# Patient Record
Sex: MALE | Race: WHITE | Employment: OTHER | URBAN - METROPOLITAN AREA
[De-identification: names, ages, dates, MRNs, and addresses within clinical notes are randomized per-mention and may not be internally consistent; named-entity substitution may affect disease eponyms.]

---

## 2017-01-12 ENCOUNTER — CARE COORDINATION (OUTPATIENT)
Dept: INTERNAL MEDICINE | Age: 82
End: 2017-01-12

## 2017-02-06 ENCOUNTER — CARE COORDINATION (OUTPATIENT)
Dept: INTERNAL MEDICINE | Age: 82
End: 2017-02-06

## 2017-02-10 RX ORDER — AMLODIPINE BESYLATE AND BENAZEPRIL HYDROCHLORIDE 10; 20 MG/1; MG/1
1 CAPSULE ORAL DAILY
Qty: 30 CAPSULE | Refills: 5 | Status: SHIPPED | OUTPATIENT
Start: 2017-02-10 | End: 2017-11-28 | Stop reason: SDUPTHER

## 2017-03-03 ENCOUNTER — CARE COORDINATION (OUTPATIENT)
Dept: INTERNAL MEDICINE | Age: 82
End: 2017-03-03

## 2017-03-14 DIAGNOSIS — E78.5 HYPERLIPIDEMIA, UNSPECIFIED HYPERLIPIDEMIA TYPE: Primary | ICD-10-CM

## 2017-03-14 DIAGNOSIS — I10 ESSENTIAL HYPERTENSION: ICD-10-CM

## 2017-03-15 DIAGNOSIS — I10 ESSENTIAL HYPERTENSION: ICD-10-CM

## 2017-03-15 DIAGNOSIS — E78.5 HYPERLIPIDEMIA, UNSPECIFIED HYPERLIPIDEMIA TYPE: ICD-10-CM

## 2017-03-15 LAB
ALBUMIN SERPL-MCNC: 4.5 G/DL (ref 3.9–4.9)
ALP BLD-CCNC: 97 U/L (ref 35–104)
ALT SERPL-CCNC: 15 U/L (ref 0–41)
ANION GAP SERPL CALCULATED.3IONS-SCNC: 13 MEQ/L (ref 7–13)
AST SERPL-CCNC: 14 U/L (ref 0–40)
BILIRUB SERPL-MCNC: 0.7 MG/DL (ref 0–1.2)
BUN BLDV-MCNC: 21 MG/DL (ref 8–23)
CALCIUM SERPL-MCNC: 9.5 MG/DL (ref 8.6–10.2)
CHLORIDE BLD-SCNC: 100 MEQ/L (ref 98–107)
CHOLESTEROL, TOTAL: 173 MG/DL (ref 0–199)
CO2: 25 MEQ/L (ref 22–29)
CREAT SERPL-MCNC: 1.09 MG/DL (ref 0.7–1.2)
GFR AFRICAN AMERICAN: >60
GFR NON-AFRICAN AMERICAN: >60
GLOBULIN: 2.3 G/DL (ref 2.3–3.5)
GLUCOSE BLD-MCNC: 89 MG/DL (ref 74–109)
HDLC SERPL-MCNC: 55 MG/DL (ref 40–59)
LDL CHOLESTEROL CALCULATED: 97 MG/DL (ref 0–129)
POTASSIUM SERPL-SCNC: 4.3 MEQ/L (ref 3.5–5.1)
SODIUM BLD-SCNC: 138 MEQ/L (ref 132–144)
TOTAL PROTEIN: 6.8 G/DL (ref 6.4–8.1)
TRIGL SERPL-MCNC: 106 MG/DL (ref 0–200)

## 2017-03-20 ENCOUNTER — OFFICE VISIT (OUTPATIENT)
Dept: FAMILY MEDICINE CLINIC | Age: 82
End: 2017-03-20

## 2017-03-20 VITALS
TEMPERATURE: 97.3 F | HEIGHT: 69 IN | DIASTOLIC BLOOD PRESSURE: 66 MMHG | RESPIRATION RATE: 16 BRPM | SYSTOLIC BLOOD PRESSURE: 134 MMHG | HEART RATE: 80 BPM | BODY MASS INDEX: 30.81 KG/M2 | WEIGHT: 208 LBS

## 2017-03-20 DIAGNOSIS — I65.23 CAROTID STENOSIS, BILATERAL: ICD-10-CM

## 2017-03-20 DIAGNOSIS — I10 ESSENTIAL HYPERTENSION: Primary | ICD-10-CM

## 2017-03-20 DIAGNOSIS — F02.80 ALZHEIMER'S DISEASE OF OTHER ONSET WITHOUT BEHAVIORAL DISTURBANCE: ICD-10-CM

## 2017-03-20 DIAGNOSIS — G30.8 ALZHEIMER'S DISEASE OF OTHER ONSET WITHOUT BEHAVIORAL DISTURBANCE: ICD-10-CM

## 2017-03-20 DIAGNOSIS — E78.5 HYPERLIPIDEMIA, UNSPECIFIED HYPERLIPIDEMIA TYPE: ICD-10-CM

## 2017-03-20 PROCEDURE — G8484 FLU IMMUNIZE NO ADMIN: HCPCS | Performed by: FAMILY MEDICINE

## 2017-03-20 PROCEDURE — 4040F PNEUMOC VAC/ADMIN/RCVD: CPT | Performed by: FAMILY MEDICINE

## 2017-03-20 PROCEDURE — 99214 OFFICE O/P EST MOD 30 MIN: CPT | Performed by: FAMILY MEDICINE

## 2017-03-20 PROCEDURE — 1123F ACP DISCUSS/DSCN MKR DOCD: CPT | Performed by: FAMILY MEDICINE

## 2017-03-20 PROCEDURE — G8419 CALC BMI OUT NRM PARAM NOF/U: HCPCS | Performed by: FAMILY MEDICINE

## 2017-03-20 PROCEDURE — G8599 NO ASA/ANTIPLAT THER USE RNG: HCPCS | Performed by: FAMILY MEDICINE

## 2017-03-20 PROCEDURE — 1036F TOBACCO NON-USER: CPT | Performed by: FAMILY MEDICINE

## 2017-03-20 PROCEDURE — G8427 DOCREV CUR MEDS BY ELIG CLIN: HCPCS | Performed by: FAMILY MEDICINE

## 2017-04-10 ENCOUNTER — CARE COORDINATION (OUTPATIENT)
Dept: INTERNAL MEDICINE | Age: 82
End: 2017-04-10

## 2017-05-01 ENCOUNTER — CARE COORDINATION (OUTPATIENT)
Dept: INTERNAL MEDICINE | Age: 82
End: 2017-05-01

## 2017-06-05 RX ORDER — PRAVASTATIN SODIUM 80 MG/1
TABLET ORAL
Qty: 90 TABLET | Refills: 1 | Status: SHIPPED | OUTPATIENT
Start: 2017-06-05 | End: 2018-04-26 | Stop reason: SDUPTHER

## 2017-06-26 RX ORDER — DONEPEZIL HYDROCHLORIDE 5 MG/1
TABLET, FILM COATED ORAL
Qty: 30 TABLET | Refills: 5 | Status: SHIPPED | OUTPATIENT
Start: 2017-06-26 | End: 2018-04-25 | Stop reason: SDUPTHER

## 2017-08-09 RX ORDER — METOPROLOL SUCCINATE 25 MG/1
25 TABLET, EXTENDED RELEASE ORAL DAILY
Qty: 30 TABLET | Refills: 5 | Status: SHIPPED | OUTPATIENT
Start: 2017-08-09 | End: 2018-03-06 | Stop reason: SDUPTHER

## 2017-09-11 DIAGNOSIS — I10 ESSENTIAL HYPERTENSION: ICD-10-CM

## 2017-09-11 DIAGNOSIS — E78.5 HYPERLIPIDEMIA, UNSPECIFIED HYPERLIPIDEMIA TYPE: Primary | ICD-10-CM

## 2017-09-12 DIAGNOSIS — I10 ESSENTIAL HYPERTENSION: ICD-10-CM

## 2017-09-12 DIAGNOSIS — E78.5 HYPERLIPIDEMIA, UNSPECIFIED HYPERLIPIDEMIA TYPE: ICD-10-CM

## 2017-09-12 LAB
ALBUMIN SERPL-MCNC: 4.3 G/DL (ref 3.9–4.9)
ALP BLD-CCNC: 102 U/L (ref 35–104)
ALT SERPL-CCNC: 11 U/L (ref 0–41)
ANION GAP SERPL CALCULATED.3IONS-SCNC: 16 MEQ/L (ref 7–13)
AST SERPL-CCNC: 12 U/L (ref 0–40)
BASOPHILS ABSOLUTE: 0.1 K/UL (ref 0–0.2)
BASOPHILS RELATIVE PERCENT: 0.7 %
BILIRUB SERPL-MCNC: 0.4 MG/DL (ref 0–1.2)
BUN BLDV-MCNC: 24 MG/DL (ref 8–23)
CALCIUM SERPL-MCNC: 9.4 MG/DL (ref 8.6–10.2)
CHLORIDE BLD-SCNC: 100 MEQ/L (ref 98–107)
CHOLESTEROL, TOTAL: 149 MG/DL (ref 0–199)
CO2: 24 MEQ/L (ref 22–29)
CREAT SERPL-MCNC: 0.93 MG/DL (ref 0.7–1.2)
EOSINOPHILS ABSOLUTE: 0.2 K/UL (ref 0–0.7)
EOSINOPHILS RELATIVE PERCENT: 2.8 %
GFR AFRICAN AMERICAN: >60
GFR NON-AFRICAN AMERICAN: >60
GLOBULIN: 2.4 G/DL (ref 2.3–3.5)
GLUCOSE BLD-MCNC: 100 MG/DL (ref 74–109)
HCT VFR BLD CALC: 42.7 % (ref 42–52)
HDLC SERPL-MCNC: 56 MG/DL (ref 40–59)
HEMOGLOBIN: 14.3 G/DL (ref 14–18)
LDL CHOLESTEROL CALCULATED: 76 MG/DL (ref 0–129)
LYMPHOCYTES ABSOLUTE: 1.4 K/UL (ref 1–4.8)
LYMPHOCYTES RELATIVE PERCENT: 17 %
MCH RBC QN AUTO: 30.8 PG (ref 27–31.3)
MCHC RBC AUTO-ENTMCNC: 33.4 % (ref 33–37)
MCV RBC AUTO: 92.1 FL (ref 80–100)
MONOCYTES ABSOLUTE: 0.8 K/UL (ref 0.2–0.8)
MONOCYTES RELATIVE PERCENT: 9.8 %
NEUTROPHILS ABSOLUTE: 5.9 K/UL (ref 1.4–6.5)
NEUTROPHILS RELATIVE PERCENT: 69.7 %
PDW BLD-RTO: 14.6 % (ref 11.5–14.5)
PLATELET # BLD: 216 K/UL (ref 130–400)
POTASSIUM SERPL-SCNC: 4.7 MEQ/L (ref 3.5–5.1)
RBC # BLD: 4.64 M/UL (ref 4.7–6.1)
SODIUM BLD-SCNC: 140 MEQ/L (ref 132–144)
TOTAL PROTEIN: 6.7 G/DL (ref 6.4–8.1)
TRIGL SERPL-MCNC: 83 MG/DL (ref 0–200)
WBC # BLD: 8.5 K/UL (ref 4.8–10.8)

## 2017-09-18 ENCOUNTER — OFFICE VISIT (OUTPATIENT)
Dept: FAMILY MEDICINE CLINIC | Age: 82
End: 2017-09-18

## 2017-09-18 VITALS
DIASTOLIC BLOOD PRESSURE: 60 MMHG | WEIGHT: 208 LBS | SYSTOLIC BLOOD PRESSURE: 122 MMHG | HEIGHT: 69 IN | TEMPERATURE: 97.7 F | BODY MASS INDEX: 30.81 KG/M2 | RESPIRATION RATE: 16 BRPM | HEART RATE: 72 BPM

## 2017-09-18 DIAGNOSIS — E78.5 HYPERLIPIDEMIA, UNSPECIFIED HYPERLIPIDEMIA TYPE: ICD-10-CM

## 2017-09-18 DIAGNOSIS — G30.9 ALZHEIMER'S DEMENTIA WITHOUT BEHAVIORAL DISTURBANCE, UNSPECIFIED TIMING OF DEMENTIA ONSET: ICD-10-CM

## 2017-09-18 DIAGNOSIS — Z23 NEED FOR INFLUENZA VACCINATION: ICD-10-CM

## 2017-09-18 DIAGNOSIS — Z12.5 PROSTATE CANCER SCREENING: ICD-10-CM

## 2017-09-18 DIAGNOSIS — I10 ESSENTIAL HYPERTENSION: Primary | ICD-10-CM

## 2017-09-18 DIAGNOSIS — F02.80 ALZHEIMER'S DEMENTIA WITHOUT BEHAVIORAL DISTURBANCE, UNSPECIFIED TIMING OF DEMENTIA ONSET: ICD-10-CM

## 2017-09-18 PROCEDURE — 4040F PNEUMOC VAC/ADMIN/RCVD: CPT | Performed by: FAMILY MEDICINE

## 2017-09-18 PROCEDURE — 1036F TOBACCO NON-USER: CPT | Performed by: FAMILY MEDICINE

## 2017-09-18 PROCEDURE — G8427 DOCREV CUR MEDS BY ELIG CLIN: HCPCS | Performed by: FAMILY MEDICINE

## 2017-09-18 PROCEDURE — 99214 OFFICE O/P EST MOD 30 MIN: CPT | Performed by: FAMILY MEDICINE

## 2017-09-18 PROCEDURE — G8599 NO ASA/ANTIPLAT THER USE RNG: HCPCS | Performed by: FAMILY MEDICINE

## 2017-09-18 PROCEDURE — 1123F ACP DISCUSS/DSCN MKR DOCD: CPT | Performed by: FAMILY MEDICINE

## 2017-09-18 PROCEDURE — G8417 CALC BMI ABV UP PARAM F/U: HCPCS | Performed by: FAMILY MEDICINE

## 2017-09-18 PROCEDURE — G0008 ADMIN INFLUENZA VIRUS VAC: HCPCS | Performed by: FAMILY MEDICINE

## 2017-09-18 PROCEDURE — 90662 IIV NO PRSV INCREASED AG IM: CPT | Performed by: FAMILY MEDICINE

## 2017-09-18 ASSESSMENT — PATIENT HEALTH QUESTIONNAIRE - PHQ9
2. FEELING DOWN, DEPRESSED OR HOPELESS: 1
SUM OF ALL RESPONSES TO PHQ QUESTIONS 1-9: 2
1. LITTLE INTEREST OR PLEASURE IN DOING THINGS: 1
SUM OF ALL RESPONSES TO PHQ9 QUESTIONS 1 & 2: 2

## 2017-11-28 RX ORDER — AMLODIPINE BESYLATE AND BENAZEPRIL HYDROCHLORIDE 10; 20 MG/1; MG/1
CAPSULE ORAL
Qty: 30 CAPSULE | Refills: 5 | Status: SHIPPED | OUTPATIENT
Start: 2017-11-28 | End: 2018-03-06 | Stop reason: SDUPTHER

## 2017-11-29 RX ORDER — AMLODIPINE BESYLATE AND BENAZEPRIL HYDROCHLORIDE 10; 20 MG/1; MG/1
CAPSULE ORAL
Qty: 30 CAPSULE | Refills: 5 | Status: SHIPPED | OUTPATIENT
Start: 2017-11-29 | End: 2018-03-06 | Stop reason: SDUPTHER

## 2017-12-01 RX ORDER — AMLODIPINE BESYLATE AND BENAZEPRIL HYDROCHLORIDE 10; 20 MG/1; MG/1
CAPSULE ORAL
Qty: 30 CAPSULE | Refills: 5 | Status: SHIPPED | OUTPATIENT
Start: 2017-12-01 | End: 2018-05-14

## 2018-02-06 RX ORDER — FINASTERIDE 5 MG/1
TABLET, FILM COATED ORAL
Qty: 90 TABLET | Refills: 3 | Status: SHIPPED | OUTPATIENT
Start: 2018-02-06 | End: 2019-03-29 | Stop reason: SDUPTHER

## 2018-03-06 ENCOUNTER — OFFICE VISIT (OUTPATIENT)
Dept: FAMILY MEDICINE CLINIC | Age: 83
End: 2018-03-06
Payer: MEDICARE

## 2018-03-06 VITALS
HEIGHT: 69 IN | SYSTOLIC BLOOD PRESSURE: 160 MMHG | DIASTOLIC BLOOD PRESSURE: 66 MMHG | TEMPERATURE: 98.1 F | RESPIRATION RATE: 18 BRPM | HEART RATE: 84 BPM | WEIGHT: 208 LBS | BODY MASS INDEX: 30.81 KG/M2

## 2018-03-06 DIAGNOSIS — I10 ESSENTIAL HYPERTENSION: Primary | ICD-10-CM

## 2018-03-06 DIAGNOSIS — G30.9 ALZHEIMER'S DEMENTIA WITHOUT BEHAVIORAL DISTURBANCE, UNSPECIFIED TIMING OF DEMENTIA ONSET: ICD-10-CM

## 2018-03-06 DIAGNOSIS — F02.80 ALZHEIMER'S DEMENTIA WITHOUT BEHAVIORAL DISTURBANCE, UNSPECIFIED TIMING OF DEMENTIA ONSET: ICD-10-CM

## 2018-03-06 DIAGNOSIS — I65.23 CAROTID STENOSIS, BILATERAL: ICD-10-CM

## 2018-03-06 DIAGNOSIS — E78.5 HYPERLIPIDEMIA, UNSPECIFIED HYPERLIPIDEMIA TYPE: ICD-10-CM

## 2018-03-06 PROCEDURE — 1036F TOBACCO NON-USER: CPT | Performed by: FAMILY MEDICINE

## 2018-03-06 PROCEDURE — G8427 DOCREV CUR MEDS BY ELIG CLIN: HCPCS | Performed by: FAMILY MEDICINE

## 2018-03-06 PROCEDURE — G8484 FLU IMMUNIZE NO ADMIN: HCPCS | Performed by: FAMILY MEDICINE

## 2018-03-06 PROCEDURE — 1123F ACP DISCUSS/DSCN MKR DOCD: CPT | Performed by: FAMILY MEDICINE

## 2018-03-06 PROCEDURE — 99214 OFFICE O/P EST MOD 30 MIN: CPT | Performed by: FAMILY MEDICINE

## 2018-03-06 PROCEDURE — G8599 NO ASA/ANTIPLAT THER USE RNG: HCPCS | Performed by: FAMILY MEDICINE

## 2018-03-06 PROCEDURE — 4040F PNEUMOC VAC/ADMIN/RCVD: CPT | Performed by: FAMILY MEDICINE

## 2018-03-06 PROCEDURE — G8417 CALC BMI ABV UP PARAM F/U: HCPCS | Performed by: FAMILY MEDICINE

## 2018-03-06 RX ORDER — METOPROLOL SUCCINATE 50 MG/1
50 TABLET, EXTENDED RELEASE ORAL DAILY
Qty: 90 TABLET | Refills: 3 | Status: SHIPPED | OUTPATIENT
Start: 2018-03-06 | End: 2018-12-27 | Stop reason: HOSPADM

## 2018-03-06 NOTE — PROGRESS NOTES
Serenoa repens, (SAW PALMETTO PO) Take by mouth      Multiple Vitamins-Minerals (MULTIVITAMIN PO) Take by mouth      dorzolamide-timolol (COSOPT) 22.3-6.8 MG/ML ophthalmic solution       latanoprost (XALATAN) 0.005 % ophthalmic solution       timolol (TIMOPTIC) 0.5 % ophthalmic solution        No current facility-administered medications for this visit. The patient denies any history of      seizures,             heart attack or KNOWN CAD        or stroke. No chest pain, shortness of breath, paroxysmal nocturnal dyspnea. No nausea, vomiting, diarrhea, hematochezia or melena. No paresthesias or headaches. No dysuria, frequency or hematuria. Last labs  No visits with results within 3 Month(s) from this visit. Latest known visit with results is:   Orders Only on 09/12/2017   Component Date Value Ref Range Status    Cholesterol, Total 09/12/2017 149  0 - 199 mg/dL Final    Triglycerides 09/12/2017 83  0 - 200 mg/dL Final    HDL 09/12/2017 56  40 - 59 mg/dL Final    Comment: ATP III HDL Cholesterol Classification is Desirable. Expected Values:    Males:    >55 = No Risk            35-55 = Moderate Risk            <35 = High Risk    Females:  >65 = No Risk            45-65 = Moderate Risk            <45 = High Risk    NCEP Guidelines:   Third Report May 2001  >59 = negative risk factor for CHD  <40 = major risk factor for CHD      LDL Calculated 09/12/2017 76  0 - 129 mg/dL Final    Sodium 09/12/2017 140  132 - 144 mEq/L Final    Potassium 09/12/2017 4.7  3.5 - 5.1 mEq/L Final    Chloride 09/12/2017 100  98 - 107 mEq/L Final    CO2 09/12/2017 24  22 - 29 mEq/L Final    Anion Gap 09/12/2017 16* 7 - 13 mEq/L Final    Glucose 09/12/2017 100  74 - 109 mg/dL Final    BUN 09/12/2017 24* 8 - 23 mg/dL Final    CREATININE 09/12/2017 0.93  0.70 - 1.20 mg/dL Final    GFR Non- 09/12/2017 >60.0  >60 Final    Comment: >60 mL/min/1.73m2 EGFR, calc. for ages 25 and older using No results found for this visit on 03/06/18. Objective    Vitals:    03/06/18 1438 03/06/18 1446   BP: (!) 158/60 (!) 160/66   Pulse: 84    Resp: 18    Temp: 98.1 °F (36.7 °C)    TempSrc: Oral    Weight: 208 lb (94.3 kg)    Height: 5' 9\" (1.753 m)        PHYSICAL EXAMINATION:        GENERAL:    The patient appears well nourished and well-developed,     Normal affect. Not appearing significantly anxious or depressed. No acute respiratory distress. Alert and oriented times 2. Skin:     No skin rashes. No concerning moles observed. Gait:    Normal gait. No ataxia. HEENT:  Normocephalic, atraumatic. Throat:  Pharynx is clear, no erythema/ edema or exudates   Ears:    TMs normal bilaterally. Canals and ears normal   Eyes:  Extraocular eye motions intact and pain free. Pupils reactive/equal    Sclerae and conjunctivae clear    NECK: No masses or adenopathy palpable. No carotid bruits heard. No asymmetry visible. No thyromegaly. RESPIRATORY:   Clear/ Equal breath sounds /No acute respiratory distress. No wheezes,rales, or rhonchi. No percussive abnormalities    HEART: Regular rhythm without murmur, rub or gallop. ABDOMEN:  Soft, non tender. No masses, guarding or rebound. Normo active bowel sounds. EXTREMITIES:  No edema in any extremity. No cyanosis or clubbing. 2+ dorsalis pedis pulses bilaterally          Assessment & Plan   1. Essential hypertension     2. Hyperlipidemia, unspecified hyperlipidemia type     3. Carotid stenosis, bilateral     4. Alzheimer's dementia without behavioral disturbance, unspecified timing of dementia onset       No orders of the defined types were placed in this encounter.     Orders Placed This Encounter   Medications    metoprolol succinate (TOPROL XL) 50 MG extended release tablet     Sig: Take 1 tablet by mouth daily     Dispense:  90 tablet     Refill:  3     Medications Discontinued During This Encounter Medication Reason    amLODIPine-benazepril (LOTREL) 10-20 MG per capsule Duplicate Order    amLODIPine-benazepril (LOTREL) 10-20 MG per capsule Duplicate Order    metoprolol succinate (TOPROL XL) 25 MG extended release tablet Reorder     Return in about 4 months (around 7/6/2018).   Labs and appt     Lars Telles MD

## 2018-04-25 RX ORDER — DONEPEZIL HYDROCHLORIDE 5 MG/1
TABLET, FILM COATED ORAL
Qty: 30 TABLET | Refills: 5 | Status: SHIPPED | OUTPATIENT
Start: 2018-04-25 | End: 2018-05-18 | Stop reason: SDUPTHER

## 2018-04-27 RX ORDER — PRAVASTATIN SODIUM 80 MG/1
TABLET ORAL
Qty: 90 TABLET | Refills: 1 | Status: SHIPPED | OUTPATIENT
Start: 2018-04-27 | End: 2018-11-12 | Stop reason: SDUPTHER

## 2018-05-01 ENCOUNTER — HOSPITAL ENCOUNTER (OUTPATIENT)
Age: 83
Setting detail: OBSERVATION
Discharge: HOME | End: 2018-05-02
Attending: STUDENT IN AN ORGANIZED HEALTH CARE EDUCATION/TRAINING PROGRAM | Admitting: INTERNAL MEDICINE
Payer: MEDICARE

## 2018-05-01 ENCOUNTER — APPOINTMENT (OUTPATIENT)
Dept: GENERAL RADIOLOGY | Age: 83
End: 2018-05-01
Payer: MEDICARE

## 2018-05-01 DIAGNOSIS — R77.8 ELEVATED TROPONIN: Primary | ICD-10-CM

## 2018-05-01 DIAGNOSIS — I16.0 MALIGNANT HYPERTENSIVE URGENCY: ICD-10-CM

## 2018-05-01 LAB
ALBUMIN SERPL-MCNC: 4.5 G/DL (ref 3.9–4.9)
ALP BLD-CCNC: 106 U/L (ref 35–104)
ALT SERPL-CCNC: 14 U/L (ref 0–41)
ANION GAP SERPL CALCULATED.3IONS-SCNC: 15 MEQ/L (ref 7–13)
APTT: 27.3 SEC (ref 21.6–35.4)
AST SERPL-CCNC: 14 U/L (ref 0–40)
BASOPHILS ABSOLUTE: 0.1 K/UL (ref 0–0.2)
BASOPHILS RELATIVE PERCENT: 1.1 %
BILIRUB SERPL-MCNC: 0.7 MG/DL (ref 0–1.2)
BUN BLDV-MCNC: 22 MG/DL (ref 8–23)
CALCIUM SERPL-MCNC: 9.8 MG/DL (ref 8.6–10.2)
CHLORIDE BLD-SCNC: 100 MEQ/L (ref 98–107)
CO2: 24 MEQ/L (ref 22–29)
CREAT SERPL-MCNC: 0.84 MG/DL (ref 0.7–1.2)
EKG ATRIAL RATE: 80 BPM
EKG P AXIS: 32 DEGREES
EKG P-R INTERVAL: 166 MS
EKG Q-T INTERVAL: 372 MS
EKG QRS DURATION: 86 MS
EKG QTC CALCULATION (BAZETT): 429 MS
EKG R AXIS: -29 DEGREES
EKG T AXIS: -1 DEGREES
EKG VENTRICULAR RATE: 80 BPM
EOSINOPHILS ABSOLUTE: 0.2 K/UL (ref 0–0.7)
EOSINOPHILS RELATIVE PERCENT: 2.6 %
GFR AFRICAN AMERICAN: >60
GFR NON-AFRICAN AMERICAN: >60
GLOBULIN: 2.3 G/DL (ref 2.3–3.5)
GLUCOSE BLD-MCNC: 103 MG/DL (ref 74–109)
HCT VFR BLD CALC: 46 % (ref 42–52)
HEMOGLOBIN: 15.7 G/DL (ref 14–18)
INR BLD: 1
LV EF: 60 %
LVEF MODALITY: NORMAL
LYMPHOCYTES ABSOLUTE: 1.2 K/UL (ref 1–4.8)
LYMPHOCYTES RELATIVE PERCENT: 18.2 %
MCH RBC QN AUTO: 31.3 PG (ref 27–31.3)
MCHC RBC AUTO-ENTMCNC: 34.1 % (ref 33–37)
MCV RBC AUTO: 92 FL (ref 80–100)
MONOCYTES ABSOLUTE: 0.5 K/UL (ref 0.2–0.8)
MONOCYTES RELATIVE PERCENT: 7.6 %
NEUTROPHILS ABSOLUTE: 4.6 K/UL (ref 1.4–6.5)
NEUTROPHILS RELATIVE PERCENT: 70.5 %
PDW BLD-RTO: 14.3 % (ref 11.5–14.5)
PLATELET # BLD: 190 K/UL (ref 130–400)
POTASSIUM SERPL-SCNC: 4.2 MEQ/L (ref 3.5–5.1)
PROTHROMBIN TIME: 10.8 SEC (ref 9.6–12.3)
RBC # BLD: 5.01 M/UL (ref 4.7–6.1)
SODIUM BLD-SCNC: 139 MEQ/L (ref 132–144)
TOTAL CK: 144 U/L (ref 0–190)
TOTAL PROTEIN: 6.8 G/DL (ref 6.4–8.1)
TROPONIN: 0.01 NG/ML (ref 0–0.01)
TROPONIN: <0.01 NG/ML (ref 0–0.01)
WBC # BLD: 6.5 K/UL (ref 4.8–10.8)

## 2018-05-01 PROCEDURE — 99285 EMERGENCY DEPT VISIT HI MDM: CPT

## 2018-05-01 PROCEDURE — 80053 COMPREHEN METABOLIC PANEL: CPT

## 2018-05-01 PROCEDURE — 36415 COLL VENOUS BLD VENIPUNCTURE: CPT

## 2018-05-01 PROCEDURE — 85025 COMPLETE CBC W/AUTO DIFF WBC: CPT

## 2018-05-01 PROCEDURE — 85610 PROTHROMBIN TIME: CPT

## 2018-05-01 PROCEDURE — 93005 ELECTROCARDIOGRAM TRACING: CPT

## 2018-05-01 PROCEDURE — 96372 THER/PROPH/DIAG INJ SC/IM: CPT

## 2018-05-01 PROCEDURE — 71046 X-RAY EXAM CHEST 2 VIEWS: CPT

## 2018-05-01 PROCEDURE — 6370000000 HC RX 637 (ALT 250 FOR IP): Performed by: STUDENT IN AN ORGANIZED HEALTH CARE EDUCATION/TRAINING PROGRAM

## 2018-05-01 PROCEDURE — G0378 HOSPITAL OBSERVATION PER HR: HCPCS

## 2018-05-01 PROCEDURE — 2580000003 HC RX 258: Performed by: NURSE PRACTITIONER

## 2018-05-01 PROCEDURE — 82550 ASSAY OF CK (CPK): CPT

## 2018-05-01 PROCEDURE — 93306 TTE W/DOPPLER COMPLETE: CPT

## 2018-05-01 PROCEDURE — 6370000000 HC RX 637 (ALT 250 FOR IP): Performed by: INTERNAL MEDICINE

## 2018-05-01 PROCEDURE — 6360000002 HC RX W HCPCS: Performed by: NURSE PRACTITIONER

## 2018-05-01 PROCEDURE — 96374 THER/PROPH/DIAG INJ IV PUSH: CPT

## 2018-05-01 PROCEDURE — 84484 ASSAY OF TROPONIN QUANT: CPT

## 2018-05-01 PROCEDURE — 85730 THROMBOPLASTIN TIME PARTIAL: CPT

## 2018-05-01 RX ORDER — SODIUM CHLORIDE 0.9 % (FLUSH) 0.9 %
10 SYRINGE (ML) INJECTION EVERY 12 HOURS SCHEDULED
Status: DISCONTINUED | OUTPATIENT
Start: 2018-05-01 | End: 2018-05-02 | Stop reason: HOSPADM

## 2018-05-01 RX ORDER — METOPROLOL SUCCINATE 50 MG/1
50 TABLET, EXTENDED RELEASE ORAL DAILY
Status: DISCONTINUED | OUTPATIENT
Start: 2018-05-01 | End: 2018-05-02 | Stop reason: HOSPADM

## 2018-05-01 RX ORDER — LATANOPROST 50 UG/ML
1 SOLUTION/ DROPS OPHTHALMIC NIGHTLY
Status: DISCONTINUED | OUTPATIENT
Start: 2018-05-01 | End: 2018-05-02 | Stop reason: HOSPADM

## 2018-05-01 RX ORDER — DONEPEZIL HYDROCHLORIDE 5 MG/1
5 TABLET, FILM COATED ORAL NIGHTLY
Status: DISCONTINUED | OUTPATIENT
Start: 2018-05-01 | End: 2018-05-02 | Stop reason: HOSPADM

## 2018-05-01 RX ORDER — ASPIRIN 81 MG/1
81 TABLET, CHEWABLE ORAL ONCE
Status: COMPLETED | OUTPATIENT
Start: 2018-05-01 | End: 2018-05-01

## 2018-05-01 RX ORDER — TIMOLOL MALEATE 5 MG/ML
1 SOLUTION/ DROPS OPHTHALMIC DAILY
Status: DISCONTINUED | OUTPATIENT
Start: 2018-05-01 | End: 2018-05-01 | Stop reason: SDUPTHER

## 2018-05-01 RX ORDER — AMLODIPINE BESYLATE 10 MG/1
10 TABLET ORAL DAILY
Status: DISCONTINUED | OUTPATIENT
Start: 2018-05-01 | End: 2018-05-02 | Stop reason: HOSPADM

## 2018-05-01 RX ORDER — LISINOPRIL 20 MG/1
20 TABLET ORAL DAILY
Status: DISCONTINUED | OUTPATIENT
Start: 2018-05-01 | End: 2018-05-02 | Stop reason: HOSPADM

## 2018-05-01 RX ORDER — DORZOLAMIDE HCL 20 MG/ML
1 SOLUTION/ DROPS OPHTHALMIC 2 TIMES DAILY
Status: DISCONTINUED | OUTPATIENT
Start: 2018-05-01 | End: 2018-05-02 | Stop reason: HOSPADM

## 2018-05-01 RX ORDER — DORZOLAMIDE HYDROCHLORIDE AND TIMOLOL MALEATE 20; 5 MG/ML; MG/ML
1 SOLUTION/ DROPS OPHTHALMIC 2 TIMES DAILY
Status: DISCONTINUED | OUTPATIENT
Start: 2018-05-01 | End: 2018-05-01 | Stop reason: CLARIF

## 2018-05-01 RX ORDER — SODIUM CHLORIDE 0.9 % (FLUSH) 0.9 %
10 SYRINGE (ML) INJECTION PRN
Status: DISCONTINUED | OUTPATIENT
Start: 2018-05-01 | End: 2018-05-02 | Stop reason: HOSPADM

## 2018-05-01 RX ORDER — TIMOLOL MALEATE 5 MG/ML
1 SOLUTION/ DROPS OPHTHALMIC 2 TIMES DAILY
Status: DISCONTINUED | OUTPATIENT
Start: 2018-05-01 | End: 2018-05-02 | Stop reason: HOSPADM

## 2018-05-01 RX ORDER — HYDRALAZINE HYDROCHLORIDE 20 MG/ML
10 INJECTION INTRAMUSCULAR; INTRAVENOUS EVERY 4 HOURS PRN
Status: DISCONTINUED | OUTPATIENT
Start: 2018-05-01 | End: 2018-05-02 | Stop reason: HOSPADM

## 2018-05-01 RX ORDER — PRAVASTATIN SODIUM 80 MG/1
80 TABLET ORAL EVERY EVENING
Status: DISCONTINUED | OUTPATIENT
Start: 2018-05-01 | End: 2018-05-02 | Stop reason: HOSPADM

## 2018-05-01 RX ORDER — FINASTERIDE 5 MG/1
5 TABLET, FILM COATED ORAL DAILY
Status: DISCONTINUED | OUTPATIENT
Start: 2018-05-01 | End: 2018-05-02 | Stop reason: HOSPADM

## 2018-05-01 RX ORDER — LORAZEPAM 1 MG/1
1 TABLET ORAL EVERY 8 HOURS PRN
Status: DISCONTINUED | OUTPATIENT
Start: 2018-05-01 | End: 2018-05-02 | Stop reason: HOSPADM

## 2018-05-01 RX ADMIN — HYDRALAZINE HYDROCHLORIDE 10 MG: 20 INJECTION INTRAMUSCULAR; INTRAVENOUS at 16:15

## 2018-05-01 RX ADMIN — ENOXAPARIN SODIUM 40 MG: 40 INJECTION SUBCUTANEOUS at 16:07

## 2018-05-01 RX ADMIN — PRAVASTATIN SODIUM 80 MG: 80 TABLET ORAL at 21:58

## 2018-05-01 RX ADMIN — METOPROLOL SUCCINATE 50 MG: 50 TABLET, EXTENDED RELEASE ORAL at 21:59

## 2018-05-01 RX ADMIN — Medication 10 ML: at 22:06

## 2018-05-01 RX ADMIN — DONEPEZIL HYDROCHLORIDE 5 MG: 5 TABLET, FILM COATED ORAL at 21:59

## 2018-05-01 RX ADMIN — LISINOPRIL 20 MG: 20 TABLET ORAL at 21:58

## 2018-05-01 RX ADMIN — ASPIRIN 81 MG 81 MG: 81 TABLET ORAL at 13:14

## 2018-05-01 ASSESSMENT — ENCOUNTER SYMPTOMS
ABDOMINAL PAIN: 0
SHORTNESS OF BREATH: 0
EYE PAIN: 0
CHEST TIGHTNESS: 0
TROUBLE SWALLOWING: 0
BACK PAIN: 0
COUGH: 0
SINUS PRESSURE: 0
DIARRHEA: 0
VOMITING: 0
PHOTOPHOBIA: 0

## 2018-05-01 ASSESSMENT — PAIN DESCRIPTION - ORIENTATION: ORIENTATION: ANTERIOR

## 2018-05-01 ASSESSMENT — PAIN DESCRIPTION - PAIN TYPE: TYPE: ACUTE PAIN

## 2018-05-01 ASSESSMENT — PAIN DESCRIPTION - LOCATION: LOCATION: HEAD

## 2018-05-01 ASSESSMENT — PAIN SCALES - GENERAL
PAINLEVEL_OUTOF10: 2
PAINLEVEL_OUTOF10: 0

## 2018-05-01 ASSESSMENT — PAIN DESCRIPTION - DESCRIPTORS: DESCRIPTORS: THROBBING

## 2018-05-02 VITALS
DIASTOLIC BLOOD PRESSURE: 66 MMHG | HEIGHT: 69 IN | HEART RATE: 67 BPM | SYSTOLIC BLOOD PRESSURE: 129 MMHG | RESPIRATION RATE: 16 BRPM | OXYGEN SATURATION: 95 % | WEIGHT: 206.57 LBS | TEMPERATURE: 97.9 F | BODY MASS INDEX: 30.6 KG/M2

## 2018-05-02 LAB
ANION GAP SERPL CALCULATED.3IONS-SCNC: 13 MEQ/L (ref 7–13)
BASOPHILS ABSOLUTE: 0.1 K/UL (ref 0–0.2)
BASOPHILS RELATIVE PERCENT: 1 %
BUN BLDV-MCNC: 21 MG/DL (ref 8–23)
CALCIUM SERPL-MCNC: 9.2 MG/DL (ref 8.6–10.2)
CHLORIDE BLD-SCNC: 104 MEQ/L (ref 98–107)
CO2: 23 MEQ/L (ref 22–29)
CREAT SERPL-MCNC: 0.85 MG/DL (ref 0.7–1.2)
EOSINOPHILS ABSOLUTE: 0.3 K/UL (ref 0–0.7)
EOSINOPHILS RELATIVE PERCENT: 4.2 %
GFR AFRICAN AMERICAN: >60
GFR NON-AFRICAN AMERICAN: >60
GLUCOSE BLD-MCNC: 101 MG/DL (ref 74–109)
HCT VFR BLD CALC: 42.4 % (ref 42–52)
HEMOGLOBIN: 14.4 G/DL (ref 14–18)
LYMPHOCYTES ABSOLUTE: 1.7 K/UL (ref 1–4.8)
LYMPHOCYTES RELATIVE PERCENT: 24.3 %
MCH RBC QN AUTO: 31.4 PG (ref 27–31.3)
MCHC RBC AUTO-ENTMCNC: 33.8 % (ref 33–37)
MCV RBC AUTO: 92.7 FL (ref 80–100)
MONOCYTES ABSOLUTE: 0.6 K/UL (ref 0.2–0.8)
MONOCYTES RELATIVE PERCENT: 8.3 %
NEUTROPHILS ABSOLUTE: 4.2 K/UL (ref 1.4–6.5)
NEUTROPHILS RELATIVE PERCENT: 62.2 %
PDW BLD-RTO: 14.3 % (ref 11.5–14.5)
PLATELET # BLD: 204 K/UL (ref 130–400)
POTASSIUM REFLEX MAGNESIUM: 4.3 MEQ/L (ref 3.5–5.1)
RBC # BLD: 4.58 M/UL (ref 4.7–6.1)
SODIUM BLD-SCNC: 140 MEQ/L (ref 132–144)
TROPONIN: 0.01 NG/ML (ref 0–0.01)
TROPONIN: 0.01 NG/ML (ref 0–0.01)
WBC # BLD: 6.8 K/UL (ref 4.8–10.8)

## 2018-05-02 PROCEDURE — 6370000000 HC RX 637 (ALT 250 FOR IP): Performed by: INTERNAL MEDICINE

## 2018-05-02 PROCEDURE — 80048 BASIC METABOLIC PNL TOTAL CA: CPT

## 2018-05-02 PROCEDURE — 84484 ASSAY OF TROPONIN QUANT: CPT

## 2018-05-02 PROCEDURE — G0378 HOSPITAL OBSERVATION PER HR: HCPCS

## 2018-05-02 PROCEDURE — 99204 OFFICE O/P NEW MOD 45 MIN: CPT | Performed by: INTERNAL MEDICINE

## 2018-05-02 PROCEDURE — 36415 COLL VENOUS BLD VENIPUNCTURE: CPT

## 2018-05-02 PROCEDURE — 96372 THER/PROPH/DIAG INJ SC/IM: CPT

## 2018-05-02 PROCEDURE — 6360000002 HC RX W HCPCS: Performed by: NURSE PRACTITIONER

## 2018-05-02 PROCEDURE — 2580000003 HC RX 258: Performed by: NURSE PRACTITIONER

## 2018-05-02 PROCEDURE — 85025 COMPLETE CBC W/AUTO DIFF WBC: CPT

## 2018-05-02 RX ADMIN — Medication 10 ML: at 09:06

## 2018-05-02 RX ADMIN — AMLODIPINE BESYLATE 10 MG: 10 TABLET ORAL at 01:18

## 2018-05-02 RX ADMIN — ENOXAPARIN SODIUM 40 MG: 40 INJECTION SUBCUTANEOUS at 09:06

## 2018-05-02 RX ADMIN — METOPROLOL SUCCINATE 50 MG: 50 TABLET, EXTENDED RELEASE ORAL at 09:06

## 2018-05-02 RX ADMIN — FINASTERIDE 5 MG: 5 TABLET, FILM COATED ORAL at 09:06

## 2018-05-02 ASSESSMENT — ENCOUNTER SYMPTOMS
EYES NEGATIVE: 1
RESPIRATORY NEGATIVE: 1
SHORTNESS OF BREATH: 0
COUGH: 0
CHEST TIGHTNESS: 0
BLOOD IN STOOL: 0
NAUSEA: 0
WHEEZING: 0
STRIDOR: 0
GASTROINTESTINAL NEGATIVE: 1

## 2018-05-03 ENCOUNTER — CARE COORDINATION (OUTPATIENT)
Dept: CASE MANAGEMENT | Age: 83
End: 2018-05-03

## 2018-05-03 DIAGNOSIS — I10 ESSENTIAL HYPERTENSION: Primary | ICD-10-CM

## 2018-05-03 PROCEDURE — 1111F DSCHRG MED/CURRENT MED MERGE: CPT | Performed by: FAMILY MEDICINE

## 2018-05-04 ENCOUNTER — CARE COORDINATION (OUTPATIENT)
Dept: CARE COORDINATION | Age: 83
End: 2018-05-04

## 2018-05-04 ENCOUNTER — HOSPITAL ENCOUNTER (EMERGENCY)
Age: 83
Discharge: HOME | End: 2018-05-04
Attending: STUDENT IN AN ORGANIZED HEALTH CARE EDUCATION/TRAINING PROGRAM
Payer: MEDICARE

## 2018-05-04 VITALS
TEMPERATURE: 97.8 F | RESPIRATION RATE: 16 BRPM | OXYGEN SATURATION: 96 % | HEIGHT: 69 IN | DIASTOLIC BLOOD PRESSURE: 77 MMHG | SYSTOLIC BLOOD PRESSURE: 144 MMHG | BODY MASS INDEX: 29.77 KG/M2 | HEART RATE: 78 BPM | WEIGHT: 201 LBS

## 2018-05-04 DIAGNOSIS — I10 ESSENTIAL HYPERTENSION: Primary | ICD-10-CM

## 2018-05-04 PROCEDURE — 99282 EMERGENCY DEPT VISIT SF MDM: CPT

## 2018-05-04 ASSESSMENT — ENCOUNTER SYMPTOMS
ABDOMINAL PAIN: 0
BACK PAIN: 0
VOMITING: 0
CHEST TIGHTNESS: 0
TROUBLE SWALLOWING: 0
COUGH: 0
SHORTNESS OF BREATH: 0
DIARRHEA: 0
SINUS PRESSURE: 0

## 2018-05-14 ENCOUNTER — OFFICE VISIT (OUTPATIENT)
Dept: CARDIOLOGY CLINIC | Age: 83
End: 2018-05-14
Payer: MEDICARE

## 2018-05-14 VITALS
BODY MASS INDEX: 30.96 KG/M2 | WEIGHT: 209 LBS | OXYGEN SATURATION: 95 % | DIASTOLIC BLOOD PRESSURE: 74 MMHG | SYSTOLIC BLOOD PRESSURE: 152 MMHG | HEART RATE: 78 BPM | HEIGHT: 69 IN | RESPIRATION RATE: 16 BRPM

## 2018-05-14 DIAGNOSIS — I16.0 HYPERTENSIVE URGENCY: ICD-10-CM

## 2018-05-14 DIAGNOSIS — E78.5 HYPERLIPIDEMIA, UNSPECIFIED HYPERLIPIDEMIA TYPE: ICD-10-CM

## 2018-05-14 DIAGNOSIS — I21.4 NSTEMI (NON-ST ELEVATED MYOCARDIAL INFARCTION) (HCC): Primary | ICD-10-CM

## 2018-05-14 DIAGNOSIS — I10 ESSENTIAL HYPERTENSION: ICD-10-CM

## 2018-05-14 PROCEDURE — G8427 DOCREV CUR MEDS BY ELIG CLIN: HCPCS | Performed by: INTERNAL MEDICINE

## 2018-05-14 PROCEDURE — 4040F PNEUMOC VAC/ADMIN/RCVD: CPT | Performed by: INTERNAL MEDICINE

## 2018-05-14 PROCEDURE — 1036F TOBACCO NON-USER: CPT | Performed by: INTERNAL MEDICINE

## 2018-05-14 PROCEDURE — 99214 OFFICE O/P EST MOD 30 MIN: CPT | Performed by: INTERNAL MEDICINE

## 2018-05-14 PROCEDURE — G8598 ASA/ANTIPLAT THER USED: HCPCS | Performed by: INTERNAL MEDICINE

## 2018-05-14 PROCEDURE — G8417 CALC BMI ABV UP PARAM F/U: HCPCS | Performed by: INTERNAL MEDICINE

## 2018-05-14 PROCEDURE — 1123F ACP DISCUSS/DSCN MKR DOCD: CPT | Performed by: INTERNAL MEDICINE

## 2018-05-14 RX ORDER — BENAZEPRIL HYDROCHLORIDE 20 MG/1
20 TABLET ORAL DAILY
Qty: 30 TABLET | Refills: 3 | Status: SHIPPED | OUTPATIENT
Start: 2018-05-14 | End: 2018-10-05 | Stop reason: SDUPTHER

## 2018-05-14 RX ORDER — TRIAMTERENE AND HYDROCHLOROTHIAZIDE 37.5; 25 MG/1; MG/1
1 TABLET ORAL DAILY
Qty: 30 TABLET | Refills: 3 | Status: SHIPPED | OUTPATIENT
Start: 2018-05-14 | End: 2018-07-30 | Stop reason: SDUPTHER

## 2018-05-14 ASSESSMENT — ENCOUNTER SYMPTOMS
EYES NEGATIVE: 1
SHORTNESS OF BREATH: 0
COUGH: 0
GASTROINTESTINAL NEGATIVE: 1
STRIDOR: 0
WHEEZING: 0
RESPIRATORY NEGATIVE: 1
CHEST TIGHTNESS: 0
BLOOD IN STOOL: 0
NAUSEA: 0

## 2018-05-21 RX ORDER — DONEPEZIL HYDROCHLORIDE 5 MG/1
TABLET, FILM COATED ORAL
Qty: 30 TABLET | Refills: 5 | Status: ON HOLD | OUTPATIENT
Start: 2018-05-21 | End: 2018-11-14 | Stop reason: HOSPADM

## 2018-05-29 ENCOUNTER — HOSPITAL ENCOUNTER (OUTPATIENT)
Dept: NUCLEAR MEDICINE | Age: 83
Discharge: HOME | End: 2018-05-31
Payer: MEDICARE

## 2018-05-29 DIAGNOSIS — I21.4 NSTEMI (NON-ST ELEVATED MYOCARDIAL INFARCTION) (HCC): ICD-10-CM

## 2018-05-29 PROCEDURE — 6360000002 HC RX W HCPCS: Performed by: INTERNAL MEDICINE

## 2018-05-29 PROCEDURE — 2580000003 HC RX 258: Performed by: INTERNAL MEDICINE

## 2018-05-29 PROCEDURE — 78452 HT MUSCLE IMAGE SPECT MULT: CPT

## 2018-05-29 PROCEDURE — A9502 TC99M TETROFOSMIN: HCPCS | Performed by: INTERNAL MEDICINE

## 2018-05-29 PROCEDURE — 3430000000 HC RX DIAGNOSTIC RADIOPHARMACEUTICAL: Performed by: INTERNAL MEDICINE

## 2018-05-29 PROCEDURE — 93017 CV STRESS TEST TRACING ONLY: CPT

## 2018-05-29 RX ORDER — SODIUM CHLORIDE 0.9 % (FLUSH) 0.9 %
10 SYRINGE (ML) INJECTION PRN
Status: DISCONTINUED | OUTPATIENT
Start: 2018-05-29 | End: 2018-06-01 | Stop reason: HOSPADM

## 2018-05-29 RX ADMIN — Medication 10 ML: at 11:46

## 2018-05-29 RX ADMIN — Medication 10 ML: at 11:47

## 2018-05-29 RX ADMIN — REGADENOSON 0.4 MG: 0.08 INJECTION, SOLUTION INTRAVENOUS at 11:46

## 2018-05-29 RX ADMIN — Medication 10 ML: at 09:35

## 2018-05-29 RX ADMIN — TETROFOSMIN 30.4 MILLICURIE: 0.23 INJECTION, POWDER, LYOPHILIZED, FOR SOLUTION INTRAVENOUS at 11:46

## 2018-05-29 RX ADMIN — TETROFOSMIN 9.6 MILLICURIE: 0.23 INJECTION, POWDER, LYOPHILIZED, FOR SOLUTION INTRAVENOUS at 09:35

## 2018-06-29 ENCOUNTER — OFFICE VISIT (OUTPATIENT)
Dept: CARDIOLOGY CLINIC | Age: 83
End: 2018-06-29
Payer: MEDICARE

## 2018-06-29 VITALS
BODY MASS INDEX: 30.27 KG/M2 | OXYGEN SATURATION: 98 % | TEMPERATURE: 98.7 F | WEIGHT: 204.4 LBS | HEIGHT: 69 IN | HEART RATE: 71 BPM | DIASTOLIC BLOOD PRESSURE: 74 MMHG | RESPIRATION RATE: 22 BRPM | SYSTOLIC BLOOD PRESSURE: 126 MMHG

## 2018-06-29 DIAGNOSIS — I21.4 NSTEMI (NON-ST ELEVATED MYOCARDIAL INFARCTION) (HCC): Primary | ICD-10-CM

## 2018-06-29 DIAGNOSIS — I10 ESSENTIAL HYPERTENSION: ICD-10-CM

## 2018-06-29 DIAGNOSIS — E78.5 HYPERLIPIDEMIA, UNSPECIFIED HYPERLIPIDEMIA TYPE: ICD-10-CM

## 2018-06-29 PROCEDURE — G8417 CALC BMI ABV UP PARAM F/U: HCPCS | Performed by: INTERNAL MEDICINE

## 2018-06-29 PROCEDURE — G8428 CUR MEDS NOT DOCUMENT: HCPCS | Performed by: INTERNAL MEDICINE

## 2018-06-29 PROCEDURE — G8598 ASA/ANTIPLAT THER USED: HCPCS | Performed by: INTERNAL MEDICINE

## 2018-06-29 PROCEDURE — 1036F TOBACCO NON-USER: CPT | Performed by: INTERNAL MEDICINE

## 2018-06-29 PROCEDURE — 1123F ACP DISCUSS/DSCN MKR DOCD: CPT | Performed by: INTERNAL MEDICINE

## 2018-06-29 PROCEDURE — 4040F PNEUMOC VAC/ADMIN/RCVD: CPT | Performed by: INTERNAL MEDICINE

## 2018-06-29 PROCEDURE — 99214 OFFICE O/P EST MOD 30 MIN: CPT | Performed by: INTERNAL MEDICINE

## 2018-06-29 ASSESSMENT — ENCOUNTER SYMPTOMS
NAUSEA: 0
STRIDOR: 0
WHEEZING: 0
GASTROINTESTINAL NEGATIVE: 1
COUGH: 0
EYES NEGATIVE: 1
BLOOD IN STOOL: 0
SHORTNESS OF BREATH: 0
CHEST TIGHTNESS: 0
RESPIRATORY NEGATIVE: 1

## 2018-07-30 ENCOUNTER — OFFICE VISIT (OUTPATIENT)
Dept: FAMILY MEDICINE CLINIC | Age: 83
End: 2018-07-30
Payer: MEDICARE

## 2018-07-30 VITALS
HEART RATE: 76 BPM | WEIGHT: 209 LBS | DIASTOLIC BLOOD PRESSURE: 60 MMHG | RESPIRATION RATE: 16 BRPM | TEMPERATURE: 98.1 F | BODY MASS INDEX: 30.96 KG/M2 | HEIGHT: 69 IN | SYSTOLIC BLOOD PRESSURE: 124 MMHG

## 2018-07-30 DIAGNOSIS — F02.80 ALZHEIMER'S DEMENTIA WITHOUT BEHAVIORAL DISTURBANCE, UNSPECIFIED TIMING OF DEMENTIA ONSET: ICD-10-CM

## 2018-07-30 DIAGNOSIS — G30.9 ALZHEIMER'S DEMENTIA WITHOUT BEHAVIORAL DISTURBANCE, UNSPECIFIED TIMING OF DEMENTIA ONSET: ICD-10-CM

## 2018-07-30 DIAGNOSIS — R73.9 HYPERGLYCEMIA: ICD-10-CM

## 2018-07-30 DIAGNOSIS — I21.4 NSTEMI (NON-ST ELEVATED MYOCARDIAL INFARCTION) (HCC): ICD-10-CM

## 2018-07-30 DIAGNOSIS — E78.5 HYPERLIPIDEMIA, UNSPECIFIED HYPERLIPIDEMIA TYPE: ICD-10-CM

## 2018-07-30 DIAGNOSIS — I10 ESSENTIAL HYPERTENSION: Primary | ICD-10-CM

## 2018-07-30 DIAGNOSIS — Z12.5 PROSTATE CANCER SCREENING: ICD-10-CM

## 2018-07-30 DIAGNOSIS — I10 ESSENTIAL HYPERTENSION: ICD-10-CM

## 2018-07-30 DIAGNOSIS — I65.23 CAROTID STENOSIS, BILATERAL: ICD-10-CM

## 2018-07-30 LAB
ALBUMIN SERPL-MCNC: 4.2 G/DL (ref 3.9–4.9)
ALP BLD-CCNC: 90 U/L (ref 35–104)
ALT SERPL-CCNC: 14 U/L (ref 0–41)
ANION GAP SERPL CALCULATED.3IONS-SCNC: 15 MEQ/L (ref 7–13)
AST SERPL-CCNC: 13 U/L (ref 0–40)
BILIRUB SERPL-MCNC: 0.5 MG/DL (ref 0–1.2)
BUN BLDV-MCNC: 24 MG/DL (ref 8–23)
CALCIUM SERPL-MCNC: 9.6 MG/DL (ref 8.6–10.2)
CHLORIDE BLD-SCNC: 99 MEQ/L (ref 98–107)
CHOLESTEROL, TOTAL: 173 MG/DL (ref 0–199)
CO2: 24 MEQ/L (ref 22–29)
CREAT SERPL-MCNC: 1.04 MG/DL (ref 0.7–1.2)
GFR AFRICAN AMERICAN: >60
GFR NON-AFRICAN AMERICAN: >60
GLOBULIN: 2.7 G/DL (ref 2.3–3.5)
GLUCOSE BLD-MCNC: 90 MG/DL (ref 74–109)
HBA1C MFR BLD: 6.1 % (ref 4.8–5.9)
HCT VFR BLD CALC: 44.1 % (ref 42–52)
HDLC SERPL-MCNC: 50 MG/DL (ref 40–59)
HEMOGLOBIN: 14.8 G/DL (ref 14–18)
LDL CHOLESTEROL CALCULATED: 106 MG/DL (ref 0–129)
MCH RBC QN AUTO: 31.2 PG (ref 27–31.3)
MCHC RBC AUTO-ENTMCNC: 33.6 % (ref 33–37)
MCV RBC AUTO: 92.9 FL (ref 80–100)
PDW BLD-RTO: 15.2 % (ref 11.5–14.5)
PLATELET # BLD: 219 K/UL (ref 130–400)
PLATELET SLIDE REVIEW: NORMAL
POTASSIUM SERPL-SCNC: 4.9 MEQ/L (ref 3.5–5.1)
PROSTATE SPECIFIC ANTIGEN: 2.88 NG/ML (ref 0–6.22)
RBC # BLD: 4.74 M/UL (ref 4.7–6.1)
SODIUM BLD-SCNC: 138 MEQ/L (ref 132–144)
TOTAL PROTEIN: 6.9 G/DL (ref 6.4–8.1)
TRIGL SERPL-MCNC: 83 MG/DL (ref 0–200)
WBC # BLD: 7.5 K/UL (ref 4.8–10.8)

## 2018-07-30 PROCEDURE — 4040F PNEUMOC VAC/ADMIN/RCVD: CPT | Performed by: FAMILY MEDICINE

## 2018-07-30 PROCEDURE — 1036F TOBACCO NON-USER: CPT | Performed by: FAMILY MEDICINE

## 2018-07-30 PROCEDURE — G8427 DOCREV CUR MEDS BY ELIG CLIN: HCPCS | Performed by: FAMILY MEDICINE

## 2018-07-30 PROCEDURE — G8417 CALC BMI ABV UP PARAM F/U: HCPCS | Performed by: FAMILY MEDICINE

## 2018-07-30 PROCEDURE — G8598 ASA/ANTIPLAT THER USED: HCPCS | Performed by: FAMILY MEDICINE

## 2018-07-30 PROCEDURE — 1123F ACP DISCUSS/DSCN MKR DOCD: CPT | Performed by: FAMILY MEDICINE

## 2018-07-30 PROCEDURE — 1101F PT FALLS ASSESS-DOCD LE1/YR: CPT | Performed by: FAMILY MEDICINE

## 2018-07-30 PROCEDURE — 99214 OFFICE O/P EST MOD 30 MIN: CPT | Performed by: FAMILY MEDICINE

## 2018-07-30 RX ORDER — TRIAMTERENE AND HYDROCHLOROTHIAZIDE 37.5; 25 MG/1; MG/1
1 TABLET ORAL DAILY
Qty: 30 TABLET | Refills: 5 | Status: ON HOLD | OUTPATIENT
Start: 2018-07-30 | End: 2018-11-06

## 2018-07-30 ASSESSMENT — PATIENT HEALTH QUESTIONNAIRE - PHQ9
2. FEELING DOWN, DEPRESSED OR HOPELESS: 1
SUM OF ALL RESPONSES TO PHQ QUESTIONS 1-9: 1
SUM OF ALL RESPONSES TO PHQ9 QUESTIONS 1 & 2: 1
1. LITTLE INTEREST OR PLEASURE IN DOING THINGS: 0

## 2018-07-30 NOTE — PROGRESS NOTES
Subjective  [place], [de-identified] y.o. male presents today with:  Chief Complaint   Patient presents with    Hypertension    Hyperlipidemia    Dementia           Past Medical History:   Diagnosis Date    Carotid stenosis     2/2013 16-49%    Dementia     Hyperlipidemia     Hypertension      Past Surgical History:   Procedure Laterality Date    CARPAL TUNNEL RELEASE  1998    CATARACT REMOVAL  2007    COLONOSCOPY  12/10/10,2007    DR Nilo Cagle - DONE AT [address] COLONOSCOPY  2007    hx polyps needs 2015    COLONOSCOPY  9/21/15     DR. YARBROUGH      Social History     Social History    Marital status:      Spouse name: N/A    Number of children: N/A    Years of education: N/A     Occupational History    Not on file.      Social History Main Topics    Smoking status: Never Smoker    Smokeless tobacco: Never Used    Alcohol use No    Drug use: No    Sexual activity: Not on file     Other Topics Concern    Not on file     Social History Narrative    No narrative on file     Family History   Problem Relation Age of Onset    Heart Disease Mother     High Blood Pressure Mother      No Known Allergies  Current Outpatient Prescriptions   Medication Sig Dispense Refill    donepezil (ARICEPT) 5 MG tablet TAKE ONE TABLET BY MOUTH AT BEDTIME 30 tablet 5    aspirin 81 MG tablet Take 1 tablet by mouth daily With Food 30 tablet 3    benazepril (LOTENSIN) 20 MG tablet Take 1 tablet by mouth daily 30 tablet 3    triamterene-hydrochlorothiazide (MAXZIDE-25) 37.5-25 MG per tablet Take 1 tablet by mouth daily 30 tablet 3    pravastatin (PRAVACHOL) 80 MG tablet TAKE ONE TABLET BY MOUTH EVERY EVENING 90 tablet 1    metoprolol succinate (TOPROL XL) 50 MG extended release tablet Take 1 tablet by mouth daily 90 tablet 3    finasteride (PROSCAR) 5 MG tablet TAKE ONE TABLET BY MOUTH EVERY DAY 90 tablet 3    LORazepam (ATIVAN) 1 MG tablet Take 1 tablet by mouth every 8 hours as needed for Anxiety 30  Monocytes # 05/01/2018 0.5  0.2 - 0.8 K/uL Final    Eosinophils # 05/01/2018 0.2  0.0 - 0.7 K/uL Final    Basophils # 05/01/2018 0.1  0.0 - 0.2 K/uL Final    Troponin 05/01/2018 0.015* 0.000 - 0.010 ng/mL Final    Methodology by Troponin T.    Total CK 05/01/2018 144  0 - 190 U/L Final    Sodium 05/01/2018 139  132 - 144 mEq/L Final    Potassium 05/01/2018 4.2  3.5 - 5.1 mEq/L Final    Chloride 05/01/2018 100  98 - 107 mEq/L Final    CO2 05/01/2018 24  22 - 29 mEq/L Final    Anion Gap 05/01/2018 15* 7 - 13 mEq/L Final    Glucose 05/01/2018 103  74 - 109 mg/dL Final    BUN 05/01/2018 22  8 - 23 mg/dL Final    CREATININE 05/01/2018 0.84  0.70 - 1.20 mg/dL Final    GFR Non- 05/01/2018 >60.0  >60 Final    Comment: >60 mL/min/1.73m2 EGFR, calc. for ages 25 and older using the  MDRD formula (not corrected for weight), is valid for stable  renal function.  GFR  05/01/2018 >60.0  >60 Final    Comment: >60 mL/min/1.73m2 EGFR, calc. for ages 25 and older using the  MDRD formula (not corrected for weight), is valid for stable  renal function.  Calcium 05/01/2018 9.8  8.6 - 10.2 mg/dL Final    Total Protein 05/01/2018 6.8  6.4 - 8.1 g/dL Final    Alb 05/01/2018 4.5  3.9 - 4.9 g/dL Final    Total Bilirubin 05/01/2018 0.7  0.0 - 1.2 mg/dL Final    Alkaline Phosphatase 05/01/2018 106* 35 - 104 U/L Final    ALT 05/01/2018 14  0 - 41 U/L Final    AST 05/01/2018 14  0 - 40 U/L Final    Globulin 05/01/2018 2.3  2.3 - 3.5 g/dL Final    Protime 05/01/2018 10.8  9.6 - 12.3 sec Final    Comment: Effective 4-10-18:  Please note reference ranges have  changed for PT testing.       INR 05/01/2018 1.0   Final    Comment: Recommended INR therapeutic ranges for oral anticoagulant  therapy    Prophylaxis/treatment of:                       INR     Venous Thrombosis, Pulmonary Embolism       2.0-3  Prevention of Systemic Embolism from:     Atrial Fibrillation No wheezes,rales, or rhonchi. No percussive abnormalities    HEART: Regular rhythm without murmur, rub or gallop. ABDOMEN:  Soft, non tender. No masses, guarding or rebound. Normo active bowel sounds. EXTREMITIES:  No edema in any extremity. No cyanosis or clubbing. 2+ dorsalis pedis pulses bilaterally          Assessment & Plan    Diagnosis Orders   1. Essential hypertension  triamterene-hydrochlorothiazide (MAXZIDE-25) 37.5-25 MG per tablet    Lipid Panel    CBC    Comprehensive Metabolic Panel   2. Hyperlipidemia, unspecified hyperlipidemia type  Lipid Panel   3. Carotid stenosis, bilateral     4. Alzheimer's dementia without behavioral disturbance, unspecified timing of dementia onset     5. NSTEMI (non-ST elevated myocardial infarction) ([facility] [address]     6. Prostate cancer screening  PSA Screening   7. Hyperglycemia  Hemoglobin A1C     No orders of the defined types were placed in this encounter. No orders of the defined types were placed in this encounter. There are no discontinued medications. No Follow-up on file. Stay active  [facility] is advised to follow up ASAP if condition deteriorates or problems arise and if no information on test results to patient in the next 1 month they are advised to call us.      Terra Tello MD

## 2018-09-27 ENCOUNTER — OFFICE VISIT (OUTPATIENT)
Dept: CARDIOLOGY CLINIC | Age: 83
End: 2018-09-27
Payer: MEDICARE

## 2018-09-27 VITALS
BODY MASS INDEX: 30.81 KG/M2 | SYSTOLIC BLOOD PRESSURE: 136 MMHG | RESPIRATION RATE: 16 BRPM | DIASTOLIC BLOOD PRESSURE: 68 MMHG | HEIGHT: 69 IN | WEIGHT: 208 LBS | HEART RATE: 86 BPM | OXYGEN SATURATION: 97 %

## 2018-09-27 DIAGNOSIS — R60.0 LEG EDEMA: ICD-10-CM

## 2018-09-27 DIAGNOSIS — I16.0 HYPERTENSIVE URGENCY: ICD-10-CM

## 2018-09-27 DIAGNOSIS — I21.4 NSTEMI (NON-ST ELEVATED MYOCARDIAL INFARCTION) (HCC): ICD-10-CM

## 2018-09-27 DIAGNOSIS — I50.31 ACUTE DIASTOLIC HEART FAILURE (HCC): ICD-10-CM

## 2018-09-27 DIAGNOSIS — E78.5 HYPERLIPIDEMIA, UNSPECIFIED HYPERLIPIDEMIA TYPE: ICD-10-CM

## 2018-09-27 DIAGNOSIS — I10 ESSENTIAL HYPERTENSION: ICD-10-CM

## 2018-09-27 DIAGNOSIS — R06.09 DOE (DYSPNEA ON EXERTION): Primary | ICD-10-CM

## 2018-09-27 PROCEDURE — 1036F TOBACCO NON-USER: CPT | Performed by: INTERNAL MEDICINE

## 2018-09-27 PROCEDURE — 99215 OFFICE O/P EST HI 40 MIN: CPT | Performed by: INTERNAL MEDICINE

## 2018-09-27 PROCEDURE — 1123F ACP DISCUSS/DSCN MKR DOCD: CPT | Performed by: INTERNAL MEDICINE

## 2018-09-27 PROCEDURE — G8598 ASA/ANTIPLAT THER USED: HCPCS | Performed by: INTERNAL MEDICINE

## 2018-09-27 PROCEDURE — 4040F PNEUMOC VAC/ADMIN/RCVD: CPT | Performed by: INTERNAL MEDICINE

## 2018-09-27 PROCEDURE — G8417 CALC BMI ABV UP PARAM F/U: HCPCS | Performed by: INTERNAL MEDICINE

## 2018-09-27 PROCEDURE — 1101F PT FALLS ASSESS-DOCD LE1/YR: CPT | Performed by: INTERNAL MEDICINE

## 2018-09-27 PROCEDURE — G8427 DOCREV CUR MEDS BY ELIG CLIN: HCPCS | Performed by: INTERNAL MEDICINE

## 2018-09-27 RX ORDER — POTASSIUM CHLORIDE 20 MEQ/1
20 TABLET, EXTENDED RELEASE ORAL DAILY
Qty: 30 TABLET | Refills: 3 | Status: SHIPPED | OUTPATIENT
Start: 2018-09-27 | End: 2019-02-02 | Stop reason: SDUPTHER

## 2018-09-27 RX ORDER — FUROSEMIDE 40 MG/1
40 TABLET ORAL DAILY
Qty: 30 TABLET | Refills: 3 | Status: SHIPPED | OUTPATIENT
Start: 2018-09-27 | End: 2018-11-21

## 2018-09-27 ASSESSMENT — ENCOUNTER SYMPTOMS
SHORTNESS OF BREATH: 1
EYES NEGATIVE: 1
COUGH: 0
GASTROINTESTINAL NEGATIVE: 1
WHEEZING: 0
CHEST TIGHTNESS: 0
STRIDOR: 0
NAUSEA: 0
BLOOD IN STOOL: 0

## 2018-09-27 NOTE — PROGRESS NOTES
Subsequent Progress Note  Patient: Oscar Phoenix  YOB: 1935  MRN: [id number]    Chief Complaint: nstemi HTN ORR LE edema  Chief Complaint   Patient presents with    Hypertension    Hyperlipidemia       CV Data:  6/2018 spect negative  5/2018 echo EF 60%  Subjective/HPI: now has orr. No cp. Has + lE edema. No bleed no falls     EKG:    Past Medical History:   Diagnosis Date    Carotid stenosis     2/2013 16-49%    Dementia     Hyperlipidemia     Hypertension        Past Surgical History:   Procedure Laterality Date    CARPAL TUNNEL RELEASE  1998    CATARACT REMOVAL  2007    COLONOSCOPY  12/10/10,2007    DR Taylor Barnes - DONE AT [address] COLONOSCOPY  2007    hx polyps needs 2015    COLONOSCOPY  9/21/15     DR. YARBROUGH        Family History   Problem Relation Age of Onset    Heart Disease Mother     High Blood Pressure Mother        Social History     Social History    Marital status:      Spouse name: N/A    Number of children: N/A    Years of education: N/A     Social History Main Topics    Smoking status: Never Smoker    Smokeless tobacco: Never Used    Alcohol use No    Drug use: No    Sexual activity: Not Asked     Other Topics Concern    None     Social History Narrative    None       No Known Allergies    Current Outpatient Prescriptions   Medication Sig Dispense Refill    furosemide (LASIX) 40 MG tablet Take 1 tablet by mouth daily 30 tablet 3    potassium chloride (KLOR-CON M) 20 MEQ extended release tablet Take 1 tablet by mouth daily 30 tablet 3    triamterene-hydrochlorothiazide (MAXZIDE-25) 37.5-25 MG per tablet Take 1 tablet by mouth daily 30 tablet 5    donepezil (ARICEPT) 5 MG tablet TAKE ONE TABLET BY MOUTH AT BEDTIME 30 tablet 5    aspirin 81 MG tablet Take 1 tablet by mouth daily With Food 30 tablet 3    benazepril (LOTENSIN) 20 MG tablet Take 1 tablet by mouth daily 30 tablet 3    pravastatin (PRAVACHOL) 80 MG tablet TAKE ONE TABLET BY MOUTH intact distal pulses and normal pulses. Pulmonary/Chest: Effort normal. He has no wheezes. He has bibasilar rales. He exhibits no tenderness. Abdominal: Soft. Bowel sounds are normal. There is no tenderness. Musculoskeletal: Normal range of motion. He exhibits edema (1+). Neurological: He is alert and oriented to person, place, and time. Skin: Skin is warm. No cyanosis. Nails show no clubbing.        LABS:  CBC:   Lab Results   Component Value Date    WBC 7.5 07/30/2018    RBC 4.74 07/30/2018    RBC 4.69 10/25/2011    HGB 14.8 07/30/2018    HCT 44.1 07/30/2018    MCV 92.9 07/30/2018    MCH 31.2 07/30/2018    MCHC 33.6 07/30/2018    RDW 15.2 07/30/2018     07/30/2018    MPV 9.2 08/27/2015     Lipids:  Lab Results   Component Value Date    CHOL 173 07/30/2018    CHOL 149 09/12/2017    CHOL 173 03/15/2017     Lab Results   Component Value Date    TRIG 83 07/30/2018    TRIG 83 09/12/2017    TRIG 106 03/15/2017     Lab Results   Component Value Date    HDL 50 07/30/2018    HDL 56 09/12/2017    HDL 55 03/15/2017     Lab Results   Component Value Date    LDLCALC 106 07/30/2018    LDLCALC 76 09/12/2017    LDLCALC 97 03/15/2017     No results found for: LABVLDL, VLDL  Lab Results   Component Value Date    CHOLHDLRATIO 3.8 09/10/2012    CHOLHDLRATIO 3.3 10/25/2011     CMP:    Lab Results   Component Value Date     07/30/2018    K 4.9 07/30/2018    K 4.3 05/02/2018    CL 99 07/30/2018    CO2 24 07/30/2018    BUN 24 07/30/2018    CREATININE 1.04 07/30/2018    GFRAA >60.0 07/30/2018    LABGLOM >60.0 07/30/2018    GLUCOSE 90 07/30/2018    GLUCOSE 96 10/25/2011    PROT 6.9 07/30/2018    LABALBU 4.2 07/30/2018    LABALBU 4.6 10/25/2011    CALCIUM 9.6 07/30/2018    BILITOT 0.5 07/30/2018    ALKPHOS 90 07/30/2018    AST 13 07/30/2018    ALT 14 07/30/2018     BMP:    Lab Results   Component Value Date     07/30/2018    K 4.9 07/30/2018    K 4.3 05/02/2018    CL 99 07/30/2018    CO2 24 07/30/2018    BUN 24

## 2018-10-05 RX ORDER — BENAZEPRIL HYDROCHLORIDE 20 MG/1
20 TABLET ORAL DAILY
Qty: 30 TABLET | Refills: 3 | Status: SHIPPED | OUTPATIENT
Start: 2018-10-05 | End: 2018-12-27 | Stop reason: SDUPTHER

## 2018-10-10 DIAGNOSIS — I16.0 HYPERTENSIVE URGENCY: ICD-10-CM

## 2018-10-10 LAB
ANION GAP SERPL CALCULATED.3IONS-SCNC: 12 MEQ/L (ref 7–13)
BUN BLDV-MCNC: 19 MG/DL (ref 8–23)
CALCIUM SERPL-MCNC: 9.5 MG/DL (ref 8.6–10.2)
CHLORIDE BLD-SCNC: 101 MEQ/L (ref 98–107)
CO2: 26 MEQ/L (ref 22–29)
CREAT SERPL-MCNC: 0.86 MG/DL (ref 0.7–1.2)
GFR AFRICAN AMERICAN: >60
GFR NON-AFRICAN AMERICAN: >60
GLUCOSE BLD-MCNC: 89 MG/DL (ref 74–109)
HCT VFR BLD CALC: 43.5 % (ref 42–52)
HEMOGLOBIN: 14.5 G/DL (ref 14–18)
MAGNESIUM: 2.3 MG/DL (ref 1.7–2.3)
MCH RBC QN AUTO: 31.4 PG (ref 27–31.3)
MCHC RBC AUTO-ENTMCNC: 33.4 % (ref 33–37)
MCV RBC AUTO: 94.2 FL (ref 80–100)
PDW BLD-RTO: 14.3 % (ref 11.5–14.5)
PLATELET # BLD: 213 K/UL (ref 130–400)
POTASSIUM SERPL-SCNC: 4.3 MEQ/L (ref 3.5–5.1)
RBC # BLD: 4.62 M/UL (ref 4.7–6.1)
SODIUM BLD-SCNC: 139 MEQ/L (ref 132–144)
WBC # BLD: 7.1 K/UL (ref 4.8–10.8)

## 2018-10-12 ENCOUNTER — OFFICE VISIT (OUTPATIENT)
Dept: CARDIOLOGY CLINIC | Age: 83
End: 2018-10-12
Payer: MEDICARE

## 2018-10-12 VITALS
HEIGHT: 69 IN | SYSTOLIC BLOOD PRESSURE: 136 MMHG | WEIGHT: 204 LBS | DIASTOLIC BLOOD PRESSURE: 74 MMHG | HEART RATE: 100 BPM | BODY MASS INDEX: 30.21 KG/M2 | RESPIRATION RATE: 16 BRPM | OXYGEN SATURATION: 97 %

## 2018-10-12 DIAGNOSIS — R60.0 LEG EDEMA: Primary | ICD-10-CM

## 2018-10-12 DIAGNOSIS — I50.31 ACUTE DIASTOLIC HEART FAILURE (HCC): ICD-10-CM

## 2018-10-12 DIAGNOSIS — R06.09 DOE (DYSPNEA ON EXERTION): ICD-10-CM

## 2018-10-12 DIAGNOSIS — I10 ESSENTIAL HYPERTENSION: ICD-10-CM

## 2018-10-12 DIAGNOSIS — I65.23 CAROTID STENOSIS, BILATERAL: ICD-10-CM

## 2018-10-12 DIAGNOSIS — E78.5 HYPERLIPIDEMIA, UNSPECIFIED HYPERLIPIDEMIA TYPE: ICD-10-CM

## 2018-10-12 DIAGNOSIS — I16.0 HYPERTENSIVE URGENCY: ICD-10-CM

## 2018-10-12 DIAGNOSIS — I21.4 NSTEMI (NON-ST ELEVATED MYOCARDIAL INFARCTION) (HCC): ICD-10-CM

## 2018-10-12 PROCEDURE — 1101F PT FALLS ASSESS-DOCD LE1/YR: CPT | Performed by: INTERNAL MEDICINE

## 2018-10-12 PROCEDURE — 1123F ACP DISCUSS/DSCN MKR DOCD: CPT | Performed by: INTERNAL MEDICINE

## 2018-10-12 PROCEDURE — G8417 CALC BMI ABV UP PARAM F/U: HCPCS | Performed by: INTERNAL MEDICINE

## 2018-10-12 PROCEDURE — 4040F PNEUMOC VAC/ADMIN/RCVD: CPT | Performed by: INTERNAL MEDICINE

## 2018-10-12 PROCEDURE — G8427 DOCREV CUR MEDS BY ELIG CLIN: HCPCS | Performed by: INTERNAL MEDICINE

## 2018-10-12 PROCEDURE — 1036F TOBACCO NON-USER: CPT | Performed by: INTERNAL MEDICINE

## 2018-10-12 PROCEDURE — G8598 ASA/ANTIPLAT THER USED: HCPCS | Performed by: INTERNAL MEDICINE

## 2018-10-12 PROCEDURE — G8484 FLU IMMUNIZE NO ADMIN: HCPCS | Performed by: INTERNAL MEDICINE

## 2018-10-12 PROCEDURE — 99214 OFFICE O/P EST MOD 30 MIN: CPT | Performed by: INTERNAL MEDICINE

## 2018-10-12 ASSESSMENT — ENCOUNTER SYMPTOMS
CHEST TIGHTNESS: 0
BLOOD IN STOOL: 0
RESPIRATORY NEGATIVE: 1
SHORTNESS OF BREATH: 0
GASTROINTESTINAL NEGATIVE: 1
NAUSEA: 0
EYES NEGATIVE: 1
WHEEZING: 0
COUGH: 0
STRIDOR: 0

## 2018-10-12 NOTE — PROGRESS NOTES
Subsequent Progress Note  Patient: Elroy Epley  YOB: 1935  MRN: [id number]    Chief Complaint: NSTEMI HTN HESS LEG edema  Chief Complaint   Patient presents with    Hyperlipidemia     Review labs    Hypertension    Coronary Artery Disease       CV Data:  6/2018 spect negative  5/2018 echo EF 60%  Subjective/HPI: feels good compression and Lasix helped edema. No cp breathing is good no falls     EKG:    Past Medical History:   Diagnosis Date    Carotid stenosis     2/2013 16-49%    Dementia     Hyperlipidemia     Hypertension        Past Surgical History:   Procedure Laterality Date    CARPAL TUNNEL RELEASE  1998    CATARACT REMOVAL  2007    COLONOSCOPY  12/10/10,2007    DR Lyndsey Blanchard - DONE AT [address] COLONOSCOPY  2007    hx polyps needs 2015    COLONOSCOPY  9/21/15     DR. YARBROUGH        Family History   Problem Relation Age of Onset    Heart Disease Mother     High Blood Pressure Mother        Social History     Social History    Marital status:      Spouse name: N/A    Number of children: N/A    Years of education: N/A     Social History Main Topics    Smoking status: Never Smoker    Smokeless tobacco: Never Used    Alcohol use No    Drug use: No    Sexual activity: Not Asked     Other Topics Concern    None     Social History Narrative    None       No Known Allergies    Current Outpatient Prescriptions   Medication Sig Dispense Refill    benazepril (LOTENSIN) 20 MG tablet Take 1 tablet by mouth daily 30 tablet 3    furosemide (LASIX) 40 MG tablet Take 1 tablet by mouth daily 30 tablet 3    potassium chloride (KLOR-CON M) 20 MEQ extended release tablet Take 1 tablet by mouth daily 30 tablet 3    Elastic Bandages & Supports (JOBST KNEE HIGH COMPRESSION SM) MISC 1 each by Does not apply route daily 1 each 2    triamterene-hydrochlorothiazide (MAXZIDE-25) 37.5-25 MG per tablet Take 1 tablet by mouth daily 30 tablet 5    donepezil (ARICEPT) 5 MG tablet TAKE ONE TABLET BY MOUTH AT BEDTIME 30 tablet 5    aspirin 81 MG tablet Take 1 tablet by mouth daily With Food 30 tablet 3    pravastatin (PRAVACHOL) 80 MG tablet TAKE ONE TABLET BY MOUTH EVERY EVENING 90 tablet 1    metoprolol succinate (TOPROL XL) 50 MG extended release tablet Take 1 tablet by mouth daily 90 tablet 3    finasteride (PROSCAR) 5 MG tablet TAKE ONE TABLET BY MOUTH EVERY DAY 90 tablet 3    LORazepam (ATIVAN) 1 MG tablet Take 1 tablet by mouth every 8 hours as needed for Anxiety 30 tablet 1    Multiple Vitamins-Minerals (MULTIVITAMIN PO) Take by mouth      dorzolamide-timolol (COSOPT) 22.3-6.8 MG/ML ophthalmic solution Place 1 drop into both eyes daily       latanoprost (XALATAN) 0.005 % ophthalmic solution Place 1 drop into both eyes daily       timolol (TIMOPTIC) 0.5 % ophthalmic solution Place 1 drop into both eyes        No current facility-administered medications for this visit. Review of Systems:   Review of Systems   Constitutional: Negative. Negative for diaphoresis and fatigue. HENT: Negative. Eyes: Negative. Respiratory: Negative. Negative for cough, chest tightness, shortness of breath, wheezing and stridor. Cardiovascular: Negative. Negative for chest pain, palpitations and leg swelling. Gastrointestinal: Negative. Negative for blood in stool and nausea. Genitourinary: Negative. Musculoskeletal: Negative. Skin: Negative. Neurological: Negative. Negative for dizziness, syncope, weakness and light-headedness. Hematological: Negative. Psychiatric/Behavioral: Negative. Physical Examination:    /74 (Site: Right Upper Arm, Position: Sitting, Cuff Size: Large Adult)   Pulse 100   Resp 16   Ht 5' 9\" (1.753 m)   Wt 204 lb (92.5 kg)   SpO2 97%   BMI 30.13 kg/m²    Physical Exam   Constitutional: He appears healthy. No distress. HENT:   Normal cephalic and Atraumatic   Eyes: Pupils are equal, round, and reactive to light. Neck: Normal range of motion and thyroid normal. Neck supple. No JVD present. No neck adenopathy. No thyromegaly present. Cardiovascular: Normal rate, regular rhythm, normal heart sounds, intact distal pulses and normal pulses. Pulmonary/Chest: Effort normal and breath sounds normal. He has no wheezes. He has no rales. He exhibits no tenderness. Abdominal: Soft. Bowel sounds are normal. There is no tenderness. Musculoskeletal: Normal range of motion. He exhibits no edema or tenderness. Neurological: He is alert and oriented to person, place, and time. Skin: Skin is warm. No cyanosis. Nails show no clubbing.        LABS:  CBC:   Lab Results   Component Value Date    WBC 7.1 10/10/2018    RBC 4.62 10/10/2018    RBC 4.69 10/25/2011    HGB 14.5 10/10/2018    HCT 43.5 10/10/2018    MCV 94.2 10/10/2018    MCH 31.4 10/10/2018    MCHC 33.4 10/10/2018    RDW 14.3 10/10/2018     10/10/2018    MPV 9.2 08/27/2015     Lipids:  Lab Results   Component Value Date    CHOL 173 07/30/2018    CHOL 149 09/12/2017    CHOL 173 03/15/2017     Lab Results   Component Value Date    TRIG 83 07/30/2018    TRIG 83 09/12/2017    TRIG 106 03/15/2017     Lab Results   Component Value Date    HDL 50 07/30/2018    HDL 56 09/12/2017    HDL 55 03/15/2017     Lab Results   Component Value Date    LDLCALC 106 07/30/2018    LDLCALC 76 09/12/2017    LDLCALC 97 03/15/2017     No results found for: LABVLDL, VLDL  Lab Results   Component Value Date    CHOLHDLRATIO 3.8 09/10/2012    CHOLHDLRATIO 3.3 10/25/2011     CMP:    Lab Results   Component Value Date     10/10/2018    K 4.3 10/10/2018    K 4.3 05/02/2018     10/10/2018    CO2 26 10/10/2018    BUN 19 10/10/2018    CREATININE 0.86 10/10/2018    GFRAA >60.0 10/10/2018    LABGLOM >60.0 10/10/2018    GLUCOSE 89 10/10/2018    GLUCOSE 96 10/25/2011    PROT 6.9 07/30/2018    LABALBU 4.2 07/30/2018    LABALBU 4.6 10/25/2011    CALCIUM 9.5 10/10/2018    BILITOT 0.5 07/30/2018

## 2018-11-06 ENCOUNTER — APPOINTMENT (OUTPATIENT)
Dept: GENERAL RADIOLOGY | Age: 83
End: 2018-11-06
Payer: MEDICARE

## 2018-11-06 ENCOUNTER — HOSPITAL ENCOUNTER (OUTPATIENT)
Age: 83
Setting detail: OBSERVATION
Discharge: HOME | End: 2018-11-07
Attending: EMERGENCY MEDICINE | Admitting: INTERNAL MEDICINE
Payer: MEDICARE

## 2018-11-06 DIAGNOSIS — R07.9 CHEST PAIN, UNSPECIFIED TYPE: Primary | ICD-10-CM

## 2018-11-06 DIAGNOSIS — I10 ESSENTIAL HYPERTENSION: ICD-10-CM

## 2018-11-06 LAB
ALBUMIN SERPL-MCNC: 4.3 G/DL (ref 3.9–4.9)
ALP BLD-CCNC: 95 U/L (ref 35–104)
ALT SERPL-CCNC: 13 U/L (ref 0–41)
ANION GAP SERPL CALCULATED.3IONS-SCNC: 13 MEQ/L (ref 7–13)
AST SERPL-CCNC: 13 U/L (ref 0–40)
BASOPHILS ABSOLUTE: 0.1 K/UL (ref 0–0.2)
BASOPHILS RELATIVE PERCENT: 0.8 %
BILIRUB SERPL-MCNC: 0.5 MG/DL (ref 0–1.2)
BUN BLDV-MCNC: 19 MG/DL (ref 8–23)
CALCIUM SERPL-MCNC: 9.6 MG/DL (ref 8.6–10.2)
CHLORIDE BLD-SCNC: 99 MEQ/L (ref 98–107)
CO2: 26 MEQ/L (ref 22–29)
CREAT SERPL-MCNC: 0.94 MG/DL (ref 0.7–1.2)
EOSINOPHILS ABSOLUTE: 0.1 K/UL (ref 0–0.7)
EOSINOPHILS RELATIVE PERCENT: 1.3 %
GFR AFRICAN AMERICAN: >60
GFR NON-AFRICAN AMERICAN: >60
GLOBULIN: 2.8 G/DL (ref 2.3–3.5)
GLUCOSE BLD-MCNC: 110 MG/DL (ref 74–109)
HCT VFR BLD CALC: 44.9 % (ref 42–52)
HEMOGLOBIN: 15 G/DL (ref 14–18)
INR BLD: 1
LYMPHOCYTES ABSOLUTE: 0.9 K/UL (ref 1–4.8)
LYMPHOCYTES RELATIVE PERCENT: 12.6 %
MCH RBC QN AUTO: 31.1 PG (ref 27–31.3)
MCHC RBC AUTO-ENTMCNC: 33.5 % (ref 33–37)
MCV RBC AUTO: 93 FL (ref 80–100)
MONOCYTES ABSOLUTE: 0.4 K/UL (ref 0.2–0.8)
MONOCYTES RELATIVE PERCENT: 6 %
NEUTROPHILS ABSOLUTE: 5.8 K/UL (ref 1.4–6.5)
NEUTROPHILS RELATIVE PERCENT: 79.3 %
PDW BLD-RTO: 14 % (ref 11.5–14.5)
PLATELET # BLD: 191 K/UL (ref 130–400)
POTASSIUM SERPL-SCNC: 4.3 MEQ/L (ref 3.5–5.1)
PRO-BNP: 192 PG/ML
PROTHROMBIN TIME: 10.8 SEC (ref 9.6–12.3)
RBC # BLD: 4.83 M/UL (ref 4.7–6.1)
SODIUM BLD-SCNC: 138 MEQ/L (ref 132–144)
TOTAL PROTEIN: 7.1 G/DL (ref 6.4–8.1)
TROPONIN: 0.01 NG/ML (ref 0–0.01)
TROPONIN: 0.02 NG/ML (ref 0–0.01)
WBC # BLD: 7.3 K/UL (ref 4.8–10.8)

## 2018-11-06 PROCEDURE — 6360000002 HC RX W HCPCS: Performed by: EMERGENCY MEDICINE

## 2018-11-06 PROCEDURE — 96372 THER/PROPH/DIAG INJ SC/IM: CPT

## 2018-11-06 PROCEDURE — 6360000002 HC RX W HCPCS: Performed by: INTERNAL MEDICINE

## 2018-11-06 PROCEDURE — 96374 THER/PROPH/DIAG INJ IV PUSH: CPT

## 2018-11-06 PROCEDURE — 36415 COLL VENOUS BLD VENIPUNCTURE: CPT

## 2018-11-06 PROCEDURE — 85025 COMPLETE CBC W/AUTO DIFF WBC: CPT

## 2018-11-06 PROCEDURE — 83880 ASSAY OF NATRIURETIC PEPTIDE: CPT

## 2018-11-06 PROCEDURE — 93005 ELECTROCARDIOGRAM TRACING: CPT

## 2018-11-06 PROCEDURE — 99285 EMERGENCY DEPT VISIT HI MDM: CPT

## 2018-11-06 PROCEDURE — 6370000000 HC RX 637 (ALT 250 FOR IP): Performed by: EMERGENCY MEDICINE

## 2018-11-06 PROCEDURE — 99215 OFFICE O/P EST HI 40 MIN: CPT | Performed by: INTERNAL MEDICINE

## 2018-11-06 PROCEDURE — 2580000003 HC RX 258: Performed by: INTERNAL MEDICINE

## 2018-11-06 PROCEDURE — 80053 COMPREHEN METABOLIC PANEL: CPT

## 2018-11-06 PROCEDURE — 85610 PROTHROMBIN TIME: CPT

## 2018-11-06 PROCEDURE — G0378 HOSPITAL OBSERVATION PER HR: HCPCS

## 2018-11-06 PROCEDURE — 84484 ASSAY OF TROPONIN QUANT: CPT

## 2018-11-06 PROCEDURE — 6370000000 HC RX 637 (ALT 250 FOR IP): Performed by: INTERNAL MEDICINE

## 2018-11-06 PROCEDURE — 71045 X-RAY EXAM CHEST 1 VIEW: CPT

## 2018-11-06 RX ORDER — METOPROLOL SUCCINATE 50 MG/1
50 TABLET, EXTENDED RELEASE ORAL DAILY
Status: DISCONTINUED | OUTPATIENT
Start: 2018-11-06 | End: 2018-11-07 | Stop reason: HOSPADM

## 2018-11-06 RX ORDER — SODIUM CHLORIDE 0.9 % (FLUSH) 0.9 %
10 SYRINGE (ML) INJECTION EVERY 12 HOURS SCHEDULED
Status: DISCONTINUED | OUTPATIENT
Start: 2018-11-06 | End: 2018-11-06

## 2018-11-06 RX ORDER — ATORVASTATIN CALCIUM 40 MG/1
20 TABLET, FILM COATED ORAL NIGHTLY
Status: DISCONTINUED | OUTPATIENT
Start: 2018-11-06 | End: 2018-11-07 | Stop reason: HOSPADM

## 2018-11-06 RX ORDER — NITROGLYCERIN 0.4 MG/1
0.4 TABLET SUBLINGUAL EVERY 5 MIN PRN
Status: DISCONTINUED | OUTPATIENT
Start: 2018-11-06 | End: 2018-11-07 | Stop reason: HOSPADM

## 2018-11-06 RX ORDER — DORZOLAMIDE HYDROCHLORIDE AND TIMOLOL MALEATE 20; 5 MG/ML; MG/ML
1 SOLUTION/ DROPS OPHTHALMIC DAILY
Status: DISCONTINUED | OUTPATIENT
Start: 2018-11-06 | End: 2018-11-06 | Stop reason: SDUPTHER

## 2018-11-06 RX ORDER — ONDANSETRON 2 MG/ML
4 INJECTION INTRAMUSCULAR; INTRAVENOUS EVERY 6 HOURS PRN
Status: DISCONTINUED | OUTPATIENT
Start: 2018-11-06 | End: 2018-11-06

## 2018-11-06 RX ORDER — FINASTERIDE 5 MG/1
5 TABLET, FILM COATED ORAL DAILY
Status: DISCONTINUED | OUTPATIENT
Start: 2018-11-06 | End: 2018-11-07 | Stop reason: HOSPADM

## 2018-11-06 RX ORDER — DONEPEZIL HYDROCHLORIDE 5 MG/1
5 TABLET, FILM COATED ORAL NIGHTLY
Status: DISCONTINUED | OUTPATIENT
Start: 2018-11-06 | End: 2018-11-07 | Stop reason: HOSPADM

## 2018-11-06 RX ORDER — SODIUM CHLORIDE 0.9 % (FLUSH) 0.9 %
10 SYRINGE (ML) INJECTION PRN
Status: DISCONTINUED | OUTPATIENT
Start: 2018-11-06 | End: 2018-11-06

## 2018-11-06 RX ORDER — LANOLIN ALCOHOL/MO/W.PET/CERES
500 CREAM (GRAM) TOPICAL DAILY
COMMUNITY

## 2018-11-06 RX ORDER — LISINOPRIL 20 MG/1
40 TABLET ORAL DAILY
Status: DISCONTINUED | OUTPATIENT
Start: 2018-11-06 | End: 2018-11-07 | Stop reason: HOSPADM

## 2018-11-06 RX ORDER — TIMOLOL MALEATE 5 MG/ML
1 SOLUTION/ DROPS OPHTHALMIC DAILY
Status: DISCONTINUED | OUTPATIENT
Start: 2018-11-06 | End: 2018-11-07 | Stop reason: HOSPADM

## 2018-11-06 RX ORDER — SODIUM CHLORIDE 0.9 % (FLUSH) 0.9 %
10 SYRINGE (ML) INJECTION EVERY 12 HOURS SCHEDULED
Status: DISCONTINUED | OUTPATIENT
Start: 2018-11-06 | End: 2018-11-07 | Stop reason: HOSPADM

## 2018-11-06 RX ORDER — POTASSIUM CHLORIDE 20 MEQ/1
20 TABLET, EXTENDED RELEASE ORAL DAILY
Status: DISCONTINUED | OUTPATIENT
Start: 2018-11-06 | End: 2018-11-07 | Stop reason: HOSPADM

## 2018-11-06 RX ORDER — ASPIRIN 81 MG/1
81 TABLET, CHEWABLE ORAL DAILY
Status: DISCONTINUED | OUTPATIENT
Start: 2018-11-07 | End: 2018-11-07 | Stop reason: HOSPADM

## 2018-11-06 RX ORDER — NITROGLYCERIN 0.4 MG/1
0.4 TABLET SUBLINGUAL EVERY 5 MIN PRN
Status: DISCONTINUED | OUTPATIENT
Start: 2018-11-06 | End: 2018-11-06

## 2018-11-06 RX ORDER — ASPIRIN 81 MG/1
81 TABLET, CHEWABLE ORAL DAILY
Status: DISCONTINUED | OUTPATIENT
Start: 2018-11-07 | End: 2018-11-06

## 2018-11-06 RX ORDER — LORAZEPAM 1 MG/1
1 TABLET ORAL EVERY 8 HOURS PRN
Status: DISCONTINUED | OUTPATIENT
Start: 2018-11-06 | End: 2018-11-07 | Stop reason: HOSPADM

## 2018-11-06 RX ORDER — ONDANSETRON 2 MG/ML
4 INJECTION INTRAMUSCULAR; INTRAVENOUS EVERY 6 HOURS PRN
Status: DISCONTINUED | OUTPATIENT
Start: 2018-11-06 | End: 2018-11-07 | Stop reason: HOSPADM

## 2018-11-06 RX ORDER — FUROSEMIDE 10 MG/ML
40 INJECTION INTRAMUSCULAR; INTRAVENOUS 2 TIMES DAILY
Status: DISCONTINUED | OUTPATIENT
Start: 2018-11-06 | End: 2018-11-07

## 2018-11-06 RX ORDER — SODIUM CHLORIDE 0.9 % (FLUSH) 0.9 %
10 SYRINGE (ML) INJECTION PRN
Status: DISCONTINUED | OUTPATIENT
Start: 2018-11-06 | End: 2018-11-07 | Stop reason: HOSPADM

## 2018-11-06 RX ORDER — METOPROLOL SUCCINATE 50 MG/1
50 TABLET, EXTENDED RELEASE ORAL DAILY
Status: DISCONTINUED | OUTPATIENT
Start: 2018-11-06 | End: 2018-11-06 | Stop reason: SDUPTHER

## 2018-11-06 RX ORDER — METOPROLOL TARTRATE 50 MG/1
50 TABLET, FILM COATED ORAL ONCE
Status: COMPLETED | OUTPATIENT
Start: 2018-11-06 | End: 2018-11-06

## 2018-11-06 RX ORDER — BENAZEPRIL HYDROCHLORIDE 20 MG/1
20 TABLET ORAL DAILY
Status: DISCONTINUED | OUTPATIENT
Start: 2018-11-06 | End: 2018-11-06 | Stop reason: CLARIF

## 2018-11-06 RX ORDER — LATANOPROST 50 UG/ML
1 SOLUTION/ DROPS OPHTHALMIC DAILY
Status: DISCONTINUED | OUTPATIENT
Start: 2018-11-06 | End: 2018-11-07 | Stop reason: HOSPADM

## 2018-11-06 RX ORDER — PRAVASTATIN SODIUM 80 MG/1
80 TABLET ORAL EVERY EVENING
Status: DISCONTINUED | OUTPATIENT
Start: 2018-11-06 | End: 2018-11-07 | Stop reason: HOSPADM

## 2018-11-06 RX ORDER — TRIAMTERENE AND HYDROCHLOROTHIAZIDE 37.5; 25 MG/1; MG/1
1 TABLET ORAL DAILY
Status: DISCONTINUED | OUTPATIENT
Start: 2018-11-06 | End: 2018-11-07 | Stop reason: HOSPADM

## 2018-11-06 RX ADMIN — FUROSEMIDE 40 MG: 10 INJECTION, SOLUTION INTRAMUSCULAR; INTRAVENOUS at 17:11

## 2018-11-06 RX ADMIN — PRAVASTATIN SODIUM 80 MG: 80 TABLET ORAL at 21:05

## 2018-11-06 RX ADMIN — DONEPEZIL HYDROCHLORIDE 5 MG: 5 TABLET, FILM COATED ORAL at 21:06

## 2018-11-06 RX ADMIN — Medication 10 ML: at 21:06

## 2018-11-06 RX ADMIN — METOPROLOL TARTRATE 50 MG: 50 TABLET ORAL at 10:19

## 2018-11-06 RX ADMIN — LISINOPRIL 40 MG: 20 TABLET ORAL at 15:43

## 2018-11-06 RX ADMIN — ATORVASTATIN CALCIUM 20 MG: 40 TABLET, FILM COATED ORAL at 21:06

## 2018-11-06 RX ADMIN — ENOXAPARIN SODIUM 40 MG: 40 INJECTION SUBCUTANEOUS at 17:11

## 2018-11-06 ASSESSMENT — ENCOUNTER SYMPTOMS
ABDOMINAL PAIN: 0
COUGH: 0
STRIDOR: 0
WHEEZING: 0
VOMITING: 0
EYES NEGATIVE: 1
GASTROINTESTINAL NEGATIVE: 1
CHEST TIGHTNESS: 1
SHORTNESS OF BREATH: 0
SORE THROAT: 0
BLOOD IN STOOL: 0
EYE PAIN: 0
NAUSEA: 0
CHEST TIGHTNESS: 0

## 2018-11-06 NOTE — H&P
10/10/2018    HCT 43.5 10/10/2018    MCV 94.2 10/10/2018    MCH 31.4 10/10/2018    MCHC 33.4 10/10/2018    RDW 14.3 10/10/2018     10/10/2018    MPV 9.2 08/27/2015     CBC with Differential:    Lab Results   Component Value Date    WBC 7.1 10/10/2018    RBC 4.62 10/10/2018    RBC 4.69 10/25/2011    HGB 14.5 10/10/2018    HCT 43.5 10/10/2018     10/10/2018    MCV 94.2 10/10/2018    MCH 31.4 10/10/2018    MCHC 33.4 10/10/2018    RDW 14.3 10/10/2018    LYMPHOPCT 24.3 05/02/2018    MONOPCT 8.3 05/02/2018    EOSPCT 6.2 10/25/2011    BASOPCT 1.0 05/02/2018    MONOSABS 0.6 05/02/2018    LYMPHSABS 1.7 05/02/2018    EOSABS 0.3 05/02/2018    BASOSABS 0.1 05/02/2018     CMP:    Lab Results   Component Value Date     10/10/2018    K 4.3 10/10/2018    K 4.3 05/02/2018     10/10/2018    CO2 26 10/10/2018    BUN 19 10/10/2018    CREATININE 0.86 10/10/2018    GFRAA >60.0 10/10/2018    LABGLOM >60.0 10/10/2018    GLUCOSE 89 10/10/2018    GLUCOSE 96 10/25/2011    PROT 6.9 07/30/2018    LABALBU 4.2 07/30/2018    LABALBU 4.6 10/25/2011    CALCIUM 9.5 10/10/2018    BILITOT 0.5 07/30/2018    ALKPHOS 90 07/30/2018    AST 13 07/30/2018    ALT 14 07/30/2018     BMP:    Lab Results   Component Value Date     10/10/2018    K 4.3 10/10/2018    K 4.3 05/02/2018     10/10/2018    CO2 26 10/10/2018    BUN 19 10/10/2018    LABALBU 4.2 07/30/2018    LABALBU 4.6 10/25/2011    CREATININE 0.86 10/10/2018    CALCIUM 9.5 10/10/2018    GFRAA >60.0 10/10/2018    LABGLOM >60.0 10/10/2018    GLUCOSE 89 10/10/2018    GLUCOSE 96 10/25/2011     Magnesium:    Lab Results   Component Value Date    MG 2.3 10/10/2018     Troponin:    Lab Results   Component Value Date    TROPONINI 0.013 05/02/2018       There are no active hospital problems to display for this patient. Assessment/Plan:  1. Chest pain admit serial Trops- ASA BB Statin  2. HTN- will adjust meds  3. DHF- Compensated with no symptoms.   4. Chronic LE edema- will give iv Lasix  5.  Follow labs     Electronically signed by Roxann Meehan MD on 11/6/2018 at 11:10 AM

## 2018-11-07 VITALS
DIASTOLIC BLOOD PRESSURE: 69 MMHG | WEIGHT: 198.6 LBS | HEART RATE: 69 BPM | RESPIRATION RATE: 20 BRPM | OXYGEN SATURATION: 97 % | SYSTOLIC BLOOD PRESSURE: 134 MMHG | BODY MASS INDEX: 29.41 KG/M2 | HEIGHT: 69 IN | TEMPERATURE: 97.2 F

## 2018-11-07 LAB
ANION GAP SERPL CALCULATED.3IONS-SCNC: 13 MEQ/L (ref 7–13)
BUN BLDV-MCNC: 22 MG/DL (ref 8–23)
CALCIUM SERPL-MCNC: 9.3 MG/DL (ref 8.6–10.2)
CHLORIDE BLD-SCNC: 104 MEQ/L (ref 98–107)
CHOLESTEROL, TOTAL: 154 MG/DL (ref 0–199)
CO2: 24 MEQ/L (ref 22–29)
CREAT SERPL-MCNC: 0.89 MG/DL (ref 0.7–1.2)
EKG ATRIAL RATE: 70 BPM
EKG ATRIAL RATE: 76 BPM
EKG P AXIS: 33 DEGREES
EKG P AXIS: 50 DEGREES
EKG P-R INTERVAL: 166 MS
EKG P-R INTERVAL: 172 MS
EKG Q-T INTERVAL: 380 MS
EKG Q-T INTERVAL: 414 MS
EKG QRS DURATION: 94 MS
EKG QRS DURATION: 96 MS
EKG QTC CALCULATION (BAZETT): 427 MS
EKG QTC CALCULATION (BAZETT): 447 MS
EKG R AXIS: -21 DEGREES
EKG R AXIS: -23 DEGREES
EKG T AXIS: 7 DEGREES
EKG T AXIS: 8 DEGREES
EKG VENTRICULAR RATE: 70 BPM
EKG VENTRICULAR RATE: 76 BPM
GFR AFRICAN AMERICAN: >60
GFR NON-AFRICAN AMERICAN: >60
GLUCOSE BLD-MCNC: 101 MG/DL (ref 74–109)
HCT VFR BLD CALC: 43.3 % (ref 42–52)
HDLC SERPL-MCNC: 50 MG/DL (ref 40–59)
HEMOGLOBIN: 14.7 G/DL (ref 14–18)
LDL CHOLESTEROL CALCULATED: 79 MG/DL (ref 0–129)
MAGNESIUM: 2.2 MG/DL (ref 1.7–2.3)
MCH RBC QN AUTO: 31.4 PG (ref 27–31.3)
MCHC RBC AUTO-ENTMCNC: 33.9 % (ref 33–37)
MCV RBC AUTO: 92.7 FL (ref 80–100)
PDW BLD-RTO: 14.1 % (ref 11.5–14.5)
PLATELET # BLD: 178 K/UL (ref 130–400)
POTASSIUM SERPL-SCNC: 3.8 MEQ/L (ref 3.5–5.1)
RBC # BLD: 4.67 M/UL (ref 4.7–6.1)
SODIUM BLD-SCNC: 141 MEQ/L (ref 132–144)
TRIGL SERPL-MCNC: 125 MG/DL (ref 0–200)
TROPONIN: 0.02 NG/ML (ref 0–0.01)
WBC # BLD: 6.2 K/UL (ref 4.8–10.8)

## 2018-11-07 PROCEDURE — 80048 BASIC METABOLIC PNL TOTAL CA: CPT

## 2018-11-07 PROCEDURE — 6370000000 HC RX 637 (ALT 250 FOR IP): Performed by: INTERNAL MEDICINE

## 2018-11-07 PROCEDURE — G0378 HOSPITAL OBSERVATION PER HR: HCPCS

## 2018-11-07 PROCEDURE — 83735 ASSAY OF MAGNESIUM: CPT

## 2018-11-07 PROCEDURE — 36415 COLL VENOUS BLD VENIPUNCTURE: CPT

## 2018-11-07 PROCEDURE — 84484 ASSAY OF TROPONIN QUANT: CPT

## 2018-11-07 PROCEDURE — 99217 PR OBSERVATION CARE DISCHARGE MANAGEMENT: CPT | Performed by: INTERNAL MEDICINE

## 2018-11-07 PROCEDURE — 6370000000 HC RX 637 (ALT 250 FOR IP): Performed by: EMERGENCY MEDICINE

## 2018-11-07 PROCEDURE — 85027 COMPLETE CBC AUTOMATED: CPT

## 2018-11-07 PROCEDURE — 80061 LIPID PANEL: CPT

## 2018-11-07 PROCEDURE — 93005 ELECTROCARDIOGRAM TRACING: CPT

## 2018-11-07 RX ORDER — TRIAMTERENE AND HYDROCHLOROTHIAZIDE 37.5; 25 MG/1; MG/1
1 TABLET ORAL DAILY
Qty: 30 TABLET | Refills: 5 | Status: SHIPPED | OUTPATIENT
Start: 2018-11-07 | End: 2019-09-24 | Stop reason: SDUPTHER

## 2018-11-07 RX ORDER — FUROSEMIDE 40 MG/1
40 TABLET ORAL DAILY
Status: DISCONTINUED | OUTPATIENT
Start: 2018-11-07 | End: 2018-11-07 | Stop reason: HOSPADM

## 2018-11-07 RX ORDER — PRAVASTATIN SODIUM 80 MG/1
80 TABLET ORAL EVERY EVENING
Qty: 30 TABLET | Refills: 3 | Status: SHIPPED | OUTPATIENT
Start: 2018-11-07 | End: 2019-01-28 | Stop reason: SDUPTHER

## 2018-11-07 RX ORDER — LATANOPROST 50 UG/ML
1 SOLUTION/ DROPS OPHTHALMIC DAILY
Qty: 1 BOTTLE | Refills: 3 | Status: SHIPPED | OUTPATIENT
Start: 2018-11-07 | End: 2019-08-08 | Stop reason: SDUPTHER

## 2018-11-07 RX ADMIN — POTASSIUM CHLORIDE 20 MEQ: 20 TABLET, EXTENDED RELEASE ORAL at 09:18

## 2018-11-07 RX ADMIN — FUROSEMIDE 40 MG: 40 TABLET ORAL at 09:18

## 2018-11-07 RX ADMIN — LISINOPRIL 40 MG: 20 TABLET ORAL at 09:18

## 2018-11-07 RX ADMIN — TRIAMTERENE AND HYDROCHLOROTHIAZIDE 1 TABLET: 37.5; 25 TABLET ORAL at 09:18

## 2018-11-07 RX ADMIN — METOPROLOL SUCCINATE 50 MG: 50 TABLET, EXTENDED RELEASE ORAL at 09:18

## 2018-11-07 RX ADMIN — ASPIRIN 81 MG: 81 TABLET, CHEWABLE ORAL at 09:18

## 2018-11-07 RX ADMIN — FINASTERIDE 5 MG: 5 TABLET, FILM COATED ORAL at 09:22

## 2018-11-07 ASSESSMENT — PAIN SCALES - GENERAL: PAINLEVEL_OUTOF10: 0

## 2018-11-07 NOTE — DISCHARGE SUMMARY
Cardiology Discharge Summary      Patient Identification:  Cecy Savage  : 1935  MRN: [id number]   Account: [de-identified]     Admit date: 2018  Discharge date: 2018   Attending provider: Scott Navarro MD        Primary care provider: Elvia Marie MD     Admission Diagnoses:  <principal problem not specified>     Cp  htn    Discharge Diagnoses: Active Hospital Problems    Diagnosis Date Noted    Chest pain [R07.9] 2018     Priority: [facility] Course:   Cecy Savage is a [de-identified] y.o. male admitted to [facility] on 2018 for . Med RX  Procedures:   1. Consults:   None    Examination:  BP (!) 149/85   Pulse 66   Temp 97.3 °F (36.3 °C) (Oral)   Resp 16   Ht 5' 9\" (1.753 m)   Wt 198 lb 9.6 oz (90.1 kg)   SpO2 97%   BMI 29.33 kg/m²    Physical Exam   Constitutional: He appears healthy. No distress. HENT:   Normal cephalic and Atraumatic   Eyes: Pupils are equal, round, and reactive to light. Neck: Normal range of motion and thyroid normal. Neck supple. No JVD present. No neck adenopathy. No thyromegaly present. Cardiovascular: Normal rate, regular rhythm, normal heart sounds, intact distal pulses and normal pulses. Pulmonary/Chest: Effort normal and breath sounds normal. He has no wheezes. He has no rales. He exhibits no tenderness. Abdominal: Soft. Bowel sounds are normal. There is no tenderness. Musculoskeletal: Normal range of motion. He exhibits no edema or tenderness. Neurological: He is alert and oriented to person, place, and time. Skin: Skin is warm. No cyanosis. Nails show no clubbing.        Medications:  Current Discharge Medication List      CONTINUE these medications which have CHANGED    Details   !! latanoprost (XALATAN) 0.005 % ophthalmic solution Place 1 drop into both eyes daily  Qty: 1 Bottle, Refills: 3      !! pravastatin (PRAVACHOL) 80 MG tablet medications       timolol (TIMOPTIC) 0.5 % ophthalmic solution Comments:   Reason for Stopping:               Significant Diagnostics:   Radiology: Xr Chest Portable    Result Date: 11/6/2018  EXAMINATION: CHEST PORTABLE VIEW  CLINICAL HISTORY: Midsternal chest pain COMPARISONS: May 1, 2018  FINDINGS: Single  views of the chest is submitted. The cardiac silhouette is enlarged unchanged configuration. The mediastinum is unremarkable. Pulmonary vascular unremarkable. Right sided trachea. There is areas atelectasis, scarring right lower lobe. No focal infiltrates. No Pneumothoraces. Again note is made of elevation right hemidiaphragm. Unchanged Degenerative changes of the right shoulder.                                                                                    NO ACUTE ACTIVE CARDIOPULMONARY PROCESS      Labs:   Recent Results (from the past 72 hour(s))   EKG 12 Lead - Chest Pain    Collection Time: 11/06/18  9:58 AM   Result Value Ref Range    Ventricular Rate 76 BPM    Atrial Rate 76 BPM    P-R Interval 172 ms    QRS Duration 94 ms    Q-T Interval 380 ms    QTc Calculation (Bazett) 427 ms    P Axis 33 degrees    R Axis -23 degrees    T Axis 7 degrees   CBC Auto Differential    Collection Time: 11/06/18 10:54 AM   Result Value Ref Range    WBC 7.3 4.8 - 10.8 K/uL    RBC 4.83 4.70 - 6.10 M/uL    Hemoglobin 15.0 14.0 - 18.0 g/dL    Hematocrit 44.9 42.0 - 52.0 %    MCV 93.0 80.0 - 100.0 fL    MCH 31.1 27.0 - 31.3 pg    MCHC 33.5 33.0 - 37.0 %    RDW 14.0 11.5 - 14.5 %    Platelets 647 838 - 584 K/uL    Neutrophils % 79.3 %    Lymphocytes % 12.6 %    Monocytes % 6.0 %    Eosinophils % 1.3 %    Basophils % 0.8 %    Neutrophils # 5.8 1.4 - 6.5 K/uL    Lymphocytes # 0.9 (L) 1.0 - 4.8 K/uL    Monocytes # 0.4 0.2 - 0.8 K/uL    Eosinophils # 0.1 0.0 - 0.7 K/uL    Basophils # 0.1 0.0 - 0.2 K/uL   Comprehensive Metabolic Panel    Collection Time: 11/06/18 10:54 AM   Result Value Ref Range    Sodium 138 132 - 144 mEq/L 141 132 - 144 mEq/L    Potassium 3.8 3.5 - 5.1 mEq/L    Chloride 104 98 - 107 mEq/L    CO2 24 22 - 29 mEq/L    Anion Gap 13 7 - 13 mEq/L    Glucose 101 74 - 109 mg/dL    BUN 22 8 - 23 mg/dL    CREATININE 0.89 0.70 - 1.20 mg/dL    GFR Non-African American >60.0 >60    GFR  >60.0 >60    Calcium 9.3 8.6 - 10.2 mg/dL   Lipid panel - fasting    Collection Time: 11/07/18  6:20 AM   Result Value Ref Range    Cholesterol, Total 154 0 - 199 mg/dL    Triglycerides 125 0 - 200 mg/dL    HDL 50 40 - 59 mg/dL    LDL Calculated 79 0 - 129 mg/dL           Follow-up visits:   Donald Trotter MD  [address] [place] [address] [facility],[address]          Myranda Whittaker MD  [address] [place] [address] A  [address] [place] [address]  [phone]    Schedule an appointment as soon as possible for a visit in 1 week         [facility]    Diagnosis Date Noted    Chest pain [R07.9] 11/06/2018     Priority: Low     1. CP- Trops detected. Appears to have chronic elevated trop. Recent SPECT negative. 2. LE edema- diuresed 1.3 L  3. Pt is asymptomatic  4. Telemetry SR   5.  Dc home               Electronically signed by Willie Garcia MD on 11/7/2018 at 7:43 AM

## 2018-11-08 PROCEDURE — 93010 ELECTROCARDIOGRAM REPORT: CPT | Performed by: INTERNAL MEDICINE

## 2018-11-12 ENCOUNTER — OFFICE VISIT (OUTPATIENT)
Dept: CARDIOLOGY CLINIC | Age: 83
End: 2018-11-12
Payer: MEDICARE

## 2018-11-12 VITALS
HEART RATE: 68 BPM | BODY MASS INDEX: 29.92 KG/M2 | SYSTOLIC BLOOD PRESSURE: 142 MMHG | RESPIRATION RATE: 18 BRPM | WEIGHT: 202 LBS | HEIGHT: 69 IN | OXYGEN SATURATION: 98 % | DIASTOLIC BLOOD PRESSURE: 78 MMHG

## 2018-11-12 DIAGNOSIS — R06.09 DOE (DYSPNEA ON EXERTION): ICD-10-CM

## 2018-11-12 DIAGNOSIS — I16.0 HYPERTENSIVE URGENCY: Primary | ICD-10-CM

## 2018-11-12 DIAGNOSIS — E78.5 HYPERLIPIDEMIA, UNSPECIFIED HYPERLIPIDEMIA TYPE: ICD-10-CM

## 2018-11-12 DIAGNOSIS — I50.31 ACUTE DIASTOLIC HEART FAILURE (HCC): ICD-10-CM

## 2018-11-12 DIAGNOSIS — I21.4 NSTEMI (NON-ST ELEVATED MYOCARDIAL INFARCTION) (HCC): ICD-10-CM

## 2018-11-12 DIAGNOSIS — I10 ESSENTIAL HYPERTENSION: ICD-10-CM

## 2018-11-12 PROCEDURE — 99215 OFFICE O/P EST HI 40 MIN: CPT | Performed by: INTERNAL MEDICINE

## 2018-11-12 PROCEDURE — 1101F PT FALLS ASSESS-DOCD LE1/YR: CPT | Performed by: INTERNAL MEDICINE

## 2018-11-12 PROCEDURE — 1123F ACP DISCUSS/DSCN MKR DOCD: CPT | Performed by: INTERNAL MEDICINE

## 2018-11-12 PROCEDURE — G8417 CALC BMI ABV UP PARAM F/U: HCPCS | Performed by: INTERNAL MEDICINE

## 2018-11-12 PROCEDURE — 1036F TOBACCO NON-USER: CPT | Performed by: INTERNAL MEDICINE

## 2018-11-12 PROCEDURE — G8484 FLU IMMUNIZE NO ADMIN: HCPCS | Performed by: INTERNAL MEDICINE

## 2018-11-12 PROCEDURE — 4040F PNEUMOC VAC/ADMIN/RCVD: CPT | Performed by: INTERNAL MEDICINE

## 2018-11-12 PROCEDURE — G8427 DOCREV CUR MEDS BY ELIG CLIN: HCPCS | Performed by: INTERNAL MEDICINE

## 2018-11-12 PROCEDURE — G8598 ASA/ANTIPLAT THER USED: HCPCS | Performed by: INTERNAL MEDICINE

## 2018-11-12 ASSESSMENT — ENCOUNTER SYMPTOMS
SHORTNESS OF BREATH: 0
WHEEZING: 0
EYES NEGATIVE: 1
CHEST TIGHTNESS: 0
GASTROINTESTINAL NEGATIVE: 1
COUGH: 0
RESPIRATORY NEGATIVE: 1
NAUSEA: 0
STRIDOR: 0
BLOOD IN STOOL: 0

## 2018-11-12 NOTE — PROGRESS NOTES
Subsequent Progress Note  Patient: Kylie Rizo  YOB: 1935  MRN: [id number]    Chief Complaint: Leg edema nstemi  Etienne htn  Chief Complaint   Patient presents with    Follow-Up from [address] [place] [address]    Chest Pain       CV Data:  6/2018 spect negative  5/2018 echo EF 60%  Subjective/HPI: no cp + sob leg swelling improved. Eating well. takse smeds     EKG:    Past Medical History:   Diagnosis Date    Anticoagulant long-term use     Anxiety     CAD (coronary artery disease)     Carotid stenosis     2/2013 16-49%    [facility]     Dementia     Dementia     Hyperlipidemia     Hypertension     [facility] infarction) [facility]     Osteoarthritis        Past Surgical History:   Procedure Laterality Date    CARPAL TUNNEL RELEASE  1998    CATARACT REMOVAL  2007    COLONOSCOPY  12/10/10,2007    DR Lynda Ponce - DONE AT [address] [place]  COLONOSCOPY  2007    hx polyps needs 2015    COLONOSCOPY  9/21/15     DR. YARBROUGH     HERNIA REPAIR         Family History   Problem Relation Age of Onset    Heart Disease Mother     High Blood Pressure Mother        Social History     Social History    Marital status:      Spouse name: N/A    Number of children: N/A    Years of education: N/A     Social History Main Topics    Smoking status: Never Smoker    Smokeless tobacco: Never Used    Alcohol use No      Comment: social    Drug use: No    Sexual activity: Not on file     Other Topics Concern    Not on file     Social History Narrative    No narrative on file       No Known Allergies    Current Outpatient Prescriptions   Medication Sig Dispense Refill    latanoprost (XALATAN) 0.005 % ophthalmic solution Place 1 drop into both eyes daily 1 Bottle 3    pravastatin (PRAVACHOL) 80 MG tablet Take 1 tablet by mouth every evening 30 tablet 3    triamterene-hydrochlorothiazide (MAXZIDE-25) 37.5-25 MG per tablet Take 1 tablet by mouth daily 30 tablet 5    vitamin B-12 Arm, Position: Sitting, Cuff Size: Medium Adult)   Pulse 68   Resp 18   Ht 5' 9\" (1.753 m)   Wt 202 lb (91.6 kg)   SpO2 98%   BMI 29.83 kg/m²    Physical Exam   Constitutional: He appears healthy. No distress. HENT:   Normal cephalic and Atraumatic   Eyes: Pupils are equal, round, and reactive to light. Neck: Normal range of motion and thyroid normal. Neck supple. No JVD present. No neck adenopathy. No thyromegaly present. Cardiovascular: Normal rate, regular rhythm, normal heart sounds, intact distal pulses and normal pulses. Pulmonary/Chest: Effort normal and breath sounds normal. He has no wheezes. He has no rales. He exhibits no tenderness. Abdominal: Soft. Bowel sounds are normal. There is no tenderness. Musculoskeletal: Normal range of motion. He exhibits no edema or tenderness. Neurological: He is alert and oriented to person, place, and time. Skin: Skin is warm. No cyanosis. Nails show no clubbing.        LABS:  CBC:   Lab Results   Component Value Date    WBC 6.2 11/07/2018    RBC 4.67 11/07/2018    RBC 4.69 10/25/2011    HGB 14.7 11/07/2018    HCT 43.3 11/07/2018    MCV 92.7 11/07/2018    MCH 31.4 11/07/2018    MCHC 33.9 11/07/2018    RDW 14.1 11/07/2018     11/07/2018    MPV 9.2 08/27/2015     Lipids:  Lab Results   Component Value Date    CHOL 154 11/07/2018    CHOL 173 07/30/2018    CHOL 149 09/12/2017     Lab Results   Component Value Date    TRIG 125 11/07/2018    TRIG 83 07/30/2018    TRIG 83 09/12/2017     Lab Results   Component Value Date    HDL 50 11/07/2018    HDL 50 07/30/2018    HDL 56 09/12/2017     Lab Results   Component Value Date    LDLCALC 79 11/07/2018    LDLCALC 106 07/30/2018    LDLCALC 76 09/12/2017     No results found for: LABVLDL, VLDL  Lab Results   Component Value Date    CHOLHDLRATIO 3.8 09/10/2012    CHOLHDLRATIO 3.3 10/25/2011     CMP:    Lab Results   Component Value Date     11/07/2018    K 3.8 11/07/2018    K 4.3 05/02/2018

## 2018-11-13 ENCOUNTER — HOSPITAL ENCOUNTER (OUTPATIENT)
Dept: CARDIAC CATH/INVASIVE PROCEDURES | Age: 83
Discharge: HOME | End: 2018-11-14
Attending: INTERNAL MEDICINE | Admitting: INTERNAL MEDICINE
Payer: MEDICARE

## 2018-11-13 PROBLEM — Z98.890 S/P CARDIAC CATH: Status: ACTIVE | Noted: 2018-11-13

## 2018-11-13 PROCEDURE — C1725 CATH, TRANSLUMIN NON-LASER: HCPCS

## 2018-11-13 PROCEDURE — C1887 CATHETER, GUIDING: HCPCS

## 2018-11-13 PROCEDURE — C9600 PERC DRUG-EL COR STENT SING: HCPCS | Performed by: INTERNAL MEDICINE

## 2018-11-13 PROCEDURE — 6360000002 HC RX W HCPCS: Performed by: INTERNAL MEDICINE

## 2018-11-13 PROCEDURE — 6360000002 HC RX W HCPCS

## 2018-11-13 PROCEDURE — C1751 CATH, INF, PER/CENT/MIDLINE: HCPCS

## 2018-11-13 PROCEDURE — 93458 L HRT ARTERY/VENTRICLE ANGIO: CPT | Performed by: INTERNAL MEDICINE

## 2018-11-13 PROCEDURE — 2580000003 HC RX 258: Performed by: INTERNAL MEDICINE

## 2018-11-13 PROCEDURE — C1769 GUIDE WIRE: HCPCS

## 2018-11-13 PROCEDURE — 6370000000 HC RX 637 (ALT 250 FOR IP): Performed by: INTERNAL MEDICINE

## 2018-11-13 PROCEDURE — 2500000003 HC RX 250 WO HCPCS

## 2018-11-13 PROCEDURE — C1894 INTRO/SHEATH, NON-LASER: HCPCS

## 2018-11-13 PROCEDURE — C1760 CLOSURE DEV, VASC: HCPCS

## 2018-11-13 PROCEDURE — C1874 STENT, COATED/COV W/DEL SYS: HCPCS

## 2018-11-13 PROCEDURE — 92928 PRQ TCAT PLMT NTRAC ST 1 LES: CPT | Performed by: INTERNAL MEDICINE

## 2018-11-13 PROCEDURE — 6370000000 HC RX 637 (ALT 250 FOR IP)

## 2018-11-13 PROCEDURE — 2709999900 HC NON-CHARGEABLE SUPPLY

## 2018-11-13 PROCEDURE — 2580000003 HC RX 258

## 2018-11-13 PROCEDURE — 93460 R&L HRT ART/VENTRICLE ANGIO: CPT | Performed by: INTERNAL MEDICINE

## 2018-11-13 RX ORDER — TRIAMTERENE AND HYDROCHLOROTHIAZIDE 37.5; 25 MG/1; MG/1
1 TABLET ORAL DAILY
Status: DISCONTINUED | OUTPATIENT
Start: 2018-11-13 | End: 2018-11-14 | Stop reason: HOSPADM

## 2018-11-13 RX ORDER — POTASSIUM CHLORIDE 20 MEQ/1
20 TABLET, EXTENDED RELEASE ORAL DAILY
Status: DISCONTINUED | OUTPATIENT
Start: 2018-11-13 | End: 2018-11-14 | Stop reason: HOSPADM

## 2018-11-13 RX ORDER — SODIUM CHLORIDE 0.9 % (FLUSH) 0.9 %
10 SYRINGE (ML) INJECTION PRN
Status: DISCONTINUED | OUTPATIENT
Start: 2018-11-13 | End: 2018-11-13

## 2018-11-13 RX ORDER — LISINOPRIL 5 MG/1
5 TABLET ORAL DAILY
Status: DISCONTINUED | OUTPATIENT
Start: 2018-11-13 | End: 2018-11-14 | Stop reason: HOSPADM

## 2018-11-13 RX ORDER — SODIUM CHLORIDE 9 MG/ML
INJECTION, SOLUTION INTRAVENOUS CONTINUOUS
Status: ACTIVE | OUTPATIENT
Start: 2018-11-13 | End: 2018-11-14

## 2018-11-13 RX ORDER — DIPHENHYDRAMINE HYDROCHLORIDE 50 MG/ML
50 INJECTION INTRAMUSCULAR; INTRAVENOUS ONCE
Status: DISCONTINUED | OUTPATIENT
Start: 2018-11-13 | End: 2018-11-13

## 2018-11-13 RX ORDER — FUROSEMIDE 40 MG/1
40 TABLET ORAL DAILY
Status: DISCONTINUED | OUTPATIENT
Start: 2018-11-13 | End: 2018-11-14 | Stop reason: HOSPADM

## 2018-11-13 RX ORDER — PRAVASTATIN SODIUM 80 MG/1
80 TABLET ORAL EVERY EVENING
Status: DISCONTINUED | OUTPATIENT
Start: 2018-11-13 | End: 2018-11-14 | Stop reason: HOSPADM

## 2018-11-13 RX ORDER — NITROGLYCERIN 0.4 MG/1
0.4 TABLET SUBLINGUAL EVERY 5 MIN PRN
Status: DISCONTINUED | OUTPATIENT
Start: 2018-11-13 | End: 2018-11-14 | Stop reason: HOSPADM

## 2018-11-13 RX ORDER — FINASTERIDE 5 MG/1
5 TABLET, FILM COATED ORAL DAILY
Status: DISCONTINUED | OUTPATIENT
Start: 2018-11-13 | End: 2018-11-14 | Stop reason: HOSPADM

## 2018-11-13 RX ORDER — METOPROLOL SUCCINATE 50 MG/1
50 TABLET, EXTENDED RELEASE ORAL DAILY
Status: DISCONTINUED | OUTPATIENT
Start: 2018-11-13 | End: 2018-11-14 | Stop reason: HOSPADM

## 2018-11-13 RX ORDER — SODIUM CHLORIDE 0.9 % (FLUSH) 0.9 %
10 SYRINGE (ML) INJECTION EVERY 12 HOURS SCHEDULED
Status: DISCONTINUED | OUTPATIENT
Start: 2018-11-13 | End: 2018-11-13

## 2018-11-13 RX ORDER — ONDANSETRON 2 MG/ML
4 INJECTION INTRAMUSCULAR; INTRAVENOUS EVERY 6 HOURS PRN
Status: DISCONTINUED | OUTPATIENT
Start: 2018-11-13 | End: 2018-11-14 | Stop reason: HOSPADM

## 2018-11-13 RX ORDER — ASPIRIN 81 MG/1
81 TABLET ORAL DAILY
Status: DISCONTINUED | OUTPATIENT
Start: 2018-11-13 | End: 2018-11-14 | Stop reason: HOSPADM

## 2018-11-13 RX ORDER — SODIUM CHLORIDE 450 MG/100ML
75 INJECTION, SOLUTION INTRAVENOUS CONTINUOUS
Status: DISCONTINUED | OUTPATIENT
Start: 2018-11-13 | End: 2018-11-13

## 2018-11-13 RX ORDER — DONEPEZIL HYDROCHLORIDE 5 MG/1
5 TABLET, FILM COATED ORAL NIGHTLY
Status: DISCONTINUED | OUTPATIENT
Start: 2018-11-13 | End: 2018-11-14 | Stop reason: HOSPADM

## 2018-11-13 RX ADMIN — DONEPEZIL HYDROCHLORIDE 5 MG: 5 TABLET, FILM COATED ORAL at 21:36

## 2018-11-13 RX ADMIN — SODIUM CHLORIDE 75 ML/HR: 4.5 INJECTION, SOLUTION INTRAVENOUS at 13:34

## 2018-11-13 RX ADMIN — ONDANSETRON 4 MG: 2 INJECTION INTRAMUSCULAR; INTRAVENOUS at 16:29

## 2018-11-13 RX ADMIN — PRAVASTATIN SODIUM 80 MG: 80 TABLET ORAL at 21:36

## 2018-11-13 RX ADMIN — SODIUM CHLORIDE: 9 INJECTION, SOLUTION INTRAVENOUS at 20:14

## 2018-11-13 NOTE — PROGRESS NOTES
Patient is resting in bed with family at the bedside. Right groin remains soft and dry.   Report called to Ghazal Robertson

## 2018-11-13 NOTE — PROGRESS NOTES
Hemostasis achieved after holding manual pressure for 10 minutes. Patient complained of nausea and was medicated with zofran.

## 2018-11-13 NOTE — PROGRESS NOTES
Patient will be brought back to pre and post, identified, sign consents and given a copy of rights and responsibilities

## 2018-11-14 VITALS
RESPIRATION RATE: 18 BRPM | DIASTOLIC BLOOD PRESSURE: 65 MMHG | OXYGEN SATURATION: 96 % | BODY MASS INDEX: 30.21 KG/M2 | TEMPERATURE: 96.3 F | WEIGHT: 204 LBS | HEIGHT: 69 IN | HEART RATE: 66 BPM | SYSTOLIC BLOOD PRESSURE: 132 MMHG

## 2018-11-14 PROCEDURE — 6370000000 HC RX 637 (ALT 250 FOR IP): Performed by: INTERNAL MEDICINE

## 2018-11-14 PROCEDURE — 99217 PR OBSERVATION CARE DISCHARGE MANAGEMENT: CPT | Performed by: INTERNAL MEDICINE

## 2018-11-14 RX ORDER — DONEPEZIL HYDROCHLORIDE 5 MG/1
5 TABLET, FILM COATED ORAL NIGHTLY
Qty: 30 TABLET | Refills: 3 | Status: SHIPPED | OUTPATIENT
Start: 2018-11-14 | End: 2019-04-01 | Stop reason: SDUPTHER

## 2018-11-14 RX ORDER — NITROGLYCERIN 0.4 MG/1
TABLET SUBLINGUAL
Qty: 25 TABLET | Refills: 3 | Status: SHIPPED | OUTPATIENT
Start: 2018-11-14 | End: 2021-11-02 | Stop reason: SDUPTHER

## 2018-11-14 RX ADMIN — ASPIRIN 81 MG: 81 TABLET ORAL at 08:02

## 2018-11-14 RX ADMIN — POTASSIUM CHLORIDE 20 MEQ: 20 TABLET, EXTENDED RELEASE ORAL at 08:02

## 2018-11-14 RX ADMIN — TICAGRELOR 90 MG: 90 TABLET ORAL at 08:02

## 2018-11-14 RX ADMIN — TRIAMTERENE AND HYDROCHLOROTHIAZIDE 1 TABLET: 37.5; 25 TABLET ORAL at 08:02

## 2018-11-14 RX ADMIN — LISINOPRIL 5 MG: 5 TABLET ORAL at 08:02

## 2018-11-14 RX ADMIN — METOPROLOL SUCCINATE 50 MG: 50 TABLET, EXTENDED RELEASE ORAL at 08:02

## 2018-11-14 ASSESSMENT — PAIN SCALES - GENERAL
PAINLEVEL_OUTOF10: 0
PAINLEVEL_OUTOF10: 0

## 2018-11-14 NOTE — CONSULTS
Cardiac Rehab and MI book given and discussed. Uncertain if he can start outpatient program due to lack of transportation, will check some resources and schedule if able.

## 2018-11-21 ENCOUNTER — OFFICE VISIT (OUTPATIENT)
Dept: CARDIOLOGY CLINIC | Age: 83
End: 2018-11-21
Payer: MEDICARE

## 2018-11-21 VITALS
WEIGHT: 202 LBS | OXYGEN SATURATION: 97 % | HEIGHT: 69 IN | HEART RATE: 79 BPM | DIASTOLIC BLOOD PRESSURE: 67 MMHG | BODY MASS INDEX: 29.92 KG/M2 | RESPIRATION RATE: 16 BRPM | SYSTOLIC BLOOD PRESSURE: 115 MMHG

## 2018-11-21 DIAGNOSIS — I10 ESSENTIAL HYPERTENSION: ICD-10-CM

## 2018-11-21 DIAGNOSIS — I65.23 CAROTID STENOSIS, BILATERAL: ICD-10-CM

## 2018-11-21 DIAGNOSIS — R60.0 LEG EDEMA: ICD-10-CM

## 2018-11-21 DIAGNOSIS — R06.09 DOE (DYSPNEA ON EXERTION): ICD-10-CM

## 2018-11-21 DIAGNOSIS — E78.5 HYPERLIPIDEMIA, UNSPECIFIED HYPERLIPIDEMIA TYPE: ICD-10-CM

## 2018-11-21 DIAGNOSIS — I21.4 NSTEMI (NON-ST ELEVATED MYOCARDIAL INFARCTION) (HCC): ICD-10-CM

## 2018-11-21 PROCEDURE — G8427 DOCREV CUR MEDS BY ELIG CLIN: HCPCS | Performed by: INTERNAL MEDICINE

## 2018-11-21 PROCEDURE — G8598 ASA/ANTIPLAT THER USED: HCPCS | Performed by: INTERNAL MEDICINE

## 2018-11-21 PROCEDURE — 1036F TOBACCO NON-USER: CPT | Performed by: INTERNAL MEDICINE

## 2018-11-21 PROCEDURE — G8482 FLU IMMUNIZE ORDER/ADMIN: HCPCS | Performed by: INTERNAL MEDICINE

## 2018-11-21 PROCEDURE — 4040F PNEUMOC VAC/ADMIN/RCVD: CPT | Performed by: INTERNAL MEDICINE

## 2018-11-21 PROCEDURE — G8417 CALC BMI ABV UP PARAM F/U: HCPCS | Performed by: INTERNAL MEDICINE

## 2018-11-21 PROCEDURE — 1123F ACP DISCUSS/DSCN MKR DOCD: CPT | Performed by: INTERNAL MEDICINE

## 2018-11-21 PROCEDURE — 1101F PT FALLS ASSESS-DOCD LE1/YR: CPT | Performed by: INTERNAL MEDICINE

## 2018-11-21 PROCEDURE — 99214 OFFICE O/P EST MOD 30 MIN: CPT | Performed by: INTERNAL MEDICINE

## 2018-11-21 ASSESSMENT — ENCOUNTER SYMPTOMS
COUGH: 0
RESPIRATORY NEGATIVE: 1
EYES NEGATIVE: 1
NAUSEA: 0
BLOOD IN STOOL: 0
WHEEZING: 0
STRIDOR: 0
CHEST TIGHTNESS: 0
GASTROINTESTINAL NEGATIVE: 1
SHORTNESS OF BREATH: 0

## 2018-11-21 NOTE — PROGRESS NOTES
tablet 3    triamterene-hydrochlorothiazide (MAXZIDE-25) 37.5-25 MG per tablet Take 1 tablet by mouth daily 30 tablet 5    vitamin B-12 (CYANOCOBALAMIN) 1000 MCG tablet Take 500 mcg by mouth daily      benazepril (LOTENSIN) 20 MG tablet Take 1 tablet by mouth daily 30 tablet 3    potassium chloride (KLOR-CON M) 20 MEQ extended release tablet Take 1 tablet by mouth daily 30 tablet 3    Elastic Bandages & Supports (JOBST KNEE HIGH COMPRESSION SM) MISC 1 each by Does not apply route daily 1 each 2    aspirin 81 MG tablet Take 1 tablet by mouth daily With Food 30 tablet 3    metoprolol succinate (TOPROL XL) 50 MG extended release tablet Take 1 tablet by mouth daily 90 tablet 3    finasteride (PROSCAR) 5 MG tablet TAKE ONE TABLET BY MOUTH EVERY DAY 90 tablet 3    LORazepam (ATIVAN) 1 MG tablet Take 1 tablet by mouth every 8 hours as needed for Anxiety 30 tablet 1    Multiple Vitamins-Minerals (MULTIVITAMIN PO) Take by mouth daily       dorzolamide-timolol (COSOPT) 22.3-6.8 MG/ML ophthalmic solution Place 1 drop into both eyes daily       nitroGLYCERIN (NITROSTAT) 0.4 MG SL tablet up to max of 3 total doses. If no relief after 1 dose, call 911. 25 tablet 3     No current facility-administered medications for this visit. Review of Systems:   Review of Systems   Constitutional: Negative. Negative for diaphoresis and fatigue. HENT: Negative. Eyes: Negative. Respiratory: Negative. Negative for cough, chest tightness, shortness of breath, wheezing and stridor. Cardiovascular: Negative. Negative for chest pain, palpitations and leg swelling. Gastrointestinal: Negative. Negative for blood in stool and nausea. Genitourinary: Negative. Musculoskeletal: Negative. Skin: Negative. Neurological: Negative. Negative for dizziness, syncope, weakness and light-headedness. Hematological: Negative. Psychiatric/Behavioral: Negative.           Physical Examination:    /67 (Site: Left Low Salt Diet, Take Precautions to Prevent Falls, Regular Exercise and Walk Daily    Return in about 3 months (around 2/21/2019) for Cardiovascular care. .      Electronically signed by Chen Rasheed MD on 11/21/2018 at 2:15 PM

## 2018-11-25 ENCOUNTER — CARE COORDINATION (OUTPATIENT)
Dept: CARE COORDINATION | Age: 83
End: 2018-11-25

## 2018-12-06 ENCOUNTER — HOSPITAL ENCOUNTER (OUTPATIENT)
Dept: ULTRASOUND IMAGING | Age: 83
Discharge: HOME | End: 2018-12-08
Payer: MEDICARE

## 2018-12-06 ENCOUNTER — OFFICE VISIT (OUTPATIENT)
Dept: FAMILY MEDICINE CLINIC | Age: 83
End: 2018-12-06
Payer: MEDICARE

## 2018-12-06 VITALS
SYSTOLIC BLOOD PRESSURE: 116 MMHG | OXYGEN SATURATION: 98 % | WEIGHT: 204.3 LBS | HEIGHT: 69 IN | HEART RATE: 70 BPM | RESPIRATION RATE: 15 BRPM | TEMPERATURE: 97.3 F | DIASTOLIC BLOOD PRESSURE: 84 MMHG | BODY MASS INDEX: 30.26 KG/M2

## 2018-12-06 DIAGNOSIS — K40.90 UNILATERAL INGUINAL HERNIA WITHOUT OBSTRUCTION OR GANGRENE, RECURRENCE NOT SPECIFIED: ICD-10-CM

## 2018-12-06 DIAGNOSIS — I72.4 FEMORAL ARTERY PSEUDO-ANEURYSM, RIGHT (HCC): Primary | ICD-10-CM

## 2018-12-06 DIAGNOSIS — K40.90 UNILATERAL INGUINAL HERNIA WITHOUT OBSTRUCTION OR GANGRENE, RECURRENCE NOT SPECIFIED: Primary | ICD-10-CM

## 2018-12-06 DIAGNOSIS — I72.4 FEMORAL ARTERY PSEUDO-ANEURYSM, RIGHT (HCC): ICD-10-CM

## 2018-12-06 LAB
HCT VFR BLD CALC: 43.1 % (ref 42–52)
HEMOGLOBIN: 14.6 G/DL (ref 14–18)

## 2018-12-06 PROCEDURE — 99214 OFFICE O/P EST MOD 30 MIN: CPT | Performed by: INTERNAL MEDICINE

## 2018-12-06 PROCEDURE — G8417 CALC BMI ABV UP PARAM F/U: HCPCS | Performed by: INTERNAL MEDICINE

## 2018-12-06 PROCEDURE — 93926 LOWER EXTREMITY STUDY: CPT

## 2018-12-06 PROCEDURE — 4040F PNEUMOC VAC/ADMIN/RCVD: CPT | Performed by: INTERNAL MEDICINE

## 2018-12-06 PROCEDURE — 1101F PT FALLS ASSESS-DOCD LE1/YR: CPT | Performed by: INTERNAL MEDICINE

## 2018-12-06 PROCEDURE — G8427 DOCREV CUR MEDS BY ELIG CLIN: HCPCS | Performed by: INTERNAL MEDICINE

## 2018-12-06 PROCEDURE — G8482 FLU IMMUNIZE ORDER/ADMIN: HCPCS | Performed by: INTERNAL MEDICINE

## 2018-12-06 PROCEDURE — G8598 ASA/ANTIPLAT THER USED: HCPCS | Performed by: INTERNAL MEDICINE

## 2018-12-06 PROCEDURE — 1036F TOBACCO NON-USER: CPT | Performed by: INTERNAL MEDICINE

## 2018-12-06 PROCEDURE — 1123F ACP DISCUSS/DSCN MKR DOCD: CPT | Performed by: INTERNAL MEDICINE

## 2018-12-06 ASSESSMENT — ENCOUNTER SYMPTOMS
SHORTNESS OF BREATH: 0
VOMITING: 0
BACK PAIN: 0
EYE PAIN: 0
ABDOMINAL PAIN: 0
NAUSEA: 0

## 2018-12-06 NOTE — PROGRESS NOTES
Subjective:      Patient ID: Shaneka Salgado is a 82 y.o. male who presents today with:  Chief Complaint   Patient presents with    Inguinal Hernia     has a lump on the right side groin area, no pain at this time. Patient noticed this about 7 days ago. HPI   He noticed a painless lump above his right testicle. He mentioned it will spontaneously \"go away\" when he lays flat. He has a history of a total of four hernias. He mentions he had a LHC done in 11/13/18, but denies any pain or swelling at the site of the [facility]. Past Medical History:   Diagnosis Date    Anticoagulant long-term use     Anxiety     CAD (coronary artery disease)     Carotid stenosis     2/2013 16-49%    [facility]     Dementia     Dementia     Hyperlipidemia     Hypertension     MI [facility]myocardial infarction) [facility]     Osteoarthritis      Past Surgical History:   Procedure Laterality Date    CARPAL TUNNEL RELEASE  1998    CATARACT REMOVAL  2007    COLONOSCOPY  12/10/10,2007    DR Hollis Lebron - DONE AT [address] [facility] COLONOSCOPY  2007    hx polyps needs 2015    COLONOSCOPY  9/21/15     DR. YARBROUGH     HERNIA REPAIR       Social History     Social History    Marital status:      Spouse name: N/A    Number of children: N/A    Years of education: N/A     Occupational History    Not on file. Social History Main Topics    Smoking status: Never Smoker    Smokeless tobacco: Never Used    Alcohol use No      Comment: social    Drug use: No    Sexual activity: Not on file     Other Topics Concern    Not on file     Social History Narrative    No narrative on file     No Known Allergies  Current Outpatient Prescriptions on File Prior to Visit   Medication Sig Dispense Refill    nitroGLYCERIN (NITROSTAT) 0.4 MG SL tablet up to max of 3 total doses.  If no relief after 1 dose, call 911. 25 tablet 3    ticagrelor (BRILINTA) 90 MG TABS tablet Take 1 tablet by mouth 2 times daily 60 tablet 5

## 2018-12-07 ENCOUNTER — TELEPHONE (OUTPATIENT)
Dept: FAMILY MEDICINE CLINIC | Age: 83
End: 2018-12-07

## 2018-12-07 DIAGNOSIS — N32.89 BLADDER HERNIA IN MALE: Primary | ICD-10-CM

## 2018-12-14 ENCOUNTER — OFFICE VISIT (OUTPATIENT)
Dept: SURGERY | Age: 83
End: 2018-12-14
Payer: MEDICARE

## 2018-12-14 VITALS — WEIGHT: 190 LBS | BODY MASS INDEX: 28.14 KG/M2 | HEIGHT: 69 IN | TEMPERATURE: 96.8 F

## 2018-12-14 DIAGNOSIS — K40.91 UNILATERAL RECURRENT INGUINAL HERNIA WITHOUT OBSTRUCTION OR GANGRENE: Primary | ICD-10-CM

## 2018-12-14 PROCEDURE — 4040F PNEUMOC VAC/ADMIN/RCVD: CPT | Performed by: COLON & RECTAL SURGERY

## 2018-12-14 PROCEDURE — G8598 ASA/ANTIPLAT THER USED: HCPCS | Performed by: COLON & RECTAL SURGERY

## 2018-12-14 PROCEDURE — 99204 OFFICE O/P NEW MOD 45 MIN: CPT | Performed by: COLON & RECTAL SURGERY

## 2018-12-14 PROCEDURE — G8417 CALC BMI ABV UP PARAM F/U: HCPCS | Performed by: COLON & RECTAL SURGERY

## 2018-12-14 PROCEDURE — 1123F ACP DISCUSS/DSCN MKR DOCD: CPT | Performed by: COLON & RECTAL SURGERY

## 2018-12-14 PROCEDURE — 1101F PT FALLS ASSESS-DOCD LE1/YR: CPT | Performed by: COLON & RECTAL SURGERY

## 2018-12-14 PROCEDURE — 1036F TOBACCO NON-USER: CPT | Performed by: COLON & RECTAL SURGERY

## 2018-12-14 PROCEDURE — G8427 DOCREV CUR MEDS BY ELIG CLIN: HCPCS | Performed by: COLON & RECTAL SURGERY

## 2018-12-14 PROCEDURE — G8482 FLU IMMUNIZE ORDER/ADMIN: HCPCS | Performed by: COLON & RECTAL SURGERY

## 2018-12-14 ASSESSMENT — ENCOUNTER SYMPTOMS
DIARRHEA: 0
ABDOMINAL DISTENTION: 0
CONSTIPATION: 0
ANAL BLEEDING: 0
SHORTNESS OF BREATH: 0
ABDOMINAL PAIN: 0
CHEST TIGHTNESS: 0

## 2018-12-14 NOTE — PROGRESS NOTES
Subjective:      Patient ID: Celine Ortiz is a 82 y.o. male who presents for:  Chief Complaint   Patient presents with    New Patient       This is an 68-year-old male who was referred for potential inguinal hernia. He's had this repaired twice in the past.  He complains of an occasional bulge which reduces by its own he has no groin pain whatsoever. He's never had any abdominal distention, nausea or vomiting. He recently had heart stents placed through a right groin percutaneous approach. During an ultrasound for evaluation of pseudoaneurysm there was concern about bladder being in this hernia. Past Medical History:   Diagnosis Date    Anticoagulant long-term use     Anxiety     CAD (coronary artery disease)     Carotid stenosis     2/2013 16-49%    [facility]     Dementia     Dementia     Hyperlipidemia     Hypertension     [facility]my[facility] infarction) [facility]     Osteoarthritis      Past Surgical History:   Procedure Laterality Date    CARPAL TUNNEL RELEASE  1998    CATARACT REMOVAL  2007    COLONOSCOPY  12/10/10,2007    DR Aldo Grimes - DONE AT [address] [place]  COLONOSCOPY  2007    hx polyps needs 2015    COLONOSCOPY  9/21/15     DR. YARBROUGH     HERNIA REPAIR       Social History     Social History    Marital status:      Spouse name: N/A    Number of children: N/A    Years of education: N/A     Occupational History    Not on file. Social History Main Topics    Smoking status: Never Smoker    Smokeless tobacco: Never Used    Alcohol use No      Comment: social    Drug use: No    Sexual activity: Not on file     Other Topics Concern    Not on file     Social History Narrative    No narrative on file     Family History   Problem Relation Age of Onset    Heart Disease Mother     High Blood Pressure Mother      Allergies:  Patient has no known allergies. Review of Systems   Constitutional: Negative for activity change and fever. Respiratory: Negative for chest tightness and shortness of breath. Cardiovascular: Negative for chest pain. Gastrointestinal: Negative for abdominal distention, abdominal pain, anal bleeding, constipation and diarrhea. Genitourinary: Negative for difficulty urinating. Musculoskeletal: Negative for arthralgias. Neurological: Negative for dizziness and headaches. Hematological: Does not bruise/bleed easily. Psychiatric/Behavioral: Negative for agitation and confusion. Objective:   Temp 96.8 °F (36 °C) (Temporal)   Ht 5' 9\" (1.753 m)   Wt 190 lb (86.2 kg)   BMI 28.06 kg/m²     Physical Exam   Constitutional: He is oriented to person, place, and time. He appears well-developed and well-nourished. No distress. HENT:   Head: Normocephalic and atraumatic. Eyes: Pupils are equal, round, and reactive to light. Neck: Normal range of motion. Cardiovascular: Normal rate, regular rhythm and normal heart sounds. Pulmonary/Chest: Effort normal and breath sounds normal. No respiratory distress. He has no wheezes. Abdominal: Soft. He exhibits no distension. There is no tenderness. There is no guarding. No hernia. There is a very small recurrent right inguinal hernia. There is no evidence of any incarcerated bladder. There are no skin changes. Previous surgical incisions are well healed. The remainder of his abdominal exam is unremarkable. Musculoskeletal: Normal range of motion. He exhibits no edema or tenderness. Neurological: He is alert and oriented to person, place, and time. Skin: Skin is warm and dry. No rash noted. He is not diaphoretic. No erythema. Psychiatric: He has a normal mood and affect. His behavior is normal. Judgment and thought content normal.            Assessment/Plan:       Diagnosis Orders   1. Unilateral recurrent inguinal hernia without obstruction or gangrene          I reviewed all his films.   I spoke with Dr. Bulmaro Nicholas and discussed the case as well.    With his daughter on the phone, I discussed the fact that this represents a small recurrent hernia which is completely asymptomatic. Given his heart history and anticoagulation status, I would not recommend any surgical repair. I would be happy to see him back at any time if any problems arose or if he had any further questions. His primary care physician, daughter, and he himself all agree with that plan. Please note this report has beenpartially produced using speech recognition software and may cause contain errors related to that system including grammar, punctuation and spelling as well as words and phrases that may seem inappropriate.  If there arequestions or concerns please feel free to contact me to clarify

## 2018-12-23 ENCOUNTER — HOSPITAL ENCOUNTER (INPATIENT)
Age: 83
LOS: 1 days | Discharge: HOME | DRG: 305 | End: 2018-12-25
Attending: STUDENT IN AN ORGANIZED HEALTH CARE EDUCATION/TRAINING PROGRAM | Admitting: INTERNAL MEDICINE
Payer: MEDICARE

## 2018-12-23 ENCOUNTER — APPOINTMENT (OUTPATIENT)
Dept: GENERAL RADIOLOGY | Age: 83
DRG: 305 | End: 2018-12-23
Payer: MEDICARE

## 2018-12-23 DIAGNOSIS — R42 DIZZINESS: Primary | ICD-10-CM

## 2018-12-23 DIAGNOSIS — R77.8 ELEVATED TROPONIN: ICD-10-CM

## 2018-12-23 PROBLEM — I20.8 ANGINAL EQUIVALENT (HCC): Status: ACTIVE | Noted: 2018-12-23

## 2018-12-23 PROBLEM — I20.89 ANGINAL EQUIVALENT: Status: ACTIVE | Noted: 2018-12-23

## 2018-12-23 LAB
ALBUMIN SERPL-MCNC: 4.2 G/DL (ref 3.9–4.9)
ALP BLD-CCNC: 101 U/L (ref 35–104)
ALT SERPL-CCNC: 12 U/L (ref 0–41)
ANION GAP SERPL CALCULATED.3IONS-SCNC: 15 MEQ/L (ref 7–13)
APTT: 28.5 SEC (ref 21.6–35.4)
AST SERPL-CCNC: 13 U/L (ref 0–40)
BACTERIA: NEGATIVE /HPF
BASOPHILS ABSOLUTE: 0.1 K/UL (ref 0–0.2)
BASOPHILS RELATIVE PERCENT: 0.7 %
BILIRUB SERPL-MCNC: 0.5 MG/DL (ref 0–1.2)
BILIRUBIN URINE: NEGATIVE
BLOOD, URINE: ABNORMAL
BUN BLDV-MCNC: 19 MG/DL (ref 8–23)
C-REACTIVE PROTEIN, HIGH SENSITIVITY: 4.3 MG/L (ref 0–5)
CALCIUM SERPL-MCNC: 9.8 MG/DL (ref 8.6–10.2)
CHLORIDE BLD-SCNC: 99 MEQ/L (ref 98–107)
CK MB: 5.2 NG/ML (ref 0–6.7)
CLARITY: CLEAR
CO2: 22 MEQ/L (ref 22–29)
COLOR: YELLOW
CREAT SERPL-MCNC: 0.88 MG/DL (ref 0.7–1.2)
CREATINE KINASE-MB INDEX: 4.9 % (ref 0–3.5)
EOSINOPHILS ABSOLUTE: 0.3 K/UL (ref 0–0.7)
EOSINOPHILS RELATIVE PERCENT: 3.4 %
EPITHELIAL CELLS, UA: ABNORMAL /HPF (ref 0–5)
GFR AFRICAN AMERICAN: >60
GFR NON-AFRICAN AMERICAN: >60
GLOBULIN: 3.3 G/DL (ref 2.3–3.5)
GLUCOSE BLD-MCNC: 97 MG/DL (ref 74–109)
GLUCOSE URINE: NEGATIVE MG/DL
HCT VFR BLD CALC: 45.3 % (ref 42–52)
HEMOGLOBIN: 15.2 G/DL (ref 14–18)
HYALINE CASTS: ABNORMAL /HPF (ref 0–5)
INR BLD: 1
KETONES, URINE: NEGATIVE MG/DL
LEUKOCYTE ESTERASE, URINE: NEGATIVE
LYMPHOCYTES ABSOLUTE: 1.7 K/UL (ref 1–4.8)
LYMPHOCYTES RELATIVE PERCENT: 22 %
MCH RBC QN AUTO: 30.8 PG (ref 27–31.3)
MCHC RBC AUTO-ENTMCNC: 33.5 % (ref 33–37)
MCV RBC AUTO: 91.9 FL (ref 80–100)
MONOCYTES ABSOLUTE: 0.6 K/UL (ref 0.2–0.8)
MONOCYTES RELATIVE PERCENT: 7.7 %
NEUTROPHILS ABSOLUTE: 5 K/UL (ref 1.4–6.5)
NEUTROPHILS RELATIVE PERCENT: 66.2 %
NITRITE, URINE: NEGATIVE
PDW BLD-RTO: 14.4 % (ref 11.5–14.5)
PH UA: 6.5 (ref 5–9)
PLATELET # BLD: 230 K/UL (ref 130–400)
POTASSIUM SERPL-SCNC: 4.2 MEQ/L (ref 3.5–5.1)
PRO-BNP: 161 PG/ML
PROTEIN UA: NEGATIVE MG/DL
PROTHROMBIN TIME: 10.6 SEC (ref 9–11.5)
RBC # BLD: 4.93 M/UL (ref 4.7–6.1)
RBC UA: ABNORMAL /HPF (ref 0–5)
SODIUM BLD-SCNC: 136 MEQ/L (ref 132–144)
SPECIFIC GRAVITY UA: 1.01 (ref 1–1.03)
TOTAL CK: 107 U/L (ref 0–190)
TOTAL PROTEIN: 7.5 G/DL (ref 6.4–8.1)
TROPONIN: 0.01 NG/ML (ref 0–0.01)
TROPONIN: <0.01 NG/ML (ref 0–0.01)
URINE REFLEX TO CULTURE: YES
UROBILINOGEN, URINE: 0.2 E.U./DL
WBC # BLD: 7.5 K/UL (ref 4.8–10.8)
WBC UA: ABNORMAL /HPF (ref 0–5)

## 2018-12-23 PROCEDURE — 93005 ELECTROCARDIOGRAM TRACING: CPT

## 2018-12-23 PROCEDURE — 36415 COLL VENOUS BLD VENIPUNCTURE: CPT

## 2018-12-23 PROCEDURE — G0378 HOSPITAL OBSERVATION PER HR: HCPCS

## 2018-12-23 PROCEDURE — 84484 ASSAY OF TROPONIN QUANT: CPT

## 2018-12-23 PROCEDURE — 82553 CREATINE MB FRACTION: CPT

## 2018-12-23 PROCEDURE — 85730 THROMBOPLASTIN TIME PARTIAL: CPT

## 2018-12-23 PROCEDURE — 85610 PROTHROMBIN TIME: CPT

## 2018-12-23 PROCEDURE — 96372 THER/PROPH/DIAG INJ SC/IM: CPT

## 2018-12-23 PROCEDURE — 71045 X-RAY EXAM CHEST 1 VIEW: CPT

## 2018-12-23 PROCEDURE — 82550 ASSAY OF CK (CPK): CPT

## 2018-12-23 PROCEDURE — 6370000000 HC RX 637 (ALT 250 FOR IP): Performed by: STUDENT IN AN ORGANIZED HEALTH CARE EDUCATION/TRAINING PROGRAM

## 2018-12-23 PROCEDURE — 83880 ASSAY OF NATRIURETIC PEPTIDE: CPT

## 2018-12-23 PROCEDURE — 80053 COMPREHEN METABOLIC PANEL: CPT

## 2018-12-23 PROCEDURE — 86141 C-REACTIVE PROTEIN HS: CPT

## 2018-12-23 PROCEDURE — 2580000003 HC RX 258: Performed by: INTERNAL MEDICINE

## 2018-12-23 PROCEDURE — 87086 URINE CULTURE/COLONY COUNT: CPT

## 2018-12-23 PROCEDURE — 6360000002 HC RX W HCPCS: Performed by: INTERNAL MEDICINE

## 2018-12-23 PROCEDURE — 99285 EMERGENCY DEPT VISIT HI MDM: CPT

## 2018-12-23 PROCEDURE — 85025 COMPLETE CBC W/AUTO DIFF WBC: CPT

## 2018-12-23 PROCEDURE — 81001 URINALYSIS AUTO W/SCOPE: CPT

## 2018-12-23 RX ORDER — TRIAMTERENE AND HYDROCHLOROTHIAZIDE 37.5; 25 MG/1; MG/1
1 TABLET ORAL DAILY
Status: DISCONTINUED | OUTPATIENT
Start: 2018-12-23 | End: 2018-12-24

## 2018-12-23 RX ORDER — ASPIRIN 81 MG/1
81 TABLET ORAL DAILY
Status: DISCONTINUED | OUTPATIENT
Start: 2018-12-24 | End: 2018-12-25 | Stop reason: HOSPADM

## 2018-12-23 RX ORDER — LORAZEPAM 1 MG/1
1 TABLET ORAL EVERY 8 HOURS PRN
Status: DISCONTINUED | OUTPATIENT
Start: 2018-12-23 | End: 2018-12-25 | Stop reason: HOSPADM

## 2018-12-23 RX ORDER — DONEPEZIL HYDROCHLORIDE 5 MG/1
5 TABLET, FILM COATED ORAL NIGHTLY
Status: DISCONTINUED | OUTPATIENT
Start: 2018-12-23 | End: 2018-12-25 | Stop reason: HOSPADM

## 2018-12-23 RX ORDER — BENAZEPRIL HYDROCHLORIDE 20 MG/1
20 TABLET ORAL DAILY
Status: DISCONTINUED | OUTPATIENT
Start: 2018-12-23 | End: 2018-12-23 | Stop reason: CLARIF

## 2018-12-23 RX ORDER — BENAZEPRIL HYDROCHLORIDE 20 MG/1
20 TABLET ORAL DAILY
Status: DISCONTINUED | OUTPATIENT
Start: 2018-12-24 | End: 2018-12-24

## 2018-12-23 RX ORDER — CHOLECALCIFEROL (VITAMIN D3) 125 MCG
500 CAPSULE ORAL DAILY
Status: DISCONTINUED | OUTPATIENT
Start: 2018-12-23 | End: 2018-12-25 | Stop reason: HOSPADM

## 2018-12-23 RX ORDER — SODIUM CHLORIDE 0.9 % (FLUSH) 0.9 %
10 SYRINGE (ML) INJECTION PRN
Status: DISCONTINUED | OUTPATIENT
Start: 2018-12-23 | End: 2018-12-25 | Stop reason: HOSPADM

## 2018-12-23 RX ORDER — METOPROLOL SUCCINATE 50 MG/1
50 TABLET, EXTENDED RELEASE ORAL DAILY
Status: DISCONTINUED | OUTPATIENT
Start: 2018-12-23 | End: 2018-12-24

## 2018-12-23 RX ORDER — LATANOPROST 50 UG/ML
1 SOLUTION/ DROPS OPHTHALMIC DAILY
Status: DISCONTINUED | OUTPATIENT
Start: 2018-12-23 | End: 2018-12-25 | Stop reason: HOSPADM

## 2018-12-23 RX ORDER — SODIUM CHLORIDE 0.9 % (FLUSH) 0.9 %
10 SYRINGE (ML) INJECTION EVERY 12 HOURS SCHEDULED
Status: DISCONTINUED | OUTPATIENT
Start: 2018-12-23 | End: 2018-12-25 | Stop reason: HOSPADM

## 2018-12-23 RX ORDER — FINASTERIDE 5 MG/1
5 TABLET, FILM COATED ORAL DAILY
Status: DISCONTINUED | OUTPATIENT
Start: 2018-12-23 | End: 2018-12-25 | Stop reason: HOSPADM

## 2018-12-23 RX ORDER — POTASSIUM CHLORIDE 20 MEQ/1
20 TABLET, EXTENDED RELEASE ORAL DAILY
Status: DISCONTINUED | OUTPATIENT
Start: 2018-12-23 | End: 2018-12-25 | Stop reason: HOSPADM

## 2018-12-23 RX ORDER — LISINOPRIL 20 MG/1
20 TABLET ORAL DAILY
Status: DISCONTINUED | OUTPATIENT
Start: 2018-12-24 | End: 2018-12-23

## 2018-12-23 RX ORDER — ASPIRIN 81 MG/1
162 TABLET, CHEWABLE ORAL ONCE
Status: COMPLETED | OUTPATIENT
Start: 2018-12-23 | End: 2018-12-23

## 2018-12-23 RX ORDER — DORZOLAMIDE HYDROCHLORIDE AND TIMOLOL MALEATE 20; 5 MG/ML; MG/ML
1 SOLUTION/ DROPS OPHTHALMIC DAILY
Status: DISCONTINUED | OUTPATIENT
Start: 2018-12-23 | End: 2018-12-23 | Stop reason: CLARIF

## 2018-12-23 RX ORDER — PRAVASTATIN SODIUM 80 MG/1
80 TABLET ORAL EVERY EVENING
Status: DISCONTINUED | OUTPATIENT
Start: 2018-12-23 | End: 2018-12-25 | Stop reason: HOSPADM

## 2018-12-23 RX ORDER — TIMOLOL MALEATE 5 MG/ML
1 SOLUTION/ DROPS OPHTHALMIC DAILY
Status: DISCONTINUED | OUTPATIENT
Start: 2018-12-23 | End: 2018-12-25 | Stop reason: HOSPADM

## 2018-12-23 RX ORDER — ASPIRIN 81 MG/1
81 TABLET, CHEWABLE ORAL DAILY
Status: DISCONTINUED | OUTPATIENT
Start: 2018-12-24 | End: 2018-12-23 | Stop reason: SDUPTHER

## 2018-12-23 RX ORDER — ASPIRIN 81 MG/1
81 TABLET, CHEWABLE ORAL DAILY
Status: DISCONTINUED | OUTPATIENT
Start: 2018-12-23 | End: 2018-12-23

## 2018-12-23 RX ORDER — NITROGLYCERIN 0.4 MG/1
0.4 TABLET SUBLINGUAL EVERY 5 MIN PRN
Status: DISCONTINUED | OUTPATIENT
Start: 2018-12-23 | End: 2018-12-25 | Stop reason: HOSPADM

## 2018-12-23 RX ORDER — ONDANSETRON 2 MG/ML
4 INJECTION INTRAMUSCULAR; INTRAVENOUS EVERY 6 HOURS PRN
Status: DISCONTINUED | OUTPATIENT
Start: 2018-12-23 | End: 2018-12-25 | Stop reason: HOSPADM

## 2018-12-23 RX ORDER — DORZOLAMIDE HCL 20 MG/ML
1 SOLUTION/ DROPS OPHTHALMIC DAILY
Status: DISCONTINUED | OUTPATIENT
Start: 2018-12-23 | End: 2018-12-25 | Stop reason: HOSPADM

## 2018-12-23 RX ADMIN — Medication 10 ML: at 21:38

## 2018-12-23 RX ADMIN — ENOXAPARIN SODIUM 40 MG: 40 INJECTION SUBCUTANEOUS at 21:38

## 2018-12-23 RX ADMIN — ASPIRIN 81 MG 162 MG: 81 TABLET ORAL at 19:42

## 2018-12-23 ASSESSMENT — PAIN SCALES - GENERAL
PAINLEVEL_OUTOF10: 0
PAINLEVEL_OUTOF10: 0

## 2018-12-23 ASSESSMENT — ENCOUNTER SYMPTOMS
DIARRHEA: 0
TROUBLE SWALLOWING: 0
VOMITING: 0
BACK PAIN: 0
ABDOMINAL PAIN: 0
CHEST TIGHTNESS: 0
SHORTNESS OF BREATH: 0
COUGH: 0
SINUS PRESSURE: 0
NAUSEA: 0

## 2018-12-23 ASSESSMENT — HEART SCORE: ECG: 1

## 2018-12-23 ASSESSMENT — PAIN SCALES - WONG BAKER: WONGBAKER_NUMERICALRESPONSE: 0

## 2018-12-23 NOTE — ED PROVIDER NOTES
(BRILINTA) 90 MG TABS TABLET    Take 1 tablet by mouth 2 times daily    TRIAMTERENE-HYDROCHLOROTHIAZIDE (MAXZIDE-25) 37.5-25 MG PER TABLET    Take 1 tablet by mouth daily    VITAMIN B-12 (CYANOCOBALAMIN) 1000 MCG TABLET    Take 500 mcg by mouth daily       ALLERGIES     Patient has no known allergies. FAMILY HISTORY       Family History   Problem Relation Age of Onset    Heart Disease Mother     High Blood Pressure Mother           SOCIAL HISTORY       Social History     Social History    Marital status:      Spouse name: N/A    Number of children: N/A    Years of education: N/A     Social History Main Topics    Smoking status: Never Smoker    Smokeless tobacco: Never Used    Alcohol use No      Comment: social    Drug use: No    Sexual activity: Not Asked     Other Topics Concern    None     Social History Narrative    None       SCREENINGS    Wali Coma Scale  Eye Opening: Spontaneous  Best Verbal Response: Oriented  Best Motor Response: Obeys commands  Wali Coma Scale Score: 15 @FLOW([id number])@      PHYSICAL EXAM    (up to 7 for level 4, 8 or more for level 5)     ED Triage Vitals [12/23/18 1524]   BP Temp Temp Source Pulse Resp SpO2 Height Weight   (!) 144/76 98 °F (36.7 °C) Oral 78 16 98 % 5' 9\" (1.753 m) 204 lb (92.5 kg)       Physical Exam   Constitutional: He is oriented to person, place, and time. He appears well-developed and well-nourished. No distress. HENT:   Head: Normocephalic and atraumatic. Head is without Hernandez's sign. Right Ear: External ear normal.   Left Ear: External ear normal.   Nose: Nose normal.   Mouth/Throat: Oropharynx is clear and moist. No oropharyngeal exudate. Eyes: Pupils are equal, round, and reactive to light. Conjunctivae and EOM are normal. No foreign body present in the right eye. Left eye exhibits no exudate. No scleral icterus. Neck: Normal range of motion. Neck supple. No JVD present. No neck rigidity. No tracheal deviation present.  No patient had updated the patient and the family. CONSULTS:  IP CONSULT TO CARDIOLOGY    PROCEDURES:  Unless otherwise noted below, none     Procedures    FINAL IMPRESSION      1. Dizziness    2. Elevated troponin          DISPOSITION/PLAN   DISPOSITION Decision To Admit 12/23/2018 06:34:00 PM      PATIENT REFERRED TO:  No follow-up provider specified.     DISCHARGE MEDICATIONS:  New Prescriptions    No medications on file          (Please note that portions of this note were completed with a voice recognition program.  Efforts were made to edit the dictations but occasionally words are mis-transcribed.)    Adriana Brown DO (electronically signed)  Attending Emergency Physician       Adriana Brown DO  12/23/18 2713

## 2018-12-24 ENCOUNTER — APPOINTMENT (OUTPATIENT)
Dept: ULTRASOUND IMAGING | Age: 83
DRG: 305 | End: 2018-12-24
Payer: MEDICARE

## 2018-12-24 LAB
CHOLESTEROL, TOTAL: 133 MG/DL (ref 0–199)
HCT VFR BLD CALC: 41.9 % (ref 42–52)
HDLC SERPL-MCNC: 49 MG/DL (ref 40–59)
HEMOGLOBIN: 14.2 G/DL (ref 14–18)
LDL CHOLESTEROL CALCULATED: 60 MG/DL (ref 0–129)
MAGNESIUM: 2.2 MG/DL (ref 1.7–2.3)
MCH RBC QN AUTO: 31.1 PG (ref 27–31.3)
MCHC RBC AUTO-ENTMCNC: 34 % (ref 33–37)
MCV RBC AUTO: 91.7 FL (ref 80–100)
PDW BLD-RTO: 14.3 % (ref 11.5–14.5)
PLATELET # BLD: 205 K/UL (ref 130–400)
RBC # BLD: 4.57 M/UL (ref 4.7–6.1)
TRIGL SERPL-MCNC: 119 MG/DL (ref 0–200)
TROPONIN: 0.03 NG/ML (ref 0–0.01)
WBC # BLD: 5.8 K/UL (ref 4.8–10.8)

## 2018-12-24 PROCEDURE — 6370000000 HC RX 637 (ALT 250 FOR IP): Performed by: INTERNAL MEDICINE

## 2018-12-24 PROCEDURE — 6360000002 HC RX W HCPCS: Performed by: INTERNAL MEDICINE

## 2018-12-24 PROCEDURE — 93010 ELECTROCARDIOGRAM REPORT: CPT | Performed by: INTERNAL MEDICINE

## 2018-12-24 PROCEDURE — 84484 ASSAY OF TROPONIN QUANT: CPT

## 2018-12-24 PROCEDURE — 93880 EXTRACRANIAL BILAT STUDY: CPT

## 2018-12-24 PROCEDURE — 99223 1ST HOSP IP/OBS HIGH 75: CPT | Performed by: INTERNAL MEDICINE

## 2018-12-24 PROCEDURE — 2060000000 HC ICU INTERMEDIATE R&B

## 2018-12-24 PROCEDURE — 83735 ASSAY OF MAGNESIUM: CPT

## 2018-12-24 PROCEDURE — 93005 ELECTROCARDIOGRAM TRACING: CPT

## 2018-12-24 PROCEDURE — 36415 COLL VENOUS BLD VENIPUNCTURE: CPT

## 2018-12-24 PROCEDURE — 2580000003 HC RX 258: Performed by: INTERNAL MEDICINE

## 2018-12-24 PROCEDURE — APPNB45 APP NON BILLABLE 31-45 MINUTES: Performed by: NURSE PRACTITIONER

## 2018-12-24 PROCEDURE — 80061 LIPID PANEL: CPT

## 2018-12-24 PROCEDURE — 6370000000 HC RX 637 (ALT 250 FOR IP): Performed by: NURSE PRACTITIONER

## 2018-12-24 PROCEDURE — 85027 COMPLETE CBC AUTOMATED: CPT

## 2018-12-24 RX ORDER — CARVEDILOL 12.5 MG/1
12.5 TABLET ORAL 2 TIMES DAILY WITH MEALS
Status: DISCONTINUED | OUTPATIENT
Start: 2018-12-24 | End: 2018-12-25 | Stop reason: HOSPADM

## 2018-12-24 RX ORDER — BENAZEPRIL HYDROCHLORIDE 20 MG/1
20 TABLET ORAL 2 TIMES DAILY
Status: DISCONTINUED | OUTPATIENT
Start: 2018-12-24 | End: 2018-12-24 | Stop reason: CLARIF

## 2018-12-24 RX ORDER — HYDROCHLOROTHIAZIDE 25 MG/1
25 TABLET ORAL DAILY
Status: DISCONTINUED | OUTPATIENT
Start: 2018-12-24 | End: 2018-12-25 | Stop reason: HOSPADM

## 2018-12-24 RX ORDER — LISINOPRIL 20 MG/1
20 TABLET ORAL 2 TIMES DAILY
Status: DISCONTINUED | OUTPATIENT
Start: 2018-12-24 | End: 2018-12-25 | Stop reason: HOSPADM

## 2018-12-24 RX ADMIN — POTASSIUM CHLORIDE 20 MEQ: 20 TABLET, EXTENDED RELEASE ORAL at 08:38

## 2018-12-24 RX ADMIN — METOPROLOL SUCCINATE 50 MG: 50 TABLET, EXTENDED RELEASE ORAL at 08:36

## 2018-12-24 RX ADMIN — Medication 10 ML: at 08:42

## 2018-12-24 RX ADMIN — ENOXAPARIN SODIUM 40 MG: 40 INJECTION SUBCUTANEOUS at 22:17

## 2018-12-24 RX ADMIN — CARVEDILOL 12.5 MG: 12.5 TABLET, FILM COATED ORAL at 17:08

## 2018-12-24 RX ADMIN — TICAGRELOR 90 MG: 90 TABLET ORAL at 08:37

## 2018-12-24 RX ADMIN — BENAZEPRIL HYDROCHLORIDE 20 MG: 20 TABLET ORAL at 08:37

## 2018-12-24 RX ADMIN — DONEPEZIL HYDROCHLORIDE 5 MG: 5 TABLET, FILM COATED ORAL at 22:16

## 2018-12-24 RX ADMIN — HYDROCHLOROTHIAZIDE 25 MG: 25 TABLET ORAL at 12:01

## 2018-12-24 RX ADMIN — ASPIRIN 81 MG: 81 TABLET, COATED ORAL at 08:38

## 2018-12-24 RX ADMIN — FINASTERIDE 5 MG: 5 TABLET, FILM COATED ORAL at 08:36

## 2018-12-24 RX ADMIN — CARVEDILOL 12.5 MG: 12.5 TABLET, FILM COATED ORAL at 12:01

## 2018-12-24 RX ADMIN — PRAVASTATIN SODIUM 80 MG: 80 TABLET ORAL at 22:16

## 2018-12-24 RX ADMIN — LISINOPRIL 20 MG: 20 TABLET ORAL at 22:16

## 2018-12-24 RX ADMIN — TICAGRELOR 90 MG: 90 TABLET ORAL at 22:16

## 2018-12-24 RX ADMIN — TRIAMTERENE AND HYDROCHLOROTHIAZIDE 1 TABLET: 37.5; 25 TABLET ORAL at 08:39

## 2018-12-24 ASSESSMENT — ENCOUNTER SYMPTOMS
CHOKING: 0
NAUSEA: 0
ABDOMINAL DISTENTION: 0
EYE PAIN: 0
ABDOMINAL PAIN: 0
SHORTNESS OF BREATH: 0
TROUBLE SWALLOWING: 0
APNEA: 0
STRIDOR: 0
EYES NEGATIVE: 1
EYE DISCHARGE: 0
CHEST TIGHTNESS: 0
COUGH: 0
ALLERGIC/IMMUNOLOGIC NEGATIVE: 1
COLOR CHANGE: 0
GASTROINTESTINAL NEGATIVE: 1
WHEEZING: 0

## 2018-12-24 ASSESSMENT — PAIN SCALES - GENERAL
PAINLEVEL_OUTOF10: 0

## 2018-12-24 NOTE — CARE COORDINATION
PT FROM HOME ALONE, INDEPENDENT WITH A CANE. PT DOES NOT DRIVE, PT USES SENIOR TRANSPORTATION OR HIS FAMILY WILL TAKE HIM TO APPTS. PT STATES HIS DTR IS A NURSE AND HELPS WHEN NEEDED. PT DENIES ANY DISCHARGE NEEDS. POPULATION HEALTH PATIENT. IMM FRONT SIGNED, PT VERBALIZED UNDERSTANDING.

## 2018-12-24 NOTE — PLAN OF CARE
Problem: Falls - Risk of:  Goal: Will remain free from falls  Will remain free from falls   Outcome: Ongoing    Goal: Absence of physical injury  Absence of physical injury   Outcome: Ongoing      Problem: Infection:  Goal: Will remain free from infection  Will remain free from infection   Outcome: Ongoing      Problem: Safety:  Goal: Free from accidental physical injury  Free from accidental physical injury   Outcome: Ongoing    Goal: Free from intentional harm  Free from intentional harm   Outcome: Ongoing      Problem: Daily Care:  Goal: Daily care needs are met  Daily care needs are met   Outcome: Ongoing      Problem: Pain:  Goal: Patient's pain/discomfort is manageable  Patient's pain/discomfort is manageable   Outcome: Ongoing      Problem: Discharge Planning:  Goal: Patients continuum of care needs are met  Patients continuum of care needs are met   Outcome: Ongoing

## 2018-12-24 NOTE — H&P
ADDITION H&P/CONSULT    Chief Complaint: dizziness      History of Present Illness: 82year old male known to me from recent PCI. Patient was feeling lightheaded and dizzy for one day at home. This was moderate. No aggravating or alleviating factors. Associated with very high blood pressure >804 systolic. No chest pain or dyspnea per se. Noted to have positive troponin and so was admitted. Review of Systems:   Review of Systems   Constitutional: Negative. HENT: Negative for congestion and trouble swallowing. Eyes: Negative for pain, discharge and visual disturbance. Respiratory: Negative for apnea, cough, choking, chest tightness, shortness of breath, wheezing and stridor. Cardiovascular: Negative for chest pain, palpitations and leg swelling. Gastrointestinal: Negative for abdominal distention, abdominal pain and nausea. Endocrine: Negative for cold intolerance and heat intolerance. Genitourinary: Negative for difficulty urinating. Musculoskeletal: Negative for arthralgias and joint swelling. Skin: Negative for color change and pallor. Allergic/Immunologic: Negative for immunocompromised state. Neurological: Positive for dizziness and light-headedness. Negative for seizures, syncope, facial asymmetry, speech difficulty, weakness, numbness and headaches. Hematological: Negative for adenopathy. Psychiatric/Behavioral: Negative for agitation, behavioral problems and confusion. Physical Examination:    BP (!) 126/57   Pulse 64   Temp 97.5 °F (36.4 °C) (Oral)   Resp 18   Ht 5' 9\" (1.753 m)   Wt 203 lb (92.1 kg)   SpO2 96%   BMI 29.98 kg/m²    Physical Exam   Constitutional: He appears healthy. No distress. HENT:   Mouth/Throat: Oropharynx is clear. Eyes: Pupils are equal, round, and reactive to light. Neck: Normal range of motion. No JVD present. Cardiovascular: Regular rhythm, S1 normal, S2 normal, normal heart sounds and intact distal pulses.   Exam reveals

## 2018-12-24 NOTE — PROGRESS NOTES
PHARMACY NOTE  Cecy Savage was ordered benazepril. Per the [address], this medication is non-formulary and not stocked by pharmacy. lisinopril was substituted. The medication can be reordered at discharge.

## 2018-12-25 VITALS
OXYGEN SATURATION: 95 % | RESPIRATION RATE: 18 BRPM | DIASTOLIC BLOOD PRESSURE: 61 MMHG | WEIGHT: 203 LBS | BODY MASS INDEX: 30.07 KG/M2 | HEART RATE: 62 BPM | TEMPERATURE: 97.6 F | HEIGHT: 69 IN | SYSTOLIC BLOOD PRESSURE: 107 MMHG

## 2018-12-25 LAB
ANION GAP SERPL CALCULATED.3IONS-SCNC: 16 MEQ/L (ref 7–13)
BUN BLDV-MCNC: 23 MG/DL (ref 8–23)
CALCIUM SERPL-MCNC: 9.2 MG/DL (ref 8.6–10.2)
CHLORIDE BLD-SCNC: 100 MEQ/L (ref 98–107)
CO2: 19 MEQ/L (ref 22–29)
CREAT SERPL-MCNC: 1.07 MG/DL (ref 0.7–1.2)
EKG ATRIAL RATE: 60 BPM
EKG ATRIAL RATE: 69 BPM
EKG ATRIAL RATE: 70 BPM
EKG ATRIAL RATE: 79 BPM
EKG P AXIS: 28 DEGREES
EKG P AXIS: 45 DEGREES
EKG P AXIS: 49 DEGREES
EKG P AXIS: 58 DEGREES
EKG P-R INTERVAL: 162 MS
EKG P-R INTERVAL: 164 MS
EKG P-R INTERVAL: 168 MS
EKG P-R INTERVAL: 168 MS
EKG Q-T INTERVAL: 378 MS
EKG Q-T INTERVAL: 392 MS
EKG Q-T INTERVAL: 398 MS
EKG Q-T INTERVAL: 412 MS
EKG QRS DURATION: 86 MS
EKG QRS DURATION: 88 MS
EKG QRS DURATION: 88 MS
EKG QRS DURATION: 90 MS
EKG QTC CALCULATION (BAZETT): 412 MS
EKG QTC CALCULATION (BAZETT): 423 MS
EKG QTC CALCULATION (BAZETT): 426 MS
EKG QTC CALCULATION (BAZETT): 433 MS
EKG R AXIS: -14 DEGREES
EKG R AXIS: -16 DEGREES
EKG R AXIS: -23 DEGREES
EKG R AXIS: -25 DEGREES
EKG T AXIS: 22 DEGREES
EKG T AXIS: 3 DEGREES
EKG T AXIS: 33 DEGREES
EKG T AXIS: 35 DEGREES
EKG VENTRICULAR RATE: 60 BPM
EKG VENTRICULAR RATE: 69 BPM
EKG VENTRICULAR RATE: 70 BPM
EKG VENTRICULAR RATE: 79 BPM
GFR AFRICAN AMERICAN: >60
GFR NON-AFRICAN AMERICAN: >60
GLUCOSE BLD-MCNC: 128 MG/DL (ref 74–109)
HCT VFR BLD CALC: 43 % (ref 42–52)
HEMOGLOBIN: 14.9 G/DL (ref 14–18)
MAGNESIUM: 2.1 MG/DL (ref 1.7–2.3)
MCH RBC QN AUTO: 32 PG (ref 27–31.3)
MCHC RBC AUTO-ENTMCNC: 34.6 % (ref 33–37)
MCV RBC AUTO: 92.3 FL (ref 80–100)
PDW BLD-RTO: 14.3 % (ref 11.5–14.5)
PLATELET # BLD: 240 K/UL (ref 130–400)
POTASSIUM SERPL-SCNC: 4.2 MEQ/L (ref 3.5–5.1)
RBC # BLD: 4.66 M/UL (ref 4.7–6.1)
SODIUM BLD-SCNC: 135 MEQ/L (ref 132–144)
URINE CULTURE, ROUTINE: NORMAL
WBC # BLD: 6.7 K/UL (ref 4.8–10.8)

## 2018-12-25 PROCEDURE — 6370000000 HC RX 637 (ALT 250 FOR IP): Performed by: NURSE PRACTITIONER

## 2018-12-25 PROCEDURE — 36415 COLL VENOUS BLD VENIPUNCTURE: CPT

## 2018-12-25 PROCEDURE — 2580000003 HC RX 258: Performed by: INTERNAL MEDICINE

## 2018-12-25 PROCEDURE — 93005 ELECTROCARDIOGRAM TRACING: CPT

## 2018-12-25 PROCEDURE — 83735 ASSAY OF MAGNESIUM: CPT

## 2018-12-25 PROCEDURE — 80048 BASIC METABOLIC PNL TOTAL CA: CPT

## 2018-12-25 PROCEDURE — 6370000000 HC RX 637 (ALT 250 FOR IP): Performed by: INTERNAL MEDICINE

## 2018-12-25 PROCEDURE — 85027 COMPLETE CBC AUTOMATED: CPT

## 2018-12-25 RX ADMIN — FINASTERIDE 5 MG: 5 TABLET, FILM COATED ORAL at 09:42

## 2018-12-25 RX ADMIN — TICAGRELOR 90 MG: 90 TABLET ORAL at 09:44

## 2018-12-25 RX ADMIN — LISINOPRIL 20 MG: 20 TABLET ORAL at 09:47

## 2018-12-25 RX ADMIN — CARVEDILOL 12.5 MG: 12.5 TABLET, FILM COATED ORAL at 09:53

## 2018-12-25 RX ADMIN — POTASSIUM CHLORIDE 20 MEQ: 20 TABLET, EXTENDED RELEASE ORAL at 09:43

## 2018-12-25 RX ADMIN — ASPIRIN 81 MG: 81 TABLET, COATED ORAL at 09:41

## 2018-12-25 RX ADMIN — HYDROCHLOROTHIAZIDE 25 MG: 25 TABLET ORAL at 09:50

## 2018-12-25 RX ADMIN — Medication 10 ML: at 09:45

## 2018-12-25 NOTE — DISCHARGE SUMMARY
Radiology: Xr Chest Portable    Result Date: 12/24/2018  EXAMINATION: CHEST PORTABLE VIEW  CLINICAL HISTORY: Midsternal chest pain COMPARISONS: November 6, 2018  FINDINGS: Single  views of the chest is submitted. The cardiac silhouette is enlarged unchanged configuration. .  The mediastinum is unremarkable. Pulmonary vascular unremarkable. Right sided trachea. Again note is made of compressive atelectasis in the right lower lobe superimposed upon a elevated right hemidiaphragm. Small area of atelectasis left lower lobe. No focal infiltrates. No Pneumothoraces. There is degenerative changes of the right shoulder                                                                                   NO ACUTE ACTIVE CARDIOPULMONARY PROCESS    Us Carotid Artery Bilateral    Result Date: 12/24/2018  Carotid Ultrasound Examination Clinical information: Carotid stenosis Comparison:  Carotid ultrasound, March 5, 2014 February 6, 2013. Findings Grayscale and color Doppler ultrasound examination of the carotid and vertebral artery systems bilaterally. Maximum peak systolic velocity (PSV) / end diastolic velocity (EDV) measurements were obtained. Right Carotid System Right Common Carotid Artery (RCCA): 114  cm/s. Right Internal Carotid Artery (SHE): 103  cm/s. Right External Carotid Artery (RECA): 122 cm/s. Right Vertebral Artery (RVA): Antegrade flow. Right ICA to CCA ratio: 0.90. Left Carotid System Left Common Carotid Artery (LCCA): 148 cm/s. Left Internal Carotid Artery (LICA): 362 cm/s. Left External Carotid Artery (LECA): 122 cm/s. Left Vertebral Artery (LVA): Not visualized. No color flow and no Doppler identified in region of left vertebral artery. Left ICA/CCA ratio: 0.72. Impression Less than 50% stenosis, bilateral internal carotid arteries. Interval occlusion, left vertebral artery .  Validated velocity measurements with angiographic measurements, velocity criteria EKG 12 Lead    Collection Time: 12/25/18  6:06 AM   Result Value Ref Range    Ventricular Rate 60 BPM    Atrial Rate 60 BPM    P-R Interval 164 ms    QRS Duration 86 ms    Q-T Interval 412 ms    QTc Calculation (Bazett) 412 ms    P Axis 45 degrees    R Axis -16 degrees    T Axis 33 degrees             Follow-up visits:   Lilian Stroud MD  [address] [place] [address] [facility],[address]             Assessment:  C/[facility] [address] Problems    Diagnosis Date Noted    Elevated troponin [R74.8]     Dizziness [R42]     Anginal equivalent ([address] [I20.8] 12/23/2018    NSTEMI (non-ST elevated myocardial infarction) ([address] [I21.4] 05/14/2018         Plan:   1. DC home and see RC as OP in 3 weeks        Electronically signed by Sally Benavides 12/25/2018 at 1:31 PM

## 2018-12-26 ENCOUNTER — TELEPHONE (OUTPATIENT)
Dept: PHARMACY | Facility: CLINIC | Age: 83
End: 2018-12-26

## 2018-12-26 DIAGNOSIS — I20.8 ANGINAL EQUIVALENT (HCC): Primary | ICD-10-CM

## 2018-12-26 PROCEDURE — 1111F DSCHRG MED/CURRENT MED MERGE: CPT | Performed by: PHARMACIST

## 2018-12-26 PROCEDURE — 93010 ELECTROCARDIOGRAM REPORT: CPT | Performed by: INTERNAL MEDICINE

## 2018-12-27 ENCOUNTER — TELEPHONE (OUTPATIENT)
Dept: CARDIOLOGY CLINIC | Age: 83
End: 2018-12-27

## 2018-12-27 RX ORDER — CARVEDILOL 12.5 MG/1
12.5 TABLET ORAL 2 TIMES DAILY
Qty: 60 TABLET | Refills: 5 | Status: SHIPPED | OUTPATIENT
Start: 2018-12-27 | End: 2019-07-03 | Stop reason: SDUPTHER

## 2018-12-27 RX ORDER — BENAZEPRIL HYDROCHLORIDE 20 MG/1
20 TABLET ORAL 2 TIMES DAILY
Qty: 60 TABLET | Refills: 5 | Status: SHIPPED | OUTPATIENT
Start: 2018-12-27 | End: 2019-03-28 | Stop reason: SDUPTHER

## 2018-12-27 NOTE — TELEPHONE ENCOUNTER
PT'S DAUGHTER, LYRIC, CALLING. PT WAS INPT AT [facility] 12/23/18 THROUGH 12/25/18 FOR UNCONTROLLED HTN. IT WAS MENTIONED WHEN SPEAKING WITH NP THAT 2 MEDICATIONS WOULD BE STOPPED AND 2 DIFFERENT MEDS WOULD BE STARTED BUT WHEN PT DISCHARGED NO MEDICATION CHANGES WERE MADE. SHE STATES ONE OF THE MEDS MAY HAVE TO DO WITH THE CAROTID US RESULTS. SHOWED 50% OCCLUSION ON ONE SIDE. PLEASE ADVISE. CALL LYRIC [phone].

## 2019-01-08 ENCOUNTER — OFFICE VISIT (OUTPATIENT)
Dept: FAMILY MEDICINE CLINIC | Age: 84
End: 2019-01-08
Payer: MEDICARE

## 2019-01-08 VITALS
DIASTOLIC BLOOD PRESSURE: 90 MMHG | SYSTOLIC BLOOD PRESSURE: 142 MMHG | TEMPERATURE: 98.2 F | RESPIRATION RATE: 17 BRPM | HEIGHT: 69 IN | BODY MASS INDEX: 30.51 KG/M2 | WEIGHT: 206 LBS | HEART RATE: 66 BPM

## 2019-01-08 DIAGNOSIS — I20.8 ANGINAL EQUIVALENT (HCC): ICD-10-CM

## 2019-01-08 DIAGNOSIS — F02.80 ALZHEIMER'S DEMENTIA WITHOUT BEHAVIORAL DISTURBANCE, UNSPECIFIED TIMING OF DEMENTIA ONSET: ICD-10-CM

## 2019-01-08 DIAGNOSIS — Z09 HOSPITAL DISCHARGE FOLLOW-UP: Primary | ICD-10-CM

## 2019-01-08 DIAGNOSIS — G30.9 ALZHEIMER'S DEMENTIA WITHOUT BEHAVIORAL DISTURBANCE, UNSPECIFIED TIMING OF DEMENTIA ONSET: ICD-10-CM

## 2019-01-08 PROCEDURE — 4040F PNEUMOC VAC/ADMIN/RCVD: CPT | Performed by: NURSE PRACTITIONER

## 2019-01-08 PROCEDURE — 1036F TOBACCO NON-USER: CPT | Performed by: NURSE PRACTITIONER

## 2019-01-08 PROCEDURE — G8599 NO ASA/ANTIPLAT THER USE RNG: HCPCS | Performed by: NURSE PRACTITIONER

## 2019-01-08 PROCEDURE — 1123F ACP DISCUSS/DSCN MKR DOCD: CPT | Performed by: NURSE PRACTITIONER

## 2019-01-08 PROCEDURE — 1111F DSCHRG MED/CURRENT MED MERGE: CPT | Performed by: NURSE PRACTITIONER

## 2019-01-08 PROCEDURE — G8417 CALC BMI ABV UP PARAM F/U: HCPCS | Performed by: NURSE PRACTITIONER

## 2019-01-08 PROCEDURE — 99204 OFFICE O/P NEW MOD 45 MIN: CPT | Performed by: NURSE PRACTITIONER

## 2019-01-08 PROCEDURE — G8427 DOCREV CUR MEDS BY ELIG CLIN: HCPCS | Performed by: NURSE PRACTITIONER

## 2019-01-08 PROCEDURE — G8482 FLU IMMUNIZE ORDER/ADMIN: HCPCS | Performed by: NURSE PRACTITIONER

## 2019-01-08 PROCEDURE — 1101F PT FALLS ASSESS-DOCD LE1/YR: CPT | Performed by: NURSE PRACTITIONER

## 2019-01-08 RX ORDER — FINASTERIDE 5 MG/1
TABLET, FILM COATED ORAL
Qty: 90 TABLET | Refills: 3 | Status: CANCELLED | OUTPATIENT
Start: 2019-01-08

## 2019-01-08 RX ORDER — DONEPEZIL HYDROCHLORIDE 5 MG/1
5 TABLET, FILM COATED ORAL NIGHTLY
Qty: 30 TABLET | Refills: 3 | Status: CANCELLED | OUTPATIENT
Start: 2019-01-08

## 2019-01-08 RX ORDER — POTASSIUM CHLORIDE 20 MEQ/1
20 TABLET, EXTENDED RELEASE ORAL DAILY
Qty: 30 TABLET | Refills: 3 | Status: CANCELLED | OUTPATIENT
Start: 2019-01-08

## 2019-01-13 ASSESSMENT — ENCOUNTER SYMPTOMS: CHEST TIGHTNESS: 0

## 2019-01-28 RX ORDER — PRAVASTATIN SODIUM 80 MG/1
80 TABLET ORAL EVERY EVENING
Qty: 30 TABLET | Refills: 3 | Status: SHIPPED | OUTPATIENT
Start: 2019-01-28 | End: 2019-05-31 | Stop reason: SDUPTHER

## 2019-02-04 RX ORDER — POTASSIUM CHLORIDE 20 MEQ/1
20 TABLET, EXTENDED RELEASE ORAL DAILY
Qty: 30 TABLET | Refills: 3 | Status: SHIPPED | OUTPATIENT
Start: 2019-02-04 | End: 2019-06-07 | Stop reason: SDUPTHER

## 2019-02-20 ENCOUNTER — TELEPHONE (OUTPATIENT)
Dept: FAMILY MEDICINE CLINIC | Age: 84
End: 2019-02-20

## 2019-02-21 ENCOUNTER — OFFICE VISIT (OUTPATIENT)
Dept: CARDIOLOGY CLINIC | Age: 84
End: 2019-02-21
Payer: MEDICARE

## 2019-02-21 VITALS
BODY MASS INDEX: 30.36 KG/M2 | DIASTOLIC BLOOD PRESSURE: 70 MMHG | RESPIRATION RATE: 18 BRPM | OXYGEN SATURATION: 98 % | SYSTOLIC BLOOD PRESSURE: 132 MMHG | HEIGHT: 69 IN | HEART RATE: 70 BPM | WEIGHT: 205 LBS

## 2019-02-21 DIAGNOSIS — I10 ESSENTIAL HYPERTENSION: ICD-10-CM

## 2019-02-21 DIAGNOSIS — R60.0 LEG EDEMA: ICD-10-CM

## 2019-02-21 DIAGNOSIS — I16.0 HYPERTENSIVE URGENCY: Primary | ICD-10-CM

## 2019-02-21 DIAGNOSIS — I25.10 CORONARY ARTERY DISEASE INVOLVING NATIVE CORONARY ARTERY OF NATIVE HEART WITHOUT ANGINA PECTORIS: ICD-10-CM

## 2019-02-21 DIAGNOSIS — I50.31 ACUTE DIASTOLIC HEART FAILURE (HCC): ICD-10-CM

## 2019-02-21 DIAGNOSIS — I21.4 NSTEMI (NON-ST ELEVATED MYOCARDIAL INFARCTION) (HCC): ICD-10-CM

## 2019-02-21 PROCEDURE — G8427 DOCREV CUR MEDS BY ELIG CLIN: HCPCS | Performed by: INTERNAL MEDICINE

## 2019-02-21 PROCEDURE — 4040F PNEUMOC VAC/ADMIN/RCVD: CPT | Performed by: INTERNAL MEDICINE

## 2019-02-21 PROCEDURE — 99214 OFFICE O/P EST MOD 30 MIN: CPT | Performed by: INTERNAL MEDICINE

## 2019-02-21 PROCEDURE — 1036F TOBACCO NON-USER: CPT | Performed by: INTERNAL MEDICINE

## 2019-02-21 PROCEDURE — G8599 NO ASA/ANTIPLAT THER USE RNG: HCPCS | Performed by: INTERNAL MEDICINE

## 2019-02-21 PROCEDURE — 1123F ACP DISCUSS/DSCN MKR DOCD: CPT | Performed by: INTERNAL MEDICINE

## 2019-02-21 PROCEDURE — G8482 FLU IMMUNIZE ORDER/ADMIN: HCPCS | Performed by: INTERNAL MEDICINE

## 2019-02-21 PROCEDURE — G8417 CALC BMI ABV UP PARAM F/U: HCPCS | Performed by: INTERNAL MEDICINE

## 2019-02-21 PROCEDURE — 1101F PT FALLS ASSESS-DOCD LE1/YR: CPT | Performed by: INTERNAL MEDICINE

## 2019-02-21 ASSESSMENT — ENCOUNTER SYMPTOMS
NAUSEA: 0
SHORTNESS OF BREATH: 0
EYES NEGATIVE: 1
BLOOD IN STOOL: 0
STRIDOR: 0
GASTROINTESTINAL NEGATIVE: 1
COUGH: 0
CHEST TIGHTNESS: 1
WHEEZING: 0

## 2019-03-28 RX ORDER — BENAZEPRIL HYDROCHLORIDE 20 MG/1
20 TABLET ORAL 2 TIMES DAILY
Qty: 180 TABLET | Refills: 2 | Status: SHIPPED | OUTPATIENT
Start: 2019-03-28 | End: 2019-05-03 | Stop reason: SDUPTHER

## 2019-03-29 RX ORDER — FINASTERIDE 5 MG/1
5 TABLET, FILM COATED ORAL DAILY
Qty: 30 TABLET | Refills: 0 | Status: SHIPPED | OUTPATIENT
Start: 2019-03-29 | End: 2019-04-08 | Stop reason: SDUPTHER

## 2019-04-04 RX ORDER — DONEPEZIL HYDROCHLORIDE 5 MG/1
5 TABLET, FILM COATED ORAL NIGHTLY
Qty: 30 TABLET | Refills: 3 | Status: SHIPPED | OUTPATIENT
Start: 2019-04-04 | End: 2019-09-24 | Stop reason: SDUPTHER

## 2019-04-08 ENCOUNTER — OFFICE VISIT (OUTPATIENT)
Dept: FAMILY MEDICINE CLINIC | Age: 84
End: 2019-04-08
Payer: MEDICARE

## 2019-04-08 VITALS
SYSTOLIC BLOOD PRESSURE: 130 MMHG | DIASTOLIC BLOOD PRESSURE: 70 MMHG | HEART RATE: 68 BPM | OXYGEN SATURATION: 95 % | TEMPERATURE: 97.6 F | HEIGHT: 69 IN | BODY MASS INDEX: 30.07 KG/M2 | WEIGHT: 203 LBS

## 2019-04-08 DIAGNOSIS — I16.0 HYPERTENSIVE URGENCY: ICD-10-CM

## 2019-04-08 DIAGNOSIS — I21.4 NSTEMI (NON-ST ELEVATED MYOCARDIAL INFARCTION) (HCC): ICD-10-CM

## 2019-04-08 DIAGNOSIS — I10 ESSENTIAL HYPERTENSION: ICD-10-CM

## 2019-04-08 DIAGNOSIS — Z98.890 S/P CARDIAC CATH: ICD-10-CM

## 2019-04-08 DIAGNOSIS — R60.0 LEG EDEMA: ICD-10-CM

## 2019-04-08 DIAGNOSIS — I20.9 ANGINA, CLASS III (HCC): ICD-10-CM

## 2019-04-08 DIAGNOSIS — R07.9 CHEST PAIN, UNSPECIFIED TYPE: ICD-10-CM

## 2019-04-08 DIAGNOSIS — E78.2 MIXED HYPERLIPIDEMIA: ICD-10-CM

## 2019-04-08 DIAGNOSIS — I50.31 ACUTE DIASTOLIC HEART FAILURE (HCC): ICD-10-CM

## 2019-04-08 DIAGNOSIS — I50.9 CONGESTIVE HEART FAILURE, UNSPECIFIED HF CHRONICITY, UNSPECIFIED HEART FAILURE TYPE (HCC): ICD-10-CM

## 2019-04-08 DIAGNOSIS — I65.23 CAROTID STENOSIS, BILATERAL: ICD-10-CM

## 2019-04-08 DIAGNOSIS — R06.09 DOE (DYSPNEA ON EXERTION): ICD-10-CM

## 2019-04-08 DIAGNOSIS — F03.90 DEMENTIA WITHOUT BEHAVIORAL DISTURBANCE, UNSPECIFIED DEMENTIA TYPE: ICD-10-CM

## 2019-04-08 DIAGNOSIS — N40.0 BENIGN PROSTATIC HYPERPLASIA, UNSPECIFIED WHETHER LOWER URINARY TRACT SYMPTOMS PRESENT: Primary | ICD-10-CM

## 2019-04-08 DIAGNOSIS — I25.10 CORONARY ARTERY DISEASE INVOLVING NATIVE HEART WITHOUT ANGINA PECTORIS, UNSPECIFIED VESSEL OR LESION TYPE: ICD-10-CM

## 2019-04-08 PROCEDURE — G8599 NO ASA/ANTIPLAT THER USE RNG: HCPCS | Performed by: FAMILY MEDICINE

## 2019-04-08 PROCEDURE — G8417 CALC BMI ABV UP PARAM F/U: HCPCS | Performed by: FAMILY MEDICINE

## 2019-04-08 PROCEDURE — 99214 OFFICE O/P EST MOD 30 MIN: CPT | Performed by: FAMILY MEDICINE

## 2019-04-08 PROCEDURE — 1123F ACP DISCUSS/DSCN MKR DOCD: CPT | Performed by: FAMILY MEDICINE

## 2019-04-08 PROCEDURE — 4040F PNEUMOC VAC/ADMIN/RCVD: CPT | Performed by: FAMILY MEDICINE

## 2019-04-08 PROCEDURE — G8427 DOCREV CUR MEDS BY ELIG CLIN: HCPCS | Performed by: FAMILY MEDICINE

## 2019-04-08 PROCEDURE — 1036F TOBACCO NON-USER: CPT | Performed by: FAMILY MEDICINE

## 2019-04-08 RX ORDER — FINASTERIDE 5 MG/1
5 TABLET, FILM COATED ORAL DAILY
Qty: 90 TABLET | Refills: 2 | Status: SHIPPED | OUTPATIENT
Start: 2019-04-08 | End: 2020-01-07

## 2019-04-08 NOTE — PROGRESS NOTES
Chief Complaint   Patient presents with    Consultation     new PCP       HPI:  Berna Thomas is a 82 y.o. male     New to this office, was following at [address] list reviewed below    Has had follow ups this year with previous PCP office and cardiology    Has upcoming f/u with Dr. Chandana Burton    Wife passed away in September    Son and daughter live here in [address] [place] [address] safe at home    Patient Active Problem List   Diagnosis    Hyperlipidemia    Carotid stenosis, bilateral    Dementia    Hypertensive urgency    NSTEMI (non-ST elevated myocardial infarction) ([address]    Essential hypertension    HESS (dyspnea on exertion)    Leg edema    Acute diastolic heart failure ([address]    Chest pain    Angina, class III ([address]    S/P cardiac cath    Anginal equivalent ([address]    Dizziness    Coronary artery disease involving native coronary artery of native heart without angina pectoris    CHF (congestive heart failure) ([address]    CAD (coronary artery disease)       Current Outpatient Medications   Medication Sig Dispense Refill    finasteride (PROSCAR) 5 MG tablet Take 1 tablet by mouth daily 90 tablet 02    donepezil (ARICEPT) 5 MG tablet TAKE 1 TABLET BY MOUTH NIGHTLY 30 tablet 3    benazepril (LOTENSIN) 20 MG tablet Take 1 tablet by mouth 2 times daily 180 tablet 2    potassium chloride (KLOR-CON M) 20 MEQ extended release tablet Take 1 tablet by mouth daily 30 tablet 3    pravastatin (PRAVACHOL) 80 MG tablet Take 1 tablet by mouth every evening 30 tablet 3    carvedilol (COREG) 12.5 MG tablet Take 1 tablet by mouth 2 times daily 60 tablet 5    nitroGLYCERIN (NITROSTAT) 0.4 MG SL tablet up to max of 3 total doses.  If no relief after 1 dose, call 911. 25 tablet 3    ticagrelor (BRILINTA) 90 MG TABS tablet Take 1 tablet by mouth 2 times daily 60 tablet 5    latanoprost (XALATAN) 0.005 % ophthalmic solution Place 1 drop into both eyes daily 1 Bottle 3    triamterene-hydrochlorothiazide Sexual activity: None   Lifestyle    Physical activity:     Days per week: None     Minutes per session: None    Stress: None   Relationships    Social connections:     Talks on phone: None     Gets together: None     Attends Jewish service: None     Active member of club or organization: None     Attends meetings of clubs or organizations: None     Relationship status: None    Intimate partner violence:     Fear of current or ex partner: None     Emotionally abused: None     Physically abused: None     Forced sexual activity: None   Other Topics Concern    None   Social History Narrative    None     No Known Allergies    Review of Systems:   General ROS: negative for - chills, fatigue, fever, malaise, weight gain or weight loss  Respiratory ROS: no cough, shortness of breath, or wheezing  Cardiovascular ROS: no chest pain or dyspnea on exertion  Gastrointestinal ROS: no abdominal pain, change in bowel habits, or black or bloody stools  Genito-Urinary ROS: no dysuria, trouble voiding  Musculoskeletal ROS: negative for - gait disturbance, joint pain or joint stiffness  Neurological ROS: negative for - behavioral changes, memory loss, numbness/tingling, tremors or weakness    In general patient otherwise reports feeling well. Physical Exam:  /70   Pulse 68   Temp 97.6 °F (36.4 °C)   Ht 5' 9\" (1.753 m)   Wt 203 lb (92.1 kg)   SpO2 95%   BMI 29.98 kg/m²     Gen: Well, NAD, Alert, Oriented x 3   HEENT: EOMI, eyes clear, MMM  Skin: without rash or jaundice  Neck: no significant lymphadenopathy or thyromegaly  Lungs: CTA B w/out Rales/Wheezes/Rhonchi, Good respiratory effort   Heart: RRR, S1S2, w/out M/R/G, non-displaced PMI   Abdomen: Soft NT/ND, w/out R/G, w/ +BSx4   Ext: No C/C/E Bilaterally. Neuro: Neurovascularly intact w/ Sensory/Motor intact UE/LE Bilaterally.     Lab Results   Component Value Date    WBC 6.7 12/25/2018    HGB 14.9 12/25/2018    HCT 43.0 12/25/2018     12/25/2018 CHOL 133 12/24/2018    TRIG 119 12/24/2018    HDL 49 12/24/2018    ALT 12 12/23/2018    AST 13 12/23/2018     12/25/2018    K 4.2 12/25/2018     12/25/2018    CREATININE 1.07 12/25/2018    BUN 23 12/25/2018    CO2 19 (L) 12/25/2018    TSH 1.948 02/04/2013    PSA 2.88 07/30/2018    INR 1.0 12/23/2018    LABA1C 6.1 (H) 07/30/2018         A&P   Diagnosis Orders   1. Benign prostatic hyperplasia, unspecified whether lower urinary tract symptoms present  finasteride (PROSCAR) 5 MG tablet   2. Mixed hyperlipidemia     3. Dementia without behavioral disturbance, unspecified dementia type     4. Carotid stenosis, bilateral     5. Hypertensive urgency     6. NSTEMI (non-ST elevated myocardial infarction) (HCC)  finasteride (PROSCAR) 5 MG tablet   7. Essential hypertension     8. HESS (dyspnea on exertion)     9. Leg edema     10. Acute diastolic heart failure ([address]     11. Chest pain, unspecified type     12. Angina, class III ([address]     13. S/P cardiac cath     14. Congestive heart failure, unspecified HF chronicity, unspecified heart failure type ([address]     15.  Coronary artery disease involving native heart without angina pectoris, unspecified vessel or lesion type       Chronic conditions are stable  Continue current regimen  Follow up with appropriate specialists and here routinely for ongoing monitoring of chronic conditions    Call for refills    Stay active     F/u 6 mos      Shivani Cuenca MD

## 2019-04-29 ENCOUNTER — OFFICE VISIT (OUTPATIENT)
Dept: FAMILY MEDICINE CLINIC | Age: 84
End: 2019-04-29
Payer: MEDICARE

## 2019-04-29 VITALS
OXYGEN SATURATION: 98 % | DIASTOLIC BLOOD PRESSURE: 74 MMHG | HEIGHT: 69 IN | SYSTOLIC BLOOD PRESSURE: 130 MMHG | WEIGHT: 198.4 LBS | HEART RATE: 77 BPM | BODY MASS INDEX: 29.38 KG/M2

## 2019-04-29 DIAGNOSIS — K40.90 RIGHT INGUINAL HERNIA: Primary | ICD-10-CM

## 2019-04-29 PROCEDURE — 4040F PNEUMOC VAC/ADMIN/RCVD: CPT | Performed by: FAMILY MEDICINE

## 2019-04-29 PROCEDURE — G8417 CALC BMI ABV UP PARAM F/U: HCPCS | Performed by: FAMILY MEDICINE

## 2019-04-29 PROCEDURE — G8427 DOCREV CUR MEDS BY ELIG CLIN: HCPCS | Performed by: FAMILY MEDICINE

## 2019-04-29 PROCEDURE — 1123F ACP DISCUSS/DSCN MKR DOCD: CPT | Performed by: FAMILY MEDICINE

## 2019-04-29 PROCEDURE — G8599 NO ASA/ANTIPLAT THER USE RNG: HCPCS | Performed by: FAMILY MEDICINE

## 2019-04-29 PROCEDURE — 1036F TOBACCO NON-USER: CPT | Performed by: FAMILY MEDICINE

## 2019-04-29 PROCEDURE — 99213 OFFICE O/P EST LOW 20 MIN: CPT | Performed by: FAMILY MEDICINE

## 2019-04-29 ASSESSMENT — PATIENT HEALTH QUESTIONNAIRE - PHQ9
SUM OF ALL RESPONSES TO PHQ9 QUESTIONS 1 & 2: 1
2. FEELING DOWN, DEPRESSED OR HOPELESS: 1
SUM OF ALL RESPONSES TO PHQ QUESTIONS 1-9: 1
1. LITTLE INTEREST OR PLEASURE IN DOING THINGS: 0
SUM OF ALL RESPONSES TO PHQ QUESTIONS 1-9: 1

## 2019-04-29 NOTE — PROGRESS NOTES
Chief Complaint   Patient presents with    Hernia     x 3 weeks would like checked, right side       HPI:  Estrellita Prather is a 82 y.o. male     4 hernia operations in his 25s    About 3 weeks ago noted bulge right side straining to have bowel movement. Denies any pain    Feels the bulge in area of old surgical scar      Patient Active Problem List   Diagnosis    Hyperlipidemia    Carotid stenosis, bilateral    Dementia    Hypertensive urgency    [facility]    Essential hypertension    HESS (dyspnea on exertion)    Leg edema    Acute diastolic heart failure ([facility])    Chest pain    Angina, class III ([facility])    S/P cardiac cath    Anginal equivalent ([facility])    Dizziness    Coronary artery disease involving native coronary artery of native heart without angina pectoris    CHF (congestive heart failure) ([facility])    CAD (coronary artery disease)       Current Outpatient Medications   Medication Sig Dispense Refill    Elastic Bandages & Supports (HERNIA SUPPORT RIGHT LARGE) MISC Wear daily. Dx: right inguinal hernia 1 each 0    finasteride (PROSCAR) 5 MG tablet Take 1 tablet by mouth daily 90 tablet 02    donepezil (ARICEPT) 5 MG tablet TAKE 1 TABLET BY MOUTH NIGHTLY 30 tablet 3    benazepril (LOTENSIN) 20 MG tablet Take 1 tablet by mouth 2 times daily 180 tablet 2    potassium chloride (KLOR-CON M) 20 MEQ extended release tablet Take 1 tablet by mouth daily 30 tablet 3    pravastatin (PRAVACHOL) 80 MG tablet Take 1 tablet by mouth every evening 30 tablet 3    carvedilol (COREG) 12.5 MG tablet Take 1 tablet by mouth 2 times daily 60 tablet 5    nitroGLYCERIN (NITROSTAT) 0.4 MG SL tablet up to max of 3 total doses.  If no relief after 1 dose, call 911. 25 tablet 3    ticagrelor (BRILINTA) 90 MG TABS tablet Take 1 tablet by mouth 2 times daily 60 tablet 5    latanoprost (XALATAN) 0.005 % ophthalmic solution Place 1 drop into both eyes daily 1 Bottle 3    triamterene-hydrochlorothiazide (MAXZIDE-25) 37.5-25 MG per tablet Take 1 tablet by mouth daily 30 tablet 5    vitamin B-12 (CYANOCOBALAMIN) 1000 MCG tablet Take 500 mcg by mouth daily      Elastic Bandages & Supports (JOBST KNEE HIGH COMPRESSION SM) MISC 1 each by Does not apply route daily 1 each 2    aspirin 81 MG tablet Take 1 tablet by mouth daily With Food 30 tablet 3    Multiple Vitamins-Minerals (MULTIVITAMIN PO) Take by mouth daily       dorzolamide-timolol (COSOPT) 22.3-6.8 MG/ML ophthalmic solution Place 1 drop into both eyes 2 times daily        No current facility-administered medications for this visit. Past Medical History:   Diagnosis Date    Anticoagulant long-term use     Anxiety     CAD (coronary artery disease)     Carotid stenosis     2/2013 16-49%    [facility]) [facility]     Dementia     Dementia     Hyperlipidemia     Hypertension     MI (myocardial infarction) ([facility]     Osteoarthritis      Past Surgical History:   Procedure Laterality Date    CARPAL TUNNEL RELEASE  1998    CATARACT REMOVAL  2007    COLONOSCOPY  12/10/10,2007    DR Cherie Narvaez - DONE AT [address] [place]  COLONOSCOPY  2007    hx polyps needs 2015    COLONOSCOPY  9/21/15     DR. YARBROUGH     CORONARY ANGIOPLASTY WITH STENT PLACEMENT      HERNIA REPAIR       Family History   Problem Relation Age of Onset    Heart Disease Mother     High Blood Pressure Mother      Social History     Socioeconomic History    Marital status:      Spouse name: None    Number of children: None    Years of education: None    Highest education level: None   Occupational History    None   Social Needs    Financial resource strain: None    Food insecurity:     Worry: None     Inability: None    Transportation needs:     Medical: None     Non-medical: None   Tobacco Use    Smoking status: Never Smoker    Smokeless tobacco: Never Used   Substance and Sexual Activity    Alcohol use:  No 19 (L) 12/25/2018    TSH 1.948 02/04/2013    PSA 2.88 07/30/2018    INR 1.0 12/23/2018    LABA1C 6.1 (H) 07/30/2018         A&P   Diagnosis Orders   1.  Right inguinal hernia  Elastic Bandages & Supports (HERNIA SUPPORT RIGHT LARGE) MISC     Will need to hold off on surgery right now  Stent in December and on antiplatelet therapy  Hernia belt  Will pursue elective surgery next year    Perri Avendaño MD

## 2019-05-03 RX ORDER — BENAZEPRIL HYDROCHLORIDE 20 MG/1
20 TABLET ORAL 2 TIMES DAILY
Qty: 180 TABLET | Refills: 2 | Status: SHIPPED | OUTPATIENT
Start: 2019-05-03 | End: 2019-05-29 | Stop reason: SDUPTHER

## 2019-05-22 ENCOUNTER — OFFICE VISIT (OUTPATIENT)
Dept: CARDIOLOGY CLINIC | Age: 84
End: 2019-05-22
Payer: MEDICARE

## 2019-05-22 VITALS
OXYGEN SATURATION: 98 % | HEIGHT: 69 IN | RESPIRATION RATE: 16 BRPM | SYSTOLIC BLOOD PRESSURE: 136 MMHG | HEART RATE: 68 BPM | DIASTOLIC BLOOD PRESSURE: 72 MMHG | WEIGHT: 201 LBS | BODY MASS INDEX: 29.77 KG/M2

## 2019-05-22 DIAGNOSIS — E78.5 HYPERLIPIDEMIA, UNSPECIFIED HYPERLIPIDEMIA TYPE: Primary | ICD-10-CM

## 2019-05-22 DIAGNOSIS — I25.10 CORONARY ARTERY DISEASE INVOLVING NATIVE CORONARY ARTERY OF NATIVE HEART WITHOUT ANGINA PECTORIS: ICD-10-CM

## 2019-05-22 DIAGNOSIS — I21.4 NSTEMI (NON-ST ELEVATED MYOCARDIAL INFARCTION) (HCC): ICD-10-CM

## 2019-05-22 DIAGNOSIS — I10 ESSENTIAL HYPERTENSION: ICD-10-CM

## 2019-05-22 PROCEDURE — G8417 CALC BMI ABV UP PARAM F/U: HCPCS | Performed by: INTERNAL MEDICINE

## 2019-05-22 PROCEDURE — G8427 DOCREV CUR MEDS BY ELIG CLIN: HCPCS | Performed by: INTERNAL MEDICINE

## 2019-05-22 PROCEDURE — 1123F ACP DISCUSS/DSCN MKR DOCD: CPT | Performed by: INTERNAL MEDICINE

## 2019-05-22 PROCEDURE — 4040F PNEUMOC VAC/ADMIN/RCVD: CPT | Performed by: INTERNAL MEDICINE

## 2019-05-22 PROCEDURE — 1036F TOBACCO NON-USER: CPT | Performed by: INTERNAL MEDICINE

## 2019-05-22 PROCEDURE — 99214 OFFICE O/P EST MOD 30 MIN: CPT | Performed by: INTERNAL MEDICINE

## 2019-05-22 PROCEDURE — G8599 NO ASA/ANTIPLAT THER USE RNG: HCPCS | Performed by: INTERNAL MEDICINE

## 2019-05-22 ASSESSMENT — ENCOUNTER SYMPTOMS
SHORTNESS OF BREATH: 0
BLOOD IN STOOL: 0
COUGH: 0
STRIDOR: 0
WHEEZING: 0
GASTROINTESTINAL NEGATIVE: 1
CHEST TIGHTNESS: 0
EYES NEGATIVE: 1
RESPIRATORY NEGATIVE: 1
NAUSEA: 0

## 2019-05-22 NOTE — PROGRESS NOTES
Subsequent Progress Note  Patient: Wil Miranda  YOB: 1935  MRN: [id number]    Chief Complaint: nstemi cad htn   Chief Complaint   Patient presents with    Hypertension     3 m f/u    Congestive Heart Failure    Coronary Artery Disease       CV Data:  6/2018 spect negative  5/2018 echo EF 60%  11/2018 LAD AMBER  12/2018 CUS <. Left Vertebral 100    Subjective/HPI: no cp no sob no falls no bleed. R Inguinal hernia     Nonsmoker      EKG:    Past Medical History:   Diagnosis Date    Anticoagulant long-term use     Anxiety     CAD (coronary artery disease)     Carotid stenosis     2/2013 16-49%    CHF (congestive heart failure) ([facility] [place] [address]     Dementia     Dementia     Hyperlipidemia     Hypertension     MI (myocardial infarction) ([facility] [place] [address]     Osteoarthritis        Past Surgical History:   Procedure Laterality Date    CARPAL TUNNEL RELEASE  1998    CATARACT REMOVAL  2007    COLONOSCOPY  12/10/10,2007    DR Frances Pierce - DONE AT [address] COLONOSCOPY  2007    hx polyps needs 2015    COLONOSCOPY  9/21/15     DR. YARBROUGH     CORONARY ANGIOPLASTY WITH STENT PLACEMENT      HERNIA REPAIR         Family History   Problem Relation Age of Onset    Heart Disease Mother     High Blood Pressure Mother        Social History     Socioeconomic History    Marital status:       Spouse name: None    Number of children: None    Years of education: None    Highest education level: None   Occupational History    None   Social Needs    Financial resource strain: None    Food insecurity:     Worry: None     Inability: None    Transportation needs:     Medical: None     Non-medical: None   Tobacco Use    Smoking status: Never Smoker    Smokeless tobacco: Never Used   Substance and Sexual Activity    Alcohol use: No     Comment: social    Drug use: No    Sexual activity: None   Lifestyle    Physical activity:     Days per week: None     Minutes per session: None    Stress: None Relationships    Social connections:     Talks on phone: None     Gets together: None     Attends Restorationism service: None     Active member of club or organization: None     Attends meetings of clubs or organizations: None     Relationship status: None    Intimate partner violence:     Fear of current or ex partner: None     Emotionally abused: None     Physically abused: None     Forced sexual activity: None   Other Topics Concern    None   Social History Narrative    None       No Known Allergies    Current Outpatient Medications   Medication Sig Dispense Refill    BRILINTA 90 MG TABS tablet TAKE 1 TABLET BY MOUTH TWICE DAILY 60 tablet 5    benazepril (LOTENSIN) 20 MG tablet Take 1 tablet by mouth 2 times daily 180 tablet 2    Elastic Bandages & Supports (HERNIA SUPPORT RIGHT LARGE) MISC Wear daily.   Dx: right inguinal hernia 1 each 0    finasteride (PROSCAR) 5 MG tablet Take 1 tablet by mouth daily 90 tablet 02    donepezil (ARICEPT) 5 MG tablet TAKE 1 TABLET BY MOUTH NIGHTLY 30 tablet 3    potassium chloride (KLOR-CON M) 20 MEQ extended release tablet Take 1 tablet by mouth daily 30 tablet 3    pravastatin (PRAVACHOL) 80 MG tablet Take 1 tablet by mouth every evening 30 tablet 3    carvedilol (COREG) 12.5 MG tablet Take 1 tablet by mouth 2 times daily 60 tablet 5    latanoprost (XALATAN) 0.005 % ophthalmic solution Place 1 drop into both eyes daily 1 Bottle 3    triamterene-hydrochlorothiazide (MAXZIDE-25) 37.5-25 MG per tablet Take 1 tablet by mouth daily 30 tablet 5    vitamin B-12 (CYANOCOBALAMIN) 1000 MCG tablet Take 500 mcg by mouth daily      Elastic Bandages & Supports (JOBST KNEE HIGH COMPRESSION SM) MISC 1 each by Does not apply route daily 1 each 2    aspirin 81 MG tablet Take 1 tablet by mouth daily With Food 30 tablet 3    Multiple Vitamins-Minerals (MULTIVITAMIN PO) Take by mouth daily       dorzolamide-timolol (COSOPT) 22.3-6.8 MG/ML ophthalmic solution Place 1 drop into both eyes 2 times daily       nitroGLYCERIN (NITROSTAT) 0.4 MG SL tablet up to max of 3 total doses. If no relief after 1 dose, call 911. 25 tablet 3     No current facility-administered medications for this visit. Review of Systems:   Review of Systems   Constitutional: Negative. Negative for diaphoresis and fatigue. HENT: Negative. Eyes: Negative. Respiratory: Negative. Negative for cough, chest tightness, shortness of breath, wheezing and stridor. Cardiovascular: Negative. Negative for chest pain, palpitations and leg swelling. Gastrointestinal: Negative. Negative for blood in stool and nausea. Genitourinary: Negative. Musculoskeletal: Negative. Skin: Negative. Neurological: Negative. Negative for dizziness, syncope, weakness and light-headedness. Hematological: Negative. Psychiatric/Behavioral: Negative. Physical Examination:    /72 (Site: Left Upper Arm, Position: Sitting, Cuff Size: Large Adult)   Pulse 68   Resp 16   Ht 5' 9\" (1.753 m)   Wt 201 lb (91.2 kg)   SpO2 98%   BMI 29.68 kg/m²    Physical Exam   Constitutional: He appears healthy. No distress. HENT:   Normal cephalic and Atraumatic   Eyes: Pupils are equal, round, and reactive to light. Neck: Normal range of motion and thyroid normal. Neck supple. No JVD present. No neck adenopathy. No thyromegaly present. Cardiovascular: Normal rate, regular rhythm, normal heart sounds, intact distal pulses and normal pulses. Pulmonary/Chest: Effort normal and breath sounds normal. He has no wheezes. He has no rales. He exhibits no tenderness. Abdominal: Soft. Bowel sounds are normal. There is no tenderness. Musculoskeletal: Normal range of motion. He exhibits no edema or tenderness. Neurological: He is alert and oriented to person, place, and time. Skin: Skin is warm. No cyanosis. Nails show no clubbing.        LABS:  CBC:   Lab Results   Component Value Date    WBC 6.7 12/25/2018    RBC 4.66 12/25/2018    RBC 4.69 10/25/2011    HGB 14.9 12/25/2018    HCT 43.0 12/25/2018    MCV 92.3 12/25/2018    MCH 32.0 12/25/2018    MCHC 34.6 12/25/2018    RDW 14.3 12/25/2018     12/25/2018    MPV 9.2 08/27/2015     Lipids:  Lab Results   Component Value Date    CHOL 133 12/24/2018    CHOL 154 11/07/2018    CHOL 173 07/30/2018     Lab Results   Component Value Date    TRIG 119 12/24/2018    TRIG 125 11/07/2018    TRIG 83 07/30/2018     Lab Results   Component Value Date    HDL 49 12/24/2018    HDL 50 11/07/2018    HDL 50 07/30/2018     Lab Results   Component Value Date    LDLCALC 60 12/24/2018    LDLCALC 79 11/07/2018    LDLCALC 106 07/30/2018     No results found for: LABVLDL, VLDL  Lab Results   Component Value Date    CHOLHDLRATIO 3.8 09/10/2012    CHOLHDLRATIO 3.3 10/25/2011     CMP:    Lab Results   Component Value Date     12/25/2018    K 4.2 12/25/2018    K 4.3 05/02/2018     12/25/2018    CO2 19 12/25/2018    BUN 23 12/25/2018    CREATININE 1.07 12/25/2018    GFRAA >60.0 12/25/2018    LABGLOM >60.0 12/25/2018    GLUCOSE 128 12/25/2018    GLUCOSE 96 10/25/2011    PROT 7.5 12/23/2018    LABALBU 4.2 12/23/2018    LABALBU 4.6 10/25/2011    CALCIUM 9.2 12/25/2018    BILITOT 0.5 12/23/2018    ALKPHOS 101 12/23/2018    AST 13 12/23/2018    ALT 12 12/23/2018     BMP:    Lab Results   Component Value Date     12/25/2018    K 4.2 12/25/2018    K 4.3 05/02/2018     12/25/2018    CO2 19 12/25/2018    BUN 23 12/25/2018    LABALBU 4.2 12/23/2018    LABALBU 4.6 10/25/2011    CREATININE 1.07 12/25/2018    CALCIUM 9.2 12/25/2018    GFRAA >60.0 12/25/2018    LABGLOM >60.0 12/25/2018    GLUCOSE 128 12/25/2018    GLUCOSE 96 10/25/2011     Magnesium:    Lab Results   Component Value Date    MG 2.1 12/25/2018     TSH:  Lab Results   Component Value Date    TSH 1.948 02/04/2013       Patient Active Problem List   Diagnosis    Hyperlipidemia    Carotid stenosis, bilateral    Dementia    Hypertensive urgency    [facility]non[facility] elevated [facility] [address]    Essential hypertension    HESS (dyspnea on exertion)    Leg edema    Acute diastolic heart failure [facility]    Chest pain    Angina, class III ([facility])    S/P cardiac cath    Anginal equivalent ([facility])    Dizziness    Coronary artery disease involving native coronary artery of native heart without angina pectoris    CHF (congestive heart failure) ([facility]    CAD (coronary artery disease)       There are no discontinued medications. Modified Medications    No medications on file       No orders of the defined types were placed in this encounter. Assessment/Plan:    1. Hyperlipidemia, unspecified hyperlipidemia type  Statin     2. NSTEMI (non-ST elevated myocardial infarction) ([facility])  No angina     3. Essential hypertension   stable     4. Coronary artery disease involving native coronary artery of native heart without angina pectoris  No angina. Continue DAPT    5. R Inguinal hernia- asymptomatic . Avoid heavylifting or constipation       Counseling:  Heart Healthy Lifestyle, Decrease Alcohol Consumption, Take Precautions to Prevent Falls, Regular Exercise and Walk Daily    Return in about 4 months (around 9/22/2019) for Cardiovascular care. .      Electronically signed by Micaela Swift MD on 5/22/2019 at 2:24 PM

## 2019-05-29 RX ORDER — BENAZEPRIL HYDROCHLORIDE 20 MG/1
20 TABLET ORAL 2 TIMES DAILY
Qty: 180 TABLET | Refills: 2 | Status: SHIPPED | OUTPATIENT
Start: 2019-05-29 | End: 2020-04-13 | Stop reason: SDUPTHER

## 2019-05-31 RX ORDER — PRAVASTATIN SODIUM 80 MG/1
80 TABLET ORAL EVERY EVENING
Qty: 30 TABLET | Refills: 3 | Status: SHIPPED | OUTPATIENT
Start: 2019-05-31 | End: 2019-09-24 | Stop reason: SDUPTHER

## 2019-06-07 RX ORDER — POTASSIUM CHLORIDE 20 MEQ/1
20 TABLET, EXTENDED RELEASE ORAL DAILY
Qty: 30 TABLET | Refills: 5 | Status: SHIPPED | OUTPATIENT
Start: 2019-06-07 | End: 2019-12-05 | Stop reason: SDUPTHER

## 2019-07-03 RX ORDER — CARVEDILOL 12.5 MG/1
12.5 TABLET ORAL 2 TIMES DAILY
Qty: 60 TABLET | Refills: 5 | Status: SHIPPED | OUTPATIENT
Start: 2019-07-03 | End: 2020-01-10 | Stop reason: SDUPTHER

## 2019-08-09 RX ORDER — LATANOPROST 50 UG/ML
SOLUTION/ DROPS OPHTHALMIC
Qty: 2.5 ML | Refills: 5 | Status: SHIPPED | OUTPATIENT
Start: 2019-08-09

## 2019-09-24 DIAGNOSIS — I10 ESSENTIAL HYPERTENSION: ICD-10-CM

## 2019-09-24 RX ORDER — DONEPEZIL HYDROCHLORIDE 5 MG/1
5 TABLET, FILM COATED ORAL NIGHTLY
Qty: 30 TABLET | Refills: 3 | Status: SHIPPED | OUTPATIENT
Start: 2019-09-24 | End: 2020-02-16

## 2019-09-24 RX ORDER — TRIAMTERENE AND HYDROCHLOROTHIAZIDE 37.5; 25 MG/1; MG/1
TABLET ORAL
Qty: 30 TABLET | Refills: 11 | Status: SHIPPED | OUTPATIENT
Start: 2019-09-24 | End: 2019-09-25 | Stop reason: SDUPTHER

## 2019-09-25 ENCOUNTER — OFFICE VISIT (OUTPATIENT)
Dept: CARDIOLOGY CLINIC | Age: 84
End: 2019-09-25
Payer: MEDICARE

## 2019-09-25 VITALS
DIASTOLIC BLOOD PRESSURE: 62 MMHG | BODY MASS INDEX: 29.62 KG/M2 | OXYGEN SATURATION: 98 % | SYSTOLIC BLOOD PRESSURE: 122 MMHG | RESPIRATION RATE: 18 BRPM | WEIGHT: 200.6 LBS | HEART RATE: 67 BPM

## 2019-09-25 DIAGNOSIS — I21.4 NSTEMI (NON-ST ELEVATED MYOCARDIAL INFARCTION) (HCC): ICD-10-CM

## 2019-09-25 DIAGNOSIS — I50.9 CONGESTIVE HEART FAILURE, UNSPECIFIED HF CHRONICITY, UNSPECIFIED HEART FAILURE TYPE (HCC): ICD-10-CM

## 2019-09-25 DIAGNOSIS — I10 ESSENTIAL HYPERTENSION: ICD-10-CM

## 2019-09-25 DIAGNOSIS — I25.10 CORONARY ARTERY DISEASE INVOLVING NATIVE CORONARY ARTERY OF NATIVE HEART WITHOUT ANGINA PECTORIS: Primary | ICD-10-CM

## 2019-09-25 DIAGNOSIS — E78.5 HYPERLIPIDEMIA, UNSPECIFIED HYPERLIPIDEMIA TYPE: ICD-10-CM

## 2019-09-25 PROCEDURE — 99214 OFFICE O/P EST MOD 30 MIN: CPT | Performed by: INTERNAL MEDICINE

## 2019-09-25 PROCEDURE — 1036F TOBACCO NON-USER: CPT | Performed by: INTERNAL MEDICINE

## 2019-09-25 PROCEDURE — 1123F ACP DISCUSS/DSCN MKR DOCD: CPT | Performed by: INTERNAL MEDICINE

## 2019-09-25 PROCEDURE — 4040F PNEUMOC VAC/ADMIN/RCVD: CPT | Performed by: INTERNAL MEDICINE

## 2019-09-25 PROCEDURE — G8599 NO ASA/ANTIPLAT THER USE RNG: HCPCS | Performed by: INTERNAL MEDICINE

## 2019-09-25 PROCEDURE — G8417 CALC BMI ABV UP PARAM F/U: HCPCS | Performed by: INTERNAL MEDICINE

## 2019-09-25 PROCEDURE — G8427 DOCREV CUR MEDS BY ELIG CLIN: HCPCS | Performed by: INTERNAL MEDICINE

## 2019-09-25 RX ORDER — PRAVASTATIN SODIUM 80 MG/1
80 TABLET ORAL EVERY EVENING
Qty: 30 TABLET | Refills: 3 | Status: SHIPPED | OUTPATIENT
Start: 2019-09-25 | End: 2020-02-04

## 2019-09-25 RX ORDER — TRIAMTERENE AND HYDROCHLOROTHIAZIDE 37.5; 25 MG/1; MG/1
0.5 TABLET ORAL DAILY
Qty: 30 TABLET | Refills: 11 | Status: ON HOLD
Start: 2019-09-25 | End: 2020-06-08 | Stop reason: HOSPADM

## 2019-09-25 ASSESSMENT — ENCOUNTER SYMPTOMS
EYES NEGATIVE: 1
GASTROINTESTINAL NEGATIVE: 1
BLOOD IN STOOL: 0
WHEEZING: 0
COUGH: 0
NAUSEA: 0
SHORTNESS OF BREATH: 0
RESPIRATORY NEGATIVE: 1
STRIDOR: 0
CHEST TIGHTNESS: 0

## 2019-09-25 NOTE — PROGRESS NOTES
None   Lifestyle    Physical activity:     Days per week: None     Minutes per session: None    Stress: None   Relationships    Social connections:     Talks on phone: None     Gets together: None     Attends Bahai service: None     Active member of club or organization: None     Attends meetings of clubs or organizations: None     Relationship status: None    Intimate partner violence:     Fear of current or ex partner: None     Emotionally abused: None     Physically abused: None     Forced sexual activity: None   Other Topics Concern    None   Social History Narrative    None       No Known Allergies    Current Outpatient Medications   Medication Sig Dispense Refill    pravastatin (PRAVACHOL) 80 MG tablet TAKE 1 TABLET BY MOUTH EVERY EVENING 30 tablet 3    triamterene-hydrochlorothiazide (MAXZIDE-25) 37.5-25 MG per tablet Take 0.5 tablets by mouth daily 30 tablet 11    donepezil (ARICEPT) 5 MG tablet TAKE 1 TABLET BY MOUTH NIGHTLY 30 tablet 3    latanoprost (XALATAN) 0.005 % ophthalmic solution use 1 drop in each eye every day 2.5 mL 5    carvedilol (COREG) 12.5 MG tablet Take 1 tablet by mouth 2 times daily 60 tablet 5    benazepril (LOTENSIN) 20 MG tablet Take 1 tablet by mouth 2 times daily 180 tablet 2    Elastic Bandages & Supports (HERNIA SUPPORT RIGHT LARGE) MISC Wear daily. Dx: right inguinal hernia 1 each 0    finasteride (PROSCAR) 5 MG tablet Take 1 tablet by mouth daily 90 tablet 02    nitroGLYCERIN (NITROSTAT) 0.4 MG SL tablet up to max of 3 total doses.  If no relief after 1 dose, call 911. 25 tablet 3    vitamin B-12 (CYANOCOBALAMIN) 1000 MCG tablet Take 500 mcg by mouth daily      Elastic Bandages & Supports (JOBST KNEE HIGH COMPRESSION SM) MISC 1 each by Does not apply route daily 1 each 2    aspirin 81 MG tablet Take 1 tablet by mouth daily With Food 30 tablet 3    Multiple Vitamins-Minerals (MULTIVITAMIN PO) Take by mouth daily       dorzolamide-timolol (COSOPT) 22.3-6.8 MG/ML ophthalmic solution Place 1 drop into both eyes 2 times daily       potassium chloride (KLOR-CON M) 20 MEQ extended release tablet Take 1 tablet by mouth daily 30 tablet 5     No current facility-administered medications for this visit. Review of Systems:   Review of Systems   Constitutional: Negative. Negative for diaphoresis and fatigue. HENT: Negative. Eyes: Negative. Respiratory: Negative. Negative for cough, chest tightness, shortness of breath, wheezing and stridor. Cardiovascular: Negative. Negative for chest pain, palpitations and leg swelling. Gastrointestinal: Negative. Negative for blood in stool and nausea. Genitourinary: Negative. Musculoskeletal: Negative. Skin: Negative. Neurological: Negative. Negative for dizziness, syncope, weakness and light-headedness. Hematological: Negative. Psychiatric/Behavioral: Negative. Physical Examination:    /62 (Site: Right Upper Arm, Position: Sitting, Cuff Size: Medium Adult)   Pulse 67   Resp 18   Wt 200 lb 9.6 oz (91 kg)   SpO2 98%   BMI 29.62 kg/m²    Physical Exam   Constitutional: He appears healthy. No distress. HENT:   Normal cephalic and Atraumatic   Eyes: Pupils are equal, round, and reactive to light. Neck: Normal range of motion and thyroid normal. Neck supple. No JVD present. No neck adenopathy. No thyromegaly present. Cardiovascular: Normal rate, regular rhythm, intact distal pulses and normal pulses. Murmur heard. Pulmonary/Chest: Effort normal and breath sounds normal. He has no wheezes. He has no rales. He exhibits no tenderness. Abdominal: Soft. Bowel sounds are normal. There is no tenderness. Musculoskeletal: Normal range of motion. He exhibits edema (trace). He exhibits no tenderness. Neurological: He is alert and oriented to person, place, and time. Skin: Skin is warm. No cyanosis. Nails show no clubbing.        LABS:  CBC:   Lab Results   Component Value Date

## 2019-10-08 ENCOUNTER — OFFICE VISIT (OUTPATIENT)
Dept: FAMILY MEDICINE CLINIC | Age: 84
End: 2019-10-08
Payer: MEDICARE

## 2019-10-08 VITALS
WEIGHT: 201.2 LBS | BODY MASS INDEX: 29.8 KG/M2 | OXYGEN SATURATION: 98 % | SYSTOLIC BLOOD PRESSURE: 120 MMHG | HEIGHT: 69 IN | DIASTOLIC BLOOD PRESSURE: 78 MMHG | HEART RATE: 66 BPM

## 2019-10-08 DIAGNOSIS — I10 ESSENTIAL HYPERTENSION: ICD-10-CM

## 2019-10-08 DIAGNOSIS — R42 DIZZINESS: ICD-10-CM

## 2019-10-08 DIAGNOSIS — I25.10 CORONARY ARTERY DISEASE INVOLVING NATIVE HEART WITHOUT ANGINA PECTORIS, UNSPECIFIED VESSEL OR LESION TYPE: ICD-10-CM

## 2019-10-08 DIAGNOSIS — I25.10 CORONARY ARTERY DISEASE INVOLVING NATIVE CORONARY ARTERY OF NATIVE HEART WITHOUT ANGINA PECTORIS: ICD-10-CM

## 2019-10-08 DIAGNOSIS — E78.5 HYPERLIPIDEMIA, UNSPECIFIED HYPERLIPIDEMIA TYPE: ICD-10-CM

## 2019-10-08 DIAGNOSIS — I50.31 ACUTE DIASTOLIC HEART FAILURE (HCC): ICD-10-CM

## 2019-10-08 DIAGNOSIS — I21.4 NSTEMI (NON-ST ELEVATED MYOCARDIAL INFARCTION) (HCC): ICD-10-CM

## 2019-10-08 DIAGNOSIS — I20.9 ANGINA, CLASS III (HCC): ICD-10-CM

## 2019-10-08 DIAGNOSIS — Z00.00 ROUTINE GENERAL MEDICAL EXAMINATION AT A HEALTH CARE FACILITY: Primary | ICD-10-CM

## 2019-10-08 DIAGNOSIS — I20.8 ANGINAL EQUIVALENT (HCC): ICD-10-CM

## 2019-10-08 DIAGNOSIS — G30.9 ALZHEIMER'S DEMENTIA WITHOUT BEHAVIORAL DISTURBANCE, UNSPECIFIED TIMING OF DEMENTIA ONSET: ICD-10-CM

## 2019-10-08 DIAGNOSIS — R60.0 LEG EDEMA: ICD-10-CM

## 2019-10-08 DIAGNOSIS — F02.80 ALZHEIMER'S DEMENTIA WITHOUT BEHAVIORAL DISTURBANCE, UNSPECIFIED TIMING OF DEMENTIA ONSET: ICD-10-CM

## 2019-10-08 DIAGNOSIS — Z23 NEEDS FLU SHOT: ICD-10-CM

## 2019-10-08 DIAGNOSIS — I50.9 CONGESTIVE HEART FAILURE, UNSPECIFIED HF CHRONICITY, UNSPECIFIED HEART FAILURE TYPE (HCC): ICD-10-CM

## 2019-10-08 LAB
ALBUMIN SERPL-MCNC: 4.3 G/DL (ref 3.5–4.6)
ALP BLD-CCNC: 89 U/L (ref 35–104)
ALT SERPL-CCNC: 12 U/L (ref 0–41)
ANION GAP SERPL CALCULATED.3IONS-SCNC: 12 MEQ/L (ref 9–15)
AST SERPL-CCNC: 14 U/L (ref 0–40)
BILIRUB SERPL-MCNC: 0.3 MG/DL (ref 0.2–0.7)
BUN BLDV-MCNC: 22 MG/DL (ref 8–23)
CALCIUM SERPL-MCNC: 9.7 MG/DL (ref 8.5–9.9)
CHLORIDE BLD-SCNC: 102 MEQ/L (ref 95–107)
CHOLESTEROL, TOTAL: 146 MG/DL (ref 0–199)
CO2: 24 MEQ/L (ref 20–31)
CREAT SERPL-MCNC: 0.91 MG/DL (ref 0.7–1.2)
GFR AFRICAN AMERICAN: >60
GFR NON-AFRICAN AMERICAN: >60
GLOBULIN: 3.5 G/DL (ref 2.3–3.5)
GLUCOSE BLD-MCNC: 92 MG/DL (ref 70–99)
HCT VFR BLD CALC: 43.9 % (ref 42–52)
HDLC SERPL-MCNC: 53 MG/DL (ref 40–59)
HEMOGLOBIN: 14.3 G/DL (ref 14–18)
LDL CHOLESTEROL CALCULATED: 74 MG/DL (ref 0–129)
MAGNESIUM: 2.4 MG/DL (ref 1.7–2.4)
MCH RBC QN AUTO: 30.7 PG (ref 27–31.3)
MCHC RBC AUTO-ENTMCNC: 32.5 % (ref 33–37)
MCV RBC AUTO: 94.4 FL (ref 80–100)
PDW BLD-RTO: 15.1 % (ref 11.5–14.5)
PLATELET # BLD: 227 K/UL (ref 130–400)
POTASSIUM SERPL-SCNC: 4.6 MEQ/L (ref 3.4–4.9)
RBC # BLD: 4.65 M/UL (ref 4.7–6.1)
SODIUM BLD-SCNC: 138 MEQ/L (ref 135–144)
TOTAL PROTEIN: 7.8 G/DL (ref 6.3–8)
TRIGL SERPL-MCNC: 93 MG/DL (ref 0–150)
TSH SERPL DL<=0.05 MIU/L-ACNC: 2.28 UIU/ML (ref 0.44–3.86)
WBC # BLD: 6.4 K/UL (ref 4.8–10.8)

## 2019-10-08 PROCEDURE — 90653 IIV ADJUVANT VACCINE IM: CPT | Performed by: FAMILY MEDICINE

## 2019-10-08 PROCEDURE — G8482 FLU IMMUNIZE ORDER/ADMIN: HCPCS | Performed by: FAMILY MEDICINE

## 2019-10-08 PROCEDURE — 1123F ACP DISCUSS/DSCN MKR DOCD: CPT | Performed by: FAMILY MEDICINE

## 2019-10-08 PROCEDURE — G8599 NO ASA/ANTIPLAT THER USE RNG: HCPCS | Performed by: FAMILY MEDICINE

## 2019-10-08 PROCEDURE — 4040F PNEUMOC VAC/ADMIN/RCVD: CPT | Performed by: FAMILY MEDICINE

## 2019-10-08 PROCEDURE — G0008 ADMIN INFLUENZA VIRUS VAC: HCPCS | Performed by: FAMILY MEDICINE

## 2019-10-08 PROCEDURE — G0438 PPPS, INITIAL VISIT: HCPCS | Performed by: FAMILY MEDICINE

## 2019-10-18 ENCOUNTER — CARE COORDINATION (OUTPATIENT)
Dept: CARE COORDINATION | Age: 84
End: 2019-10-18

## 2019-10-25 ENCOUNTER — CARE COORDINATION (OUTPATIENT)
Dept: CARE COORDINATION | Age: 84
End: 2019-10-25

## 2019-12-06 RX ORDER — POTASSIUM CHLORIDE 20 MEQ/1
20 TABLET, EXTENDED RELEASE ORAL DAILY
Qty: 90 TABLET | Refills: 3 | Status: ON HOLD
Start: 2019-12-06 | End: 2020-06-08 | Stop reason: HOSPADM

## 2020-01-05 ENCOUNTER — HOSPITAL ENCOUNTER (OUTPATIENT)
Age: 85
Setting detail: OBSERVATION
Discharge: HOME | End: 2020-01-06
Attending: FAMILY MEDICINE | Admitting: FAMILY MEDICINE
Payer: MEDICARE

## 2020-01-05 ENCOUNTER — APPOINTMENT (OUTPATIENT)
Dept: GENERAL RADIOLOGY | Age: 85
End: 2020-01-05
Payer: MEDICARE

## 2020-01-05 LAB
ALBUMIN SERPL-MCNC: 4.3 G/DL (ref 3.5–4.6)
ALP BLD-CCNC: 104 U/L (ref 35–104)
ALT SERPL-CCNC: 13 U/L (ref 0–41)
ANION GAP SERPL CALCULATED.3IONS-SCNC: 14 MEQ/L (ref 9–15)
AST SERPL-CCNC: 15 U/L (ref 0–40)
BASOPHILS ABSOLUTE: 0.1 K/UL (ref 0–0.2)
BASOPHILS RELATIVE PERCENT: 0.9 %
BILIRUB SERPL-MCNC: 0.8 MG/DL (ref 0.2–0.7)
BUN BLDV-MCNC: 16 MG/DL (ref 8–23)
CALCIUM SERPL-MCNC: 9.5 MG/DL (ref 8.5–9.9)
CHLORIDE BLD-SCNC: 99 MEQ/L (ref 95–107)
CO2: 23 MEQ/L (ref 20–31)
CREAT SERPL-MCNC: 0.86 MG/DL (ref 0.7–1.2)
EOSINOPHILS ABSOLUTE: 0.3 K/UL (ref 0–0.7)
EOSINOPHILS RELATIVE PERCENT: 5.2 %
GFR AFRICAN AMERICAN: >60
GFR NON-AFRICAN AMERICAN: >60
GLOBULIN: 2.9 G/DL (ref 2.3–3.5)
GLUCOSE BLD-MCNC: 95 MG/DL (ref 70–99)
HCT VFR BLD CALC: 44.3 % (ref 42–52)
HEMOGLOBIN: 14.7 G/DL (ref 14–18)
INFLUENZA A BY PCR: NEGATIVE
INFLUENZA B BY PCR: NEGATIVE
LACTIC ACID: 1 MMOL/L (ref 0.5–2.2)
LYMPHOCYTES ABSOLUTE: 1.1 K/UL (ref 1–4.8)
LYMPHOCYTES RELATIVE PERCENT: 18.6 %
MAGNESIUM: 2.1 MG/DL (ref 1.7–2.4)
MCH RBC QN AUTO: 30.4 PG (ref 27–31.3)
MCHC RBC AUTO-ENTMCNC: 33.1 % (ref 33–37)
MCV RBC AUTO: 91.8 FL (ref 80–100)
MONOCYTES ABSOLUTE: 0.5 K/UL (ref 0.2–0.8)
MONOCYTES RELATIVE PERCENT: 9.1 %
NEUTROPHILS ABSOLUTE: 3.9 K/UL (ref 1.4–6.5)
NEUTROPHILS RELATIVE PERCENT: 66.2 %
PDW BLD-RTO: 14.3 % (ref 11.5–14.5)
PLATELET # BLD: 204 K/UL (ref 130–400)
POTASSIUM SERPL-SCNC: 4.1 MEQ/L (ref 3.4–4.9)
PRO-BNP: 235 PG/ML
RBC # BLD: 4.83 M/UL (ref 4.7–6.1)
SODIUM BLD-SCNC: 136 MEQ/L (ref 135–144)
TOTAL PROTEIN: 7.2 G/DL (ref 6.3–8)
TROPONIN: 0.02 NG/ML (ref 0–0.01)
WBC # BLD: 5.9 K/UL (ref 4.8–10.8)

## 2020-01-05 PROCEDURE — 36592 COLLECT BLOOD FROM PICC: CPT

## 2020-01-05 PROCEDURE — G0378 HOSPITAL OBSERVATION PER HR: HCPCS

## 2020-01-05 PROCEDURE — 80053 COMPREHEN METABOLIC PANEL: CPT

## 2020-01-05 PROCEDURE — 85025 COMPLETE CBC W/AUTO DIFF WBC: CPT

## 2020-01-05 PROCEDURE — 93005 ELECTROCARDIOGRAM TRACING: CPT | Performed by: PHYSICIAN ASSISTANT

## 2020-01-05 PROCEDURE — 99285 EMERGENCY DEPT VISIT HI MDM: CPT

## 2020-01-05 PROCEDURE — 84484 ASSAY OF TROPONIN QUANT: CPT

## 2020-01-05 PROCEDURE — 83735 ASSAY OF MAGNESIUM: CPT

## 2020-01-05 PROCEDURE — 36415 COLL VENOUS BLD VENIPUNCTURE: CPT

## 2020-01-05 PROCEDURE — 83605 ASSAY OF LACTIC ACID: CPT

## 2020-01-05 PROCEDURE — 83880 ASSAY OF NATRIURETIC PEPTIDE: CPT

## 2020-01-05 PROCEDURE — 87502 INFLUENZA DNA AMP PROBE: CPT

## 2020-01-05 PROCEDURE — 71046 X-RAY EXAM CHEST 2 VIEWS: CPT

## 2020-01-05 PROCEDURE — 87040 BLOOD CULTURE FOR BACTERIA: CPT

## 2020-01-05 RX ORDER — ASPIRIN 81 MG/1
81 TABLET, CHEWABLE ORAL DAILY
Status: DISCONTINUED | OUTPATIENT
Start: 2020-01-06 | End: 2020-01-06 | Stop reason: HOSPADM

## 2020-01-05 RX ORDER — ATORVASTATIN CALCIUM 40 MG/1
40 TABLET, FILM COATED ORAL NIGHTLY
Status: DISCONTINUED | OUTPATIENT
Start: 2020-01-05 | End: 2020-01-06

## 2020-01-05 RX ORDER — ONDANSETRON 2 MG/ML
4 INJECTION INTRAMUSCULAR; INTRAVENOUS EVERY 6 HOURS PRN
Status: DISCONTINUED | OUTPATIENT
Start: 2020-01-05 | End: 2020-01-06 | Stop reason: HOSPADM

## 2020-01-05 RX ORDER — SODIUM CHLORIDE 0.9 % (FLUSH) 0.9 %
10 SYRINGE (ML) INJECTION EVERY 12 HOURS SCHEDULED
Status: DISCONTINUED | OUTPATIENT
Start: 2020-01-05 | End: 2020-01-06 | Stop reason: HOSPADM

## 2020-01-05 RX ORDER — CARVEDILOL 3.12 MG/1
3.12 TABLET ORAL 2 TIMES DAILY WITH MEALS
Status: DISCONTINUED | OUTPATIENT
Start: 2020-01-06 | End: 2020-01-06

## 2020-01-05 RX ORDER — SODIUM CHLORIDE 0.9 % (FLUSH) 0.9 %
10 SYRINGE (ML) INJECTION PRN
Status: DISCONTINUED | OUTPATIENT
Start: 2020-01-05 | End: 2020-01-06 | Stop reason: HOSPADM

## 2020-01-05 RX ORDER — HYDRALAZINE HYDROCHLORIDE 20 MG/ML
10 INJECTION INTRAMUSCULAR; INTRAVENOUS EVERY 6 HOURS PRN
Status: DISCONTINUED | OUTPATIENT
Start: 2020-01-05 | End: 2020-01-06 | Stop reason: HOSPADM

## 2020-01-05 ASSESSMENT — ENCOUNTER SYMPTOMS
VOMITING: 0
EYE DISCHARGE: 0
APNEA: 0
COUGH: 1
ANAL BLEEDING: 0
BACK PAIN: 0
ABDOMINAL DISTENTION: 0
NAUSEA: 0
SHORTNESS OF BREATH: 1
PHOTOPHOBIA: 0
VOICE CHANGE: 0
ABDOMINAL PAIN: 0

## 2020-01-05 NOTE — ED TRIAGE NOTES
Pt to ER with c/o HTN per family, SOB and leg swelling, family states pt has been coughing a lot at night, pt coughing up yellow phlegm, pt also per family becoming very SOB with movement and his legs are starting to swell

## 2020-01-05 NOTE — ED PROVIDER NOTES
[facility] ED  eMERGENCY dEPARTMENT eNCOUnter      Pt Name: Alayna Paredes  MRN: [id number]  [place] 1935  Date of evaluation: 1/5/2020  Provider: Ted Silvestre PA-C    CHIEF COMPLAINT       Chief Complaint   Patient presents with    Hypertension     per family pt BP was 180/90 at home and when he had prior MI the only symptom was an increase in BP    Shortness of Breath     and leg swelling         HISTORY OF PRESENT ILLNESS   (Location/Symptom, Timing/Onset,Context/Setting, Quality, Duration, Modifying Factors, Severity)  Note limiting factors. Alayna Paredes is a 82 y.o. male who presents to the emergency department patient presents with shortness of breath cough leg swelling yellow phlegm he is short of breath worse with movement they note that his blood pressure is elevated and he has had MI in the past with his only presentation being an elevated blood pressure. Patient denies fever chills chest pain denies rectal bleeding denies hematemesis symptoms mild to moderate severity worse with movement nothing improves symptoms. HPI    NursingNotes were reviewed. REVIEW OF SYSTEMS    (2-9 systems for level 4, 10 or more for level 5)     Review of Systems   Constitutional: Negative for activity change, appetite change and unexpected weight change. HENT: Negative for ear discharge, nosebleeds and voice change. Eyes: Negative for photophobia and discharge. Respiratory: Positive for cough and shortness of breath. Negative for apnea. Cardiovascular: Positive for leg swelling. Negative for chest pain. Gastrointestinal: Negative for abdominal distention, abdominal pain, anal bleeding, nausea and vomiting. Endocrine: Negative for cold intolerance, heat intolerance and polyphagia. Genitourinary: Negative for genital sores. Musculoskeletal: Negative for back pain, joint swelling and neck pain. Skin: Negative for pallor.    Allergic/Immunologic: Negative for immunocompromised state. Neurological: Negative for seizures and facial asymmetry. Hematological: Does not bruise/bleed easily. Psychiatric/Behavioral: Negative for behavioral problems, self-injury and sleep disturbance. All other systems reviewed and are negative. Except as noted above the remainder of the review of systems was reviewed and negative. PAST MEDICAL HISTORY     Past Medical History:   Diagnosis Date    Anticoagulant long-term use     Anxiety     CAD (coronary artery disease)     Carotid stenosis     2/2013 16-49%    CHF (congestive heart failure) (HCC)     Dementia (HCC)     Dementia (HCC)     Hyperlipidemia     Hypertension     [facility] infarction) [facility] [address]     Osteoarthritis          SURGICALHISTORY       Past Surgical History:   Procedure Laterality Date    CARPAL TUNNEL RELEASE  1998    CATARACT REMOVAL  2007    COLONOSCOPY  12/10/10,2007    DR Yohan Mancera - DONE AT [address] [place]  COLONOSCOPY  2007    hx polyps needs 2015    COLONOSCOPY  9/21/15     DR. YARBROUGH     CORONARY ANGIOPLASTY WITH STENT PLACEMENT      HERNIA REPAIR           CURRENT MEDICATIONS       Previous Medications    ASPIRIN 81 MG TABLET    Take 1 tablet by mouth daily With Food    BENAZEPRIL (LOTENSIN) 20 MG TABLET    Take 1 tablet by mouth 2 times daily    CARVEDILOL (COREG) 12.5 MG TABLET    Take 1 tablet by mouth 2 times daily    DONEPEZIL (ARICEPT) 5 MG TABLET    TAKE 1 TABLET BY MOUTH NIGHTLY    DORZOLAMIDE-TIMOLOL (COSOPT) 22.3-6.8 MG/ML OPHTHALMIC SOLUTION    Place 1 drop into both eyes 2 times daily     ELASTIC BANDAGES & SUPPORTS (HERNIA SUPPORT RIGHT LARGE) MISC    Wear daily.   Dx: right inguinal hernia    ELASTIC BANDAGES & SUPPORTS (JOBST KNEE HIGH COMPRESSION SM) MISC    1 each by Does not apply route daily    FINASTERIDE (PROSCAR) 5 MG TABLET    Take 1 tablet by mouth daily    LATANOPROST (XALATAN) 0.005 % OPHTHALMIC SOLUTION    use 1 drop in each eye every day    MULTIPLE VITAMINS-MINERALS (MULTIVITAMIN PO)    Take by mouth daily     NITROGLYCERIN (NITROSTAT) 0.4 MG SL TABLET    up to max of 3 total doses. If no relief after 1 dose, call 911. POTASSIUM CHLORIDE (KLOR-CON M) 20 MEQ EXTENDED RELEASE TABLET    Take 1 tablet by mouth daily    PRAVASTATIN (PRAVACHOL) 80 MG TABLET    TAKE 1 TABLET BY MOUTH EVERY EVENING    TRIAMTERENE-HYDROCHLOROTHIAZIDE (MAXZIDE-25) 37.5-25 MG PER TABLET    Take 0.5 tablets by mouth daily    VITAMIN B-12 (CYANOCOBALAMIN) 1000 MCG TABLET    Take 500 mcg by mouth daily       ALLERGIES     Patient has no known allergies. FAMILY HISTORY       Family History   Problem Relation Age of Onset    Heart Disease Mother     High Blood Pressure Mother           SOCIAL HISTORY       Social History     Socioeconomic History    Marital status:       Spouse name: None    Number of children: None    Years of education: None    Highest education level: None   Occupational History    None   Social Needs    Financial resource strain: None    Food insecurity:     Worry: None     Inability: None    Transportation needs:     Medical: None     Non-medical: None   Tobacco Use    Smoking status: Never Smoker    Smokeless tobacco: Never Used   Substance and Sexual Activity    Alcohol use: No     Comment: social    Drug use: No    Sexual activity: None   Lifestyle    Physical activity:     Days per week: None     Minutes per session: None    Stress: None   Relationships    Social connections:     Talks on phone: None     Gets together: None     Attends Rastafarian service: None     Active member of club or organization: None     Attends meetings of clubs or organizations: None     Relationship status: None    Intimate partner violence:     Fear of current or ex partner: None     Emotionally abused: None     Physically abused: None     Forced sexual activity: None   Other Topics Concern    None   Social History Narrative    None       SCREENINGS @FLOW([id number])@      PHYSICAL EXAM    (up to 7 for level 4, 8 or more for level 5)     ED Triage Vitals [01/05/20 1713]   BP Temp Temp Source Pulse Resp SpO2 Height Weight   (!) 163/80 98.8 °F (37.1 °C) Temporal 77 19 95 % 5' 9\" (1.753 m) 210 lb (95.3 kg)       Physical Exam  Vitals signs and nursing note reviewed. Constitutional:       General: He is not in acute distress. Appearance: Normal appearance. He is well-developed. HENT:      Head: Normocephalic and atraumatic. Right Ear: Tympanic membrane normal.      Left Ear: Tympanic membrane normal.      Nose: Nose normal.      Mouth/Throat:      Mouth: Mucous membranes are moist.      Pharynx: No oropharyngeal exudate or posterior oropharyngeal erythema. Eyes:      General:         Right eye: No discharge. Left eye: No discharge. Pupils: Pupils are equal, round, and reactive to light. Neck:      Musculoskeletal: Normal range of motion and neck supple. Cardiovascular:      Rate and Rhythm: Normal rate and regular rhythm. Heart sounds: Normal heart sounds. Pulmonary:      Effort: Pulmonary effort is normal. No respiratory distress. Breath sounds: Normal breath sounds. No stridor. No wheezing or rhonchi. Chest:      Chest wall: No tenderness. Abdominal:      General: Bowel sounds are normal. There is no distension. Palpations: Abdomen is soft. Tenderness: There is no tenderness. Musculoskeletal: Normal range of motion. Right lower leg: Edema present. Left lower leg: Edema present. Comments: +1 edema   Skin:     General: Skin is warm. Capillary Refill: Capillary refill takes less than 2 seconds. Findings: No erythema. Neurological:      Mental Status: He is alert and oriented to person, place, and time. Motor: No weakness.    Psychiatric:         Mood and Affect: Mood normal.         DIAGNOSTIC RESULTS     EKG: All EKG's are interpreted by the Emergency Department Physician who either signs or Co-signsthis chart in the absence of a cardiologist.    EKG normal sinus rhythm rate 64 neg ST segment elevation. RADIOLOGY:   Non-plain filmimages such as CT, Ultrasound and MRI are read by the radiologist. Plain radiographic images are visualized and preliminarily interpreted by the emergency physician with the below findings:    nad    Interpretation per the Radiologist below, if available at the time ofthis note:    XR CHEST STANDARD (2 VW)    (Results Pending)         ED BEDSIDE ULTRASOUND:   Performed by ED Physician - none    LABS:  Labs Reviewed   COMPREHENSIVE METABOLIC PANEL - Abnormal; Notable for the following components:       Result Value    Total Bilirubin 0.8 (*)     All other components within normal limits    Narrative:     Sandy Barnettter tel. [phone],  Trop results called to and read back by Dr Julia Mc, 01/05/2020 18:34, by Mendy Bertrand   TROPONIN - Abnormal; Notable for the following components:    Troponin 0.023 (*)     All other components within normal limits    Narrative:     Sandy Heide tel. [phone],  Trop results called to and read back by Dr Julia Mc, 01/05/2020 18:34, by Nima MORALEZ/ANAY ANTIGENS   CULTURE BLOOD #1   CBC WITH AUTO DIFFERENTIAL   LACTIC ACID, PLASMA   MAGNESIUM    Narrative:     Sandy Barnettter tel. [phone],  Trop results called to and read back by Dr Julia Mc, 01/05/2020 18:34, by [address] [place] [address]    Narrative:     Sandy Danielserster tel. [phone],  Trop results called to and read back by Dr Julia Mc, 01/05/2020 18:34, by Mendy Bertrand       All other labs were within normal range or not returned as of this dictation.     EMERGENCY DEPARTMENT COURSE and DIFFERENTIAL DIAGNOSIS/MDM:   Vitals:    Vitals:    01/05/20 1713 01/05/20 1922   BP: (!) 163/80 (!) 163/85   Pulse: 77 65   Resp: 19 18   Temp: 98.8 °F (37.1 °C)    TempSrc: Temporal    SpO2: 95% 95%   Weight: 210 lb (95.3 kg)    Height: 5' 9\" (1.753 m)             MDM  Number of

## 2020-01-06 ENCOUNTER — TELEPHONE (OUTPATIENT)
Dept: FAMILY MEDICINE CLINIC | Age: 85
End: 2020-01-06

## 2020-01-06 VITALS
DIASTOLIC BLOOD PRESSURE: 62 MMHG | SYSTOLIC BLOOD PRESSURE: 117 MMHG | BODY MASS INDEX: 29.92 KG/M2 | TEMPERATURE: 97 F | OXYGEN SATURATION: 97 % | HEIGHT: 69 IN | WEIGHT: 202 LBS | HEART RATE: 54 BPM | RESPIRATION RATE: 18 BRPM

## 2020-01-06 LAB
ANION GAP SERPL CALCULATED.3IONS-SCNC: 15 MEQ/L (ref 9–15)
BUN BLDV-MCNC: 16 MG/DL (ref 8–23)
CALCIUM SERPL-MCNC: 9 MG/DL (ref 8.5–9.9)
CHLORIDE BLD-SCNC: 103 MEQ/L (ref 95–107)
CO2: 22 MEQ/L (ref 20–31)
CREAT SERPL-MCNC: 0.85 MG/DL (ref 0.7–1.2)
EKG ATRIAL RATE: 64 BPM
EKG ATRIAL RATE: 67 BPM
EKG P AXIS: 26 DEGREES
EKG P AXIS: 50 DEGREES
EKG P-R INTERVAL: 172 MS
EKG P-R INTERVAL: 174 MS
EKG Q-T INTERVAL: 412 MS
EKG Q-T INTERVAL: 416 MS
EKG QRS DURATION: 88 MS
EKG QRS DURATION: 90 MS
EKG QTC CALCULATION (BAZETT): 429 MS
EKG QTC CALCULATION (BAZETT): 435 MS
EKG R AXIS: -18 DEGREES
EKG R AXIS: -23 DEGREES
EKG T AXIS: 0 DEGREES
EKG T AXIS: 3 DEGREES
EKG VENTRICULAR RATE: 64 BPM
EKG VENTRICULAR RATE: 67 BPM
GFR AFRICAN AMERICAN: >60
GFR NON-AFRICAN AMERICAN: >60
GLUCOSE BLD-MCNC: 96 MG/DL (ref 70–99)
HCT VFR BLD CALC: 40.3 % (ref 42–52)
HEMOGLOBIN: 13.3 G/DL (ref 14–18)
LV EF: 60 %
LVEF MODALITY: NORMAL
MCH RBC QN AUTO: 30.3 PG (ref 27–31.3)
MCHC RBC AUTO-ENTMCNC: 33 % (ref 33–37)
MCV RBC AUTO: 92 FL (ref 80–100)
PDW BLD-RTO: 14.5 % (ref 11.5–14.5)
PLATELET # BLD: 200 K/UL (ref 130–400)
POTASSIUM REFLEX MAGNESIUM: 4 MEQ/L (ref 3.4–4.9)
PROCALCITONIN: 0.06 NG/ML (ref 0–0.15)
RBC # BLD: 4.38 M/UL (ref 4.7–6.1)
SODIUM BLD-SCNC: 140 MEQ/L (ref 135–144)
TROPONIN: 0.02 NG/ML (ref 0–0.01)
TROPONIN: 0.03 NG/ML (ref 0–0.01)
WBC # BLD: 6.3 K/UL (ref 4.8–10.8)

## 2020-01-06 PROCEDURE — 93010 ELECTROCARDIOGRAM REPORT: CPT | Performed by: INTERNAL MEDICINE

## 2020-01-06 PROCEDURE — 85027 COMPLETE CBC AUTOMATED: CPT

## 2020-01-06 PROCEDURE — 93306 TTE W/DOPPLER COMPLETE: CPT

## 2020-01-06 PROCEDURE — G0378 HOSPITAL OBSERVATION PER HR: HCPCS

## 2020-01-06 PROCEDURE — 99214 OFFICE O/P EST MOD 30 MIN: CPT | Performed by: INTERNAL MEDICINE

## 2020-01-06 PROCEDURE — 93005 ELECTROCARDIOGRAM TRACING: CPT | Performed by: PHYSICIAN ASSISTANT

## 2020-01-06 PROCEDURE — 84145 PROCALCITONIN (PCT): CPT

## 2020-01-06 PROCEDURE — 36415 COLL VENOUS BLD VENIPUNCTURE: CPT

## 2020-01-06 PROCEDURE — 6370000000 HC RX 637 (ALT 250 FOR IP): Performed by: HOSPITALIST

## 2020-01-06 PROCEDURE — 2580000003 HC RX 258: Performed by: PHYSICIAN ASSISTANT

## 2020-01-06 PROCEDURE — 80048 BASIC METABOLIC PNL TOTAL CA: CPT

## 2020-01-06 PROCEDURE — 6370000000 HC RX 637 (ALT 250 FOR IP): Performed by: PHYSICIAN ASSISTANT

## 2020-01-06 PROCEDURE — 84484 ASSAY OF TROPONIN QUANT: CPT

## 2020-01-06 RX ORDER — CHOLECALCIFEROL (VITAMIN D3) 125 MCG
500 CAPSULE ORAL DAILY
Status: DISCONTINUED | OUTPATIENT
Start: 2020-01-06 | End: 2020-01-06 | Stop reason: HOSPADM

## 2020-01-06 RX ORDER — TRIAMTERENE AND HYDROCHLOROTHIAZIDE 37.5; 25 MG/1; MG/1
0.5 TABLET ORAL DAILY
Status: DISCONTINUED | OUTPATIENT
Start: 2020-01-06 | End: 2020-01-06 | Stop reason: HOSPADM

## 2020-01-06 RX ORDER — BENAZEPRIL HYDROCHLORIDE 20 MG/1
20 TABLET ORAL DAILY
Status: DISCONTINUED | OUTPATIENT
Start: 2020-01-06 | End: 2020-01-06 | Stop reason: HOSPADM

## 2020-01-06 RX ORDER — TIMOLOL MALEATE 5 MG/ML
1 SOLUTION/ DROPS OPHTHALMIC 2 TIMES DAILY
Status: DISCONTINUED | OUTPATIENT
Start: 2020-01-06 | End: 2020-01-06 | Stop reason: HOSPADM

## 2020-01-06 RX ORDER — PRAVASTATIN SODIUM 80 MG/1
80 TABLET ORAL EVERY EVENING
Status: DISCONTINUED | OUTPATIENT
Start: 2020-01-06 | End: 2020-01-06 | Stop reason: HOSPADM

## 2020-01-06 RX ORDER — DORZOLAMIDE HYDROCHLORIDE AND TIMOLOL MALEATE 20; 5 MG/ML; MG/ML
1 SOLUTION/ DROPS OPHTHALMIC 2 TIMES DAILY
Status: DISCONTINUED | OUTPATIENT
Start: 2020-01-06 | End: 2020-01-06

## 2020-01-06 RX ORDER — LISINOPRIL 20 MG/1
20 TABLET ORAL DAILY
Status: DISCONTINUED | OUTPATIENT
Start: 2020-01-06 | End: 2020-01-06

## 2020-01-06 RX ORDER — DORZOLAMIDE HCL 20 MG/ML
1 SOLUTION/ DROPS OPHTHALMIC 2 TIMES DAILY
Status: DISCONTINUED | OUTPATIENT
Start: 2020-01-06 | End: 2020-01-06 | Stop reason: HOSPADM

## 2020-01-06 RX ORDER — DONEPEZIL HYDROCHLORIDE 5 MG/1
5 TABLET, FILM COATED ORAL NIGHTLY
Status: DISCONTINUED | OUTPATIENT
Start: 2020-01-06 | End: 2020-01-06 | Stop reason: HOSPADM

## 2020-01-06 RX ORDER — FINASTERIDE 5 MG/1
5 TABLET, FILM COATED ORAL DAILY
Status: DISCONTINUED | OUTPATIENT
Start: 2020-01-06 | End: 2020-01-06 | Stop reason: HOSPADM

## 2020-01-06 RX ORDER — POTASSIUM CHLORIDE 20 MEQ/1
20 TABLET, EXTENDED RELEASE ORAL DAILY
Status: DISCONTINUED | OUTPATIENT
Start: 2020-01-06 | End: 2020-01-06 | Stop reason: HOSPADM

## 2020-01-06 RX ORDER — LATANOPROST 50 UG/ML
1 SOLUTION/ DROPS OPHTHALMIC DAILY
Status: DISCONTINUED | OUTPATIENT
Start: 2020-01-06 | End: 2020-01-06 | Stop reason: HOSPADM

## 2020-01-06 RX ORDER — CARVEDILOL 12.5 MG/1
12.5 TABLET ORAL 2 TIMES DAILY
Status: DISCONTINUED | OUTPATIENT
Start: 2020-01-06 | End: 2020-01-06 | Stop reason: HOSPADM

## 2020-01-06 RX ORDER — NITROGLYCERIN 0.4 MG/1
0.4 TABLET SUBLINGUAL EVERY 5 MIN PRN
Status: DISCONTINUED | OUTPATIENT
Start: 2020-01-06 | End: 2020-01-06 | Stop reason: HOSPADM

## 2020-01-06 RX ADMIN — BENAZEPRIL HYDROCHLORIDE 20 MG: 20 TABLET ORAL at 09:28

## 2020-01-06 RX ADMIN — TIMOLOL MALEATE 1 DROP: 5 SOLUTION OPHTHALMIC at 09:31

## 2020-01-06 RX ADMIN — ASPIRIN 81 MG CHEWABLE TABLET 81 MG: 81 TABLET CHEWABLE at 09:30

## 2020-01-06 RX ADMIN — CARVEDILOL 12.5 MG: 12.5 TABLET ORAL at 09:29

## 2020-01-06 RX ADMIN — FINASTERIDE 5 MG: 5 TABLET, FILM COATED ORAL at 09:29

## 2020-01-06 RX ADMIN — TRIAMTERENE AND HYDROCHLOROTHIAZIDE 0.5 TABLET: 37.5; 25 TABLET ORAL at 09:28

## 2020-01-06 RX ADMIN — DORZOLAMIDE HYDROCHLORIDE 1 DROP: 20 SOLUTION/ DROPS OPHTHALMIC at 09:31

## 2020-01-06 RX ADMIN — LATANOPROST 1 DROP: 50 SOLUTION OPHTHALMIC at 09:31

## 2020-01-06 RX ADMIN — CYANOCOBALAMIN TAB 500 MCG 500 MCG: 500 TAB at 09:32

## 2020-01-06 RX ADMIN — Medication 10 ML: at 09:32

## 2020-01-06 RX ADMIN — POTASSIUM CHLORIDE 20 MEQ: 20 TABLET, EXTENDED RELEASE ORAL at 09:29

## 2020-01-06 NOTE — H&P
Hospital Medicine History & Physical      PCP: Simón Nettles MD    Date of Admission: 1/5/2020    Date of Service: 1/5/20      Chief Complaint:  Hypertension, shortness of breath      History Of Present Illness:  82 y.o. male who presented to [facility] ED for complaints of having a systolic blood pressure of 180/90. The patient had a previous history of CAD with stent placement. The patient states that he has a productive cough of yellow sputum. The patient states that he had a previous MI with his only symptoms being an elevated blood pressure. Denies cp sob ha rash anx n v d f    Past Medical History:          Diagnosis Date    Anticoagulant long-term use     Anxiety     CAD (coronary artery disease)     Carotid stenosis     2/2013 16-49%    CHF (congestive heart failure) (HCC)     Dementia (HCC)     Dementia (HCC)     Hyperlipidemia     Hypertension     MI [facility] infarction) ([facility] [address]     Osteoarthritis        Past Surgical History:          Procedure Laterality Date    CARPAL TUNNEL RELEASE  1998    CATARACT REMOVAL  2007    COLONOSCOPY  12/10/10,2007    DR Yonny Ward - DONE AT [address] [facility]  COLONOSCOPY  2007    hx polyps needs 2015    COLONOSCOPY  9/21/15     DR. YARBROUGH     CORONARY ANGIOPLASTY WITH STENT PLACEMENT      HERNIA REPAIR         Medications Prior to Admission:      Prior to Admission medications    Medication Sig Start Date End Date Taking?  Authorizing Provider   potassium chloride (KLOR-CON M) 20 MEQ extended release tablet Take 1 tablet by mouth daily 12/6/19   Mdaelyn Sheldon MD   pravastatin (PRAVACHOL) 80 MG tablet TAKE 1 TABLET BY MOUTH EVERY EVENING 9/25/19   David Mcguire MD   triamterene-hydrochlorothiazide [facility]) 37.5-25 MG per tablet Take 0.5 tablets by mouth daily 9/25/19   Madelyn Sheldon MD   donepezil (ARICEPT) 5 MG tablet TAKE 1 TABLET BY MOUTH NIGHTLY 9/24/19   Madelyn Sheldon MD   latanoprost (XALATAN) 0.005 % ophthalmic solution use 1 drop in each eye every day 8/9/19   Dk Garcia MD   carvedilol (COREG) 12.5 MG tablet Take 1 tablet by mouth 2 times daily 7/3/19   ILYA Ott CNP   benazepril (LOTENSIN) 20 MG tablet Take 1 tablet by mouth 2 times daily 5/29/19   ILYA Ott CNP   Elastic Bandages & Supports (HERNIA SUPPORT RIGHT LARGE) MISC Wear daily. Dx: right inguinal hernia 4/29/19   Dk Garcia MD   finasteride (PROSCAR) 5 MG tablet Take 1 tablet by mouth daily 4/8/19   Dk Garcia MD   nitroGLYCERIN (NITROSTAT) 0.4 MG SL tablet up to max of 3 total doses. If no relief after 1 dose, call 911. 11/14/18   Korey Greenfield MD   vitamin B-12 (CYANOCOBALAMIN) 1000 MCG tablet Take 500 mcg by mouth daily    Historical Provider, MD   Elastic Bandages & Supports (JOBST KNEE HIGH COMPRESSION SM) MISC 1 each by Does not apply route daily 9/27/18   Korey Greenfield MD   aspirin 81 MG tablet Take 1 tablet by mouth daily With Food 5/14/18   Korey Greenfield MD   Multiple Vitamins-Minerals (MULTIVITAMIN PO) Take by mouth daily     Historical Provider, MD   dorzolamide-timolol (COSOPT) 22.3-6.8 MG/ML ophthalmic solution Place 1 drop into both eyes 2 times daily  7/26/14   Historical Provider, MD       Allergies:  Patient has no known allergies. Social History:      The patient currently lives home    TOBACCO:   reports that he has never smoked. He has never used smokeless tobacco.  ETOH:   reports no history of alcohol use. Family History:      Positive as follows:        Problem Relation Age of Onset    Heart Disease Mother     High Blood Pressure Mother        REVIEW OF SYSTEMS:   Pertinent positives as noted in the HPI. All other systems reviewed and negative. PHYSICAL EXAM:    BP (!) 163/85   Pulse 65   Temp 98.8 °F (37.1 °C) (Temporal)   Resp 18   Ht 5' 9\" (1.753 m)   Wt 210 lb (95.3 kg)   SpO2 95%   BMI 31.01 kg/m²     General appearance:  No apparent distress, appears stated age and cooperative.   HEENT:  Normal cephalic,

## 2020-01-06 NOTE — DISCHARGE SUMMARY
(2 Vw)    Result Date: 1/6/2020  EXAMINATION: XR CHEST (2 VW) CLINICAL HISTORY: COUGH, SHORTNESS OF BREATH COMPARISONS: DECEMBER 23, 2018 FINDINGS: Degenerative changes right glenohumeral joint identified. Right diaphragm elevated. Cardiopericardial silhouette normal. Ill-defined areas increase opacity at lung bases bilaterally. BIBASILAR SUBSEGMENTAL ATELECTASIS/PNEUMONIA. Discharge Medications:       [facility]   Home Medication Instructions DLB:[id number]    Printed on:01/06/20 0072   Medication Information                      aspirin 81 MG tablet  Take 1 tablet by mouth daily With Food             benazepril (LOTENSIN) 20 MG tablet  Take 1 tablet by mouth 2 times daily             carvedilol (COREG) 12.5 MG tablet  Take 1 tablet by mouth 2 times daily             donepezil (ARICEPT) 5 MG tablet  TAKE 1 TABLET BY MOUTH NIGHTLY             dorzolamide-timolol (COSOPT) 22.3-6.8 MG/ML ophthalmic solution  Place 1 drop into both eyes 2 times daily              Elastic Bandages & Supports (HERNIA SUPPORT RIGHT LARGE) MISC  Wear daily. Dx: right inguinal hernia             Elastic Bandages & Supports (JOBST KNEE HIGH COMPRESSION SM) MISC  1 each by Does not apply route daily             finasteride (PROSCAR) 5 MG tablet  Take 1 tablet by mouth daily             latanoprost (XALATAN) 0.005 % ophthalmic solution  use 1 drop in each eye every day             Multiple Vitamins-Minerals (MULTIVITAMIN PO)  Take by mouth daily              nitroGLYCERIN (NITROSTAT) 0.4 MG SL tablet  up to max of 3 total doses. If no relief after 1 dose, call 911.              potassium chloride (KLOR-CON M) 20 MEQ extended release tablet  Take 1 tablet by mouth daily             pravastatin (PRAVACHOL) 80 MG tablet  TAKE 1 TABLET BY MOUTH EVERY EVENING             triamterene-hydrochlorothiazide (MAXZIDE-25) 37.5-25 MG per tablet  Take 0.5 tablets by mouth daily             vitamin B-12 (CYANOCOBALAMIN) 1000 MCG tablet  Take 500 mcg by mouth daily                 Disposition:   If discharged to Home, Any Gabriela  needs that were indicated and/or required as been addressed and set up by Social Work. Condition at discharge: Pt was medically stable at the time of discharge. Activity: activity as tolerated    Total time taken for discharging this patient: 40 minutes. Greater than 70% of time was spent focused exclusively on this patient. Time was taken to review chart, discuss plans with consultants, reconciling medications, discussing plan answering questions with patient.      Deepti Forman  1/6/2020, 1:38 PM  ----------------------------------------------------------------------------------------------------------------------    Myriam Medellin

## 2020-01-06 NOTE — CARE COORDINATION
[facility] AT [place] Case Management Initial Discharge Assessment    Met with Family to discuss discharge plan. PCP: Luis E Workman MD                                Date of Last Visit: Nov    If no PCP, list provided? N/A    Discharge Planning    Living Arrangements: independently at home    Who do you live with? son    Who helps you with your care:  son    If lives at home:     Do you have any barriers navigating in your home? no    Patient can perform ADL? Yes    Current Services (outpatient and in home) :  None    Dialysis: No    Is transportation available to get to your appointments? Yes    DME Equipment:  no    Respiratory equipment: None    Respiratory provider:  no     [facility]    Consult with Medication Assistance Program?  No        Does Patient Have a High-Risk for Readmission Diagnosis (CHF, PN, MI, COPD)? Yes chf, mi        Initial Discharge Plan? (Note: please see concurrent daily documentation for any updates after initial note).     CM to follow for further d/c needs or referrals  Electronically signed by Shruthi Elizabeth on 1/5/2020 at 7:12 PM

## 2020-01-06 NOTE — TELEPHONE ENCOUNTER
Argentina Lima from [facility] letting you know that this pt has been admitted there. Pt is there for chest pain. FYI!

## 2020-01-06 NOTE — ED NOTES
Patient resting on cart  Denies any needs at this time  No s/s of distress  Family remains at bedside      Ha Sanabria RN  01/05/20 1946

## 2020-01-06 NOTE — CONSULTS
[facility] CONSULT NOTE    Patient: Marly Mathias  Unit/Bed: T660/U200-48  YOB: 1935  MRN: [id number]  Acct: [de-identified]   Admitting Diagnosis: Chest pain [R07.9]  Admit Date:  1/5/2020  Hospital Day: 0      Chief Complaint: high blood pressure    History of Present Illness: 82year old male with history of CAD, and remote PCI. Chronic diastolic CHF. Dementia, HTN, HPL. Patient came in to ED because he was hypertensive. He states when he had prior stent he was also hypertensive. There was no chest pain or angina. No shortness of breath. Admitted due to troponin positive. Looking back there are multiple positive low level trop. PMHx:  Past Medical History:   Diagnosis Date    Anticoagulant long-term use     Anxiety     CAD (coronary artery disease)     Carotid stenosis     2/2013 16-49%    CHF (congestive heart failure) (HCC)     Dementia (HCC)     Dementia (HCC)     Hyperlipidemia     Hypertension     MI [facility] infarction) ([facility] [address]     Osteoarthritis        PSHx:  Past Surgical History:   Procedure Laterality Date    CARPAL TUNNEL RELEASE  1998    CATARACT REMOVAL  2007    COLONOSCOPY  12/10/10,2007    DR Alayna Lux - DONE AT [address] COLONOSCOPY  2007    hx polyps needs 2015    COLONOSCOPY  9/21/15     DR. YARBROUGH     CORONARY ANGIOPLASTY WITH STENT PLACEMENT      HERNIA REPAIR         Social Hx:  Social History     Socioeconomic History    Marital status:       Spouse name: None    Number of children: None    Years of education: None    Highest education level: None   Occupational History    None   Social Needs    Financial resource strain: None    Food insecurity:     Worry: None     Inability: None    Transportation needs:     Medical: None     Non-medical: None   Tobacco Use    Smoking status: Never Smoker    Smokeless tobacco: Never Used   Substance and Sexual Activity    Alcohol use: No     Comment: social    Drug use: No    Sexual

## 2020-01-06 NOTE — ED NOTES
Report called to [employer] RN [address] monitor placed on patient per [address], [place]  01/05/20 2025

## 2020-01-07 RX ORDER — FINASTERIDE 5 MG/1
5 TABLET, FILM COATED ORAL DAILY
Qty: 90 TABLET | Refills: 2 | Status: SHIPPED | OUTPATIENT
Start: 2020-01-07 | End: 2020-10-09

## 2020-01-08 ENCOUNTER — APPOINTMENT (OUTPATIENT)
Dept: GENERAL RADIOLOGY | Age: 85
End: 2020-01-08
Payer: MEDICARE

## 2020-01-08 ENCOUNTER — HOSPITAL ENCOUNTER (EMERGENCY)
Age: 85
Discharge: HOME | End: 2020-01-08
Payer: MEDICARE

## 2020-01-08 LAB
ALBUMIN SERPL-MCNC: 3.9 G/DL (ref 3.5–4.6)
ALP BLD-CCNC: 93 U/L (ref 35–104)
ALT SERPL-CCNC: 12 U/L (ref 0–41)
ANION GAP SERPL CALCULATED.3IONS-SCNC: 13 MEQ/L (ref 9–15)
AST SERPL-CCNC: 15 U/L (ref 0–40)
BASOPHILS ABSOLUTE: 0.1 K/UL (ref 0–0.2)
BASOPHILS RELATIVE PERCENT: 1.3 %
BILIRUB SERPL-MCNC: 0.5 MG/DL (ref 0.2–0.7)
BUN BLDV-MCNC: 23 MG/DL (ref 8–23)
CALCIUM SERPL-MCNC: 9.5 MG/DL (ref 8.5–9.9)
CHLORIDE BLD-SCNC: 99 MEQ/L (ref 95–107)
CO2: 22 MEQ/L (ref 20–31)
CREAT SERPL-MCNC: 0.95 MG/DL (ref 0.7–1.2)
EKG ATRIAL RATE: 60 BPM
EKG P AXIS: 48 DEGREES
EKG P-R INTERVAL: 174 MS
EKG Q-T INTERVAL: 418 MS
EKG QRS DURATION: 84 MS
EKG QTC CALCULATION (BAZETT): 418 MS
EKG R AXIS: -21 DEGREES
EKG T AXIS: 15 DEGREES
EKG VENTRICULAR RATE: 60 BPM
EOSINOPHILS ABSOLUTE: 0.4 K/UL (ref 0–0.7)
EOSINOPHILS RELATIVE PERCENT: 5.8 %
GFR AFRICAN AMERICAN: >60
GFR NON-AFRICAN AMERICAN: >60
GLOBULIN: 3 G/DL (ref 2.3–3.5)
GLUCOSE BLD-MCNC: 93 MG/DL (ref 70–99)
HCT VFR BLD CALC: 41.7 % (ref 42–52)
HEMOGLOBIN: 13.7 G/DL (ref 14–18)
LYMPHOCYTES ABSOLUTE: 1.2 K/UL (ref 1–4.8)
LYMPHOCYTES RELATIVE PERCENT: 19.7 %
MCH RBC QN AUTO: 30.5 PG (ref 27–31.3)
MCHC RBC AUTO-ENTMCNC: 32.8 % (ref 33–37)
MCV RBC AUTO: 93 FL (ref 80–100)
MONOCYTES ABSOLUTE: 0.6 K/UL (ref 0.2–0.8)
MONOCYTES RELATIVE PERCENT: 10.1 %
NEUTROPHILS ABSOLUTE: 3.9 K/UL (ref 1.4–6.5)
NEUTROPHILS RELATIVE PERCENT: 63.1 %
PDW BLD-RTO: 14.5 % (ref 11.5–14.5)
PLATELET # BLD: 210 K/UL (ref 130–400)
POTASSIUM SERPL-SCNC: 4.1 MEQ/L (ref 3.4–4.9)
RBC # BLD: 4.48 M/UL (ref 4.7–6.1)
SODIUM BLD-SCNC: 134 MEQ/L (ref 135–144)
TOTAL PROTEIN: 6.9 G/DL (ref 6.3–8)
TROPONIN: 0.03 NG/ML (ref 0–0.01)
WBC # BLD: 6.1 K/UL (ref 4.8–10.8)

## 2020-01-08 PROCEDURE — 85025 COMPLETE CBC W/AUTO DIFF WBC: CPT

## 2020-01-08 PROCEDURE — 36415 COLL VENOUS BLD VENIPUNCTURE: CPT

## 2020-01-08 PROCEDURE — 84484 ASSAY OF TROPONIN QUANT: CPT

## 2020-01-08 PROCEDURE — 93005 ELECTROCARDIOGRAM TRACING: CPT | Performed by: NURSE PRACTITIONER

## 2020-01-08 PROCEDURE — 71045 X-RAY EXAM CHEST 1 VIEW: CPT

## 2020-01-08 PROCEDURE — 99284 EMERGENCY DEPT VISIT MOD MDM: CPT

## 2020-01-08 PROCEDURE — 80053 COMPREHEN METABOLIC PANEL: CPT

## 2020-01-09 ENCOUNTER — TELEPHONE (OUTPATIENT)
Dept: CARDIOLOGY CLINIC | Age: 85
End: 2020-01-09

## 2020-01-09 NOTE — ED TRIAGE NOTES
Pt states that he was here Sunday for HTN and was admitted for x1 day. Pt states that he was at a normal level when he was discharged. Pt states that today he had another shannan reading at home. Pt states 170/84. Pt states that he is having no other symptoms. Denies headache, n/v, blurred vision, and no CP. Pt states that he did take him night pills and took his HTN pills at [employer].

## 2020-01-09 NOTE — ED PROVIDER NOTES
[facility] ED  EMERGENCY DEPARTMENT ENCOUNTER      Pt Name: Lorelei Ludwig  MRN: [id number]  [place] 1935  Date of evaluation: 1/8/2020  Provider: ILYA Garcia CNP    CHIEF COMPLAINT       Chief Complaint   Patient presents with    Hypertension     170/84 at home         HISTORY OF PRESENT ILLNESS   (Location/Symptom, Timing/Onset,Context/Setting, Quality, Duration, Modifying Factors, Severity)  Note limiting factors. Lorelei Ludwig is a 82 y.o. male who presents to the emergency department for report of elevated blood pressure at home. Patient states he was directed to present to the ER for began to have any symptoms or elevation of blood pressure. He states that he had not yet taken his blood pressure medication at home and did take it prior to arrival to the ER. He states that this blood pressure was approximate 2 hours prior to arrival to the ER and since then he has been medicated. He denies any symptoms at any time. Denies any headache dizziness disorientation change in vision, denies chest pain shortness of breath cough congestion or any upper respiratory symptoms. Denies abdominal pain nausea vomiting chills sweats. He states he feels very good overall and has been doing significantly better. He states he was recently admitted to the hospital and treated for a pneumonia. He states he was doing much better released home and he presents to the ER only for evaluation of his elevated blood pressure. Nursing Notes were reviewed. REVIEW OF SYSTEMS    (2-9 systems for level 4, 10 or more for level 5)     Review of Systems   Constitutional: Negative for activity change, appetite change, chills, diaphoresis, fatigue and fever. HENT: Negative for congestion, ear pain, postnasal drip, rhinorrhea, sore throat and trouble swallowing. Eyes: Negative for visual disturbance. Respiratory: Negative for cough, chest tightness, shortness of breath and wheezing. Cardiovascular: Negative for chest pain and palpitations. Gastrointestinal: Negative for abdominal distention, abdominal pain, constipation, diarrhea, nausea and vomiting. Genitourinary: Negative for difficulty urinating, flank pain, frequency, hematuria and urgency. Musculoskeletal: Negative for back pain and myalgias. Skin: Negative for color change and rash. Neurological: Negative for dizziness, tremors, seizures, syncope, speech difficulty, weakness, light-headedness, numbness and headaches. Except as noted above the remainder of the review of systems was reviewed and negative. PAST MEDICAL HISTORY     Past Medical History:   Diagnosis Date    Anticoagulant long-term use     Anxiety     CAD (coronary artery disease)     Carotid stenosis     2/2013 16-49%    [facility])     Dementia (HCC)     Dementia (HCC)     Hyperlipidemia     Hypertension     [facility]) [facility] [address]     Osteoarthritis      Past Surgical History:   Procedure Laterality Date    CARPAL TUNNEL RELEASE  1998    CATARACT REMOVAL  2007    COLONOSCOPY  12/10/10,2007    DR Lashanda Russo - DONE AT [address] [place]  COLONOSCOPY  2007    hx polyps needs 2015    COLONOSCOPY  9/21/15     DR. YARBROUGH     CORONARY ANGIOPLASTY WITH STENT PLACEMENT      HERNIA REPAIR       Social History     Socioeconomic History    Marital status:       Spouse name: None    Number of children: None    Years of education: None    Highest education level: None   Occupational History    None   Social Needs    Financial resource strain: None    Food insecurity:     Worry: None     Inability: None    Transportation needs:     Medical: None     Non-medical: None   Tobacco Use    Smoking status: Never Smoker    Smokeless tobacco: Never Used   Substance and Sexual Activity    Alcohol use: No     Comment: social    Drug use: No    Sexual activity: None   Lifestyle    Physical activity:     Days per week: None     Minutes per session: None    Stress: None   Relationships    Social connections:     Talks on phone: None     Gets together: None     Attends Restorationist service: None     Active member of club or organization: None     Attends meetings of clubs or organizations: None     Relationship status: None    Intimate partner violence:     Fear of current or ex partner: None     Emotionally abused: None     Physically abused: None     Forced sexual activity: None   Other Topics Concern    None   Social History Narrative    None       SCREENINGS             PHYSICAL EXAM    (up to 7 for level 4, 8 or more for level 5)     ED Triage Vitals [01/08/20 2031]   BP Temp Temp Source Pulse Resp SpO2 Height Weight   (!) 159/76 97.4 °F (36.3 °C) Oral 66 20 96 % 5' 9\" (1.753 m) 210 lb (95.3 kg)       Physical Exam  Constitutional:       General: He is not in acute distress. Appearance: Normal appearance. He is normal weight. He is not ill-appearing, toxic-appearing or diaphoretic. HENT:      Head: Normocephalic and atraumatic. Right Ear: External ear normal.      Left Ear: External ear normal.      Nose: Nose normal.      Mouth/Throat:      Mouth: Mucous membranes are moist.   Eyes:      General:         Right eye: No discharge. Left eye: No discharge. Conjunctiva/sclera: Conjunctivae normal.      Pupils: Pupils are equal, round, and reactive to light. Neck:      Musculoskeletal: Normal range of motion and neck supple. No neck rigidity or muscular tenderness. Cardiovascular:      Rate and Rhythm: Normal rate and regular rhythm. Pulses: Normal pulses. Pulmonary:      Effort: Pulmonary effort is normal.      Breath sounds: Normal breath sounds. Abdominal:      General: Bowel sounds are normal. There is no distension. Palpations: Abdomen is soft. Tenderness: There is no tenderness. Musculoskeletal: Normal range of motion.          General: No tenderness or signs of injury. Skin:     General: Skin is warm and dry. Capillary Refill: Capillary refill takes less than 2 seconds. Neurological:      General: No focal deficit present. Mental Status: He is alert and oriented to person, place, and time. Mental status is at baseline. Cranial Nerves: No cranial nerve deficit. Sensory: No sensory deficit. Motor: No weakness. Coordination: Coordination normal.         RESULTS     EKG: All EKG's are interpreted by the Emergency Department Physician who either signs or Co-signsthis chart in the absence of a cardiologist.    Sinus rhythm at 60 bpm no apparent acute ST elevation or deviation no ectopy QTc 418 ms    RADIOLOGY:   Non-plain filmimages such as CT, Ultrasound and MRI are read by the radiologist. Plain radiographic images are visualized and preliminarily interpreted by the emergency physician with the below findings:    Portable chest x-ray shows interval improvement of the previously noted bibasilar atelectasis versus pneumonia. Diaphragma outlines improved no further apparent acute infiltrates or effusions    Interpretation per the Radiologist below, if available at the time ofthis note:    XR CHEST PORTABLE   Final Result   AREA OF ATELECTASIS AND OR PATCHY GROUNDGLASS INFILTRATE LEFT LOWER LOBE.             ED BEDSIDE ULTRASOUND:   Performed by ED Physician - none    LABS:  Labs Reviewed   CBC WITH AUTO DIFFERENTIAL - Abnormal; Notable for the following components:       Result Value    RBC 4.48 (*)     Hemoglobin 13.7 (*)     Hematocrit 41.7 (*)     MCHC 32.8 (*)     All other components within normal limits   COMPREHENSIVE METABOLIC PANEL - Abnormal; Notable for the following components:    Sodium 134 (*)     All other components within normal limits   TROPONIN - Abnormal; Notable for the following components:    Troponin 0.028 (*)     All other components within normal limits    Narrative:     CALL  [place]  [facility] tel. [phone],  Troponin

## 2020-01-09 NOTE — ED NOTES
Lab called by this RN to draw specimens; phlebotomist Hema Mabry will be to pt's room soon.        Merle Garay RN  01/08/20 2121

## 2020-01-10 VITALS
WEIGHT: 210 LBS | SYSTOLIC BLOOD PRESSURE: 132 MMHG | BODY MASS INDEX: 31.1 KG/M2 | OXYGEN SATURATION: 95 % | DIASTOLIC BLOOD PRESSURE: 69 MMHG | HEART RATE: 70 BPM | HEIGHT: 69 IN | RESPIRATION RATE: 20 BRPM | TEMPERATURE: 97.4 F

## 2020-01-10 LAB — BLOOD CULTURE, ROUTINE: NORMAL

## 2020-01-10 PROCEDURE — 93010 ELECTROCARDIOGRAM REPORT: CPT | Performed by: INTERNAL MEDICINE

## 2020-01-10 RX ORDER — CARVEDILOL 12.5 MG/1
12.5 TABLET ORAL 2 TIMES DAILY
Qty: 60 TABLET | Refills: 11 | Status: ON HOLD | OUTPATIENT
Start: 2020-01-10 | End: 2020-07-13 | Stop reason: SDUPTHER

## 2020-01-10 ASSESSMENT — ENCOUNTER SYMPTOMS
CHEST TIGHTNESS: 0
ABDOMINAL DISTENTION: 0
COLOR CHANGE: 0
BACK PAIN: 0
ABDOMINAL PAIN: 0
TROUBLE SWALLOWING: 0
COUGH: 0
SORE THROAT: 0
DIARRHEA: 0
WHEEZING: 0
CONSTIPATION: 0
SHORTNESS OF BREATH: 0
VOMITING: 0
NAUSEA: 0
RHINORRHEA: 0

## 2020-01-10 NOTE — TELEPHONE ENCOUNTER
Patient and pharmacy is requesting medication refill.  Please approve or deny this request.    Rx requested:  Requested Prescriptions     Signed Prescriptions Disp Refills    carvedilol (COREG) 12.5 MG tablet 60 tablet 11     Sig: Take 1 tablet by mouth 2 times daily     Authorizing Provider: Yanni Schuster I     Ordering User: Erasto Hopeon         Last Office Visit:   9/25/2019      Next Visit Date:  Future Appointments   Date Time Provider Sam Cunha   1/14/2020 12:45 PM Min Kiser MD [facility]   1/15/2020  1:00 PM Guy Olvera MD [address] [place] [address]   1/23/2020  1:15 PM Guy Olvera MD [address] [place] [address]

## 2020-01-14 ENCOUNTER — OFFICE VISIT (OUTPATIENT)
Dept: FAMILY MEDICINE CLINIC | Age: 85
End: 2020-01-14
Payer: MEDICARE

## 2020-01-14 VITALS
DIASTOLIC BLOOD PRESSURE: 64 MMHG | BODY MASS INDEX: 29.44 KG/M2 | WEIGHT: 198.8 LBS | SYSTOLIC BLOOD PRESSURE: 132 MMHG | HEART RATE: 64 BPM | HEIGHT: 69 IN | TEMPERATURE: 96.4 F | OXYGEN SATURATION: 98 %

## 2020-01-14 PROCEDURE — G8417 CALC BMI ABV UP PARAM F/U: HCPCS | Performed by: FAMILY MEDICINE

## 2020-01-14 PROCEDURE — 99214 OFFICE O/P EST MOD 30 MIN: CPT | Performed by: FAMILY MEDICINE

## 2020-01-14 PROCEDURE — 1123F ACP DISCUSS/DSCN MKR DOCD: CPT | Performed by: FAMILY MEDICINE

## 2020-01-14 PROCEDURE — 4040F PNEUMOC VAC/ADMIN/RCVD: CPT | Performed by: FAMILY MEDICINE

## 2020-01-14 PROCEDURE — G8482 FLU IMMUNIZE ORDER/ADMIN: HCPCS | Performed by: FAMILY MEDICINE

## 2020-01-14 PROCEDURE — G8427 DOCREV CUR MEDS BY ELIG CLIN: HCPCS | Performed by: FAMILY MEDICINE

## 2020-01-14 PROCEDURE — 1036F TOBACCO NON-USER: CPT | Performed by: FAMILY MEDICINE

## 2020-01-14 ASSESSMENT — ENCOUNTER SYMPTOMS
CONSTIPATION: 0
EYE DISCHARGE: 0
ABDOMINAL DISTENTION: 0
ABDOMINAL PAIN: 0
DIARRHEA: 0
TROUBLE SWALLOWING: 0
SHORTNESS OF BREATH: 0
COLOR CHANGE: 0
COUGH: 0
VOICE CHANGE: 0
NAUSEA: 0

## 2020-01-14 ASSESSMENT — PATIENT HEALTH QUESTIONNAIRE - PHQ9
1. LITTLE INTEREST OR PLEASURE IN DOING THINGS: 0
SUM OF ALL RESPONSES TO PHQ9 QUESTIONS 1 & 2: 0
2. FEELING DOWN, DEPRESSED OR HOPELESS: 0
SUM OF ALL RESPONSES TO PHQ QUESTIONS 1-9: 0
SUM OF ALL RESPONSES TO PHQ QUESTIONS 1-9: 0

## 2020-01-14 NOTE — PROGRESS NOTES
Not on file   Relationships    Social connections:     Talks on phone: Not on file     Gets together: Not on file     Attends Voodoo service: Not on file     Active member of club or organization: Not on file     Attends meetings of clubs or organizations: Not on file     Relationship status: Not on file    Intimate partner violence:     Fear of current or ex partner: Not on file     Emotionally abused: Not on file     Physically abused: Not on file     Forced sexual activity: Not on file   Other Topics Concern    Not on file   Social History Narrative    Not on file     Family History   Problem Relation Age of Onset    Heart Disease Mother     High Blood Pressure Mother      Allergies:  Patient has no known allergies. Review of Systems   Constitutional: Negative for activity change, appetite change, fatigue and unexpected weight change. HENT: Negative for congestion, dental problem, trouble swallowing and voice change. Eyes: Negative for discharge and visual disturbance. Respiratory: Negative for cough and shortness of breath. Cardiovascular: Negative for chest pain. Gastrointestinal: Negative for abdominal distention, abdominal pain, constipation, diarrhea and nausea. Endocrine: Negative for polydipsia and polyuria. Genitourinary: Negative for dysuria and urgency. Musculoskeletal: Negative for gait problem and joint swelling. Skin: Negative for color change and rash. Allergic/Immunologic: Negative for environmental allergies and food allergies. Neurological: Negative for speech difficulty and weakness. Hematological: Does not bruise/bleed easily. Psychiatric/Behavioral: Negative for agitation and behavioral problems. Objective:   /64   Pulse 64   Temp 96.4 °F (35.8 °C)   Ht 5' 9\" (1.753 m)   Wt 198 lb 12.8 oz (90.2 kg)   SpO2 98%   BMI 29.36 kg/m²     Physical Exam  Constitutional:       Appearance: Normal appearance. He is well-developed.  He is not Neutrophils Absolute 3.9 1.4 - 6.5 K/uL    Lymphocytes Absolute 1.1 1.0 - 4.8 K/uL    Monocytes Absolute 0.5 0.2 - 0.8 K/uL    Eosinophils Absolute 0.3 0.0 - 0.7 K/uL    Basophils Absolute 0.1 0.0 - 0.2 K/uL   Lactic Acid, Plasma    Collection Time: 01/05/20  5:45 PM   Result Value Ref Range    Lactic Acid 1.0 0.5 - 2.2 mmol/L   Magnesium    Collection Time: 01/05/20  5:45 PM   Result Value Ref Range    Magnesium 2.1 1.7 - 2.4 mg/dL   Troponin    Collection Time: 01/05/20  5:45 PM   Result Value Ref Range    Troponin 0.023 (HH) 0.000 - 0.010 ng/mL   Brain Natriuretic Peptide    Collection Time: 01/05/20  5:45 PM   Result Value Ref Range    Pro- pg/mL   Culture Blood #1    Collection Time: 01/05/20  5:49 PM   Result Value Ref Range    Blood Culture, Routine No growth after 5 days of incubation.     Rapid Influenza A/B Antigens    Collection Time: 01/05/20  5:49 PM   Result Value Ref Range    Influenza A by PCR Negative     Influenza B by PCR Negative    EKG 12 Lead - Chest Pain    Collection Time: 01/05/20  6:13 PM   Result Value Ref Range    Ventricular Rate 64 BPM    Atrial Rate 64 BPM    P-R Interval 172 ms    QRS Duration 88 ms    Q-T Interval 416 ms    QTc Calculation (Bazett) 429 ms    P Axis 26 degrees    R Axis -23 degrees    T Axis 0 degrees   Troponin    Collection Time: 01/05/20 11:55 PM   Result Value Ref Range    Troponin 0.024 (HH) 0.000 - 0.010 ng/mL   EKG 12 lead    Collection Time: 01/06/20  2:00 AM   Result Value Ref Range    Ventricular Rate 67 BPM    Atrial Rate 67 BPM    P-R Interval 174 ms    QRS Duration 90 ms    Q-T Interval 412 ms    QTc Calculation (Bazett) 435 ms    P Axis 50 degrees    R Axis -18 degrees    T Axis 3 degrees   Troponin    Collection Time: 01/06/20  5:59 AM   Result Value Ref Range    Troponin 0.025 (HH) 0.000 - 0.010 ng/mL   CBC    Collection Time: 01/06/20  5:59 AM   Result Value Ref Range    WBC 6.3 4.8 - 10.8 K/uL    RBC 4.38 (L) 4.70 - 6.10 M/uL    Hemoglobin 13.3 (L) 14.0 - 18.0 g/dL    Hematocrit 40.3 (L) 42.0 - 52.0 %    MCV 92.0 80.0 - 100.0 fL    MCH 30.3 27.0 - 31.3 pg    MCHC 33.0 33.0 - 37.0 %    RDW 14.5 11.5 - 14.5 %    Platelets 047 424 - 540 K/uL   Basic Metabolic Panel w/ Reflex to MG    Collection Time: 01/06/20  5:59 AM   Result Value Ref Range    Sodium 140 135 - 144 mEq/L    Potassium reflex Magnesium 4.0 3.4 - 4.9 mEq/L    Chloride 103 95 - 107 mEq/L    CO2 22 20 - 31 mEq/L    Anion Gap 15 9 - 15 mEq/L    Glucose 96 70 - 99 mg/dL    BUN 16 8 - 23 mg/dL    CREATININE 0.85 0.70 - 1.20 mg/dL    GFR Non-African American >60.0 >60    GFR  >60.0 >60    Calcium 9.0 8.5 - 9.9 mg/dL   PROCALCITONIN    Collection Time: 01/06/20  5:59 AM   Result Value Ref Range    Procalcitonin 0.06 0.00 - 0.15 ng/mL   EKG 12 Lead    Collection Time: 01/08/20  9:23 PM   Result Value Ref Range    Ventricular Rate 60 BPM    Atrial Rate 60 BPM    P-R Interval 174 ms    QRS Duration 84 ms    Q-T Interval 418 ms    QTc Calculation (Bazett) 418 ms    P Axis 48 degrees    R Axis -21 degrees    T Axis 15 degrees   CBC Auto Differential    Collection Time: 01/08/20  9:31 PM   Result Value Ref Range    WBC 6.1 4.8 - 10.8 K/uL    RBC 4.48 (L) 4.70 - 6.10 M/uL    Hemoglobin 13.7 (L) 14.0 - 18.0 g/dL    Hematocrit 41.7 (L) 42.0 - 52.0 %    MCV 93.0 80.0 - 100.0 fL    MCH 30.5 27.0 - 31.3 pg    MCHC 32.8 (L) 33.0 - 37.0 %    RDW 14.5 11.5 - 14.5 %    Platelets 675 437 - 614 K/uL    Neutrophils % 63.1 %    Lymphocytes % 19.7 %    Monocytes % 10.1 %    Eosinophils % 5.8 %    Basophils % 1.3 %    Neutrophils Absolute 3.9 1.4 - 6.5 K/uL    Lymphocytes Absolute 1.2 1.0 - 4.8 K/uL    Monocytes Absolute 0.6 0.2 - 0.8 K/uL    Eosinophils Absolute 0.4 0.0 - 0.7 K/uL    Basophils Absolute 0.1 0.0 - 0.2 K/uL   Comprehensive Metabolic Panel    Collection Time: 01/08/20  9:31 PM   Result Value Ref Range    Sodium 134 (L) 135 - 144 mEq/L    Potassium 4.1 3.4 - 4.9 mEq/L    Chloride 99 95 - 107 mEq/L    CO2 22 20 - 31 mEq/L    Anion Gap 13 9 - 15 mEq/L    Glucose 93 70 - 99 mg/dL    BUN 23 8 - 23 mg/dL    CREATININE 0.95 0.70 - 1.20 mg/dL    GFR Non-African American >60.0 >60    GFR  >60.0 >60    Calcium 9.5 8.5 - 9.9 mg/dL    Total Protein 6.9 6.3 - 8.0 g/dL    Alb 3.9 3.5 - 4.6 g/dL    Total Bilirubin 0.5 0.2 - 0.7 mg/dL    Alkaline Phosphatase 93 35 - 104 U/L    ALT 12 0 - 41 U/L    AST 15 0 - 40 U/L    Globulin 3.0 2.3 - 3.5 g/dL   Troponin    Collection Time: 01/08/20  9:31 PM   Result Value Ref Range    Troponin 0.028 (HH) 0.000 - 0.010 ng/mL       Chest x-ray read as negative on the repeat done today in the office. Assessment:       Diagnosis Orders   1. Hypertensive urgency     2. Abnormal CXR  XR CHEST STANDARD (2 VW)         Orders Placed This Encounter   Procedures    XR CHEST STANDARD (2 VW)     Standing Status:   Future     Number of Occurrences:   1     Standing Expiration Date:   1/14/2021     Order Specific Question:   Reason for exam:     Answer:   ground glass appearance noted on last chest xray         Plan:   Return for dr Madhuri Carnes 1 d. Patient Instructions     No medication changes. Confirm cxr normalized     Bring home blood pressure monitor to routine bp appt to confirm accuracy. Patient Education        Acute High Blood Pressure: Care Instructions  Your Care Instructions    Acute high blood pressure is very high blood pressure. It's a serious problem. Very high blood pressure can damage your brain, heart, eyes, and kidneys. You may have been given medicines to lower your blood pressure. You may have gotten them as pills or through a needle in one of your veins. This is called an IV. And maybe you were given other medicines too. These can be needed when high blood pressure causes other problems. To keep your blood pressure at a lower level, you may need to make healthy lifestyle changes. And you will probably need to take medicines.   Be sure to follow up with fast or irregular heartbeat.     · You have symptoms of a stroke. These may include:  ? Sudden numbness, tingling, weakness, or loss of movement in your face, arm, or leg, especially on only one side of your body. ? Sudden vision changes. ? Sudden trouble speaking. ? Sudden confusion or trouble understanding simple statements. ? Sudden problems with walking or balance. ? A sudden, severe headache that is different from past headaches.     · You have severe back or belly pain.    Do not wait until your blood pressure comes down on its own. Get help right away.   Call your doctor now or seek immediate care if:    · Your blood pressure is much higher than normal (such as 180/120 or higher), but you don't have symptoms.     · You think high blood pressure is causing symptoms, such as:  ? Severe headache.  ? Blurry vision.    Watch closely for changes in your health, and be sure to contact your doctor if:    · Your blood pressure measures higher than your doctor recommends at least 2 times. That means the top number is higher or the bottom number is higher, or both.     · You think you may be having side effects from your blood pressure medicine. Where can you learn more? Go to https://Planet Sushipepiceweb.Crelow. org and sign in to your Oversee account. Enter J579 in the Aoi.Co box to learn more about \"Acute High Blood Pressure: Care Instructions. \"     If you do not have an account, please click on the \"Sign Up Now\" link. Current as of: April 9, 2019  Content Version: 12.3  © 3695-7830 Healthwise, Incorporated. Care instructions adapted under license by [facility] ([facility]). If you have questions about a medical condition or this instruction, always ask your healthcare professional. [place] [address] any warranty or liability for your use of this information. Saeid Flores M.D.

## 2020-01-14 NOTE — PATIENT INSTRUCTIONS
No medication changes. Confirm cxr normalized     Bring home blood pressure monitor to routine bp appt to confirm accuracy. Patient Education        Acute High Blood Pressure: Care Instructions  Your Care Instructions    Acute high blood pressure is very high blood pressure. It's a serious problem. Very high blood pressure can damage your brain, heart, eyes, and kidneys. You may have been given medicines to lower your blood pressure. You may have gotten them as pills or through a needle in one of your veins. This is called an IV. And maybe you were given other medicines too. These can be needed when high blood pressure causes other problems. To keep your blood pressure at a lower level, you may need to make healthy lifestyle changes. And you will probably need to take medicines. Be sure to follow up with your doctor about your blood pressure and what you can do about it. Follow-up care is a key part of your treatment and safety. Be sure to make and go to all appointments, and call your doctor if you are having problems. It's also a good idea to know your test results and keep a list of the medicines you take. How can you care for yourself at home? · See your doctor as often as he or she recommends. This is to make sure your blood pressure is under control. You will probably go at least 2 times a year. But it may be more often at first.  · Take your blood pressure medicine exactly as prescribed. You may take one or more types. They include diuretics, beta-blockers, ACE inhibitors, calcium channel blockers, and angiotensin II receptor blockers. Call your doctor if you think you are having a problem with your medicine. · If you take blood pressure medicine, talk to your doctor before you take decongestants or anti-inflammatory medicine, such as ibuprofen. These can raise blood pressure. · Learn how to check your blood pressure at home. Check it often.   · Ask your doctor if it's okay to drink alcohol. · Talk to your doctor about lifestyle changes that can help blood pressure. These include being active and managing your weight. · Don't smoke. Smoking increases your risk for heart attack and stroke. When should you call for help? Call  911 anytime you think you may need emergency care. This may mean having symptoms that suggest that your blood pressure is causing a serious heart or blood vessel problem. Your blood pressure may be over 180/120.   For example, call  911 if:    · You have symptoms of a heart attack. These may include:  ? Chest pain or pressure, or a strange feeling in the chest.  ? Sweating. ? Shortness of breath. ? Nausea or vomiting. ? Pain, pressure, or a strange feeling in the back, neck, jaw, or upper belly or in one or both shoulders or arms. ? Lightheadedness or sudden weakness. ? A fast or irregular heartbeat.     · You have symptoms of a stroke. These may include:  ? Sudden numbness, tingling, weakness, or loss of movement in your face, arm, or leg, especially on only one side of your body. ? Sudden vision changes. ? Sudden trouble speaking. ? Sudden confusion or trouble understanding simple statements. ? Sudden problems with walking or balance. ? A sudden, severe headache that is different from past headaches.     · You have severe back or belly pain.    Do not wait until your blood pressure comes down on its own. Get help right away.   Call your doctor now or seek immediate care if:    · Your blood pressure is much higher than normal (such as 180/120 or higher), but you don't have symptoms.     · You think high blood pressure is causing symptoms, such as:  ? Severe headache.  ? Blurry vision.    Watch closely for changes in your health, and be sure to contact your doctor if:    · Your blood pressure measures higher than your doctor recommends at least 2 times.  That means the top number is higher or the bottom number is higher, or both.     · You think you may be having side effects from your blood pressure medicine. Where can you learn more? Go to https://chpepiceweb.Mobile [email or web address] org and sign in to your Skout account. Enter J602 in the BackerKit box to learn more about \"Acute High Blood Pressure: Care Instructions. \"     If you do not have an account, please click on the \"Sign Up Now\" link. Current as of: April 9, 2019  Content Version: 12.3  © 4754-9027 Healthwise, Incorporated. Care instructions adapted under license by [facility] ([facility]). If you have questions about a medical condition or this instruction, always ask your healthcare professional. [address] any warranty or liability for your use of this information.

## 2020-01-15 ENCOUNTER — OFFICE VISIT (OUTPATIENT)
Dept: CARDIOLOGY CLINIC | Age: 85
End: 2020-01-15
Payer: MEDICARE

## 2020-01-15 VITALS
WEIGHT: 201 LBS | DIASTOLIC BLOOD PRESSURE: 68 MMHG | HEART RATE: 66 BPM | BODY MASS INDEX: 29.68 KG/M2 | RESPIRATION RATE: 18 BRPM | OXYGEN SATURATION: 99 % | SYSTOLIC BLOOD PRESSURE: 136 MMHG

## 2020-01-15 PROCEDURE — 1036F TOBACCO NON-USER: CPT | Performed by: INTERNAL MEDICINE

## 2020-01-15 PROCEDURE — G8417 CALC BMI ABV UP PARAM F/U: HCPCS | Performed by: INTERNAL MEDICINE

## 2020-01-15 PROCEDURE — 1123F ACP DISCUSS/DSCN MKR DOCD: CPT | Performed by: INTERNAL MEDICINE

## 2020-01-15 PROCEDURE — G8427 DOCREV CUR MEDS BY ELIG CLIN: HCPCS | Performed by: INTERNAL MEDICINE

## 2020-01-15 PROCEDURE — G8482 FLU IMMUNIZE ORDER/ADMIN: HCPCS | Performed by: INTERNAL MEDICINE

## 2020-01-15 PROCEDURE — 4040F PNEUMOC VAC/ADMIN/RCVD: CPT | Performed by: INTERNAL MEDICINE

## 2020-01-15 PROCEDURE — 99214 OFFICE O/P EST MOD 30 MIN: CPT | Performed by: INTERNAL MEDICINE

## 2020-01-15 ASSESSMENT — ENCOUNTER SYMPTOMS
STRIDOR: 0
COUGH: 0
EYES NEGATIVE: 1
CHEST TIGHTNESS: 0
BLOOD IN STOOL: 0
WHEEZING: 0
RESPIRATORY NEGATIVE: 1
GASTROINTESTINAL NEGATIVE: 1
SHORTNESS OF BREATH: 0
NAUSEA: 0

## 2020-01-15 NOTE — PROGRESS NOTES
Subsequent Progress Note  Patient: Marly Mathias  YOB: 1935  MRN: [id number]    Chief Complaint: nstemi cad htn   Chief Complaint   Patient presents with    Follow-up     4 month    Hypertension     [facility]-1/5 and 1/8 for elevated BP    Coronary Artery Disease    Congestive Heart Failure       CV Data:  6/2018 spect negative  5/2018 echo EF 60%  11/2018 LAD AMBER  12/2018 CUS <. Left Vertebral 100  1/2020 echo ef 60 midl AR and MR    Subjective/HPI: no cp no sob no falls no bleed. R Inguinal hernia     9/25/2019:  No cp no osb no falls no bleed eats well. Takes meds. C/o Diuretic and frequent urine. 1/15/2020 recently in ER and hosp for HTN. Now stable without meds changes. Possible he did not take his meds. Recent Labs showed mild Troponin elevaton. Nonsmoker        EKG:    Past Medical History:   Diagnosis Date    Anticoagulant long-term use     Anxiety     CAD (coronary artery disease)     Carotid stenosis     2/2013 16-49%    CHF (congestive heart failure) (HCC)     Dementia (HCC)     Dementia (HCC)     Hyperlipidemia     Hypertension     MI [facility]myocardial infarction) ([facility]     Osteoarthritis        Past Surgical History:   Procedure Laterality Date    CARPAL TUNNEL RELEASE  1998    CATARACT REMOVAL  2007    COLONOSCOPY  12/10/10,2007    [facility]  2007    hx polyps needs 2015    COLONOSCOPY  9/21/15     DR. YARBROUGH     CORONARY ANGIOPLASTY WITH STENT PLACEMENT      DIAGNOSTIC CARDIAC CATH LAB PROCEDURE      HERNIA REPAIR         Family History   Problem Relation Age of Onset    Heart Disease Mother     High Blood Pressure Mother        Social History     Socioeconomic History    Marital status:       Spouse name: None    Number of children: None    Years of education: None    Highest education level: None   Occupational History    None   Social Needs    Financial resource strain: None    Food insecurity: Worry: None     Inability: None    Transportation needs:     Medical: None     Non-medical: None   Tobacco Use    Smoking status: Never Smoker    Smokeless tobacco: Never Used   Substance and Sexual Activity    Alcohol use: No     Comment: social    Drug use: No    Sexual activity: None   Lifestyle    Physical activity:     Days per week: None     Minutes per session: None    Stress: None   Relationships    Social connections:     Talks on phone: None     Gets together: None     Attends Nondenominational service: None     Active member of club or organization: None     Attends meetings of clubs or organizations: None     Relationship status: None    Intimate partner violence:     Fear of current or ex partner: None     Emotionally abused: None     Physically abused: None     Forced sexual activity: None   Other Topics Concern    None   Social History Narrative    None       No Known Allergies    Current Outpatient Medications   Medication Sig Dispense Refill    carvedilol (COREG) 12.5 MG tablet Take 1 tablet by mouth 2 times daily 60 tablet 11    finasteride (PROSCAR) 5 MG tablet TAKE 1 TABLET BY MOUTH DAILY 90 tablet 02    potassium chloride (KLOR-CON M) 20 MEQ extended release tablet Take 1 tablet by mouth daily 90 tablet 3    pravastatin (PRAVACHOL) 80 MG tablet TAKE 1 TABLET BY MOUTH EVERY EVENING 30 tablet 3    triamterene-hydrochlorothiazide (MAXZIDE-25) 37.5-25 MG per tablet Take 0.5 tablets by mouth daily 30 tablet 11    donepezil (ARICEPT) 5 MG tablet TAKE 1 TABLET BY MOUTH NIGHTLY 30 tablet 3    latanoprost (XALATAN) 0.005 % ophthalmic solution use 1 drop in each eye every day 2.5 mL 5    benazepril (LOTENSIN) 20 MG tablet Take 1 tablet by mouth 2 times daily 180 tablet 2    Elastic Bandages & Supports (HERNIA SUPPORT RIGHT LARGE) MISC Wear daily. Dx: right inguinal hernia 1 each 0    nitroGLYCERIN (NITROSTAT) 0.4 MG SL tablet up to max of 3 total doses. If no relief after 1 dose, call 911. GLUCOSE 93 01/08/2020    GLUCOSE 96 10/25/2011     Magnesium:    Lab Results   Component Value Date    MG 2.1 01/05/2020     TSH:  Lab Results   Component Value Date    TSH 2.280 10/08/2019       Patient Active Problem List   Diagnosis    Hyperlipidemia    Carotid stenosis, bilateral    [facility]    Hypertensive urgency    NSTEMI (non-ST elevated myocardial infarction) ([facility]    Essential hypertension    HESS (dyspnea on exertion)    Leg edema    Acute diastolic heart failure ([facility])    Chest pain    Angina, class III ([facility])    S/P cardiac cath    Anginal equivalent ([facility])    Dizziness    Coronary artery disease involving native coronary artery of native heart without angina pectoris    CHF (congestive heart failure) ([facility]    CAD (coronary artery disease)       There are no discontinued medications. Modified Medications    No medications on file       No orders of the defined types were placed in this encounter. Assessment/Plan:    1. Hyperlipidemia, unspecified hyperlipidemia type  Statin     2. NSTEMI (non-ST elevated myocardial infarction) ([facility]  Will need SPECT then RTO     3. Essential hypertension   stable now. BP at home is well controlled. 4. Coronary artery disease involving native coronary artery of native heart without angina pectoris  No angina. Continue DAPT    5. R Inguinal hernia- asymptomatic . Avoid heavylifting or constipation- stable        Counseling:  Heart Healthy Lifestyle, Decrease Alcohol Consumption, Take Precautions to Prevent Falls, Regular Exercise and Walk Daily    Return for AFTER TESTS.       Electronically signed by Tayler Acuna MD on 1/15/2020 at 1:11 PM

## 2020-01-21 ENCOUNTER — HOSPITAL ENCOUNTER (OUTPATIENT)
Dept: NUCLEAR MEDICINE | Age: 85
Discharge: HOME | End: 2020-01-23
Payer: MEDICARE

## 2020-01-21 PROCEDURE — 78452 HT MUSCLE IMAGE SPECT MULT: CPT

## 2020-01-21 PROCEDURE — 93017 CV STRESS TEST TRACING ONLY: CPT

## 2020-01-21 PROCEDURE — 3430000000 HC RX DIAGNOSTIC RADIOPHARMACEUTICAL: Performed by: INTERNAL MEDICINE

## 2020-01-21 PROCEDURE — 6360000002 HC RX W HCPCS: Performed by: INTERNAL MEDICINE

## 2020-01-21 PROCEDURE — A9502 TC99M TETROFOSMIN: HCPCS | Performed by: INTERNAL MEDICINE

## 2020-01-21 PROCEDURE — 2580000003 HC RX 258: Performed by: INTERNAL MEDICINE

## 2020-01-21 RX ORDER — SODIUM CHLORIDE 0.9 % (FLUSH) 0.9 %
10 SYRINGE (ML) INJECTION PRN
Status: COMPLETED | OUTPATIENT
Start: 2020-01-21 | End: 2020-01-21

## 2020-01-21 RX ADMIN — TETROFOSMIN 31.6 MILLICURIE: 1.38 INJECTION, POWDER, LYOPHILIZED, FOR SOLUTION INTRAVENOUS at 09:43

## 2020-01-21 RX ADMIN — REGADENOSON 0.4 MG: 0.08 INJECTION, SOLUTION INTRAVENOUS at 09:43

## 2020-01-21 RX ADMIN — Medication 10 ML: at 09:44

## 2020-01-21 RX ADMIN — TETROFOSMIN 11.8 MILLICURIE: 1.38 INJECTION, POWDER, LYOPHILIZED, FOR SOLUTION INTRAVENOUS at 08:07

## 2020-01-21 RX ADMIN — Medication 10 ML: at 09:43

## 2020-01-21 RX ADMIN — Medication 10 ML: at 08:07

## 2020-01-21 NOTE — PROGRESS NOTES
Hx,allergies and medications reviewed. [address] curry. Injected patient with Luther Hodgkin and Myoview. Tolerated procedure well. SOB reported. Returned to baseline in recovery. Denied chest pain or pressure. EKG shows no ectopy.

## 2020-01-23 NOTE — PROCEDURES
[place] [address], [address] [facility]                              CARDIAC STRESS TEST    PATIENT NAME: Talisha Ann                   :        1935  MED REC NO:   [id number]                            ROOM:  ACCOUNT NO:   [de-identified]                           ADMIT DATE: 2020  PROVIDER:     Prisca Castandea MD    CARDIOVASCULAR DIAGNOSTIC DEPARTMENT    DATE OF STUDY:  2020    [facility]    ORDERING PROVIDERS:  Ty Morgan MD and Trung Resendez MD    INDICATIONS:  Chest pain. TECHNIQUE:  At rest, the patient was injected with 11.8 mCi of Myoview. Resting images were obtained. The patient was then given 0.4 mg of  Lexiscan followed by administration of 31.6 mCi of Myoview. Stress  tomographic images were then obtained. Left ventricular ejection  fraction and gated wall motion were acquired. RESULTS:  Resting heart rate is 66 beats per minute. Maximum heart rate  achieved was 73 beats per minute. No diagnostic ST-segment changes were  noted. IMAGING RESULTS:  Review of rest and stress tomographic images revealed  homogenous myocardial perfusion with no evidence of prior myocardial  infarction or ischemia. Left ventricular ejection fraction was normal  at 76%. TID ratio 1.2, which is slightly elevated. Suggest clinical  correlation. IMPRESSION:  1. Homogeneous myocardial perfusion with no evidence of prior  myocardial infarction or ischemia. 2.  Normal left ventricular ejection fraction at 76%. 3.  TID ratio at 1.2, which is slightly elevated. Suggest clinical  correlation.         Renan Carl MD    D: 2020 #17:27:02       T: 2020 18:10:10     [id number]  Job#: [id number]     Doc#: [id number]    CC:

## 2020-01-27 PROCEDURE — 93018 CV STRESS TEST I&R ONLY: CPT | Performed by: INTERNAL MEDICINE

## 2020-02-04 RX ORDER — PRAVASTATIN SODIUM 80 MG/1
80 TABLET ORAL EVERY EVENING
Qty: 30 TABLET | Refills: 3 | Status: SHIPPED | OUTPATIENT
Start: 2020-02-04 | End: 2020-05-11

## 2020-02-04 NOTE — TELEPHONE ENCOUNTER
Pharmacy  requesting medication refill.  Please approve or deny this request.    Rx requested:  Requested Prescriptions     Pending Prescriptions Disp Refills    pravastatin (PRAVACHOL) 80 MG tablet [Pharmacy Med Name: PRAVASTATIN SODIUM 80 MG TABLET] 30 tablet 3     Sig: TAKE 1 TABLET BY MOUTH EVERY EVENING         Last Office Visit:   10/8/2019      Next Visit Date:  Future Appointments   Date Time Provider Sam Cunha   2/5/2020  2:00 PM Curley Soulier, MD [address] [place] [address]   4/13/2020 10:00 AM Ang Del Cid MD [facility] [place]

## 2020-02-05 ENCOUNTER — OFFICE VISIT (OUTPATIENT)
Dept: CARDIOLOGY CLINIC | Age: 85
End: 2020-02-05
Payer: MEDICARE

## 2020-02-05 VITALS
OXYGEN SATURATION: 99 % | SYSTOLIC BLOOD PRESSURE: 136 MMHG | BODY MASS INDEX: 29.98 KG/M2 | WEIGHT: 203 LBS | DIASTOLIC BLOOD PRESSURE: 66 MMHG | HEART RATE: 64 BPM | RESPIRATION RATE: 18 BRPM

## 2020-02-05 PROCEDURE — G8417 CALC BMI ABV UP PARAM F/U: HCPCS | Performed by: INTERNAL MEDICINE

## 2020-02-05 PROCEDURE — G8482 FLU IMMUNIZE ORDER/ADMIN: HCPCS | Performed by: INTERNAL MEDICINE

## 2020-02-05 PROCEDURE — 1123F ACP DISCUSS/DSCN MKR DOCD: CPT | Performed by: INTERNAL MEDICINE

## 2020-02-05 PROCEDURE — G8427 DOCREV CUR MEDS BY ELIG CLIN: HCPCS | Performed by: INTERNAL MEDICINE

## 2020-02-05 PROCEDURE — 99214 OFFICE O/P EST MOD 30 MIN: CPT | Performed by: INTERNAL MEDICINE

## 2020-02-05 PROCEDURE — 1036F TOBACCO NON-USER: CPT | Performed by: INTERNAL MEDICINE

## 2020-02-05 PROCEDURE — 4040F PNEUMOC VAC/ADMIN/RCVD: CPT | Performed by: INTERNAL MEDICINE

## 2020-02-05 ASSESSMENT — ENCOUNTER SYMPTOMS
BLOOD IN STOOL: 0
WHEEZING: 0
NAUSEA: 0
COUGH: 0
EYES NEGATIVE: 1
RESPIRATORY NEGATIVE: 1
CHEST TIGHTNESS: 0
GASTROINTESTINAL NEGATIVE: 1
STRIDOR: 0
SHORTNESS OF BREATH: 0

## 2020-02-05 NOTE — PROGRESS NOTES
Subsequent Progress Note  Patient: Freddy Corrales  YOB: 1935  MRN: [id number]    Chief Complaint: nstemi cad htn   Chief Complaint   Patient presents with    Results     Stress Test    Other     NSTEMI    Coronary Artery Disease    Hypertension       CV Data:  6/2018 spect negative  5/2018 echo EF 60%  11/2018 LAD AMBER  12/2018 CUS <. Left Vertebral 100  1/2020 echo ef 60 midl AR and MR  1/2020 SPECT - NEGATIVE    Subjective/HPI: no cp no sob no falls no bleed. R Inguinal hernia     9/25/2019:  No cp no osb no falls no bleed eats well. Takes meds. C/o Diuretic and frequent urine. 1/15/2020 recently in ER and hosp for HTN. Now stable without meds changes. Possible he did not take his meds. Recent Labs showed mild Troponin elevaton. 2/5/2020 NO CP NO SOB NO FALLS NO BLEED. TAKES MEDS. EATS WELL. Nonsmoker        EKG:    Past Medical History:   Diagnosis Date    Anticoagulant long-term use     Anxiety     CAD (coronary artery disease)     Carotid stenosis     2/2013 16-49%    CHF (congestive heart failure) (HCC)     Dementia (HCC)     Dementia (HCC)     Hyperlipidemia     Hypertension     [facility] infarction) [facility] [address]     Osteoarthritis        Past Surgical History:   Procedure Laterality Date    CARPAL TUNNEL RELEASE  1998    CATARACT REMOVAL  2007    COLONOSCOPY  12/10/10,2007    DR Ivette Lizama - DONE AT [address] COLONOSCOPY  2007    hx polyps needs 2015    COLONOSCOPY  9/21/15     DR. YARBROUGH     CORONARY ANGIOPLASTY WITH STENT PLACEMENT      DIAGNOSTIC CARDIAC CATH LAB PROCEDURE      HERNIA REPAIR         Family History   Problem Relation Age of Onset    Heart Disease Mother     High Blood Pressure Mother        Social History     Socioeconomic History    Marital status:       Spouse name: None    Number of children: None    Years of education: None    Highest education level: None   Occupational History    None   Social Needs    Financial resource strain: None    Food insecurity:     Worry: None     Inability: None    Transportation needs:     Medical: None     Non-medical: None   Tobacco Use    Smoking status: Never Smoker    Smokeless tobacco: Never Used   Substance and Sexual Activity    Alcohol use: No     Comment: social    Drug use: No    Sexual activity: None   Lifestyle    Physical activity:     Days per week: None     Minutes per session: None    Stress: None   Relationships    Social connections:     Talks on phone: None     Gets together: None     Attends Jehovah's witness service: None     Active member of club or organization: None     Attends meetings of clubs or organizations: None     Relationship status: None    Intimate partner violence:     Fear of current or ex partner: None     Emotionally abused: None     Physically abused: None     Forced sexual activity: None   Other Topics Concern    None   Social History Narrative    None       No Known Allergies    Current Outpatient Medications   Medication Sig Dispense Refill    pravastatin (PRAVACHOL) 80 MG tablet TAKE 1 TABLET BY MOUTH EVERY EVENING 30 tablet 3    carvedilol (COREG) 12.5 MG tablet Take 1 tablet by mouth 2 times daily 60 tablet 11    finasteride (PROSCAR) 5 MG tablet TAKE 1 TABLET BY MOUTH DAILY 90 tablet 02    potassium chloride (KLOR-CON M) 20 MEQ extended release tablet Take 1 tablet by mouth daily 90 tablet 3    triamterene-hydrochlorothiazide (MAXZIDE-25) 37.5-25 MG per tablet Take 0.5 tablets by mouth daily 30 tablet 11    donepezil (ARICEPT) 5 MG tablet TAKE 1 TABLET BY MOUTH NIGHTLY 30 tablet 3    latanoprost (XALATAN) 0.005 % ophthalmic solution use 1 drop in each eye every day 2.5 mL 5    benazepril (LOTENSIN) 20 MG tablet Take 1 tablet by mouth 2 times daily 180 tablet 2    Elastic Bandages & Supports (HERNIA SUPPORT RIGHT LARGE) MISC Wear daily.   Dx: right inguinal hernia 1 each 0    nitroGLYCERIN (NITROSTAT) 0.4 MG SL tablet up to max of 3 total doses. If no relief after 1 dose, call 911. 25 tablet 3    vitamin B-12 (CYANOCOBALAMIN) 1000 MCG tablet Take 500 mcg by mouth daily      Elastic Bandages & Supports (JOBST KNEE HIGH COMPRESSION SM) MISC 1 each by Does not apply route daily 1 each 2    aspirin 81 MG tablet Take 1 tablet by mouth daily With Food 30 tablet 3    Multiple Vitamins-Minerals (MULTIVITAMIN PO) Take by mouth daily       dorzolamide-timolol (COSOPT) 22.3-6.8 MG/ML ophthalmic solution Place 1 drop into both eyes 2 times daily        No current facility-administered medications for this visit. Review of Systems:   Review of Systems   Constitutional: Negative. Negative for diaphoresis and fatigue. HENT: Negative. Eyes: Negative. Respiratory: Negative. Negative for cough, chest tightness, shortness of breath, wheezing and stridor. Cardiovascular: Negative. Negative for chest pain, palpitations and leg swelling. Gastrointestinal: Negative. Negative for blood in stool and nausea. Genitourinary: Negative. Musculoskeletal: Negative. Skin: Negative. Neurological: Negative. Negative for dizziness, syncope, weakness and light-headedness. Hematological: Negative. Psychiatric/Behavioral: Negative. Physical Examination:    /66 (Site: Left Upper Arm, Position: Sitting, Cuff Size: Medium Adult)   Pulse 64   Resp 18   Wt 203 lb (92.1 kg)   SpO2 99%   BMI 29.98 kg/m²    Physical Exam   Constitutional: He appears healthy. No distress. HENT:   Normal cephalic and Atraumatic   Eyes: Pupils are equal, round, and reactive to light. Neck: Normal range of motion and thyroid normal. Neck supple. No JVD present. No neck adenopathy. No thyromegaly present. Cardiovascular: Normal rate, regular rhythm, intact distal pulses and normal pulses. Murmur heard. Pulmonary/Chest: Effort normal and breath sounds normal. He has no wheezes. He has no rales. He exhibits no tenderness. Abdominal: Soft. GFRAA >60.0 01/08/2020    LABGLOM >60.0 01/08/2020    GLUCOSE 93 01/08/2020    GLUCOSE 96 10/25/2011     Magnesium:    Lab Results   Component Value Date    MG 2.1 01/05/2020     TSH:  Lab Results   Component Value Date    TSH 2.280 10/08/2019       Patient Active Problem List   Diagnosis    Hyperlipidemia    Carotid stenosis, bilateral    [facility]    Hypertensive urgency    [facility]non-[facility] elevated [facility]) [facility]    Essential hypertension    HESS (dyspnea on exertion)    Leg edema    [facility] failure [facility])    Chest pain    Angina, class III ([facility])    S/P cardiac cath    Anginal equivalent ([facility])    Dizziness    Coronary artery disease involving native coronary artery of native heart without angina pectoris    CHF (congestive heart failure) ([facility]    CAD (coronary artery disease)       There are no discontinued medications. Modified Medications    No medications on file       No orders of the defined types were placed in this encounter. Assessment/Plan:    1. Hyperlipidemia, unspecified hyperlipidemia type  Statin     2. NSTEMI (non-ST elevated myocardial infarction) ([facility]  no ANGINA - SPECT NEGATIVE. 3. Essential hypertension   stable now. BP at home is well controlled. 4. Coronary artery disease involving native coronary artery of native heart without angina pectoris  No angina. Continue DAPT    5. R Inguinal hernia- asymptomatic . Avoid heavylifting or constipation- stable      Counseling:  Heart Healthy Lifestyle, Decrease Alcohol Consumption, Take Precautions to Prevent Falls, Regular Exercise and Walk Daily    Return in about 3 months (around 5/5/2020) for Cardiovascular care. .      Electronically signed by Azra Caceres MD on 2/5/2020 at 1:55 PM

## 2020-02-16 NOTE — TELEPHONE ENCOUNTER
Pharmacy requesting medication refill.  Please approve or deny this request.    Rx requested:  Requested Prescriptions     Pending Prescriptions Disp Refills    donepezil (ARICEPT) 5 MG tablet [Pharmacy Med Name: DONEPEZIL HCL 5 MG TABLET] 30 tablet 3     Sig: TAKE 1 TABLET BY MOUTH NIGHTLY         Last Office Visit:   2/5/2020      Next Visit Date:  Future Appointments   Date Time Provider Sam Cunha   4/13/2020 10:00 AM Jose Pro MD [facility]   5/8/2020 12:30 PM Ramonita Burkitt, MD [facility]

## 2020-02-18 RX ORDER — DONEPEZIL HYDROCHLORIDE 5 MG/1
5 TABLET, FILM COATED ORAL NIGHTLY
Qty: 90 TABLET | Refills: 3 | Status: SHIPPED | OUTPATIENT
Start: 2020-02-18 | End: 2021-02-08

## 2020-04-13 RX ORDER — BENAZEPRIL HYDROCHLORIDE 20 MG/1
20 TABLET ORAL 2 TIMES DAILY
Qty: 180 TABLET | Refills: 2 | Status: ON HOLD | OUTPATIENT
Start: 2020-04-13 | End: 2020-06-08 | Stop reason: SDUPTHER

## 2020-05-08 ENCOUNTER — VIRTUAL VISIT (OUTPATIENT)
Dept: CARDIOLOGY CLINIC | Age: 85
End: 2020-05-08
Payer: MEDICARE

## 2020-05-08 PROBLEM — I50.9 CONGESTIVE HEART FAILURE (HCC): Status: ACTIVE | Noted: 2020-05-08

## 2020-05-08 PROCEDURE — 1123F ACP DISCUSS/DSCN MKR DOCD: CPT | Performed by: INTERNAL MEDICINE

## 2020-05-08 PROCEDURE — G8428 CUR MEDS NOT DOCUMENT: HCPCS | Performed by: INTERNAL MEDICINE

## 2020-05-08 PROCEDURE — 4040F PNEUMOC VAC/ADMIN/RCVD: CPT | Performed by: INTERNAL MEDICINE

## 2020-05-08 PROCEDURE — 99214 OFFICE O/P EST MOD 30 MIN: CPT | Performed by: INTERNAL MEDICINE

## 2020-05-08 ASSESSMENT — ENCOUNTER SYMPTOMS
CHEST TIGHTNESS: 0
EYES NEGATIVE: 1
BLOOD IN STOOL: 0
COUGH: 0
NAUSEA: 0
SHORTNESS OF BREATH: 0
RESPIRATORY NEGATIVE: 1
WHEEZING: 0
GASTROINTESTINAL NEGATIVE: 1
STRIDOR: 0

## 2020-05-08 NOTE — PROGRESS NOTES
Subsequent Progress Note  Patient: Ismael Rick  YOB: 1935  MRN: [id number]    Chief Complaint: nstemi cad htn   Chief Complaint   Patient presents with    Coronary Artery Disease       CV Data:  6/2018 spect negative  5/2018 echo EF 60%  11/2018 LAD AMBER  12/2018 CUS <. Left Vertebral 100  1/2020 echo ef 60 midl AR and MR  1/2020 SPECT - NEGATIVE    Subjective/HPI: no cp no sob no falls no bleed. R Inguinal hernia     9/25/2019:  No cp no osb no falls no bleed eats well. Takes meds. C/o Diuretic and frequent urine. 1/15/2020 recently in ER and hosp for HTN. Now stable without meds changes. Possible he did not take his meds. Recent Labs showed mild Troponin elevaton. 2/5/2020 NO CP NO SOB NO FALLS NO BLEED. TAKES MEDS. EATS WELL.     5/8/2020 TELEHEALTH EVALUATION -- Audio/Visual (During [id number] public health emergency)      137/70 home BP. Doing well no cp no osb no falls no bleed takes meds. Eats well. Son moved in w him. Nonsmoker        EKG:    Past Medical History:   Diagnosis Date    Anticoagulant long-term use     Anxiety     CAD (coronary artery disease)     Carotid stenosis     2/2013 16-49%    CHF (congestive heart failure) (HCC)     Dementia (HCC)     Dementia (HCC)     Hyperlipidemia     Hypertension     [facility]     Osteoarthritis        Past Surgical History:   Procedure Laterality Date    CARPAL TUNNEL RELEASE  1998    CATARACT REMOVAL  2007    COLONOSCOPY  12/10/10,2007    [address] Rodríguez Letters [address]  COLONOSCOPY  2007    hx polyps needs 2015    COLONOSCOPY  9/21/15     DR. YARBROUGH     CORONARY ANGIOPLASTY WITH STENT PLACEMENT      DIAGNOSTIC CARDIAC CATH LAB PROCEDURE      HERNIA REPAIR         Family History   Problem Relation Age of Onset    Heart Disease Mother     High Blood Pressure Mother        Social History     Socioeconomic History    Marital status:       Spouse name: Not on file    Number of Lipids:  Lab Results   Component Value Date    CHOL 146 10/08/2019    CHOL 133 12/24/2018    CHOL 154 11/07/2018     Lab Results   Component Value Date    TRIG 93 10/08/2019    TRIG 119 12/24/2018    TRIG 125 11/07/2018     Lab Results   Component Value Date    HDL 53 10/08/2019    HDL 49 12/24/2018    HDL 50 11/07/2018     Lab Results   Component Value Date    LDLCALC 74 10/08/2019    LDLCALC 60 12/24/2018    LDLCALC 79 11/07/2018     No results found for: LABVLDL, VLDL  Lab Results   Component Value Date    CHOLHDLRATIO 3.8 09/10/2012    CHOLHDLRATIO 3.3 10/25/2011     CMP:    Lab Results   Component Value Date     01/08/2020    K 4.1 01/08/2020    K 4.0 01/06/2020    CL 99 01/08/2020    CO2 22 01/08/2020    BUN 23 01/08/2020    CREATININE 0.95 01/08/2020    GFRAA >60.0 01/08/2020    LABGLOM >60.0 01/08/2020    GLUCOSE 93 01/08/2020    GLUCOSE 96 10/25/2011    PROT 6.9 01/08/2020    LABALBU 3.9 01/08/2020    LABALBU 4.6 10/25/2011    CALCIUM 9.5 01/08/2020    BILITOT 0.5 01/08/2020    ALKPHOS 93 01/08/2020    AST 15 01/08/2020    ALT 12 01/08/2020     BMP:    Lab Results   Component Value Date     01/08/2020    K 4.1 01/08/2020    K 4.0 01/06/2020    CL 99 01/08/2020    CO2 22 01/08/2020    BUN 23 01/08/2020    LABALBU 3.9 01/08/2020    LABALBU 4.6 10/25/2011    CREATININE 0.95 01/08/2020    CALCIUM 9.5 01/08/2020    GFRAA >60.0 01/08/2020    LABGLOM >60.0 01/08/2020    GLUCOSE 93 01/08/2020    GLUCOSE 96 10/25/2011     Magnesium:    Lab Results   Component Value Date    MG 2.1 01/05/2020     TSH:  Lab Results   Component Value Date    TSH 2.280 10/08/2019       Patient Active Problem List   Diagnosis    Hyperlipidemia    Carotid stenosis, bilateral    D[facility] ([facility]    Hypertensive urgency    NSTEMI (non-ST elevated myocardial infarction) ([facility]    Essential hypertension    HESS (dyspnea on exertion)    Leg edema    Acute diastolic heart failure (HCC)    Chest pain    Angina, class III ([facility]    S/P cardiac cath    Anginal equivalent ([facility]    Dizziness    Coronary artery disease involving native coronary artery of native heart without angina pectoris    CHF (congestive heart failure) ([facility]    CAD (coronary artery disease)    Congestive heart failure (HCC)       There are no discontinued medications. Modified Medications    No medications on file       No orders of the defined types were placed in this encounter. Assessment/Plan:    1. Hyperlipidemia, unspecified hyperlipidemia type  Statin     2. NSTEMI (non-ST elevated myocardial infarction) ([facility]  no ANGINA     3. Essential hypertension   stable now. BP at home is well controlled. 4. Coronary artery disease involving native coronary artery of native heart without angina pectoris  No angina. Continue DAPT    5. R Inguinal hernia- asymptomatic . Avoid heavylifting or constipation- stable      Pursuant to the emergency declaration under the [facility] waiver authority and the [employer] Act, this Virtual Visit was conducted, with patient's consent, to reduce the patient's risk of exposure to COVID-19 and provide continuity of care for an established patient. Services were provided through a video synchronous discussion virtually to substitute for in-person clinic visit. Visit completed using [employer] me. Patient was located at home and I was located in the clinic. Counseling:  Heart Healthy Lifestyle, Decrease Alcohol Consumption, Take Precautions to Prevent Falls, Regular Exercise and Walk Daily    Return in about 1 month (around 6/8/2020) for Cardiovascular care. .      Electronically signed by Neftali Tejada MD on 5/8/2020 at 12:35 PM

## 2020-05-11 RX ORDER — PRAVASTATIN SODIUM 80 MG/1
80 TABLET ORAL EVERY EVENING
Qty: 30 TABLET | Refills: 3 | Status: SHIPPED | OUTPATIENT
Start: 2020-05-11 | End: 2020-09-08

## 2020-05-31 ENCOUNTER — APPOINTMENT (OUTPATIENT)
Dept: GENERAL RADIOLOGY | Age: 85
End: 2020-05-31
Payer: MEDICARE

## 2020-05-31 ENCOUNTER — HOSPITAL ENCOUNTER (OUTPATIENT)
Age: 85
Setting detail: OBSERVATION
Discharge: HOME | End: 2020-06-01
Attending: INTERNAL MEDICINE | Admitting: INTERNAL MEDICINE
Payer: MEDICARE

## 2020-05-31 PROBLEM — I21.4 NON-STEMI (NON-ST ELEVATED MYOCARDIAL INFARCTION) (HCC): Status: ACTIVE | Noted: 2020-05-31

## 2020-05-31 LAB
ALBUMIN SERPL-MCNC: 4.1 G/DL (ref 3.5–4.6)
ALP BLD-CCNC: 85 U/L (ref 35–104)
ALT SERPL-CCNC: 8 U/L (ref 0–41)
ANION GAP SERPL CALCULATED.3IONS-SCNC: 11 MEQ/L (ref 9–15)
APTT: 28.3 SEC (ref 24.4–36.8)
AST SERPL-CCNC: 10 U/L (ref 0–40)
BASOPHILS ABSOLUTE: 0 K/UL (ref 0–0.2)
BASOPHILS RELATIVE PERCENT: 0.9 %
BILIRUB SERPL-MCNC: 0.7 MG/DL (ref 0.2–0.7)
BUN BLDV-MCNC: 15 MG/DL (ref 8–23)
CALCIUM SERPL-MCNC: 9.3 MG/DL (ref 8.5–9.9)
CHLORIDE BLD-SCNC: 104 MEQ/L (ref 95–107)
CO2: 24 MEQ/L (ref 20–31)
CREAT SERPL-MCNC: 0.74 MG/DL (ref 0.7–1.2)
EOSINOPHILS ABSOLUTE: 0.4 K/UL (ref 0–0.7)
EOSINOPHILS RELATIVE PERCENT: 7.1 %
GFR AFRICAN AMERICAN: >60
GFR NON-AFRICAN AMERICAN: >60
GLOBULIN: 2.6 G/DL (ref 2.3–3.5)
GLUCOSE BLD-MCNC: 111 MG/DL (ref 70–99)
HCT VFR BLD CALC: 42 % (ref 42–52)
HEMOGLOBIN: 14.1 G/DL (ref 14–18)
INR BLD: 1
LYMPHOCYTES ABSOLUTE: 1.2 K/UL (ref 1–4.8)
LYMPHOCYTES RELATIVE PERCENT: 20.7 %
MCH RBC QN AUTO: 30.8 PG (ref 27–31.3)
MCHC RBC AUTO-ENTMCNC: 33.6 % (ref 33–37)
MCV RBC AUTO: 91.6 FL (ref 80–100)
MONOCYTES ABSOLUTE: 0.5 K/UL (ref 0.2–0.8)
MONOCYTES RELATIVE PERCENT: 8.8 %
NEUTROPHILS ABSOLUTE: 3.5 K/UL (ref 1.4–6.5)
NEUTROPHILS RELATIVE PERCENT: 62.5 %
PDW BLD-RTO: 14.4 % (ref 11.5–14.5)
PLATELET # BLD: 183 K/UL (ref 130–400)
POTASSIUM SERPL-SCNC: 4.1 MEQ/L (ref 3.4–4.9)
PRO-BNP: 269 PG/ML
PRO-BNP: 269 PG/ML
PROTHROMBIN TIME: 13.7 SEC (ref 12.3–14.9)
RBC # BLD: 4.59 M/UL (ref 4.7–6.1)
SARS-COV-2, NAAT: NOT DETECTED
SODIUM BLD-SCNC: 139 MEQ/L (ref 135–144)
TOTAL CK: 103 U/L (ref 0–190)
TOTAL PROTEIN: 6.7 G/DL (ref 6.3–8)
TROPONIN: 0.02 NG/ML (ref 0–0.01)
TROPONIN: 0.03 NG/ML (ref 0–0.01)
WBC # BLD: 5.6 K/UL (ref 4.8–10.8)

## 2020-05-31 PROCEDURE — 6370000000 HC RX 637 (ALT 250 FOR IP): Performed by: INTERNAL MEDICINE

## 2020-05-31 PROCEDURE — 80053 COMPREHEN METABOLIC PANEL: CPT

## 2020-05-31 PROCEDURE — 6360000002 HC RX W HCPCS: Performed by: PHYSICIAN ASSISTANT

## 2020-05-31 PROCEDURE — 82550 ASSAY OF CK (CPK): CPT

## 2020-05-31 PROCEDURE — 85730 THROMBOPLASTIN TIME PARTIAL: CPT

## 2020-05-31 PROCEDURE — 85610 PROTHROMBIN TIME: CPT

## 2020-05-31 PROCEDURE — 71045 X-RAY EXAM CHEST 1 VIEW: CPT

## 2020-05-31 PROCEDURE — 96372 THER/PROPH/DIAG INJ SC/IM: CPT

## 2020-05-31 PROCEDURE — 99285 EMERGENCY DEPT VISIT HI MDM: CPT

## 2020-05-31 PROCEDURE — 84484 ASSAY OF TROPONIN QUANT: CPT

## 2020-05-31 PROCEDURE — 85025 COMPLETE CBC W/AUTO DIFF WBC: CPT

## 2020-05-31 PROCEDURE — 93005 ELECTROCARDIOGRAM TRACING: CPT | Performed by: PHYSICIAN ASSISTANT

## 2020-05-31 PROCEDURE — 83880 ASSAY OF NATRIURETIC PEPTIDE: CPT

## 2020-05-31 PROCEDURE — 2580000003 HC RX 258: Performed by: INTERNAL MEDICINE

## 2020-05-31 PROCEDURE — G0378 HOSPITAL OBSERVATION PER HR: HCPCS

## 2020-05-31 PROCEDURE — 99220 PR INITIAL OBSERVATION CARE/DAY 70 MINUTES: CPT | Performed by: INTERNAL MEDICINE

## 2020-05-31 PROCEDURE — 93005 ELECTROCARDIOGRAM TRACING: CPT | Performed by: NEUROMUSCULOSKELETAL MEDICINE, SPORTS MEDICINE

## 2020-05-31 PROCEDURE — 36415 COLL VENOUS BLD VENIPUNCTURE: CPT

## 2020-05-31 PROCEDURE — 6370000000 HC RX 637 (ALT 250 FOR IP): Performed by: PHYSICIAN ASSISTANT

## 2020-05-31 PROCEDURE — U0002 COVID-19 LAB TEST NON-CDC: HCPCS

## 2020-05-31 RX ORDER — ASPIRIN 81 MG/1
81 TABLET, CHEWABLE ORAL DAILY
Status: DISCONTINUED | OUTPATIENT
Start: 2020-06-01 | End: 2020-05-31 | Stop reason: SDUPTHER

## 2020-05-31 RX ORDER — DONEPEZIL HYDROCHLORIDE 5 MG/1
5 TABLET, FILM COATED ORAL NIGHTLY
Status: DISCONTINUED | OUTPATIENT
Start: 2020-05-31 | End: 2020-06-01 | Stop reason: HOSPADM

## 2020-05-31 RX ORDER — DORZOLAMIDE HYDROCHLORIDE AND TIMOLOL MALEATE 20; 5 MG/ML; MG/ML
1 SOLUTION/ DROPS OPHTHALMIC 2 TIMES DAILY
Status: DISCONTINUED | OUTPATIENT
Start: 2020-05-31 | End: 2020-05-31 | Stop reason: CLARIF

## 2020-05-31 RX ORDER — ASPIRIN 81 MG/1
81 TABLET, CHEWABLE ORAL DAILY
Status: DISCONTINUED | OUTPATIENT
Start: 2020-05-31 | End: 2020-06-01 | Stop reason: HOSPADM

## 2020-05-31 RX ORDER — FINASTERIDE 5 MG/1
5 TABLET, FILM COATED ORAL DAILY
Status: DISCONTINUED | OUTPATIENT
Start: 2020-05-31 | End: 2020-06-01 | Stop reason: HOSPADM

## 2020-05-31 RX ORDER — DORZOLAMIDE HCL 20 MG/ML
1 SOLUTION/ DROPS OPHTHALMIC 2 TIMES DAILY
Status: DISCONTINUED | OUTPATIENT
Start: 2020-05-31 | End: 2020-06-01 | Stop reason: HOSPADM

## 2020-05-31 RX ORDER — TIMOLOL MALEATE 5 MG/ML
1 SOLUTION/ DROPS OPHTHALMIC 2 TIMES DAILY
Status: DISCONTINUED | OUTPATIENT
Start: 2020-05-31 | End: 2020-06-01 | Stop reason: HOSPADM

## 2020-05-31 RX ORDER — NITROGLYCERIN 0.4 MG/1
0.4 TABLET SUBLINGUAL EVERY 5 MIN PRN
Status: DISCONTINUED | OUTPATIENT
Start: 2020-05-31 | End: 2020-06-01 | Stop reason: HOSPADM

## 2020-05-31 RX ORDER — POLYETHYLENE GLYCOL 3350 17 G/17G
17 POWDER, FOR SOLUTION ORAL DAILY PRN
Status: DISCONTINUED | OUTPATIENT
Start: 2020-05-31 | End: 2020-06-01 | Stop reason: HOSPADM

## 2020-05-31 RX ORDER — SODIUM CHLORIDE 0.9 % (FLUSH) 0.9 %
10 SYRINGE (ML) INJECTION EVERY 12 HOURS SCHEDULED
Status: DISCONTINUED | OUTPATIENT
Start: 2020-05-31 | End: 2020-06-01 | Stop reason: HOSPADM

## 2020-05-31 RX ORDER — LISINOPRIL 20 MG/1
20 TABLET ORAL 2 TIMES DAILY
Status: DISCONTINUED | OUTPATIENT
Start: 2020-05-31 | End: 2020-06-01 | Stop reason: HOSPADM

## 2020-05-31 RX ORDER — POTASSIUM CHLORIDE 20 MEQ/1
20 TABLET, EXTENDED RELEASE ORAL DAILY
Status: DISCONTINUED | OUTPATIENT
Start: 2020-05-31 | End: 2020-06-01 | Stop reason: HOSPADM

## 2020-05-31 RX ORDER — CHOLECALCIFEROL (VITAMIN D3) 125 MCG
500 CAPSULE ORAL DAILY
Status: DISCONTINUED | OUTPATIENT
Start: 2020-05-31 | End: 2020-06-01 | Stop reason: HOSPADM

## 2020-05-31 RX ORDER — NITROGLYCERIN 0.4 MG/1
0.4 TABLET SUBLINGUAL EVERY 5 MIN PRN
Status: DISCONTINUED | OUTPATIENT
Start: 2020-05-31 | End: 2020-05-31 | Stop reason: SDUPTHER

## 2020-05-31 RX ORDER — ACETAMINOPHEN 325 MG/1
650 TABLET ORAL EVERY 6 HOURS PRN
Status: DISCONTINUED | OUTPATIENT
Start: 2020-05-31 | End: 2020-06-01 | Stop reason: HOSPADM

## 2020-05-31 RX ORDER — ATORVASTATIN CALCIUM 20 MG/1
20 TABLET, FILM COATED ORAL NIGHTLY
Status: DISCONTINUED | OUTPATIENT
Start: 2020-05-31 | End: 2020-05-31

## 2020-05-31 RX ORDER — TRIAMTERENE AND HYDROCHLOROTHIAZIDE 37.5; 25 MG/1; MG/1
0.5 TABLET ORAL DAILY
Status: DISCONTINUED | OUTPATIENT
Start: 2020-05-31 | End: 2020-06-01 | Stop reason: HOSPADM

## 2020-05-31 RX ORDER — PRAVASTATIN SODIUM 40 MG
80 TABLET ORAL EVERY EVENING
Status: DISCONTINUED | OUTPATIENT
Start: 2020-05-31 | End: 2020-06-01 | Stop reason: HOSPADM

## 2020-05-31 RX ORDER — CARVEDILOL 12.5 MG/1
12.5 TABLET ORAL 2 TIMES DAILY
Status: DISCONTINUED | OUTPATIENT
Start: 2020-05-31 | End: 2020-06-01 | Stop reason: HOSPADM

## 2020-05-31 RX ORDER — PROMETHAZINE HYDROCHLORIDE 12.5 MG/1
12.5 TABLET ORAL EVERY 6 HOURS PRN
Status: DISCONTINUED | OUTPATIENT
Start: 2020-05-31 | End: 2020-06-01 | Stop reason: HOSPADM

## 2020-05-31 RX ORDER — ASPIRIN 81 MG/1
324 TABLET, CHEWABLE ORAL ONCE
Status: COMPLETED | OUTPATIENT
Start: 2020-05-31 | End: 2020-05-31

## 2020-05-31 RX ORDER — ONDANSETRON 2 MG/ML
4 INJECTION INTRAMUSCULAR; INTRAVENOUS EVERY 6 HOURS PRN
Status: DISCONTINUED | OUTPATIENT
Start: 2020-05-31 | End: 2020-06-01 | Stop reason: HOSPADM

## 2020-05-31 RX ORDER — SODIUM CHLORIDE 0.9 % (FLUSH) 0.9 %
10 SYRINGE (ML) INJECTION PRN
Status: DISCONTINUED | OUTPATIENT
Start: 2020-05-31 | End: 2020-06-01 | Stop reason: HOSPADM

## 2020-05-31 RX ORDER — ACETAMINOPHEN 650 MG/1
650 SUPPOSITORY RECTAL EVERY 6 HOURS PRN
Status: DISCONTINUED | OUTPATIENT
Start: 2020-05-31 | End: 2020-06-01 | Stop reason: HOSPADM

## 2020-05-31 RX ORDER — LATANOPROST 50 UG/ML
1 SOLUTION/ DROPS OPHTHALMIC DAILY
Status: DISCONTINUED | OUTPATIENT
Start: 2020-05-31 | End: 2020-06-01 | Stop reason: HOSPADM

## 2020-05-31 RX ADMIN — DONEPEZIL HYDROCHLORIDE 5 MG: 5 TABLET, FILM COATED ORAL at 22:13

## 2020-05-31 RX ADMIN — DORZOLAMIDE HYDROCHLORIDE 1 DROP: 20 SOLUTION/ DROPS OPHTHALMIC at 13:23

## 2020-05-31 RX ADMIN — ASPIRIN 81 MG CHEWABLE TABLET 324 MG: 81 TABLET CHEWABLE at 08:20

## 2020-05-31 RX ADMIN — NITROGLYCERIN 1 INCH: 20 OINTMENT TOPICAL at 08:20

## 2020-05-31 RX ADMIN — POTASSIUM CHLORIDE 20 MEQ: 20 TABLET, EXTENDED RELEASE ORAL at 11:33

## 2020-05-31 RX ADMIN — CARVEDILOL 12.5 MG: 12.5 TABLET, FILM COATED ORAL at 22:13

## 2020-05-31 RX ADMIN — ENOXAPARIN SODIUM 100 MG: 100 INJECTION SUBCUTANEOUS at 08:20

## 2020-05-31 RX ADMIN — LISINOPRIL 20 MG: 20 TABLET ORAL at 11:34

## 2020-05-31 RX ADMIN — CYANOCOBALAMIN TAB 500 MCG 500 MCG: 500 TAB at 11:34

## 2020-05-31 RX ADMIN — PRAVASTATIN SODIUM 80 MG: 40 TABLET ORAL at 22:13

## 2020-05-31 RX ADMIN — Medication 10 ML: at 22:14

## 2020-05-31 RX ADMIN — LISINOPRIL 20 MG: 20 TABLET ORAL at 22:13

## 2020-05-31 RX ADMIN — TIMOLOL MALEATE 1 DROP: 5 SOLUTION/ DROPS OPHTHALMIC at 13:24

## 2020-05-31 RX ADMIN — TRIAMTERENE AND HYDROCHLOROTHIAZIDE 0.5 TABLET: 37.5; 25 TABLET ORAL at 11:33

## 2020-05-31 ASSESSMENT — PAIN SCALES - GENERAL
PAINLEVEL_OUTOF10: 0

## 2020-05-31 ASSESSMENT — ENCOUNTER SYMPTOMS
ABDOMINAL DISTENTION: 0
CHEST TIGHTNESS: 0
STRIDOR: 0
VOMITING: 0
RESPIRATORY NEGATIVE: 1
SHORTNESS OF BREATH: 0
COUGH: 0
ABDOMINAL PAIN: 0
WHEEZING: 0
EYES NEGATIVE: 1
EYE DISCHARGE: 0
COLOR CHANGE: 0
SORE THROAT: 0
CONSTIPATION: 0
GASTROINTESTINAL NEGATIVE: 1
BLOOD IN STOOL: 0
CHOKING: 0
RHINORRHEA: 0
DIARRHEA: 0
RECTAL PAIN: 0
BACK PAIN: 0
NAUSEA: 0

## 2020-05-31 ASSESSMENT — HEART SCORE: ECG: 0

## 2020-05-31 NOTE — ED TRIAGE NOTES
Pt states woke up in cold sweat denied any N, V, chest pain at that time but stated /82 and took his morning Coreg 12.5 mg PO BID approx 20 min PTA.   Upon arrival pt denies any pain, SOB, sweating, N, h/a.

## 2020-05-31 NOTE — ED PROVIDER NOTES
pallor and rash. Neurological: Positive for dizziness. Negative for tremors, syncope, weakness, numbness and headaches. Psychiatric/Behavioral: Negative for agitation and confusion. Except as noted above the remainder of the review of systems was reviewed and negative. PAST MEDICAL HISTORY     Past Medical History:   Diagnosis Date    Anticoagulant long-term use     Anxiety     CAD (coronary artery disease)     Carotid stenosis     2/2013 16-49%    [facility]     D[facility]     Dementia ([facility])     Hyperlipidemia     Hypertension     MI (myocardial infarction) ([facility]     Osteoarthritis          SURGICALHISTORY       Past Surgical History:   Procedure Laterality Date    CARPAL TUNNEL RELEASE  1998    CATARACT REMOVAL  2007    COLONOSCOPY  12/10/10,2007    DR Dobbins Oas - DONE AT [address] COLONOSCOPY  2007    hx polyps needs 2015    COLONOSCOPY  9/21/15     DR. YARBROUGH     CORONARY ANGIOPLASTY WITH STENT PLACEMENT      DIAGNOSTIC CARDIAC CATH LAB PROCEDURE      HERNIA REPAIR           CURRENT MEDICATIONS       Current Discharge Medication List      CONTINUE these medications which have NOT CHANGED    Details   pravastatin (PRAVACHOL) 80 MG tablet TAKE 1 TABLET BY MOUTH EVERY EVENING  Qty: 30 tablet, Refills: 3      benazepril (LOTENSIN) 20 MG tablet Take 1 tablet by mouth 2 times daily  Qty: 180 tablet, Refills: 2      donepezil (ARICEPT) 5 MG tablet TAKE 1 TABLET BY MOUTH NIGHTLY  Qty: 90 tablet, Refills: 3      carvedilol (COREG) 12.5 MG tablet Take 1 tablet by mouth 2 times daily  Qty: 60 tablet, Refills: 11      finasteride (PROSCAR) 5 MG tablet TAKE 1 TABLET BY MOUTH DAILY  Qty: 90 tablet, Refills: 02    Associated Diagnoses: Benign prostatic hyperplasia, unspecified whether lower urinary tract symptoms present; NSTEMI (non-ST elevated myocardial infarction) ([facility])      potassium chloride (KLOR-CON M) 20 MEQ extended release tablet Take 1 tablet by FAUSTINO  05/31/20 133

## 2020-05-31 NOTE — ED NOTES
Pt awake and alert. Pt family has been notified that since he is a Covid rule out that there are no visitors allowed until he has 2 negative covid tests. Family understands. Family requesting  That  Dr Nilsa Juan call Liliane Nielsen daughter with updates, Estee Arellano on floor notified. Pt to 1 west via cart on tele [employer]. No complaints pt pink warm and dry.      Lauren Mendoza RN  05/31/20 2042

## 2020-06-01 VITALS
BODY MASS INDEX: 31.1 KG/M2 | DIASTOLIC BLOOD PRESSURE: 63 MMHG | RESPIRATION RATE: 16 BRPM | TEMPERATURE: 97.4 F | HEIGHT: 69 IN | WEIGHT: 210 LBS | HEART RATE: 61 BPM | SYSTOLIC BLOOD PRESSURE: 101 MMHG | OXYGEN SATURATION: 96 %

## 2020-06-01 LAB
CHOLESTEROL, TOTAL: 130 MG/DL (ref 0–199)
EKG ATRIAL RATE: 58 BPM
EKG ATRIAL RATE: 60 BPM
EKG P AXIS: 44 DEGREES
EKG P AXIS: 51 DEGREES
EKG P-R INTERVAL: 172 MS
EKG P-R INTERVAL: 172 MS
EKG Q-T INTERVAL: 420 MS
EKG Q-T INTERVAL: 430 MS
EKG QRS DURATION: 82 MS
EKG QRS DURATION: 88 MS
EKG QTC CALCULATION (BAZETT): 412 MS
EKG QTC CALCULATION (BAZETT): 430 MS
EKG R AXIS: -11 DEGREES
EKG R AXIS: -18 DEGREES
EKG T AXIS: 13 DEGREES
EKG T AXIS: 16 DEGREES
EKG VENTRICULAR RATE: 58 BPM
EKG VENTRICULAR RATE: 60 BPM
HCT VFR BLD CALC: 40.6 % (ref 42–52)
HDLC SERPL-MCNC: 48 MG/DL (ref 40–59)
HEMOGLOBIN: 13.6 G/DL (ref 14–18)
LDL CHOLESTEROL CALCULATED: 65 MG/DL (ref 0–129)
MAGNESIUM: 2.1 MG/DL (ref 1.7–2.4)
MCH RBC QN AUTO: 30.8 PG (ref 27–31.3)
MCHC RBC AUTO-ENTMCNC: 33.5 % (ref 33–37)
MCV RBC AUTO: 92.1 FL (ref 80–100)
PDW BLD-RTO: 14.4 % (ref 11.5–14.5)
PLATELET # BLD: 177 K/UL (ref 130–400)
RBC # BLD: 4.41 M/UL (ref 4.7–6.1)
SARS-COV-2, NAAT: NOT DETECTED
TRIGL SERPL-MCNC: 86 MG/DL (ref 0–150)
WBC # BLD: 7.1 K/UL (ref 4.8–10.8)

## 2020-06-01 PROCEDURE — 96372 THER/PROPH/DIAG INJ SC/IM: CPT

## 2020-06-01 PROCEDURE — 80061 LIPID PANEL: CPT

## 2020-06-01 PROCEDURE — G0378 HOSPITAL OBSERVATION PER HR: HCPCS

## 2020-06-01 PROCEDURE — 83735 ASSAY OF MAGNESIUM: CPT

## 2020-06-01 PROCEDURE — 36415 COLL VENOUS BLD VENIPUNCTURE: CPT

## 2020-06-01 PROCEDURE — 93005 ELECTROCARDIOGRAM TRACING: CPT | Performed by: INTERNAL MEDICINE

## 2020-06-01 PROCEDURE — 2580000003 HC RX 258: Performed by: INTERNAL MEDICINE

## 2020-06-01 PROCEDURE — 93010 ELECTROCARDIOGRAM REPORT: CPT | Performed by: INTERNAL MEDICINE

## 2020-06-01 PROCEDURE — 6360000002 HC RX W HCPCS: Performed by: INTERNAL MEDICINE

## 2020-06-01 PROCEDURE — 99217 PR OBSERVATION CARE DISCHARGE MANAGEMENT: CPT | Performed by: INTERNAL MEDICINE

## 2020-06-01 PROCEDURE — U0002 COVID-19 LAB TEST NON-CDC: HCPCS

## 2020-06-01 PROCEDURE — 6370000000 HC RX 637 (ALT 250 FOR IP): Performed by: INTERNAL MEDICINE

## 2020-06-01 PROCEDURE — 85027 COMPLETE CBC AUTOMATED: CPT

## 2020-06-01 RX ORDER — ISOSORBIDE MONONITRATE 60 MG/1
60 TABLET, EXTENDED RELEASE ORAL DAILY
Qty: 30 TABLET | Refills: 3 | Status: ON HOLD
Start: 2020-06-02 | End: 2020-07-13 | Stop reason: HOSPADM

## 2020-06-01 RX ORDER — ISOSORBIDE MONONITRATE 60 MG/1
60 TABLET, EXTENDED RELEASE ORAL DAILY
Status: DISCONTINUED | OUTPATIENT
Start: 2020-06-01 | End: 2020-06-01 | Stop reason: HOSPADM

## 2020-06-01 RX ADMIN — TRIAMTERENE AND HYDROCHLOROTHIAZIDE 0.5 TABLET: 37.5; 25 TABLET ORAL at 08:17

## 2020-06-01 RX ADMIN — DORZOLAMIDE HYDROCHLORIDE 1 DROP: 20 SOLUTION/ DROPS OPHTHALMIC at 08:18

## 2020-06-01 RX ADMIN — LISINOPRIL 20 MG: 20 TABLET ORAL at 08:17

## 2020-06-01 RX ADMIN — ENOXAPARIN SODIUM 40 MG: 40 INJECTION SUBCUTANEOUS at 08:18

## 2020-06-01 RX ADMIN — POTASSIUM CHLORIDE 20 MEQ: 20 TABLET, EXTENDED RELEASE ORAL at 08:17

## 2020-06-01 RX ADMIN — LATANOPROST 1 DROP: 50 SOLUTION OPHTHALMIC at 09:57

## 2020-06-01 RX ADMIN — ISOSORBIDE MONONITRATE 60 MG: 60 TABLET, EXTENDED RELEASE ORAL at 09:57

## 2020-06-01 RX ADMIN — ASPIRIN 81 MG 81 MG: 81 TABLET ORAL at 08:17

## 2020-06-01 RX ADMIN — CARVEDILOL 12.5 MG: 12.5 TABLET, FILM COATED ORAL at 08:17

## 2020-06-01 RX ADMIN — TIMOLOL MALEATE 1 DROP: 5 SOLUTION/ DROPS OPHTHALMIC at 08:18

## 2020-06-01 RX ADMIN — CYANOCOBALAMIN TAB 500 MCG 500 MCG: 500 TAB at 08:17

## 2020-06-01 RX ADMIN — FINASTERIDE 5 MG: 5 TABLET, FILM COATED ORAL at 08:21

## 2020-06-01 RX ADMIN — Medication 10 ML: at 08:18

## 2020-06-01 ASSESSMENT — ENCOUNTER SYMPTOMS
SHORTNESS OF BREATH: 0
EYES NEGATIVE: 1
NAUSEA: 0
BLOOD IN STOOL: 0
CHEST TIGHTNESS: 0
COUGH: 0
RESPIRATORY NEGATIVE: 1
STRIDOR: 0
WHEEZING: 0
GASTROINTESTINAL NEGATIVE: 1

## 2020-06-01 NOTE — PROGRESS NOTES
club or organization: None     Attends meetings of clubs or organizations: None     Relationship status: None    Intimate partner violence     Fear of current or ex partner: None     Emotionally abused: None     Physically abused: None     Forced sexual activity: None   Other Topics Concern    None   Social History Narrative    None       Subjective/HPI no further symptoms. No CP no Diaphoresis. Trops elevated but no CP    EKG: SR75        Review of Systems:   Review of Systems   Constitutional: Negative. Negative for diaphoresis and fatigue. HENT: Negative. Eyes: Negative. Respiratory: Negative. Negative for cough, chest tightness, shortness of breath, wheezing and stridor. Cardiovascular: Negative. Negative for chest pain, palpitations and leg swelling. Gastrointestinal: Negative. Negative for blood in stool and nausea. Genitourinary: Negative. Musculoskeletal: Negative. Skin: Negative. Neurological: Negative. Negative for dizziness, syncope, weakness and light-headedness. Hematological: Negative. Psychiatric/Behavioral: Negative. Physical Examination:    BP (!) 151/72   Pulse 63   Temp 97.4 °F (36.3 °C) (Axillary)   Resp 16   Ht 5' 9\" (1.753 m)   Wt 210 lb (95.3 kg)   SpO2 96%   BMI 31.01 kg/m²    Physical Exam   Constitutional: He appears healthy. No distress. HENT:   Normal cephalic and Atraumatic   Eyes: Pupils are equal, round, and reactive to light. Neck: Normal range of motion and thyroid normal. Neck supple. No JVD present. No neck adenopathy. No thyromegaly present. Cardiovascular: Normal rate, regular rhythm, intact distal pulses and normal pulses. Murmur heard. Pulmonary/Chest: Effort normal and breath sounds normal. He has no wheezes. He has no rales. He exhibits no tenderness. Abdominal: Soft. Bowel sounds are normal. There is no abdominal tenderness. Musculoskeletal: Normal range of motion. General: No tenderness or edema. Neurological: He is alert and oriented to person, place, and time. Skin: Skin is warm. No cyanosis. Nails show no clubbing.        LABS:  CBC:   Lab Results   Component Value Date    WBC 7.1 06/01/2020    RBC 4.41 06/01/2020    RBC 4.69 10/25/2011    HGB 13.6 06/01/2020    HCT 40.6 06/01/2020    MCV 92.1 06/01/2020    MCH 30.8 06/01/2020    MCHC 33.5 06/01/2020    RDW 14.4 06/01/2020     06/01/2020    MPV 9.2 08/27/2015     CBC with Differential:    Lab Results   Component Value Date    WBC 7.1 06/01/2020    RBC 4.41 06/01/2020    RBC 4.69 10/25/2011    HGB 13.6 06/01/2020    HCT 40.6 06/01/2020     06/01/2020    MCV 92.1 06/01/2020    MCH 30.8 06/01/2020    MCHC 33.5 06/01/2020    RDW 14.4 06/01/2020    LYMPHOPCT 20.7 05/31/2020    MONOPCT 8.8 05/31/2020    EOSPCT 6.2 10/25/2011    BASOPCT 0.9 05/31/2020    MONOSABS 0.5 05/31/2020    LYMPHSABS 1.2 05/31/2020    EOSABS 0.4 05/31/2020    BASOSABS 0.0 05/31/2020     CMP:    Lab Results   Component Value Date     05/31/2020    K 4.1 05/31/2020    K 4.0 01/06/2020     05/31/2020    CO2 24 05/31/2020    BUN 15 05/31/2020    CREATININE 0.74 05/31/2020    GFRAA >60.0 05/31/2020    LABGLOM >60.0 05/31/2020    GLUCOSE 111 05/31/2020    GLUCOSE 96 10/25/2011    PROT 6.7 05/31/2020    LABALBU 4.1 05/31/2020    LABALBU 4.6 10/25/2011    CALCIUM 9.3 05/31/2020    BILITOT 0.7 05/31/2020    ALKPHOS 85 05/31/2020    AST 10 05/31/2020    ALT 8 05/31/2020     BMP:    Lab Results   Component Value Date     05/31/2020    K 4.1 05/31/2020    K 4.0 01/06/2020     05/31/2020    CO2 24 05/31/2020    BUN 15 05/31/2020    LABALBU 4.1 05/31/2020    LABALBU 4.6 10/25/2011    CREATININE 0.74 05/31/2020    CALCIUM 9.3 05/31/2020    GFRAA >60.0 05/31/2020    LABGLOM >60.0 05/31/2020    GLUCOSE 111 05/31/2020    GLUCOSE 96 10/25/2011     Magnesium:    Lab Results   Component Value Date    MG 2.1 06/01/2020     Troponin:    Lab Results   Component Value Date

## 2020-06-01 NOTE — PROGRESS NOTES
Spiritual Care Services     Summary of Visit:  Advance directive visit. Nurse to deliver packet to patient due to contact isolation. Spiritual Assessment/Intervention/Outcomes:    Encounter Summary  Services provided to[de-identified] Patient  Referral/Consult From[de-identified] Nurse  Support System: Children  Continue Visiting: Yes(Possible AD follow up)  Complexity of Encounter: Moderate  Length of Encounter: 15 minutes  Advance Care Planning: Yes                 Advance Directives (For Healthcare)  Healthcare Directive: No, patient does not have an advance directive for healthcare treatment  Information on Healthcare Directives Requested: Yes  Patient Requests Assistance: Yes, referral made to   Advance Directives: Documents given, Documents explained(Nurse to deliver due to contact isolation.)           Values / Beliefs  Do you have any ethnic, cultural, sacramental, or spiritual Oriental orthodox needs you would like us to be aware of while you are in the hospital?: No    Care Plan:    Follow up as needed with patient for Advanced Directive assistance. Spiritual Care Services   Electronically signed by Lilia Whaley on 6/1/20 at 10:42 AM EDT     To reach a  for emotional and spiritual support, place an [facility] consult request.   If a  is needed immediately, dial 0 and ask to page the on-call .

## 2020-06-02 ENCOUNTER — TELEPHONE (OUTPATIENT)
Dept: FAMILY MEDICINE CLINIC | Age: 85
End: 2020-06-02

## 2020-06-07 ENCOUNTER — HOSPITAL ENCOUNTER (INPATIENT)
Age: 85
LOS: 1 days | Discharge: HOME | DRG: 247 | End: 2020-06-08
Attending: NEUROMUSCULOSKELETAL MEDICINE, SPORTS MEDICINE | Admitting: INTERNAL MEDICINE
Payer: MEDICARE

## 2020-06-07 PROBLEM — I24.9 ACS (ACUTE CORONARY SYNDROME) (HCC): Status: ACTIVE | Noted: 2020-06-07

## 2020-06-07 PROBLEM — I20.0 UNSTABLE ANGINA (HCC): Status: ACTIVE | Noted: 2020-06-07

## 2020-06-07 LAB
ALBUMIN SERPL-MCNC: 3.9 G/DL (ref 3.5–4.6)
ALP BLD-CCNC: 78 U/L (ref 35–104)
ALT SERPL-CCNC: 13 U/L (ref 0–41)
ANION GAP SERPL CALCULATED.3IONS-SCNC: 17 MEQ/L (ref 9–15)
AST SERPL-CCNC: 31 U/L (ref 0–40)
BACTERIA: ABNORMAL /HPF
BILIRUB SERPL-MCNC: 1.1 MG/DL (ref 0.2–0.7)
BILIRUBIN URINE: ABNORMAL
BLOOD, URINE: ABNORMAL
BUN BLDV-MCNC: 34 MG/DL (ref 8–23)
CALCIUM SERPL-MCNC: 9.6 MG/DL (ref 8.5–9.9)
CHLORIDE BLD-SCNC: 102 MEQ/L (ref 95–107)
CLARITY: CLEAR
CO2: 20 MEQ/L (ref 20–31)
COLOR: ABNORMAL
CREAT SERPL-MCNC: 1.39 MG/DL (ref 0.7–1.2)
EKG ATRIAL RATE: 72 BPM
EKG P AXIS: 19 DEGREES
EKG P-R INTERVAL: 172 MS
EKG Q-T INTERVAL: 416 MS
EKG QRS DURATION: 88 MS
EKG QTC CALCULATION (BAZETT): 455 MS
EKG R AXIS: -23 DEGREES
EKG T AXIS: 8 DEGREES
EKG VENTRICULAR RATE: 72 BPM
EPITHELIAL CELLS, UA: ABNORMAL /HPF (ref 0–5)
GFR AFRICAN AMERICAN: 59.1
GFR NON-AFRICAN AMERICAN: 48.9
GLOBULIN: 3.1 G/DL (ref 2.3–3.5)
GLUCOSE BLD-MCNC: 135 MG/DL (ref 70–99)
GLUCOSE URINE: NEGATIVE MG/DL
HCT VFR BLD CALC: 40.2 % (ref 42–52)
HEMOGLOBIN: 13.4 G/DL (ref 14–18)
HYALINE CASTS: ABNORMAL /HPF (ref 0–5)
KETONES, URINE: 40 MG/DL
LEUKOCYTE ESTERASE, URINE: ABNORMAL
MCH RBC QN AUTO: 30.7 PG (ref 27–31.3)
MCHC RBC AUTO-ENTMCNC: 33.3 % (ref 33–37)
MCV RBC AUTO: 92 FL (ref 80–100)
MUCUS: PRESENT /LPF
NITRITE, URINE: POSITIVE
PDW BLD-RTO: 14 % (ref 11.5–14.5)
PH UA: 5 (ref 5–9)
PLATELET # BLD: 192 K/UL (ref 130–400)
POTASSIUM SERPL-SCNC: 4.6 MEQ/L (ref 3.4–4.9)
PRO-BNP: 403 PG/ML
PROTEIN UA: 30 MG/DL
RBC # BLD: 4.37 M/UL (ref 4.7–6.1)
RBC UA: ABNORMAL /HPF (ref 0–2)
SODIUM BLD-SCNC: 139 MEQ/L (ref 135–144)
SPECIFIC GRAVITY UA: 1.03 (ref 1–1.03)
TOTAL PROTEIN: 7 G/DL (ref 6.3–8)
TROPONIN: 0.03 NG/ML (ref 0–0.01)
UROBILINOGEN, URINE: 1 E.U./DL
WBC # BLD: 12.3 K/UL (ref 4.8–10.8)
WBC UA: ABNORMAL /HPF (ref 0–5)

## 2020-06-07 PROCEDURE — 80053 COMPREHEN METABOLIC PANEL: CPT

## 2020-06-07 PROCEDURE — 87077 CULTURE AEROBIC IDENTIFY: CPT

## 2020-06-07 PROCEDURE — 83880 ASSAY OF NATRIURETIC PEPTIDE: CPT

## 2020-06-07 PROCEDURE — 2060000000 HC ICU INTERMEDIATE R&B

## 2020-06-07 PROCEDURE — 87086 URINE CULTURE/COLONY COUNT: CPT

## 2020-06-07 PROCEDURE — 36415 COLL VENOUS BLD VENIPUNCTURE: CPT

## 2020-06-07 PROCEDURE — 2580000003 HC RX 258: Performed by: INTERNAL MEDICINE

## 2020-06-07 PROCEDURE — 85027 COMPLETE CBC AUTOMATED: CPT

## 2020-06-07 PROCEDURE — 99223 1ST HOSP IP/OBS HIGH 75: CPT | Performed by: INTERNAL MEDICINE

## 2020-06-07 PROCEDURE — 6370000000 HC RX 637 (ALT 250 FOR IP): Performed by: INTERNAL MEDICINE

## 2020-06-07 PROCEDURE — 6830039000 HC L3 TRAUMA ALERT

## 2020-06-07 PROCEDURE — 87186 SC STD MICRODIL/AGAR DIL: CPT

## 2020-06-07 PROCEDURE — 6360000002 HC RX W HCPCS: Performed by: INTERNAL MEDICINE

## 2020-06-07 PROCEDURE — 99285 EMERGENCY DEPT VISIT HI MDM: CPT

## 2020-06-07 PROCEDURE — 81001 URINALYSIS AUTO W/SCOPE: CPT

## 2020-06-07 PROCEDURE — 6370000000 HC RX 637 (ALT 250 FOR IP): Performed by: NEUROMUSCULOSKELETAL MEDICINE, SPORTS MEDICINE

## 2020-06-07 PROCEDURE — 84484 ASSAY OF TROPONIN QUANT: CPT

## 2020-06-07 PROCEDURE — 93005 ELECTROCARDIOGRAM TRACING: CPT | Performed by: NEUROMUSCULOSKELETAL MEDICINE, SPORTS MEDICINE

## 2020-06-07 RX ORDER — SODIUM CHLORIDE 0.9 % (FLUSH) 0.9 %
10 SYRINGE (ML) INJECTION PRN
Status: DISCONTINUED | OUTPATIENT
Start: 2020-06-07 | End: 2020-06-08 | Stop reason: SDUPTHER

## 2020-06-07 RX ORDER — ACETAMINOPHEN 325 MG/1
650 TABLET ORAL EVERY 4 HOURS PRN
Status: DISCONTINUED | OUTPATIENT
Start: 2020-06-07 | End: 2020-06-08 | Stop reason: HOSPADM

## 2020-06-07 RX ORDER — CHOLECALCIFEROL (VITAMIN D3) 125 MCG
500 CAPSULE ORAL DAILY
Status: DISCONTINUED | OUTPATIENT
Start: 2020-06-07 | End: 2020-06-08 | Stop reason: HOSPADM

## 2020-06-07 RX ORDER — ASPIRIN 81 MG/1
81 TABLET ORAL DAILY
Status: DISCONTINUED | OUTPATIENT
Start: 2020-06-07 | End: 2020-06-08 | Stop reason: HOSPADM

## 2020-06-07 RX ORDER — ISOSORBIDE MONONITRATE 60 MG/1
60 TABLET, EXTENDED RELEASE ORAL DAILY
Status: DISCONTINUED | OUTPATIENT
Start: 2020-06-07 | End: 2020-06-08 | Stop reason: HOSPADM

## 2020-06-07 RX ORDER — PRAVASTATIN SODIUM 40 MG
80 TABLET ORAL EVERY EVENING
Status: DISCONTINUED | OUTPATIENT
Start: 2020-06-07 | End: 2020-06-08 | Stop reason: HOSPADM

## 2020-06-07 RX ORDER — DORZOLAMIDE HYDROCHLORIDE AND TIMOLOL MALEATE 20; 5 MG/ML; MG/ML
1 SOLUTION/ DROPS OPHTHALMIC 2 TIMES DAILY
Status: DISCONTINUED | OUTPATIENT
Start: 2020-06-07 | End: 2020-06-08 | Stop reason: HOSPADM

## 2020-06-07 RX ORDER — POTASSIUM CHLORIDE 20 MEQ/1
20 TABLET, EXTENDED RELEASE ORAL DAILY
Status: DISCONTINUED | OUTPATIENT
Start: 2020-06-07 | End: 2020-06-08 | Stop reason: HOSPADM

## 2020-06-07 RX ORDER — TRIAMTERENE AND HYDROCHLOROTHIAZIDE 37.5; 25 MG/1; MG/1
0.5 TABLET ORAL DAILY
Status: DISCONTINUED | OUTPATIENT
Start: 2020-06-07 | End: 2020-06-08 | Stop reason: HOSPADM

## 2020-06-07 RX ORDER — CARVEDILOL 12.5 MG/1
12.5 TABLET ORAL 2 TIMES DAILY
Status: DISCONTINUED | OUTPATIENT
Start: 2020-06-07 | End: 2020-06-08 | Stop reason: HOSPADM

## 2020-06-07 RX ORDER — SODIUM CHLORIDE 0.9 % (FLUSH) 0.9 %
10 SYRINGE (ML) INJECTION EVERY 12 HOURS SCHEDULED
Status: DISCONTINUED | OUTPATIENT
Start: 2020-06-07 | End: 2020-06-08 | Stop reason: SDUPTHER

## 2020-06-07 RX ORDER — DONEPEZIL HYDROCHLORIDE 5 MG/1
5 TABLET, FILM COATED ORAL NIGHTLY
Status: DISCONTINUED | OUTPATIENT
Start: 2020-06-07 | End: 2020-06-08 | Stop reason: HOSPADM

## 2020-06-07 RX ORDER — SODIUM CHLORIDE 9 MG/ML
INJECTION, SOLUTION INTRAVENOUS CONTINUOUS
Status: DISCONTINUED | OUTPATIENT
Start: 2020-06-07 | End: 2020-06-08

## 2020-06-07 RX ORDER — FINASTERIDE 5 MG/1
5 TABLET, FILM COATED ORAL DAILY
Status: DISCONTINUED | OUTPATIENT
Start: 2020-06-07 | End: 2020-06-08 | Stop reason: HOSPADM

## 2020-06-07 RX ORDER — LISINOPRIL 20 MG/1
20 TABLET ORAL DAILY
Status: DISCONTINUED | OUTPATIENT
Start: 2020-06-07 | End: 2020-06-08 | Stop reason: HOSPADM

## 2020-06-07 RX ORDER — LATANOPROST 50 UG/ML
1 SOLUTION/ DROPS OPHTHALMIC NIGHTLY
Status: DISCONTINUED | OUTPATIENT
Start: 2020-06-07 | End: 2020-06-08 | Stop reason: HOSPADM

## 2020-06-07 RX ORDER — NITROGLYCERIN 0.4 MG/1
0.4 TABLET SUBLINGUAL EVERY 5 MIN PRN
Status: DISCONTINUED | OUTPATIENT
Start: 2020-06-07 | End: 2020-06-08 | Stop reason: HOSPADM

## 2020-06-07 RX ORDER — ASPIRIN 81 MG/1
324 TABLET, CHEWABLE ORAL ONCE
Status: COMPLETED | OUTPATIENT
Start: 2020-06-07 | End: 2020-06-07

## 2020-06-07 RX ADMIN — DORZOLAMIDE HYDROCHLORIDE AND TIMOLOL MALEATE 1 DROP: 20; 5 SOLUTION/ DROPS OPHTHALMIC at 20:20

## 2020-06-07 RX ADMIN — ISOSORBIDE MONONITRATE 60 MG: 60 TABLET, EXTENDED RELEASE ORAL at 12:27

## 2020-06-07 RX ADMIN — FINASTERIDE 5 MG: 5 TABLET, FILM COATED ORAL at 12:27

## 2020-06-07 RX ADMIN — PRAVASTATIN SODIUM 80 MG: 40 TABLET ORAL at 20:20

## 2020-06-07 RX ADMIN — CEFTRIAXONE SODIUM 1 G: 1 INJECTION, POWDER, FOR SOLUTION INTRAMUSCULAR; INTRAVENOUS at 12:25

## 2020-06-07 RX ADMIN — DORZOLAMIDE HYDROCHLORIDE AND TIMOLOL MALEATE 1 DROP: 20; 5 SOLUTION/ DROPS OPHTHALMIC at 12:33

## 2020-06-07 RX ADMIN — LISINOPRIL 20 MG: 20 TABLET ORAL at 12:27

## 2020-06-07 RX ADMIN — TRIAMTERENE AND HYDROCHLOROTHIAZIDE 0.5 TABLET: 37.5; 25 TABLET ORAL at 12:32

## 2020-06-07 RX ADMIN — CARVEDILOL 12.5 MG: 12.5 TABLET, FILM COATED ORAL at 12:27

## 2020-06-07 RX ADMIN — POTASSIUM CHLORIDE 20 MEQ: 20 TABLET, EXTENDED RELEASE ORAL at 12:27

## 2020-06-07 RX ADMIN — LATANOPROST 1 DROP: 50 SOLUTION OPHTHALMIC at 20:20

## 2020-06-07 RX ADMIN — CARVEDILOL 12.5 MG: 12.5 TABLET, FILM COATED ORAL at 20:21

## 2020-06-07 RX ADMIN — SODIUM CHLORIDE: 9 INJECTION, SOLUTION INTRAVENOUS at 15:33

## 2020-06-07 RX ADMIN — Medication 10 ML: at 12:25

## 2020-06-07 RX ADMIN — ASPIRIN 81 MG 324 MG: 81 TABLET ORAL at 04:27

## 2020-06-07 RX ADMIN — CYANOCOBALAMIN TAB 500 MCG 500 MCG: 500 TAB at 12:32

## 2020-06-07 RX ADMIN — ASPIRIN 81 MG: 81 TABLET, COATED ORAL at 12:27

## 2020-06-07 RX ADMIN — DONEPEZIL HYDROCHLORIDE 5 MG: 5 TABLET, FILM COATED ORAL at 20:20

## 2020-06-07 ASSESSMENT — ENCOUNTER SYMPTOMS
SHORTNESS OF BREATH: 0
CHEST TIGHTNESS: 0
APNEA: 0
NAUSEA: 0
DIARRHEA: 0
VOMITING: 0
COLOR CHANGE: 0
ABDOMINAL DISTENTION: 0

## 2020-06-07 ASSESSMENT — PAIN SCALES - GENERAL
PAINLEVEL_OUTOF10: 0

## 2020-06-07 NOTE — H&P
History and Physical  Patient: Santiago Serrano  Unit/Bed:W164/W164-01  YOB: 1935  MRN: [id number]  Acct: [de-identified]   Admitting Diagnosis: Unstable angina ([facility] [I20.0]  Unstable angina ([facility] [I20.0]  Admit Date:  2020  Hospital Day: 0      Chief Complaint:   Dizziness near syncope      History of Present Illness:  Very pleasant 44-year-old gentleman who used to be with . Lately has been with Dr. Marv Mathias. Controlled coronary artery disease status post angioplasty stent to the LAD. 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 Son lives with him. Had his wife  in 2018 because of pancreatic cancer. She struggled with it for almost a year and a half. Then he sustained MI followed by angioplasty of the LAD. He was recently told by Dr. Thor Alford that he may need to have catheterization. Stress test in 2020 was negative. Echo showed mild AI and mild MR.  LV ejection fraction normal.  No definite chest pain.   Troponins are positive    No Known Allergies    Current Facility-Administered Medications   Medication Dose Route Frequency Provider Last Rate Last Dose    sodium chloride flush 0.9 % injection 10 mL  10 mL Intravenous 2 times per day Awa Bailon MD        sodium chloride flush 0.9 % injection 10 mL  10 mL Intravenous PRN Awa Bailon MD        acetaminophen (TYLENOL) tablet 650 mg  650 mg Oral Q4H PRN Awa Bailon MD           PMHx:  Past Medical History:   Diagnosis Date    Anticoagulant long-term use     Anxiety     CAD (coronary artery disease)     Carotid stenosis     2013 16-49%    CH[facility]congestive heart failure) ([facility]     Dementia (HCC)     Dementia (HCC)     Hyperlipidemia     Hypertension     MI (myocardial infarction) ([facility]     Osteoarthritis        PSHx:  Past Surgical History:   Procedure Laterality Date    CARPAL TUNNEL RELEASE      CATARACT REMOVAL      COLONOSCOPY  12/10/10,2007    DR Kristina Dumont [facility] dizziness, syncope, weakness, light-headedness, numbness and headaches. Psychiatric/Behavioral: Negative for agitation, behavioral problems and confusion. The patient is not nervous/anxious and is not hyperactive. All other systems reviewed and are negative. Physical Examination:    BP (!) 124/55   Pulse 70   Temp 97.5 °F (36.4 °C) (Oral)   Resp 18   Ht 5' 9\" (1.753 m)   Wt 210 lb (95.3 kg)   SpO2 96%   BMI 31.01 kg/m²    Physical Exam  Constitutional:       Appearance: He is well-developed. HENT:      Head: Normocephalic and atraumatic. Right Ear: External ear normal.      Left Ear: External ear normal.   Eyes:      Conjunctiva/sclera: Conjunctivae normal.      Pupils: Pupils are equal, round, and reactive to light. Neck:      Musculoskeletal: Normal range of motion and neck supple. Cardiovascular:      Rate and Rhythm: Normal rate and regular rhythm. Heart sounds: Normal heart sounds. Pulmonary:      Effort: Pulmonary effort is normal.      Breath sounds: Normal breath sounds. Abdominal:      General: Bowel sounds are normal.      Palpations: Abdomen is soft. Musculoskeletal: Normal range of motion. Skin:     General: Skin is warm and dry. Neurological:      Mental Status: He is alert and oriented to person, place, and time. Deep Tendon Reflexes: Reflexes are normal and symmetric. Psychiatric:         Behavior: Behavior normal.         Thought Content:  Thought content normal.         Judgment: Judgment normal.          LABS:  CBC:   Lab Results   Component Value Date    WBC 12.3 06/07/2020    RBC 4.37 06/07/2020    RBC 4.69 10/25/2011    HGB 13.4 06/07/2020    HCT 40.2 06/07/2020    MCV 92.0 06/07/2020    MCH 30.7 06/07/2020    MCHC 33.3 06/07/2020    RDW 14.0 06/07/2020     06/07/2020    MPV 9.2 08/27/2015     CBC with Differential:   Lab Results   Component Value Date    WBC 12.3 06/07/2020    RBC 4.37 06/07/2020    RBC 4.69 10/25/2011    HGB 13.4 06/07/2020    HCT 40.2 06/07/2020     06/07/2020    MCV 92.0 06/07/2020    MCH 30.7 06/07/2020    MCHC 33.3 06/07/2020    RDW 14.0 06/07/2020    LYMPHOPCT 20.7 05/31/2020    MONOPCT 8.8 05/31/2020    EOSPCT 6.2 10/25/2011    BASOPCT 0.9 05/31/2020    MONOSABS 0.5 05/31/2020    LYMPHSABS 1.2 05/31/2020    EOSABS 0.4 05/31/2020    BASOSABS 0.0 05/31/2020     CMP:    Lab Results   Component Value Date     06/07/2020    K 4.6 06/07/2020    K 4.0 01/06/2020     06/07/2020    CO2 20 06/07/2020    BUN 34 06/07/2020    CREATININE 1.39 06/07/2020    GFRAA 59.1 06/07/2020    LABGLOM 48.9 06/07/2020    GLUCOSE 135 06/07/2020    GLUCOSE 96 10/25/2011    PROT 7.0 06/07/2020    LABALBU 3.9 06/07/2020    LABALBU 4.6 10/25/2011    CALCIUM 9.6 06/07/2020    BILITOT 1.1 06/07/2020    ALKPHOS 78 06/07/2020    AST 31 06/07/2020    ALT 13 06/07/2020     BMP:    Lab Results   Component Value Date     06/07/2020    K 4.6 06/07/2020    K 4.0 01/06/2020     06/07/2020    CO2 20 06/07/2020    BUN 34 06/07/2020    LABALBU 3.9 06/07/2020    LABALBU 4.6 10/25/2011    CREATININE 1.39 06/07/2020    CALCIUM 9.6 06/07/2020    GFRAA 59.1 06/07/2020    LABGLOM 48.9 06/07/2020    GLUCOSE 135 06/07/2020    GLUCOSE 96 10/25/2011     Magnesium:    Lab Results   Component Value Date    MG 2.1 06/01/2020     Troponin:    Lab Results   Component Value Date    TROPONINI 0.027 06/07/2020     Recent Labs     06/07/20  0245   PROBNP 403     No results for input(s): INR in the last 72 hours. RADIOLOGY:  No results found. EKG:  Assessment:    Active Hospital Problems    Diagnosis Date Noted    Unstable angina ([facility] [I20.0] 06/07/2020     1. Plan:  1. Cardiac catheterization tomorrow by Dr. Alex Hannon. 2.  Echo  3.          Electronically signed by Awa Bailon MD on 6/7/2020 at 10:40 AM

## 2020-06-07 NOTE — CARE COORDINATION
Met with patient to confirm CMI info and he does have son living with him. Pt states he just walked to bathroom alone and feels he is ambulating fine. Rounds done w RN Sukhdeep Caro and she feels he needs PT/OT eval. I asked pt about needing strengthening and he said he has never had to go anywhere and will go home tomorrow after his procedure. I also asked pt about HHC and therapy at home and he declines at this time

## 2020-06-07 NOTE — ED NOTES
Dr. Corinne Vanegas at bedside. POC discussed with patient and daughter.      Natasha Shukla RN  06/07/20 0589

## 2020-06-07 NOTE — FLOWSHEET NOTE
Advance Care Planning     Non-Provider Advance Care Planning (ACP) Note    Date of ACP Conversation: 6/7/2020  Persons included in Conversation: patient  Length of ACP Conversation in minutes: <16 minutes (Non-Billable)    Conversation requested by: Other:      Authorized Decision Maker (if patient is incapable of making informed decisions): This person is:  Next of Kin by law (only applies in absence of above)        General ACP for ALL Patients with Decision Making Capacity:    Advance Directive Conversation with Patients who have not yet planned:  Importance of advance care planning, including choosing a healthcare agent to communicate patient's healthcare decisions if patient lost the ability to make decisions, such as after a sudden illness or accident    Review of Existing Advance Directive: (Select questions covered)  \"What medical information, if any, do you need related to advance care planning now? \"  Provide written materials    Interventions Provided:  Provided ACP educational materials:  Advance Directive Form

## 2020-06-07 NOTE — ED PROVIDER NOTES
reviewed and negative except for HPI. Except as noted above the remainder of the review of systems was reviewed and negative. PAST MEDICAL HISTORY     Past Medical History:   Diagnosis Date    Anticoagulant long-term use     Anxiety     CAD (coronary artery disease)     Carotid stenosis     2/2013 16-49%    CHF (congestive heart failure) (HCC)     Dementia (HCC)     Dementia (HCC)     Hyperlipidemia     Hypertension     [facility]) [facility]     Osteoarthritis          SURGICAL HISTORY       Past Surgical History:   Procedure Laterality Date    CARPAL TUNNEL RELEASE  1998    CATARACT REMOVAL  2007    COLONOSCOPY  12/10/10,2007    DR Edel Colon - DONE AT [address] COLONOSCOPY  2007    hx polyps needs 2015    COLONOSCOPY  9/21/15     DR. YARBROUGH     CORONARY ANGIOPLASTY WITH STENT PLACEMENT      DIAGNOSTIC CARDIAC CATH LAB PROCEDURE      HERNIA REPAIR           CURRENTMEDICATIONS       Previous Medications    ASPIRIN 81 MG TABLET    Take 1 tablet by mouth daily With Food    BENAZEPRIL (LOTENSIN) 20 MG TABLET    Take 1 tablet by mouth 2 times daily    CARVEDILOL (COREG) 12.5 MG TABLET    Take 1 tablet by mouth 2 times daily    DONEPEZIL (ARICEPT) 5 MG TABLET    TAKE 1 TABLET BY MOUTH NIGHTLY    DORZOLAMIDE-TIMOLOL (COSOPT) 22.3-6.8 MG/ML OPHTHALMIC SOLUTION    Place 1 drop into both eyes 2 times daily     ELASTIC BANDAGES & SUPPORTS (HERNIA SUPPORT RIGHT LARGE) MISC    Wear daily.   Dx: right inguinal hernia    ELASTIC BANDAGES & SUPPORTS (JOBST KNEE HIGH COMPRESSION SM) MISC    1 each by Does not apply route daily    FINASTERIDE (PROSCAR) 5 MG TABLET    TAKE 1 TABLET BY MOUTH DAILY    ISOSORBIDE MONONITRATE (IMDUR) 60 MG EXTENDED RELEASE TABLET    Take 1 tablet by mouth daily    LATANOPROST (XALATAN) 0.005 % OPHTHALMIC SOLUTION    use 1 drop in each eye every day    MULTIPLE VITAMINS-MINERALS (MULTIVITAMIN PO)    Take by mouth daily     NITROGLYCERIN (NITROSTAT) 0.4 MG SL TABLET 9.6   Total Protein Latest Ref Range: 6.3 - 8.0 g/dL 7.0   Pro-BNP Latest Units: pg/mL 403   Troponin Latest Ref Range: 0.000 - 0.010 ng/mL 0.027 (HH)   Albumin Latest Ref Range: 3.5 - 4.6 g/dL 3.9   Globulin Latest Ref Range: 2.3 - 3.5 g/dL 3.1   Alk Phos Latest Ref Range: 35 - 104 U/L 78   ALT Latest Ref Range: 0 - 41 U/L 13   AST Latest Ref Range: 0 - 40 U/L 31   Bilirubin Latest Ref Range: 0.2 - 0.7 mg/dL 1.1 (H)   WBC Latest Ref Range: 4.8 - 10.8 K/uL 12.3 (H)   RBC Latest Ref Range: 4.70 - 6.10 M/uL 4.37 (L)   Hemoglobin Quant Latest Ref Range: 14.0 - 18.0 g/dL 13.4 (L)   Hematocrit Latest Ref Range: 42.0 - 52.0 % 40.2 (L)   MCV Latest Ref Range: 80.0 - 100.0 fL 92.0   MCH Latest Ref Range: 27.0 - 31.3 pg 30.7   MCHC Latest Ref Range: 33.0 - 37.0 % 33.3   RDW Latest Ref Range: 11.5 - 14.5 % 14.0   Platelet Count Latest Ref Range: 130 - 400 K/uL 192     MDM    Saline lock and labs drawn. Patient medicated 4 baby aspirins were given. Discussed with cardiologist Dr. Charly Andersen and patient is admitted to the hospital.         FINAL IMPRESSION      1.  Unstable angina [facility])          DISPOSITION/PLAN   DISPOSITION Admitted 06/07/2020 04:16:18 AM        DISCHARGE MEDICATIONS:  Rosendo Trejo MD(electronically signed)  Attending Emergency Physician            Tari Tovar MD  06/07/20 0660       Tari Tovar MD  06/07/20 9860

## 2020-06-07 NOTE — FLOWSHEET NOTE
[facility] at bedside. Consents for cath signed by patient.  Electronically signed by Sirena Lopez RN on 6/7/2020 at 6:15 PM

## 2020-06-08 ENCOUNTER — APPOINTMENT (OUTPATIENT)
Dept: CARDIAC CATH/INVASIVE PROCEDURES | Age: 85
DRG: 247 | End: 2020-06-08
Payer: MEDICARE

## 2020-06-08 VITALS
TEMPERATURE: 97.2 F | HEART RATE: 63 BPM | RESPIRATION RATE: 22 BRPM | BODY MASS INDEX: 31.1 KG/M2 | WEIGHT: 210 LBS | HEIGHT: 69 IN | SYSTOLIC BLOOD PRESSURE: 163 MMHG | DIASTOLIC BLOOD PRESSURE: 77 MMHG | OXYGEN SATURATION: 99 %

## 2020-06-08 LAB
PERFORMED ON: ABNORMAL
PERFORMED ON: ABNORMAL
POC ACTIVATED CLOTTING TIME KAOLIN: 169 SEC (ref 82–152)
POC ACTIVATED CLOTTING TIME KAOLIN: 186 SEC (ref 82–152)
POC SAMPLE TYPE: ABNORMAL
POC SAMPLE TYPE: ABNORMAL

## 2020-06-08 PROCEDURE — 6370000000 HC RX 637 (ALT 250 FOR IP): Performed by: INTERNAL MEDICINE

## 2020-06-08 PROCEDURE — 6360000002 HC RX W HCPCS: Performed by: INTERNAL MEDICINE

## 2020-06-08 PROCEDURE — 97165 OT EVAL LOW COMPLEX 30 MIN: CPT

## 2020-06-08 PROCEDURE — C1769 GUIDE WIRE: HCPCS

## 2020-06-08 PROCEDURE — 92928 PRQ TCAT PLMT NTRAC ST 1 LES: CPT | Performed by: INTERNAL MEDICINE

## 2020-06-08 PROCEDURE — C9600 PERC DRUG-EL COR STENT SING: HCPCS | Performed by: INTERNAL MEDICINE

## 2020-06-08 PROCEDURE — C1725 CATH, TRANSLUMIN NON-LASER: HCPCS

## 2020-06-08 PROCEDURE — 93458 L HRT ARTERY/VENTRICLE ANGIO: CPT | Performed by: INTERNAL MEDICINE

## 2020-06-08 PROCEDURE — 2580000003 HC RX 258

## 2020-06-08 PROCEDURE — 2580000003 HC RX 258: Performed by: INTERNAL MEDICINE

## 2020-06-08 PROCEDURE — C1894 INTRO/SHEATH, NON-LASER: HCPCS

## 2020-06-08 PROCEDURE — 97161 PT EVAL LOW COMPLEX 20 MIN: CPT

## 2020-06-08 PROCEDURE — 4A023N7 MEASUREMENT OF CARDIAC SAMPLING AND PRESSURE, LEFT HEART, PERCUTANEOUS APPROACH: ICD-10-PCS | Performed by: INTERNAL MEDICINE

## 2020-06-08 PROCEDURE — 6360000004 HC RX CONTRAST MEDICATION: Performed by: INTERNAL MEDICINE

## 2020-06-08 PROCEDURE — 2709999900 HC NON-CHARGEABLE SUPPLY

## 2020-06-08 PROCEDURE — 027034Z DILATION OF CORONARY ARTERY, ONE ARTERY WITH DRUG-ELUTING INTRALUMINAL DEVICE, PERCUTANEOUS APPROACH: ICD-10-PCS | Performed by: INTERNAL MEDICINE

## 2020-06-08 PROCEDURE — 2500000003 HC RX 250 WO HCPCS

## 2020-06-08 PROCEDURE — 6370000000 HC RX 637 (ALT 250 FOR IP)

## 2020-06-08 PROCEDURE — C1887 CATHETER, GUIDING: HCPCS

## 2020-06-08 PROCEDURE — 99024 POSTOP FOLLOW-UP VISIT: CPT | Performed by: INTERNAL MEDICINE

## 2020-06-08 PROCEDURE — 6360000002 HC RX W HCPCS

## 2020-06-08 PROCEDURE — B2111ZZ FLUOROSCOPY OF MULTIPLE CORONARY ARTERIES USING LOW OSMOLAR CONTRAST: ICD-10-PCS | Performed by: INTERNAL MEDICINE

## 2020-06-08 PROCEDURE — 85347 COAGULATION TIME ACTIVATED: CPT

## 2020-06-08 RX ORDER — SODIUM CHLORIDE 9 MG/ML
INJECTION, SOLUTION INTRAVENOUS CONTINUOUS
Status: DISCONTINUED | OUTPATIENT
Start: 2020-06-08 | End: 2020-06-08

## 2020-06-08 RX ORDER — SODIUM CHLORIDE 0.9 % (FLUSH) 0.9 %
10 SYRINGE (ML) INJECTION EVERY 12 HOURS SCHEDULED
Status: DISCONTINUED | OUTPATIENT
Start: 2020-06-08 | End: 2020-06-08

## 2020-06-08 RX ORDER — SODIUM CHLORIDE 0.9 % (FLUSH) 0.9 %
10 SYRINGE (ML) INJECTION PRN
Status: DISCONTINUED | OUTPATIENT
Start: 2020-06-08 | End: 2020-06-08

## 2020-06-08 RX ORDER — SODIUM CHLORIDE 9 MG/ML
INJECTION, SOLUTION INTRAVENOUS CONTINUOUS
Status: ACTIVE | OUTPATIENT
Start: 2020-06-08 | End: 2020-06-08

## 2020-06-08 RX ORDER — BENAZEPRIL HYDROCHLORIDE 20 MG/1
20 TABLET ORAL DAILY
Qty: 180 TABLET | Refills: 2 | Status: ON HOLD
Start: 2020-06-08 | End: 2020-07-13 | Stop reason: HOSPADM

## 2020-06-08 RX ORDER — CLOPIDOGREL BISULFATE 75 MG/1
75 TABLET ORAL DAILY
Status: DISCONTINUED | OUTPATIENT
Start: 2020-06-09 | End: 2020-06-08 | Stop reason: HOSPADM

## 2020-06-08 RX ORDER — CLOPIDOGREL BISULFATE 75 MG/1
75 TABLET ORAL DAILY
Qty: 30 TABLET | Refills: 3 | Status: SHIPPED | OUTPATIENT
Start: 2020-06-09 | End: 2020-06-10 | Stop reason: SDUPTHER

## 2020-06-08 RX ORDER — AMOXICILLIN AND CLAVULANATE POTASSIUM 875; 125 MG/1; MG/1
1 TABLET, FILM COATED ORAL 2 TIMES DAILY
Qty: 14 TABLET | Refills: 0 | Status: SHIPPED | OUTPATIENT
Start: 2020-06-08 | End: 2020-06-15

## 2020-06-08 RX ADMIN — CARVEDILOL 12.5 MG: 12.5 TABLET, FILM COATED ORAL at 09:10

## 2020-06-08 RX ADMIN — SODIUM CHLORIDE: 9 INJECTION, SOLUTION INTRAVENOUS at 04:11

## 2020-06-08 RX ADMIN — CEFTRIAXONE SODIUM 1 G: 1 INJECTION, POWDER, FOR SOLUTION INTRAMUSCULAR; INTRAVENOUS at 10:41

## 2020-06-08 RX ADMIN — ISOSORBIDE MONONITRATE 60 MG: 60 TABLET, EXTENDED RELEASE ORAL at 09:10

## 2020-06-08 RX ADMIN — IOPAMIDOL 125 ML: 612 INJECTION, SOLUTION INTRAVENOUS at 11:59

## 2020-06-08 RX ADMIN — ASPIRIN 81 MG: 81 TABLET, COATED ORAL at 09:10

## 2020-06-08 RX ADMIN — ACETAMINOPHEN 650 MG: 325 TABLET, FILM COATED ORAL at 04:09

## 2020-06-08 ASSESSMENT — PAIN SCALES - GENERAL
PAINLEVEL_OUTOF10: 0
PAINLEVEL_OUTOF10: 2

## 2020-06-08 NOTE — PROGRESS NOTES
Physical Therapy Med Surg Initial Assessment  Facility/Department: [facility]  [facility]       NAME: Isidro Abreu  : 1935 (82 y.o.)  MRN: [id number]  CODE STATUS: Full Code    Date of Service: 2020    Patient Diagnosis(es): Unstable angina ([address] [I20.0]  Unstable angina ([address] [I20.0]  ACS (acute coronary syndrome) [facility]) [I24.9]   Chief Complaint   Patient presents with    Other     difficulty ambulating x 6 months     Patient Active Problem List    Diagnosis Date Noted    Congestive heart failure ([address] 2020     Priority: High    Unstable angina ([address] 2020    ACS (acute coronary syndrome) ([address] 2020    Non-STEMI (non-ST elevated myocardial infarction) ([address] 2020    CHF (congestive heart failure) ([address]     CAD (coronary artery disease)     Coronary artery disease involving native coronary artery of native heart without angina pectoris 2019    Dizziness     Anginal equivalent ([address] 2018    S/P cardiac cath 2018    Angina, class III ([address]     Chest pain 2018    HESS (dyspnea on exertion) 2018    Leg edema 2018    Acute diastolic heart failure ([address] 2018    Essential hypertension 2018    NSTEMI (non-ST elevated myocardial infarction) ([address] 2018    Hypertensive urgency 2018    Dementia ([address]     Carotid stenosis, bilateral 2013    Hyperlipidemia         Past Medical History:   Diagnosis Date    Anticoagulant long-term use     Anxiety     CAD (coronary artery disease)     Carotid stenosis     2013 16-49%    CHF (congestive heart failure) ([address]     Dementia ([address]     Dementia ([address]     Hyperlipidemia     Hypertension     MI (myocardial infarction) ([address]     Osteoarthritis      Past Surgical History:   Procedure Laterality Date    CARPAL TUNNEL RELEASE  1998    CATARACT REMOVAL      COLONOSCOPY  12/10/10,2007    DR Bobbi Baca - DONE AT [address] [place] [address] 2007    hx polyps needs 2015    COLONOSCOPY  9/21/15     DR. YARBROUGH     CORONARY ANGIOPLASTY WITH STENT PLACEMENT      DIAGNOSTIC CARDIAC CATH LAB PROCEDURE      HERNIA REPAIR         Chart Reviewed: Yes  Patient assessed for rehabilitation services?: Yes  General Comment  Comments: Pt agreeable to PT evaluation. Restrictions:  Restrictions/Precautions: Fall Risk     SUBJECTIVE: Subjective: \"I'm doing ok. \"    Pain     Post Treatment Pain Screening:   Pain Screening  Patient Currently in Pain: No  Pain Assessment  Pain Assessment: 0-10  Pain Level: 0    Prior Level of Function:  Social/Functional History  Lives With: Son  Type of Home: House  Home Layout: One level  Home Access: Stairs to enter with rails  Entrance Stairs - Number of Steps: 1  Bathroom Shower/Tub: Walk-in shower  Bathroom Equipment: Grab bars in shower  Home Equipment: Rolling walker, Cane(rollator)  Receives Help From: Family  ADL Assistance: Independent  Homemaking Assistance: Independent(son completes most)  Homemaking Responsibilities: No  Ambulation Assistance: Independent(with device)  Transfer Assistance: Independent  Active : No  Patient's  Info: family  Occupation: Retired  Type of occupation: [employer]  IADL Comments: son compeletes    OBJECTIVE:   Vision: Impaired  Vision Exceptions: Wears glasses for reading  Hearing: Within functional limits    Cognition:  Overall Orientation Status: Within Functional Limits  Follows Commands: Within Functional Limits    Observation/Palpation  Posture: Fair    ROM:  RLE PROM: WFL  LLE PROM: WFL    Strength:  Strength RLE  Comment: grossly 4/5  Strength LLE  Comment: grossly 4/5    Neuro:  Balance  Sitting - Static: Good  Sitting - Dynamic: Good;-  Standing - Static: Fair;+  Standing - Dynamic: Fair        Sensation  Overall Sensation Status: WFL    Bed mobility  Supine to Sit: Minimal assistance  Sit to Supine: Stand by assistance    Transfers  Sit to Stand: Stand by assistance  Stand to sit: Stand by assistance  Comment: improper hand placement with transfer    Ambulation  Ambulation?: Yes  Ambulation 1  Surface: level tile  Device: Rolling Walker  Assistance: Stand by assistance  Quality of Gait: step to gait pattern, forward flexed posture  Gait Deviations: Decreased step length;Decreased step height;Slow Nette  Distance: 12 ft       ASSESSMENT:   Body structures, Functions, Activity limitations: Decreased strength;Decreased functional mobility ; Decreased endurance;Decreased balance;Decreased coordination;Decreased posture  Decision Making: Low Complexity  History: high  Exam: medium  Clinical Presentation: stable    Prognosis: Good    DISCHARGE RECOMMENDATIONS:  Discharge Recommendations: Continue to assess pending progress    Assessment: Patient demonstrates decline in functional mobility and balance. Further physical therapy indicated to return patient to independent level of function prior to D/C home. REQUIRES PT FOLLOW UP: Yes      PLAN OF CARE:  Plan  Times per week: 3-6  Current Treatment Recommendations: Strengthening, Balance Training, Functional Mobility Training, Transfer Training, Endurance Training, Gait Training, Neuromuscular Re-education, Home Exercise Program, Safety Education & Training, Patient/Caregiver Education & Training, Equipment Evaluation, Education, & procurement  Safety Devices  Type of devices: All fall risk precautions in place, Bed alarm in place, Call light within reach    Goals:  Long term goals  Long term goal 1: Patient will be independent with bed mobility. Long term goal 2: Patient will be independent with transfers. Long term goal 3: Patient will be independent with 150ft of gait using LRD. Long term goal 4: Patient will be independent with HEP.     [facility] (6 CLICK) BASIC MOBILITY  AM-PAC Inpatient Mobility Raw Score : 19     Therapy Time:   Individual   Time In 0930   Time Out 0941   Minutes [address], [place], 06/08/20 at 9:52 AM         Definitions for assistance levels  Independent = pt does not require any physical supervision or assistance from another person for activity completion. Device may be needed.   Stand by assistance = pt requires verbal cues or instructions from another person, close to but not touching, to perform the activity  Minimal assistance= pt performs 75% or more of the activity; assistance is required to complete the activity  Moderate assistance= pt performs 50% of the activity; assistance is required to complete the activity  Maximal assistance = pt performs 25% of the activity; assistance is required to complete the activity  Dependent = pt requires total physical assistance to accomplish the task

## 2020-06-08 NOTE — PROGRESS NOTES
Arrived to pre/post from the cath lab and report was received from [facility]. 6 Vincentian sheath to right groin attached to pressure bag and flushes easily. No bleeding or hematoma to right groin. Attached to monitor and vitals are stable. Dr. Bill Ward reviewed results with pt and family.  Pt instructed to keep right leg straight

## 2020-06-08 NOTE — PROGRESS NOTES
Hospitalist Progress Note      PCP: Misti Pickett MD    Date of Admission: 6/7/2020    Chief Complaint:      Subjective: :  Patient seen and examined at bedside. He denies complaints this morning including dizziness, chest pain, shortness of breath. Plan for cardiac cath today. Medications:  Reviewed    Infusion Medications   Scheduled Medications    aspirin  81 mg Oral Daily    [Held by provider] lisinopril  20 mg Oral Daily    carvedilol  12.5 mg Oral BID    donepezil  5 mg Oral Nightly    dorzolamide-timolol  1 drop Both Eyes BID    finasteride  5 mg Oral Daily    isosorbide mononitrate  60 mg Oral Daily    latanoprost  1 drop Both Eyes Nightly    potassium chloride  20 mEq Oral Daily    pravastatin  80 mg Oral QPM    [Held by provider] triamterene-hydroCHLOROthiazide  0.5 tablet Oral Daily    vitamin B-12  500 mcg Oral Daily    cefTRIAXone (ROCEPHIN) IV  1 g Intravenous Q24H     PRN Meds: acetaminophen, nitroGLYCERIN      Intake/Output Summary (Last 24 hours) at 6/8/2020 1227  Last data filed at 6/8/2020 0411  Gross per 24 hour   Intake 460 ml   Output 850 ml   Net -390 ml       Exam:    /74   Pulse 58   Temp 97.9 °F (36.6 °C) (Oral)   Resp 18   Ht 5' 9\" (1.753 m)   Wt 210 lb (95.3 kg)   SpO2 96%   BMI 31.01 kg/m²     General appearance: No apparent distress, appears stated age and cooperative. HEENT: Pupils equal, round, and reactive to light. Conjunctivae/corneas clear. Neck: Supple, with full range of motion. No jugular venous distention. Trachea midline. Respiratory:  Normal respiratory effort. Clear to auscultation, bilaterally without Rales/Wheezes/Rhonchi. Cardiovascular: Regular rate and rhythm with normal S1/S2 without murmurs, rubs or gallops. Abdomen: Soft, non-tender, non-distended with normal bowel sounds. Musculoskeletal: No clubbing, cyanosis or edema bilaterally. Full range of motion without deformity.   Skin: Skin color, texture, turgor normal.  No discussed with nursing staff when appropriate. Family will be updated if and when appropriate. Diet: DIET CARDIAC;   Diet NPO Time Specified    Code Status: Full Code    PT/OT Eval           Electronically signed by Chasity Alexander DO on 6/8/2020 at 12:27 PM

## 2020-06-08 NOTE — DISCHARGE SUMMARY
Cardiology Discharge Summary      Patient Identification:  Jodi Canavan  : 1935  MRN: [id number]   Account: [de-identified]     Admit date: 2020  Discharge date: 20   Attending provider: Estiven Malave MD        Primary care provider: Vega Sarmiento MD     Admission Diagnoses:  Angina. Dizziness        Discharge Diagnoses: Active Hospital Problems    Diagnosis Date Noted    Unstable angina [facility]) [I20.0] 2020    ACS (acute coronary syndrome) [facility]) [I24.9] 2020          Hospital Course:   Jodi Canavan is a84 y.o. male admitted to [facility] on 2020 for dizziness and chest pain. Is a patient of Dr. Nilsa Juan. He was to undergo cardiac catheterization as outpatient. This was performed by Dr. Yaritza Colorado he underwent angioplasty OM1 will be discharged home follow-up with           Procedures:   Left heart cath coronary angiogram     Consults:       Examination:  /74   Pulse 58   Temp 97.9 °F (36.6 °C) (Oral)   Resp 18   Ht 5' 9\" (1.753 m)   Wt 210 lb (95.3 kg)   SpO2 96%   BMI 31.01 kg/m²    Physical Exam  Constitutional:       Appearance: He is well-developed. HENT:      Head: Normocephalic and atraumatic. Right Ear: External ear normal.      Left Ear: External ear normal.   Eyes:      Conjunctiva/sclera: Conjunctivae normal.      Pupils: Pupils are equal, round, and reactive to light. Neck:      Musculoskeletal: Normal range of motion and neck supple. Cardiovascular:      Rate and Rhythm: Normal rate and regular rhythm. Heart sounds: Normal heart sounds. Pulmonary:      Effort: Pulmonary effort is normal.      Breath sounds: Normal breath sounds. Abdominal:      General: Bowel sounds are normal.      Palpations: Abdomen is soft. Musculoskeletal: Normal range of motion. Skin:     General: Skin is warm and dry.    Neurological:      Mental Status: He is alert and Troponin    Collection Time: 06/07/20  2:45 AM   Result Value Ref Range    Troponin 0.027 (HH) 0.000 - 0.010 ng/mL   Brain Natriuretic Peptide    Collection Time: 06/07/20  2:45 AM   Result Value Ref Range    Pro- pg/mL   Urinalysis    Collection Time: 06/07/20  2:45 AM   Result Value Ref Range    Color, UA DARK YELLOW (A) Straw/Yellow    Clarity, UA Clear Clear    Glucose, Ur Negative Negative mg/dL    Bilirubin Urine SMALL (A) Negative    Ketones, Urine 40 (A) Negative mg/dL    Specific Gravity, UA 1.027 1.005 - 1.030    Blood, Urine LARGE (A) Negative    pH, UA 5.0 5.0 - 9.0    Protein, UA 30 (A) Negative mg/dL    Urobilinogen, Urine 1.0 <2.0 E.U./dL    Nitrite, Urine POSITIVE (A) Negative    Leukocyte Esterase, Urine SMALL (A) Negative   Microscopic Urinalysis    Collection Time: 06/07/20  2:45 AM   Result Value Ref Range    Mucus, UA Present None Seen /LPF    RBC, UA 5-10 (A) 0 - 2 /HPF    Bacteria, UA MODERATE (A) Negative /HPF    Hyaline Casts, UA 10-20 0 - 5 /HPF    WBC, UA 20-50 (A) 0 - 5 /HPF    Epithelial Cells, UA 0-2 0 - 5 /HPF   Culture, Urine    Collection Time: 06/07/20 11:30 AM   Result Value Ref Range    Organism Gram negative alayna (A)     Urine Culture, Routine 75,000 CFU/ml  ID and sensitivity to follow                Follow-up visits:   Dennise Salas MD  [address] [address] [facility]  [phone]             Assessment:  Active Hospital Problems    Diagnosis Date Noted    Unstable angina (HCC) [I20.0] 06/07/2020    ACS (acute coronary syndrome) ([facility] [address] [I24.9] 06/07/2020         Plan:   1. Discharge home  2.   Follow-up with Dr. Deneen Rosenbaum        Electronically signed by Sally Mckee 6/8/2020 at 1:56 PM

## 2020-06-08 NOTE — DISCHARGE INSTR - COC
Continuity of Care Form    Patient Name: Amanda Jewell   :  1935  MRN:  [id number]    Admit date:  2020  Discharge date:  ***    Code Status Order: Full Code   Advance Directives:   Advance Care Flowsheet Documentation     Date/Time Healthcare Directive Type of Healthcare Directive Copy in [address] Agent's Name Healthcare Agent's Phone Number    20 1001  No, patient does not have an advance directive for healthcare treatment -- -- -- -- --    20 0523  No, patient does not have an advance directive for healthcare treatment -- -- -- -- --          Admitting Physician:  Dino Chopra MD  PCP: Seble Young MD    Discharging Nurse: [facility] Unit/Room#: P062/K823-15  Discharging Unit Phone Number: ***    Emergency Contact:   Extended Emergency Contact Information  [facility] [address]  Phone: [phone]  Relation: Child  Secondary Emergency Contact: Olga Garcia   [facility] [address]  Phone: [phone]  Relation: Child    Past Surgical History:  Past Surgical History:   Procedure Laterality Date   [address] CATARACT REMOVAL      COLONOSCOPY  12/10/10,    [address] [facility] [address]      hx polyps needs     COLONOSCOPY  9/21/15     DR. YARBROUGH     CORONARY ANGIOPLASTY WITH STENT PLACEMENT      DIAGNOSTIC CARDIAC CATH LAB PROCEDURE      HERNIA REPAIR         Immunization History:   Immunization History   Administered Date(s) Administered    Influenza 10/12/2011, 10/08/2012, 10/10/2013    Influenza Virus Vaccine 10/16/2014    Influenza, High Dose (Fluzone 65 yrs and older) 2015, 2016, 2017, 10/20/2018    Influenza, Triv, inactivated, subunit, adjuvanted, IM (Fluad 65 yrs and older) 10/08/2019    Pneumococcal Conjugate 13-valent (Guqsnga44) 2016    Pneumococcal Polysaccharide (Rwhpgaims10) 10/16/2014       Active Problems:  Patient Active Problem List   Diagnosis Code    Hyperlipidemia E78.5    Carotid stenosis, bilateral I65.23    Dementia ([facility]) F03.90    Hypertensive urgency I16.0    NSTEMI (non-ST elevated myocardial infarction) ([facility]) I21.4    Essential hypertension I10    HESS (dyspnea on exertion) R06.09    Leg edema R60.0    Acute diastolic heart failure ([facility]) I50.31    Chest pain R07.9    Angina, class III ([facility]) I20.9    S/P cardiac cath Z98.890    Anginal equivalent ([facility]) I20.8    Dizziness R42    Coronary artery disease involving native coronary artery of native heart without angina pectoris I25.10    CHF (congestive heart failure) ([facility]) I50.9    CAD (coronary artery disease) I25.10    Congestive heart failure ([facility]) I50.9    Non-STEMI (non-ST elevated myocardial infarction) ([facility]) I21.4    Unstable angina ([facility]) I20.0    ACS (acute coronary syndrome) ([facility]) I24.9       Isolation/Infection:   Isolation          No Isolation        Patient Infection Status     Infection Onset Added Last Indicated Last Indicated By Review Planned Expiration Resolved Resolved By    None active    Resolved    COVID-19 Rule Out 05/31/20 05/31/20 06/01/20 COVID-19 (Ordered)   06/01/20 Rule-Out Test Resulted    COVID-19 Rule Out 05/31/20 05/31/20 05/31/20 COVID-19 (Ordered)   05/31/20 Rule-Out Test Resulted          Nurse Assessment:  Last Vital Signs: BP (!) 163/77   Pulse 63   Temp 97.2 °F (36.2 °C) (Oral)   Resp 22   Ht 5' 9\" (1.753 m)   Wt 210 lb (95.3 kg)   SpO2 99%   BMI 31.01 kg/m²     Last documented pain score (0-10 scale): Pain Level: 0  Last Weight:   Wt Readings from Last 1 Encounters:   06/07/20 210 lb (95.3 kg)     Mental Status:  {IP PT MENTAL STATUS:20030}    IV Access:  {[facility] IV ACCESS:439834494}    Nursing Mobility/ADLs:  Walking   {[facility] DME SSQX:562915817}  Transfer  {[facility] DME BRMM:044036350}  Bathing  {[facility] DME MBBT:216006394}  Dressing  {[facility] DME HXXQ:500047627}  Toileting  {P DME ECEW:855456083}  Feeding  {P DME VZTR:618383717}  Med Admin  {P DME EUJ:787696505}  Med Delivery   { GERDA MED Delivery:562601141}    Wound Care Documentation and Therapy:        Elimination:  Continence:   · Bowel: {YES / MD:16420}  · Bladder: {YES / QC:07938}  Urinary Catheter: {Urinary Catheter:610433140}   Colostomy/Ileostomy/Ileal Conduit: {YES / KH:34788}       Date of Last BM: ***    Intake/Output Summary (Last 24 hours) at 2020  Last data filed at 2020 0411  Gross per 24 hour   Intake --   Output 500 ml   Net -500 ml     I/O last 3 completed shifts:   In: 200 [P.O.:200]  Out: 850 [Urine:600; Emesis/NG output:250]    Safety Concerns:     [address] Safety Concerns:499201771}    Impairments/Disabilities:      [address] Impairments/Disabilities:852383400}    Nutrition Therapy:  Current Nutrition Therapy:   [address] Diet List:246161272}    Routes of Feeding: {[facility] DME Other Feedings:800367172}  Liquids: {Slp liquid thickness:80556}  Daily Fluid Restriction: {[facility] DME Yes amt example:091185174}  Last Modified Barium Swallow with Video (Video Swallowing Test): {Done Not Done FTJV:286161904}    Treatments at the Time of Hospital Discharge:   Respiratory Treatments: ***  Oxygen Therapy:  {Therapy; copd oxygen:00633}  Ventilator:    {[facility] Vent PEYE:701094219}    Rehab Therapies: {THERAPEUTIC INTERVENTION:4211896128}  Weight Bearing Status/Restrictions: [employer]  Weight Bearin}  Other Medical Equipment (for information only, NOT a DME order):  {EQUIPMENT:059872985}  Other Treatments: ***    Patient's personal belongings (please select all that are sent with patient):  {[facility] DME Belongings:907767366}    RN SIGNATURE:  {Esignature:287104150}    CASE MANAGEMENT/SOCIAL WORK SECTION    Inpatient Status Date: ***    Readmission Risk Assessment Score:  Readmission Risk              Risk of Unplanned Readmission:        18           Discharging to Facility/ Agency · Name:   · Address:  · Phone:  · Fax:    Dialysis Facility (if applicable)   · Name:  · Address:  · Dialysis Schedule:  · Phone:  · Fax:    / signature: {Esignature:366318663}    PHYSICIAN SECTION    Prognosis: {Prognosis:2539911869}    Condition at Discharge: Rossy Gonzalez Patient Condition:271316369}    Rehab Potential (if transferring to Rehab): {Prognosis:8927747975}    Recommended Labs or Other Treatments After Discharge: ***    Physician Certification: I certify the above information and transfer of Ismael Frame  is necessary for the continuing treatment of the diagnosis listed and that he requires {Admit to Appropriate Level of Care:61923} for {GREATER/LESS:959739554} 30 days.      Update Admission H&P: {CHP DME Changes in LNDZ}    PHYSICIAN SIGNATURE:  {Esignature:200025418}

## 2020-06-09 ENCOUNTER — CARE COORDINATION (OUTPATIENT)
Dept: CASE MANAGEMENT | Age: 85
End: 2020-06-09

## 2020-06-09 LAB
ORGANISM: ABNORMAL
PERFORMED ON: ABNORMAL
PERFORMED ON: ABNORMAL
POC ACTIVATED CLOTTING TIME KAOLIN: 202 SEC (ref 82–152)
POC ACTIVATED CLOTTING TIME KAOLIN: 230 SEC (ref 82–152)
POC SAMPLE TYPE: ABNORMAL
POC SAMPLE TYPE: ABNORMAL
URINE CULTURE, ROUTINE: ABNORMAL

## 2020-06-09 PROCEDURE — 93010 ELECTROCARDIOGRAM REPORT: CPT | Performed by: INTERNAL MEDICINE

## 2020-06-10 ENCOUNTER — CARE COORDINATION (OUTPATIENT)
Dept: CASE MANAGEMENT | Age: 85
End: 2020-06-10

## 2020-06-10 RX ORDER — CLOPIDOGREL BISULFATE 75 MG/1
75 TABLET ORAL DAILY
Qty: 90 TABLET | Refills: 3 | Status: SHIPPED | OUTPATIENT
Start: 2020-06-10 | End: 2021-06-18

## 2020-06-12 ENCOUNTER — CARE COORDINATION (OUTPATIENT)
Dept: CASE MANAGEMENT | Age: 85
End: 2020-06-12

## 2020-06-12 NOTE — PROCEDURES
[address]                      [address], [address] [facility]                            CARDIAC CATHETERIZATION    PATIENT NAME: Viry Paulson                   :        1935  MED REC NO:   [id number]                            ROOM:       W164  ACCOUNT NO:   [de-identified]                           ADMIT DATE: 2020  PROVIDER:     Ben Noland MD    DATE OF PROCEDURE:  2020    PROCEDURE:  1. Coronary angiography. 2.  Left ventriculography. 3.  Hemodynamic measurement of left ventricle. 4.  Percutaneous coronary intervention of OM1. REFERRING PROVIDERS:  Dr. Obdulia Starr, Dr. Rico London and Dr. Sagrario Hawkins.    INDICATIONS:  Elevated troponin, non-ST elevation myocardial infarction. PROCEDURE DETAILS:  Following full informed consent, the patient was  brought to the cath lab where sterile prep and drape were administered  in the usual fashion. Anesthesia was obtained in the right wrist with  lidocaine after administration of conscious sedation. The right radial  5-6 slender Terumo sheath was placed without complication. Heparin was  given intravenously with an ACT greater than 219. A 5-Spanish Bull  catheter was advanced and selectively engaged in the left main coronary  artery following which multiple doses of contrast was given in the left  main for cineangiography. This catheter was repositioned in the right  coronary artery following which multiple doses of contrast given in the  right coronary artery for cineangiography. This catheter was removed  and then a pigtail catheter was placed in the left ventricle. Bolus  dose of contrast was given for left ventriculography. Hemodynamic  measurements were made and pullback gradient was obtained. This  catheter was removed and a 6-Spanish XB 3.5 guide catheter was advanced  and selectively engaged in the left main coronary artery.   A wire was  advanced into the obtuse marginal artery and the lesion was dilated with  a balloon, stented with a drug-eluting stent, post-dilated with a  noncompliant balloon at high pressure. Completion angiography was  performed. The guide and wire removed and the sheath removed. Hemostasis was obtained by D-Stat radial device. No immediate  complications. FINDINGS:  1. LMT:  Left main trunk is angiographically normal.  It is large in  caliber, bifurcates into a large left anterior descending and left  circumflex coronary artery. 2.  LAD:  Left anterior descending artery has patent proximal stents  with no significant in-stent restenosis. There is mid to distal small  vessel diffuse severe disease throughout all the way to the apex. 3.  LCX:  Left circumflex coronary artery has mild diffuse disease and  also has distal small vessel diffuse severe disease. The obtuse  marginal 1 is medium sized and has 80% focal stenosis. 4.  RCA:  Right coronary artery is small, technically codominant as  there is some blush in the inferior wall, but diffusely diseased  throughout and a less than 2-mm vessel. 5.  LV:  Left ventriculography shows ejection fraction 60% with no wall  motion abnormalities, normal chamber size and no mitral regurgitation. 6.  Hemodynamics:  Left ventricular end-diastolic pressure is 10 with no  gradient across the aortic valve. 7.  Dominance:  Mixed. PCI NOTE:  Successful percutaneous coronary intervention of obtuse  marginal 1 80% stenosis MADDY-3 flow reduced to 0% residual with MADDY-3  flow after implantation of Xience 2.25 x 18 drug-eluting stent,  post-dilated with a 2.5 noncompliant balloon at high pressure. CONCLUSION:  Dual antiplatelet therapy. Maximal medical therapy is  advised.         Jessenia Merlos MD    D: 06/12/2020 8:44:23       T: 06/12/2020 8:53:44     [email or web address]  Job#: [id number]     Doc#: [id number]      CC:

## 2020-06-17 ENCOUNTER — OFFICE VISIT (OUTPATIENT)
Dept: CARDIOLOGY CLINIC | Age: 85
End: 2020-06-17
Payer: MEDICARE

## 2020-06-17 VITALS
HEART RATE: 62 BPM | OXYGEN SATURATION: 97 % | DIASTOLIC BLOOD PRESSURE: 74 MMHG | WEIGHT: 198 LBS | BODY MASS INDEX: 29.24 KG/M2 | SYSTOLIC BLOOD PRESSURE: 156 MMHG | RESPIRATION RATE: 18 BRPM

## 2020-06-17 PROBLEM — R09.89 BILATERAL CAROTID BRUITS: Status: ACTIVE | Noted: 2020-06-17

## 2020-06-17 PROCEDURE — 1111F DSCHRG MED/CURRENT MED MERGE: CPT | Performed by: INTERNAL MEDICINE

## 2020-06-17 PROCEDURE — 1036F TOBACCO NON-USER: CPT | Performed by: INTERNAL MEDICINE

## 2020-06-17 PROCEDURE — 1123F ACP DISCUSS/DSCN MKR DOCD: CPT | Performed by: INTERNAL MEDICINE

## 2020-06-17 PROCEDURE — G8417 CALC BMI ABV UP PARAM F/U: HCPCS | Performed by: INTERNAL MEDICINE

## 2020-06-17 PROCEDURE — 99214 OFFICE O/P EST MOD 30 MIN: CPT | Performed by: INTERNAL MEDICINE

## 2020-06-17 PROCEDURE — 4040F PNEUMOC VAC/ADMIN/RCVD: CPT | Performed by: INTERNAL MEDICINE

## 2020-06-17 PROCEDURE — G8427 DOCREV CUR MEDS BY ELIG CLIN: HCPCS | Performed by: INTERNAL MEDICINE

## 2020-06-17 ASSESSMENT — ENCOUNTER SYMPTOMS
WHEEZING: 0
COUGH: 0
NAUSEA: 0
GASTROINTESTINAL NEGATIVE: 1
CHEST TIGHTNESS: 0
STRIDOR: 0
RESPIRATORY NEGATIVE: 1
SHORTNESS OF BREATH: 0
EYES NEGATIVE: 1
BLOOD IN STOOL: 0

## 2020-06-17 NOTE — PROGRESS NOTES
Subsequent Progress Note  Patient: Yana Aguilar  YOB: 1935  MRN: [id number]    Chief Complaint: nstemi cad htn   Chief Complaint   Patient presents with    Follow Up After Procedure     S/P CATH/PCI 6/7/20    Coronary Artery Disease    Congestive Heart Failure    Hypertension       CV Data:  6/2018 spect negative  5/2018 echo EF 60%  11/2018 LAD AMBER  12/2018 CUS <. Left Vertebral 100  1/2020 echo ef 60 midl AR and MR  1/2020 SPECT - NEGATIVE  6/8/2020 Cath OM1 AMBER. EF 60%    Subjective/HPI: no cp no sob no falls no bleed. R Inguinal hernia     9/25/2019:  No cp no osb no falls no bleed eats well. Takes meds. C/o Diuretic and frequent urine. 1/15/2020 recently in ER and hosp for HTN. Now stable without meds changes. Possible he did not take his meds. Recent Labs showed mild Troponin elevaton. 2/5/2020 NO CP NO SOB NO FALLS NO BLEED. TAKES MEDS. EATS WELL.     5/8/2020 TELEHEALTH EVALUATION -- Audio/Visual (During ZWKI-18 public health emergency)      137/70 home BP. Doing well no cp no osb no falls no bleed takes meds. Eats well. Son moved in w him. 6/17/2020 no cp no sob no edema no falls no bleed. Feels better since last stent. Nonsmoker        EKG:    Past Medical History:   Diagnosis Date    Anticoagulant long-term use     Anxiety     CAD (coronary artery disease)     Carotid stenosis     2/2013 16-49%    CHF (congestive heart failure) (HCC)     Dementia (HCC)     Dementia (HCC)     Hyperlipidemia     Hypertension     MI [facility]ocardial infarction) ([facility] [address]     Osteoarthritis        Past Surgical History:   Procedure Laterality Date    CARPAL TUNNEL RELEASE  1998    CATARACT REMOVAL  2007    COLONOSCOPY  12/10/10,2007    DR Elza Smith - DONE AT [address] COLONOSCOPY  2007    hx polyps needs 2015    COLONOSCOPY  9/21/15     DR. YARBROUGH     CORONARY ANGIOPLASTY WITH STENT PLACEMENT  06/07/2020    DIAGNOSTIC CARDIAC CATH LAB PROCEDURE      HERNIA REPAIR      TAKE 1 TABLET BY MOUTH DAILY 90 tablet 02    latanoprost (XALATAN) 0.005 % ophthalmic solution use 1 drop in each eye every day 2.5 mL 5    Elastic Bandages & Supports (HERNIA SUPPORT RIGHT LARGE) MISC Wear daily. Dx: right inguinal hernia 1 each 0    nitroGLYCERIN (NITROSTAT) 0.4 MG SL tablet up to max of 3 total doses. If no relief after 1 dose, call 911. 25 tablet 3    vitamin B-12 (CYANOCOBALAMIN) 1000 MCG tablet Take 500 mcg by mouth daily      Elastic Bandages & Supports (JOBST KNEE HIGH COMPRESSION SM) MISC 1 each by Does not apply route daily 1 each 2    aspirin 81 MG tablet Take 1 tablet by mouth daily With Food 30 tablet 3    Multiple Vitamins-Minerals (MULTIVITAMIN PO) Take by mouth daily       dorzolamide-timolol (COSOPT) 22.3-6.8 MG/ML ophthalmic solution Place 1 drop into both eyes 2 times daily        No current facility-administered medications for this visit. Review of Systems:   Review of Systems   Constitutional: Negative. Negative for diaphoresis and fatigue. HENT: Negative. Eyes: Negative. Respiratory: Negative. Negative for cough, chest tightness, shortness of breath, wheezing and stridor. Cardiovascular: Negative. Negative for chest pain, palpitations and leg swelling. Gastrointestinal: Negative. Negative for blood in stool and nausea. Genitourinary: Negative. Musculoskeletal: Negative. Skin: Negative. Neurological: Negative. Negative for dizziness, syncope, weakness and light-headedness. Hematological: Negative. Psychiatric/Behavioral: Negative. Physical Examination:    BP (!) 156/74 (Site: Left Upper Arm, Position: Sitting, Cuff Size: Medium Adult)   Pulse 62   Resp 18   Wt 198 lb (89.8 kg)   SpO2 97%   BMI 29.24 kg/m²    Physical Exam   Constitutional: He appears healthy. No distress. HENT:   Normal cephalic and Atraumatic   Eyes: Pupils are equal, round, and reactive to light.    Neck: Normal range of motion and thyroid 06/07/2020    ALT 13 06/07/2020     BMP:    Lab Results   Component Value Date     06/07/2020    K 4.6 06/07/2020    K 4.0 01/06/2020     06/07/2020    CO2 20 06/07/2020    BUN 34 06/07/2020    LABALBU 3.9 06/07/2020    LABALBU 4.6 10/25/2011    CREATININE 1.39 06/07/2020    CALCIUM 9.6 06/07/2020    GFRAA 59.1 06/07/2020    LABGLOM 48.9 06/07/2020    GLUCOSE 135 06/07/2020    GLUCOSE 96 10/25/2011     Magnesium:    Lab Results   Component Value Date    MG 2.1 06/01/2020     TSH:  Lab Results   Component Value Date    TSH 2.280 10/08/2019       Patient Active Problem List   Diagnosis    Hyperlipidemia    Carotid stenosis, bilateral    [facility]    Hypertensive urgency    NSTEMI (non-ST elevated myocardial infarction) [facility]    Essential hypertension    HESS (dyspnea on exertion)    Leg edema    [facility] diastolic [facility] failure ([facility])    Chest pain    Angina, class III ([facility])    S/P cardiac cath    Anginal equivalent ([facility])    Dizziness    Coronary artery disease involving native coronary artery of native heart without angina pectoris    CHF [facility]congestive heart failure) [facility]    CAD (coronary artery disease)    Congestive heart failure ([facility]    Non-STEMI (non-ST elevated myocardial infarction) [facility]    Unstable angina ([facility])    ACS (acute coronary syndrome) ([facility])    Bilateral carotid bruits       There are no discontinued medications. Modified Medications    No medications on file       No orders of the defined types were placed in this encounter. Assessment/Plan:    1. Hyperlipidemia, unspecified hyperlipidemia type  Statin     2. [facility]STEM[facility]non[facility]ST elevated [facility]) [facility]  no ANGINA - s/p recent OM AMBER    3. Essential hypertension   stable now. BP at home is well controlled. 4. Coronary artery disease involving native coronary artery of native heart without angina pectoris  No angina. Continue DAPT    5. R Inguinal hernia- asymptomatic .  Avoid heavylifting or

## 2020-06-25 ENCOUNTER — VIRTUAL VISIT (OUTPATIENT)
Dept: FAMILY MEDICINE CLINIC | Age: 85
End: 2020-06-25
Payer: MEDICARE

## 2020-06-25 PROCEDURE — 99441 PR PHYS/QHP TELEPHONE EVALUATION 5-10 MIN: CPT | Performed by: FAMILY MEDICINE

## 2020-06-25 NOTE — PROGRESS NOTES
Chief Complaint   Patient presents with    Follow-Up from [facility], stent placement, saw Dr. Nancy Solis said he looked good       HPI:  Rajesh Carter is a 82 y.o. male     Told to have PCP follow up after recent hospitalization    NSTEMI  S/p angioplasty and stenting, Dr. Melva Krabbe performed procedure  Already saw Dr. Nancy Solis on 6/17 for f/u    Presented with dizziness and chest pain    Currently feels \"great\"  No concerns  Compliant with meds    Patient Active Problem List   Diagnosis    Hyperlipidemia    Carotid stenosis, bilateral    Dementia ([address]    Hypertensive urgency    [facility]non-ST elevated [facility]) [facility]    Essential hypertension    HESS (dyspnea on exertion)    Leg edema    Acute diastolic heart failure ([address]    Chest pain    Angina, class III ([address]    S/P cardiac cath    Anginal equivalent ([address]    Dizziness    Coronary artery disease involving native coronary artery of native heart without angina pectoris    CHF (congestive heart failure) ([address]    CAD (coronary artery disease)    Congestive heart failure ([address]    Non-STEMI (non-ST elevated myocardial infarction) ([address]    Unstable angina ([address]    ACS (acute coronary syndrome) (HCC)    Bilateral carotid bruits       Current Outpatient Medications   Medication Sig Dispense Refill    clopidogrel (PLAVIX) 75 MG tablet Take 1 tablet by mouth daily 90 tablet 3    benazepril (LOTENSIN) 20 MG tablet Take 1 tablet by mouth daily 180 tablet 2    isosorbide mononitrate (IMDUR) 60 MG extended release tablet Take 1 tablet by mouth daily 30 tablet 3    pravastatin (PRAVACHOL) 80 MG tablet TAKE 1 TABLET BY MOUTH EVERY EVENING 30 tablet 3    donepezil (ARICEPT) 5 MG tablet TAKE 1 TABLET BY MOUTH NIGHTLY 90 tablet 3    carvedilol (COREG) 12.5 MG tablet Take 1 tablet by mouth 2 times daily 60 tablet 11    finasteride (PROSCAR) 5 MG tablet TAKE 1 TABLET BY MOUTH DAILY 90 tablet 02    latanoprost (XALATAN) 0.005 % Never Smoker    Smokeless tobacco: Never Used   Substance and Sexual Activity    Alcohol use: No     Comment: social    Drug use: No    Sexual activity: Not on file   Lifestyle    Physical activity     Days per week: Not on file     Minutes per session: Not on file    Stress: Not on file   Relationships    Social connections     Talks on phone: Not on file     Gets together: Not on file     Attends Jewish service: Not on file     Active member of club or organization: Not on file     Attends meetings of clubs or organizations: Not on file     Relationship status: Not on file    Intimate partner violence     Fear of current or ex partner: Not on file     Emotionally abused: Not on file     Physically abused: Not on file     Forced sexual activity: Not on file   Other Topics Concern    Not on file   Social History Narrative    Not on file     No Known Allergies    Review of Systems:   General ROS: negative for - chills, fatigue, fever, malaise, weight gain or weight loss  Respiratory ROS: no cough, shortness of breath, or wheezing  Cardiovascular ROS: no chest pain or dyspnea on exertion  Gastrointestinal ROS: no abdominal pain, change in bowel habits, or black or bloody stools  Genito-Urinary ROS: no dysuria, trouble voiding  Musculoskeletal ROS: negative for - gait disturbance, joint pain or joint stiffness  Neurological ROS: negative for - behavioral changes, memory loss, numbness/tingling, tremors or weakness    In general patient otherwise reports feeling well. Physical Exam:  There were no vitals taken for this visit.     Gen: Well, NAD, Alert, Oriented x 3   Normal mood  Pleasant, conversant      Lab Results   Component Value Date    WBC 12.3 (H) 06/07/2020    HGB 13.4 (L) 06/07/2020    HCT 40.2 (L) 06/07/2020     06/07/2020    CHOL 130 06/01/2020    TRIG 86 06/01/2020    HDL 48 06/01/2020    ALT 13 06/07/2020    AST 31 06/07/2020     06/07/2020    K 4.6 06/07/2020     06/07/2020    CREATININE 1.39 (H) 06/07/2020    BUN 34 (H) 06/07/2020    CO2 20 06/07/2020    TSH 2.280 10/08/2019    PSA 2.88 07/30/2018    INR 1.0 05/31/2020    LABA1C 6.1 (H) 07/30/2018         A&P   Diagnosis Orders   1. NSTEMI (non-ST elevated myocardial infarction) ([facility] [address] [facility]     2. Alzheimer's dementia without behavioral disturbance, unspecified timing of dementia onset ([facility] [address]     3. Routine general medical examination at a health care facility     4. Congestive heart failure, unspecified HF chronicity, unspecified heart failure type ([facility] [address]     5. Anginal equivalent ([facility] [address]     6. Angina, class III ([facility] [address]     7. Essential hypertension     8. Hyperlipidemia, unspecified hyperlipidemia type       Chronic conditions are stable  Continue current regimen  Follow up with appropriate specialists and here routinely for ongoing monitoring of chronic conditions    Call for refills    Stay active     F/u 6 mos      Natalya Wong MD    Uri Larsen is a 82 y.o. male being evaluated by a Virtual Visit (phone call) encounter to address concerns as mentioned above. A caregiver was present when appropriate. Due to this being a TeleHealth encounter (During [facility] public health emergency), evaluation of the following organ systems was limited: Vitals/Constitutional/EENT/Resp/CV/GI//MS/Neuro/Skin/Heme-Lymph-Imm. Pursuant to the emergency declaration under the [address] [place] [address] authority and the [employer] Act, this Virtual Visit was conducted with patient's (and/or legal guardian's) consent, to reduce the patient's risk of exposure to COVID-19 and provide necessary medical care. The patient (and/or legal guardian) has also been advised to contact this office for worsening conditions or problems, and seek emergency medical treatment and/or call 911 if deemed necessary.      Patient identification was verified at the start of the

## 2020-07-12 ENCOUNTER — HOSPITAL ENCOUNTER (OUTPATIENT)
Age: 85
Setting detail: OBSERVATION
Discharge: HOME | End: 2020-07-13
Attending: INTERNAL MEDICINE | Admitting: INTERNAL MEDICINE
Payer: MEDICARE

## 2020-07-12 ENCOUNTER — APPOINTMENT (OUTPATIENT)
Dept: CT IMAGING | Age: 85
End: 2020-07-12
Payer: MEDICARE

## 2020-07-12 ENCOUNTER — APPOINTMENT (OUTPATIENT)
Dept: GENERAL RADIOLOGY | Age: 85
End: 2020-07-12
Payer: MEDICARE

## 2020-07-12 PROBLEM — R55 SYNCOPE AND COLLAPSE: Status: ACTIVE | Noted: 2020-07-12

## 2020-07-12 LAB
ALBUMIN SERPL-MCNC: 3.9 G/DL (ref 3.5–4.6)
ALP BLD-CCNC: 80 U/L (ref 35–104)
ALT SERPL-CCNC: 17 U/L (ref 0–41)
ANION GAP SERPL CALCULATED.3IONS-SCNC: 9 MEQ/L (ref 9–15)
APTT: 26.4 SEC (ref 24.4–36.8)
AST SERPL-CCNC: 13 U/L (ref 0–40)
BACTERIA: ABNORMAL /HPF
BASOPHILS ABSOLUTE: 0.1 K/UL (ref 0–0.2)
BASOPHILS RELATIVE PERCENT: 1.5 %
BILIRUB SERPL-MCNC: 0.6 MG/DL (ref 0.2–0.7)
BILIRUBIN URINE: NEGATIVE
BLOOD, URINE: ABNORMAL
BUN BLDV-MCNC: 23 MG/DL (ref 8–23)
CALCIUM SERPL-MCNC: 9.7 MG/DL (ref 8.5–9.9)
CHLORIDE BLD-SCNC: 104 MEQ/L (ref 95–107)
CLARITY: ABNORMAL
CO2: 24 MEQ/L (ref 20–31)
COLOR: YELLOW
CREAT SERPL-MCNC: 0.89 MG/DL (ref 0.7–1.2)
EOSINOPHILS ABSOLUTE: 0.3 K/UL (ref 0–0.7)
EOSINOPHILS RELATIVE PERCENT: 3.5 %
EPITHELIAL CELLS, UA: ABNORMAL /HPF (ref 0–5)
GFR AFRICAN AMERICAN: >60
GFR NON-AFRICAN AMERICAN: >60
GLOBULIN: 2.6 G/DL (ref 2.3–3.5)
GLUCOSE BLD-MCNC: 136 MG/DL (ref 70–99)
GLUCOSE URINE: NEGATIVE MG/DL
HCT VFR BLD CALC: 41.6 % (ref 42–52)
HEMOGLOBIN: 13.8 G/DL (ref 14–18)
HYALINE CASTS: ABNORMAL /HPF (ref 0–5)
INR BLD: 1.1
KETONES, URINE: NEGATIVE MG/DL
LEUKOCYTE ESTERASE, URINE: ABNORMAL
LYMPHOCYTES ABSOLUTE: 1.2 K/UL (ref 1–4.8)
LYMPHOCYTES RELATIVE PERCENT: 16.9 %
MAGNESIUM: 2.2 MG/DL (ref 1.7–2.4)
MCH RBC QN AUTO: 30 PG (ref 27–31.3)
MCHC RBC AUTO-ENTMCNC: 33.1 % (ref 33–37)
MCV RBC AUTO: 90.7 FL (ref 80–100)
MONOCYTES ABSOLUTE: 0.6 K/UL (ref 0.2–0.8)
MONOCYTES RELATIVE PERCENT: 8.3 %
NEUTROPHILS ABSOLUTE: 5.1 K/UL (ref 1.4–6.5)
NEUTROPHILS RELATIVE PERCENT: 69.8 %
NITRITE, URINE: POSITIVE
PDW BLD-RTO: 14.3 % (ref 11.5–14.5)
PH UA: 5.5 (ref 5–9)
PLATELET # BLD: 293 K/UL (ref 130–400)
POTASSIUM SERPL-SCNC: 4.4 MEQ/L (ref 3.4–4.9)
PROTEIN UA: ABNORMAL MG/DL
PROTHROMBIN TIME: 14.4 SEC (ref 12.3–14.9)
RBC # BLD: 4.59 M/UL (ref 4.7–6.1)
RBC UA: ABNORMAL /HPF (ref 0–5)
SODIUM BLD-SCNC: 137 MEQ/L (ref 135–144)
SPECIFIC GRAVITY UA: 1.02 (ref 1–1.03)
T4 FREE: 1.51 NG/DL (ref 0.84–1.68)
TOTAL CK: 46 U/L (ref 0–190)
TOTAL PROTEIN: 6.5 G/DL (ref 6.3–8)
TROPONIN: 0.01 NG/ML (ref 0–0.01)
TROPONIN: 0.02 NG/ML (ref 0–0.01)
TROPONIN: <0.01 NG/ML (ref 0–0.01)
TSH REFLEX: 5.18 UIU/ML (ref 0.44–3.86)
URINE REFLEX TO CULTURE: YES
UROBILINOGEN, URINE: 0.2 E.U./DL
WBC # BLD: 7.3 K/UL (ref 4.8–10.8)
WBC UA: >100 /HPF (ref 0–5)

## 2020-07-12 PROCEDURE — G0378 HOSPITAL OBSERVATION PER HR: HCPCS

## 2020-07-12 PROCEDURE — 85025 COMPLETE CBC W/AUTO DIFF WBC: CPT

## 2020-07-12 PROCEDURE — 36415 COLL VENOUS BLD VENIPUNCTURE: CPT

## 2020-07-12 PROCEDURE — 85730 THROMBOPLASTIN TIME PARTIAL: CPT

## 2020-07-12 PROCEDURE — 84443 ASSAY THYROID STIM HORMONE: CPT

## 2020-07-12 PROCEDURE — 96365 THER/PROPH/DIAG IV INF INIT: CPT

## 2020-07-12 PROCEDURE — 6360000002 HC RX W HCPCS: Performed by: INTERNAL MEDICINE

## 2020-07-12 PROCEDURE — 84484 ASSAY OF TROPONIN QUANT: CPT

## 2020-07-12 PROCEDURE — 96372 THER/PROPH/DIAG INJ SC/IM: CPT

## 2020-07-12 PROCEDURE — 99215 OFFICE O/P EST HI 40 MIN: CPT | Performed by: INTERNAL MEDICINE

## 2020-07-12 PROCEDURE — 99285 EMERGENCY DEPT VISIT HI MDM: CPT

## 2020-07-12 PROCEDURE — 71045 X-RAY EXAM CHEST 1 VIEW: CPT

## 2020-07-12 PROCEDURE — 6370000000 HC RX 637 (ALT 250 FOR IP): Performed by: INTERNAL MEDICINE

## 2020-07-12 PROCEDURE — 85610 PROTHROMBIN TIME: CPT

## 2020-07-12 PROCEDURE — 93005 ELECTROCARDIOGRAM TRACING: CPT | Performed by: EMERGENCY MEDICINE

## 2020-07-12 PROCEDURE — 81001 URINALYSIS AUTO W/SCOPE: CPT

## 2020-07-12 PROCEDURE — 82550 ASSAY OF CK (CPK): CPT

## 2020-07-12 PROCEDURE — 2580000003 HC RX 258: Performed by: INTERNAL MEDICINE

## 2020-07-12 PROCEDURE — 83735 ASSAY OF MAGNESIUM: CPT

## 2020-07-12 PROCEDURE — 87077 CULTURE AEROBIC IDENTIFY: CPT

## 2020-07-12 PROCEDURE — 87086 URINE CULTURE/COLONY COUNT: CPT

## 2020-07-12 PROCEDURE — 6360000002 HC RX W HCPCS: Performed by: NURSE PRACTITIONER

## 2020-07-12 PROCEDURE — 87186 SC STD MICRODIL/AGAR DIL: CPT

## 2020-07-12 PROCEDURE — 70450 CT HEAD/BRAIN W/O DYE: CPT

## 2020-07-12 PROCEDURE — 80053 COMPREHEN METABOLIC PANEL: CPT

## 2020-07-12 PROCEDURE — 96375 TX/PRO/DX INJ NEW DRUG ADDON: CPT

## 2020-07-12 PROCEDURE — 84439 ASSAY OF FREE THYROXINE: CPT

## 2020-07-12 RX ORDER — LISINOPRIL 20 MG/1
20 TABLET ORAL DAILY
Status: DISCONTINUED | OUTPATIENT
Start: 2020-07-12 | End: 2020-07-12

## 2020-07-12 RX ORDER — ACETAMINOPHEN 325 MG/1
650 TABLET ORAL EVERY 6 HOURS PRN
Status: DISCONTINUED | OUTPATIENT
Start: 2020-07-12 | End: 2020-07-13 | Stop reason: HOSPADM

## 2020-07-12 RX ORDER — TIMOLOL MALEATE 5 MG/ML
1 SOLUTION/ DROPS OPHTHALMIC 2 TIMES DAILY
Status: DISCONTINUED | OUTPATIENT
Start: 2020-07-12 | End: 2020-07-13 | Stop reason: HOSPADM

## 2020-07-12 RX ORDER — SODIUM CHLORIDE 0.9 % (FLUSH) 0.9 %
10 SYRINGE (ML) INJECTION PRN
Status: DISCONTINUED | OUTPATIENT
Start: 2020-07-12 | End: 2020-07-13 | Stop reason: HOSPADM

## 2020-07-12 RX ORDER — PROMETHAZINE HYDROCHLORIDE 12.5 MG/1
12.5 TABLET ORAL EVERY 6 HOURS PRN
Status: DISCONTINUED | OUTPATIENT
Start: 2020-07-12 | End: 2020-07-13 | Stop reason: HOSPADM

## 2020-07-12 RX ORDER — ISOSORBIDE MONONITRATE 60 MG/1
60 TABLET, EXTENDED RELEASE ORAL DAILY
Status: DISCONTINUED | OUTPATIENT
Start: 2020-07-13 | End: 2020-07-12

## 2020-07-12 RX ORDER — ASPIRIN 81 MG/1
81 TABLET ORAL DAILY
Status: DISCONTINUED | OUTPATIENT
Start: 2020-07-13 | End: 2020-07-13 | Stop reason: HOSPADM

## 2020-07-12 RX ORDER — CLOPIDOGREL BISULFATE 75 MG/1
75 TABLET ORAL DAILY
Status: DISCONTINUED | OUTPATIENT
Start: 2020-07-13 | End: 2020-07-13 | Stop reason: HOSPADM

## 2020-07-12 RX ORDER — ATORVASTATIN CALCIUM 40 MG/1
40 TABLET, FILM COATED ORAL NIGHTLY
Status: DISCONTINUED | OUTPATIENT
Start: 2020-07-12 | End: 2020-07-12

## 2020-07-12 RX ORDER — LISINOPRIL 10 MG/1
10 TABLET ORAL DAILY
Status: DISCONTINUED | OUTPATIENT
Start: 2020-07-12 | End: 2020-07-13 | Stop reason: HOSPADM

## 2020-07-12 RX ORDER — ONDANSETRON 2 MG/ML
4 INJECTION INTRAMUSCULAR; INTRAVENOUS EVERY 6 HOURS PRN
Status: DISCONTINUED | OUTPATIENT
Start: 2020-07-12 | End: 2020-07-13 | Stop reason: HOSPADM

## 2020-07-12 RX ORDER — ONDANSETRON 2 MG/ML
4 INJECTION INTRAMUSCULAR; INTRAVENOUS EVERY 10 MIN PRN
Status: DISCONTINUED | OUTPATIENT
Start: 2020-07-12 | End: 2020-07-12

## 2020-07-12 RX ORDER — FINASTERIDE 5 MG/1
5 TABLET, FILM COATED ORAL DAILY
Status: DISCONTINUED | OUTPATIENT
Start: 2020-07-12 | End: 2020-07-13 | Stop reason: HOSPADM

## 2020-07-12 RX ORDER — POLYETHYLENE GLYCOL 3350 17 G/17G
17 POWDER, FOR SOLUTION ORAL DAILY PRN
Status: DISCONTINUED | OUTPATIENT
Start: 2020-07-12 | End: 2020-07-13 | Stop reason: HOSPADM

## 2020-07-12 RX ORDER — DORZOLAMIDE HCL 20 MG/ML
1 SOLUTION/ DROPS OPHTHALMIC 2 TIMES DAILY
Status: DISCONTINUED | OUTPATIENT
Start: 2020-07-12 | End: 2020-07-13 | Stop reason: HOSPADM

## 2020-07-12 RX ORDER — DONEPEZIL HYDROCHLORIDE 5 MG/1
5 TABLET, FILM COATED ORAL NIGHTLY
Status: DISCONTINUED | OUTPATIENT
Start: 2020-07-12 | End: 2020-07-13 | Stop reason: HOSPADM

## 2020-07-12 RX ORDER — CARVEDILOL 12.5 MG/1
12.5 TABLET ORAL 2 TIMES DAILY
Status: DISCONTINUED | OUTPATIENT
Start: 2020-07-12 | End: 2020-07-13 | Stop reason: HOSPADM

## 2020-07-12 RX ORDER — ACETAMINOPHEN 650 MG/1
650 SUPPOSITORY RECTAL EVERY 6 HOURS PRN
Status: DISCONTINUED | OUTPATIENT
Start: 2020-07-12 | End: 2020-07-13 | Stop reason: HOSPADM

## 2020-07-12 RX ORDER — SODIUM CHLORIDE 0.9 % (FLUSH) 0.9 %
10 SYRINGE (ML) INJECTION EVERY 12 HOURS SCHEDULED
Status: DISCONTINUED | OUTPATIENT
Start: 2020-07-12 | End: 2020-07-13 | Stop reason: HOSPADM

## 2020-07-12 RX ORDER — DORZOLAMIDE HYDROCHLORIDE AND TIMOLOL MALEATE 20; 5 MG/ML; MG/ML
1 SOLUTION/ DROPS OPHTHALMIC 2 TIMES DAILY
Status: DISCONTINUED | OUTPATIENT
Start: 2020-07-12 | End: 2020-07-12 | Stop reason: CLARIF

## 2020-07-12 RX ORDER — LATANOPROST 50 UG/ML
1 SOLUTION/ DROPS OPHTHALMIC DAILY
Status: DISCONTINUED | OUTPATIENT
Start: 2020-07-12 | End: 2020-07-13 | Stop reason: HOSPADM

## 2020-07-12 RX ORDER — PRAVASTATIN SODIUM 80 MG/1
80 TABLET ORAL EVERY EVENING
Status: DISCONTINUED | OUTPATIENT
Start: 2020-07-12 | End: 2020-07-13 | Stop reason: HOSPADM

## 2020-07-12 RX ADMIN — PRAVASTATIN SODIUM 80 MG: 80 TABLET ORAL at 20:55

## 2020-07-12 RX ADMIN — Medication 10 ML: at 20:54

## 2020-07-12 RX ADMIN — ENOXAPARIN SODIUM 40 MG: 40 INJECTION SUBCUTANEOUS at 20:54

## 2020-07-12 RX ADMIN — TIMOLOL MALEATE 1 DROP: 5 SOLUTION/ DROPS OPHTHALMIC at 20:56

## 2020-07-12 RX ADMIN — DONEPEZIL HYDROCHLORIDE 5 MG: 5 TABLET, FILM COATED ORAL at 20:56

## 2020-07-12 RX ADMIN — ONDANSETRON 4 MG: 2 INJECTION INTRAMUSCULAR; INTRAVENOUS at 12:06

## 2020-07-12 RX ADMIN — CARVEDILOL 12.5 MG: 12.5 TABLET, FILM COATED ORAL at 20:56

## 2020-07-12 RX ADMIN — CEFTRIAXONE SODIUM 1 G: 1 INJECTION, POWDER, FOR SOLUTION INTRAMUSCULAR; INTRAVENOUS at 22:34

## 2020-07-12 RX ADMIN — DORZOLAMIDE HYDROCHLORIDE 1 DROP: 20 SOLUTION/ DROPS OPHTHALMIC at 20:56

## 2020-07-12 ASSESSMENT — ENCOUNTER SYMPTOMS
NAUSEA: 0
COUGH: 0
VOMITING: 0
APNEA: 0
COLOR CHANGE: 0
EYE PAIN: 0
BACK PAIN: 0
SHORTNESS OF BREATH: 0
SORE THROAT: 0
RHINORRHEA: 0
DIARRHEA: 0
PHOTOPHOBIA: 0
CHEST TIGHTNESS: 0
ABDOMINAL PAIN: 0
ABDOMINAL DISTENTION: 0

## 2020-07-12 ASSESSMENT — PAIN SCALES - GENERAL
PAINLEVEL_OUTOF10: 0
PAINLEVEL_OUTOF10: 5

## 2020-07-12 NOTE — ED NOTES
Patient to ct via cart. Tolerated well. No acute distress noted.       Cassandra Locus  07/12/20 1132

## 2020-07-12 NOTE — H&P
Hospital Medicine  History and Physical    Patient:  Cortney Dunn  MRN: [id number]    CHIEF COMPLAINT:    Chief Complaint   Patient presents with    Dizziness     hx of vertigo. pt had a low blood pressure per daughter       History Obtained From:  Patient, EMR  Primary Care Physician: Kev Lainez MD    HISTORY OF PRESENT ILLNESS:   The patient is a 82 y.o. male with PMH of CAD s/p recent PCI to OM on 6/8/20220, HTN, dementia who presented with the above CC. Patient was sitting outside with his daughter when he started feeling dizzy, nauseated, became pale and diaphoretic, was staring in space, saying only \"yes\" and passed out per ED report, no chest pain or shortness of breath, similar presentation with dizziness in July when he had a stent placed, ECG on arrival showed sinus bradycardia, troponin was slightly elevated, Zofran was administered and patient was admitted for further management. Past Medical History:      Diagnosis Date    Anticoagulant long-term use     Anxiety     CAD (coronary artery disease)     Carotid stenosis     2/2013 16-49%    CHF (congestive heart failure) (HCC)     Dementia (HCC)     Dementia (HCC)     Hyperlipidemia     Hypertension     [facility]) [facility]     Osteoarthritis        Past Surgical History:      Procedure Laterality Date    CARPAL TUNNEL RELEASE  1998    CATARACT REMOVAL  2007    COLONOSCOPY  12/10/10,2007    [facility]    hx polyps needs 2015    COLONOSCOPY  9/21/15     DR. YARBROUGH     CORONARY ANGIOPLASTY WITH STENT PLACEMENT  06/07/2020    DIAGNOSTIC CARDIAC CATH LAB PROCEDURE      HERNIA REPAIR      PTCA         Medications Prior to Admission:    Prior to Admission medications    Medication Sig Start Date End Date Taking?  Authorizing Provider   clopidogrel (PLAVIX) 75 MG tablet Take 1 tablet by mouth daily 6/10/20  Yes NUNU Parham   benazepril (LOTENSIN) 20 MG tablet Take 1 facility use dose modulation, iterative reconstruction, and/or weight based dosing when appropriate to reduce radiation dose to as low as reasonably achievable. Xr Chest Portable    Result Date: 7/12/2020  Exam: XR CHEST PORTABLE History: Chest pain Technique: AP portable view of the chest obtained. Comparison: Portable chest radiograph from  May 31, 2020 Findings: Chronic elevation of the right hemidiaphragm The cardiomediastinal silhouette is within normal limits. No pneumothorax, pleural effusion, or consolidation. Degenerative changes of the spine and both shoulders. No radiographic evidence of acute intrathoracic process.            Assessment and Plan     82 y.o. male with PMH of CAD s/p recent PCI to OM on 6/8/20220, HTN, dementia who presented with:    Syncope with elevated troponin  - similar presentation with dizziness in July when he had a stent placed,  - currently asymptomatic  - resume ASA, Plavix,statin therapy  - serial troponins  - monitor on telemetry  - cardiology consulted    HTN  - continue home regimen    Dementia   - continue Aricept        Alicia Moralez MD  Admitting Hospitalist

## 2020-07-12 NOTE — CONSULTS
History    Marital status:      Spouse name: None    Number of children: None    Years of education: None    Highest education level: None   Occupational History    None   Social Needs    Financial resource strain: None    Food insecurity     Worry: None     Inability: None    Transportation needs     Medical: None     Non-medical: None   Tobacco Use    Smoking status: Never Smoker    Smokeless tobacco: Never Used   Substance and Sexual Activity    Alcohol use: No     Comment: social    Drug use: No    Sexual activity: Not Currently   Lifestyle    Physical activity     Days per week: None     Minutes per session: None    Stress: None   Relationships    Social connections     Talks on phone: None     Gets together: None     Attends Yarsanism service: None     Active member of club or organization: None     Attends meetings of clubs or organizations: None     Relationship status: None    Intimate partner violence     Fear of current or ex partner: None     Emotionally abused: None     Physically abused: None     Forced sexual activity: None   Other Topics Concern    None   Social History Narrative    None       Family Hx:  Family History   Problem Relation Age of Onset    Heart Disease Mother     High Blood Pressure Mother        Review of Systems:   Review of Systems   Constitutional: Negative for appetite change, diaphoresis and fatigue. Respiratory: Negative for apnea, chest tightness and shortness of breath. Cardiovascular: Negative for chest pain, palpitations and leg swelling. Gastrointestinal: Negative for abdominal distention, diarrhea, nausea and vomiting. Skin: Negative for color change, pallor, rash and wound. Neurological: Negative for dizziness, syncope, weakness, light-headedness, numbness and headaches. Psychiatric/Behavioral: Negative for agitation, behavioral problems and confusion. The patient is not nervous/anxious and is not hyperactive.     All other systems dose modulation, iterative reconstruction, and/or weight based dosing when appropriate to reduce radiation dose to as low as reasonably achievable. Xr Chest Portable    Result Date: 7/12/2020  Exam: XR CHEST PORTABLE History: Chest pain Technique: AP portable view of the chest obtained. Comparison: Portable chest radiograph from  May 31, 2020 Findings: Chronic elevation of the right hemidiaphragm The cardiomediastinal silhouette is within normal limits. No pneumothorax, pleural effusion, or consolidation. Degenerative changes of the spine and both shoulders. No radiographic evidence of acute intrathoracic process. EKG:  Assessment:    Active Hospital Problems    Diagnosis Date Noted    Syncope and collapse [R55] 07/12/2020         Recent PCI to OM branch of the circumflex         History of dizziness in the past.  First episode of syncope         History of BPH         Hypertension       1. Plan:  2. Episode sounds vasovagal.  We will reduce the dose of ACE inhibition and discontinue Imdur. He is also on Flomax for BPH. That would be continued. Will monitor his postural blood pressure. If significant postural hypotension is documented, consider initiation of Florinef and stockings.   This does not appear to be neurologic in etiology            Electronically signed by Stef Swift MD on 7/12/2020 at 5:03 PM

## 2020-07-12 NOTE — ED NOTES
Transporter here. Pt awake and alert. Daughter remains at bedside. Pt without any further complaints. No acute distress noted at this time.      Marlo Kathleen RN  07/12/20 9511

## 2020-07-12 NOTE — ED PROVIDER NOTES
[facility] ENCOUNTER      Pt Name: Rashaun Salmon  MRN: [id number]  Jayygftamra 1935  Date of evaluation: 7/12/2020  Provider: ILYA Agudelo 1636       Chief Complaint   Patient presents with    Dizziness     hx of vertigo. pt had a low blood pressure per daughter         HISTORY OF PRESENT ILLNESS   (Location/Symptom, Timing/Onset,Context/Setting, Quality, Duration, Modifying Factors, Severity)  Note limiting factors. Rashaun Salmon is a 82 y.o. male who presents to the emergency department for evaluation of episode of dizziness pta. He admits to sitting with dtr outsideand when he stood he felt dizzy with spinning. Hx of similar episodes in the past.  No current c/o and he feels better. No headache, lightheadedness, fsc, cough, cp, sob, palpitations, nvdc or abdominal pain. Location/Symptom - dizzy  Onset - pta  Context/Setting - as above  Quality - spinnning  Duration - episodic  Modifying Factors - change of position  Severity - mild        Nursing Notes were reviewed. REVIEW OF SYSTEMS    (2-9 systems for level 4, 10 or more for level 5)     Review of Systems   Constitutional: Negative for chills, diaphoresis, fatigue and fever. HENT: Negative for congestion, rhinorrhea and sore throat. Eyes: Negative for photophobia and pain. Respiratory: Negative for cough and shortness of breath. Cardiovascular: Negative for chest pain and palpitations. Gastrointestinal: Negative for abdominal pain, diarrhea, nausea and vomiting. Genitourinary: Negative for dysuria and flank pain. Musculoskeletal: Negative for back pain. Skin: Negative for rash. Neurological: Positive for dizziness. Negative for light-headedness and headaches. Psychiatric/Behavioral: Negative. All other systems reviewed and are negative. Except as noted above the remainder of the review of systems was reviewed and negative.        PAST MEDICAL HISTORY Past Medical History:   Diagnosis Date    Anticoagulant long-term use     Anxiety     CAD (coronary artery disease)     Carotid stenosis     2/2013 16-49%    CHF (congestive heart failure) (HCC)     Dementia (HCC)     Dementia (HCC)     Hyperlipidemia     Hypertension     MI (myocardial infarction) [facility] [address]     Osteoarthritis      Past Surgical History:   Procedure Laterality Date    CARPAL TUNNEL RELEASE  1998    CATARACT REMOVAL  2007    COLONOSCOPY  12/10/10,2007    DR Margie Phillips - DONE AT [address] COLONOSCOPY  2007    hx polyps needs 2015    COLONOSCOPY  9/21/15     DR. YARBROUGH     CORONARY ANGIOPLASTY WITH STENT PLACEMENT  06/07/2020    DIAGNOSTIC CARDIAC CATH LAB PROCEDURE      HERNIA REPAIR      PTCA       Social History     Socioeconomic History    Marital status:       Spouse name: None    Number of children: None    Years of education: None    Highest education level: None   Occupational History    None   Social Needs    Financial resource strain: None    Food insecurity     Worry: None     Inability: None    Transportation needs     Medical: None     Non-medical: None   Tobacco Use    Smoking status: Never Smoker    Smokeless tobacco: Never Used   Substance and Sexual Activity    Alcohol use: No     Comment: social    Drug use: No    Sexual activity: Not Currently   Lifestyle    Physical activity     Days per week: None     Minutes per session: None    Stress: None   Relationships    Social connections     Talks on phone: None     Gets together: None     Attends Oriental orthodox service: None     Active member of club or organization: None     Attends meetings of clubs or organizations: None     Relationship status: None    Intimate partner violence     Fear of current or ex partner: None     Emotionally abused: None     Physically abused: None     Forced sexual activity: None   Other Topics Concern    None   Social History Narrative    None       SCREENINGS PHYSICAL EXAM    (up to 7 for level 4, 8 or more for level 5)     ED Triage Vitals   BP Temp Temp src Pulse Resp SpO2 Height Weight   07/12/20 1123 07/12/20 1129 -- 07/12/20 1123 07/12/20 1123 07/12/20 1123 -- --   105/65 97.7 °F (36.5 °C)  53 19 95 %         Physical Exam  Vitals signs and nursing note reviewed. Constitutional:       General: He is not in acute distress. Appearance: Normal appearance. He is well-developed. He is not diaphoretic. HENT:      Head: Normocephalic and atraumatic. Mouth/Throat:      Mouth: Mucous membranes are moist.   Eyes:      General: Lids are normal.      Conjunctiva/sclera: Conjunctivae normal.   Neck:      Musculoskeletal: Normal range of motion and neck supple. Cardiovascular:      Rate and Rhythm: Normal rate and regular rhythm. Pulses: Normal pulses. Heart sounds: Normal heart sounds. Pulmonary:      Effort: Pulmonary effort is normal.      Breath sounds: Normal breath sounds. Abdominal:      General: Bowel sounds are normal.      Palpations: Abdomen is soft. Tenderness: There is no abdominal tenderness. Lymphadenopathy:      Cervical: No cervical adenopathy. Skin:     General: Skin is warm and dry. Capillary Refill: Capillary refill takes less than 2 seconds. Findings: No rash. Neurological:      Mental Status: He is alert and oriented to person, place, and time. Comments: No focal deficits   Psychiatric:         Thought Content:  Thought content normal.         Judgment: Judgment normal.         RESULTS     EKG: All EKG's are interpreted by the Emergency Department Physician who either signs or Co-signsthis chart in the absence of a cardiologist.    Sinus bradycardia 54 no STEMI    RADIOLOGY:   Non-plain filmimages such as CT, Ultrasound and MRI are read by the radiologist. Plain radiographic images are visualized and preliminarily interpreted by the emergency physician with the below findings:        Interpretation per the Radiologist below, if available at the time ofthis note:    XR CHEST PORTABLE   Final Result      No radiographic evidence of acute intrathoracic process. CT Head WO Contrast   Final Result      No acute intracranial process. All CT scans at this facility use dose modulation, iterative reconstruction, and/or weight based dosing when appropriate to reduce radiation dose to as low as reasonably achievable. ED BEDSIDE ULTRASOUND:   Performed by ED Physician - none    LABS:  Labs Reviewed   CBC WITH AUTO DIFFERENTIAL - Abnormal; Notable for the following components:       Result Value    RBC 4.59 (*)     Hemoglobin 13.8 (*)     Hematocrit 41.6 (*)     All other components within normal limits   COMPREHENSIVE METABOLIC PANEL - Abnormal; Notable for the following components:    Glucose 136 (*)     All other components within normal limits   TROPONIN - Abnormal; Notable for the following components:    Troponin 0.024 (*)     All other components within normal limits    Narrative:     CALL  [facility]  ED tel. [phone],  Trop results called to and read back by Neela Crystal, 07/12/2020 12:22, by  Kalia Joshua   TSH WITH REFLEX - Abnormal; Notable for the following components:    TSH 5.180 (*)     All other components within normal limits    Narrative:     CALL  [facility]  ED tel. [phone],  Trop results called to and read back by Neela Crystal, 07/12/2020 12:22, by  SEPIDEH   CK   MAGNESIUM   APTT   PROTIME-INR   T4, FREE    Narrative:     [facility]  ED tel. [phone],  Trop results called to and read back by Neela Crystal, 07/12/2020 12:22, by  BLANCA   URINE RT REFLEX TO CULTURE       All other labs were within normal range or not returned as of this dictation.     EMERGENCY DEPARTMENT COURSE and DIFFERENTIAL DIAGNOSIS/MDM:   Vitals:    Vitals:    07/12/20 1123 07/12/20 1129 07/12/20 1155   BP: 105/65 121/68 106/60   Pulse: 53  57   Resp: 19     Temp:  97.7 °F (36.5 °C)    SpO2: 95%              MDM evaluation patient is nontoxic and in no distress. Vital signs are noted and normal.  He has no acute complaints. He admits to \"dizziness described as spinning. Feels like he is back to his normal.  Given his medical history laboratory radiology studies were ordered for evaluation of acute pathology. Patient's daughter later arrives. She was present at the time of the patient's \"dizziness\" episode and advance she was on her activated EMS. She reports that he began to stare off and did not break stare for several minutes and was only able to answer minimal questions with only the word yeah. She reports he became increasingly pale and diaphoretic. He did eventually slumped down into the chair and sustain a full loss of consciousness. She reports that although he did not sustain any injury  He was unconscious for several minutes. She was concerned and as a nurse she checked for a pulse. She did report that he maintained a pulse. She reports that he was very seemingly confused and out of it for quite some time until EMS arrival when he returned back to his baseline. He has no recollection of this event. Studies remain pending. Laboratory radiology studies are reviewed and plan of care was discussed with patient as well as family. Given daughters witnessing and recollection of the events obvious concern for arrhythmia is noted. Patient could have also sustained a seizure, TIA would be more unlikely although the daughter notes that he seemingly shuffled his gait a little more. He is neurologically intact on his arrival here. There was no unilateral weakness or deficit found. Case is reviewed and discussed with hospitalist, they are agreeable to excepting to their service. CRITICAL CARE TIME       CONSULTS:  IP CONSULT TO HOSPITALIST    PROCEDURES:  Unless otherwise noted below, none     Procedures    FINAL IMPRESSION      1.  Syncope and collapse DISPOSITION/PLAN   DISPOSITION Admitted 07/12/2020 12:47:38 PM      PATIENT REFERRED TO:  Mike Gu MD  [address]  [phone]            DISCHARGE MEDICATIONS:  Current Discharge Medication List             (Please notethat portions of this note were completed with a voice recognition program.  Efforts were made to edit the dictations but occasionally words are mis-transcribed.)    ILYA Rick CNP (electronically signed)  Attending Emergency Physician          ILYA Rick CNP  07/12/20 1851

## 2020-07-12 NOTE — ED NOTES
Returned to room. Placed back onto monitor. Family member at bedside. States he started dragging his left leg around 1045 today then passed out about 10 minutes later. Called 911 at that time. RADHA Abdalla made aware.      Candie Pablo RN  07/12/20 8912

## 2020-07-12 NOTE — ED NOTES
Bed: 07  Expected date: 7/12/20  Expected time: 11:09 AM  Means of arrival: [facility]  Comments:  83M, possible stroke     Laura Daily RN  07/12/20 9186

## 2020-07-13 VITALS
TEMPERATURE: 97.2 F | OXYGEN SATURATION: 97 % | HEART RATE: 61 BPM | WEIGHT: 189.6 LBS | DIASTOLIC BLOOD PRESSURE: 68 MMHG | BODY MASS INDEX: 28.08 KG/M2 | SYSTOLIC BLOOD PRESSURE: 153 MMHG | HEIGHT: 69 IN | RESPIRATION RATE: 16 BRPM

## 2020-07-13 LAB
ALBUMIN SERPL-MCNC: 3.4 G/DL (ref 3.5–4.6)
ANION GAP SERPL CALCULATED.3IONS-SCNC: 8 MEQ/L (ref 9–15)
BUN BLDV-MCNC: 20 MG/DL (ref 8–23)
CALCIUM SERPL-MCNC: 8.8 MG/DL (ref 8.5–9.9)
CHLORIDE BLD-SCNC: 103 MEQ/L (ref 95–107)
CO2: 25 MEQ/L (ref 20–31)
CREAT SERPL-MCNC: 0.79 MG/DL (ref 0.7–1.2)
EKG ATRIAL RATE: 54 BPM
EKG ATRIAL RATE: 58 BPM
EKG P AXIS: 38 DEGREES
EKG P AXIS: 49 DEGREES
EKG P-R INTERVAL: 170 MS
EKG P-R INTERVAL: 170 MS
EKG Q-T INTERVAL: 426 MS
EKG Q-T INTERVAL: 436 MS
EKG QRS DURATION: 88 MS
EKG QRS DURATION: 88 MS
EKG QTC CALCULATION (BAZETT): 413 MS
EKG QTC CALCULATION (BAZETT): 418 MS
EKG R AXIS: -19 DEGREES
EKG R AXIS: -26 DEGREES
EKG T AXIS: -2 DEGREES
EKG T AXIS: 22 DEGREES
EKG VENTRICULAR RATE: 54 BPM
EKG VENTRICULAR RATE: 58 BPM
GFR AFRICAN AMERICAN: >60
GFR NON-AFRICAN AMERICAN: >60
GLUCOSE BLD-MCNC: 95 MG/DL (ref 70–99)
HCT VFR BLD CALC: 38.2 % (ref 42–52)
HEMOGLOBIN: 12.7 G/DL (ref 14–18)
MAGNESIUM: 2.3 MG/DL (ref 1.7–2.4)
MCH RBC QN AUTO: 30.7 PG (ref 27–31.3)
MCHC RBC AUTO-ENTMCNC: 33.3 % (ref 33–37)
MCV RBC AUTO: 92.4 FL (ref 80–100)
PDW BLD-RTO: 14.5 % (ref 11.5–14.5)
PHOSPHORUS: 2.9 MG/DL (ref 2.3–4.8)
PLATELET # BLD: 224 K/UL (ref 130–400)
POTASSIUM SERPL-SCNC: 3.8 MEQ/L (ref 3.4–4.9)
RBC # BLD: 4.14 M/UL (ref 4.7–6.1)
SODIUM BLD-SCNC: 136 MEQ/L (ref 135–144)
WBC # BLD: 7.6 K/UL (ref 4.8–10.8)

## 2020-07-13 PROCEDURE — 96372 THER/PROPH/DIAG INJ SC/IM: CPT

## 2020-07-13 PROCEDURE — 80069 RENAL FUNCTION PANEL: CPT

## 2020-07-13 PROCEDURE — 85027 COMPLETE CBC AUTOMATED: CPT

## 2020-07-13 PROCEDURE — 6370000000 HC RX 637 (ALT 250 FOR IP): Performed by: INTERNAL MEDICINE

## 2020-07-13 PROCEDURE — 93005 ELECTROCARDIOGRAM TRACING: CPT | Performed by: INTERNAL MEDICINE

## 2020-07-13 PROCEDURE — 36415 COLL VENOUS BLD VENIPUNCTURE: CPT

## 2020-07-13 PROCEDURE — 93010 ELECTROCARDIOGRAM REPORT: CPT | Performed by: INTERNAL MEDICINE

## 2020-07-13 PROCEDURE — 6360000002 HC RX W HCPCS: Performed by: INTERNAL MEDICINE

## 2020-07-13 PROCEDURE — G0378 HOSPITAL OBSERVATION PER HR: HCPCS

## 2020-07-13 PROCEDURE — 83735 ASSAY OF MAGNESIUM: CPT

## 2020-07-13 PROCEDURE — 99214 OFFICE O/P EST MOD 30 MIN: CPT | Performed by: INTERNAL MEDICINE

## 2020-07-13 PROCEDURE — APPNB30 APP NON BILLABLE TIME 0-30 MINS: Performed by: PHYSICIAN ASSISTANT

## 2020-07-13 PROCEDURE — 2580000003 HC RX 258: Performed by: INTERNAL MEDICINE

## 2020-07-13 RX ORDER — CARVEDILOL 6.25 MG/1
6.25 TABLET ORAL 2 TIMES DAILY
Qty: 60 TABLET | Refills: 2 | Status: SHIPPED | OUTPATIENT
Start: 2020-07-13 | End: 2020-10-08 | Stop reason: SDUPTHER

## 2020-07-13 RX ORDER — CIPROFLOXACIN 250 MG/1
250 TABLET, FILM COATED ORAL 2 TIMES DAILY
Qty: 6 TABLET | Refills: 0 | Status: SHIPPED | OUTPATIENT
Start: 2020-07-13 | End: 2020-07-16

## 2020-07-13 RX ORDER — LISINOPRIL 10 MG/1
10 TABLET ORAL DAILY
Qty: 30 TABLET | Refills: 3 | Status: SHIPPED | OUTPATIENT
Start: 2020-07-14 | End: 2020-10-09 | Stop reason: SDUPTHER

## 2020-07-13 RX ADMIN — LISINOPRIL 10 MG: 10 TABLET ORAL at 08:13

## 2020-07-13 RX ADMIN — FINASTERIDE 5 MG: 5 TABLET, FILM COATED ORAL at 08:13

## 2020-07-13 RX ADMIN — DORZOLAMIDE HYDROCHLORIDE 1 DROP: 20 SOLUTION/ DROPS OPHTHALMIC at 08:15

## 2020-07-13 RX ADMIN — ENOXAPARIN SODIUM 40 MG: 40 INJECTION SUBCUTANEOUS at 08:12

## 2020-07-13 RX ADMIN — Medication 10 ML: at 08:15

## 2020-07-13 RX ADMIN — CARVEDILOL 12.5 MG: 12.5 TABLET, FILM COATED ORAL at 08:14

## 2020-07-13 RX ADMIN — CLOPIDOGREL BISULFATE 75 MG: 75 TABLET ORAL at 08:13

## 2020-07-13 RX ADMIN — LATANOPROST 1 DROP: 50 SOLUTION OPHTHALMIC at 08:15

## 2020-07-13 RX ADMIN — TIMOLOL MALEATE 1 DROP: 5 SOLUTION/ DROPS OPHTHALMIC at 08:15

## 2020-07-13 RX ADMIN — ASPIRIN 81 MG: 81 TABLET, COATED ORAL at 08:13

## 2020-07-13 ASSESSMENT — ENCOUNTER SYMPTOMS
CHEST TIGHTNESS: 0
SHORTNESS OF BREATH: 0
NAUSEA: 0
VOMITING: 0

## 2020-07-13 ASSESSMENT — PAIN SCALES - GENERAL: PAINLEVEL_OUTOF10: 0

## 2020-07-13 NOTE — PROGRESS NOTES
Progress Note  Patient: Zacarias Foster  Unit/Bed: Y411/H747-64  YOB: 1935  MRN: [id number]  Acct: [de-identified]   Admitting Diagnosis: Syncope and collapse [R55]  Date:  7/12/2020  Hospital Day: 0    Chief Complaint:  Dizziness/syncope/elevated trop    Subjective      7/13/20: Resting comfortably in bed in no acute distress. Denies chest pain, shortness of breath, recurrent dizziness or lightheadedness or recurrent syncope. Imdur was discontinued and ACE inhibitor dose was decreased due to concern for hypotension. Blood pressure has been stable since admission. On telemetry, he is currently sinus rhythm with heart rate in the 60s. Heart rate is was as low as into the 50s on telemetry alarms early this morning. No significant pauses noted. Patient states that he has been compliant with dual antiplatelet therapy of aspirin and Plavix since his recent PCI of the OM on 6/8/2020. His episode of dizziness and lightheadedness on Sunday occurred after he stood up from a seated position to walk into the house and upon sitting down at the kitchen table was very lightheaded and dizzy and subsequently passed out briefly witnessed by his daughter. Reportedly, his blood pressure was low at that time. 7/12/20: Very pleasant elderly white male who lives with his son. Was at home with his daughter. He was having some food to eat got dizzy lightheaded and had carly syncope lasting about 3 minutes. It was witnessed by his daughter who is a nurse at [employer]. He does not recall the event. No seizures no urinary incontinence. This was preceded by hypotension which was documented by ambulatory blood pressure machine at home. He has a previous history of dizziness. No syncope and no history of seizures. He sees . Troponin is slightly elevated. No chest pain.   He was admitted with an event about a month ago with chest pain and he underwent cardiac catheterization with PCI to OM branch by  Evan. He has been significantly depressed after his wife  about a year ago. He does not drive at all now. He is not a smoker no alcohol use    Review of Systems:   Review of Systems   Constitutional: Negative for activity change, fatigue and fever. HENT: Negative for congestion. Respiratory: Negative for chest tightness and shortness of breath. Cardiovascular: Negative for chest pain, palpitations and leg swelling. Gastrointestinal: Negative for nausea and vomiting. Genitourinary: Negative for difficulty urinating. Musculoskeletal: Negative for arthralgias. Neurological: Negative for dizziness, syncope and light-headedness. Psychiatric/Behavioral: Negative for agitation and behavioral problems. Physical Examination:    /67   Pulse 70   Temp 97.8 °F (36.6 °C) (Oral)   Resp 16   Ht 5' 9\" (1.753 m)   Wt 189 lb 9.5 oz (86 kg)   SpO2 96%   BMI 28.00 kg/m²    Physical Exam  Constitutional:       General: He is not in acute distress. Appearance: Normal appearance. HENT:      Head: Normocephalic and atraumatic. Neck:      Musculoskeletal: Normal range of motion and neck supple. Cardiovascular:      Rate and Rhythm: Normal rate and regular rhythm. Pulmonary:      Effort: Pulmonary effort is normal. No respiratory distress. Breath sounds: Normal breath sounds. No wheezing, rhonchi or rales. Abdominal:      Palpations: Abdomen is soft. Tenderness: There is no abdominal tenderness. Musculoskeletal: Normal range of motion. Right lower leg: No edema. Left lower leg: No edema. Skin:     General: Skin is warm and dry. Neurological:      General: No focal deficit present. Mental Status: He is alert and oriented to person, place, and time. Cranial Nerves: No cranial nerve deficit.    Psychiatric:         Mood and Affect: Mood normal.         Behavior: Behavior normal.         LABS:  CBC:   Lab Results   Component Value Date    WBC 7.6 07/13/2020    RBC 4.14 07/13/2020    RBC 4.69 10/25/2011    HGB 12.7 07/13/2020    HCT 38.2 07/13/2020    MCV 92.4 07/13/2020    MCH 30.7 07/13/2020    MCHC 33.3 07/13/2020    RDW 14.5 07/13/2020     07/13/2020    MPV 9.2 08/27/2015     CBC with Differential:   Lab Results   Component Value Date    WBC 7.6 07/13/2020    RBC 4.14 07/13/2020    RBC 4.69 10/25/2011    HGB 12.7 07/13/2020    HCT 38.2 07/13/2020     07/13/2020    MCV 92.4 07/13/2020    MCH 30.7 07/13/2020    MCHC 33.3 07/13/2020    RDW 14.5 07/13/2020    LYMPHOPCT 16.9 07/12/2020    MONOPCT 8.3 07/12/2020    EOSPCT 6.2 10/25/2011    BASOPCT 1.5 07/12/2020    MONOSABS 0.6 07/12/2020    LYMPHSABS 1.2 07/12/2020    EOSABS 0.3 07/12/2020    BASOSABS 0.1 07/12/2020     CMP:    Lab Results   Component Value Date     07/13/2020    K 3.8 07/13/2020    K 4.0 01/06/2020     07/13/2020    CO2 25 07/13/2020    BUN 20 07/13/2020    CREATININE 0.79 07/13/2020    GFRAA >60.0 07/13/2020    LABGLOM >60.0 07/13/2020    GLUCOSE 95 07/13/2020    GLUCOSE 96 10/25/2011    PROT 6.5 07/12/2020    LABALBU 3.4 07/13/2020    LABALBU 4.6 10/25/2011    CALCIUM 8.8 07/13/2020    BILITOT 0.6 07/12/2020    ALKPHOS 80 07/12/2020    AST 13 07/12/2020    ALT 17 07/12/2020     BMP:    Lab Results   Component Value Date     07/13/2020    K 3.8 07/13/2020    K 4.0 01/06/2020     07/13/2020    CO2 25 07/13/2020    BUN 20 07/13/2020    LABALBU 3.4 07/13/2020    LABALBU 4.6 10/25/2011    CREATININE 0.79 07/13/2020    CALCIUM 8.8 07/13/2020    GFRAA >60.0 07/13/2020    LABGLOM >60.0 07/13/2020    GLUCOSE 95 07/13/2020    GLUCOSE 96 10/25/2011     Magnesium:    Lab Results   Component Value Date    MG 2.3 07/13/2020     Troponin:    Lab Results   Component Value Date    TROPONINI 0.014 07/12/2020       Radiology:  Ct Head Wo Contrast    Result Date: 7/12/2020  EXAMINATION: CT of the brain without contrast HISTORY: Dizziness. Vertigo.  COMPARISON: CT brain from February 6, 2013 TECHNIQUE: Multiple contiguous axial images were obtained of the brain from the skull base through the vertex. Multiplanar reformats were obtained. FINDINGS: Prominence of the sulci and ventricles compatible with mild generalized parenchymal volume loss. Gray-white matter differentiation is preserved. Areas of bilateral supratentorial white matter hypoattenuation are nonspecific but most likely related to chronic small vessel ischemic changes in a patient of this age. No acute hemorrhage or abnormal extra-axial fluid collection. No mass effect or midline shift. The visualized paranasal sinuses and mastoid air cells are clear. Calvarium is intact. No acute intracranial process. All CT scans at this facility use dose modulation, iterative reconstruction, and/or weight based dosing when appropriate to reduce radiation dose to as low as reasonably achievable. Xr Chest Portable    Result Date: 7/12/2020  Exam: XR CHEST PORTABLE History: Chest pain Technique: AP portable view of the chest obtained. Comparison: Portable chest radiograph from  May 31, 2020 Findings: Chronic elevation of the right hemidiaphragm The cardiomediastinal silhouette is within normal limits. No pneumothorax, pleural effusion, or consolidation. Degenerative changes of the spine and both shoulders. No radiographic evidence of acute intrathoracic process. Echocardiogram 1/6/20:    Conclusions   Summary   Normal left ventricular systolic function, no regional wall motion   abnormalities, estimated ejection fraction of 60%. Normal left ventricular size and function. Normal left ventricular wall thickness. Impaired relaxation compatible with diastolic dysfunction. ( reversed E/A   ratio)   Trace (+) mitral regurgitation is present. No evidence of mitral valve stenosis. Mild aortic regurgitation is noted. No evidence of aortic valve stenosis.    There is Trace tricuspid regurgitation with estimated RVSP of 36 mm Hg. The left atrium is Mild to moderate dilated. Signature   ----------------------------------------------------------------   Electronically signed by Neisha Deras DO(Interpreting   physician) on 01/07/2020 06:30 AM   ----------------------------------------------------------------   Findings  Left Ventricle  Normal left ventricular systolic function, no regional wall motion  abnormalities, estimated ejection fraction of 60%. Normal left ventricular size and function. Normal left ventricular wall thickness. Impaired relaxation compatible with diastolic dysfunction. ( reversed E/A  ratio)  Right Ventricle  Normal right ventricle structure and function. Normal right ventricle systolic pressure. Left Atrium  The left atrium is Mild to moderate dilated. Right Atrium  Normal right atrium. Mitral Valve  Diffusely thickened and pliable mitral valve leaflets with normal excursion. Trace (+) mitral regurgitation is present. No evidence of mitral valve stenosis. Tricuspid Valve  Tricuspid valve is structurally normal.  There is Trace tricuspid regurgitation with estimated RVSP of 36 mm Hg. Aortic Valve  Sclerotic, trileaflet aortic valve with diffusely thickened leaflets with  normal cusp separation and excursion. Mild aortic regurgitation is noted. No  evidence of aortic valve stenosis. Pulmonic Valve  Normal pulmonic valve structure and function. Pericardial Effusion  No evidence of pericardial effusion. Pleural Effusion  No evidence of pleural effusion. Aorta \ Miscellaneous  Miscellaneous normal findings were found. [facility] 6/8/20:  FINDINGS:  1. LMT:  Left main trunk is angiographically normal.  It is large in  caliber, bifurcates into a large left anterior descending and left  circumflex coronary artery. 2.  LAD:  Left anterior descending artery has patent proximal stents  with no significant in-stent restenosis.   There is mid to distal small  vessel diffuse severe disease throughout all the way to the apex. 3.  LCX:  Left circumflex coronary artery has mild diffuse disease and  also has distal small vessel diffuse severe disease. The obtuse  marginal 1 is medium sized and has 80% focal stenosis. 4.  RCA:  Right coronary artery is small, technically codominant as  there is some blush in the inferior wall, but diffusely diseased  throughout and a less than 2-mm vessel. 5.  LV:  Left ventriculography shows ejection fraction 60% with no wall  motion abnormalities, normal chamber size and no mitral regurgitation. 6.  Hemodynamics:  Left ventricular end-diastolic pressure is 10 with no  gradient across the aortic valve. 7.  Dominance:  Mixed.     PCI NOTE:  Successful percutaneous coronary intervention of obtuse  marginal 1 80% stenosis MADDY-3 flow reduced to 0% residual with MADDY-3  flow after implantation of Xience 2.25 x 18 drug-eluting stent,  post-dilated with a 2.5 noncompliant balloon at high pressure.     CONCLUSION:  Dual antiplatelet therapy. Maximal medical therapy is  Advised. EKG 7/13/20: SB 58, T wave inversion lead III, QTc 418ms    Telemetry 7/13/20: SR 60s; HR decreased into 50s early this AM      Assessment:    Active Hospital Problems    Diagnosis Date Noted    Syncope and collapse [R55] 07/12/2020    Post PTCA [Z98.61]      1. Syncope  2. Elevated troponin  3. Hx CAD s/p PCI--most recent PCI AMBER of OM1 on 6/8/20  4. Normal LVF EF 60% per echo 1/6/20  5. Hx HTN  6. Dyslipidemia  7. Dementia     Plan:  1. Maximize medical therapy-continue dual antiplatelet therapy with aspirin 81 mg p.o. daily and Plavix 75 mg p.o. daily given recent PCI with drug-eluting stent to the OM1 on 6/8/2020, Coreg 12.5 mg p.o. twice daily, reduced dose of benazepril (using alternate therapy of lisinopril during admission), discontinued Imdur, Pravachol 80 mg tablet p.o. daily at bedtime, sublingual nitroglycerin PRN for chest pain  2.  May consider decreasing carvedilol dose if blood pressure remains marginal/low with concern for hypotensive episodes  3. Cardiac diet recommended  4. Monitor on telemetry for any tachycardia or bradycardia arrhythmias  5. Maintain potassium greater than 4, magnesium greater than 2  6. GI/DVT prophylaxis  7. Consider outpatient Holter/event monitor to rule out underlying cardiac arrhythmias/bradycardia arrhythmias as etiology of patient's syncopal event  8. Conservative medical therapy from a cardiac standpoint as patient is status post recent cardiac catheterization on 6/8/2020 during which he underwent PCI with drug-eluting stent of the OM1 and no significant stenosis noted otherwise.   9.  Further recommendations to follow      Electronically signed by NUNU Carver on 7/13/2020 at 9:18 AM

## 2020-07-13 NOTE — FLOWSHEET NOTE
Advance Care Planning     Advance Care Planning Activator (Inpatient)  Conversation Note      Date of ACP Conversation: 7/12/2020    Conversation Conducted with: Patient with Decision Making Capacity    ACP Activator: [address] makes decisions on behalf of the incapacitated patient: Decision Maker is asked to consider and make decisions based on patient values, known preferences, or best interests. Health Care Decision Maker:     Current Designated Health Care Decision Maker:   (If there is a valid [place] named in the [address] [place] [address] Makers\" box in the ACP activity, but it is not visible above, be sure to open that field and then select the health care decision maker relationship (ie \"primary\") in the blank space to the right of the name.) Validate  this information as still accurate & up-to-date; edit [place] field as needed.)    Note: Assess and validate information in current ACP documents, as indicated. If no Decision Maker listed above or available through scanned documents, then:    If no Authorized Decision Maker has previously been identified, then patient chooses [place]:  \"Who would you like to name as your primary health care decision-maker? \"               Name: Adi Green        Relationship: Daughter          Phone number:- [phone]  [facility] this person be reached easily? \" Yes  \"Who would you like to name as your back-up decision maker? \"   Name: Tanner Turner        Relationship: Son        Phone number: [phone]  [facility] this person be reached easily? \" Yes    Note: If the relationship of these Decision-Makers to the patient does NOT follow your state's Next of Kin hierarchy, recommend that patient complete ACP document that meets state-specific requirements to allow them to act on the patient's behalf when appropriate. Care Preferences    Ventilation:   \"If you were in your present state of health and suddenly became very ill and were unable to breathe on your own, what would your preference be about the use of a ventilator (breathing machine) if it were available to you? \"      Would the patient desire the use of ventilator (breathing machine)?: no    \"If your health worsens and it becomes clear that your chance of recovery is unlikely, what would your preference be about the use of a ventilator (breathing machine) if it were available to you? \"     Would the patient desire the use of ventilator (breathing machine)?: No      Resuscitation  \"CPR works best to restart the heart when there is a sudden event, like a heart attack, in someone who is otherwise healthy. Unfortunately, CPR does not typically restart the heart for people who have serious health conditions or who are very sick. \"    \"In the event your heart stopped as a result of an underlying serious health condition, would you want attempts to be made to restart your heart (answer \"yes\" for attempt to resuscitate) or would you prefer a natural death (answer \"no\" for do not attempt to resuscitate)? \" no      NOTE: If the patient has a valid advance directive AND now provides care preference(s) that are inconsistent with that prior directive, advise the patient to consider either: creating a new advance directive that complies with state-specific requirements; or, if that is not possible, orally revoking that prior directive in accordance with state-specific requirements, which must be documented in the EHR. [] Yes   [] No   Educated Patient / Jamas Favre regarding differences between Advance Directives and portable DNR orders.     Length of ACP Conversation in minutes:      Conversation Outcomes:  [x] ACP discussion completed  [] Existing advance directive reviewed with patient; no changes to patient's previously recorded wishes  [] New Advance Directive completed  [] Portable Do Not Rescitate prepared for Provider review and signature  [] POLST/POST/MOLST/MOST prepared for Provider review and signature      Follow-up plan:    [] Schedule follow-up conversation to continue planning  [] Referred individual to Provider for additional questions/concerns   [] Advised patient/agent/surrogate to review completed ACP document and update if needed with changes in condition, patient preferences or care setting    [] This note routed to one or more involved healthcare providers

## 2020-07-13 NOTE — CARE COORDINATION
[facility] Case Management Initial Discharge Assessment    Met with Patient to discuss discharge plan. PCP: Alphonse Grigsby MD                                Date of Last Visit: VIRTUAL VISIT 6/25      Discharge Planning    Living Arrangements: independently at home    Who do you live with? PTS SON LIVES WITH HIM. PTS DTR IS A NURSE THAT LIVES IN [place] AND HELPS HIM WHEN NEEDED. Who helps you with your care:  self    If lives at home:     Do you have any barriers navigating in your home? no    Patient can perform ADL? Yes    Current Services (outpatient and in home) :  None    Dialysis: No    Is transportation available to get to your appointments? Yes-PT DOES NOT DRIVE D/T DEMENTIA. PT STATES HIS SON OR DTR DRIVE HIM    DME Equipment:  yes - PT USES A CANE OR A WALKER BASED ON HOW HE FEELS. Respiratory equipment: None    Respiratory provider:  no     Pharmacy:  yes - [facility] [place]. Consult with Medication Assistance Program?  No      Patient agreeable to [facility]? Declined    Patient agreeable to SNF/Rehab? Declined    Other discharge needs identified? N/A    Freedom of choice list provided with basic dialogue that supports the patient's individualized plan of care/goals and shares the quality data associated with the providers. Yes    Does Patient Have a High-Risk for Readmission Diagnosis (CHF, PN, MI, COPD)? No    The plan for Transition of Care is related to the following treatment goals: LESS DIZZY    Initial Discharge Plan? (Note: please see concurrent daily documentation for any updates after initial note). MET  WITH PATIENT, OBS. PT DENIES ANY HOME NEEDS AT THIS TIME.      The Patient and/or patient representative: PT was provided with choice of any post-acute providers for care and equipment and agrees with discharge plan  Yes    Electronically signed by Stefanie Greenfield RN on 7/13/2020 at 2:37 PM

## 2020-07-13 NOTE — DISCHARGE SUMMARY
Physician Discharge Summary     Patient ID:  Gilbert Bhat  [id number]  75 y.o.  1935    Admit date: 7/12/2020    Discharge date : 07/13/20     Admitting Physician: Parveen Thompson MD     Discharge Physician: Brie Rodrigez MD     Admission Diagnoses: Syncope and collapse [R55]    Discharge Diagnoses: Syncope due to hypotension due to medications    Admission Condition: fair    Discharged Condition: good    Hospital Course:   Patient had low bp on arrival, coreg decreased, imdur dc'd, ace-inhibitor changed to lisinopril at lower dose.     Consults: cardiology    Significant Diagnostic Studies: as below    Discharge Exam:  BP (!) 157/71   Pulse 65   Temp 97.2 °F (36.2 °C) (Oral)   Resp 16   Ht 5' 9\" (1.753 m)   Wt 189 lb 9.5 oz (86 kg)   SpO2 97%   BMI 28.00 kg/m²   General appearance: alert, appears stated age and cooperative  Lungs: clear to auscultation bilaterally  Heart: regular rate and rhythm, S1, S2 normal, no murmur, click, rub or gallop  Abdomen: soft, non-tender; bowel sounds normal; no masses,  no organomegaly  Extremities: extremities normal, atraumatic, no cyanosis or edema  Skin: Skin color, texture, turgor normal. No rashes or lesions    Labs:   Recent Labs     07/12/20  1130 07/13/20  0545   WBC 7.3 7.6   HGB 13.8* 12.7*   HCT 41.6* 38.2*    224     Recent Labs     07/12/20  1130 07/13/20  0545    136   K 4.4 3.8    103   CO2 24 25   BUN 23 20   CREATININE 0.89 0.79   CALCIUM 9.7 8.8   PHOS  --  2.9     Recent Labs     07/12/20  1130   AST 13   ALT 17   BILITOT 0.6   ALKPHOS 80     Recent Labs     07/12/20  1130   INR 1.1     Recent Labs     07/12/20  1130 07/12/20  1605 07/12/20  1849   CKTOTAL 46  --   --    TROPONINI 0.024* <0.010 0.014*       Urinalysis:   Lab Results   Component Value Date    NITRU POSITIVE 07/12/2020    WBCUA >100 07/12/2020    BACTERIA MANY 07/12/2020    RBCUA 10-20 07/12/2020    BLOODU MODERATE 07/12/2020    SPECGRAV 1.019 07/12/2020 250 MG tablet Take 1 tablet by mouth 2 times daily for 3 days  Qty: 6 tablet, Refills: 0         CONTINUE these medications which have CHANGED    Details   carvedilol (COREG) 6.25 MG tablet Take 1 tablet by mouth 2 times daily  Qty: 60 tablet, Refills: 2         CONTINUE these medications which have NOT CHANGED    Details   clopidogrel (PLAVIX) 75 MG tablet Take 1 tablet by mouth daily  Qty: 90 tablet, Refills: 3      pravastatin (PRAVACHOL) 80 MG tablet TAKE 1 TABLET BY MOUTH EVERY EVENING  Qty: 30 tablet, Refills: 3      donepezil (ARICEPT) 5 MG tablet TAKE 1 TABLET BY MOUTH NIGHTLY  Qty: 90 tablet, Refills: 3      finasteride (PROSCAR) 5 MG tablet TAKE 1 TABLET BY MOUTH DAILY  Qty: 90 tablet, Refills: 02    Associated Diagnoses: Benign prostatic hyperplasia, unspecified whether lower urinary tract symptoms present; NSTEMI (non-ST elevated myocardial infarction) ([facility])      latanoprost (XALATAN) 0.005 % ophthalmic solution use 1 drop in each eye every day  Qty: 2.5 mL, Refills: 5      vitamin B-12 (CYANOCOBALAMIN) 1000 MCG tablet Take 500 mcg by mouth daily      aspirin 81 MG tablet Take 1 tablet by mouth daily With Food  Qty: 30 tablet, Refills: 3      Multiple Vitamins-Minerals (MULTIVITAMIN PO) Take by mouth daily       dorzolamide-timolol (COSOPT) 22.3-6.8 MG/ML ophthalmic solution Place 1 drop into both eyes 2 times daily       nitroGLYCERIN (NITROSTAT) 0.4 MG SL tablet up to max of 3 total doses. If no relief after 1 dose, call 911. Qty: 25 tablet, Refills: 3         STOP taking these medications       benazepril (LOTENSIN) 20 MG tablet Comments:   Reason for Stopping:         isosorbide mononitrate (IMDUR) 60 MG extended release tablet Comments:   Reason for Stopping:             Activity: activity as tolerated  Diet: regular diet  Wound Care: none needed    Follow-up with PCP  in 1 week, cardiology 2-3 weeks.     DC time 35 minutes    Signed:  Electronically signed by Karlee Sepulveda MD on 7/13/2020 at 3:23 PM

## 2020-07-15 LAB
ORGANISM: ABNORMAL
URINE CULTURE, ROUTINE: ABNORMAL

## 2020-08-05 ENCOUNTER — OFFICE VISIT (OUTPATIENT)
Dept: CARDIOLOGY CLINIC | Age: 85
End: 2020-08-05
Payer: MEDICARE

## 2020-08-05 VITALS
RESPIRATION RATE: 18 BRPM | OXYGEN SATURATION: 97 % | HEART RATE: 69 BPM | WEIGHT: 191 LBS | DIASTOLIC BLOOD PRESSURE: 64 MMHG | BODY MASS INDEX: 28.29 KG/M2 | HEIGHT: 69 IN | SYSTOLIC BLOOD PRESSURE: 134 MMHG

## 2020-08-05 PROCEDURE — 4040F PNEUMOC VAC/ADMIN/RCVD: CPT | Performed by: INTERNAL MEDICINE

## 2020-08-05 PROCEDURE — G8427 DOCREV CUR MEDS BY ELIG CLIN: HCPCS | Performed by: INTERNAL MEDICINE

## 2020-08-05 PROCEDURE — G8417 CALC BMI ABV UP PARAM F/U: HCPCS | Performed by: INTERNAL MEDICINE

## 2020-08-05 PROCEDURE — 99214 OFFICE O/P EST MOD 30 MIN: CPT | Performed by: INTERNAL MEDICINE

## 2020-08-05 PROCEDURE — 1123F ACP DISCUSS/DSCN MKR DOCD: CPT | Performed by: INTERNAL MEDICINE

## 2020-08-05 PROCEDURE — 1036F TOBACCO NON-USER: CPT | Performed by: INTERNAL MEDICINE

## 2020-08-05 ASSESSMENT — ENCOUNTER SYMPTOMS
SHORTNESS OF BREATH: 0
WHEEZING: 0
EYES NEGATIVE: 1
STRIDOR: 0
BLOOD IN STOOL: 0
COUGH: 0
CHEST TIGHTNESS: 0
GASTROINTESTINAL NEGATIVE: 1
NAUSEA: 0
RESPIRATORY NEGATIVE: 1

## 2020-08-05 NOTE — PROGRESS NOTES
Subsequent Progress Note  Patient: Dauna Lesches  YOB: 1935  MRN: [id number]    Chief Complaint: nstemi cad htn   Chief Complaint   Patient presents with    Follow-Up from Hospital    Loss of Consciousness    Hypotension       CV Data:  6/2018 spect negative  5/2018 echo EF 60%  11/2018 LAD AMBER  12/2018 CUS <. Left Vertebral 100  1/2020 echo ef 60 midl AR and MR  1/2020 SPECT - NEGATIVE  6/8/2020 Cath OM1 AMBER. EF 60%    Subjective/HPI: no cp no sob no falls no bleed. R Inguinal hernia     9/25/2019:  No cp no osb no falls no bleed eats well. Takes meds. C/o Diuretic and frequent urine. 1/15/2020 recently in ER and hosp for HTN. Now stable without meds changes. Possible he did not take his meds. Recent Labs showed mild Troponin elevaton. 2/5/2020 NO CP NO SOB NO FALLS NO BLEED. TAKES MEDS. EATS WELL.     5/8/2020 TELEHEALTH EVALUATION -- Audio/Visual (During [facility] public health emergency)      137/70 home BP. Doing well no cp no osb no falls no bleed takes meds. Eats well. Son moved in w him. 6/17/2020 no cp no sob no edema no falls no bleed. Feels better since last stent. 8/5/2020 recent hosp for syncope related to Low BP. Imdur was stopped and Benazepril changed to Lisinopril. Feels much better. Nonsmoker        EKG:    Past Medical History:   Diagnosis Date    Anticoagulant long-term use     Anxiety     CAD (coronary artery disease)     Carotid stenosis     2/2013 16-49%    CHF (congestive heart failure) (HCC)     Dementia (HCC)     Dementia (HCC)     Hyperlipidemia     Hypertension     MI (myocardial infarction) ([facility]     Osteoarthritis        Past Surgical History:   Procedure Laterality Date    CARPAL TUNNEL RELEASE  1998    CATARACT REMOVAL  2007    COLONOSCOPY  12/10/10,2007    DR Emily Keita - DONE AT [address] COLONOSCOPY  2007    hx polyps needs 2015    COLONOSCOPY  9/21/15       [address] WITH STENT PLACEMENT 06/07/2020    DIAGNOSTIC CARDIAC CATH LAB PROCEDURE      HERNIA REPAIR      PTCA         Family History   Problem Relation Age of Onset    Heart Disease Mother     High Blood Pressure Mother        Social History     Socioeconomic History    Marital status:       Spouse name: None    Number of children: None    Years of education: None    Highest education level: None   Occupational History    None   Social Needs    Financial resource strain: None    Food insecurity     Worry: None     Inability: None    Transportation needs     Medical: None     Non-medical: None   Tobacco Use    Smoking status: Never Smoker    Smokeless tobacco: Never Used   Substance and Sexual Activity    Alcohol use: No     Comment: social    Drug use: No    Sexual activity: Not Currently   Lifestyle    Physical activity     Days per week: None     Minutes per session: None    Stress: None   Relationships    Social connections     Talks on phone: None     Gets together: None     Attends Zoroastrianism service: None     Active member of club or organization: None     Attends meetings of clubs or organizations: None     Relationship status: None    Intimate partner violence     Fear of current or ex partner: None     Emotionally abused: None     Physically abused: None     Forced sexual activity: None   Other Topics Concern    None   Social History Narrative    None       No Known Allergies    Current Outpatient Medications   Medication Sig Dispense Refill    lisinopril (PRINIVIL;ZESTRIL) 10 MG tablet Take 1 tablet by mouth daily 30 tablet 3    carvedilol (COREG) 6.25 MG tablet Take 1 tablet by mouth 2 times daily 60 tablet 2    clopidogrel (PLAVIX) 75 MG tablet Take 1 tablet by mouth daily 90 tablet 3    pravastatin (PRAVACHOL) 80 MG tablet TAKE 1 TABLET BY MOUTH EVERY EVENING 30 tablet 3    donepezil (ARICEPT) 5 MG tablet TAKE 1 TABLET BY MOUTH NIGHTLY 90 tablet 3    finasteride (PROSCAR) 5 MG tablet TAKE 1 TABLET BY MOUTH DAILY 90 tablet 02    latanoprost (XALATAN) 0.005 % ophthalmic solution use 1 drop in each eye every day 2.5 mL 5    nitroGLYCERIN (NITROSTAT) 0.4 MG SL tablet up to max of 3 total doses. If no relief after 1 dose, call 911. 25 tablet 3    vitamin B-12 (CYANOCOBALAMIN) 1000 MCG tablet Take 500 mcg by mouth daily      aspirin 81 MG tablet Take 1 tablet by mouth daily With Food 30 tablet 3    Multiple Vitamins-Minerals (MULTIVITAMIN PO) Take by mouth daily       dorzolamide-timolol (COSOPT) 22.3-6.8 MG/ML ophthalmic solution Place 1 drop into both eyes 2 times daily        No current facility-administered medications for this visit. Review of Systems:   Review of Systems   Constitutional: Negative. Negative for diaphoresis and fatigue. HENT: Negative. Eyes: Negative. Respiratory: Negative. Negative for cough, chest tightness, shortness of breath, wheezing and stridor. Cardiovascular: Negative. Negative for chest pain, palpitations and leg swelling. Gastrointestinal: Negative. Negative for blood in stool and nausea. Genitourinary: Negative. Musculoskeletal: Negative. Skin: Negative. Neurological: Negative. Negative for dizziness, syncope, weakness and light-headedness. Hematological: Negative. Psychiatric/Behavioral: Negative. Physical Examination:    /64 (Site: Right Upper Arm, Position: Sitting, Cuff Size: Large Adult)   Pulse 69   Resp 18   Ht 5' 9\" (1.753 m)   Wt 191 lb (86.6 kg)   SpO2 97%   BMI 28.21 kg/m²    Physical Exam   Constitutional: He appears healthy. No distress. HENT:   Normal cephalic and Atraumatic   Eyes: Pupils are equal, round, and reactive to light. Neck: Normal range of motion and thyroid normal. Neck supple. No JVD present. No neck adenopathy. No thyromegaly present. Cardiovascular: Normal rate, regular rhythm, intact distal pulses and normal pulses. Murmur heard.   Pulmonary/Chest: Effort normal and breath sounds normal. He has no wheezes. He has no rales. He exhibits no tenderness. Abdominal: Soft. Bowel sounds are normal. There is no abdominal tenderness. Musculoskeletal: Normal range of motion. General: No tenderness or edema. Neurological: He is alert and oriented to person, place, and time. Skin: Skin is warm. No cyanosis. Nails show no clubbing.        LABS:  CBC:   Lab Results   Component Value Date    WBC 7.6 07/13/2020    RBC 4.14 07/13/2020    RBC 4.69 10/25/2011    HGB 12.7 07/13/2020    HCT 38.2 07/13/2020    MCV 92.4 07/13/2020    MCH 30.7 07/13/2020    MCHC 33.3 07/13/2020    RDW 14.5 07/13/2020     07/13/2020    MPV 9.2 08/27/2015     Lipids:  Lab Results   Component Value Date    CHOL 130 06/01/2020    CHOL 146 10/08/2019    CHOL 133 12/24/2018     Lab Results   Component Value Date    TRIG 86 06/01/2020    TRIG 93 10/08/2019    TRIG 119 12/24/2018     Lab Results   Component Value Date    HDL 48 06/01/2020    HDL 53 10/08/2019    HDL 49 12/24/2018     Lab Results   Component Value Date    LDLCALC 65 06/01/2020    LDLCALC 74 10/08/2019    LDLCALC 60 12/24/2018     No results found for: LABVLDL, VLDL  Lab Results   Component Value Date    CHOLHDLRATIO 3.8 09/10/2012    CHOLHDLRATIO 3.3 10/25/2011     CMP:    Lab Results   Component Value Date     07/13/2020    K 3.8 07/13/2020    K 4.0 01/06/2020     07/13/2020    CO2 25 07/13/2020    BUN 20 07/13/2020    CREATININE 0.79 07/13/2020    GFRAA >60.0 07/13/2020    LABGLOM >60.0 07/13/2020    GLUCOSE 95 07/13/2020    GLUCOSE 96 10/25/2011    PROT 6.5 07/12/2020    LABALBU 3.4 07/13/2020    LABALBU 4.6 10/25/2011    CALCIUM 8.8 07/13/2020    BILITOT 0.6 07/12/2020    ALKPHOS 80 07/12/2020    AST 13 07/12/2020    ALT 17 07/12/2020     BMP:    Lab Results   Component Value Date     07/13/2020    K 3.8 07/13/2020    K 4.0 01/06/2020     07/13/2020    CO2 25 07/13/2020    BUN 20 07/13/2020    LABALBU 3.4 07/13/2020 LABALBU 4.6 10/25/2011    CREATININE 0.79 07/13/2020    CALCIUM 8.8 07/13/2020    GFRAA >60.0 07/13/2020    LABGLOM >60.0 07/13/2020    GLUCOSE 95 07/13/2020    GLUCOSE 96 10/25/2011     Magnesium:    Lab Results   Component Value Date    MG 2.3 07/13/2020     TSH:  Lab Results   Component Value Date    TSH 2.280 10/08/2019       Patient Active Problem List   Diagnosis    Hyperlipidemia    Carotid stenosis, bilateral    D[facility]    Hypertensive urgency    NSTEMI (non-ST elevated myocardial infarction) ([facility]    Essential hypertension    HESS (dyspnea on exertion)    Leg edema    [facility] diastolic [facility] failure ([facility])    Chest pain    Angina, class III ([facility])    S/P cardiac cath    Anginal equivalent ([facility])    Dizziness    Coronary artery disease involving native coronary artery of native heart without angina pectoris    CHF (congestive heart failure) ([facility]    CAD (coronary artery disease)    Congestive heart failure ([facility])    Non-STEMI (non-ST elevated myocardial infarction) ([facility]    Unstable angina ([facility])    ACS (acute coronary syndrome) ([facility])    Bilateral carotid bruits    Syncope and collapse    Post PTCA       There are no discontinued medications. Modified Medications    No medications on file       No orders of the defined types were placed in this encounter. Assessment/Plan:    1. Hyperlipidemia, unspecified hyperlipidemia type  Statin     2. NSTEMI (non-ST elevated myocardial infarction) ([facility]  no ANGINA - s/p recent OM AMBER    3. Essential hypertension   stable now. BP at home is well controlled. 4. Coronary artery disease involving native coronary artery of native heart without angina pectoris  No angina. Continue DAPT    5. R Inguinal hernia- asymptomatic .  Avoid heavylifting or constipation- stable        Counseling:  Heart Healthy Lifestyle, Decrease Alcohol Consumption, Take Precautions to Prevent Falls, Regular Exercise and Walk Daily    Return in about 4 months (around 12/5/2020).       Electronically signed by Rodolfo Fragoso MD on 8/5/2020 at 2:52 PM

## 2020-09-01 ENCOUNTER — HOSPITAL ENCOUNTER (OUTPATIENT)
Dept: ULTRASOUND IMAGING | Age: 85
Discharge: HOME | End: 2020-09-03
Payer: MEDICARE

## 2020-09-01 PROCEDURE — 93880 EXTRACRANIAL BILAT STUDY: CPT

## 2020-09-08 RX ORDER — PRAVASTATIN SODIUM 80 MG/1
80 TABLET ORAL EVERY EVENING
Qty: 30 TABLET | Refills: 3 | Status: SHIPPED | OUTPATIENT
Start: 2020-09-08 | End: 2021-01-03

## 2020-10-08 RX ORDER — CARVEDILOL 6.25 MG/1
6.25 TABLET ORAL 2 TIMES DAILY
Qty: 180 TABLET | Refills: 3 | Status: ON HOLD | OUTPATIENT
Start: 2020-10-08 | End: 2020-10-20 | Stop reason: HOSPADM

## 2020-10-08 NOTE — TELEPHONE ENCOUNTER
lov 6/25/20    Called patient as a reminder he is due for an Annual Wellness Visit this month and his 6 month follow up in December. Pt states he will not scheduled an appointment at this time.

## 2020-10-09 RX ORDER — FINASTERIDE 5 MG/1
5 TABLET, FILM COATED ORAL DAILY
Qty: 90 TABLET | Refills: 2 | Status: SHIPPED | OUTPATIENT
Start: 2020-10-09 | End: 2021-07-26

## 2020-10-09 RX ORDER — LISINOPRIL 10 MG/1
10 TABLET ORAL DAILY
Qty: 30 TABLET | Refills: 3 | Status: SHIPPED | OUTPATIENT
Start: 2020-10-09 | End: 2021-02-08

## 2020-10-09 RX ORDER — CARVEDILOL 6.25 MG/1
TABLET ORAL
Qty: 60 TABLET | Refills: 2 | Status: SHIPPED | OUTPATIENT
Start: 2020-10-09 | End: 2021-01-03

## 2020-10-19 ENCOUNTER — HOSPITAL ENCOUNTER (OUTPATIENT)
Age: 85
Setting detail: OBSERVATION
Discharge: HOME | End: 2020-10-20
Attending: INTERNAL MEDICINE | Admitting: INTERNAL MEDICINE
Payer: MEDICARE

## 2020-10-19 PROCEDURE — 99285 EMERGENCY DEPT VISIT HI MDM: CPT

## 2020-10-20 VITALS
DIASTOLIC BLOOD PRESSURE: 60 MMHG | HEART RATE: 58 BPM | BODY MASS INDEX: 28.29 KG/M2 | SYSTOLIC BLOOD PRESSURE: 116 MMHG | TEMPERATURE: 98.4 F | OXYGEN SATURATION: 97 % | RESPIRATION RATE: 18 BRPM | HEIGHT: 69 IN | WEIGHT: 191 LBS

## 2020-10-20 LAB
ALBUMIN SERPL-MCNC: 3.7 G/DL (ref 3.5–4.6)
ALP BLD-CCNC: 74 U/L (ref 35–104)
ALT SERPL-CCNC: 12 U/L (ref 0–41)
ANION GAP SERPL CALCULATED.3IONS-SCNC: 9 MEQ/L (ref 9–15)
AST SERPL-CCNC: 13 U/L (ref 0–40)
BILIRUB SERPL-MCNC: 0.4 MG/DL (ref 0.2–0.7)
BUN BLDV-MCNC: 19 MG/DL (ref 8–23)
CALCIUM SERPL-MCNC: 8.8 MG/DL (ref 8.5–9.9)
CHLORIDE BLD-SCNC: 106 MEQ/L (ref 95–107)
CO2: 23 MEQ/L (ref 20–31)
CREAT SERPL-MCNC: 0.78 MG/DL (ref 0.7–1.2)
EKG ATRIAL RATE: 59 BPM
EKG P AXIS: 50 DEGREES
EKG P-R INTERVAL: 150 MS
EKG Q-T INTERVAL: 406 MS
EKG QRS DURATION: 80 MS
EKG QTC CALCULATION (BAZETT): 401 MS
EKG R AXIS: -19 DEGREES
EKG T AXIS: 12 DEGREES
EKG VENTRICULAR RATE: 59 BPM
GFR AFRICAN AMERICAN: >60
GFR NON-AFRICAN AMERICAN: >60
GLOBULIN: 2.3 G/DL (ref 2.3–3.5)
GLUCOSE BLD-MCNC: 104 MG/DL (ref 70–99)
LACTIC ACID: 0.8 MMOL/L (ref 0.5–2.2)
POTASSIUM SERPL-SCNC: 3.9 MEQ/L (ref 3.4–4.9)
SODIUM BLD-SCNC: 138 MEQ/L (ref 135–144)
TOTAL PROTEIN: 6 G/DL (ref 6.3–8)
TROPONIN: 0.02 NG/ML (ref 0–0.01)
TROPONIN: 0.03 NG/ML (ref 0–0.01)
TROPONIN: 0.04 NG/ML (ref 0–0.01)

## 2020-10-20 PROCEDURE — 6370000000 HC RX 637 (ALT 250 FOR IP): Performed by: INTERNAL MEDICINE

## 2020-10-20 PROCEDURE — 96372 THER/PROPH/DIAG INJ SC/IM: CPT

## 2020-10-20 PROCEDURE — 93005 ELECTROCARDIOGRAM TRACING: CPT | Performed by: PHYSICIAN ASSISTANT

## 2020-10-20 PROCEDURE — 84484 ASSAY OF TROPONIN QUANT: CPT

## 2020-10-20 PROCEDURE — 93010 ELECTROCARDIOGRAM REPORT: CPT | Performed by: INTERNAL MEDICINE

## 2020-10-20 PROCEDURE — 6360000002 HC RX W HCPCS: Performed by: PERSONAL EMERGENCY RESPONSE ATTENDANT

## 2020-10-20 PROCEDURE — 2580000003 HC RX 258: Performed by: PERSONAL EMERGENCY RESPONSE ATTENDANT

## 2020-10-20 PROCEDURE — G0378 HOSPITAL OBSERVATION PER HR: HCPCS

## 2020-10-20 PROCEDURE — 6370000000 HC RX 637 (ALT 250 FOR IP): Performed by: PERSONAL EMERGENCY RESPONSE ATTENDANT

## 2020-10-20 PROCEDURE — 36415 COLL VENOUS BLD VENIPUNCTURE: CPT

## 2020-10-20 PROCEDURE — 80053 COMPREHEN METABOLIC PANEL: CPT

## 2020-10-20 PROCEDURE — 99215 OFFICE O/P EST HI 40 MIN: CPT | Performed by: INTERNAL MEDICINE

## 2020-10-20 PROCEDURE — 83605 ASSAY OF LACTIC ACID: CPT

## 2020-10-20 RX ORDER — ASPIRIN 81 MG/1
162 TABLET, CHEWABLE ORAL ONCE
Status: COMPLETED | OUTPATIENT
Start: 2020-10-20 | End: 2020-10-20

## 2020-10-20 RX ORDER — NITROGLYCERIN 0.4 MG/1
0.4 TABLET SUBLINGUAL EVERY 5 MIN PRN
Status: CANCELLED | OUTPATIENT
Start: 2020-10-20

## 2020-10-20 RX ORDER — CARVEDILOL 6.25 MG/1
1 TABLET ORAL 2 TIMES DAILY
Status: CANCELLED | OUTPATIENT
Start: 2020-10-20

## 2020-10-20 RX ORDER — ONDANSETRON 2 MG/ML
4 INJECTION INTRAMUSCULAR; INTRAVENOUS EVERY 6 HOURS PRN
Status: DISCONTINUED | OUTPATIENT
Start: 2020-10-20 | End: 2020-10-20 | Stop reason: HOSPADM

## 2020-10-20 RX ORDER — SODIUM CHLORIDE 0.9 % (FLUSH) 0.9 %
10 SYRINGE (ML) INJECTION PRN
Status: DISCONTINUED | OUTPATIENT
Start: 2020-10-20 | End: 2020-10-20 | Stop reason: HOSPADM

## 2020-10-20 RX ORDER — PRAVASTATIN SODIUM 80 MG/1
80 TABLET ORAL EVERY EVENING
Status: CANCELLED | OUTPATIENT
Start: 2020-10-20

## 2020-10-20 RX ORDER — LISINOPRIL 10 MG/1
10 TABLET ORAL DAILY
Status: CANCELLED | OUTPATIENT
Start: 2020-10-20

## 2020-10-20 RX ORDER — ASPIRIN 81 MG/1
81 TABLET, CHEWABLE ORAL DAILY
Status: DISCONTINUED | OUTPATIENT
Start: 2020-10-20 | End: 2020-10-20

## 2020-10-20 RX ORDER — POLYETHYLENE GLYCOL 3350 17 G/17G
17 POWDER, FOR SOLUTION ORAL DAILY PRN
Status: DISCONTINUED | OUTPATIENT
Start: 2020-10-20 | End: 2020-10-20 | Stop reason: HOSPADM

## 2020-10-20 RX ORDER — ACETAMINOPHEN 325 MG/1
650 TABLET ORAL EVERY 6 HOURS PRN
Status: DISCONTINUED | OUTPATIENT
Start: 2020-10-20 | End: 2020-10-20 | Stop reason: HOSPADM

## 2020-10-20 RX ORDER — ISOSORBIDE MONONITRATE 30 MG/1
30 TABLET, EXTENDED RELEASE ORAL DAILY
Status: DISCONTINUED | OUTPATIENT
Start: 2020-10-20 | End: 2020-10-20 | Stop reason: HOSPADM

## 2020-10-20 RX ORDER — PROMETHAZINE HYDROCHLORIDE 12.5 MG/1
12.5 TABLET ORAL EVERY 6 HOURS PRN
Status: DISCONTINUED | OUTPATIENT
Start: 2020-10-20 | End: 2020-10-20 | Stop reason: HOSPADM

## 2020-10-20 RX ORDER — CHOLECALCIFEROL (VITAMIN D3) 125 MCG
500 CAPSULE ORAL DAILY
Status: CANCELLED | OUTPATIENT
Start: 2020-10-20

## 2020-10-20 RX ORDER — DONEPEZIL HYDROCHLORIDE 5 MG/1
5 TABLET, FILM COATED ORAL NIGHTLY
Status: CANCELLED | OUTPATIENT
Start: 2020-10-20

## 2020-10-20 RX ORDER — FINASTERIDE 5 MG/1
1 TABLET, FILM COATED ORAL DAILY
Status: CANCELLED | OUTPATIENT
Start: 2020-10-20

## 2020-10-20 RX ORDER — SODIUM CHLORIDE 0.9 % (FLUSH) 0.9 %
10 SYRINGE (ML) INJECTION EVERY 12 HOURS SCHEDULED
Status: DISCONTINUED | OUTPATIENT
Start: 2020-10-20 | End: 2020-10-20 | Stop reason: HOSPADM

## 2020-10-20 RX ORDER — CLOPIDOGREL BISULFATE 75 MG/1
75 TABLET ORAL DAILY
Status: CANCELLED | OUTPATIENT
Start: 2020-10-20

## 2020-10-20 RX ORDER — ACETAMINOPHEN 650 MG/1
650 SUPPOSITORY RECTAL EVERY 6 HOURS PRN
Status: DISCONTINUED | OUTPATIENT
Start: 2020-10-20 | End: 2020-10-20 | Stop reason: HOSPADM

## 2020-10-20 RX ORDER — ISOSORBIDE MONONITRATE 30 MG/1
30 TABLET, EXTENDED RELEASE ORAL DAILY
Qty: 30 TABLET | Refills: 3 | Status: SHIPPED | OUTPATIENT
Start: 2020-10-20 | End: 2021-02-08

## 2020-10-20 RX ADMIN — Medication 10 ML: at 08:37

## 2020-10-20 RX ADMIN — ASPIRIN 81 MG: 81 TABLET, CHEWABLE ORAL at 08:37

## 2020-10-20 RX ADMIN — METOPROLOL TARTRATE 25 MG: 25 TABLET, FILM COATED ORAL at 05:14

## 2020-10-20 RX ADMIN — ISOSORBIDE MONONITRATE 30 MG: 30 TABLET, EXTENDED RELEASE ORAL at 11:44

## 2020-10-20 RX ADMIN — ASPIRIN 162 MG: 81 TABLET, CHEWABLE ORAL at 05:14

## 2020-10-20 RX ADMIN — ENOXAPARIN SODIUM 90 MG: 100 INJECTION SUBCUTANEOUS at 05:14

## 2020-10-20 ASSESSMENT — ENCOUNTER SYMPTOMS
SHORTNESS OF BREATH: 0
DIARRHEA: 0
VOMITING: 0
SINUS PAIN: 0
GASTROINTESTINAL NEGATIVE: 1
COUGH: 0
WHEEZING: 0
EYE REDNESS: 0
BLOOD IN STOOL: 0
BACK PAIN: 0
EYE DISCHARGE: 0
CHEST TIGHTNESS: 0
NAUSEA: 0
RHINORRHEA: 0
EYES NEGATIVE: 1
ABDOMINAL PAIN: 0
STRIDOR: 0
RESPIRATORY NEGATIVE: 1
COLOR CHANGE: 0

## 2020-10-20 ASSESSMENT — PAIN SCALES - GENERAL
PAINLEVEL_OUTOF10: 0
PAINLEVEL_OUTOF10: 0

## 2020-10-20 NOTE — H&P
History and Physical  [facility]  Unit/Bed: B274/L402-41  YOB: 1935  MRN: [id number]  Acct: [de-identified]   Admitting Diagnosis: Hypertensive urgency [I16.0]  Admit Date:  10/19/2020  Hospital Day: 0      Chief Complaint: HTN      History of Present Illness: Called EMS due to BP being elevated on 2 consecutive BP checks at home were elevated to [id number] systolic. Pt has no symptoms of CP SOB nor headaches. He otherwise felt well. Initial BP in /78. Incidentally Trops are detected. ECG NSR with no injury. EKG:SR  PMHx:  Past Medical History:   Diagnosis Date    Anticoagulant long-term use     Anxiety     CAD (coronary artery disease)     Carotid stenosis     2/2013 16-49%    CHF (congestive heart failure) (HCC)     Dementia (HCC)     Dementia (HCC)     Hyperlipidemia     Hypertension     [facility]     Osteoarthritis        PSHx:  Past Surgical History:   Procedure Laterality Date    CARPAL TUNNEL RELEASE  1998    CATARACT REMOVAL  2007    COLONOSCOPY  12/10/10,2007    [facility] - DONE AT [facility] COLONOSCOPY  2007    hx polyps needs 2015    COLONOSCOPY  9/21/15     DR. YARBROUGH     CORONARY ANGIOPLASTY WITH STENT PLACEMENT  06/07/2020    DIAGNOSTIC CARDIAC CATH LAB PROCEDURE      HERNIA REPAIR      PTCA         Social Hx:  Social History     Socioeconomic History    Marital status:       Spouse name: None    Number of children: None    Years of education: None    Highest education level: None   Occupational History    None   Social Needs    Financial resource strain: None    Food insecurity     Worry: None     Inability: None    Transportation needs     Medical: None     Non-medical: None   Tobacco Use    Smoking status: Never Smoker    Smokeless tobacco: Never Used   Substance and Sexual Activity    Alcohol use: No     Comment: social    Drug use: No    Sexual activity: Not Currently   Lifestyle    swelling. Gastrointestinal: Negative. Negative for blood in stool and nausea. Genitourinary: Negative. Musculoskeletal: Negative. Skin: Negative. Neurological: Negative. Negative for dizziness, syncope, weakness and light-headedness. Hematological: Negative. Psychiatric/Behavioral: Negative. Physical Examination:    BP (!) 148/51   Pulse 69   Temp 97.6 °F (36.4 °C) (Temporal)   Resp 21   Ht 5' 9\" (1.753 m)   Wt 191 lb (86.6 kg)   SpO2 97%   BMI 28.21 kg/m²    Physical Exam   Constitutional: He appears healthy. No distress. HENT:   Normal cephalic and Atraumatic   Eyes: Pupils are equal, round, and reactive to light. Neck: Normal range of motion and thyroid normal. Neck supple. No JVD present. No neck adenopathy. No thyromegaly present. Cardiovascular: Normal rate, regular rhythm, normal heart sounds, intact distal pulses and normal pulses. Pulmonary/Chest: Effort normal and breath sounds normal. He has no wheezes. He has no rales. He exhibits no tenderness. Abdominal: Soft. Bowel sounds are normal. There is no abdominal tenderness. Musculoskeletal: Normal range of motion. General: No tenderness or edema. Neurological: He is alert and oriented to person, place, and time. Skin: Skin is warm. No cyanosis. Nails show no clubbing.          LABS:  CBC:   Lab Results   Component Value Date    WBC 7.6 07/13/2020    RBC 4.14 07/13/2020    RBC 4.69 10/25/2011    HGB 12.7 07/13/2020    HCT 38.2 07/13/2020    MCV 92.4 07/13/2020    MCH 30.7 07/13/2020    MCHC 33.3 07/13/2020    RDW 14.5 07/13/2020     07/13/2020    MPV 9.2 08/27/2015     CBC with Differential:    Lab Results   Component Value Date    WBC 7.6 07/13/2020    RBC 4.14 07/13/2020    RBC 4.69 10/25/2011    HGB 12.7 07/13/2020    HCT 38.2 07/13/2020     07/13/2020    MCV 92.4 07/13/2020    MCH 30.7 07/13/2020    MCHC 33.3 07/13/2020    RDW 14.5 07/13/2020    LYMPHOPCT 16.9 07/12/2020    [facility]    EOSPCT 6.2 10/25/2011    BASOPCT 1.5 07/12/2020    MONOSABS 0.6 07/12/2020    LYMPHSABS 1.2 07/12/2020    EOSABS 0.3 07/12/2020    BASOSABS 0.1 07/12/2020     CMP:    Lab Results   Component Value Date     10/20/2020    K 3.9 10/20/2020    K 4.0 01/06/2020     10/20/2020    CO2 23 10/20/2020    BUN 19 10/20/2020    CREATININE 0.78 10/20/2020    GFRAA >60.0 10/20/2020    LABGLOM >60.0 10/20/2020    GLUCOSE 104 10/20/2020    GLUCOSE 96 10/25/2011    PROT 6.0 10/20/2020    LABALBU 3.7 10/20/2020    LABALBU 4.6 10/25/2011    CALCIUM 8.8 10/20/2020    BILITOT 0.4 10/20/2020    ALKPHOS 74 10/20/2020    AST 13 10/20/2020    ALT 12 10/20/2020     BMP:    Lab Results   Component Value Date     10/20/2020    K 3.9 10/20/2020    K 4.0 01/06/2020     10/20/2020    CO2 23 10/20/2020    BUN 19 10/20/2020    LABALBU 3.7 10/20/2020    LABALBU 4.6 10/25/2011    CREATININE 0.78 10/20/2020    CALCIUM 8.8 10/20/2020    GFRAA >60.0 10/20/2020    LABGLOM >60.0 10/20/2020    GLUCOSE 104 10/20/2020    GLUCOSE 96 10/25/2011     Magnesium:    Lab Results   Component Value Date    MG 2.3 07/13/2020     Troponin:    Lab Results   Component Value Date    TROPONINI 0.036 10/20/2020       Active Hospital Problems    Diagnosis Date Noted    Hypertensive urgency [I16.0] 05/01/2018     Priority: Low        Assessment/Plan:  1. HTN by BP machine at home. BP in ER much better. BP now slihtly elevated . Will add Imdur 30  2. Elevated Trop- possible demand ischmeia- no CP nor SOB. ECG benign. Tele no arrhythmia  3. LVEF 60%  4. CAD prior LAD nad OM1 stents - stable no angina. 5. Dc home- f/u 2 days in office with his BP machine so we can check reliability of his device.        Electronically signed by Anant Manley MD on 10/20/2020 at 8:46 AM

## 2020-10-20 NOTE — CARE COORDINATION
Met with patient at the bedside. He will return home today, his family will pick him up after 4pm.  He currently denies any additional needs. CM will follow if needed.  Electronically signed by Steph Steiner RN on 10/20/2020 at 2:19 PM

## 2020-10-20 NOTE — ED NOTES
Pt stable, a&ox4, 0 c/o, 0 distress, skin w/d/pink. Will monitor.      Taniya Bradley RN  10/20/20 [id number]

## 2020-10-20 NOTE — ED PROVIDER NOTES
[address] ENCOUNTER      Pt Name: Sylvie Hernandez  MRN: [id number]  [place] 1935  Date of evaluation: 10/19/2020  Provider: Nolan Knight PA-C    CHIEF COMPLAINT       Chief Complaint   Patient presents with    Hypertension         HISTORY OF PRESENT ILLNESS   (Location/Symptom, Timing/Onset, Context/Setting, Quality, Duration, Modifying Factors, Severity)  Note limiting factors. Sylvie Hernandez is a 82 y.o. male who presents to the emergency department for evaluation of hypertension. Patient states he does have a history of hypertension he is on multiple medications. Patient states that when he checked his blood pressure an hour or 2 prior to arrival he was found to have blood pressures with systolics in the 952G 961A. Patient denies chest pain, dizziness, palpitations. Patient states his machine at home to take his blood pressure. Patient denies nausea, vomiting. HPI    Nursing Notes were reviewed. REVIEW OF SYSTEMS    (2-9 systems for level 4, 10 or more for level 5)     Review of Systems   Constitutional: Negative for activity change, chills, fatigue and fever. HENT: Negative for congestion, hearing loss, rhinorrhea, sinus pain, sneezing and tinnitus. Eyes: Negative for discharge, redness and visual disturbance. Respiratory: Negative for cough, chest tightness and shortness of breath. Cardiovascular: Negative for chest pain and leg swelling. Hypertension. Gastrointestinal: Negative for abdominal pain, diarrhea, nausea and vomiting. Endocrine: Negative for heat intolerance, polydipsia and polyuria. Genitourinary: Negative for dysuria, flank pain, hematuria and urgency. Musculoskeletal: Negative for back pain, joint swelling and myalgias. Skin: Negative for color change, rash and wound. Allergic/Immunologic: Negative for immunocompromised state. Neurological: Negative for tremors, seizures, syncope, light-headedness and headaches. Hematological: Does not bruise/bleed easily. Psychiatric/Behavioral: Negative for agitation and behavioral problems. The patient is not nervous/anxious. Except as noted above the remainder of the review of systems was reviewed and negative. PAST MEDICAL HISTORY     Past Medical History:   Diagnosis Date    Anticoagulant long-term use     Anxiety     CAD (coronary artery disease)     Carotid stenosis     2/2013 16-49%    CHF (congestive heart failure) (HCC)     Dementia (HCC)     Dementia (HCC)     Hyperlipidemia     Hypertension     MI [facility] infarction) ([facility] [address]     Osteoarthritis          SURGICAL HISTORY       Past Surgical History:   Procedure Laterality Date    CARPAL TUNNEL RELEASE  1998    CATARACT REMOVAL  2007    COLONOSCOPY  12/10/10,2007    DR Lorene Barragan - DONE AT [address] COLONOSCOPY  2007    hx polyps needs 2015    COLONOSCOPY  9/21/15     DR. YARBROUGH     CORONARY ANGIOPLASTY WITH STENT PLACEMENT  06/07/2020    DIAGNOSTIC CARDIAC CATH LAB PROCEDURE      HERNIA REPAIR      PTCA           CURRENT MEDICATIONS       Discharge Medication List as of 10/20/2020  3:16 PM      CONTINUE these medications which have NOT CHANGED    Details   finasteride (PROSCAR) 5 MG tablet TAKE 1 TABLET BY MOUTH DAILY, Disp-90 tablet,R-2Normal      carvedilol (COREG) 6.25 MG tablet TAKE 1 TABLET BY MOUTH TWICE DAILY, Disp-60 tablet,R-2Normal      lisinopril (PRINIVIL;ZESTRIL) 10 MG tablet Take 1 tablet by mouth daily, Disp-30 tablet,R-3Normal      pravastatin (PRAVACHOL) 80 MG tablet TAKE 1 TABLET BY MOUTH EVERY EVENING, Disp-30 tablet,R-3Normal      clopidogrel (PLAVIX) 75 MG tablet Take 1 tablet by mouth daily, Disp-90 tablet, R-3Normal      donepezil (ARICEPT) 5 MG tablet TAKE 1 TABLET BY MOUTH NIGHTLY, Disp-90 tablet, R-3Normal      latanoprost (XALATAN) 0.005 % ophthalmic solution use 1 drop in each eye every day, Disp-2.5 mL, R-5Normal      nitroGLYCERIN (NITROSTAT) 0.4 MG SL tablet up to max of 3 total doses. If no relief after 1 dose, call 911., Disp-25 tablet, R-3Normal      vitamin B-12 (CYANOCOBALAMIN) 1000 MCG tablet Take 500 mcg by mouth dailyHistorical Med      aspirin 81 MG tablet Take 1 tablet by mouth daily With Food, Disp-30 tablet, R-3Normal      Multiple Vitamins-Minerals (MULTIVITAMIN PO) Take by mouth daily Historical Med      dorzolamide-timolol (COSOPT) 22.3-6.8 MG/ML ophthalmic solution Place 1 drop into both eyes 2 times daily Historical Med             ALLERGIES     Patient has no known allergies. FAMILY HISTORY       Family History   Problem Relation Age of Onset    Heart Disease Mother     High Blood Pressure Mother           SOCIAL HISTORY       Social History     Socioeconomic History    Marital status:       Spouse name: None    Number of children: None    Years of education: None    Highest education level: None   Occupational History    None   Social Needs    Financial resource strain: None    Food insecurity     Worry: None     Inability: None    Transportation needs     Medical: None     Non-medical: None   Tobacco Use    Smoking status: Never Smoker    Smokeless tobacco: Never Used   Substance and Sexual Activity    Alcohol use: No     Comment: social    Drug use: No    Sexual activity: Not Currently   Lifestyle    Physical activity     Days per week: None     Minutes per session: None    Stress: None   Relationships    Social connections     Talks on phone: None     Gets together: None     Attends [employer] service: None     Active member of club or organization: None     Attends meetings of clubs or organizations: None     Relationship status: None    Intimate partner violence     Fear of current or ex partner: None     Emotionally abused: None     Physically abused: None     Forced sexual activity: None   Other Topics Concern    None   Social History Narrative    None       SCREENINGS        Wali Coma Scale  Eye Opening: Spontaneous  Best Verbal Response: Oriented  Best Motor Response: Obeys commands  Wali Coma Scale Score: 15               PHYSICAL EXAM    (up to 7 for level 4, 8 or more for level 5)     ED Triage Vitals [10/19/20 2349]   BP Temp Temp Source Pulse Resp SpO2 Height Weight   (!) 151/78 97.6 °F (36.4 °C) Temporal 77 18 97 % 5' 9\" (1.753 m) 191 lb (86.6 kg)       Physical Exam  Vitals signs and nursing note reviewed. Constitutional:       Appearance: Normal appearance. He is not ill-appearing, toxic-appearing or diaphoretic. HENT:      Head: Normocephalic. Right Ear: Tympanic membrane and external ear normal.      Left Ear: Tympanic membrane and external ear normal.      Nose: Nose normal.      Mouth/Throat:      Mouth: Mucous membranes are moist.   Eyes:      Extraocular Movements: Extraocular movements intact. Pupils: Pupils are equal, round, and reactive to light. Neck:      Musculoskeletal: Normal range of motion and neck supple. Cardiovascular:      Rate and Rhythm: Normal rate and regular rhythm. Pulses: Normal pulses. Heart sounds: Normal heart sounds. Pulmonary:      Effort: Pulmonary effort is normal.      Breath sounds: Normal breath sounds. Abdominal:      General: Abdomen is flat. Bowel sounds are normal. There is no distension. Palpations: Abdomen is soft. Tenderness: There is no abdominal tenderness. Musculoskeletal: Normal range of motion. General: No swelling, tenderness or signs of injury. Skin:     General: Skin is warm and dry. Capillary Refill: Capillary refill takes less than 2 seconds. Findings: No erythema. Neurological:      General: No focal deficit present. Mental Status: He is alert and oriented to person, place, and time.    Psychiatric:         Mood and Affect: Mood normal.         DIAGNOSTIC RESULTS     EKG: All EKG's are interpreted by the Emergency Department Physician who either signs or Co-signs this chart in the absence of a cardiologist.        RADIOLOGY:   Non-plain film images such as CT, Ultrasound and MRI are read by the radiologist. Plain radiographic images are visualized and preliminarily interpreted by the emergency physician with the below findings:        Interpretation per the Radiologist below, if available at the time of this note:    No orders to display         ED BEDSIDE ULTRASOUND:   Performed by ED Physician - none    LABS:  Labs Reviewed   COMPREHENSIVE METABOLIC PANEL - Abnormal; Notable for the following components:       Result Value    Glucose 104 (*)     Total Protein 6.0 (*)     All other components within normal limits   TROPONIN - Abnormal; Notable for the following components:    Troponin 0.018 (*)     All other components within normal limits    Narrative:     Eren Munoz tel. [phone],  TROP results called to and read back by [facility] RN, 10/20/2020 02:32, by [facility]   TROPONIN - Abnormal; Notable for the following components:    Troponin 0.034 (*)     All other components within normal limits    Narrative:     Cisco Martinez  LC1W tel. [phone],  Trop results called to and read back by [facility], 10/20/2020 06:53,  by Umberto Taveras   TROPONIN - Abnormal; Notable for the following components:    Troponin 0.036 (*)     All other components within normal limits    Narrative:     Cornejo Carrel tel. [phone],  Trop results called to and read back by Susanne Rosenthal, 10/20/2020 08:27, by  Amari Mcfarland has been rescheduled by Panchito Vidales at 10/20/2020 05:46 Reason:   Draw at 730   LACTIC ACID, PLASMA       All other labs were within normal range or not returned as of this dictation.     EMERGENCY DEPARTMENT COURSE and DIFFERENTIAL DIAGNOSIS/MDM:   Vitals:    Vitals:    10/20/20 0230 10/20/20 0315 10/20/20 0745 10/20/20 1428   BP: (!) 165/71 (!) 148/51 (!) 157/79 116/60   Pulse: 61 69 62 58   Resp: 19 21 19 18   Temp:   98.4 °F (36.9 °C) 98.4 °F (36.9 °C)   TempSrc:   Oral Oral   SpO2: 96% 97% 96% 97%   Weight:       Height:         A 84-year of age male who presents for evaluation of hypertension as measured by an at home machine. Patient is normotensive on exam without any cardiac symptoms. Patient had no trouble breathing. No chest pain. Lactic acid, CMP will be obtained to look for endorgan perfusion damage. Patient's BP will be taken multiple times here. Initial BP was systolics in the 047I. Repeat with with systolics in the 593L. Patient be reassessed. After further questioning her learned patient has never had chest pain from any of his prior heart attacks just hypertension. Troponin was sent and elevated. Patient's cardiologist was consulted who agreed to admit the patient. Patient was advised these findings and agreed with the plan. MDM      REASSESSMENT            CONSULTS:  None    PROCEDURES:  Unless otherwise noted below, none     Procedures      FINAL IMPRESSION      1. Elevated troponin    2. Chest pain, unspecified type    3. Hypertensive urgency          DISPOSITION/PLAN   DISPOSITION Admitted 10/20/2020 03:33:12 AM      PATIENT REFERRED TO:  Chet Allen MD  [address] [place] [address] A  [address] [place] [address]  [phone]    Schedule an appointment as soon as possible for a visit in 2 days  Bring your BP cuff to office when you follow up ! DISCHARGE MEDICATIONS:  Discharge Medication List as of 10/20/2020  3:16 PM      START taking these medications    Details   isosorbide mononitrate (IMDUR) 30 MG extended release tablet Take 1 tablet by mouth daily, Disp-30 tablet,R-3Normal           Controlled Substances Monitoring:     No flowsheet data found.     (Please note that portions of this note were completed with a voice recognition program.  Efforts were made to edit the dictations but occasionally words are mis-transcribed.)    Charlene Bee PA-C (electronically signed)             Charlene Bee PA-C  10/22/20 3978

## 2020-10-20 NOTE — ED NOTES
Obt. Blood to lab. Pt resting in bed, 0 c/o, 0 distress, 0 sob, 0 pain, calm, cooperative, msp's intact, will monitor.      Tosin Allen RN  10/20/20 8805

## 2020-10-20 NOTE — ED NOTES
ekg done and to robin whitt, pt sb on monitor at 59, pt stable, 0 c/o, 0 distress, 0 sob, 0 n&v, 0 pain, a&ox4. 0 problems. Pt's son at bedside, will monitor.      Samir Ji RN  10/20/20 2803

## 2020-10-20 NOTE — PROGRESS NOTES
Patient and son given discharge instructions. Advised to call the Dr. With any questions or concerns. Patient aware of new RX sent to pharmacy electronically. Plans to  on way home and resume home meds. No acute questions or concerns. Deleted NCP. Stable. Patient left with son to home via wc.

## 2020-10-21 ENCOUNTER — CARE COORDINATION (OUTPATIENT)
Dept: CASE MANAGEMENT | Age: 85
End: 2020-10-21

## 2020-10-22 ENCOUNTER — OFFICE VISIT (OUTPATIENT)
Dept: CARDIOLOGY CLINIC | Age: 85
End: 2020-10-22
Payer: MEDICARE

## 2020-10-22 ENCOUNTER — CARE COORDINATION (OUTPATIENT)
Dept: CASE MANAGEMENT | Age: 85
End: 2020-10-22

## 2020-10-22 VITALS
HEART RATE: 58 BPM | SYSTOLIC BLOOD PRESSURE: 142 MMHG | DIASTOLIC BLOOD PRESSURE: 72 MMHG | OXYGEN SATURATION: 93 % | BODY MASS INDEX: 28.5 KG/M2 | WEIGHT: 193 LBS | RESPIRATION RATE: 16 BRPM

## 2020-10-22 PROCEDURE — G8427 DOCREV CUR MEDS BY ELIG CLIN: HCPCS | Performed by: INTERNAL MEDICINE

## 2020-10-22 PROCEDURE — 99214 OFFICE O/P EST MOD 30 MIN: CPT | Performed by: INTERNAL MEDICINE

## 2020-10-22 PROCEDURE — 1123F ACP DISCUSS/DSCN MKR DOCD: CPT | Performed by: INTERNAL MEDICINE

## 2020-10-22 PROCEDURE — 1111F DSCHRG MED/CURRENT MED MERGE: CPT | Performed by: FAMILY MEDICINE

## 2020-10-22 PROCEDURE — 1036F TOBACCO NON-USER: CPT | Performed by: INTERNAL MEDICINE

## 2020-10-22 PROCEDURE — G8417 CALC BMI ABV UP PARAM F/U: HCPCS | Performed by: INTERNAL MEDICINE

## 2020-10-22 PROCEDURE — 4040F PNEUMOC VAC/ADMIN/RCVD: CPT | Performed by: INTERNAL MEDICINE

## 2020-10-22 PROCEDURE — G8484 FLU IMMUNIZE NO ADMIN: HCPCS | Performed by: INTERNAL MEDICINE

## 2020-10-22 ASSESSMENT — ENCOUNTER SYMPTOMS
CHEST TIGHTNESS: 0
NAUSEA: 0
SHORTNESS OF BREATH: 0
EYES NEGATIVE: 1
COUGH: 0
STRIDOR: 0
WHEEZING: 0
RESPIRATORY NEGATIVE: 1
BLOOD IN STOOL: 0
GASTROINTESTINAL NEGATIVE: 1

## 2020-10-22 NOTE — PROGRESS NOTES
Subsequent Progress Note  Patient: Talisha Wong  YOB: 1935  MRN: [id number]    Chief Complaint: nstemi cad htn   Chief Complaint   Patient presents with    Follow-Up from [address] [place] [address] 10/19-10/20    Hypertension       CV Data:  6/2018 spect negative  5/2018 echo EF 60%  11/2018 LAD AMBER  12/2018 CUS <. Left Vertebral 100  1/2020 echo ef 60 midl AR and MR  1/2020 SPECT - NEGATIVE  6/8/2020 Cath OM1 AMBER. EF 60%  9/2020  CUS mild    Subjective/HPI: no cp no sob no falls no bleed. R Inguinal hernia     9/25/2019:  No cp no osb no falls no bleed eats well. Takes meds. C/o Diuretic and frequent urine. 1/15/2020 recently in ER and hosp for HTN. Now stable without meds changes. Possible he did not take his meds. Recent Labs showed mild Troponin elevaton. 2/5/2020 NO CP NO SOB NO FALLS NO BLEED. TAKES MEDS. EATS WELL.     5/8/2020 TELEHEALTH EVALUATION -- Audio/Visual (During KFWWF-50 public health emergency)      137/70 home BP. Doing well no cp no osb no falls no bleed takes meds. Eats well. Son moved in w him. 6/17/2020 no cp no sob no edema no falls no bleed. Feels better since last stent. 8/5/2020 recent hosp for syncope related to Low BP. Imdur was stopped and Benazepril changed to Lisinopril. Feels much better. 10/22/2020 recent hosp for accelerated HTN. We added Imdur. He never had CP nor SOB. He feels well now. BOP better. Eating well.      Nonsmoker        EKG:    Past Medical History:   Diagnosis Date    Anticoagulant long-term use     Anxiety     CAD (coronary artery disease)     Carotid stenosis     2/2013 16-49%    CHF (congestive heart failure) (HCC)     Dementia (HCC)     Dementia (HCC)     Hyperlipidemia     Hypertension     [facility]) ([facility] [address]     Osteoarthritis        Past Surgical History:   Procedure Laterality Date    CARPAL TUNNEL RELEASE  1998    CATARACT REMOVAL  2007    COLONOSCOPY  12/10/10,2007    [facility] MATTHEWS [facility]    COLONOSCOPY  2007    hx polyps needs 2015    COLONOSCOPY  9/21/15     DR. YARBROUGH     CORONARY ANGIOPLASTY WITH STENT PLACEMENT  06/07/2020    DIAGNOSTIC CARDIAC CATH LAB PROCEDURE      HERNIA REPAIR      PTCA         Family History   Problem Relation Age of Onset    Heart Disease Mother     High Blood Pressure Mother        Social History     Socioeconomic History    Marital status:       Spouse name: None    Number of children: None    Years of education: None    Highest education level: None   Occupational History    None   Social Needs    Financial resource strain: None    Food insecurity     Worry: None     Inability: None    Transportation needs     Medical: None     Non-medical: None   Tobacco Use    Smoking status: Never Smoker    Smokeless tobacco: Never Used   Substance and Sexual Activity    Alcohol use: No     Comment: social    Drug use: No    Sexual activity: Not Currently   Lifestyle    Physical activity     Days per week: None     Minutes per session: None    Stress: None   Relationships    Social connections     Talks on phone: None     Gets together: None     Attends Restorationist service: None     Active member of club or organization: None     Attends meetings of clubs or organizations: None     Relationship status: None    Intimate partner violence     Fear of current or ex partner: None     Emotionally abused: None     Physically abused: None     Forced sexual activity: None   Other Topics Concern    None   Social History Narrative    None       No Known Allergies    Current Outpatient Medications   Medication Sig Dispense Refill    isosorbide mononitrate (IMDUR) 30 MG extended release tablet Take 1 tablet by mouth daily 30 tablet 3    finasteride (PROSCAR) 5 MG tablet TAKE 1 TABLET BY MOUTH DAILY 90 tablet 2    carvedilol (COREG) 6.25 MG tablet TAKE 1 TABLET BY MOUTH TWICE DAILY 60 tablet 2    lisinopril (PRINIVIL;ZESTRIL) 10 MG tablet Take 1 tablet by mouth daily 30 tablet 3    pravastatin (PRAVACHOL) 80 MG tablet TAKE 1 TABLET BY MOUTH EVERY EVENING 30 tablet 3    clopidogrel (PLAVIX) 75 MG tablet Take 1 tablet by mouth daily 90 tablet 3    donepezil (ARICEPT) 5 MG tablet TAKE 1 TABLET BY MOUTH NIGHTLY 90 tablet 3    latanoprost (XALATAN) 0.005 % ophthalmic solution use 1 drop in each eye every day 2.5 mL 5    nitroGLYCERIN (NITROSTAT) 0.4 MG SL tablet up to max of 3 total doses. If no relief after 1 dose, call 911. 25 tablet 3    vitamin B-12 (CYANOCOBALAMIN) 1000 MCG tablet Take 500 mcg by mouth daily      aspirin 81 MG tablet Take 1 tablet by mouth daily With Food 30 tablet 3    Multiple Vitamins-Minerals (MULTIVITAMIN PO) Take by mouth daily       dorzolamide-timolol (COSOPT) 22.3-6.8 MG/ML ophthalmic solution Place 1 drop into both eyes 2 times daily        No current facility-administered medications for this visit. Review of Systems:   Review of Systems   Constitutional: Negative. Negative for diaphoresis and fatigue. HENT: Negative. Eyes: Negative. Respiratory: Negative. Negative for cough, chest tightness, shortness of breath, wheezing and stridor. Cardiovascular: Negative. Negative for chest pain, palpitations and leg swelling. Gastrointestinal: Negative. Negative for blood in stool and nausea. Genitourinary: Negative. Musculoskeletal: Negative. Skin: Negative. Neurological: Negative. Negative for dizziness, syncope, weakness and light-headedness. Hematological: Negative. Psychiatric/Behavioral: Negative. Physical Examination:    BP (!) 142/72 (Site: Left Upper Arm, Position: Sitting, Cuff Size: Medium Adult) Comment: home BP machine  Pulse 58   Resp 16   Wt 193 lb (87.5 kg)   SpO2 93%   BMI 28.50 kg/m²    Physical Exam   Constitutional: He appears healthy. No distress. HENT:   Normal cephalic and Atraumatic   Eyes: Pupils are equal, round, and reactive to light.    Neck: Normal range coronary artery of native heart without angina pectoris  No angina. Continue DAPT    5. R Inguinal hernia- asymptomatic . Avoid heavylifting or constipation- stable -        Counseling:  Heart Healthy Lifestyle, Decrease Alcohol Consumption, Take Precautions to Prevent Falls, Regular Exercise and Walk Daily    Return for AFTER TESTS.       Electronically signed by Kalpesh Bhandari MD on 10/22/2020 at 3:22 PM

## 2020-10-27 ENCOUNTER — OFFICE VISIT (OUTPATIENT)
Dept: FAMILY MEDICINE CLINIC | Age: 85
End: 2020-10-27
Payer: MEDICARE

## 2020-10-27 VITALS
SYSTOLIC BLOOD PRESSURE: 130 MMHG | TEMPERATURE: 96.5 F | WEIGHT: 192 LBS | DIASTOLIC BLOOD PRESSURE: 72 MMHG | OXYGEN SATURATION: 97 % | HEART RATE: 86 BPM | HEIGHT: 69 IN | BODY MASS INDEX: 28.44 KG/M2

## 2020-10-27 PROCEDURE — G0008 ADMIN INFLUENZA VIRUS VAC: HCPCS | Performed by: FAMILY MEDICINE

## 2020-10-27 PROCEDURE — 1036F TOBACCO NON-USER: CPT | Performed by: FAMILY MEDICINE

## 2020-10-27 PROCEDURE — G8417 CALC BMI ABV UP PARAM F/U: HCPCS | Performed by: FAMILY MEDICINE

## 2020-10-27 PROCEDURE — 99214 OFFICE O/P EST MOD 30 MIN: CPT | Performed by: FAMILY MEDICINE

## 2020-10-27 PROCEDURE — 90694 VACC AIIV4 NO PRSRV 0.5ML IM: CPT | Performed by: FAMILY MEDICINE

## 2020-10-27 PROCEDURE — 1123F ACP DISCUSS/DSCN MKR DOCD: CPT | Performed by: FAMILY MEDICINE

## 2020-10-27 PROCEDURE — 4040F PNEUMOC VAC/ADMIN/RCVD: CPT | Performed by: FAMILY MEDICINE

## 2020-10-27 PROCEDURE — G8484 FLU IMMUNIZE NO ADMIN: HCPCS | Performed by: FAMILY MEDICINE

## 2020-10-27 PROCEDURE — G8427 DOCREV CUR MEDS BY ELIG CLIN: HCPCS | Performed by: FAMILY MEDICINE

## 2020-10-27 SDOH — ECONOMIC STABILITY: INCOME INSECURITY: HOW HARD IS IT FOR YOU TO PAY FOR THE VERY BASICS LIKE FOOD, HOUSING, MEDICAL CARE, AND HEATING?: NOT HARD AT ALL

## 2020-10-27 SDOH — ECONOMIC STABILITY: FOOD INSECURITY: WITHIN THE PAST 12 MONTHS, THE FOOD YOU BOUGHT JUST DIDN'T LAST AND YOU DIDN'T HAVE MONEY TO GET MORE.: NEVER TRUE

## 2020-10-27 SDOH — ECONOMIC STABILITY: TRANSPORTATION INSECURITY
IN THE PAST 12 MONTHS, HAS LACK OF TRANSPORTATION KEPT YOU FROM MEETINGS, WORK, OR FROM GETTING THINGS NEEDED FOR DAILY LIVING?: NO

## 2020-10-27 SDOH — ECONOMIC STABILITY: FOOD INSECURITY: WITHIN THE PAST 12 MONTHS, YOU WORRIED THAT YOUR FOOD WOULD RUN OUT BEFORE YOU GOT MONEY TO BUY MORE.: NEVER TRUE

## 2020-10-27 SDOH — ECONOMIC STABILITY: TRANSPORTATION INSECURITY
IN THE PAST 12 MONTHS, HAS THE LACK OF TRANSPORTATION KEPT YOU FROM MEDICAL APPOINTMENTS OR FROM GETTING MEDICATIONS?: NO

## 2020-10-27 NOTE — PROGRESS NOTES
After obtaining consent, and per orders of Dr. Fan Gibbons, injection of HD Flu given in Left deltoid by Janak Valencia. Patient instructed to remain in clinic for 20 minutes afterwards, and to report any adverse reaction to me immediately. Vaccine Information Sheet, \"Influenza - Inactivated\"  given to Mackenzie Heller, or parent/legal guardian of  Mackenzie Heller and verbalized understanding. Patient responses:    Have you ever had a reaction to a flu vaccine? No  Are you able to eat eggs without adverse effects? Yes  Do you have any current illness? No  Have you ever had [place] Syndrome? No    Flu vaccine given per order. Please see immunization tab.

## 2020-10-27 NOTE — PROGRESS NOTES
Chief Complaint   Patient presents with    Follow-Up from Hospital     Pt went tot hospital on 10/19 for elevated HTN, pt stated he is doing better since he got out.  Health Maintenance     flu shot, pt refusing colon screen, Needing AMWV before end of 2020!        HPI:  Mariaelena Campos is a 82 y.o. male     F/u observation in hospital  Very brief admission for elevated bp  Repeat checks ok and imdur added, which made bp even better    Had no anginal symptoms  Has also already had f/u with cardiology, who had been admitting service of record    Told to have brief f/u with PCP  Wants his flu shot        Son and daughter live here in [address] [place] [address] safe at home    Has had hernia in the groin for about a year  No pain  Getting bigger    Patient Active Problem List   Diagnosis    Hyperlipidemia    Carotid stenosis, bilateral    Dementia ([address]    Hypertensive urgency    NSTEMI (non-ST elevated myocardial infarction) ([address]    Essential hypertension    HESS (dyspnea on exertion)    Leg edema    Acute diastolic heart failure ([address]    Chest pain    Angina, class III ([address]    S/P cardiac cath    Anginal equivalent ([address]    Dizziness    Coronary artery disease involving native coronary artery of native heart without angina pectoris    CHF (congestive heart failure) ([address]    CAD (coronary artery disease)    Congestive heart failure ([address]    Non-STEMI (non-ST elevated myocardial infarction) ([address]    Unstable angina ([address]    ACS (acute coronary syndrome) ([address]    Bilateral carotid bruits    Syncope and collapse    Post PTCA       Current Outpatient Medications   Medication Sig Dispense Refill    isosorbide mononitrate (IMDUR) 30 MG extended release tablet Take 1 tablet by mouth daily 30 tablet 3    finasteride (PROSCAR) 5 MG tablet TAKE 1 TABLET BY MOUTH DAILY 90 tablet 2    carvedilol (COREG) 6.25 MG tablet TAKE 1 TABLET BY MOUTH TWICE DAILY 60 tablet 2    lisinopril (PRINIVIL;ZESTRIL) 10 education level: None   Occupational History    None   Social Needs    Financial resource strain: Not hard at all   [employer] insecurity     Worry: Never true     Inability: Never true   Joust needs     Medical: No     Non-medical: No   Tobacco Use    Smoking status: Never Smoker    Smokeless tobacco: Never Used   Substance and Sexual Activity    Alcohol use: No     Comment: social    Drug use: No    Sexual activity: Not Currently   Lifestyle    Physical activity     Days per week: None     Minutes per session: None    Stress: None   Relationships    Social connections     Talks on phone: None     Gets together: None     Attends Judaism service: None     Active member of club or organization: None     Attends meetings of clubs or organizations: None     Relationship status: None    Intimate partner violence     Fear of current or ex partner: None     Emotionally abused: None     Physically abused: None     Forced sexual activity: None   Other Topics Concern    None   Social History Narrative    None     No Known Allergies    Review of Systems:   General ROS: negative for - chills, fatigue, fever, malaise, weight gain or weight loss  Respiratory ROS: no cough, shortness of breath, or wheezing  Cardiovascular ROS: no chest pain or dyspnea on exertion  Gastrointestinal ROS: no abdominal pain, change in bowel habits, or black or bloody stools  Genito-Urinary ROS: no dysuria, trouble voiding  Musculoskeletal ROS: negative for - gait disturbance, joint pain or joint stiffness  Neurological ROS: negative for - behavioral changes, memory loss, numbness/tingling, tremors or weakness    In general patient otherwise reports feeling well.      Physical Exam:  /72 (Site: Left Upper Arm, Position: Sitting, Cuff Size: Medium Adult)   Pulse 86   Temp 96.5 °F (35.8 °C) (Infrared)   Ht 5' 9\" (1.753 m)   Wt 192 lb (87.1 kg)   SpO2 97%   BMI 28.35 kg/m²     Gen: Well, NAD, Alert, Oriented x 3   HEENT: EOMI, eyes clear, MMM  Skin: without rash or jaundice  Neck: no significant lymphadenopathy or thyromegaly  Lungs: CTA B w/out Rales/Wheezes/Rhonchi, Good respiratory effort   Heart: RRR, S1S2, w/out M/R/G, non-displaced PMI   Abdomen: Soft NT/ND, w/out R/G, w/ +BSx4   Right inguinal area with sizeable hernia, nontender    Lab Results   Component Value Date    WBC 7.6 07/13/2020    HGB 12.7 (L) 07/13/2020    HCT 38.2 (L) 07/13/2020     07/13/2020    CHOL 130 06/01/2020    TRIG 86 06/01/2020    HDL 48 06/01/2020    ALT 12 10/20/2020    AST 13 10/20/2020     10/20/2020    K 3.9 10/20/2020     10/20/2020    CREATININE 0.78 10/20/2020    BUN 19 10/20/2020    CO2 23 10/20/2020    TSH 2.280 10/08/2019    PSA 2.88 07/30/2018    INR 1.1 07/12/2020    LABA1C 6.1 (H) 07/30/2018         A&P   Diagnosis Orders   1. Hypertensive urgency     2. Needs flu shot  INFLUENZA, QUADV, ADJUVANTED, 65 YRS =, IM, PF, PREFILL SYR, 0.5ML (FLUAD)   3. Right inguinal hernia  Cherelle Isbell MD, General Surgery, [facility]   4. Alzheimer's dementia without behavioral disturbance, unspecified timing of dementia onset ([facility]     5. Congestive heart failure, unspecified HF chronicity, unspecified heart failure type ([address]     6. Essential hypertension     7. Hyperlipidemia, unspecified hyperlipidemia type     8.  Coronary artery disease involving native heart without angina pectoris, unspecified vessel or lesion type       Referral to general surgery for hernia evaluation    Flu shot    Chronic conditions are stable  Continue current regimen  Follow up with appropriate specialists and here routinely for ongoing monitoring of chronic conditions    Call for refills    Stay active     He has a lot of help from his children that live nearby      Yossi Carballo MD

## 2020-11-03 PROBLEM — I25.10 CAD (CORONARY ARTERY DISEASE): Status: RESOLVED | Noted: 2020-11-03 | Resolved: 2020-11-03

## 2020-11-06 ENCOUNTER — PREP FOR PROCEDURE (OUTPATIENT)
Dept: SURGERY | Age: 85
End: 2020-11-06

## 2020-11-06 ENCOUNTER — OFFICE VISIT (OUTPATIENT)
Dept: SURGERY | Age: 85
End: 2020-11-06
Payer: MEDICARE

## 2020-11-06 VITALS
SYSTOLIC BLOOD PRESSURE: 132 MMHG | TEMPERATURE: 98.6 F | HEIGHT: 69 IN | DIASTOLIC BLOOD PRESSURE: 80 MMHG | BODY MASS INDEX: 28.47 KG/M2 | WEIGHT: 192.2 LBS

## 2020-11-06 PROCEDURE — 4040F PNEUMOC VAC/ADMIN/RCVD: CPT | Performed by: SURGERY

## 2020-11-06 PROCEDURE — 1036F TOBACCO NON-USER: CPT | Performed by: SURGERY

## 2020-11-06 PROCEDURE — G8417 CALC BMI ABV UP PARAM F/U: HCPCS | Performed by: SURGERY

## 2020-11-06 PROCEDURE — 99203 OFFICE O/P NEW LOW 30 MIN: CPT | Performed by: SURGERY

## 2020-11-06 PROCEDURE — G8484 FLU IMMUNIZE NO ADMIN: HCPCS | Performed by: SURGERY

## 2020-11-06 PROCEDURE — 1123F ACP DISCUSS/DSCN MKR DOCD: CPT | Performed by: SURGERY

## 2020-11-06 PROCEDURE — G8427 DOCREV CUR MEDS BY ELIG CLIN: HCPCS | Performed by: SURGERY

## 2020-11-06 ASSESSMENT — ENCOUNTER SYMPTOMS
BLOOD IN STOOL: 0
ALLERGIC/IMMUNOLOGIC NEGATIVE: 1
COLOR CHANGE: 0
RHINORRHEA: 0
CONSTIPATION: 0
SHORTNESS OF BREATH: 0
EYES NEGATIVE: 1
ABDOMINAL PAIN: 1
CHEST TIGHTNESS: 0
ABDOMINAL DISTENTION: 0

## 2020-11-06 NOTE — PROGRESS NOTES
Subjective:      Patient ID: Aye Bajwa is a 82 y.o. male. New patient    HPI   Aye Bajwa is a 82 y.o. male seen at the request of Dr Tl Jerome for a possible right inguinal hernia. It was first noticed 1 year(s) ago. The patient complains of discomfort and complains of a groin mass. Pain is achy and rates it as a 3 The patient denies GI,  or respiratory problems. The hernia is worse with standing. It does interfere with normal functions. The patient has not had previous surgical treatment. The patient is on anticoagulants(Plavix). Review of Systems   Constitutional: Negative for activity change, appetite change and unexpected weight change. HENT: Negative for congestion, nosebleeds, postnasal drip, rhinorrhea and sneezing. Eyes: Negative. Negative for visual disturbance. Respiratory: Negative for chest tightness and shortness of breath. Cardiovascular: Negative for chest pain and leg swelling. Gastrointestinal: Positive for abdominal pain. Negative for abdominal distention, blood in stool and constipation. Endocrine: Negative. Genitourinary: Negative for difficulty urinating. Musculoskeletal: Positive for arthralgias and gait problem. Negative for myalgias. Skin: Negative for color change. Allergic/Immunologic: Negative. Neurological: Negative for dizziness, light-headedness, numbness and headaches. Hematological: Does not bruise/bleed easily. Psychiatric/Behavioral: Negative for sleep disturbance.      Past Medical History:   Diagnosis Date    Anticoagulant long-term use     Anxiety     CAD (coronary artery disease)     Carotid stenosis     2/2013 16-49%    CHF (congestive heart failure) (HCC)     Dementia (HCC)     Dementia (HCC)     Hyperlipidemia     Hypertension     [facility]     Osteoarthritis      Past Surgical History:   Procedure Laterality Date    CARPAL TUNNEL RELEASE  1998    CATARACT REMOVAL  2007    COLONOSCOPY 12/10/10,2007    DR [facility] COLONOSCOPY  2007    hx polyps needs 2015    COLONOSCOPY  9/21/15     DR. YARBROUGH     CORONARY ANGIOPLASTY WITH STENT PLACEMENT  06/07/2020    DIAGNOSTIC CARDIAC CATH LAB PROCEDURE      HERNIA REPAIR      PTCA       Social History     Tobacco Use    Smoking status: Never Smoker    Smokeless tobacco: Never Used   Substance Use Topics    Alcohol use: No     Comment: social    Drug use: No     Family History   Problem Relation Age of Onset    Heart Disease Mother     High Blood Pressure Mother      Patient has no known allergies. No Known Allergies  Current Outpatient Medications   Medication Sig Dispense Refill    isosorbide mononitrate (IMDUR) 30 MG extended release tablet Take 1 tablet by mouth daily 30 tablet 3    finasteride (PROSCAR) 5 MG tablet TAKE 1 TABLET BY MOUTH DAILY 90 tablet 2    carvedilol (COREG) 6.25 MG tablet TAKE 1 TABLET BY MOUTH TWICE DAILY 60 tablet 2    lisinopril (PRINIVIL;ZESTRIL) 10 MG tablet Take 1 tablet by mouth daily 30 tablet 3    pravastatin (PRAVACHOL) 80 MG tablet TAKE 1 TABLET BY MOUTH EVERY EVENING 30 tablet 3    clopidogrel (PLAVIX) 75 MG tablet Take 1 tablet by mouth daily 90 tablet 3    donepezil (ARICEPT) 5 MG tablet TAKE 1 TABLET BY MOUTH NIGHTLY 90 tablet 3    latanoprost (XALATAN) 0.005 % ophthalmic solution use 1 drop in each eye every day 2.5 mL 5    nitroGLYCERIN (NITROSTAT) 0.4 MG SL tablet up to max of 3 total doses. If no relief after 1 dose, call 911. 25 tablet 3    vitamin B-12 (CYANOCOBALAMIN) 1000 MCG tablet Take 500 mcg by mouth daily      aspirin 81 MG tablet Take 1 tablet by mouth daily With Food 30 tablet 3    Multiple Vitamins-Minerals (MULTIVITAMIN PO) Take by mouth daily       dorzolamide-timolol (COSOPT) 22.3-6.8 MG/ML ophthalmic solution Place 1 drop into both eyes 2 times daily        No current facility-administered medications for this visit.       /80   Temp 98.6 °F (37 °C) (Temporal)   Ht 5' 9\" (1.753 m)   Wt 192 lb 3.2 oz (87.2 kg)   BMI 28.38 kg/m²     Objective:   Physical Exam  Constitutional:       General: He is not in acute distress. Appearance: Normal appearance. HENT:      Mouth/Throat:      Mouth: Mucous membranes are moist.      Pharynx: Oropharynx is clear. Eyes:      Pupils: Pupils are equal, round, and reactive to light. Neck:      Comments: Neck is supple without any masses, no thyromegaly, trachea midline  Cardiovascular:      Rate and Rhythm: Normal rate and regular rhythm. Heart sounds: No murmur. Pulmonary:      Effort: Pulmonary effort is normal. No respiratory distress. Breath sounds: Normal breath sounds. Abdominal:      Palpations: There is hepatomegaly. There is no splenomegaly. Tenderness: There is no abdominal tenderness. Hernia: A hernia is present. Hernia is present in the right inguinal area (large partially right inguinal hernia). Musculoskeletal:      Comments: Uses a wheeled walker   Neurological:      Mental Status: He is alert and oriented to person, place, and time. Psychiatric:         Mood and Affect: Mood normal.         Judgment: Judgment normal.         Assessment:      Right inguinal hernia  Patient is on Plavix      Plan:      Right Inguinal hernia repair    The risks, benefits and indications for repair of the inguinal hernia are reviewed with the patient. The potential risks and complications including but not exclusive to bleeding, infection, nerve damage, testicular damage, chronic pain and recurrence were reviewed. Anticipated convalescence is discussed. The probable use of prosthetic materials/ mesh is reviewed. All questions are answered and the patient elects to proceed with surgical repair. The patient was counseled at length about the risks of jamaal Covid-19 in the perioperative period and any recovery window from their procedure.   The patient was made aware that jamaal Covid-19  may worsen their prognosis for recovering from their procedure  and lend to a higher morbidity and/or mortality risk. The patient was given the options of postponing their procedure. All material risks, benefits, and alternatives were discussed. The patient does wish to proceed with the procedure at this time. Please note this report has been partially produced using speech recognition software and may cause contain errors related to that system including grammar, punctuation and spelling as well as words and phrases that may seem inappropriate.  If there are questions or concerns please feel free to contact me to clarify        Isabel Suggs MD

## 2020-11-09 ENCOUNTER — HOSPITAL ENCOUNTER (OUTPATIENT)
Dept: PREADMISSION TESTING | Age: 85
Discharge: HOME | End: 2020-11-13
Payer: MEDICARE

## 2020-11-09 VITALS
OXYGEN SATURATION: 98 % | WEIGHT: 191.4 LBS | RESPIRATION RATE: 16 BRPM | BODY MASS INDEX: 29.01 KG/M2 | DIASTOLIC BLOOD PRESSURE: 69 MMHG | TEMPERATURE: 97.7 F | HEART RATE: 81 BPM | HEIGHT: 68 IN | SYSTOLIC BLOOD PRESSURE: 146 MMHG

## 2020-11-09 LAB
ANION GAP SERPL CALCULATED.3IONS-SCNC: 9 MEQ/L (ref 9–15)
BUN BLDV-MCNC: 21 MG/DL (ref 8–23)
CALCIUM SERPL-MCNC: 9.2 MG/DL (ref 8.5–9.9)
CHLORIDE BLD-SCNC: 104 MEQ/L (ref 95–107)
CO2: 23 MEQ/L (ref 20–31)
CREAT SERPL-MCNC: 0.82 MG/DL (ref 0.7–1.2)
GFR AFRICAN AMERICAN: >60
GFR NON-AFRICAN AMERICAN: >60
GLUCOSE BLD-MCNC: 107 MG/DL (ref 70–99)
HCT VFR BLD CALC: 44.2 % (ref 42–52)
HEMOGLOBIN: 14.8 G/DL (ref 14–18)
MCH RBC QN AUTO: 30.8 PG (ref 27–31.3)
MCHC RBC AUTO-ENTMCNC: 33.5 % (ref 33–37)
MCV RBC AUTO: 91.8 FL (ref 80–100)
PDW BLD-RTO: 14.3 % (ref 11.5–14.5)
PLATELET # BLD: 212 K/UL (ref 130–400)
POTASSIUM SERPL-SCNC: 4.2 MEQ/L (ref 3.4–4.9)
RBC # BLD: 4.82 M/UL (ref 4.7–6.1)
SODIUM BLD-SCNC: 136 MEQ/L (ref 135–144)
WBC # BLD: 6.4 K/UL (ref 4.8–10.8)

## 2020-11-09 PROCEDURE — U0003 INFECTIOUS AGENT DETECTION BY NUCLEIC ACID (DNA OR RNA); SEVERE ACUTE RESPIRATORY SYNDROME CORONAVIRUS 2 (SARS-COV-2) (CORONAVIRUS DISEASE [COVID-19]), AMPLIFIED PROBE TECHNIQUE, MAKING USE OF HIGH THROUGHPUT TECHNOLOGIES AS DESCRIBED BY CMS-2020-01-R: HCPCS

## 2020-11-09 PROCEDURE — 80048 BASIC METABOLIC PNL TOTAL CA: CPT

## 2020-11-09 PROCEDURE — 85027 COMPLETE CBC AUTOMATED: CPT

## 2020-11-09 RX ORDER — CEFAZOLIN SODIUM 2 G/50ML
2 SOLUTION INTRAVENOUS ONCE
Status: CANCELLED | OUTPATIENT
Start: 2020-11-13

## 2020-11-09 NOTE — PROGRESS NOTES
Pt's daughter called having questions regarding pt's medications, reviewed with daughter medications to take and to hold MVI and supplements.

## 2020-11-09 NOTE — PROGRESS NOTES
Internal medicine progress note.    Subjective: Awake and comfortable. Confused. Denies abdominal pain. No N/V.     ROS limited     Vitals:    04/16/17 0921   BP:    Pulse: 80   Resp:    Temp:         General: Awake, confused.   Neck supple  Lung scattered crackles but adequate air flow   CVS S 1 and S 2  Abdomen bowel sound present   CNS unchanged      Recent Labs  Lab 04/16/17  0556   WBC 9.8   RBC 4.80   HGB 14.5   HCT 42.3               Recent Labs  Lab 04/16/17  0556 04/15/17  0630 04/14/17  1825  04/13/17  0544   SODIUM 138 143  --   --  139   POTASSIUM 3.8 3.6 3.7  < > 3.1*   CHLORIDE 102 106  --   --  103   CO2 30 30  --   --  32   BUN 21* 24*  --   --  17   CREATININE 0.75 0.71  --   --  0.72   GLUCOSE 77 90  --   --  100*   CALCIUM 8.4 8.5  --   --  8.8   < > = values in this interval not displayed.    Impression:   A41.9 Sepsis, unspecified organism  I95.9 Hypotension, unspecified  W19.XXXS Unspecified fall, sequela  N17.9 Acute kidney failure, unspecified  F01.50 Vascular dementia without behavioral disturbance     Plan:  E.coli bacteremia, on Abx per ID  CT abdomen/pelvis reviewed showing cholelithiasis.  Monitor LFT's, trending down. ERCP per GI if LFT's get worse.   Taper steroids  Thyroxine.   Mechanical soft diet  Labs in a.m  Heparin for DVT prop    Devyn Rios MD       Pt informed that his insurance transportation is not an acceptable ride home post-op. Pt verbalizes understanding, his son or daughter will pick him up.

## 2020-11-11 LAB
SARS-COV-2: NOT DETECTED
SOURCE: NORMAL

## 2020-11-13 ENCOUNTER — HOSPITAL ENCOUNTER (OUTPATIENT)
Age: 85
Setting detail: OUTPATIENT SURGERY
Discharge: HOME | End: 2020-11-13
Attending: SURGERY | Admitting: SURGERY
Payer: MEDICARE

## 2020-11-13 ENCOUNTER — ANESTHESIA (OUTPATIENT)
Dept: OPERATING ROOM | Age: 85
End: 2020-11-13
Payer: MEDICARE

## 2020-11-13 ENCOUNTER — ANESTHESIA EVENT (OUTPATIENT)
Dept: OPERATING ROOM | Age: 85
End: 2020-11-13
Payer: MEDICARE

## 2020-11-13 VITALS — SYSTOLIC BLOOD PRESSURE: 152 MMHG | TEMPERATURE: 95.7 F | OXYGEN SATURATION: 100 % | DIASTOLIC BLOOD PRESSURE: 67 MMHG

## 2020-11-13 VITALS
HEIGHT: 68 IN | WEIGHT: 191 LBS | BODY MASS INDEX: 28.95 KG/M2 | SYSTOLIC BLOOD PRESSURE: 167 MMHG | RESPIRATION RATE: 12 BRPM | DIASTOLIC BLOOD PRESSURE: 85 MMHG | TEMPERATURE: 96.7 F | HEART RATE: 69 BPM | OXYGEN SATURATION: 98 %

## 2020-11-13 PROBLEM — K40.90 RIGHT INGUINAL HERNIA: Status: ACTIVE | Noted: 2020-11-13

## 2020-11-13 PROCEDURE — 6360000002 HC RX W HCPCS: Performed by: ANESTHESIOLOGY

## 2020-11-13 PROCEDURE — 64486 TAP BLOCK UNIL BY INJECTION: CPT | Performed by: NURSE ANESTHETIST, CERTIFIED REGISTERED

## 2020-11-13 PROCEDURE — 3600000003 HC SURGERY LEVEL 3 BASE: Performed by: SURGERY

## 2020-11-13 PROCEDURE — 3700000001 HC ADD 15 MINUTES (ANESTHESIA): Performed by: SURGERY

## 2020-11-13 PROCEDURE — 6370000000 HC RX 637 (ALT 250 FOR IP): Performed by: ANESTHESIOLOGY

## 2020-11-13 PROCEDURE — 6360000002 HC RX W HCPCS: Performed by: SURGERY

## 2020-11-13 PROCEDURE — 2709999900 HC NON-CHARGEABLE SUPPLY: Performed by: SURGERY

## 2020-11-13 PROCEDURE — 7100000010 HC PHASE II RECOVERY - FIRST 15 MIN: Performed by: SURGERY

## 2020-11-13 PROCEDURE — 3600000013 HC SURGERY LEVEL 3 ADDTL 15MIN: Performed by: SURGERY

## 2020-11-13 PROCEDURE — 2580000003 HC RX 258: Performed by: SURGERY

## 2020-11-13 PROCEDURE — C1781 MESH (IMPLANTABLE): HCPCS | Performed by: SURGERY

## 2020-11-13 PROCEDURE — 2500000003 HC RX 250 WO HCPCS

## 2020-11-13 PROCEDURE — 7100000011 HC PHASE II RECOVERY - ADDTL 15 MIN: Performed by: SURGERY

## 2020-11-13 PROCEDURE — 49505 PRP I/HERN INIT REDUC >5 YR: CPT | Performed by: SURGERY

## 2020-11-13 PROCEDURE — 7100000001 HC PACU RECOVERY - ADDTL 15 MIN: Performed by: SURGERY

## 2020-11-13 PROCEDURE — 3700000000 HC ANESTHESIA ATTENDED CARE: Performed by: SURGERY

## 2020-11-13 PROCEDURE — 6360000002 HC RX W HCPCS

## 2020-11-13 PROCEDURE — 2500000003 HC RX 250 WO HCPCS: Performed by: NURSE ANESTHETIST, CERTIFIED REGISTERED

## 2020-11-13 PROCEDURE — 7100000000 HC PACU RECOVERY - FIRST 15 MIN: Performed by: SURGERY

## 2020-11-13 PROCEDURE — 2580000003 HC RX 258: Performed by: ANESTHESIOLOGY

## 2020-11-13 DEVICE — PLUG HERN XL W1.6XL2IN INGUINAL POLYPR REP PRESHAPED ONLAY: Type: IMPLANTABLE DEVICE | Site: GROIN | Status: FUNCTIONAL

## 2020-11-13 RX ORDER — SODIUM CHLORIDE 0.9 % (FLUSH) 0.9 %
10 SYRINGE (ML) INJECTION PRN
Status: DISCONTINUED | OUTPATIENT
Start: 2020-11-13 | End: 2020-11-13 | Stop reason: HOSPADM

## 2020-11-13 RX ORDER — FENTANYL CITRATE 50 UG/ML
INJECTION, SOLUTION INTRAMUSCULAR; INTRAVENOUS PRN
Status: DISCONTINUED | OUTPATIENT
Start: 2020-11-13 | End: 2020-11-13 | Stop reason: SDUPTHER

## 2020-11-13 RX ORDER — ONDANSETRON 2 MG/ML
4 INJECTION INTRAMUSCULAR; INTRAVENOUS EVERY 6 HOURS PRN
Status: DISCONTINUED | OUTPATIENT
Start: 2020-11-13 | End: 2020-11-13 | Stop reason: HOSPADM

## 2020-11-13 RX ORDER — PROPOFOL 10 MG/ML
INJECTION, EMULSION INTRAVENOUS PRN
Status: DISCONTINUED | OUTPATIENT
Start: 2020-11-13 | End: 2020-11-13 | Stop reason: SDUPTHER

## 2020-11-13 RX ORDER — HYDROCODONE BITARTRATE AND ACETAMINOPHEN 5; 325 MG/1; MG/1
2 TABLET ORAL PRN
Status: COMPLETED | OUTPATIENT
Start: 2020-11-13 | End: 2020-11-13

## 2020-11-13 RX ORDER — ONDANSETRON 2 MG/ML
4 INJECTION INTRAMUSCULAR; INTRAVENOUS
Status: COMPLETED | OUTPATIENT
Start: 2020-11-13 | End: 2020-11-13

## 2020-11-13 RX ORDER — MORPHINE SULFATE 2 MG/ML
1 INJECTION, SOLUTION INTRAMUSCULAR; INTRAVENOUS
Status: DISCONTINUED | OUTPATIENT
Start: 2020-11-13 | End: 2020-11-13 | Stop reason: HOSPADM

## 2020-11-13 RX ORDER — LIDOCAINE HYDROCHLORIDE 10 MG/ML
1 INJECTION, SOLUTION EPIDURAL; INFILTRATION; INTRACAUDAL; PERINEURAL
Status: DISCONTINUED | OUTPATIENT
Start: 2020-11-13 | End: 2020-11-13 | Stop reason: HOSPADM

## 2020-11-13 RX ORDER — SODIUM CHLORIDE 0.9 % (FLUSH) 0.9 %
10 SYRINGE (ML) INJECTION EVERY 12 HOURS SCHEDULED
Status: DISCONTINUED | OUTPATIENT
Start: 2020-11-13 | End: 2020-11-13 | Stop reason: HOSPADM

## 2020-11-13 RX ORDER — LIDOCAINE HYDROCHLORIDE 20 MG/ML
INJECTION, SOLUTION INTRAVENOUS PRN
Status: DISCONTINUED | OUTPATIENT
Start: 2020-11-13 | End: 2020-11-13 | Stop reason: SDUPTHER

## 2020-11-13 RX ORDER — ROCURONIUM BROMIDE 10 MG/ML
INJECTION, SOLUTION INTRAVENOUS PRN
Status: DISCONTINUED | OUTPATIENT
Start: 2020-11-13 | End: 2020-11-13 | Stop reason: SDUPTHER

## 2020-11-13 RX ORDER — FENTANYL CITRATE 50 UG/ML
50 INJECTION, SOLUTION INTRAMUSCULAR; INTRAVENOUS EVERY 10 MIN PRN
Status: DISCONTINUED | OUTPATIENT
Start: 2020-11-13 | End: 2020-11-13 | Stop reason: HOSPADM

## 2020-11-13 RX ORDER — DIPHENHYDRAMINE HYDROCHLORIDE 50 MG/ML
12.5 INJECTION INTRAMUSCULAR; INTRAVENOUS
Status: DISCONTINUED | OUTPATIENT
Start: 2020-11-13 | End: 2020-11-13 | Stop reason: HOSPADM

## 2020-11-13 RX ORDER — TRAMADOL HYDROCHLORIDE 50 MG/1
50 TABLET ORAL EVERY 6 HOURS PRN
Status: DISCONTINUED | OUTPATIENT
Start: 2020-11-13 | End: 2020-11-13 | Stop reason: HOSPADM

## 2020-11-13 RX ORDER — MAGNESIUM HYDROXIDE 1200 MG/15ML
LIQUID ORAL CONTINUOUS PRN
Status: COMPLETED | OUTPATIENT
Start: 2020-11-13 | End: 2020-11-13

## 2020-11-13 RX ORDER — SODIUM CHLORIDE, SODIUM LACTATE, POTASSIUM CHLORIDE, CALCIUM CHLORIDE 600; 310; 30; 20 MG/100ML; MG/100ML; MG/100ML; MG/100ML
INJECTION, SOLUTION INTRAVENOUS CONTINUOUS
Status: DISCONTINUED | OUTPATIENT
Start: 2020-11-13 | End: 2020-11-13 | Stop reason: HOSPADM

## 2020-11-13 RX ORDER — HYDROCODONE BITARTRATE AND ACETAMINOPHEN 5; 325 MG/1; MG/1
1 TABLET ORAL EVERY 6 HOURS PRN
Qty: 25 TABLET | Refills: 0 | Status: SHIPPED | OUTPATIENT
Start: 2020-11-13 | End: 2020-11-20

## 2020-11-13 RX ORDER — EPHEDRINE SULFATE/0.9% NACL/PF 50 MG/5 ML
SYRINGE (ML) INTRAVENOUS PRN
Status: DISCONTINUED | OUTPATIENT
Start: 2020-11-13 | End: 2020-11-13 | Stop reason: SDUPTHER

## 2020-11-13 RX ORDER — MEPERIDINE HYDROCHLORIDE 25 MG/ML
12.5 INJECTION INTRAMUSCULAR; INTRAVENOUS; SUBCUTANEOUS EVERY 5 MIN PRN
Status: DISCONTINUED | OUTPATIENT
Start: 2020-11-13 | End: 2020-11-13 | Stop reason: HOSPADM

## 2020-11-13 RX ORDER — CEFAZOLIN SODIUM 2 G/50ML
2 SOLUTION INTRAVENOUS ONCE
Status: COMPLETED | OUTPATIENT
Start: 2020-11-13 | End: 2020-11-13

## 2020-11-13 RX ORDER — METOCLOPRAMIDE HYDROCHLORIDE 5 MG/ML
10 INJECTION INTRAMUSCULAR; INTRAVENOUS
Status: DISCONTINUED | OUTPATIENT
Start: 2020-11-13 | End: 2020-11-13 | Stop reason: HOSPADM

## 2020-11-13 RX ORDER — ROPIVACAINE HYDROCHLORIDE 5 MG/ML
INJECTION, SOLUTION EPIDURAL; INFILTRATION; PERINEURAL PRN
Status: DISCONTINUED | OUTPATIENT
Start: 2020-11-13 | End: 2020-11-13 | Stop reason: SDUPTHER

## 2020-11-13 RX ORDER — HYDROCODONE BITARTRATE AND ACETAMINOPHEN 5; 325 MG/1; MG/1
1 TABLET ORAL PRN
Status: COMPLETED | OUTPATIENT
Start: 2020-11-13 | End: 2020-11-13

## 2020-11-13 RX ORDER — PROMETHAZINE HYDROCHLORIDE 12.5 MG/1
12.5 TABLET ORAL EVERY 6 HOURS PRN
Status: DISCONTINUED | OUTPATIENT
Start: 2020-11-13 | End: 2020-11-13 | Stop reason: HOSPADM

## 2020-11-13 RX ORDER — MIDAZOLAM HYDROCHLORIDE 1 MG/ML
INJECTION INTRAMUSCULAR; INTRAVENOUS PRN
Status: DISCONTINUED | OUTPATIENT
Start: 2020-11-13 | End: 2020-11-13 | Stop reason: SDUPTHER

## 2020-11-13 RX ADMIN — ONDANSETRON 4 MG: 2 INJECTION INTRAMUSCULAR; INTRAVENOUS at 16:58

## 2020-11-13 RX ADMIN — SUGAMMADEX 200 MG: 100 INJECTION, SOLUTION INTRAVENOUS at 15:54

## 2020-11-13 RX ADMIN — ROPIVACAINE HYDROCHLORIDE 30 ML: 5 INJECTION, SOLUTION EPIDURAL; INFILTRATION; PERINEURAL at 14:46

## 2020-11-13 RX ADMIN — FENTANYL CITRATE 25 MCG: 50 INJECTION, SOLUTION INTRAMUSCULAR; INTRAVENOUS at 15:03

## 2020-11-13 RX ADMIN — PROPOFOL 120 MG: 10 INJECTION, EMULSION INTRAVENOUS at 14:35

## 2020-11-13 RX ADMIN — Medication 10 MG: at 14:52

## 2020-11-13 RX ADMIN — FENTANYL CITRATE 25 MCG: 50 INJECTION, SOLUTION INTRAMUSCULAR; INTRAVENOUS at 15:00

## 2020-11-13 RX ADMIN — MIDAZOLAM HYDROCHLORIDE 2 MG: 2 INJECTION, SOLUTION INTRAMUSCULAR; INTRAVENOUS at 14:44

## 2020-11-13 RX ADMIN — LIDOCAINE HYDROCHLORIDE 60 MG: 20 INJECTION, SOLUTION INTRAVENOUS at 14:35

## 2020-11-13 RX ADMIN — ROCURONIUM BROMIDE 50 MG: 10 INJECTION INTRAVENOUS at 14:35

## 2020-11-13 RX ADMIN — Medication 5 MG: at 15:30

## 2020-11-13 RX ADMIN — SODIUM CHLORIDE, POTASSIUM CHLORIDE, SODIUM LACTATE AND CALCIUM CHLORIDE: 600; 310; 30; 20 INJECTION, SOLUTION INTRAVENOUS at 14:32

## 2020-11-13 RX ADMIN — FENTANYL CITRATE 25 MCG: 50 INJECTION, SOLUTION INTRAMUSCULAR; INTRAVENOUS at 15:13

## 2020-11-13 RX ADMIN — CEFAZOLIN SODIUM 2 G: 2 SOLUTION INTRAVENOUS at 14:47

## 2020-11-13 RX ADMIN — FENTANYL CITRATE 25 MCG: 50 INJECTION, SOLUTION INTRAMUSCULAR; INTRAVENOUS at 14:35

## 2020-11-13 RX ADMIN — HYDROCODONE BITARTRATE AND ACETAMINOPHEN 2 TABLET: 5; 325 TABLET ORAL at 17:39

## 2020-11-13 ASSESSMENT — PULMONARY FUNCTION TESTS
PIF_VALUE: 15
PIF_VALUE: 15
PIF_VALUE: 17
PIF_VALUE: 17
PIF_VALUE: 16
PIF_VALUE: 15
PIF_VALUE: 16
PIF_VALUE: 17
PIF_VALUE: 15
PIF_VALUE: 1
PIF_VALUE: 0
PIF_VALUE: 1
PIF_VALUE: 15
PIF_VALUE: 15
PIF_VALUE: 1
PIF_VALUE: 15
PIF_VALUE: 16
PIF_VALUE: 15
PIF_VALUE: 15
PIF_VALUE: 17
PIF_VALUE: 16
PIF_VALUE: 17
PIF_VALUE: 16
PIF_VALUE: 17
PIF_VALUE: 15
PIF_VALUE: 17
PIF_VALUE: 14
PIF_VALUE: 2
PIF_VALUE: 16
PIF_VALUE: 18
PIF_VALUE: 14
PIF_VALUE: 17
PIF_VALUE: 18
PIF_VALUE: 18
PIF_VALUE: 16
PIF_VALUE: 18
PIF_VALUE: 15
PIF_VALUE: 17
PIF_VALUE: 17
PIF_VALUE: 20
PIF_VALUE: 15
PIF_VALUE: 16
PIF_VALUE: 18
PIF_VALUE: 16
PIF_VALUE: 15
PIF_VALUE: 15
PIF_VALUE: 17
PIF_VALUE: 15
PIF_VALUE: 19
PIF_VALUE: 16
PIF_VALUE: 18
PIF_VALUE: 1
PIF_VALUE: 15
PIF_VALUE: 1
PIF_VALUE: 16
PIF_VALUE: 18
PIF_VALUE: 18
PIF_VALUE: 15
PIF_VALUE: 18
PIF_VALUE: 15
PIF_VALUE: 16
PIF_VALUE: 15
PIF_VALUE: 16
PIF_VALUE: 14
PIF_VALUE: 17
PIF_VALUE: 15
PIF_VALUE: 1
PIF_VALUE: 16
PIF_VALUE: 1
PIF_VALUE: 15
PIF_VALUE: 8
PIF_VALUE: 16
PIF_VALUE: 14
PIF_VALUE: 16
PIF_VALUE: 14
PIF_VALUE: 18
PIF_VALUE: 16
PIF_VALUE: 16
PIF_VALUE: 14
PIF_VALUE: 17
PIF_VALUE: 15
PIF_VALUE: 1
PIF_VALUE: 16
PIF_VALUE: 14

## 2020-11-13 ASSESSMENT — PAIN SCALES - GENERAL
PAINLEVEL_OUTOF10: 5
PAINLEVEL_OUTOF10: 5

## 2020-11-13 ASSESSMENT — ENCOUNTER SYMPTOMS: SHORTNESS OF BREATH: 1

## 2020-11-13 NOTE — ANESTHESIA PROCEDURE NOTES
TAP block    Patient location during procedure: OR  Staffing  Anesthesiologist: Rudolph Bettencourt MD  Performed: anesthesiologist   Preanesthetic Checklist  Completed: patient identified, site marked, surgical consent, pre-op evaluation, timeout performed, IV checked, risks and benefits discussed, monitors and equipment checked, anesthesia consent given, oxygen available and patient being monitored  Peripheral Block  Patient position: supine  Prep: ChloraPrep  Patient monitoring: cardiac monitor, continuous pulse ox, frequent blood pressure checks and IV access  Block type: TAP  Laterality: right  Injection technique: single-shot  Procedures: ultrasound guided and IP approach, probe cover employed  Local infiltration: lidocaine  Provider prep: mask and sterile gloves  Local infiltration: lidocaine  Needle  Needle type: combined needle/nerve stimulator   Needle gauge: 21 G  Needle length: 10 cm  Needle localization: ultrasound guidance (IP approach, probe cover employed)  Assessment  Injection assessment: negative aspiration for heme, no paresthesia on injection and local visualized surrounding nerve on ultrasound  Paresthesia pain: none  Slow fractionated injection: yes  Hemodynamics: stable  Additional Notes  20cc 0.5% ropivacaine with decadron 5mg injected   Reason for block: post-op pain management

## 2020-11-13 NOTE — ANESTHESIA PRE PROCEDURE
Department of Anesthesiology  Preprocedure Note       Name:  Leandro Doss   Age:  82 y.o.  :  1935                                          MRN:  [id number]         Date:  2020      Surgeon: Leslie Sy):  Ratna Henley MD    Procedure: Procedure(s):  RIGHT INGUINAL HERNIA REPAIR 1 HOUR    Medications prior to admission:   Prior to Admission medications    Medication Sig Start Date End Date Taking? Authorizing Provider   isosorbide mononitrate (IMDUR) 30 MG extended release tablet Take 1 tablet by mouth daily 10/20/20  Yes Chet Allen MD   finasteride (PROSCAR) 5 MG tablet TAKE 1 TABLET BY MOUTH DAILY 10/9/20  Yes Sundar Godoy MD   carvedilol (COREG) 6.25 MG tablet TAKE 1 TABLET BY MOUTH TWICE DAILY 10/9/20  Yes Sundar Godoy MD   lisinopril (PRINIVIL;ZESTRIL) 10 MG tablet Take 1 tablet by mouth daily 10/9/20  Yes Sundar Godoy MD   pravastatin (PRAVACHOL) 80 MG tablet TAKE 1 TABLET BY MOUTH EVERY EVENING 20  Yes Sundar Godoy MD   clopidogrel (PLAVIX) 75 MG tablet Take 1 tablet by mouth daily 6/10/20  Yes NUNU May   donepezil (ARICEPT) 5 MG tablet TAKE 1 TABLET BY MOUTH NIGHTLY 20  Yes Chet Allen MD   latanoprost (XALATAN) 0.005 % ophthalmic solution use 1 drop in each eye every day 19  Yes Sundar Godoy MD   vitamin B-12 (CYANOCOBALAMIN) 1000 MCG tablet Take 500 mcg by mouth daily   Yes Historical Provider, MD   aspirin 81 MG tablet Take 1 tablet by mouth daily With Food 18  Yes Chet Allen MD   dorzolamide-timolol (COSOPT) 22.3-6.8 MG/ML ophthalmic solution Place 1 drop into both eyes 2 times daily  14  Yes Historical Provider, MD   nitroGLYCERIN (NITROSTAT) 0.4 MG SL tablet up to max of 3 total doses.  If no relief after 1 dose, call 911. 18   Chet Allen MD   Multiple Vitamins-Minerals (MULTIVITAMIN PO) Take by mouth daily     Historical Provider, MD       Current medications:    Current Facility-Administered Medications   Medication Dose Route Frequency Provider Last Rate Last Dose    ceFAZolin (ANCEF) 2 g in dextrose 3 % 50 mL IVPB (duplex)  2 g Intravenous Once Herminio Mabry MD           Allergies:  No Known Allergies    Problem List:    Patient Active Problem List   Diagnosis Code    Hyperlipidemia E78.5    Carotid stenosis, bilateral I65.23    Dementia ([facility] F03.90    Hypertensive urgency I16.0    NSTEMI (non-ST elevated myocardial infarction) ([facility] I21.4    Essential hypertension I10    HESS (dyspnea on exertion) R06.00    Leg edema R60.0    Acute diastolic heart failure ([facility]) I50.31    Chest pain R07.9    Angina, class III ([facility]) I20.9    S/P cardiac cath Z98.890    Anginal equivalent ([facility]) I20.8    Dizziness R42    Coronary artery disease involving native coronary artery of native heart without angina pectoris I25.10    CHF (congestive heart failure) ([facility]) I50.9    Congestive heart failure ([facility]) I50.9    Non-STEMI (non-ST elevated myocardial infarction) ([facility]) I21.4    Unstable angina ([facility]) I20.0    ACS (acute coronary syndrome) ([facility]) I24.9    Bilateral carotid bruits R09.89    Syncope and collapse R55    Post PTCA Z98.61       Past Medical History:        Diagnosis Date    Anticoagulant long-term use     Anxiety     CAD (coronary artery disease)     Carotid stenosis     2/2013 16-49%    CHF (congestive heart failure) ([facility])     Dementia ([facility])     Dementia ([facility])     Hyperlipidemia     Hypertension     [facility]     Osteoarthritis        Past Surgical History:        Procedure Laterality Date    CARPAL TUNNEL RELEASE  1998    CATARACT REMOVAL  2007    COLONOSCOPY  12/10/10,2007    DR Nyla Smith - DONE AT [address] COLONOSCOPY  2007    hx polyps needs 2015    COLONOSCOPY  9/21/15     DR. YARBROUGH     CORONARY ANGIOPLASTY WITH STENT PLACEMENT  06/07/2020    DIAGNOSTIC CARDIAC CATH LAB PROCEDURE      HERNIA REPAIR      PTCA         Social History:    Social History     Tobacco Use    HCGQUANT     ABGs: No results found for: PHART, PO2ART, UGS9RCC, IZO2ZBQ, BEART, Y8OANPSO     Type & Screen (If Applicable):  No results found for: LABABO, LABRH    Drug/Infectious Status (If Applicable):  No results found for: HIV, HEPCAB    COVID-19 Screening (If Applicable):   Lab Results   Component Value Date    COVID19 Not Detected 11/09/2020         Anesthesia Evaluation  Patient summary reviewed and Nursing notes reviewed no history of anesthetic complications:   Airway: Mallampati: II  TM distance: >3 FB   Neck ROM: full  Mouth opening: > = 3 FB Dental:    (+) upper dentures      Pulmonary:   (+) shortness of breath:                             Cardiovascular:    (+) hypertension:, angina:, past MI:, CAD:, CABG/stent:, CHF:, HESS:,       NYHA Classification: III  ECG reviewed               Beta Blocker:  Dose within 24 Hrs         Neuro/Psych:   (+) psychiatric history:            GI/Hepatic/Renal: Neg GI/Hepatic/Renal ROS            Endo/Other: Negative Endo/Other ROS                    Abdominal:           Vascular:   + PVD, aortic or cerebral, . Anesthesia Plan      general     ASA 3     (TAP block)  Induction: intravenous. MIPS: Postoperative opioids intended and Prophylactic antiemetics administered. Anesthetic plan and risks discussed with patient. Plan discussed with CRNA.     Attending anesthesiologist reviewed and agrees with Ulysses Lung, MD   11/13/2020

## 2020-11-13 NOTE — ANESTHESIA POSTPROCEDURE EVALUATION
Department of Anesthesiology  Postprocedure Note    Patient: Gena Malik  MRN: [id number]  YOB: 1935  Date of evaluation: 11/13/2020  Time:  4:06 PM     Procedure Summary     Date:  11/13/20 [place]  [facility] [place] [facility]    Anesthesia Start:  8953 Anesthesia Stop:      Procedure:  RIGHT INGUINAL HERNIA REPAIR (Right ) Diagnosis:  (RIGHT INGUINAL HERNIA)    Surgeon:  Primitivo Reyes MD Responsible Provider:  ILYA Ramírez CRNA    Anesthesia Type:  general ASA Status:  3          Anesthesia Type: general    Juana Phase I:      Juana Phase II:      Last vitals: Reviewed and per EMR flowsheets.        Anesthesia Post Evaluation    Patient location during evaluation: bedside  Patient participation: complete - patient participated  Level of consciousness: awake and awake and alert  Pain score: 0  Airway patency: patent  Nausea & Vomiting: no nausea and no vomiting  Complications: no  Cardiovascular status: blood pressure returned to baseline and hemodynamically stable  Respiratory status: acceptable  Hydration status: euvolemic

## 2020-11-13 NOTE — PROGRESS NOTES
Discharge instructions reviewed with pt, and also son Shyam Long on the phone, both verbalized understanding.

## 2020-11-13 NOTE — ANESTHESIA PROCEDURE NOTES
Peripheral Block    Patient location during procedure: pre-op  Staffing  Anesthesiologist: Blayne Avina MD  Performed: anesthesiologist   Preanesthetic Checklist  Completed: patient identified, site marked, surgical consent, pre-op evaluation, timeout performed, IV checked, risks and benefits discussed, monitors and equipment checked, anesthesia consent given, oxygen available and patient being monitored  Peripheral Block  Patient position: supine  Prep: ChloraPrep  Patient monitoring: continuous pulse ox, frequent blood pressure checks and IV access  Block type: TAP  Laterality: right  Injection technique: single-shot  Procedures: ultrasound guided  Local infiltration: ropivacaine  Infiltration strength: 0.5 %  Dose: 30 mL  Provider prep: mask and sterile gloves  Local infiltration: ropivacaine  Needle  Needle type: combined needle/nerve stimulator   Needle gauge: 21 G  Needle length: 10 cm  Needle localization: ultrasound guidance  Test dose: negative  Assessment  Injection assessment: negative aspiration for heme, no paresthesia on injection and local visualized surrounding nerve on ultrasound  Paresthesia pain: none  Slow fractionated injection: yes  Hemodynamics: stable  Reason for block: post-op pain management

## 2020-11-14 NOTE — OP NOTE
Ghazal Rubio [address] [place] [address], [id number] [place] [address]                                OPERATIVE REPORT    PATIENT NAME: Adalberto Sexton                   :        1935  MED REC NO:   [id number]                            ROOM:  ACCOUNT NO:   [de-identified]                           ADMIT DATE: 2020  PROVIDER:     Candee Riedel, MD    DATE OF PROCEDURE:  2020    PREOPERATIVE DIAGNOSIS:  Right inguinal hernia. POSTOPERATIVE DIAGNOSIS:  Right inguinal hernia. PROCEDURE:  Repair of right inguinal hernia with mesh. SURGEON:  Candee Riedel, MD    ANESTHESIA:  General.    COMPLICATIONS:  None. ESTIMATED BLOOD LOSS:  Less than 50 mL. HISTORY:  The patient is an 65-year-old male who has a symptomatic right  inguinal hernia. The patient is on Plavix. After discussing with him  his options of therapy, he was agreeable to hernia repair. I told him  that he could stay on his Plavix for the procedure. The procedure of  hernia repair as well as potential risks and complications including,  but not exclusive to infection, blood loss, damage to surrounding  structures, chronic pain, and recurrence were discussed and it was also  discussed the possibility of needing further surgery if any of this were  to occur were all talked with him about and he was agreeable to the  procedure. The patient has had previous right groin surgery of unclear nature in the past.    OPERATIVE PROCEDURE:  The patient brought in the operative suite, placed  in the supine position where anesthesia was induced and he was  intubated. He had had a previous surgery in the right groin for a ? heart  catheterization. He has a large oblique incision in the right groin.  A transverse incision was made in the right groin area at the level of the hernia and carried down  through the subcutaneous tissue to where there is  significant amount of scar tissue in the entire inguinal area.  As I  proceeded to find the external ring, it was very difficult to identify  because of the scar tissue in the region. I carefully dissected out  to the point where I was able to open up an external oblique and within  the inguinal canal, there was scar tissue. It was  very difficult to identify structures other than the fact that there was  a large direct inguinal hernia at the pubic bone that was protruding into the inguinal canal.  I  proceeded to dissect around and was able to find the cord structures  that were running medial to the hernia sac. Care was made not to injure  him; however, there was significant scarring of the cord structures to  the hernia sac that had to be dissected away. Afterwards, there was  good hemostasis. The hernia sac was reduced back into the floor of the  inguinal canal and I placed an extra large PerFix plug into place and  sutured it circumferentially with 2-0 Vicryl. An onlay patch was put  into place and sutured medially and laterally with 2-0 Vicryl. The cord and structures were placed back in the inguinal  canal and there was excellent hemostasis. Sponge, needle, and  instrument counts were correct. The external oblique was closed using  2-0 Vicryl, 3-0 Vicryl for the subcutaneous tissue and the skin was  closed with 4-0 Monocryl with skin glue on skin edges and Aquacel  dressing placed on the wound. He tolerated the procedure well, went to  recovery in stable condition where I spoke with his son, Robert Savage, by phone. I recommended that he stay off his Plavix for several days before going  back on it and will see him back in the office in 1 week.         Jamin Bates MD    D: 11/13/2020 23:00:03       T: 11/13/2020 23:09:13     [email or web address]  Job#: [id number]     Doc#: [id number]    CC:

## 2020-11-20 ENCOUNTER — OFFICE VISIT (OUTPATIENT)
Dept: SURGERY | Age: 85
End: 2020-11-20

## 2020-11-20 VITALS
BODY MASS INDEX: 28.29 KG/M2 | TEMPERATURE: 98 F | WEIGHT: 191 LBS | SYSTOLIC BLOOD PRESSURE: 124 MMHG | HEIGHT: 69 IN | DIASTOLIC BLOOD PRESSURE: 72 MMHG

## 2020-11-20 PROCEDURE — 99024 POSTOP FOLLOW-UP VISIT: CPT | Performed by: SURGERY

## 2020-11-20 NOTE — PROGRESS NOTES
Subjective:      Patient ID: Jason Phillips is a 82 y.o. male. HPI   Jason Phillips is status post repair of right inguinal hernia with mesh on November 13,2020. The patient is convalescing very well. Pain control is excellent using Tylenol. Appetite is good. GI function is normal.   function is normal.  He has not returned to normal activities. The patient was done on Plavix and developed a lot of bruising postoperatively and right scrotal swelling. Review of Systems    Objective:   Physical Exam  Constitutional:       General: He is not in acute distress. Appearance: Normal appearance. HENT:      Mouth/Throat:      Mouth: Mucous membranes are moist.      Pharynx: Oropharynx is clear. Eyes:      Pupils: Pupils are equal, round, and reactive to light. Neck:      Comments: Neck is supple without any masses, no thyromegaly, trachea midline  Abdominal:          Comments: Incision is well approximated, ecchymosis of the right flank and scrotal area. Erythema is not present, scrotal swelling is moderate, no evidence of hernia, mild tenderness, no drainage present, skin glue/sutures are present.,  Aquacel dressing in place. Musculoskeletal:      Comments: Normal gait   Skin:     Findings: No bruising, lesion or rash. Neurological:      Mental Status: He is alert and oriented to person, place, and time. Psychiatric:         Mood and Affect: Mood normal.         Judgment: Judgment normal.       /72   Temp 98 °F (36.7 °C) (Temporal)   Ht 5' 9\" (1.753 m)   Wt 191 lb (86.6 kg)   BMI 28.21 kg/m²   Assessment:      Satisfactory course      Plan:      The patient is instructed to return to see me in 1 month.    Remove the Aquacel dressing        Thomas Hendrickson MD

## 2020-11-21 ENCOUNTER — TELEPHONE (OUTPATIENT)
Dept: FAMILY MEDICINE CLINIC | Age: 85
End: 2020-11-21

## 2020-12-08 ENCOUNTER — TELEPHONE (OUTPATIENT)
Dept: FAMILY MEDICINE CLINIC | Age: 85
End: 2020-12-08

## 2020-12-18 ENCOUNTER — OFFICE VISIT (OUTPATIENT)
Dept: SURGERY | Age: 85
End: 2020-12-18

## 2020-12-18 VITALS
DIASTOLIC BLOOD PRESSURE: 70 MMHG | SYSTOLIC BLOOD PRESSURE: 110 MMHG | HEIGHT: 69 IN | WEIGHT: 189 LBS | BODY MASS INDEX: 27.99 KG/M2 | TEMPERATURE: 97.1 F

## 2020-12-18 PROCEDURE — 99024 POSTOP FOLLOW-UP VISIT: CPT | Performed by: SURGERY

## 2020-12-18 NOTE — PROGRESS NOTES
Subjective:      Patient ID: Nataly Morrissey is a 82 y.o. male. HPI   aNtaly Morrissey is status post repair of right inguinal hernia with mesh on November 13,2020. The patient is convalescing very well. Pain control is excellent using Tylenol. Appetite is excellent. GI function is normal.   function is normal.  He has returned to normal activities. .      Review of Systems    Objective:   Physical Exam  Constitutional:       General: He is not in acute distress. Appearance: Normal appearance. HENT:      Mouth/Throat:      Mouth: Mucous membranes are moist.      Pharynx: Oropharynx is clear. Eyes:      Pupils: Pupils are equal, round, and reactive to light. Neck:      Comments: Neck is supple without any masses, no thyromegaly, trachea midline  Abdominal:            Comments: Incision is well approximated, ecchymosis of the right flank and scrotal area. Erythema is not present, scrotal swelling is moderate, no evidence of hernia, mild tenderness, no drainage present   Musculoskeletal:      Comments: Normal gait   Skin:     Findings: No bruising, lesion or rash. Neurological:      Mental Status: He is alert and oriented to person, place, and time.    Psychiatric:         Mood and Affect: Mood normal.         Judgment: Judgment normal.       /70   Temp 97.1 °F (36.2 °C) (Temporal)   Ht 5' 9\" (1.753 m)   Wt 189 lb (85.7 kg)   BMI 27.91 kg/m²   Assessment:      Doing well      Plan:      Return to see me as needed  OK to have stress test        Martin Yang MD

## 2021-01-03 RX ORDER — CARVEDILOL 6.25 MG/1
TABLET ORAL
Qty: 60 TABLET | Refills: 2 | Status: SHIPPED | OUTPATIENT
Start: 2021-01-03 | End: 2021-04-12

## 2021-01-03 RX ORDER — PRAVASTATIN SODIUM 80 MG/1
80 TABLET ORAL EVERY EVENING
Qty: 30 TABLET | Refills: 3 | Status: SHIPPED | OUTPATIENT
Start: 2021-01-03 | End: 2021-05-25

## 2021-01-06 ENCOUNTER — HOSPITAL ENCOUNTER (OUTPATIENT)
Dept: NUCLEAR MEDICINE | Age: 86
Discharge: HOME | End: 2021-01-08
Payer: MEDICARE

## 2021-01-06 ENCOUNTER — HOSPITAL ENCOUNTER (OUTPATIENT)
Dept: ULTRASOUND IMAGING | Age: 86
Discharge: HOME | End: 2021-01-08
Payer: MEDICARE

## 2021-01-06 ENCOUNTER — HOSPITAL ENCOUNTER (OUTPATIENT)
Dept: NON INVASIVE DIAGNOSTICS | Age: 86
Discharge: HOME | End: 2021-01-06
Payer: MEDICARE

## 2021-01-06 DIAGNOSIS — I10 ACCELERATED ESSENTIAL HYPERTENSION: ICD-10-CM

## 2021-01-06 DIAGNOSIS — I21.4 NSTEMI (NON-ST ELEVATED MYOCARDIAL INFARCTION) (HCC): ICD-10-CM

## 2021-01-06 PROCEDURE — 78452 HT MUSCLE IMAGE SPECT MULT: CPT

## 2021-01-06 PROCEDURE — 6360000002 HC RX W HCPCS: Performed by: INTERNAL MEDICINE

## 2021-01-06 PROCEDURE — 3430000000 HC RX DIAGNOSTIC RADIOPHARMACEUTICAL: Performed by: INTERNAL MEDICINE

## 2021-01-06 PROCEDURE — 93017 CV STRESS TEST TRACING ONLY: CPT

## 2021-01-06 PROCEDURE — A9502 TC99M TETROFOSMIN: HCPCS | Performed by: INTERNAL MEDICINE

## 2021-01-06 PROCEDURE — 93975 VASCULAR STUDY: CPT | Performed by: INTERNAL MEDICINE

## 2021-01-06 PROCEDURE — 2580000003 HC RX 258: Performed by: INTERNAL MEDICINE

## 2021-01-06 PROCEDURE — 93975 VASCULAR STUDY: CPT

## 2021-01-06 RX ORDER — SODIUM CHLORIDE 0.9 % (FLUSH) 0.9 %
10 SYRINGE (ML) INJECTION PRN
Status: COMPLETED | OUTPATIENT
Start: 2021-01-06 | End: 2021-01-06

## 2021-01-06 RX ADMIN — Medication 10 ML: at 10:58

## 2021-01-06 RX ADMIN — Medication 10 ML: at 08:46

## 2021-01-06 RX ADMIN — Medication 10 ML: at 10:57

## 2021-01-06 RX ADMIN — TETROFOSMIN 36 MILLICURIE: 1.38 INJECTION, POWDER, LYOPHILIZED, FOR SOLUTION INTRAVENOUS at 10:57

## 2021-01-06 RX ADMIN — TETROFOSMIN 12 MILLICURIE: 1.38 INJECTION, POWDER, LYOPHILIZED, FOR SOLUTION INTRAVENOUS at 08:46

## 2021-01-06 RX ADMIN — REGADENOSON 0.4 MG: 0.08 INJECTION, SOLUTION INTRAVENOUS at 10:57

## 2021-01-06 NOTE — FLOWSHEET NOTE
Reviewed history, allergies, and medications. Consent confirmed. Lexiscan exam explained. Placed patient on monitor. [address] here to inject Jonnathan Zacarias. SOB noted during recovery phase. Denied chest pain. No ectopy noted. Patient off monitor and instructed to eat, will have last part of exam in 1 hour.

## 2021-01-07 PROCEDURE — 93018 CV STRESS TEST I&R ONLY: CPT | Performed by: INTERNAL MEDICINE

## 2021-01-07 NOTE — PROCEDURES
Ghazal Luu La [address]ie [address] [place] [address], [id number] [facility]                              CARDIAC STRESS TEST    PATIENT NAME: Wendy Serrato                   :        1935  MED REC NO:   [id number]                            ROOM:  ACCOUNT NO:   [de-identified]                           ADMIT DATE: 2021  PROVIDER:     Carmen Leigh MD    CARDIOVASCULAR DIAGNOSTIC DEPARTMENT    DATE OF STUDY:  2021    LEXISCAN    ORDERING PROVIDERS:  Amara Babin MD and Sarah Robles MD    INDICATION:  Chest pain. TECHNIQUE:  At rest, the patient was injected with 12._____ mCi of  Myoview. Resting images were obtained. The patient was then given 0.4  mg of Lexiscan followed by administration of 36.0 mCi of Myoview. Stress tomographic images were then obtained. Left ventricular ejection  fraction and gated wall motion were acquired. RESULTS:  Resting heart rate is 70 beats per minute. Maximum heart rate  achieved was 79 beats per minute. No diagnostic ST-segment changes were  noted. IMAGING RESULT:  Review of rest and stress tomographic images revealed  homogenous myocardial perfusion with no evidence of prior myocardial  infarction or ischemia. Left ventricular ejection fraction is 75%. TID  ratio 0.92, which is within normal limits. Wall motion is normal.    IMPRESSION:  1. Homogenous myocardial perfusion with no evidence of prior myocardial  infarction or ischemia. 2.  Normal left ventricular ejection fraction is 75%. 3.  TID ratio of 0.9, which is within normal limits.         Shi Hensley MD    D: 2021 #15:03:04       T: 2021 16:40:24     [email or web address]  Job#: [id number]     Doc#: [id number]    CC:

## 2021-02-08 RX ORDER — ISOSORBIDE MONONITRATE 30 MG/1
30 TABLET, EXTENDED RELEASE ORAL DAILY
Qty: 30 TABLET | Refills: 3 | Status: SHIPPED | OUTPATIENT
Start: 2021-02-08 | End: 2021-05-26

## 2021-02-08 RX ORDER — DONEPEZIL HYDROCHLORIDE 5 MG/1
5 TABLET, FILM COATED ORAL NIGHTLY
Qty: 90 TABLET | Refills: 3 | Status: SHIPPED | OUTPATIENT
Start: 2021-02-08 | End: 2022-03-07

## 2021-02-08 RX ORDER — LISINOPRIL 10 MG/1
10 TABLET ORAL DAILY
Qty: 30 TABLET | Refills: 3 | Status: SHIPPED | OUTPATIENT
Start: 2021-02-08 | End: 2021-06-21

## 2021-04-12 RX ORDER — CARVEDILOL 6.25 MG/1
TABLET ORAL
Qty: 60 TABLET | Refills: 2 | Status: SHIPPED | OUTPATIENT
Start: 2021-04-12 | End: 2021-07-12

## 2021-04-21 ENCOUNTER — TELEPHONE (OUTPATIENT)
Dept: FAMILY MEDICINE CLINIC | Age: 86
End: 2021-04-21

## 2021-04-21 NOTE — TELEPHONE ENCOUNTER
Called pt for 6 month checkup, states that he is seeing [facility] right now for shot in his eyes for blurred vision.  declined to make appt at this time

## 2021-05-11 ENCOUNTER — OFFICE VISIT (OUTPATIENT)
Dept: FAMILY MEDICINE CLINIC | Age: 86
End: 2021-05-11
Payer: MEDICARE

## 2021-05-11 VITALS
HEART RATE: 78 BPM | SYSTOLIC BLOOD PRESSURE: 130 MMHG | BODY MASS INDEX: 28.44 KG/M2 | OXYGEN SATURATION: 97 % | WEIGHT: 192 LBS | DIASTOLIC BLOOD PRESSURE: 80 MMHG | TEMPERATURE: 96.7 F | HEIGHT: 69 IN

## 2021-05-11 DIAGNOSIS — I25.10 CORONARY ARTERY DISEASE INVOLVING NATIVE HEART WITHOUT ANGINA PECTORIS, UNSPECIFIED VESSEL OR LESION TYPE: ICD-10-CM

## 2021-05-11 DIAGNOSIS — Z00.00 ROUTINE GENERAL MEDICAL EXAMINATION AT A HEALTH CARE FACILITY: Primary | ICD-10-CM

## 2021-05-11 DIAGNOSIS — G30.9 ALZHEIMER'S DEMENTIA WITHOUT BEHAVIORAL DISTURBANCE, UNSPECIFIED TIMING OF DEMENTIA ONSET: ICD-10-CM

## 2021-05-11 DIAGNOSIS — E78.5 HYPERLIPIDEMIA, UNSPECIFIED HYPERLIPIDEMIA TYPE: ICD-10-CM

## 2021-05-11 DIAGNOSIS — I10 ESSENTIAL HYPERTENSION: ICD-10-CM

## 2021-05-11 DIAGNOSIS — F02.80 ALZHEIMER'S DEMENTIA WITHOUT BEHAVIORAL DISTURBANCE, UNSPECIFIED TIMING OF DEMENTIA ONSET: ICD-10-CM

## 2021-05-11 DIAGNOSIS — I50.9 CONGESTIVE HEART FAILURE, UNSPECIFIED HF CHRONICITY, UNSPECIFIED HEART FAILURE TYPE (HCC): ICD-10-CM

## 2021-05-11 DIAGNOSIS — I20.9 ANGINA, CLASS III (HCC): ICD-10-CM

## 2021-05-11 DIAGNOSIS — N40.0 BENIGN PROSTATIC HYPERPLASIA, UNSPECIFIED WHETHER LOWER URINARY TRACT SYMPTOMS PRESENT: ICD-10-CM

## 2021-05-11 PROCEDURE — 4040F PNEUMOC VAC/ADMIN/RCVD: CPT | Performed by: FAMILY MEDICINE

## 2021-05-11 PROCEDURE — 1123F ACP DISCUSS/DSCN MKR DOCD: CPT | Performed by: FAMILY MEDICINE

## 2021-05-11 PROCEDURE — G0439 PPPS, SUBSEQ VISIT: HCPCS | Performed by: FAMILY MEDICINE

## 2021-05-11 RX ORDER — HYDROCODONE BITARTRATE AND ACETAMINOPHEN 5; 325 MG/1; MG/1
1 TABLET ORAL EVERY 8 HOURS PRN
COMMUNITY

## 2021-05-11 ASSESSMENT — PATIENT HEALTH QUESTIONNAIRE - PHQ9
SUM OF ALL RESPONSES TO PHQ9 QUESTIONS 1 & 2: 0
SUM OF ALL RESPONSES TO PHQ QUESTIONS 1-9: 0
2. FEELING DOWN, DEPRESSED OR HOPELESS: 0

## 2021-05-11 ASSESSMENT — LIFESTYLE VARIABLES: HOW OFTEN DO YOU HAVE A DRINK CONTAINING ALCOHOL: 0

## 2021-05-11 NOTE — PROGRESS NOTES
Medicare Annual Wellness Visit  Name: Daksha Hawkins Date: 2021   MRN: [id number] Sex: Male   Age: 82 y.o. Ethnicity: Non-/Non    : 1935 Race: Bala Phillips is here for Medicare AWV    Screenings for behavioral, psychosocial and functional/safety risks, and cognitive dysfunction are all negative except as indicated below. These results, as well as other patient data from the [address] form, are documented in Flowsheets linked to this Encounter. Patient Active Problem List   Diagnosis    Hyperlipidemia    Carotid stenosis, bilateral    Dementia ([facility]    Hypertensive urgency    NSTEMI (non-ST elevated myocardial infarction) ([facility]    Essential hypertension    HESS (dyspnea on exertion)    Leg edema    Acute diastolic heart failure ([facility]    Chest pain    Angina, class III ([facility])    S/P cardiac cath    Anginal equivalent ([facility])    Dizziness    Coronary artery disease involving native coronary artery of native heart without angina pectoris    CHF (congestive heart failure) ([facility]    Congestive heart failure ([facility]    Non-STEMI (non-ST elevated myocardial infarction) ([facility]    [facility]    ACS (acute coronary syndrome) ([facility]    Bilateral carotid bruits    Syncope and collapse    Post PTCA    Right inguinal hernia         No Known Allergies      Prior to Visit Medications    Medication Sig Taking? Authorizing Provider   HYDROcodone-acetaminophen (NORCO) 5-325 MG per tablet Take 1 tablet by mouth every 8 hours as needed for Pain.  Yes Historical Provider, MD   carvedilol (COREG) 6.25 MG tablet TAKE 1 TABLET BY MOUTH TWICE DAILY Yes Neptali Angela MD   lisinopril (PRINIVIL;ZESTRIL) 10 MG tablet Take 1 tablet by mouth daily Yes Neptali Angela MD   donepezil (ARICEPT) 5 MG tablet TAKE 1 TABLET BY MOUTH NIGHTLY Yes Awilda Hills MD   isosorbide mononitrate (IMDUR) 30 MG extended release tablet TAKE 1 TABLET BY MOUTH DAILY Yes Nory Jaramillo MD Jadiel   pravastatin (PRAVACHOL) 80 MG tablet TAKE 1 TABLET BY MOUTH EVERY EVENING Yes Rhonda Arciniega MD   finasteride (PROSCAR) 5 MG tablet TAKE 1 TABLET BY MOUTH DAILY Yes Rhonda Arciniega MD   clopidogrel (PLAVIX) 75 MG tablet Take 1 tablet by mouth daily Yes NUNU Pitts   latanoprost (XALATAN) 0.005 % ophthalmic solution use 1 drop in each eye every day Yes Rhonda Arciniega MD   nitroGLYCERIN (NITROSTAT) 0.4 MG SL tablet up to max of 3 total doses. If no relief after 1 dose, call 911. Yes Simon Worrell MD   vitamin B-12 (CYANOCOBALAMIN) 1000 MCG tablet Take 500 mcg by mouth daily Yes Historical Provider, MD   aspirin 81 MG tablet Take 1 tablet by mouth daily With Food Yes Simon Worrell MD   Multiple Vitamins-Minerals (MULTIVITAMIN PO) Take by mouth daily  Yes Historical Provider, MD   dorzolamide-timolol (COSOPT) 22.3-6.8 MG/ML ophthalmic solution Place 1 drop into both eyes 2 times daily  Yes Historical Provider, MD         Past Medical History:   Diagnosis Date    Anticoagulant long-term use     Anxiety     CAD (coronary artery disease)     Carotid stenosis     2/2013 16-49%    CHF (congestive heart failure) ([facility]     Dementia ([facility]     Dementia ([facility]     Hyperlipidemia     Hypertension     MI (myocardial infarction) ([facility]     Osteoarthritis        Past Surgical History:   Procedure Laterality Date    CARPAL TUNNEL RELEASE  1998    CATARACT REMOVAL  2007    COLONOSCOPY  12/10/10,2007    DR Patt Galaviz - DONE AT [facility] COLONOSCOPY  2007    hx polyps needs 2015    COLONOSCOPY  9/21/15     DR. YARBROUGH     CORONARY ANGIOPLASTY WITH STENT PLACEMENT  06/07/2020    DIAGNOSTIC CARDIAC CATH LAB PROCEDURE      HERNIA REPAIR Bilateral     x 2    HERNIA REPAIR Right 11/13/2020    RIGHT INGUINAL HERNIA REPAIR performed by Neftali Stein MD at [address] [place] [address] PTCA           Family History   Problem Relation Age of Onset    Heart Disease Mother     High Blood Pressure Mother        CareTeam (Including outside providers/suppliers regularly involved in providing care):   Patient Care Team:  Moni Malcolm MD as PCP - General (Family Medicine)  Moni Malcolm MD as PCP [facility]  Ni Milligan MD (Gastroenterology)  Castro Mead MD as Cardiologist (Cardiology)    Wt Readings from Last 3 Encounters:   05/11/21 192 lb (87.1 kg)   12/18/20 189 lb (85.7 kg)   11/20/20 191 lb (86.6 kg)     Vitals:    05/11/21 1259   BP: 130/80   Site: Left Upper Arm   Pulse: 78   Temp: 96.7 °F (35.9 °C)   SpO2: 97%   Weight: 192 lb (87.1 kg)   Height: 5' 9\" (1.753 m)     Body mass index is 28.35 kg/m². Based upon direct observation of the patient, evaluation of cognition reveals recent and remote memory intact. Physical Exam:  /80 (Site: Left Upper Arm)   Pulse 78   Temp 96.7 °F (35.9 °C)   Ht 5' 9\" (1.753 m)   Wt 192 lb (87.1 kg)   SpO2 97%   BMI 28.35 kg/m²     Gen: Well, NAD, Alert, Oriented x 3   HEENT: EOMI, eyes clear, MMM  Skin: without rash or jaundice  Neck: no significant lymphadenopathy or thyromegaly  Lungs: CTA B w/out Rales/Wheezes/Rhonchi, Good respiratory effort   Heart: RRR, S1S2, w/out M/R/G, non-displaced PMI   Abdomen: Soft NT/ND, w/out R/G, w/ +BSx4   Ext: No C/C/E Bilaterally. Neuro: Neurovascularly intact w/ Sensory/Motor intact UE/LE Bilaterally. Patient's complete Health Risk Assessment and screening values have been reviewed and are found in Flowsheets. The following problems were reviewed today and where indicated follow up appointments were made and/or referrals ordered. Positive Risk Factor Screenings with Interventions:     Fall Risk:  Timed Up and Go Test > 12 seconds?  (Complete if either Fall Risk answers are Yes): (!) yes  2 or more falls in past year?: no  Fall with injury in past year?: no  Fall Risk Interventions:    · Home safety tips provided          General Health and ACP:  General  In general, how would you say your health is?: Fair  In the past 7 days, have you experienced any of the following?  New or Increased Pain, New or Increased Fatigue, Loneliness, Social Isolation, Stress or Anger?: (!) New or Increased Pain  Do you get the social and emotional support that you need?: (!) No  Do you have a Living Will?: Yes  Advance Directives     Power of  Living Will ACP-Advance Directive ACP-Power of [place] on 07/13/20 Filed on 07/13/20 [id number] [facility] Interventions:  · has help from family     Health Habits/Nutrition:  Health Habits/Nutrition  Do you exercise for at least 20 minutes 2-3 times per week?: (!) No  Have you lost any weight without trying in the past 3 months?: No  Do you eat only one meal per day?: No  Have you seen the dentist within the past year?: (!) No  Body mass index: (!) 28.35  Health Habits/Nutrition Interventions:  · Encouraged to stay active    Hearing/Vision:  No exam data present  Hearing/Vision  Do you or your family notice any trouble with your hearing that hasn't been managed with hearing aids?: (!) Yes  Do you have difficulty driving, watching TV, or doing any of your daily activities because of your eyesight?: (!) Yes  Have you had an eye exam within the past year?: Yes  Hearing/Vision Interventions:  · Seeing eye doctor      Personalized Preventive Plan   Current Health Maintenance Status  Immunization History   Administered Date(s) Administered    JEYSONID-19, Pierce Peter, PF, 30mcg/0.3mL 02/27/2021, 03/20/2021    Influenza 10/12/2011, 10/08/2012, 10/10/2013    Influenza Virus Vaccine 10/16/2014    Influenza, High Dose (Fluzone 65 yrs and older) 11/05/2015, 09/20/2016, 09/18/2017, 10/20/2018    Influenza, Quadv, adjuvanted, 65 yrs +, IM, PF (Fluad) 10/27/2020    Influenza, Triv, inactivated, subunit, adjuvanted, IM (Fluad 65 yrs and older) 10/08/2019    Pneumococcal Conjugate 13-valent ([id number]) 09/20/2016    Pneumococcal Polysaccharide ([id number]) 10/16/2014    Tdap (Boostrix, Adacel) 06/01/2016        Health Maintenance   Topic Date Due    Shingles Vaccine (1 of 2) Never done    Colon cancer screen colonoscopy  09/21/2020    Annual Wellness Visit (AWV)  01/01/2022 (Originally 5/29/2019)    Lipid screen  06/01/2021    Potassium monitoring  11/09/2021    Creatinine monitoring  11/09/2021    DTaP/Tdap/Td vaccine (2 - Td) 06/01/2026    Flu vaccine  Completed    Pneumococcal 65+ years Vaccine  Completed    COVID-19 Vaccine  Completed    Hepatitis A vaccine  Aged Out    Hepatitis B vaccine  Aged Out    Hib vaccine  Aged Out    Meningococcal (ACWY) vaccine  Aged Out     Recommendations for [employer] Due: see orders and patient instructions/AVS.  . Recommended screening schedule for the next 5-10 years is provided to the patient in written form: see Patient Sana Rodriguez was seen today for medicare awv.     Diagnoses and all orders for this visit:    Routine general medical examination at a health care facility    Alzheimer's dementia without behavioral disturbance, unspecified timing of dementia onset ([address]    Congestive heart failure, unspecified HF chronicity, unspecified heart failure type ([address]    Angina, class III ([address]    Essential hypertension    Hyperlipidemia, unspecified hyperlipidemia type    Coronary artery disease involving native heart without angina pectoris, unspecified vessel or lesion type    Benign prostatic hyperplasia, unspecified whether lower urinary tract symptoms present        Chronic conditions are stable  Continue current regimen  Follow up with appropriate specialists and here routinely for ongoing monitoring of chronic conditions      Will see Dr. Matilda Garcia and ask about stopping plavix a year out from stents    Savannah Camargo MD

## 2021-05-25 DIAGNOSIS — I20.9 ANGINA, CLASS III (HCC): Primary | ICD-10-CM

## 2021-05-25 RX ORDER — PRAVASTATIN SODIUM 80 MG/1
80 TABLET ORAL EVERY EVENING
Qty: 30 TABLET | Refills: 3 | Status: SHIPPED | OUTPATIENT
Start: 2021-05-25 | End: 2021-11-05 | Stop reason: SDUPTHER

## 2021-05-26 RX ORDER — ISOSORBIDE MONONITRATE 30 MG/1
30 TABLET, EXTENDED RELEASE ORAL DAILY
Qty: 30 TABLET | Refills: 3 | Status: SHIPPED | OUTPATIENT
Start: 2021-05-26 | End: 2021-11-05 | Stop reason: SDUPTHER

## 2021-06-18 RX ORDER — CLOPIDOGREL BISULFATE 75 MG/1
75 TABLET ORAL DAILY
Qty: 90 TABLET | Refills: 3 | Status: SHIPPED | OUTPATIENT
Start: 2021-06-18 | End: 2021-11-05 | Stop reason: SDUPTHER

## 2021-06-21 RX ORDER — LISINOPRIL 10 MG/1
10 TABLET ORAL DAILY
Qty: 30 TABLET | Refills: 3 | Status: SHIPPED | OUTPATIENT
Start: 2021-06-21 | End: 2021-11-05 | Stop reason: SDUPTHER

## 2021-07-06 ENCOUNTER — TELEPHONE (OUTPATIENT)
Dept: FAMILY MEDICINE CLINIC | Age: 86
End: 2021-07-06

## 2021-07-06 NOTE — TELEPHONE ENCOUNTER
Daughter here, moving to assisted living next week. 2 papers need filled out  Needs help shower, getting dressed, taking medication  Call when ready.

## 2021-07-12 RX ORDER — CARVEDILOL 6.25 MG/1
TABLET ORAL
Qty: 60 TABLET | Refills: 2 | Status: SHIPPED | OUTPATIENT
Start: 2021-07-12 | End: 2021-11-05 | Stop reason: SDUPTHER

## 2021-07-22 DIAGNOSIS — I21.4 NSTEMI (NON-ST ELEVATED MYOCARDIAL INFARCTION) (HCC): ICD-10-CM

## 2021-07-22 DIAGNOSIS — N40.0 BENIGN PROSTATIC HYPERPLASIA, UNSPECIFIED WHETHER LOWER URINARY TRACT SYMPTOMS PRESENT: ICD-10-CM

## 2021-07-26 RX ORDER — FINASTERIDE 5 MG/1
5 TABLET, FILM COATED ORAL DAILY
Qty: 90 TABLET | Refills: 2 | Status: SHIPPED | OUTPATIENT
Start: 2021-07-26 | End: 2022-07-08

## 2021-08-18 ENCOUNTER — OFFICE VISIT (OUTPATIENT)
Dept: CARDIOLOGY CLINIC | Age: 86
End: 2021-08-18
Payer: MEDICARE

## 2021-08-18 VITALS
HEIGHT: 69 IN | OXYGEN SATURATION: 97 % | HEART RATE: 50 BPM | SYSTOLIC BLOOD PRESSURE: 118 MMHG | WEIGHT: 189 LBS | DIASTOLIC BLOOD PRESSURE: 64 MMHG | RESPIRATION RATE: 18 BRPM | BODY MASS INDEX: 27.99 KG/M2

## 2021-08-18 DIAGNOSIS — I65.23 CAROTID STENOSIS, BILATERAL: ICD-10-CM

## 2021-08-18 DIAGNOSIS — I21.4 NSTEMI (NON-ST ELEVATED MYOCARDIAL INFARCTION) (HCC): ICD-10-CM

## 2021-08-18 DIAGNOSIS — E78.5 HYPERLIPIDEMIA, UNSPECIFIED HYPERLIPIDEMIA TYPE: ICD-10-CM

## 2021-08-18 DIAGNOSIS — I50.9 CONGESTIVE HEART FAILURE, UNSPECIFIED HF CHRONICITY, UNSPECIFIED HEART FAILURE TYPE (HCC): ICD-10-CM

## 2021-08-18 DIAGNOSIS — I10 ESSENTIAL HYPERTENSION: ICD-10-CM

## 2021-08-18 DIAGNOSIS — R09.89 BILATERAL CAROTID BRUITS: ICD-10-CM

## 2021-08-18 DIAGNOSIS — I25.10 CORONARY ARTERY DISEASE INVOLVING NATIVE CORONARY ARTERY OF NATIVE HEART WITHOUT ANGINA PECTORIS: Primary | ICD-10-CM

## 2021-08-18 PROCEDURE — G8427 DOCREV CUR MEDS BY ELIG CLIN: HCPCS | Performed by: INTERNAL MEDICINE

## 2021-08-18 PROCEDURE — 4040F PNEUMOC VAC/ADMIN/RCVD: CPT | Performed by: INTERNAL MEDICINE

## 2021-08-18 PROCEDURE — 1036F TOBACCO NON-USER: CPT | Performed by: INTERNAL MEDICINE

## 2021-08-18 PROCEDURE — 99214 OFFICE O/P EST MOD 30 MIN: CPT | Performed by: INTERNAL MEDICINE

## 2021-08-18 PROCEDURE — 1123F ACP DISCUSS/DSCN MKR DOCD: CPT | Performed by: INTERNAL MEDICINE

## 2021-08-18 PROCEDURE — G8417 CALC BMI ABV UP PARAM F/U: HCPCS | Performed by: INTERNAL MEDICINE

## 2021-08-18 ASSESSMENT — ENCOUNTER SYMPTOMS
WHEEZING: 0
STRIDOR: 0
SHORTNESS OF BREATH: 0
CHEST TIGHTNESS: 0
COUGH: 0
RESPIRATORY NEGATIVE: 1
EYES NEGATIVE: 1
BLOOD IN STOOL: 0
NAUSEA: 0
GASTROINTESTINAL NEGATIVE: 1

## 2021-08-18 NOTE — PROGRESS NOTES
Subsequent Progress Note  Patient: Devendra Maldonado  YOB: 1935  MRN: [id number]    Chief Complaint: nstemi cad htn   Chief Complaint   Patient presents with    Hypertension     assited living facility says BP is high-uses wrist cuff    Coronary Artery Disease       CV Data:  6/2018 spect negative  5/2018 echo EF 60%  11/2018 LAD AMBER  12/2018 CUS <. Left Vertebral 100  1/2020 echo ef 60 midl AR and MR  1/2020 SPECT - NEGATIVE  6/8/2020 Cath OM1 AMBER. EF 60%  9/2020  CUS mild  1/21 Renal Duplex- negative  1/21 spect negative    Subjective/HPI: no cp no sob no falls no bleed. R Inguinal hernia     9/25/2019:  No cp no osb no falls no bleed eats well. Takes meds. C/o Diuretic and frequent urine. 1/15/2020 recently in ER and hosp for HTN. Now stable without meds changes. Possible he did not take his meds. Recent Labs showed mild Troponin elevaton. 2/5/2020 NO CP NO SOB NO FALLS NO BLEED. TAKES MEDS. EATS WELL.     5/8/2020 TELEHEALTH EVALUATION -- Audio/Visual (During FDZFN-14 public health emergency)      137/70 home BP. Doing well no cp no osb no falls no bleed takes meds. Eats well. Son moved in w him. 6/17/2020 no cp no sob no edema no falls no bleed. Feels better since last stent. 8/5/2020 recent hosp for syncope related to Low BP. Imdur was stopped and Benazepril changed to Lisinopril. Feels much better. 10/22/2020 recent hosp for accelerated HTN. We added Imdur. He never had CP nor SOB. He feels well now. BOP better. Eating well. 8/18/21 now lives at the [facility] in [place]- assisted living. Feels well no cp no sob no falls no bleed. Walks with walker.      Nonsmoker        EKG:    Past Medical History:   Diagnosis Date    Anticoagulant long-term use     Anxiety     CAD (coronary artery disease)     Carotid stenosis     2/2013 16-49%    CHF (congestive heart failure) (HCC)     Dementia (HCC)     Dementia (HCC)     Hyperlipidemia     Hypertension     [facility]     Osteoarthritis        Past Surgical History:   Procedure Laterality Date    CARPAL TUNNEL RELEASE  1998    CATARACT REMOVAL  2007    COLONOSCOPY  12/10/10,2007    DR Darcy Maldonado - DONE AT [address]  COLONOSCOPY  2007    hx polyps needs 2015    COLONOSCOPY  9/21/15     DR. YARBROUGH     CORONARY ANGIOPLASTY WITH STENT PLACEMENT  06/07/2020    DIAGNOSTIC CARDIAC CATH LAB PROCEDURE      HERNIA REPAIR Bilateral     x 2    HERNIA REPAIR Right 11/13/2020    RIGHT INGUINAL HERNIA REPAIR performed by Patriciaann Denver, MD at [address] [place] [address] [facility]         Family History   Problem Relation Age of Onset    Heart Disease Mother     High Blood Pressure Mother        Social History     Socioeconomic History    Marital status:      Spouse name: None    Number of children: None    Years of education: None    Highest education level: None   Occupational History    None   Tobacco Use    Smoking status: Never Smoker    Smokeless tobacco: Never Used   Vaping Use    Vaping Use: Never used   Substance and Sexual Activity    Alcohol use: No     Comment: social    Drug use: No    Sexual activity: Not Currently   Other Topics Concern    None   Social History Narrative    None     Social Determinants of Health     Financial Resource Strain: Low Risk     Difficulty of Paying Living Expenses: Not hard at all   Food Insecurity: No Food Insecurity    Worried About Running Out of Food in the Last Year: Never true    Nick of Food in the Last Year: Never true   Transportation Needs: No Transportation Needs    Lack of Transportation (Medical): No    Lack of Transportation (Non-Medical):  No   Physical Activity:     Days of Exercise per Week:     Minutes of Exercise per Session:    Stress:     Feeling of Stress :    Social Connections:     Frequency of Communication with Friends and Family:     Frequency of Social Gatherings with Friends and Family:     Attends Holiness Services:     Active Member of Clubs or Organizations:     Attends Club or Organization Meetings:     Marital Status:    Intimate Partner Violence:     Fear of Current or Ex-Partner:     Emotionally Abused:     Physically Abused:     Sexually Abused:        No Known Allergies    Current Outpatient Medications   Medication Sig Dispense Refill    finasteride (PROSCAR) 5 MG tablet TAKE 1 TABLET BY MOUTH DAILY 90 tablet 2    carvedilol (COREG) 6.25 MG tablet TAKE 1 TABLET BY MOUTH TWICE DAILY 60 tablet 2    lisinopril (PRINIVIL;ZESTRIL) 10 MG tablet Take 1 tablet by mouth daily 30 tablet 3    clopidogrel (PLAVIX) 75 MG tablet Take 1 tablet by mouth daily 90 tablet 3    isosorbide mononitrate (IMDUR) 30 MG extended release tablet TAKE 1 TABLET BY MOUTH DAILY 30 tablet 3    pravastatin (PRAVACHOL) 80 MG tablet TAKE 1 TABLET BY MOUTH EVERY EVENING 30 tablet 3    HYDROcodone-acetaminophen (NORCO) 5-325 MG per tablet Take 1 tablet by mouth every 8 hours as needed for Pain.  donepezil (ARICEPT) 5 MG tablet TAKE 1 TABLET BY MOUTH NIGHTLY 90 tablet 3    latanoprost (XALATAN) 0.005 % ophthalmic solution use 1 drop in each eye every day 2.5 mL 5    nitroGLYCERIN (NITROSTAT) 0.4 MG SL tablet up to max of 3 total doses. If no relief after 1 dose, call 911. 25 tablet 3    vitamin B-12 (CYANOCOBALAMIN) 1000 MCG tablet Take 500 mcg by mouth daily      aspirin 81 MG tablet Take 1 tablet by mouth daily With Food 30 tablet 3    Multiple Vitamins-Minerals (MULTIVITAMIN PO) Take by mouth daily       dorzolamide-timolol (COSOPT) 22.3-6.8 MG/ML ophthalmic solution Place 1 drop into both eyes 2 times daily        No current facility-administered medications for this visit. Review of Systems:   Review of Systems   Constitutional: Negative. Negative for diaphoresis and fatigue. HENT: Negative. Eyes: Negative. Respiratory: Negative. Negative for cough, chest tightness, shortness of breath, wheezing and stridor. Cardiovascular: Negative. Negative for chest pain, palpitations and leg swelling. Gastrointestinal: Negative. Negative for blood in stool and nausea. Genitourinary: Negative. Musculoskeletal: Negative. Skin: Negative. Neurological: Negative. Negative for dizziness, syncope, weakness and light-headedness. Hematological: Negative. Psychiatric/Behavioral: Negative. Physical Examination:    /64 (Site: Left Upper Arm, Position: Sitting, Cuff Size: Medium Adult)   Pulse 50   Resp 18   Ht 5' 9\" (1.753 m)   Wt 189 lb (85.7 kg)   SpO2 97%   BMI 27.91 kg/m²    Physical Exam   Constitutional: He appears healthy. No distress. HENT:   Normal cephalic and Atraumatic   Eyes: Pupils are equal, round, and reactive to light. Neck: Thyroid normal. No JVD present. No neck adenopathy. No thyromegaly present. Cardiovascular: Normal rate, regular rhythm, intact distal pulses and normal pulses. Murmur heard. Pulmonary/Chest: Effort normal and breath sounds normal. He has no wheezes. He has no rales. He exhibits no tenderness. Abdominal: Soft. Bowel sounds are normal. There is no abdominal tenderness. Musculoskeletal:         General: No tenderness or edema. Normal range of motion. Cervical back: Normal range of motion and neck supple. Neurological: He is alert and oriented to person, place, and time. Skin: Skin is warm. No cyanosis. Nails show no clubbing.        LABS:  CBC:   Lab Results   Component Value Date    WBC 6.4 11/09/2020    RBC 4.82 11/09/2020    RBC 4.69 10/25/2011    HGB 14.8 11/09/2020    HCT 44.2 11/09/2020    MCV 91.8 11/09/2020    MCH 30.8 11/09/2020    MCHC 33.5 11/09/2020    RDW 14.3 11/09/2020     11/09/2020    MPV 9.2 08/27/2015     Lipids:  Lab Results   Component Value Date    CHOL 130 06/01/2020    CHOL 146 10/08/2019    CHOL 133 12/24/2018     Lab Results   Component Value Date    TRIG 86 06/01/2020    TRIG 93 10/08/2019    TRIG 119 12/24/2018 Lab Results   Component Value Date    HDL 48 06/01/2020    HDL 53 10/08/2019    HDL 49 12/24/2018     Lab Results   Component Value Date    LDLCALC 65 06/01/2020    LDLCALC 74 10/08/2019    LDLCALC 60 12/24/2018     No results found for: LABVLDL, VLDL  Lab Results   Component Value Date    CHOLHDLRATIO 3.8 09/10/2012    CHOLHDLRATIO 3.3 10/25/2011     CMP:    Lab Results   Component Value Date     11/09/2020    K 4.2 11/09/2020    K 4.0 01/06/2020     11/09/2020    CO2 23 11/09/2020    BUN 21 11/09/2020    CREATININE 0.82 11/09/2020    GFRAA >60.0 11/09/2020    LABGLOM >60.0 11/09/2020    GLUCOSE 107 11/09/2020    GLUCOSE 96 10/25/2011    PROT 6.0 10/20/2020    LABALBU 3.7 10/20/2020    LABALBU 4.6 10/25/2011    CALCIUM 9.2 11/09/2020    BILITOT 0.4 10/20/2020    ALKPHOS 74 10/20/2020    AST 13 10/20/2020    ALT 12 10/20/2020     BMP:    Lab Results   Component Value Date     11/09/2020    K 4.2 11/09/2020    K 4.0 01/06/2020     11/09/2020    CO2 23 11/09/2020    BUN 21 11/09/2020    LABALBU 3.7 10/20/2020    LABALBU 4.6 10/25/2011    CREATININE 0.82 11/09/2020    CALCIUM 9.2 11/09/2020    GFRAA >60.0 11/09/2020    LABGLOM >60.0 11/09/2020    GLUCOSE 107 11/09/2020    GLUCOSE 96 10/25/2011     Magnesium:    Lab Results   Component Value Date    MG 2.3 07/13/2020     TSH:  Lab Results   Component Value Date    TSH 2.280 10/08/2019       Patient Active Problem List   Diagnosis    Hyperlipidemia    Carotid stenosis, bilateral    [facility]    Hypertensive urgency    NSTEM[facility]non-ST elevated [facility] infarction) [facility]    Essential hypertension    HESS (dyspnea on exertion)    Leg edema    Acute diastolic heart failure (HCC)    Chest pain    [facility]    S/P cardiac cath    Anginal equivalent (HCC)    Dizziness    Coronary artery disease involving native coronary artery of native heart without angina pectoris    CHF (congestive heart failure) [facility]    Congestive heart failure (HCC)    Non-STEMI (non-ST elevated myocardial infarction) ([facility] [facility]    Unstable angina ([facility]    ACS (acute coronary syndrome) (HCC)    Bilateral carotid bruits    Syncope and collapse    Post PTCA    Right inguinal hernia       There are no discontinued medications. Modified Medications    No medications on file       No orders of the defined types were placed in this encounter. Assessment/Plan:    1. Hyperlipidemia, unspecified hyperlipidemia type  Statin - continue. Need fasting Labs now. 2. NSTEMI (non-ST elevated myocardial infarction) ([facility] [facility]  no ANGINA - conitnue all CV protective meds. SL NTG for angina discussed. 3. Essential hypertension   stable now. continue same mdes. Low salt diet. 4. Coronary artery disease involving native coronary artery of native heart without angina pectoris  No angina. Continue DAPT    5. R Inguinal hernia- asymptomatic . Avoid heavylifting or constipation- stable      6. Carotid Bruits- most recent CUS images reviewed- stable. Counseling:  Heart Healthy Lifestyle, Decrease Alcohol Consumption, Take Precautions to Prevent Falls, Regular Exercise and Walk Daily    Return in about 4 months (around 12/18/2021).       Electronically signed by Lois Sanders MD on 8/18/2021 at 12:43 PM

## 2021-08-19 LAB
CHOLESTEROL, TOTAL: 133 MG/DL
CHOLESTEROL/HDL RATIO: 2.51
HDLC SERPL-MCNC: 53 MG/DL (ref 35–70)
LDL CHOLESTEROL CALCULATED: 65 MG/DL (ref 0–160)
NONHDLC SERPL-MCNC: NORMAL MG/DL
TRIGL SERPL-MCNC: 73 MG/DL
VLDLC SERPL CALC-MCNC: 15 MG/DL

## 2021-10-05 ENCOUNTER — TELEPHONE (OUTPATIENT)
Dept: FAMILY MEDICINE CLINIC | Age: 86
End: 2021-10-05

## 2021-10-05 NOTE — TELEPHONE ENCOUNTER
Called pt to reschedule his 11/17 appt with Dr Reese Freeman. He said he will need a ride so he will call us back to reschedule.

## 2021-11-01 NOTE — TELEPHONE ENCOUNTER
Last refill  Next appt none   Lov 7/22/21    Pt is in the process of moving will call to schedule an appt.

## 2021-11-02 RX ORDER — NITROGLYCERIN 0.4 MG/1
TABLET SUBLINGUAL
Qty: 25 TABLET | Refills: 3 | Status: SHIPPED | OUTPATIENT
Start: 2021-11-02 | End: 2022-01-19 | Stop reason: SDUPTHER

## 2021-11-05 DIAGNOSIS — I20.9 ANGINA, CLASS III (HCC): ICD-10-CM

## 2021-11-05 RX ORDER — CLOPIDOGREL BISULFATE 75 MG/1
75 TABLET ORAL DAILY
Qty: 30 TABLET | Refills: 3 | Status: SHIPPED | OUTPATIENT
Start: 2021-11-05 | End: 2022-01-19 | Stop reason: SDUPTHER

## 2021-11-05 RX ORDER — CARVEDILOL 6.25 MG/1
TABLET ORAL
Qty: 60 TABLET | Refills: 3 | Status: SHIPPED | OUTPATIENT
Start: 2021-11-05 | End: 2022-01-19 | Stop reason: SDUPTHER

## 2021-11-05 RX ORDER — PRAVASTATIN SODIUM 80 MG/1
80 TABLET ORAL EVERY EVENING
Qty: 30 TABLET | Refills: 3 | Status: SHIPPED | OUTPATIENT
Start: 2021-11-05 | End: 2022-01-03

## 2021-11-05 RX ORDER — ISOSORBIDE MONONITRATE 30 MG/1
30 TABLET, EXTENDED RELEASE ORAL DAILY
Qty: 30 TABLET | Refills: 3 | Status: SHIPPED | OUTPATIENT
Start: 2021-11-05 | End: 2022-01-03

## 2021-11-05 RX ORDER — LISINOPRIL 10 MG/1
10 TABLET ORAL DAILY
Qty: 30 TABLET | Refills: 3 | Status: SHIPPED | OUTPATIENT
Start: 2021-11-05 | End: 2022-01-19 | Stop reason: SDUPTHER

## 2021-11-05 NOTE — TELEPHONE ENCOUNTER
Please approve or deny this refill request. The order is pended. Thank you. LOV 8/18/2021    Next Visit Date:  No future appointments.

## 2022-01-03 DIAGNOSIS — I20.9 ANGINA, CLASS III (HCC): ICD-10-CM

## 2022-01-03 RX ORDER — PRAVASTATIN SODIUM 80 MG/1
80 TABLET ORAL EVERY EVENING
Qty: 30 TABLET | Refills: 3 | Status: SHIPPED | OUTPATIENT
Start: 2022-01-03 | End: 2022-01-19 | Stop reason: SDUPTHER

## 2022-01-03 NOTE — TELEPHONE ENCOUNTER
Future Appointments    Encounter Information    Provider Department Appt Notes   1/19/2022 Cresencio Rowe MD [facility] Card[facility] 4 month       Recent Visits    08/18/2021 Coronary artery disease involving native coronary artery of native heart without angina pectoris   [facility] Cardiology Cresencio Rowe MD   05/11/2021 Routine general medical examination at a health care facility   Rupert Jeffery MD

## 2022-01-03 NOTE — TELEPHONE ENCOUNTER
Please approve or deny this refill request. The order is pended. Thank you.     LOV 8/18/2021    Next Visit Date:  Future Appointments   Date Time Provider Sam Cunha   1/12/2022  3:00 PM Ori Camp MD [address] [place] [address]

## 2022-01-05 RX ORDER — ISOSORBIDE MONONITRATE 30 MG/1
30 TABLET, EXTENDED RELEASE ORAL DAILY
Qty: 90 TABLET | Refills: 3 | Status: SHIPPED | OUTPATIENT
Start: 2022-01-05 | End: 2022-01-19 | Stop reason: SDUPTHER

## 2022-01-19 ENCOUNTER — OFFICE VISIT (OUTPATIENT)
Dept: CARDIOLOGY CLINIC | Age: 87
End: 2022-01-19
Payer: MEDICARE

## 2022-01-19 VITALS
SYSTOLIC BLOOD PRESSURE: 110 MMHG | BODY MASS INDEX: 27.91 KG/M2 | DIASTOLIC BLOOD PRESSURE: 70 MMHG | HEART RATE: 68 BPM | WEIGHT: 189 LBS

## 2022-01-19 DIAGNOSIS — I20.9 ANGINA, CLASS III (HCC): ICD-10-CM

## 2022-01-19 DIAGNOSIS — E78.5 HYPERLIPIDEMIA, UNSPECIFIED HYPERLIPIDEMIA TYPE: ICD-10-CM

## 2022-01-19 DIAGNOSIS — I10 ESSENTIAL HYPERTENSION: ICD-10-CM

## 2022-01-19 DIAGNOSIS — I25.10 CORONARY ARTERY DISEASE INVOLVING NATIVE CORONARY ARTERY OF NATIVE HEART WITHOUT ANGINA PECTORIS: Primary | ICD-10-CM

## 2022-01-19 DIAGNOSIS — I65.23 CAROTID STENOSIS, BILATERAL: ICD-10-CM

## 2022-01-19 DIAGNOSIS — I21.4 NSTEMI (NON-ST ELEVATED MYOCARDIAL INFARCTION) (HCC): ICD-10-CM

## 2022-01-19 DIAGNOSIS — I50.9 CONGESTIVE HEART FAILURE, UNSPECIFIED HF CHRONICITY, UNSPECIFIED HEART FAILURE TYPE (HCC): ICD-10-CM

## 2022-01-19 PROCEDURE — 4040F PNEUMOC VAC/ADMIN/RCVD: CPT | Performed by: INTERNAL MEDICINE

## 2022-01-19 PROCEDURE — 1123F ACP DISCUSS/DSCN MKR DOCD: CPT | Performed by: INTERNAL MEDICINE

## 2022-01-19 PROCEDURE — G8484 FLU IMMUNIZE NO ADMIN: HCPCS | Performed by: INTERNAL MEDICINE

## 2022-01-19 PROCEDURE — 99214 OFFICE O/P EST MOD 30 MIN: CPT | Performed by: INTERNAL MEDICINE

## 2022-01-19 PROCEDURE — 1036F TOBACCO NON-USER: CPT | Performed by: INTERNAL MEDICINE

## 2022-01-19 PROCEDURE — G8417 CALC BMI ABV UP PARAM F/U: HCPCS | Performed by: INTERNAL MEDICINE

## 2022-01-19 PROCEDURE — 93000 ELECTROCARDIOGRAM COMPLETE: CPT | Performed by: INTERNAL MEDICINE

## 2022-01-19 PROCEDURE — G8427 DOCREV CUR MEDS BY ELIG CLIN: HCPCS | Performed by: INTERNAL MEDICINE

## 2022-01-19 RX ORDER — NITROGLYCERIN 0.4 MG/1
TABLET SUBLINGUAL
Qty: 25 TABLET | Refills: 3 | Status: SHIPPED | OUTPATIENT
Start: 2022-01-19 | End: 2022-05-04

## 2022-01-19 RX ORDER — ISOSORBIDE MONONITRATE 30 MG/1
30 TABLET, EXTENDED RELEASE ORAL DAILY
Qty: 90 TABLET | Refills: 3 | Status: SHIPPED | OUTPATIENT
Start: 2022-01-19

## 2022-01-19 RX ORDER — PRAVASTATIN SODIUM 80 MG/1
80 TABLET ORAL EVERY EVENING
Qty: 90 TABLET | Refills: 3 | Status: SHIPPED | OUTPATIENT
Start: 2022-01-19 | End: 2022-05-04

## 2022-01-19 RX ORDER — CLOPIDOGREL BISULFATE 75 MG/1
75 TABLET ORAL DAILY
Qty: 90 TABLET | Refills: 3 | Status: SHIPPED | OUTPATIENT
Start: 2022-01-19

## 2022-01-19 RX ORDER — LISINOPRIL 10 MG/1
10 TABLET ORAL DAILY
Qty: 90 TABLET | Refills: 3 | Status: SHIPPED | OUTPATIENT
Start: 2022-01-19 | End: 2022-02-07

## 2022-01-19 RX ORDER — CARVEDILOL 6.25 MG/1
TABLET ORAL
Qty: 180 TABLET | Refills: 3 | Status: SHIPPED | OUTPATIENT
Start: 2022-01-19 | End: 2022-02-07

## 2022-01-19 ASSESSMENT — ENCOUNTER SYMPTOMS
RESPIRATORY NEGATIVE: 1
EYES NEGATIVE: 1
NAUSEA: 0
WHEEZING: 0
COUGH: 0
BLOOD IN STOOL: 0
CHEST TIGHTNESS: 0
STRIDOR: 0
GASTROINTESTINAL NEGATIVE: 1
SHORTNESS OF BREATH: 0

## 2022-01-19 NOTE — PROGRESS NOTES
Subsequent Progress Note  Patient: Yvonne Lamb  YOB: 1935  MRN: [id number]    Chief Complaint: nstemi cad htn   Chief Complaint   Patient presents with    Coronary Artery Disease    Congestive Heart Failure    Hypertension       CV Data:  6/2018 spect negative  5/2018 echo EF 60%  11/2018 LAD AMBER  12/2018 CUS <. Left Vertebral 100  1/2020 echo ef 60 midl AR and MR  1/2020 SPECT - NEGATIVE  6/8/2020 Cath OM1 AMBER. EF 60%  9/2020  CUS mild  1/21 Renal Duplex- negative  1/21 spect negative    Subjective/HPI: no cp no sob no falls no bleed. R Inguinal hernia     9/25/2019:  No cp no osb no falls no bleed eats well. Takes meds. C/o Diuretic and frequent urine. 1/15/2020 recently in ER and hosp for HTN. Now stable without meds changes. Possible he did not take his meds. Recent Labs showed mild Troponin elevaton. 2/5/2020 NO CP NO SOB NO FALLS NO BLEED. TAKES MEDS. EATS WELL.     5/8/2020 TELEHEALTH EVALUATION -- Audio/Visual (During [id number] public health emergency)      137/70 home BP. Doing well no cp no osb no falls no bleed takes meds. Eats well. Son moved in w him. 6/17/2020 no cp no sob no edema no falls no bleed. Feels better since last stent. 8/5/2020 recent hosp for syncope related to Low BP. Imdur was stopped and Benazepril changed to Lisinopril. Feels much better. 10/22/2020 recent hosp for accelerated HTN. We added Imdur. He never had CP nor SOB. He feels well now. BOP better. Eating well. 8/18/21 now lives at the [facility] in [place]- assisted living. Feels well no cp no sob no falls no bleed. Walks with walker. 1/19/22 doing well lives at the [facility]. No cp no sob not dizzy no falls no bleed.      Nonsmoker        EKG:SR 76    Past Medical History:   Diagnosis Date    Anticoagulant long-term use     Anxiety     CAD (coronary artery disease)     Carotid stenosis     2/2013 16-49%    [facility]     Dementia ([facility])     D[facility]  Hyperlipidemia     Hypertension     [facility] infarction[facility] [facility]     Osteoarthritis        Past Surgical History:   Procedure Laterality Date    CARPAL TUNNEL RELEASE  1998    CATARACT REMOVAL  2007    COLONOSCOPY  12/10/10,2007    DR Jose Arzate - DONE AT [address] COLONOSCOPY  2007    hx polyps needs 2015    COLONOSCOPY  9/21/15     DR. YARBROUGH     CORONARY ANGIOPLASTY WITH STENT PLACEMENT  06/07/2020    DIAGNOSTIC CARDIAC CATH LAB PROCEDURE      HERNIA REPAIR Bilateral     x 2    HERNIA REPAIR Right 11/13/2020    RIGHT INGUINAL HERNIA REPAIR performed by Isabel Gann MD at [address] [place] [address] [facility]         Family History   Problem Relation Age of Onset    Heart Disease Mother     High Blood Pressure Mother        Social History     Socioeconomic History    Marital status:      Spouse name: Not on file    Number of children: Not on file    Years of education: Not on file    Highest education level: Not on file   Occupational History    Not on file   Tobacco Use    Smoking status: Never Smoker    Smokeless tobacco: Never Used   Vaping Use    Vaping Use: Never used   Substance and Sexual Activity    Alcohol use: No     Comment: social    Drug use: No    Sexual activity: Not Currently   Other Topics Concern    Not on file   Social History Narrative    Not on file     Social Determinants of Health     Financial Resource Strain:     Difficulty of Paying Living Expenses: Not on file   Food Insecurity:     Worried About Running Out of Food in the Last Year: Not on file    Nick of Food in the Last Year: Not on file   Transportation Needs:     Lack of Transportation (Medical): Not on file    Lack of Transportation (Non-Medical):  Not on file   Physical Activity:     Days of Exercise per Week: Not on file    Minutes of Exercise per Session: Not on file   Stress:     Feeling of Stress : Not on file   Social Connections:     Frequency of Communication with Friends and Family: Not on file    Frequency of Social Gatherings with Friends and Family: Not on file    Attends Latter day Services: Not on file    Active Member of Clubs or Organizations: Not on file    Attends Club or Organization Meetings: Not on file    Marital Status: Not on file   Intimate Partner Violence:     Fear of Current or Ex-Partner: Not on file    Emotionally Abused: Not on file    Physically Abused: Not on file    Sexually Abused: Not on file   Housing Stability:     Unable to Pay for Housing in the Last Year: Not on file    Number of Places Lived in the Last Year: Not on file    Unstable Housing in the Last Year: Not on file       No Known Allergies    Current Outpatient Medications   Medication Sig Dispense Refill    isosorbide mononitrate (IMDUR) 30 MG extended release tablet TAKE 1 TABLET BY MOUTH DAILY 90 tablet 3    pravastatin (PRAVACHOL) 80 MG tablet TAKE 1 TABLET BY MOUTH EVERY EVENING 30 tablet 3    carvedilol (COREG) 6.25 MG tablet TAKE 1 TABLET BY MOUTH TWICE DAILY 60 tablet 3    lisinopril (PRINIVIL;ZESTRIL) 10 MG tablet Take 1 tablet by mouth daily 30 tablet 3    clopidogrel (PLAVIX) 75 MG tablet Take 1 tablet by mouth daily 30 tablet 3    nitroGLYCERIN (NITROSTAT) 0.4 MG SL tablet up to max of 3 total doses. If no relief after 1 dose, call 911. 25 tablet 3    finasteride (PROSCAR) 5 MG tablet TAKE 1 TABLET BY MOUTH DAILY 90 tablet 2    HYDROcodone-acetaminophen (NORCO) 5-325 MG per tablet Take 1 tablet by mouth every 8 hours as needed for Pain.       donepezil (ARICEPT) 5 MG tablet TAKE 1 TABLET BY MOUTH NIGHTLY 90 tablet 3    latanoprost (XALATAN) 0.005 % ophthalmic solution use 1 drop in each eye every day 2.5 mL 5    vitamin B-12 (CYANOCOBALAMIN) 1000 MCG tablet Take 500 mcg by mouth daily      aspirin 81 MG tablet Take 1 tablet by mouth daily With Food 30 tablet 3    Multiple Vitamins-Minerals (MULTIVITAMIN PO) Take by mouth daily       dorzolamide-timolol (COSOPT) 22.3-6.8 MG/ML ophthalmic solution Place 1 drop into both eyes 2 times daily        No current facility-administered medications for this visit. Review of Systems:   Review of Systems   Constitutional: Negative. Negative for diaphoresis and fatigue. HENT: Negative. Eyes: Negative. Respiratory: Negative. Negative for cough, chest tightness, shortness of breath, wheezing and stridor. Cardiovascular: Negative. Negative for chest pain, palpitations and leg swelling. Gastrointestinal: Negative. Negative for blood in stool and nausea. Genitourinary: Negative. Musculoskeletal: Negative. Skin: Negative. Neurological: Negative. Negative for dizziness, syncope, weakness and light-headedness. Hematological: Negative. Psychiatric/Behavioral: Negative. Physical Examination:    /70 (Site: Left Upper Arm, Position: Sitting, Cuff Size: Small Adult)   Pulse 68   Wt 189 lb (85.7 kg)   BMI 27.91 kg/m²    Physical Exam   Constitutional: He appears healthy. No distress. HENT:   Normal cephalic and Atraumatic   Eyes: Pupils are equal, round, and reactive to light. Neck: Thyroid normal. No JVD present. No neck adenopathy. No thyromegaly present. Cardiovascular: Normal rate, regular rhythm, intact distal pulses and normal pulses. Murmur heard. Pulmonary/Chest: Effort normal and breath sounds normal. He has no wheezes. He has no rales. He exhibits no tenderness. Abdominal: Soft. Bowel sounds are normal. There is no abdominal tenderness. Musculoskeletal:         General: No tenderness or edema. Normal range of motion. Cervical back: Normal range of motion and neck supple. Neurological: He is alert and oriented to person, place, and time. Skin: Skin is warm. No cyanosis. Nails show no clubbing.        LABS:  CBC:   Lab Results   Component Value Date    WBC 6.4 11/09/2020    RBC 4.82 11/09/2020    RBC 4.69 10/25/2011    HGB 14.8 11/09/2020    HCT 44.2 11/09/2020    MCV 91.8 11/09/2020    MCH 30.8 11/09/2020    MCHC 33.5 11/09/2020    RDW 14.3 11/09/2020     11/09/2020    MPV 9.2 08/27/2015     Lipids:  Lab Results   Component Value Date    CHOL 133 08/19/2021    CHOL 130 06/01/2020    CHOL 146 10/08/2019     Lab Results   Component Value Date    TRIG 73 08/19/2021    TRIG 86 06/01/2020    TRIG 93 10/08/2019     Lab Results   Component Value Date    HDL 53 08/19/2021    HDL 48 06/01/2020    HDL 53 10/08/2019     Lab Results   Component Value Date    LDLCALC 65 08/19/2021    LDLCALC 65 06/01/2020    LDLCALC 74 10/08/2019     Lab Results   Component Value Date    VLDL 15 08/19/2021     Lab Results   Component Value Date    CHOLHDLRATIO 2.51 08/19/2021    CHOLHDLRATIO 3.8 09/10/2012    CHOLHDLRATIO 3.3 10/25/2011     CMP:    Lab Results   Component Value Date     11/09/2020    K 4.2 11/09/2020    K 4.0 01/06/2020     11/09/2020    CO2 23 11/09/2020    BUN 21 11/09/2020    CREATININE 0.82 11/09/2020    GFRAA >60.0 11/09/2020    LABGLOM >60.0 11/09/2020    GLUCOSE 107 11/09/2020    GLUCOSE 96 10/25/2011    PROT 6.0 10/20/2020    LABALBU 3.7 10/20/2020    LABALBU 4.6 10/25/2011    CALCIUM 9.2 11/09/2020    BILITOT 0.4 10/20/2020    ALKPHOS 74 10/20/2020    AST 13 10/20/2020    ALT 12 10/20/2020     BMP:    Lab Results   Component Value Date     11/09/2020    K 4.2 11/09/2020    K 4.0 01/06/2020     11/09/2020    CO2 23 11/09/2020    BUN 21 11/09/2020    LABALBU 3.7 10/20/2020    LABALBU 4.6 10/25/2011    CREATININE 0.82 11/09/2020    CALCIUM 9.2 11/09/2020    GFRAA >60.0 11/09/2020    LABGLOM >60.0 11/09/2020    GLUCOSE 107 11/09/2020    GLUCOSE 96 10/25/2011     Magnesium:    Lab Results   Component Value Date    MG 2.3 07/13/2020     TSH:  Lab Results   Component Value Date    TSH 2.280 10/08/2019       Patient Active Problem List   Diagnosis    Hyperlipidemia    Carotid stenosis, bilateral    Dementia ([facility] [address]    Hypertensive urgency    [facility]non-ST elevated [facility] [address]    Essential hypertension    HESS (dyspnea on exertion)    Leg edema    Acute diastolic heart failure ([facility])    Chest pain    Angina, class III ([facility])    S/P cardiac cath    Anginal equivalent ([facility])    Dizziness    Coronary artery disease involving native coronary artery of native heart without angina pectoris    CHF (congestive heart failure) ([facility])    Congestive heart failure ([facility])    Non-STEMI (non-ST elevated myocardial infarction) ([facility])    Unstable angina ([facility])    ACS (acute coronary syndrome) ([facility])    Bilateral carotid bruits    Syncope and collapse    Post PTCA    Right inguinal hernia       There are no discontinued medications. Modified Medications    No medications on file       No orders of the defined types were placed in this encounter. Assessment/Plan:    1. Hyperlipidemia, unspecified hyperlipidemia type  Statin - continue. Labs reviewed- excellent. 2. NSTEMI (non-ST elevated myocardial infarction) ([facility] [address]  no ANGINA - conitnue all CV protective meds. SL NTG for angina discussed. 3. Essential hypertension   stable now. continue same mdes. Low salt diet. 4. Coronary artery disease involving native coronary artery of native heart without angina pectoris  No angina. Continue DAPT    5. R Inguinal hernia- asymptomatic . Avoid heavylifting or constipation- stable      6. Carotid Bruits- most recent CUS images reviewed- stable. Counseling:  Heart Healthy Lifestyle, Decrease Alcohol Consumption, Take Precautions to Prevent Falls, Regular Exercise and Walk Daily    Return in about 4 months (around 5/19/2022).       Electronically signed by Margot Stark MD on 1/19/2022 at 12:12 PM

## 2022-01-31 ENCOUNTER — TELEPHONE (OUTPATIENT)
Dept: FAMILY MEDICINE CLINIC | Age: 87
End: 2022-01-31

## 2022-01-31 NOTE — TELEPHONE ENCOUNTER
PATRICIA    I called him to schedule a check up with Dr David Guan and he denied an appt. Pt is no longer a pt of ours. He moved and found a Dr with [facility] near him.

## 2022-02-04 NOTE — TELEPHONE ENCOUNTER
Future Appointments    Encounter Information    Provider Department Appt Notes   5/25/2022 Litzy Bruner MD [facility] 4 month       Recent Visits    01/19/2022 Coronary artery disease involving native coronary artery of native heart without angina pectoris   [facility] Litzy Bruner MD   08/18/2021 Coronary artery disease involving native coronary artery of native heart without angina pectoris   [facility] Litzy Bruner MD   05/11/2021 Routine general medical examination at a [facility] facility   Rupert Jeffery MD

## 2022-02-07 RX ORDER — CARVEDILOL 6.25 MG/1
TABLET ORAL
Qty: 60 TABLET | Refills: 2 | Status: SHIPPED | OUTPATIENT
Start: 2022-02-07

## 2022-02-07 RX ORDER — LISINOPRIL 10 MG/1
10 TABLET ORAL DAILY
Qty: 30 TABLET | Refills: 2 | Status: SHIPPED | OUTPATIENT
Start: 2022-02-07

## 2022-03-07 RX ORDER — DONEPEZIL HYDROCHLORIDE 5 MG/1
5 TABLET, FILM COATED ORAL NIGHTLY
Qty: 210 TABLET | Refills: 0 | Status: SHIPPED | OUTPATIENT
Start: 2022-03-07 | End: 2022-03-07 | Stop reason: SDUPTHER

## 2022-03-07 RX ORDER — DONEPEZIL HYDROCHLORIDE 5 MG/1
5 TABLET, FILM COATED ORAL NIGHTLY
Qty: 210 TABLET | Refills: 0 | Status: SHIPPED | OUTPATIENT
Start: 2022-03-07

## 2022-03-24 NOTE — TELEPHONE ENCOUNTER
Pt is still on [facility] list.   In last encounter it stated that pt was a nursing home pt.   (3/7/22)

## 2022-05-04 NOTE — TELEPHONE ENCOUNTER
Please call pt to schedule an appointment. Requesting medication refill.  Please approve or deny this request.    Rx requested:  Requested Prescriptions     Pending Prescriptions Disp Refills    nitroGLYCERIN (NITROSTAT) 0.4 MG SL tablet [Pharmacy Med Name: nitroglycerin 0.4 mg sublingual tablet] 25 tablet 3     Sig: DISSOLVE 1 TABLET UNDER THE TONGUE AS NEEDED FOR CHEST PAIN-  MAY REPEAT EVERY 5 MINUTES IF NEEDED ( MAX 3 DOSES.- IF NO RELIEF CALL 911)    pravastatin (PRAVACHOL) 80 MG tablet [Pharmacy Med Name: pravastatin 80 mg tablet] 30 tablet 3     Sig: TAKE 1 TABLET BY MOUTH EVERY EVENING       Last Office Visit:   5/11/2021    Next Visit Date:  Future Appointments   Date Time Provider Sam Cunha   5/25/2022 12:00 PM Thom Choudhury MD [facility]

## 2022-05-18 RX ORDER — PRAVASTATIN SODIUM 80 MG/1
80 TABLET ORAL EVERY EVENING
Qty: 30 TABLET | Refills: 0 | Status: SHIPPED | OUTPATIENT
Start: 2022-05-18

## 2022-05-18 RX ORDER — NITROGLYCERIN 0.4 MG/1
TABLET SUBLINGUAL
Qty: 25 TABLET | Refills: 0 | Status: SHIPPED | OUTPATIENT
Start: 2022-05-18

## 2022-05-25 ENCOUNTER — OFFICE VISIT (OUTPATIENT)
Dept: CARDIOLOGY CLINIC | Age: 87
End: 2022-05-25
Payer: MEDICARE

## 2022-05-25 VITALS
HEART RATE: 67 BPM | WEIGHT: 188 LBS | DIASTOLIC BLOOD PRESSURE: 78 MMHG | SYSTOLIC BLOOD PRESSURE: 124 MMHG | BODY MASS INDEX: 27.85 KG/M2 | HEIGHT: 69 IN | RESPIRATION RATE: 18 BRPM | OXYGEN SATURATION: 98 %

## 2022-05-25 DIAGNOSIS — I25.10 CORONARY ARTERY DISEASE INVOLVING NATIVE CORONARY ARTERY OF NATIVE HEART WITHOUT ANGINA PECTORIS: Primary | ICD-10-CM

## 2022-05-25 DIAGNOSIS — I50.31 ACUTE DIASTOLIC HEART FAILURE (HCC): ICD-10-CM

## 2022-05-25 DIAGNOSIS — I21.4 NSTEMI (NON-ST ELEVATED MYOCARDIAL INFARCTION) (HCC): ICD-10-CM

## 2022-05-25 DIAGNOSIS — I25.10 CORONARY ARTERY DISEASE INVOLVING NATIVE HEART WITHOUT ANGINA PECTORIS, UNSPECIFIED VESSEL OR LESION TYPE: ICD-10-CM

## 2022-05-25 DIAGNOSIS — I65.23 CAROTID STENOSIS, BILATERAL: ICD-10-CM

## 2022-05-25 DIAGNOSIS — R09.89 BILATERAL CAROTID BRUITS: ICD-10-CM

## 2022-05-25 DIAGNOSIS — E78.5 HYPERLIPIDEMIA, UNSPECIFIED HYPERLIPIDEMIA TYPE: ICD-10-CM

## 2022-05-25 DIAGNOSIS — I10 ESSENTIAL HYPERTENSION: ICD-10-CM

## 2022-05-25 DIAGNOSIS — Z98.890 S/P CARDIAC CATH: ICD-10-CM

## 2022-05-25 DIAGNOSIS — I50.9 CONGESTIVE HEART FAILURE, UNSPECIFIED HF CHRONICITY, UNSPECIFIED HEART FAILURE TYPE (HCC): ICD-10-CM

## 2022-05-25 PROCEDURE — 1123F ACP DISCUSS/DSCN MKR DOCD: CPT | Performed by: INTERNAL MEDICINE

## 2022-05-25 PROCEDURE — 1036F TOBACCO NON-USER: CPT | Performed by: INTERNAL MEDICINE

## 2022-05-25 PROCEDURE — G8417 CALC BMI ABV UP PARAM F/U: HCPCS | Performed by: INTERNAL MEDICINE

## 2022-05-25 PROCEDURE — 99214 OFFICE O/P EST MOD 30 MIN: CPT | Performed by: INTERNAL MEDICINE

## 2022-05-25 PROCEDURE — G8427 DOCREV CUR MEDS BY ELIG CLIN: HCPCS | Performed by: INTERNAL MEDICINE

## 2022-05-25 ASSESSMENT — ENCOUNTER SYMPTOMS
COUGH: 0
BLOOD IN STOOL: 0
GASTROINTESTINAL NEGATIVE: 1
NAUSEA: 0
RESPIRATORY NEGATIVE: 1
WHEEZING: 0
CHEST TIGHTNESS: 0
SHORTNESS OF BREATH: 0
EYES NEGATIVE: 1
STRIDOR: 0

## 2022-05-25 NOTE — PROGRESS NOTES
Subsequent Progress Note  Patient: Nils Carlin  YOB: 1935  MRN: [id number]    Chief Complaint: nstemi cad htn   Chief Complaint   Patient presents with    Follow-up     4 month    Coronary Artery Disease    Congestive Heart Failure    Hypertension       CV Data:  6/2018 spect negative  5/2018 echo EF 60%  11/2018 LAD AMBER  12/2018 CUS <. Left Vertebral 100  1/2020 echo ef 60 midl AR and MR  1/2020 SPECT - NEGATIVE  6/8/2020 Cath OM1 AMBER. EF 60%  9/2020  CUS mild  1/21 Renal Duplex- negative  1/21 spect negative    Subjective/HPI: no cp no sob no falls no bleed. R Inguinal hernia     9/25/2019:  No cp no osb no falls no bleed eats well. Takes meds. C/o Diuretic and frequent urine. 1/15/2020 recently in ER and hosp for HTN. Now stable without meds changes. Possible he did not take his meds. Recent Labs showed mild Troponin elevaton. 2/5/2020 NO CP NO SOB NO FALLS NO BLEED. TAKES MEDS. EATS WELL.     5/8/2020 TELEHEALTH EVALUATION -- Audio/Visual (During [facility]-02 public health emergency)      137/70 home BP. Doing well no cp no osb no falls no bleed takes meds. Eats well. Son moved in w him. 6/17/2020 no cp no sob no edema no falls no bleed. Feels better since last stent. 8/5/2020 recent hosp for syncope related to Low BP. Imdur was stopped and Benazepril changed to Lisinopril. Feels much better. 10/22/2020 recent hosp for accelerated HTN. We added Imdur. He never had CP nor SOB. He feels well now. BOP better. Eating well. 8/18/21 now lives at the [facility] in [place]- assisted living. Feels well no cp no sob no falls no bleed. Walks with walker. 1/19/22 doing well lives at the [facility]. No cp no sob not dizzy no falls no bleed. 5/25/22 lives at [facility]. No cp  No sob no falsl nob leed. Walks with walker. Feels well eats well.      Nonsmoker        EKG:SR 76    Past Medical History:   Diagnosis Date    Anticoagulant long-term use     Anxiety     CAD (coronary artery disease)     Carotid stenosis     2/2013 16-49%    [facility]     Dementia (HCC)     Dementia (HCC)     Hyperlipidemia     Hypertension     [facility]     Osteoarthritis        Past Surgical History:   Procedure Laterality Date    CARPAL TUNNEL RELEASE  1998    CATARACT REMOVAL  2007    COLONOSCOPY  12/10/10,2007    DR Bradley Chavez - DONE AT [address] COLONOSCOPY  2007    hx polyps needs 2015    COLONOSCOPY  9/21/15     DR. YARBROUGH     CORONARY ANGIOPLASTY WITH STENT PLACEMENT  06/07/2020    DIAGNOSTIC CARDIAC CATH LAB PROCEDURE      HERNIA REPAIR Bilateral     x 2    HERNIA REPAIR Right 11/13/2020    RIGHT INGUINAL HERNIA REPAIR performed by Arturo Mcclain MD at [address] [place] [address] PTCA         Family History   Problem Relation Age of Onset    Heart Disease Mother     High Blood Pressure Mother        Social History     Socioeconomic History    Marital status:      Spouse name: None    Number of children: None    Years of education: None    Highest education level: None   Occupational History    None   Tobacco Use    Smoking status: Never Smoker    Smokeless tobacco: Never Used   Vaping Use    Vaping Use: Never used   Substance and Sexual Activity    Alcohol use: No     Comment: social    Drug use: No    Sexual activity: Not Currently   Other Topics Concern    None   Social History Narrative    None     Social Determinants of Health     Financial Resource Strain:     Difficulty of Paying Living Expenses: Not on file   Food Insecurity:     Worried About Running Out of Food in the Last Year: Not on file    Nick of Food in the Last Year: Not on file   Transportation Needs:     Lack of Transportation (Medical): Not on file    Lack of Transportation (Non-Medical):  Not on file   Physical Activity:     Days of Exercise per Week: Not on file    Minutes of Exercise per Session: Not on file   Stress:     Feeling of Stress : Not on file   Social Connections:     Frequency of Communication with Friends and Family: Not on file    Frequency of Social Gatherings with Friends and Family: Not on file    Attends Yarsani Services: Not on file    Active Member of Clubs or Organizations: Not on file    Attends Club or Organization Meetings: Not on file    Marital Status: Not on file   Intimate Partner Violence:     Fear of Current or Ex-Partner: Not on file    Emotionally Abused: Not on file    Physically Abused: Not on file    Sexually Abused: Not on file   Housing Stability:     Unable to Pay for Housing in the Last Year: Not on file    Number of Places Lived in the Last Year: Not on file    Unstable Housing in the Last Year: Not on file       No Known Allergies    Current Outpatient Medications   Medication Sig Dispense Refill    nitroGLYCERIN (NITROSTAT) 0.4 MG SL tablet DISSOLVE 1 TABLET UNDER THE TONGUE AS NEEDED FOR CHEST PAIN-  MAY REPEAT EVERY 5 MINUTES IF NEEDED ( MAX 3 DOSES.- IF NO RELIEF CALL 911) 25 tablet 0    pravastatin (PRAVACHOL) 80 MG tablet TAKE 1 TABLET BY MOUTH EVERY EVENING 30 tablet 0    donepezil (ARICEPT) 5 MG tablet Take 1 tablet by mouth nightly 210 tablet 0    lisinopril (PRINIVIL;ZESTRIL) 10 MG tablet TAKE 1 TABLET BY MOUTH DAILY 30 tablet 2    carvedilol (COREG) 6.25 MG tablet TAKE 1 TABLET BY MOUTH TWICE DAILY 60 tablet 2    isosorbide mononitrate (IMDUR) 30 MG extended release tablet Take 1 tablet by mouth daily 90 tablet 3    clopidogrel (PLAVIX) 75 MG tablet Take 1 tablet by mouth daily 90 tablet 3    finasteride (PROSCAR) 5 MG tablet TAKE 1 TABLET BY MOUTH DAILY 90 tablet 2    HYDROcodone-acetaminophen (NORCO) 5-325 MG per tablet Take 1 tablet by mouth every 8 hours as needed for Pain.       latanoprost (XALATAN) 0.005 % ophthalmic solution use 1 drop in each eye every day 2.5 mL 5    vitamin B-12 (CYANOCOBALAMIN) 1000 MCG tablet Take 500 mcg by mouth daily      Multiple Vitamins-Minerals (MULTIVITAMIN PO) Take by mouth daily       dorzolamide-timolol (COSOPT) 22.3-6.8 MG/ML ophthalmic solution Place 1 drop into both eyes 2 times daily        No current facility-administered medications for this visit. Review of Systems:   Review of Systems   Constitutional: Negative. Negative for diaphoresis and fatigue. HENT: Negative. Eyes: Negative. Respiratory: Negative. Negative for cough, chest tightness, shortness of breath, wheezing and stridor. Cardiovascular: Negative. Negative for chest pain, palpitations and leg swelling. Gastrointestinal: Negative. Negative for blood in stool and nausea. Genitourinary: Negative. Musculoskeletal: Negative. Skin: Negative. Neurological: Negative. Negative for dizziness, syncope, weakness and light-headedness. Hematological: Negative. Psychiatric/Behavioral: Negative. Physical Examination:    /78 (Site: Right Upper Arm, Position: Sitting, Cuff Size: Medium Adult)   Pulse 67   Resp 18   Ht 5' 9\" (1.753 m)   Wt 188 lb (85.3 kg)   SpO2 98%   BMI 27.76 kg/m²    Physical Exam   Constitutional: He appears healthy. No distress. HENT:   Normal cephalic and Atraumatic   Eyes: Pupils are equal, round, and reactive to light. Neck: Thyroid normal. No JVD present. No neck adenopathy. No thyromegaly present. Cardiovascular: Normal rate, regular rhythm, intact distal pulses and normal pulses. Murmur heard. Pulmonary/Chest: Effort normal and breath sounds normal. He has no wheezes. He has no rales. He exhibits no tenderness. Abdominal: Soft. Bowel sounds are normal. There is no abdominal tenderness. Musculoskeletal:         General: No tenderness or edema. Normal range of motion. Cervical back: Normal range of motion and neck supple. Neurological: He is alert and oriented to person, place, and time. Skin: Skin is warm. No cyanosis. Nails show no clubbing.        LABS:  CBC:   Lab Results   Component Value Date    WBC 6.4 11/09/2020    RBC 4.82 11/09/2020    RBC 4.69 10/25/2011    HGB 14.8 11/09/2020    HCT 44.2 11/09/2020    MCV 91.8 11/09/2020    MCH 30.8 11/09/2020    MCHC 33.5 11/09/2020    RDW 14.3 11/09/2020     11/09/2020    MPV 9.2 08/27/2015     Lipids:  Lab Results   Component Value Date    CHOL 133 08/19/2021    CHOL 130 06/01/2020    CHOL 146 10/08/2019     Lab Results   Component Value Date    TRIG 73 08/19/2021    TRIG 86 06/01/2020    TRIG 93 10/08/2019     Lab Results   Component Value Date    HDL 53 08/19/2021    HDL 48 06/01/2020    HDL 53 10/08/2019     Lab Results   Component Value Date    LDLCALC 65 08/19/2021    LDLCALC 65 06/01/2020    LDLCALC 74 10/08/2019     Lab Results   Component Value Date    VLDL 15 08/19/2021     Lab Results   Component Value Date    CHOLHDLRATIO 2.51 08/19/2021    CHOLHDLRATIO 3.8 09/10/2012    CHOLHDLRATIO 3.3 10/25/2011     CMP:    Lab Results   Component Value Date     11/09/2020    K 4.2 11/09/2020    K 4.0 01/06/2020     11/09/2020    CO2 23 11/09/2020    BUN 21 11/09/2020    CREATININE 0.82 11/09/2020    GFRAA >60.0 11/09/2020    LABGLOM >60.0 11/09/2020    GLUCOSE 107 11/09/2020    GLUCOSE 96 10/25/2011    PROT 6.0 10/20/2020    LABALBU 3.7 10/20/2020    LABALBU 4.6 10/25/2011    CALCIUM 9.2 11/09/2020    BILITOT 0.4 10/20/2020    ALKPHOS 74 10/20/2020    AST 13 10/20/2020    ALT 12 10/20/2020     BMP:    Lab Results   Component Value Date     11/09/2020    K 4.2 11/09/2020    K 4.0 01/06/2020     11/09/2020    CO2 23 11/09/2020    BUN 21 11/09/2020    LABALBU 3.7 10/20/2020    LABALBU 4.6 10/25/2011    CREATININE 0.82 11/09/2020    CALCIUM 9.2 11/09/2020    GFRAA >60.0 11/09/2020    LABGLOM >60.0 11/09/2020    GLUCOSE 107 11/09/2020    GLUCOSE 96 10/25/2011     Magnesium:    Lab Results   Component Value Date    MG 2.3 07/13/2020     TSH:  Lab Results   Component Value Date    TSH 2.280 10/08/2019       Patient Active Problem List Diagnosis    Hyperlipidemia    Carotid stenosis, bilateral    Dementia ([facility]    Hypertensive urgency    [facility]STEMI [facility]non-ST elevated [facility] infarction) [facility]    Essential hypertension    HESS (dyspnea on exertion)    Leg edema    Acute diastolic heart failure ([facility]    Chest pain    Angina, class III ([facility])    S/P cardiac cath    Anginal equivalent ([facility])    Dizziness    Coronary artery disease involving native coronary artery of native heart without angina pectoris    CHF (congestive heart failure) ([facility]    Congestive heart failure ([facility]    Non-STEMI (non-ST elevated myocardial infarction) ([facility]    Unstable angina ([facility])    ACS (acute coronary syndrome) ([facility]    Bilateral carotid bruits    Syncope and collapse    Post PTCA    Right inguinal hernia       Medications Discontinued During This Encounter   Medication Reason    aspirin 81 MG tablet        Modified Medications    No medications on file       No orders of the defined types were placed in this encounter. Assessment/Plan:    1. Hyperlipidemia, unspecified hyperlipidemia type  Statin - continue. Labs reviewed- excellent. 2. NSTEMI (non-ST elevated myocardial infarction) ([facility]  no ANGINA - conitnue all CV protective meds. SL NTG for angina discussed. 3. Essential hypertension   stable now. continue same mdes. Low salt diet. 4. Coronary artery disease involving native coronary artery of native heart without angina pectoris  No angina. Dc ASA continue Plavix. 5. R Inguinal hernia- asymptomatic . Avoid heavylifting or constipation- stable      6. Carotid Bruits- most recent CUS images reviewed- stable. Counseling:  Heart Healthy Lifestyle, Decrease Alcohol Consumption, Take Precautions to Prevent Falls, Regular Exercise and Walk Daily    Return in about 4 months (around 9/25/2022).       Electronically signed by Kane Hwang MD on 5/25/2022 at 12:40 PM

## 2022-06-15 DIAGNOSIS — N40.0 BENIGN PROSTATIC HYPERPLASIA, UNSPECIFIED WHETHER LOWER URINARY TRACT SYMPTOMS PRESENT: ICD-10-CM

## 2022-06-15 DIAGNOSIS — I21.4 NSTEMI (NON-ST ELEVATED MYOCARDIAL INFARCTION) (HCC): ICD-10-CM

## 2022-06-15 RX ORDER — FINASTERIDE 5 MG/1
5 TABLET, FILM COATED ORAL DAILY
Qty: 90 TABLET | Refills: 2 | OUTPATIENT
Start: 2022-06-15

## 2022-06-15 NOTE — TELEPHONE ENCOUNTER
05/11/2021 Randa Aaron MD Family Medicine Office Visit Routine general medical examination at a health care facility     January 31, 2022    Phebe Goldmann Aslanidis AA    2:16 PM  Note     FYI     I called him to schedule a check up with Dr Karen Carter and he denied an appt.      Pt is no longer a pt of ours. He moved and found a Dr with [facility] near him.

## 2022-07-01 DIAGNOSIS — N40.0 BENIGN PROSTATIC HYPERPLASIA, UNSPECIFIED WHETHER LOWER URINARY TRACT SYMPTOMS PRESENT: ICD-10-CM

## 2022-07-01 DIAGNOSIS — I21.4 NSTEMI (NON-ST ELEVATED MYOCARDIAL INFARCTION) (HCC): ICD-10-CM

## 2022-07-08 RX ORDER — FINASTERIDE 5 MG/1
5 TABLET, FILM COATED ORAL DAILY
Qty: 30 TABLET | Refills: 0 | Status: SHIPPED | OUTPATIENT
Start: 2022-07-08

## 2022-10-24 ENCOUNTER — TELEPHONE (OUTPATIENT)
Dept: CARDIOLOGY CLINIC | Age: 87
End: 2022-10-24

## 2022-10-24 NOTE — TELEPHONE ENCOUNTER
Pt daughter, Zaira Francisco calling for pt's last OV notes. OV notes printed out, Zaira Francisco will come to the office to pick them up.

## 2022-11-22 ENCOUNTER — OFFICE VISIT (OUTPATIENT)
Dept: CARDIOLOGY CLINIC | Age: 87
End: 2022-11-22
Payer: MEDICARE

## 2022-11-22 VITALS
HEART RATE: 63 BPM | SYSTOLIC BLOOD PRESSURE: 118 MMHG | DIASTOLIC BLOOD PRESSURE: 62 MMHG | WEIGHT: 189.6 LBS | BODY MASS INDEX: 28 KG/M2 | OXYGEN SATURATION: 96 %

## 2022-11-22 DIAGNOSIS — I25.10 CORONARY ARTERY DISEASE INVOLVING NATIVE CORONARY ARTERY OF NATIVE HEART WITHOUT ANGINA PECTORIS: Primary | ICD-10-CM

## 2022-11-22 DIAGNOSIS — I65.23 CAROTID STENOSIS, BILATERAL: ICD-10-CM

## 2022-11-22 DIAGNOSIS — I10 ESSENTIAL HYPERTENSION: ICD-10-CM

## 2022-11-22 DIAGNOSIS — I25.10 CORONARY ARTERY DISEASE INVOLVING NATIVE HEART WITHOUT ANGINA PECTORIS, UNSPECIFIED VESSEL OR LESION TYPE: ICD-10-CM

## 2022-11-22 DIAGNOSIS — I21.4 NSTEMI (NON-ST ELEVATED MYOCARDIAL INFARCTION) (HCC): ICD-10-CM

## 2022-11-22 DIAGNOSIS — E78.5 HYPERLIPIDEMIA, UNSPECIFIED HYPERLIPIDEMIA TYPE: ICD-10-CM

## 2022-11-22 DIAGNOSIS — R09.89 BILATERAL CAROTID BRUITS: ICD-10-CM

## 2022-11-22 DIAGNOSIS — I50.31 ACUTE DIASTOLIC HEART FAILURE (HCC): ICD-10-CM

## 2022-11-22 PROCEDURE — 3074F SYST BP LT 130 MM HG: CPT | Performed by: INTERNAL MEDICINE

## 2022-11-22 PROCEDURE — 3078F DIAST BP <80 MM HG: CPT | Performed by: INTERNAL MEDICINE

## 2022-11-22 PROCEDURE — 99214 OFFICE O/P EST MOD 30 MIN: CPT | Performed by: INTERNAL MEDICINE

## 2022-11-22 PROCEDURE — 1123F ACP DISCUSS/DSCN MKR DOCD: CPT | Performed by: INTERNAL MEDICINE

## 2022-11-22 PROCEDURE — 1036F TOBACCO NON-USER: CPT | Performed by: INTERNAL MEDICINE

## 2022-11-22 PROCEDURE — G8484 FLU IMMUNIZE NO ADMIN: HCPCS | Performed by: INTERNAL MEDICINE

## 2022-11-22 PROCEDURE — G8427 DOCREV CUR MEDS BY ELIG CLIN: HCPCS | Performed by: INTERNAL MEDICINE

## 2022-11-22 PROCEDURE — G8417 CALC BMI ABV UP PARAM F/U: HCPCS | Performed by: INTERNAL MEDICINE

## 2022-11-22 RX ORDER — ISOSORBIDE DINITRATE 30 MG/1
TABLET ORAL
COMMUNITY
Start: 2022-10-29

## 2022-11-22 ASSESSMENT — ENCOUNTER SYMPTOMS
COUGH: 0
STRIDOR: 0
BLOOD IN STOOL: 0
WHEEZING: 0
RESPIRATORY NEGATIVE: 1
GASTROINTESTINAL NEGATIVE: 1
SHORTNESS OF BREATH: 0
NAUSEA: 0
CHEST TIGHTNESS: 0
EYES NEGATIVE: 1

## 2022-11-22 NOTE — PROGRESS NOTES
Subsequent Progress Note  Patient: Elham [facility]  YOB: 1935  MRN: [id number]    Chief Complaint: nstemi cad htn   Chief Complaint   Patient presents with    6 Month Follow-Up    Coronary Artery Disease       CV Data:  6/2018 spect negative  5/2018 echo EF 60%  11/2018 LAD AMBER  12/2018 CUS <. Left Vertebral 100  1/2020 echo ef 60 midl AR and MR  1/2020 SPECT - NEGATIVE  6/8/2020 Cath OM1 AMBER. EF 60%  9/2020  CUS mild  1/21 Renal Duplex- negative  1/21 spect negative    Subjective/HPI: no cp no sob no falls no bleed. R Inguinal hernia     9/25/2019:  No cp no osb no falls no bleed eats well. Takes meds. C/o Diuretic and frequent urine. 1/15/2020 recently in ER and hosp for HTN. Now stable without meds changes. Possible he did not take his meds. Recent Labs showed mild Troponin elevaton. 2/5/2020 NO CP NO SOB NO FALLS NO BLEED. TAKES MEDS. EATS WELL.     5/8/2020 TELEHEALTH EVALUATION -- Audio/Visual ([facility])      137/70 home BP. Doing well no cp no osb no falls no bleed takes meds. Eats well. Son moved in w him. 6/17/2020 no cp no sob no edema no falls no bleed. Feels better since last stent. 8/5/2020 recent hosp for syncope related to Low BP. Imdur was stopped and Benazepril changed to Lisinopril. Feels much better. 10/22/2020 recent hosp for accelerated HTN. We added Imdur. He never had CP nor SOB. He feels well now. BOP better. Eating well. 8/18/21 now lives at [facility] in [place]- assisted living. Feels well no cp no sob no falls no bleed. Walks with walker. 1/19/22 doing well lives at [facility]. No cp no sob not dizzy no falls no bleed. 5/25/22 lives at [facility]. No cp  No sob no falsl nob leed. Walks with walker. Feels well eats well.     11/22/22 doing well no cp no sob no falls nob leed.   Still taking asa and Plavix    Nonsmoker        EKG:SR 76    Past Medical History:   Diagnosis Date    Anticoagulant long-term use Anxiety     CAD (coronary artery disease)     Carotid stenosis     2/2013 16-49%    CHF (congestive heart failure) (HCC)     Dementia (HCC)     Dementia (HCC)     Hyperlipidemia     Hypertension     MI [facility])     Osteoarthritis        Past Surgical History:   Procedure Laterality Date    CARPAL TUNNEL RELEASE  1998    CATARACT REMOVAL  2007    COLONOSCOPY  12/10/10,2007    DR Tati Arambula - DONE AT [facility]    COLONOSCOPY  2007    hx polyps needs 2015    COLONOSCOPY  9/21/15     DR. YARBROUGH     CORONARY ANGIOPLASTY WITH STENT PLACEMENT  06/07/2020    DIAGNOSTIC CARDIAC CATH LAB PROCEDURE      HERNIA REPAIR Bilateral     x 2    HERNIA REPAIR Right 11/13/2020    RIGHT INGUINAL HERNIA REPAIR performed by Anna Hay MD at [facility]         Family History   Problem Relation Age of Onset    Heart Disease Mother     High Blood Pressure Mother        Social History     Socioeconomic History    Marital status:     Tobacco Use    Smoking status: Never    Smokeless tobacco: Never   Vaping Use    Vaping Use: Never used   Substance and Sexual Activity    Alcohol use: No     Comment: social    Drug use: No    Sexual activity: Not Currently       No Known Allergies    Current Outpatient Medications   Medication Sig Dispense Refill    vitamin D 25 MCG (1000 UT) CAPS Take by mouth daily      isosorbide dinitrate (ISORDIL) 30 MG tablet       finasteride (PROSCAR) 5 MG tablet TAKE 1 TABLET BY MOUTH DAILY 30 tablet 0    nitroGLYCERIN (NITROSTAT) 0.4 MG SL tablet DISSOLVE 1 TABLET UNDER THE TONGUE AS NEEDED FOR CHEST PAIN-  MAY REPEAT EVERY 5 MINUTES IF NEEDED ( MAX 3 DOSES.- IF NO RELIEF CALL 911) 25 tablet 0    pravastatin (PRAVACHOL) 80 MG tablet TAKE 1 TABLET BY MOUTH EVERY EVENING 30 tablet 0    donepezil (ARICEPT) 5 MG tablet Take 1 tablet by mouth nightly 210 tablet 0    lisinopril (PRINIVIL;ZESTRIL) 10 MG tablet TAKE 1 TABLET BY MOUTH DAILY 30 tablet 2    carvedilol (COREG) 6.25 MG tablet TAKE 1 TABLET BY MOUTH TWICE DAILY 60 tablet 2    isosorbide mononitrate (IMDUR) 30 MG extended release tablet Take 1 tablet by mouth daily 90 tablet 3    clopidogrel (PLAVIX) 75 MG tablet Take 1 tablet by mouth daily 90 tablet 3    HYDROcodone-acetaminophen (NORCO) 5-325 MG per tablet Take 1 tablet by mouth every 8 hours as needed for Pain.      latanoprost (XALATAN) 0.005 % ophthalmic solution use 1 drop in each eye every day 2.5 mL 5    vitamin B-12 (CYANOCOBALAMIN) 1000 MCG tablet Take 500 mcg by mouth daily      Multiple Vitamins-Minerals (MULTIVITAMIN PO) Take by mouth daily       dorzolamide-timolol (COSOPT) 22.3-6.8 MG/ML ophthalmic solution Place 1 drop into both eyes 2 times daily        No current facility-administered medications for this visit. Review of Systems:   Review of Systems   Constitutional: Negative. Negative for diaphoresis and fatigue. HENT: Negative. Eyes: Negative. Respiratory: Negative. Negative for cough, chest tightness, shortness of breath, wheezing and stridor. Cardiovascular: Negative. Negative for chest pain, palpitations and leg swelling. Gastrointestinal: Negative. Negative for blood in stool and nausea. Genitourinary: Negative. Musculoskeletal: Negative. Skin: Negative. Neurological: Negative. Negative for dizziness, syncope, weakness and light-headedness. Hematological: Negative. Psychiatric/Behavioral: Negative. Physical Examination:    /62 (Site: Left Upper Arm, Position: Sitting, Cuff Size: Large Adult)   Pulse 63   Wt 189 lb 9.6 oz (86 kg)   SpO2 96%   BMI 28.00 kg/m²    Physical Exam   Constitutional: He appears healthy. No distress. HENT:   Normal cephalic and Atraumatic   Eyes: Pupils are equal, round, and reactive to light. Neck: Thyroid normal. No JVD present. No neck adenopathy. No thyromegaly present. Cardiovascular: Normal rate, regular rhythm, intact distal pulses and normal pulses.    Murmur heard.  Pulmonary/Chest: Effort normal and breath sounds normal. He has no wheezes. He has no rales. He exhibits no tenderness. Abdominal: Soft. Bowel sounds are normal. There is no abdominal tenderness. Musculoskeletal:         General: No tenderness or edema. Normal range of motion. Cervical back: Normal range of motion and neck supple. Neurological: He is alert and oriented to person, place, and time. Skin: Skin is warm. No cyanosis. Nails show no clubbing.      LABS:  CBC:   Lab Results   Component Value Date/Time    WBC 6.4 11/09/2020 11:16 AM    RBC 4.82 11/09/2020 11:16 AM    RBC 4.69 10/25/2011 08:39 AM    HGB 14.8 11/09/2020 11:16 AM    HCT 44.2 11/09/2020 11:16 AM    MCV 91.8 11/09/2020 11:16 AM    MCH 30.8 11/09/2020 11:16 AM    MCHC 33.5 11/09/2020 11:16 AM    RDW 14.3 11/09/2020 11:16 AM     11/09/2020 11:16 AM    MPV 9.2 08/27/2015 09:22 AM     Lipids:  Lab Results   Component Value Date    CHOL 133 08/19/2021    CHOL 130 06/01/2020    CHOL 146 10/08/2019     Lab Results   Component Value Date    TRIG 73 08/19/2021    TRIG 86 06/01/2020    TRIG 93 10/08/2019     Lab Results   Component Value Date    HDL 53 08/19/2021    HDL 48 06/01/2020    HDL 53 10/08/2019     Lab Results   Component Value Date    LDLCALC 65 08/19/2021    LDLCALC 65 06/01/2020    LDLCALC 74 10/08/2019     Lab Results   Component Value Date    VLDL 15 08/19/2021     Lab Results   Component Value Date    CHOLHDLRATIO 2.51 08/19/2021    CHOLHDLRATIO 3.8 09/10/2012    CHOLHDLRATIO 3.3 10/25/2011     CMP:    Lab Results   Component Value Date/Time     11/09/2020 11:16 AM    K 4.2 11/09/2020 11:16 AM    K 4.0 01/06/2020 05:59 AM     11/09/2020 11:16 AM    CO2 23 11/09/2020 11:16 AM    BUN 21 11/09/2020 11:16 AM    CREATININE 0.82 11/09/2020 11:16 AM    GFRAA >60.0 11/09/2020 11:16 AM    LABGLOM >60.0 11/09/2020 11:16 AM    GLUCOSE 107 11/09/2020 11:16 AM    GLUCOSE 96 10/25/2011 08:39 AM    PROT 6.0 10/20/2020 12:15 AM    LABALBU 3.7 10/20/2020 12:15 AM    LABALBU 4.6 10/25/2011 08:39 AM    CALCIUM 9.2 11/09/2020 11:16 AM    BILITOT 0.4 10/20/2020 12:15 AM    ALKPHOS 74 10/20/2020 12:15 AM    AST 13 10/20/2020 12:15 AM    ALT 12 10/20/2020 12:15 AM     BMP:    Lab Results   Component Value Date/Time     11/09/2020 11:16 AM    K 4.2 11/09/2020 11:16 AM    K 4.0 01/06/2020 05:59 AM     11/09/2020 11:16 AM    CO2 23 11/09/2020 11:16 AM    BUN 21 11/09/2020 11:16 AM    LABALBU 3.7 10/20/2020 12:15 AM    LABALBU 4.6 10/25/2011 08:39 AM    CREATININE 0.82 11/09/2020 11:16 AM    CALCIUM 9.2 11/09/2020 11:16 AM    GFRAA >60.0 11/09/2020 11:16 AM    LABGLOM >60.0 11/09/2020 11:16 AM    GLUCOSE 107 11/09/2020 11:16 AM    GLUCOSE 96 10/25/2011 08:39 AM     Magnesium:    Lab Results   Component Value Date/Time    MG 2.3 07/13/2020 05:45 AM     TSH:  Lab Results   Component Value Date    TSH 2.280 10/08/2019       Patient Active Problem List   Diagnosis    Hyperlipidemia    Carotid stenosis, bilateral    Dementia ([facility])    Hypertensive urgency    NSTEMI (non-ST elevated myocardial infarction) ([facility])    Essential hypertension    HESS (dyspnea on exertion)    Leg edema    Acute diastolic heart failure ([facility])    Chest pain    Angina, class III ([facility])    S/P cardiac cath    Anginal equivalent ([facility])    Dizziness    Coronary artery disease involving native coronary artery of native heart without angina pectoris    CHF (congestive heart failure) ([facility])    Congestive heart failure ([facility])    Non-STEMI (non-ST elevated myocardial infarction) ([facility])    Unstable angina ([facility])    ACS (acute coronary syndrome) ([facility])    Bilateral carotid bruits    Syncope and collapse    Post PTCA    Right inguinal hernia       There are no discontinued medications. Modified Medications    No medications on file       No orders of the defined types were placed in this encounter. Assessment/Plan:    1.  Hyperlipidemia, unspecified hyperlipidemia type  Statin - continue. Labs reviewed- excellent. 2. NSTEMI (non-ST elevated myocardial infarction) ([facility]  no ANGINA - conitnue all CV protective meds. SL NTG for angina discussed. 3. Essential hypertension   stable now. continue same mdes. Low salt diet. 4. Coronary artery disease involving native coronary artery of native heart without angina pectoris  No angina. Dc ASA continue Plavix. 5. R Inguinal hernia- asymptomatic . Avoid heavylifting or constipation- stable      6. Carotid Bruits- most recent CUS images reviewed- stable. Counseling:  Heart Healthy Lifestyle, Decrease Alcohol Consumption, Take Precautions to Prevent Falls, Regular Exercise and Walk Daily    Return in about 4 months (around 3/22/2023).       Electronically signed by Ariadna Gudino MD on 11/22/2022 at 2:50 PM

## 2023-01-21 ENCOUNTER — HOSPITAL ENCOUNTER (INPATIENT)
Age: 88
LOS: 4 days | Discharge: HOME HEALTH | DRG: 177 | End: 2023-01-25
Attending: FAMILY MEDICINE | Admitting: FAMILY MEDICINE
Payer: MEDICARE

## 2023-01-21 ENCOUNTER — APPOINTMENT (OUTPATIENT)
Dept: CT IMAGING | Age: 88
DRG: 177 | End: 2023-01-21
Payer: MEDICARE

## 2023-01-21 DIAGNOSIS — U07.1 COVID-19 VIRUS INFECTION: ICD-10-CM

## 2023-01-21 DIAGNOSIS — I95.1 ORTHOSTATIC HYPOTENSION: ICD-10-CM

## 2023-01-21 DIAGNOSIS — R53.1 GENERAL WEAKNESS: Primary | ICD-10-CM

## 2023-01-21 LAB
ALBUMIN SERPL-MCNC: 3.9 G/DL (ref 3.5–4.6)
ALP BLD-CCNC: 95 U/L (ref 35–104)
ALT SERPL-CCNC: 10 U/L (ref 0–41)
ANION GAP SERPL CALCULATED.3IONS-SCNC: 11 MEQ/L (ref 9–15)
AST SERPL-CCNC: 16 U/L (ref 0–40)
BACTERIA: NEGATIVE /HPF
BASOPHILS ABSOLUTE: 0.1 K/UL (ref 0–0.2)
BASOPHILS RELATIVE PERCENT: 0.6 %
BILIRUB SERPL-MCNC: 0.9 MG/DL (ref 0.2–0.7)
BILIRUBIN URINE: NEGATIVE
BLOOD, URINE: NEGATIVE
BUN BLDV-MCNC: 20 MG/DL (ref 8–23)
CALCIUM SERPL-MCNC: 9.1 MG/DL (ref 8.5–9.9)
CHLORIDE BLD-SCNC: 102 MEQ/L (ref 95–107)
CLARITY: CLEAR
CO2: 24 MEQ/L (ref 20–31)
COLOR: YELLOW
CREAT SERPL-MCNC: 1.1 MG/DL (ref 0.7–1.2)
EOSINOPHILS ABSOLUTE: 0.1 K/UL (ref 0–0.7)
EOSINOPHILS RELATIVE PERCENT: 1 %
EPITHELIAL CELLS, UA: ABNORMAL /HPF (ref 0–5)
GFR SERPL CREATININE-BSD FRML MDRD: >60 ML/MIN/{1.73_M2}
GLOBULIN: 2.4 G/DL (ref 2.3–3.5)
GLUCOSE BLD-MCNC: 106 MG/DL (ref 70–99)
GLUCOSE URINE: NEGATIVE MG/DL
HCT VFR BLD CALC: 42.5 % (ref 42–52)
HEMOGLOBIN: 13.8 G/DL (ref 14–18)
HYALINE CASTS: ABNORMAL /HPF (ref 0–5)
INFLUENZA A BY PCR: NEGATIVE
INFLUENZA B BY PCR: NEGATIVE
KETONES, URINE: 15 MG/DL
LACTIC ACID: 2 MMOL/L (ref 0.5–2.2)
LEUKOCYTE ESTERASE, URINE: NEGATIVE
LYMPHOCYTES ABSOLUTE: 0.7 K/UL (ref 1–4.8)
LYMPHOCYTES RELATIVE PERCENT: 6.9 %
MCH RBC QN AUTO: 29.9 PG (ref 27–31.3)
MCHC RBC AUTO-ENTMCNC: 32.5 % (ref 33–37)
MCV RBC AUTO: 91.9 FL (ref 79–92.2)
MONOCYTES ABSOLUTE: 0.8 K/UL (ref 0.2–0.8)
MONOCYTES RELATIVE PERCENT: 8.1 %
NEUTROPHILS ABSOLUTE: 8 K/UL (ref 1.4–6.5)
NEUTROPHILS RELATIVE PERCENT: 83.4 %
NITRITE, URINE: NEGATIVE
PDW BLD-RTO: 14.2 % (ref 11.5–14.5)
PH UA: 6 (ref 5–9)
PLATELET # BLD: 196 K/UL (ref 130–400)
POTASSIUM REFLEX MAGNESIUM: 4.1 MEQ/L (ref 3.4–4.9)
PRO-BNP: 670 PG/ML
PROTEIN UA: 30 MG/DL
RBC # BLD: 4.62 M/UL (ref 4.7–6.1)
RBC UA: ABNORMAL /HPF (ref 0–5)
SARS-COV-2, NAAT: DETECTED
SODIUM BLD-SCNC: 137 MEQ/L (ref 135–144)
SPECIFIC GRAVITY UA: 1.02 (ref 1–1.03)
TOTAL PROTEIN: 6.3 G/DL (ref 6.3–8)
URINE REFLEX TO CULTURE: ABNORMAL
UROBILINOGEN, URINE: 1 E.U./DL
WBC # BLD: 9.6 K/UL (ref 4.8–10.8)
WBC UA: ABNORMAL /HPF (ref 0–5)

## 2023-01-21 PROCEDURE — 81001 URINALYSIS AUTO W/SCOPE: CPT

## 2023-01-21 PROCEDURE — 2580000003 HC RX 258: Performed by: FAMILY MEDICINE

## 2023-01-21 PROCEDURE — 6830039000 HC L3 TRAUMA ALERT

## 2023-01-21 PROCEDURE — 96360 HYDRATION IV INFUSION INIT: CPT

## 2023-01-21 PROCEDURE — 85025 COMPLETE CBC W/AUTO DIFF WBC: CPT

## 2023-01-21 PROCEDURE — 87635 SARS-COV-2 COVID-19 AMP PRB: CPT

## 2023-01-21 PROCEDURE — 83880 ASSAY OF NATRIURETIC PEPTIDE: CPT

## 2023-01-21 PROCEDURE — 1210000000 HC MED SURG R&B

## 2023-01-21 PROCEDURE — 36415 COLL VENOUS BLD VENIPUNCTURE: CPT

## 2023-01-21 PROCEDURE — 70450 CT HEAD/BRAIN W/O DYE: CPT

## 2023-01-21 PROCEDURE — 93005 ELECTROCARDIOGRAM TRACING: CPT | Performed by: FAMILY MEDICINE

## 2023-01-21 PROCEDURE — 99285 EMERGENCY DEPT VISIT HI MDM: CPT

## 2023-01-21 PROCEDURE — 87502 INFLUENZA DNA AMP PROBE: CPT

## 2023-01-21 PROCEDURE — 83605 ASSAY OF LACTIC ACID: CPT

## 2023-01-21 PROCEDURE — 80053 COMPREHEN METABOLIC PANEL: CPT

## 2023-01-21 RX ORDER — ACETAMINOPHEN 650 MG/1
650 SUPPOSITORY RECTAL EVERY 6 HOURS PRN
Status: DISCONTINUED | OUTPATIENT
Start: 2023-01-21 | End: 2023-01-25 | Stop reason: HOSPADM

## 2023-01-21 RX ORDER — ENOXAPARIN SODIUM 100 MG/ML
40 INJECTION SUBCUTANEOUS DAILY
Status: DISCONTINUED | OUTPATIENT
Start: 2023-01-21 | End: 2023-01-25 | Stop reason: HOSPADM

## 2023-01-21 RX ORDER — GUAIFENESIN/DEXTROMETHORPHAN 100-10MG/5
5 SYRUP ORAL EVERY 4 HOURS PRN
Status: DISCONTINUED | OUTPATIENT
Start: 2023-01-21 | End: 2023-01-25 | Stop reason: HOSPADM

## 2023-01-21 RX ORDER — POLYETHYLENE GLYCOL 3350 17 G/17G
17 POWDER, FOR SOLUTION ORAL DAILY PRN
Status: DISCONTINUED | OUTPATIENT
Start: 2023-01-21 | End: 2023-01-25 | Stop reason: HOSPADM

## 2023-01-21 RX ORDER — SODIUM CHLORIDE 9 MG/ML
INJECTION, SOLUTION INTRAVENOUS CONTINUOUS
Status: DISCONTINUED | OUTPATIENT
Start: 2023-01-21 | End: 2023-01-25 | Stop reason: HOSPADM

## 2023-01-21 RX ORDER — 0.9 % SODIUM CHLORIDE 0.9 %
1000 INTRAVENOUS SOLUTION INTRAVENOUS ONCE
Status: COMPLETED | OUTPATIENT
Start: 2023-01-21 | End: 2023-01-21

## 2023-01-21 RX ORDER — SODIUM CHLORIDE 0.9 % (FLUSH) 0.9 %
5-40 SYRINGE (ML) INJECTION PRN
Status: DISCONTINUED | OUTPATIENT
Start: 2023-01-21 | End: 2023-01-25 | Stop reason: HOSPADM

## 2023-01-21 RX ORDER — SODIUM CHLORIDE 9 MG/ML
INJECTION, SOLUTION INTRAVENOUS PRN
Status: DISCONTINUED | OUTPATIENT
Start: 2023-01-21 | End: 2023-01-25 | Stop reason: HOSPADM

## 2023-01-21 RX ORDER — ACETAMINOPHEN 325 MG/1
650 TABLET ORAL EVERY 6 HOURS PRN
Status: DISCONTINUED | OUTPATIENT
Start: 2023-01-21 | End: 2023-01-25 | Stop reason: HOSPADM

## 2023-01-21 RX ORDER — SODIUM CHLORIDE 0.9 % (FLUSH) 0.9 %
5-40 SYRINGE (ML) INJECTION EVERY 12 HOURS SCHEDULED
Status: DISCONTINUED | OUTPATIENT
Start: 2023-01-21 | End: 2023-01-25 | Stop reason: HOSPADM

## 2023-01-21 RX ORDER — ONDANSETRON 2 MG/ML
4 INJECTION INTRAMUSCULAR; INTRAVENOUS EVERY 6 HOURS PRN
Status: DISCONTINUED | OUTPATIENT
Start: 2023-01-21 | End: 2023-01-25 | Stop reason: HOSPADM

## 2023-01-21 RX ORDER — ONDANSETRON 4 MG/1
4 TABLET, ORALLY DISINTEGRATING ORAL EVERY 8 HOURS PRN
Status: DISCONTINUED | OUTPATIENT
Start: 2023-01-21 | End: 2023-01-25 | Stop reason: HOSPADM

## 2023-01-21 RX ADMIN — SODIUM CHLORIDE 1000 ML: 9 INJECTION, SOLUTION INTRAVENOUS at 10:54

## 2023-01-21 RX ADMIN — Medication 10 ML: at 23:01

## 2023-01-21 RX ADMIN — SODIUM CHLORIDE: 9 INJECTION, SOLUTION INTRAVENOUS at 15:41

## 2023-01-21 ASSESSMENT — LIFESTYLE VARIABLES
HOW MANY STANDARD DRINKS CONTAINING ALCOHOL DO YOU HAVE ON A TYPICAL DAY: PATIENT DOES NOT DRINK
HOW OFTEN DO YOU HAVE A DRINK CONTAINING ALCOHOL: MONTHLY OR LESS
HOW MANY STANDARD DRINKS CONTAINING ALCOHOL DO YOU HAVE ON A TYPICAL DAY: 1 OR 2
HOW OFTEN DO YOU HAVE A DRINK CONTAINING ALCOHOL: NEVER

## 2023-01-21 ASSESSMENT — ENCOUNTER SYMPTOMS
RHINORRHEA: 1
RESPIRATORY NEGATIVE: 1

## 2023-01-21 ASSESSMENT — PAIN - FUNCTIONAL ASSESSMENT: PAIN_FUNCTIONAL_ASSESSMENT: NONE - DENIES PAIN

## 2023-01-21 NOTE — ED PROVIDER NOTES
[facility]  eMERGENCY dEPARTMENT eNCOUnter      Pt Name: Samira Carver  MRN: [id number]  [place] 1935  Date of evaluation: 1/21/2023  Provider: Lemule Sexton MD    CHIEF COMPLAINT       Chief Complaint   Patient presents with    Fall         HISTORY OF PRESENT ILLNESS   (Location/Symptom, Timing/Onset,Context/Setting, Quality, Duration, Modifying Factors, Severity)  Note limiting factors. Samira Carver is a 82 y.o. male who presents to the emergency department weakness , fall and covid      The history is provided by the patient. NursingNotes were reviewed. REVIEW OF SYSTEMS    (2-9 systems for level 4, 10 or more for level 5)     Review of Systems   Constitutional:  Positive for activity change and fatigue. HENT:  Positive for postnasal drip and rhinorrhea. Respiratory: Negative. Cardiovascular: Negative. Skin: Negative. Neurological:  Positive for dizziness, weakness and light-headedness. Psychiatric/Behavioral: Negative. Except as noted above the remainder of the review of systems was reviewed and negative. PAST MEDICAL HISTORY     Past Medical History:   Diagnosis Date    Anticoagulant long-term use     Anxiety     CAD (coronary artery disease)     Carotid stenosis     2/2013 16-49%    CHF (congestive heart failure) (HCC)     Dementia (HCC)     Dementia (HCC)     Hyperlipidemia     Hypertension     MI (myocardial infarction) ([facility] [address]     Osteoarthritis          SURGICALHISTORY       Past Surgical History:   Procedure Laterality Date    CARPAL TUNNEL RELEASE  1998    CATARACT REMOVAL  2007    COLONOSCOPY  12/10/10,2007    DR Lennox Ped - DONE AT [facility]    COLONOSCOPY  2007    hx polyps needs 2015    COLONOSCOPY  9/21/15     DR. YARBROUGH     CORONARY ANGIOPLASTY WITH STENT PLACEMENT  06/07/2020    DIAGNOSTIC CARDIAC CATH LAB PROCEDURE      HERNIA REPAIR Bilateral     x 2    HERNIA REPAIR Right 11/13/2020    RIGHT INGUINAL HERNIA REPAIR performed by [place] Rigoberto Mcallister MD at [facility]       Current Discharge Medication List        CONTINUE these medications which have NOT CHANGED    Details   vitamin D 25 MCG (1000 UT) CAPS Take by mouth daily      isosorbide dinitrate (ISORDIL) 30 MG tablet       finasteride (PROSCAR) 5 MG tablet TAKE 1 TABLET BY MOUTH DAILY  Qty: 30 tablet, Refills: 0    Associated Diagnoses: Benign prostatic hyperplasia, unspecified whether lower urinary tract symptoms present; NSTEMI (non-ST elevated myocardial infarction) ([facility])      nitroGLYCERIN (NITROSTAT) 0.4 MG SL tablet DISSOLVE 1 TABLET UNDER THE TONGUE AS NEEDED FOR CHEST PAIN-  MAY REPEAT EVERY 5 MINUTES IF NEEDED ( MAX 3 DOSES.- IF NO RELIEF CALL 911)  Qty: 25 tablet, Refills: 0      pravastatin (PRAVACHOL) 80 MG tablet TAKE 1 TABLET BY MOUTH EVERY EVENING  Qty: 30 tablet, Refills: 0      donepezil (ARICEPT) 5 MG tablet Take 1 tablet by mouth nightly  Qty: 210 tablet, Refills: 0      lisinopril (PRINIVIL;ZESTRIL) 10 MG tablet TAKE 1 TABLET BY MOUTH DAILY  Qty: 30 tablet, Refills: 2      carvedilol (COREG) 6.25 MG tablet TAKE 1 TABLET BY MOUTH TWICE DAILY  Qty: 60 tablet, Refills: 2      isosorbide mononitrate (IMDUR) 30 MG extended release tablet Take 1 tablet by mouth daily  Qty: 90 tablet, Refills: 3    Associated Diagnoses: Angina, class III ([facility])      clopidogrel (PLAVIX) 75 MG tablet Take 1 tablet by mouth daily  Qty: 90 tablet, Refills: 3      HYDROcodone-acetaminophen (NORCO) 5-325 MG per tablet Take 1 tablet by mouth every 8 hours as needed for Pain.      latanoprost (XALATAN) 0.005 % ophthalmic solution use 1 drop in each eye every day  Qty: 2.5 mL, Refills: 5      vitamin B-12 (CYANOCOBALAMIN) 1000 MCG tablet Take 500 mcg by mouth daily      Multiple Vitamins-Minerals (MULTIVITAMIN PO) Take by mouth daily       dorzolamide-timolol (COSOPT) 22.3-6.8 MG/ML ophthalmic solution Place 1 drop into both eyes 2 times daily              ALLERGIES Patient has no known allergies. FAMILY HISTORY       Family History   Problem Relation Age of Onset    Heart Disease Mother     High Blood Pressure Mother           SOCIAL HISTORY       Social History     Socioeconomic History    Marital status:    Tobacco Use    Smoking status: Never    Smokeless tobacco: Never   Vaping Use    Vaping Use: Never used   Substance and Sexual Activity    Alcohol use: No     Comment: social    Drug use: No    Sexual activity: Not Currently       SCREENINGS   NIH Stroke Scale  Interval: Baseline  Level of Consciousness (1a): Alert  LOC Questions (1b): Answers both correctly  LOC Commands (1c): Performs both tasks correctly  Best Gaze (2): Normal  Visual (3): No visual loss  Facial Palsy (4): Normal symmetrical movement  Motor Arm, Left (5a): No drift  Motor Arm, Right (5b): No drift  Motor Leg, Left (6a): No drift  Motor Leg, Right (6b): No drift  Limb Ataxia (7): Absent  Sensory (8): Normal  Best Language (9): No aphasia  Dysarthria (10): Normal  Extinction and Inattention (11): No abnormality  Total: 0Glasgow Coma Scale  Eye Opening: Spontaneous  Best Verbal Response: Oriented  Best Motor Response: Obeys commands  Wali Coma Scale Score: 15 @FLOW([id number])@      PHYSICAL EXAM    (up to 7 for level 4, 8 or more for level 5)     ED Triage Vitals   BP Temp Temp src Heart Rate Resp SpO2 Height Weight   01/21/23 1026 01/21/23 1026 -- 01/21/23 1026 01/21/23 1026 01/21/23 1026 01/21/23 1026 01/21/23 1026   (!) 91/54 97.3 °F (36.3 °C)  70 18 95 % 5' 9\" (1.753 m) 189 lb (85.7 kg)       Physical Exam  Vitals and nursing note reviewed. Constitutional:       Appearance: He is well-developed. HENT:      Head: Normocephalic and atraumatic. Right Ear: External ear normal.      Left Ear: External ear normal.      Nose: Nose normal.      Mouth/Throat:      Mouth: Mucous membranes are dry. Eyes:      Extraocular Movements: Extraocular movements intact.       Pupils: Pupils are equal, round, and reactive to light. Cardiovascular:      Rate and Rhythm: Normal rate and regular rhythm. Heart sounds: Normal heart sounds. Pulmonary:      Effort: Pulmonary effort is normal. No respiratory distress. Breath sounds: Normal breath sounds. No wheezing or rales. Chest:      Chest wall: No tenderness. Abdominal:      General: Bowel sounds are normal.      Palpations: Abdomen is soft. Musculoskeletal:         General: Normal range of motion. Cervical back: Normal range of motion and neck supple. Skin:     General: Skin is warm and dry. Neurological:      Mental Status: He is alert and oriented to person, place, and time. Cranial Nerves: No cranial nerve deficit. Sensory: No sensory deficit. Motor: Weakness present. No abnormal muscle tone. Coordination: Coordination normal.      Deep Tendon Reflexes: Reflexes normal.   Psychiatric:         Mood and Affect: Mood normal.         Behavior: Behavior normal.         Thought Content: Thought content normal.         Judgment: Judgment normal.       DIAGNOSTIC RESULTS     EKG: All EKG's are interpreted by the Emergency Department Physician who either signs or Co-signsthis chart in the absence of a cardiologist.    EKG: normal EKG, normal sinus rhythm. RADIOLOGY:   Non-plain filmimages such as CT, Ultrasound and MRI are read by the radiologist. Plain radiographic images are visualized and preliminarily interpreted by the emergency physician with the below findings:         Interpretation per the Radiologist below, if available at the time ofthis note:    [address]   Final Result   1. There is no acute intracranial abnormality. Specifically, there is no   intracranial hemorrhage. 2. Atrophy and periventricular leukomalacia,   3.  Ethmoid and left maxillary sinusitis               ED BEDSIDE ULTRASOUND:   Performed by ED Physician - none    LABS:  Labs Reviewed   COVID-19, RAPID - Abnormal; Notable for the following components:       Result Value    SARS-CoV-2, NAAT DETECTED (*)     All other components within normal limits   CBC WITH AUTO DIFFERENTIAL - Abnormal; Notable for the following components:    RBC 4.62 (*)     Hemoglobin 13.8 (*)     MCHC 32.5 (*)     Neutrophils Absolute 8.0 (*)     Lymphocytes Absolute 0.7 (*)     All other components within normal limits   COMPREHENSIVE METABOLIC PANEL W/ REFLEX TO MG FOR LOW K - Abnormal; Notable for the following components:    Glucose 106 (*)     Total Bilirubin 0.9 (*)     All other components within normal limits   URINALYSIS WITH REFLEX TO CULTURE - Abnormal; Notable for the following components:    Ketones, Urine 15 (*)     Protein, UA 30 (*)     All other components within normal limits   MICROSCOPIC URINALYSIS - Abnormal; Notable for the following components:    RBC, UA 6-10 (*)     All other components within normal limits   RAPID INFLUENZA A/B ANTIGENS   BRAIN NATRIURETIC PEPTIDE   LACTIC ACID   VITAMIN D 25 HYDROXY       All other labs were within normal range or not returned as of this dictation. EMERGENCY DEPARTMENT COURSE and DIFFERENTIAL DIAGNOSIS/MDM:   Vitals:    Vitals:    01/21/23 2001 01/22/23 0410 01/22/23 0742 01/22/23 1137   BP: (!) 152/71 (!) 117/57 (!) 152/61 (!) 157/63   Pulse: 64 67 64 70   Resp: 18 18 18 18   Temp: 98.1 °F (36.7 °C) 97.5 °F (36.4 °C) 98.6 °F (37 °C) 98.8 °F (37.1 °C)   TempSrc: Oral Oral Oral Oral   SpO2: 97% 96% 96% 97%   Weight:       Height:                     MDM  Number of Diagnoses or Management Options  COVID-19 virus infection  General weakness  Orthostatic hypotension  Diagnosis management comments: Summary of ED course:  82years old male patient who lives in assisted living have been having a runny nose for a few days this morning woke up felt dizzy weak and have fallen twice first time had no injury second time hit the bed of a's head so was sent to the ED for evaluation.   On arrival patient was awake alert oriented but hypotensive with blood pressure 85/45 no other signs of injury. Patient had COVID test that was positive CT of the head showed no acute intracranial process dizziness have improved with IV fluid and blood pressure have improved to 136/62  EKG results per my read:  Sinus rhythm no acute ST or T wave changes  Imaging results pre my interpretation:  Of the head reviewed agreed with the radiologist report no acute intracranial process  Tests considered :  No further testing was considered  Admission considered :  Weakness hypotension positive COVID inability to care for himself in assisted living patient will be admitted under the hospitalist service. Patient and his daughter both agreed CODE STATUS confirmed with patient and his daughter and paperwork signed patient is DNR CC  Disposition :    Patient remained hemodynamically stable in the ER blood pressure have improved after 1 L fluid and was admitted under the hospitalist service         Amount and/or Complexity of Data Reviewed  Clinical lab tests: ordered and reviewed  Tests in the radiology section of CPT®: ordered             CONSULTS:  None    PROCEDURES:  Unless otherwise noted below, none     Procedures    FINAL IMPRESSION      1. General weakness    2. COVID-19 virus infection    3. Orthostatic hypotension          DISPOSITION/PLAN   DISPOSITION Admitted 01/21/2023 12:29:57 PM      PATIENT REFERRED TO:  No follow-up provider specified.     DISCHARGE MEDICATIONS:  Current Discharge Medication List             (Please note thatportions of this note were completed with a voice recognition program.  Efforts were made to edit the dictations but occasionally words are mis-transcribed.)    Henny Shepard MD (electronically signed)  Attending Emergency Physician          Gayathri Ferris MD  01/21/23 Luis Fernando Casiano MD  01/22/23 [phone]

## 2023-01-21 NOTE — FLOWSHEET NOTE
Patient transferred from ER verbal report received from JANUSZ CUEVA [facility] patient arrived via stretcher a&ox4 denies pain vss resp even/unlabored on RA head to toe assessment completed attempted to review home medications patient unable to recall will attempt to get updated list from nursing facility patient oriented to unit/room/environment and covid isolation protocol patient verbalizes good understanding currently lying in bed safety measures in place call light within reach  no s/s distress noted will cont to monitor

## 2023-01-21 NOTE — H&P
Hospital Medicine  History and Physical    Patient:  Maira Shore  MRN: [id number]    CHIEF COMPLAINT:    Chief Complaint   Patient presents with    Fall       History Obtained From:  patient  Primary Care Physician: Chase Jefferson    HISTORY OF PRESENT ILLNESS:   The patient is a 82 y.o. male who presents with a 1 week hx of generalized weakness that resulted in a fall today. He states he has been having a cough and runny nose for the past several days, and fell twice today. He was noted at his assisted living to have a low blood pressure and was transferred for further evaluation. He was covid positive in the ER. He has a hx of HTN, HLD, dementia, cad, mi, carotid stenosis, chf.    Past Medical History:      Diagnosis Date    Anticoagulant long-term use     Anxiety     CAD (coronary artery disease)     Carotid stenosis     2/2013 16-49%    CHF (congestive heart failure) (HCC)     Dementia (HCC)     Dementia ([facility] [address]     Hyperlipidemia     Hypertension     MI (myocardial infarction) ([facility] [address]     Osteoarthritis        Past Surgical History:      Procedure Laterality Date    [facility]    CATARACT REMOVAL  2007    COLONOSCOPY  12/10/10,2007    DR Ajay Bray - DONE AT [facility]    COLONOSCOPY  2007    hx polyps needs 2015    COLONOSCOPY  9/21/15     DR. YARBROUGH     CORONARY ANGIOPLASTY WITH STENT PLACEMENT  06/07/2020    DIAGNOSTIC CARDIAC CATH LAB PROCEDURE      HERNIA REPAIR Bilateral     x 2    HERNIA REPAIR Right 11/13/2020    RIGHT INGUINAL HERNIA REPAIR performed by Helene Mooney MD at [facility]    PTCA         Medications Prior to Admission:    Prior to Admission medications    Medication Sig Start Date End Date Taking?  Authorizing Provider   vitamin D 25 MCG (1000 UT) CAPS Take by mouth daily 7/13/22   Historical Provider, MD   isosorbide dinitrate (ISORDIL) 30 MG tablet  10/29/22   Historical Provider, MD   finasteride (PROSCAR) 5 MG tablet TAKE 1 TABLET BY MOUTH DAILY 7/8/22   Jose Angel Markham MD Arti   nitroGLYCERIN (NITROSTAT) 0.4 MG SL tablet DISSOLVE 1 TABLET UNDER THE TONGUE AS NEEDED FOR CHEST PAIN-  MAY REPEAT EVERY 5 MINUTES IF NEEDED ( MAX 3 DOSES.- IF NO RELIEF CALL 911) 5/18/22   Delroy De Souza MD   pravastatin (PRAVACHOL) 80 MG tablet TAKE 1 TABLET BY MOUTH EVERY EVENING 5/18/22   Delroy De Souza MD   donepezil (ARICEPT) 5 MG tablet Take 1 tablet by mouth nightly 3/7/22   Delroy De Souza MD   lisinopril (PRINIVIL;ZESTRIL) 10 MG tablet TAKE 1 TABLET BY MOUTH DAILY 2/7/22   Alexsandra Lima MD   carvedilol (COREG) 6.25 MG tablet TAKE 1 TABLET BY MOUTH TWICE DAILY 2/7/22   Alexsandra Lima MD   isosorbide mononitrate (IMDUR) 30 MG extended release tablet Take 1 tablet by mouth daily 1/19/22   Alexsandra Lima MD   clopidogrel (PLAVIX) 75 MG tablet Take 1 tablet by mouth daily 1/19/22   Alexsandra Lima MD   HYDROcodone-acetaminophen (NORCO) 5-325 MG per tablet Take 1 tablet by mouth every 8 hours as needed for Pain. Historical Provider, MD   latanoprost (XALATAN) 0.005 % ophthalmic solution use 1 drop in each eye every day 8/9/19   Delroy De Souza MD   vitamin B-12 (CYANOCOBALAMIN) 1000 MCG tablet Take 500 mcg by mouth daily    Historical Provider, MD   Multiple Vitamins-Minerals (MULTIVITAMIN PO) Take by mouth daily     Historical Provider, MD   dorzolamide-timolol (COSOPT) 22.3-6.8 MG/ML ophthalmic solution Place 1 drop into both eyes 2 times daily  7/26/14   Historical Provider, MD       Allergies:  Patient has no known allergies. Social History:   TOBACCO:   reports that he has never smoked. He has never used smokeless tobacco.  ETOH:   reports no history of alcohol use. OCCUPATION:  retired    Family History:       Problem Relation Age of Onset    Heart Disease Mother     High Blood Pressure Mother        REVIEW OF SYSTEMS:  Ten systems reviewed and negative except for as above.      Physical Exam:    Vitals: BP (!) 115/58   Pulse 66   Temp 97.3 °F (36.3 °C)   Resp 20   Ht 5' 9\" (1.753 m)   Wt 189 lb (85.7 kg)   SpO2 96%   BMI 27.91 kg/m²   Constitutional: alert, appears stated age and cooperative  Skin: Skin color, texture, turgor normal. No rashes or lesions  Eyes:Eye: Normal external eye, conjunctiva, YAN. ENT: Head: Normocephalic, no lesions, without obvious abnormality. Neck: no adenopathy, no carotid bruit, no JVD, supple, symmetrical, trachea midline and thyroid not enlarged, symmetric, no tenderness/mass/nodules  Respiratory: clear to auscultation bilaterally  Cardiovascular: regular rate and rhythm, S1, S2 normal, no murmur, click, rub or gallop  Gastrointestinal: soft, non-tender; bowel sounds normal; no masses,  no organomegaly  Genitourinary: Deferred  Musculoskeletal:extremities normal, atraumatic, no cyanosis or edema  Neurologic: Mental status AAOx3 No facial asymmetry or droop. Normal muscle strength b/l. CN II-XII grossly intact. Recent Labs     01/21/23  1045   WBC 9.6   HGB 13.8*        Recent Labs     01/21/23  1045      K 4.1      CO2 24   BUN 20   CREATININE 1.10   GLUCOSE 106*   AST 16   ALT 10   BILITOT 0.9*   ALKPHOS 95       -----------------------------------------------------------------   CT HEAD WO CONTRAST    Result Date: 1/21/2023  EXAMINATION: CT OF THE HEAD WITHOUT CONTRAST  1/21/2023 11:06 am TECHNIQUE: CT of the head was performed without the administration of intravenous contrast. Automated exposure control, iterative reconstruction, and/or weight based adjustment of the mA/kV was utilized to reduce the radiation dose to as low as reasonably achievable. COMPARISON: None. HISTORY: ORDERING SYSTEM PROVIDED HISTORY: fall TECHNOLOGIST PROVIDED HISTORY: Reason for exam:->fall Has a \"code stroke\" or \"stroke alert\" been called? ->No Decision Support Exception - unselect if not a suspected or confirmed emergency medical condition->Emergency Medical Condition (MA) What reading provider will be dictating this exam?->CRC FINDINGS: BRAIN/VENTRICLES: There is no acute intracranial hemorrhage, mass effect or midline shift. No abnormal extra-axial fluid collection. The gray-white differentiation is maintained without evidence of an acute infarct. There is no evidence of hydrocephalus. The ventricles, cisterns and sulci are prominent consistent with atrophy. There is decreased attenuation within the periventricular white matter consistent with periventricular leukomalacia. ORBITS: The visualized portion of the orbits demonstrate no acute abnormality. SINUSES: The visualized paranasal sinuses and mastoid air cells demonstrate no acute abnormality. There is mild mucosal thickening seen within the ethmoid air cells. There is also mucosal thickening seen within the left maxillary sinus. SOFT TISSUES/SKULL:  No acute abnormality of the visualized skull or soft tissues. 1. There is no acute intracranial abnormality. Specifically, there is no intracranial hemorrhage. 2. Atrophy and periventricular leukomalacia, 3. Ethmoid and left maxillary sinusitis           Assessment and Plan   Falls 2/2 COVID infection  Not hypoxic, symptomatic mgmt, pt/ot, iv fluid, dc planning to snf likely.   Hx HTN, HLD  Cont home medications when available  Hx CAD, MI  Cont home medications  DVT proph    Patient Active Problem List   Diagnosis Code    Hyperlipidemia E78.5    Carotid stenosis, bilateral I65.23    Dementia ([facility]) F03.90    Hypertensive urgency I16.0    NSTEMI (non-ST elevated myocardial infarction) ([facility]) I21.4    Essential hypertension I10    HESS (dyspnea on exertion) R06.09    Leg edema D24.0    Acute diastolic heart failure ([facility]) I50.31    Chest pain R07.9    Angina, class III ([facility]) I20.9    S/P cardiac cath Z98.890    Anginal equivalent ([facility] [address] I20.8    Dizziness R42    Coronary artery disease involving native coronary artery of native heart without angina pectoris I25.10    CHF (congestive heart failure) ([facility]) I50.9    Congestive heart failure ([facility]) I50.9    Non-STEMI (non-ST elevated myocardial infarction) (HCC) I21.4    Unstable angina (HCC) I20.0    ACS (acute coronary syndrome) (HCC) I24.9    Bilateral carotid bruits R09.89    Syncope and collapse R55    Post PTCA Z98.61    Right inguinal hernia K40.90    COVID-19 virus infection U07.1       Lisa Cuba MD, MD  Admitting Hospitalist

## 2023-01-21 NOTE — ACP (ADVANCE CARE PLANNING)
Advance Care Planning     Advance Care Planning Activator (Inpatient)  Conversation Note      Date of ACP Conversation: 1/21/2023     Conversation Conducted with: Patient with Decision Making Capacity    ACP Activator: Lei Ramírez RN        Health Care Decision Maker:     Current Designated Health Care Decision Maker:     Primary Decision Maker: Olga Garcia - Child - [phone]    Primary Decision Maker: Cresencio Dodson - Child - [phone]        Click here to complete Healthcare Decision Makers including section of the Healthcare Decision Maker Relationship (ie \"Primary\")      Care Preferences    Ventilation: \"If you were in your present state of health and suddenly became very ill and were unable to breathe on your own, what would your preference be about the use of a ventilator (breathing machine) if it were available to you? \"      Would the patient desire the use of ventilator (breathing machine)?: no    \"If your health worsens and it becomes clear that your chance of recovery is unlikely, what would your preference be about the use of a ventilator (breathing machine) if it were available to you? \"     Would the patient desire the use of ventilator (breathing machine)?: No      Resuscitation  \"CPR works best to restart the heart when there is a sudden event, like a heart attack, in someone who is otherwise healthy. Unfortunately, CPR does not typically restart the heart for people who have serious health conditions or who are very sick. \"    \"In the event your heart stopped as a result of an underlying serious health condition, would you want attempts to be made to restart your heart (answer \"yes\" for attempt to resuscitate) or would you prefer a natural death (answer \"no\" for do not attempt to resuscitate)? \" no       [x] Yes   [] No   Educated Patient / Jaymie Lisha regarding differences between Advance Directives and portable DNR orders.     Length of ACP Conversation in minutes:      Tyrell Chauhan Outcomes:  [x] ACP discussion completed  [] Existing advance directive reviewed with patient; no changes to patient's previously recorded wishes  [] New Advance Directive completed  [x] Portable Do Not Rescitate prepared for Provider review and signature  [] POLST/POST/MOLST/MOST prepared for Provider review and signature      Follow-up plan:    [] Schedule follow-up conversation to continue planning  [] Referred individual to Provider for additional questions/concerns   [] Advised patient/agent/surrogate to review completed ACP document and update if needed with changes in condition, patient preferences or care setting    [x] This note routed to one or more involved healthcare providers

## 2023-01-21 NOTE — ED TRIAGE NOTES
Pt arrived via EMS from a nursing home D/T two falls this morning, the fist fall he slid off his chair onto the floor with no injury and the second fall he tripped and fell striking the back of his head on the floor. Pt does take a blood thinner but there is no bleeding. Pt has a small abrasion on the corner of his left eye, bleeding has stopped. Pt denies any pain, he is A&O x4, pulses are good and breathing is equal and unlabored.

## 2023-01-22 LAB — VITAMIN D 25-HYDROXY: 27.6 NG/ML

## 2023-01-22 PROCEDURE — 82306 VITAMIN D 25 HYDROXY: CPT

## 2023-01-22 PROCEDURE — 6360000002 HC RX W HCPCS: Performed by: FAMILY MEDICINE

## 2023-01-22 PROCEDURE — 2580000003 HC RX 258: Performed by: FAMILY MEDICINE

## 2023-01-22 PROCEDURE — 6370000000 HC RX 637 (ALT 250 FOR IP): Performed by: INTERNAL MEDICINE

## 2023-01-22 PROCEDURE — 1210000000 HC MED SURG R&B

## 2023-01-22 RX ORDER — DONEPEZIL HYDROCHLORIDE 5 MG/1
5 TABLET, FILM COATED ORAL NIGHTLY
Status: DISCONTINUED | OUTPATIENT
Start: 2023-01-22 | End: 2023-01-25 | Stop reason: HOSPADM

## 2023-01-22 RX ORDER — CARVEDILOL 6.25 MG/1
6.25 TABLET ORAL 2 TIMES DAILY
Status: DISCONTINUED | OUTPATIENT
Start: 2023-01-22 | End: 2023-01-25 | Stop reason: HOSPADM

## 2023-01-22 RX ORDER — CLOPIDOGREL BISULFATE 75 MG/1
75 TABLET ORAL DAILY
Status: DISCONTINUED | OUTPATIENT
Start: 2023-01-23 | End: 2023-01-25 | Stop reason: HOSPADM

## 2023-01-22 RX ORDER — ISOSORBIDE MONONITRATE 30 MG/1
30 TABLET, EXTENDED RELEASE ORAL DAILY
Status: DISCONTINUED | OUTPATIENT
Start: 2023-01-23 | End: 2023-01-25 | Stop reason: HOSPADM

## 2023-01-22 RX ORDER — LISINOPRIL 10 MG/1
10 TABLET ORAL DAILY
Status: DISCONTINUED | OUTPATIENT
Start: 2023-01-23 | End: 2023-01-25 | Stop reason: HOSPADM

## 2023-01-22 RX ADMIN — DONEPEZIL HYDROCHLORIDE 5 MG: 5 TABLET, FILM COATED ORAL at 23:17

## 2023-01-22 RX ADMIN — Medication 10 ML: at 08:29

## 2023-01-22 RX ADMIN — Medication 10 ML: at 23:20

## 2023-01-22 RX ADMIN — SODIUM CHLORIDE: 9 INJECTION, SOLUTION INTRAVENOUS at 04:17

## 2023-01-22 RX ADMIN — ENOXAPARIN SODIUM 40 MG: 100 INJECTION SUBCUTANEOUS at 08:29

## 2023-01-22 RX ADMIN — SODIUM CHLORIDE: 9 INJECTION, SOLUTION INTRAVENOUS at 18:50

## 2023-01-22 RX ADMIN — CARVEDILOL 6.25 MG: 6.25 TABLET, FILM COATED ORAL at 23:17

## 2023-01-22 NOTE — CARE COORDINATION
[facility] Case Management Initial Discharge Assessment    Met with Family to discuss discharge plan. PCP: Jaquelin Knapp                                Date of Last Visit: 11/17/22    VA Patient: No        VA Notified: no    If no PCP, list provided? N/A    Discharge Planning    Living Arrangements: in assisted living facility independently    Who do you live with? Anika     Who helps you with your care:  [facility]     If lives at home:     Do you have any barriers navigating in your home? no    Patient can perform ADL? Yes    Current Services (outpatient and in home) :   [address] [place] [address]     Dialysis: No    Is transportation available to get to your appointments? Yes    DME Equipment:  yes - CANE, WALKER     Respiratory equipment: None    Respiratory provider:  no     Pharmacy:  yes - Leslie Sanabria with Medication Assistance Program?  No      Patient agreeable to [employer] [facility]? Yes, Company [employer]NS[employer] OF NAME, [address] [place] [address] [facility]     Patient agreeable to SNF/Rehab? Declined    Other discharge needs identified? N/A    Does Patient Have a High-Risk for Readmission Diagnosis (CHF, PN, MI, COPD)? No      Initial Discharge Plan? (Note: please see concurrent daily documentation for any updates after initial note). CALL TO DAUGHTER LYRIC TO DISCUSS DISCHARGE PLANNING. DTR WOULD LIKE THERAPY FROM Anika IF NEEDED.      Readmission Risk              Risk of Unplanned Readmission:  11         Electronically signed by Lennox Gonzalez RN on 1/22/2023 at 10:27 AM

## 2023-01-22 NOTE — PLAN OF CARE
Problem: Discharge Planning  Goal: Discharge to home or other facility with appropriate resources  1/22/2023 1005 by Beena Amaro RN  Outcome: Progressing  1/22/2023 0135 by Liv Duarte, RN  Outcome: Progressing     Problem: Safety - Adult  Goal: Free from fall injury  1/22/2023 1005 by Beena Amaro RN  Outcome: Progressing  1/22/2023 0135 by Liv Duarte, RN  Outcome: Progressing     Problem: ABCDS Injury Assessment  Goal: Absence of physical injury  1/22/2023 1005 by Beena Amaro RN  Outcome: Progressing  1/22/2023 0135 by Liv Duarte, RN  Outcome: Progressing

## 2023-01-22 NOTE — CARE COORDINATION
I messaged attending to address the code status change, new document was signed in [facility].  Electronically signed by Rehan Lagos RN on 1/22/2023 at 12:25 PM

## 2023-01-22 NOTE — FLOWSHEET NOTE
Assumed care of patient for   four   hour shift. Previous assessment reviewed and updated as needed. Chart and meds reviewed. Pt :       RESTING QUIETLY with eyes closed. Complaints of:  Pain:  IV: NS 100ml/hr  TELE:       no tele                    OXYGEN : room air  Dressings:   Precautions:     COVID         Falls:   85     Wiliam: 19  Call light in reach.    Electronically signed by Hiram Mcarthur RN on 1/22/2023 at 5:06 AM      NOTES:

## 2023-01-22 NOTE — PROGRESS NOTES
Hospitalist Daily Progress Note  Name: Damian Otero  Age: 82 y.o. Gender: male  CodeStatus: DNR-CC  Allergies: No Known Allergies    Chief Complaint:Fall      Primary Care Provider: [address] [place] [address] Team: Treatment Team: Attending Provider: Yaakov Altamirano MD; Respiratory Therapist (Day): Tiffanie Leon RCP; Registered Nurse: Anneliese Matthews RN; Patient Care Tech: Lily Belts; Utilization Reviewer: Mariposa Negrete RN    Admission Date: 1/21/2023      Subjective: No chest pain, sob, nausea. Sundowning last night, sleeping currently. Physical Exam  Vitals and nursing note reviewed. Constitutional:       Appearance: Normal appearance. Cardiovascular:      Rate and Rhythm: Normal rate and regular rhythm. Pulmonary:      Effort: Pulmonary effort is normal.      Breath sounds: Normal breath sounds. Abdominal:      General: Bowel sounds are normal.      Palpations: Abdomen is soft. Musculoskeletal:         General: Normal range of motion. Skin:     General: Skin is warm and dry. Neurological:      General: No focal deficit present. Mental Status: He is alert. Mental status is at baseline. Medications:  Reviewed    Infusion Medications:    sodium chloride      sodium chloride 100 mL/hr at 01/22/23 0417     Scheduled Medications:    sodium chloride flush  5-40 mL IntraVENous 2 times per day    enoxaparin  40 mg SubCUTAneous Daily     PRN Meds: sodium chloride flush, sodium chloride, ondansetron **OR** ondansetron, polyethylene glycol, acetaminophen **OR** acetaminophen, guaiFENesin-dextromethorphan    Labs:   Recent Labs     01/21/23  1045   WBC 9.6   HGB 13.8*   HCT 42.5        Recent Labs     01/21/23  1045      K 4.1      CO2 24   BUN 20   CREATININE 1.10   CALCIUM 9.1     Recent Labs     01/21/23  1045   AST 16   ALT 10   BILITOT 0.9*   ALKPHOS 95     No results for input(s): INR in the last 72 hours.   No results for input(s): CKTOTAL, TROPONINI in the last 72 hours. Urinalysis:   Lab Results   Component Value Date/Time    NITRU Negative 01/21/2023 10:45 AM    WBCUA 3-5 01/21/2023 10:45 AM    BACTERIA Negative 01/21/2023 10:45 AM    RBCUA 6-10 01/21/2023 10:45 AM    BLOODU Negative 01/21/2023 10:45 AM    SPECGRAV 1.019 01/21/2023 10:45 AM    GLUCOSEU Negative 01/21/2023 10:45 AM       Radiology:   Most recent    Chest CT      WITH CONTRAST:No results found for this or any previous visit. WITHOUT CONTRAST: No results found for this or any previous visit. CXR      2-view: Results for orders placed in visit on 01/14/20    XR CHEST STANDARD (2 VW)    Narrative  EXAMINATION: XR CHEST (2 VW)    DATE AND TIME:1/14/2020 1:16 PM    CLINICAL HISTORY: Shortness of breath  R93.89 Abnormal CXR ICD10    COMPARISONS: January 8, 2020    FINDINGS: Chronic elevation of the right hemidiaphragm with some crowding of vessels at the right base. No fresh infiltrate. No overt pulmonary edema. No pneumothorax. Heart and mediastinum are within normal limits. Chronic degenerative change in right  shoulder. Impression  NO  ACTIVE LUNG DISEASE. Results for orders placed during the hospital encounter of 01/05/20    XR CHEST STANDARD (2 VW)    Narrative  EXAMINATION: XR CHEST (2 VW)    CLINICAL HISTORY: COUGH, SHORTNESS OF BREATH    COMPARISONS: DECEMBER 23, 2018    FINDINGS: Degenerative changes right glenohumeral joint identified. Right diaphragm elevated. Cardiopericardial silhouette normal. Ill-defined areas increase opacity at lung bases bilaterally. Impression  BIBASILAR SUBSEGMENTAL ATELECTASIS/PNEUMONIA. Portable: Results for orders placed during the hospital encounter of 07/12/20    XR CHEST PORTABLE    Narrative  Exam: XR CHEST PORTABLE    History: Chest pain    Technique: AP portable view of the chest obtained.     Comparison: Portable chest radiograph from  May 31, 2020    Findings:    Chronic elevation of the right hemidiaphragm The cardiomediastinal silhouette is within normal limits. No pneumothorax, pleural effusion, or consolidation. Degenerative changes of the spine and both shoulders. Impression  No radiographic evidence of acute intrathoracic process. Echo No results found for this or any previous visit. Assessment/Plan:    Active Hospital Problems    Diagnosis Date Noted    COVID-19 virus infection [U07.1] 01/21/2023     Priority: Medium     Falls 2/2 COVID infection  Not hypoxic, symptomatic mgmt, pt/ot, iv fluid, dc planning to snf likely.   1/22 - cont iv fluids, pt/ot planning  Hx HTN, HLD  Cont home medications when available  Hx CAD, MI  Cont home medications  DVT proph       Electronically signed by Anais Dykes MD on 1/22/2023 at 4:37 PM

## 2023-01-23 LAB
EKG ATRIAL RATE: 65 BPM
EKG ATRIAL RATE: 66 BPM
EKG P AXIS: 35 DEGREES
EKG P AXIS: 57 DEGREES
EKG P-R INTERVAL: 184 MS
EKG P-R INTERVAL: 198 MS
EKG Q-T INTERVAL: 430 MS
EKG Q-T INTERVAL: 438 MS
EKG QRS DURATION: 108 MS
EKG QRS DURATION: 110 MS
EKG QTC CALCULATION (BAZETT): 447 MS
EKG QTC CALCULATION (BAZETT): 459 MS
EKG R AXIS: -50 DEGREES
EKG R AXIS: -58 DEGREES
EKG T AXIS: 13 DEGREES
EKG T AXIS: 39 DEGREES
EKG VENTRICULAR RATE: 65 BPM
EKG VENTRICULAR RATE: 66 BPM

## 2023-01-23 PROCEDURE — 97161 PT EVAL LOW COMPLEX 20 MIN: CPT

## 2023-01-23 PROCEDURE — 6370000000 HC RX 637 (ALT 250 FOR IP): Performed by: INTERNAL MEDICINE

## 2023-01-23 PROCEDURE — 6370000000 HC RX 637 (ALT 250 FOR IP): Performed by: FAMILY MEDICINE

## 2023-01-23 PROCEDURE — 2580000003 HC RX 258: Performed by: FAMILY MEDICINE

## 2023-01-23 PROCEDURE — 97166 OT EVAL MOD COMPLEX 45 MIN: CPT

## 2023-01-23 PROCEDURE — 93010 ELECTROCARDIOGRAM REPORT: CPT | Performed by: INTERNAL MEDICINE

## 2023-01-23 PROCEDURE — 6360000002 HC RX W HCPCS: Performed by: FAMILY MEDICINE

## 2023-01-23 PROCEDURE — 1210000000 HC MED SURG R&B

## 2023-01-23 RX ORDER — TIMOLOL MALEATE 5 MG/ML
1 SOLUTION/ DROPS OPHTHALMIC 2 TIMES DAILY
Status: DISCONTINUED | OUTPATIENT
Start: 2023-01-23 | End: 2023-01-25 | Stop reason: HOSPADM

## 2023-01-23 RX ORDER — DORZOLAMIDE HCL 20 MG/ML
1 SOLUTION/ DROPS OPHTHALMIC 2 TIMES DAILY
Status: DISCONTINUED | OUTPATIENT
Start: 2023-01-23 | End: 2023-01-25 | Stop reason: HOSPADM

## 2023-01-23 RX ORDER — FINASTERIDE 5 MG/1
5 TABLET, FILM COATED ORAL DAILY
Status: DISCONTINUED | OUTPATIENT
Start: 2023-01-23 | End: 2023-01-25 | Stop reason: HOSPADM

## 2023-01-23 RX ORDER — LATANOPROST 50 UG/ML
1 SOLUTION/ DROPS OPHTHALMIC DAILY
Status: DISCONTINUED | OUTPATIENT
Start: 2023-01-23 | End: 2023-01-25 | Stop reason: HOSPADM

## 2023-01-23 RX ORDER — PRAVASTATIN SODIUM 40 MG
80 TABLET ORAL EVERY EVENING
Status: DISCONTINUED | OUTPATIENT
Start: 2023-01-23 | End: 2023-01-25 | Stop reason: HOSPADM

## 2023-01-23 RX ORDER — DORZOLAMIDE HYDROCHLORIDE AND TIMOLOL MALEATE 20; 5 MG/ML; MG/ML
1 SOLUTION/ DROPS OPHTHALMIC 2 TIMES DAILY
Status: DISCONTINUED | OUTPATIENT
Start: 2023-01-23 | End: 2023-01-23 | Stop reason: RX

## 2023-01-23 RX ADMIN — LISINOPRIL 10 MG: 10 TABLET ORAL at 10:02

## 2023-01-23 RX ADMIN — LATANOPROST 1 DROP: 50 SOLUTION OPHTHALMIC at 14:31

## 2023-01-23 RX ADMIN — TIMOLOL MALEATE 1 DROP: 5 SOLUTION OPHTHALMIC at 14:31

## 2023-01-23 RX ADMIN — PRAVASTATIN SODIUM 80 MG: 40 TABLET ORAL at 20:52

## 2023-01-23 RX ADMIN — CARVEDILOL 6.25 MG: 6.25 TABLET, FILM COATED ORAL at 20:52

## 2023-01-23 RX ADMIN — ISOSORBIDE MONONITRATE 30 MG: 30 TABLET, EXTENDED RELEASE ORAL at 10:02

## 2023-01-23 RX ADMIN — TIMOLOL MALEATE 1 DROP: 5 SOLUTION OPHTHALMIC at 20:53

## 2023-01-23 RX ADMIN — ENOXAPARIN SODIUM 40 MG: 100 INJECTION SUBCUTANEOUS at 10:01

## 2023-01-23 RX ADMIN — Medication 10 ML: at 20:53

## 2023-01-23 RX ADMIN — SODIUM CHLORIDE: 9 INJECTION, SOLUTION INTRAVENOUS at 03:47

## 2023-01-23 RX ADMIN — CLOPIDOGREL BISULFATE 75 MG: 75 TABLET ORAL at 10:02

## 2023-01-23 RX ADMIN — DONEPEZIL HYDROCHLORIDE 5 MG: 5 TABLET, FILM COATED ORAL at 20:52

## 2023-01-23 RX ADMIN — FINASTERIDE 5 MG: 5 TABLET, FILM COATED ORAL at 14:31

## 2023-01-23 RX ADMIN — DORZOLAMIDE HYDROCHLORIDE 1 DROP: 20 SOLUTION/ DROPS OPHTHALMIC at 20:53

## 2023-01-23 RX ADMIN — DORZOLAMIDE HYDROCHLORIDE 1 DROP: 20 SOLUTION/ DROPS OPHTHALMIC at 14:31

## 2023-01-23 RX ADMIN — CARVEDILOL 6.25 MG: 6.25 TABLET, FILM COATED ORAL at 10:02

## 2023-01-23 NOTE — PROGRESS NOTES
[facility] OCCUPATIONAL THERAPY EVALUATION - ACUTE     NAME: Corazon Finney  : 1935 (82 y.o.)  MRN: [id number]  CODE STATUS: DNR-CC  Room: X928/Q574-78    Date of Service: 2023    Patient Diagnosis(es): Orthostatic hypotension [I95.1]  General weakness [R53.1]  COVID-19 virus infection [U07.1]   Patient Active Problem List    Diagnosis Date Noted    Congestive heart failure ([address] 2020    COVID-19 virus infection 2023    Right inguinal hernia 2020    Syncope and collapse 2020    Post PTCA     Bilateral carotid bruits 2020    Unstable angina ([address] 2020    ACS (acute coronary syndrome) ([address] 2020    Non-STEMI (non-ST elevated myocardial infarction) ([address] 2020    CHF (congestive heart failure) ([address]     Coronary artery disease involving native coronary artery of native heart without angina pectoris 2019    Dizziness     Anginal equivalent ([address] 2018    S/P cardiac cath 2018    Angina, class III ([address]     Chest pain 2018    HESS (dyspnea on exertion) 2018    Leg edema     Acute diastolic heart failure ([address] 2018    Essential hypertension 2018    NSTEMI (non-ST elevated myocardial infarction) ([address] 2018    Hypertensive urgency 2018    Dementia ([address]     Carotid stenosis, bilateral 2013    Hyperlipidemia         Past Medical History:   Diagnosis Date    Anticoagulant long-term use     Anxiety     CAD (coronary artery disease)     Carotid stenosis     2013 16-49%    CHF (congestive heart failure) ([address]     Dementia ([address]     Dementia ([address]     Hyperlipidemia     Hypertension     MI (myocardial infarction) ([address]     Osteoarthritis      Past Surgical History:   Procedure Laterality Date    [facility]    CATARACT REMOVAL      COLONOSCOPY  12/10/10,2007    DR Crosby Phalen - DONE AT [facility]    COLONOSCOPY  2007    hx polyps needs 2015    COLONOSCOPY  9/21/15     DR. Richy Sue CORONARY ANGIOPLASTY WITH STENT PLACEMENT  06/07/2020    DIAGNOSTIC CARDIAC CATH LAB PROCEDURE      HERNIA REPAIR Bilateral     x 2    HERNIA REPAIR Right 11/13/2020    RIGHT INGUINAL HERNIA REPAIR performed by Shannon Diamond MD at [facility]          Restrictions  Restrictions/Precautions: Isolation, Fall Risk (Covid+)     Safety Devices: Safety Devices  Type of Devices: All fall risk precautions in place; Chair alarm in place;Call light within reach     Patient's date of birth confirmed: Yes    General:  Chart Reviewed: Yes  Patient assessed for rehabilitation services?: Yes    Subjective  Subjective: \"I feel okay\"       Pain at start of treatment: No    Pain at end of treatment: No    Location: Pt. reports slight pain in abdomen, 1-2 but does not hurt with mobility and reports improvement following up to chair  Nursing notified:   RN:   Intervention: Repositioned    Prior Level of Function:  Social/Functional History  Lives With: Alone  Type of Home: Assisted living [facility]  Home Layout: One level  Home Access: Level entry  Bathroom Shower/Tub: Walk-in shower  Bathroom Equipment: Grab bars in shower, Built-in shower seat  Home Equipment: Rollator (Adjustable bed)  ADL Assistance: Independent  Homemaking Assistance:  (Facility manages)  Ambulation Assistance: Independent (Rollator)  Transfer Assistance: Independent  Active : No  Patient's  Info: Family, facility    OBJECTIVE:     Orientation Status:  Orientation  Orientation Level: Oriented to person;Oriented to situation    Observation:  Observation/Palpation  Posture: Good  Observation: Pt alert and attentive, agreeable to OT evaluation, no acute distress noted on RA    Cognition Status:  Cognition  Overall Cognitive Status: WFL  Cognition Comment: min increased processing time    Perception Status:  Perception  Overall Perceptual Status: WFL    Vision and Hearing Status:  Vision  Vision Exceptions: Wears glasses for reading  Hearing  Hearing: Exceptions to [facility]  Hearing Exceptions: Hard of hearing/hearing concerns   Vision - Basic Assessment  Prior Vision: Wears glasses only for reading  Visual History:  (\"On Monday I had eye injections\")    GROSS ASSESSMENT AROM/PROM:  AROM: Within functional limits       ROM:   LUE AROM (degrees)  LUE AROM : WFL  Left Hand AROM (degrees)  Left Hand AROM: WFL  RUE AROM (degrees)  RUE AROM : WFL  Right Hand AROM (degrees)  Right Hand AROM: WFL    UE STRENGTH:  Strength: Generally decreased, functional    UE COORDINATION:  Coordination: Generally decreased, functional    UE TONE:  Tone: Normal    UE SENSATION:  Sensation: Intact    Hand Dominance:  Hand Dominance  Hand Dominance: Right    ADL Status:  ADL  Feeding: Independent  Grooming: Independent  UE Bathing: Supervision  LE Bathing: Supervision  UE Dressing: Supervision  LE Dressing: Supervision  Toileting: Supervision  Additional Comments: Simulated ADLs as above. Pt with no difficulty reaching feet in figure 4 to don/doff socks, no LOB in standing to manage clothing, reports no difficulty on and off commode with nursing staff  Toilet Transfers  Toilet Transfer: Unable to assess  Toilet Transfers Comments: Declines need, states he just toileted. Anticipate supervision without grab bar and Mod I with grab bar    Functional Mobility:    Transfers  Sit to stand: Supervision  Stand to sit: Supervision  Transfer Comments: Increased effort    Patient ambulated to/from bathroom with [employer] at Supervision level.  Increased time and effort for ambulation, pt reports he typically uses rollator    Bed Mobility  Bed mobility  Bridging: Modified independent   Rolling to Left: Modified independent  Rolling to Right: Modified independent  Supine to Sit: Modified independent  Bed Mobility Comments: increased effort and bed rail required    Seated and Standing Balance:  Balance  Sitting: Intact  Standing: Impaired  Standing - Static: Good  Standing - Dynamic: Fair    Functional Endurance:  Activity Tolerance  Activity Tolerance: Patient Tolerated treatment well    D/C Recommendations:  OT D/C RECOMMENDATIONS  REQUIRES OT FOLLOW-UP: Yes    Equipment Recommendations:  OT Equipment Recommendations  Other: Continue to assess    OT Education:   Patient Education  Education Given To: Patient  Education Provided: Role of Therapy;Plan of Care  Education Method: Verbal  Barriers to Learning: None  Education Outcome: Verbalized understanding    OT Follow Up:   OT D/C RECOMMENDATIONS  REQUIRES OT FOLLOW-UP: Yes       Assessment/Discharge Disposition:  Assessment: Pt is an 82year old man from [facility] who [facility] to [facility] with the above deficits which impact his ability to perform ADLs and IADLs. Pt. limited d/t fatigue and weakness. Pt would benefit from continued OT to maximize independence and safety with ADL tasks.   Performance deficits / Impairments: Decreased functional mobility , Decreased ADL status, Decreased strength, Decreased endurance, Decreased balance, Decreased high-level IADLs  Prognosis: Good  Discharge Recommendations: Continue to assess pending progress  Decision Making: Medium Complexity  History: Pt's medical history is moderately complex  Exam: Pt. has 6 performance deficits  Assistance / Modification: Pt. requires min A    AMPAC (Six Click) Self care Score   How much help for putting on and taking off regular lower body clothing?: A Little  How much help for Bathing?: A Little  How much help for Toileting?: A Little  How much help for putting on and taking off regular upper body clothing?: A Little  How much help for taking care of personal grooming?: None  How much help for eating meals?: None  AM-[facility] Inpatient Daily Activity Raw Score: 20  AM-PAC Inpatient ADL T-Scale Score : 42.03  ADL Inpatient CMS 0-100% Score: 38.32    Therapy key for assistance levels -   Independent/Mod I = Pt. is able to perform task with no assistance but may require a device   Stand by assistance = Pt. does not perform task at an independent level but does not need physical assistance, requires verbal cues  Minimal, Moderate, Maximal Assistance = Pt. requires physical assistance (25%, 50%, 75% assist from helper) for task but is able to actively participate in task   Dependent = Pt. requires total assistance with task and is not able to actively participate with task completion     Plan:  Occupational Therapy Plan  Times Per Week: 1-2x  Therapy Duration:  (Length of acute stay)  Current Treatment Recommendations: Strengthening, Balance training, Functional mobility training, Endurance training, Safety education & training, Patient/Caregiver education & training, Equipment evaluation, education, & procurement, Self-Care / ADL    Goals:   Patient will:    - Improve functional endurance to tolerate/complete 30 mins of ADL's  - Be Mod I in UB ADLs   - Be Mod I in LB ADLs  - Be Mod I in ADL transfers without LOB  - Be Mod I in toileting tasks  - Improve B UE strength and endurance to 4/5 in order to participate in self-care activities as projected. - Access appropriate D/C site with as few architectural barriers as possible. - Sequence self-care tasks with no verbal cues for safety    Patient Goal: Patient goals : \"I want to get home\"     Discussed and agreed upon: Yes Comments:       Therapy Time:   Individual   Time In 0832   Time Out 0847   Minutes 15     Eval: 15 minutes     Electronically signed by:     CLEVELAND White,   1/23/2023, 9:39 AM

## 2023-01-23 NOTE — PROGRESS NOTES
Physical Therapy Med Surg Initial Assessment  [facility]/Department[facility]  [facility]       NAME: Damian Otero  : 1935 (82 y.o.)  MRN: [id number]  CODE STATUS: DNR-CC    Date of Service: 2023    Patient Diagnosis(es): Orthostatic hypotension [I95.1]  General weakness [R53.1]  COVID-19 virus infection [U07.1]   Chief Complaint   Patient presents with    Fall     Patient Active Problem List    Diagnosis Date Noted    Congestive heart failure ([address] 2020    COVID-19 virus infection 2023    Right inguinal hernia 2020    Syncope and collapse 2020    Post PTCA     Bilateral carotid bruits 2020    Unstable angina ([address] 2020    ACS (acute coronary syndrome) ([address] 2020    Non-STEMI (non-ST elevated myocardial infarction) ([address] 2020    CHF (congestive heart failure) ([address]     Coronary artery disease involving native coronary artery of native heart without angina pectoris 2019    Dizziness     Anginal equivalent ([address] 2018    S/P cardiac cath 2018    Angina, class III ([address]     Chest pain 2018    HESS (dyspnea on exertion) 2018    Leg edema     Acute diastolic heart failure ([address] 2018    Essential hypertension 2018    NSTEMI (non-ST elevated myocardial infarction) ([address] 2018    Hypertensive urgency 2018    Dementia ([address]     Carotid stenosis, bilateral 2013    Hyperlipidemia         Past Medical History:   Diagnosis Date    Anticoagulant long-term use     Anxiety     CAD (coronary artery disease)     Carotid stenosis     2013 16-49%    CHF (congestive heart failure) ([address]     Dementia (HCC)     Dementia ([address]     Hyperlipidemia     Hypertension     MI (myocardial infarction) ([address]     Osteoarthritis      Past Surgical History:   Procedure Laterality Date    [facility]    CATARACT REMOVAL      COLONOSCOPY  12/10/10,2007    DR MATTHEWS - DONE AT [facility]    COLONOSCOPY  2007    hx polyps needs 2015    COLONOSCOPY  9/21/15     DR. YARBROUGH     CORONARY ANGIOPLASTY WITH STENT PLACEMENT  06/07/2020    DIAGNOSTIC CARDIAC CATH LAB PROCEDURE      HERNIA REPAIR Bilateral     x 2    HERNIA REPAIR Right 11/13/2020    RIGHT INGUINAL HERNIA REPAIR performed by Dakota Murray MD at [facility]    PTCA         Chart Reviewed: Yes  Patient assessed for rehabilitation services?: Yes  Family / Caregiver Present: No    Restrictions:  Restrictions/Precautions: Isolation, Fall Risk     SUBJECTIVE:        Pain  Pt reports abdominal pain at 2/310 initially - no medication requested. No pain reported after session    Prior Level of Function:  Social/Functional History  Lives With: Alone  Type of Home: Assisted living [facility])  Home Layout: One level  Home Access: Level entry  Bathroom Shower/Tub: Walk-in shower  Bathroom Equipment: Grab bars in shower, Built-in shower seat  Home Equipment: Rollator (Adjustable bed)  ADL Assistance: Independent  Homemaking Assistance:  (Facility manages)  Ambulation Assistance: Independent (Rollator)  Transfer Assistance: Independent  Active : No  Patient's  Info: Family, facility    OBJECTIVE:   Vision  Vision: Impaired  Vision Exceptions: Wears glasses for reading  Hearing: Exceptions to [facility]  Hearing Exceptions: Hard of hearing/hearing concerns    Cognition:  Orientation Level: Oriented to person, Oriented to situation  Follows Commands: Within Functional Limits  Overall Cognitive Status: WFL  Cognition Comment: min increased processing time         ROM:  AROM: Within functional limits  PROM: Within functional limits    Strength:  Strength:  Within functional limits    Neuro:  Balance  Sitting - Static: Good  Sitting - Dynamic: Good  Standing - Static: Fair (pt able to stand with BUE support with no LOB)  Standing - Dynamic: Fair (mild delay in balance reaction in gait with ww)                      Coordination: Within functional limits         Bed mobility  Bridging: Modified independent   Rolling to Left: Modified independent  Rolling to Right: Modified independent  Supine to Sit: Modified independent  Bed Mobility Comments: increased effort and bed rail required    Transfers  Sit to Stand: Supervision;Modified independent  Stand to Sit: Supervision;Modified independent    Ambulation  Surface: Level tile  Device: Rolling Walker  Other Apparatus: (IV)  Assistance: Stand by assistance  Quality of Gait: decrease fluidity of movements, short step length, mild concern for judgement, mild delay in balance reactions with pt correcting without assistance  Distance: 25 feet  Comments: pt normally utilizes rollator for gait                   Activity Tolerance  Activity Tolerance: Patient tolerated evaluation without incident    Patient Education  Education Given To: Patient  Education Provided: Role of Therapy;Plan of Care  Education Outcome: Verbalized understanding       ASSESSMENT:   Body Structures, Functions, Activity Limitations Requiring Skilled Therapeutic Intervention: Decreased functional mobility ; Decreased safe awareness;Decreased balance  Decision Making: Low Complexity  History: high  Exam: low  Clinical Presentation: low    Barriers to Learning: none         DISCHARGE RECOMMENDATIONS:  Discharge Recommendations: Continue to assess pending progress    Assessment: Mild concern noted in areas above. Pt has covid and does not have his rollator available at this time. Pt would benefit from continued PT to ensure safe mobility for return to indep living  Requires PT Follow-Up: Yes       PLAN OF CARE:  Physcial Therapy Plan  General Plan: 1 time a day 3-6 times a week  Current Treatment Recommendations: Balance training, Functional mobility training, Transfer training, Gait training, Safety education & training    Safety Devices  Type of Devices: Chair alarm in place, Call light within reach, Bed alarm in place    Goals:  Short Term Goals  Short Term Goal 1: indep transfers  Short Term Goal 2: indep gait in room with ww or rollator 25 feet    AMPAC (6 CLICK) BASIC MOBILITY  AM-PAC Inpatient Mobility Raw Score : 22     Therapy Time:   Individual   Time In 0832   Time Out 0846   Minutes [place], [place], 01/23/23 at 9:37 AM         Definitions for assistance levels  Independent = pt does not require any physical supervision or assistance from another person for activity completion. Device may be needed.   Stand by assistance = pt requires verbal cues or instructions from another person, close to but not touching, to perform the activity  Minimal assistance= pt performs 75% or more of the activity; assistance is required to complete the activity  Moderate assistance= pt performs 50% of the activity; assistance is required to complete the activity  Maximal assistance = pt performs 25% of the activity; assistance is required to complete the activity  Dependent = pt requires total physical assistance to accomplish the task

## 2023-01-23 NOTE — PROGRESS NOTES
Shift assessment completed this AM.  Pt A&O x4. Denies having any pain. Lungs clear bilat on RA, no SOB. Pt eating approx 75% of all meals. No report of diarrhea. Pt states \"I'm all done with that\". Pt able to ambulate in room with stand by assist.  Call from the pts daughter Jayda Medina who inquired about the pts home medications. Secure message sent to hospitalist explaining the pts daughters request, home medications ordered per hospitalist.  Pt safety maintained throughout this shift.

## 2023-01-23 NOTE — PLAN OF CARE
See OT evaluation for all goals and OT POC.  Electronically signed by JOSE MANUEL Brooks/L on 1/23/2023 at 9:42 AM

## 2023-01-24 PROCEDURE — 6360000002 HC RX W HCPCS: Performed by: FAMILY MEDICINE

## 2023-01-24 PROCEDURE — 6370000000 HC RX 637 (ALT 250 FOR IP): Performed by: FAMILY MEDICINE

## 2023-01-24 PROCEDURE — 6370000000 HC RX 637 (ALT 250 FOR IP): Performed by: INTERNAL MEDICINE

## 2023-01-24 PROCEDURE — 2580000003 HC RX 258: Performed by: FAMILY MEDICINE

## 2023-01-24 PROCEDURE — 97116 GAIT TRAINING THERAPY: CPT

## 2023-01-24 PROCEDURE — 1210000000 HC MED SURG R&B

## 2023-01-24 RX ORDER — GUAIFENESIN/DEXTROMETHORPHAN 100-10MG/5
5 SYRUP ORAL EVERY 4 HOURS PRN
Qty: 120 ML | DISCHARGE
Start: 2023-01-24 | End: 2023-02-03

## 2023-01-24 RX ORDER — DORZOLAMIDE HCL 20 MG/ML
1 SOLUTION/ DROPS OPHTHALMIC 2 TIMES DAILY
Qty: 10 ML | DISCHARGE
Start: 2023-01-24

## 2023-01-24 RX ORDER — TIMOLOL MALEATE 5 MG/ML
1 SOLUTION/ DROPS OPHTHALMIC 2 TIMES DAILY
DISCHARGE
Start: 2023-01-24 | End: 2023-02-23

## 2023-01-24 RX ADMIN — CARVEDILOL 6.25 MG: 6.25 TABLET, FILM COATED ORAL at 09:09

## 2023-01-24 RX ADMIN — DONEPEZIL HYDROCHLORIDE 5 MG: 5 TABLET, FILM COATED ORAL at 21:08

## 2023-01-24 RX ADMIN — SODIUM CHLORIDE: 9 INJECTION, SOLUTION INTRAVENOUS at 00:34

## 2023-01-24 RX ADMIN — CLOPIDOGREL BISULFATE 75 MG: 75 TABLET ORAL at 09:09

## 2023-01-24 RX ADMIN — ENOXAPARIN SODIUM 40 MG: 100 INJECTION SUBCUTANEOUS at 09:08

## 2023-01-24 RX ADMIN — Medication 10 ML: at 09:11

## 2023-01-24 RX ADMIN — FINASTERIDE 5 MG: 5 TABLET, FILM COATED ORAL at 09:09

## 2023-01-24 RX ADMIN — DORZOLAMIDE HYDROCHLORIDE 1 DROP: 20 SOLUTION/ DROPS OPHTHALMIC at 09:10

## 2023-01-24 RX ADMIN — PRAVASTATIN SODIUM 80 MG: 40 TABLET ORAL at 17:50

## 2023-01-24 RX ADMIN — TIMOLOL MALEATE 1 DROP: 5 SOLUTION OPHTHALMIC at 09:10

## 2023-01-24 RX ADMIN — LATANOPROST 1 DROP: 50 SOLUTION OPHTHALMIC at 09:10

## 2023-01-24 RX ADMIN — ISOSORBIDE MONONITRATE 30 MG: 30 TABLET, EXTENDED RELEASE ORAL at 09:09

## 2023-01-24 RX ADMIN — CARVEDILOL 6.25 MG: 6.25 TABLET, FILM COATED ORAL at 21:09

## 2023-01-24 RX ADMIN — LISINOPRIL 10 MG: 10 TABLET ORAL at 09:09

## 2023-01-24 ASSESSMENT — PAIN SCALES - GENERAL: PAINLEVEL_OUTOF10: 0

## 2023-01-24 NOTE — CARE COORDINATION
Call to [facility] and spoke with nurse Rigoberto Ann to confirm that pt uses a walker for ambulation. Confirmed they can accept pt back when medically ready with COVID and if needed oxygen or HHC. They use [facility] or pt preference.

## 2023-01-24 NOTE — DISCHARGE SUMMARY
Physician Discharge Summary     Patient ID:  Danielle Padgett  [id number]  68 y.o.  1935    Admit date: 1/21/2023    Discharge date : 01/24/23     Admitting Physician: Sury Rodríguez MD     Discharge Physician: Amos Owens MD     Admission Diagnoses: Orthostatic hypotension [I95.1]  General weakness [R53.1]  COVID-19 virus infection [U07.1]    Discharge Diagnoses: covid 19 infection, falls    Admission Condition: fair    Discharged Condition: good    Hospital Course:   Falls 2/2 COVID infection  Not hypoxic, symptomatic mgmt, pt/ot, iv fluid, dc planning to snf likely. 1/22 - cont iv fluids, pt/ot planning  1/23 - rapidly improved, probable dc back to [place] tomorrow  Hx HTN, HLD  Cont home medications when available  Hx CAD, MI  Cont home medications    Consults: none    Significant Diagnostic Studies: as below    Discharge Exam:  BP (!) 158/71   Pulse 56   Temp 97.5 °F (36.4 °C) (Oral)   Resp 20   Ht 5' 9\" (1.753 m)   Wt 184 lb 4.8 oz (83.6 kg)   SpO2 98%   BMI 27.22 kg/m²   General appearance: alert, appears stated age, and cooperative  Lungs: clear to auscultation bilaterally  Heart: regular rate and rhythm, S1, S2 normal, no murmur, click, rub or gallop  Abdomen: soft, non-tender; bowel sounds normal; no masses,  no organomegaly  Extremities: extremities normal, atraumatic, no cyanosis or edema  Skin: Skin color, texture, turgor normal. No rashes or lesions    Labs:   No results for input(s): WBC, HGB, HCT, PLT in the last 72 hours. No results for input(s): NA, K, CL, CO2, BUN, CREATININE, CALCIUM, PHOS in the last 72 hours. Invalid input(s): MAGNES  No results for input(s): AST, ALT, BILIDIR, BILITOT, ALKPHOS in the last 72 hours. No results for input(s): INR in the last 72 hours. No results for input(s): Kaleen Hope in the last 72 hours.     Urinalysis:   Lab Results   Component Value Date/Time    NITRU Negative 01/21/2023 10:45 AM    WBCUA 3-5 01/21/2023 10:45 AM    BACTERIA Negative 01/21/2023 10:45 AM    RBCUA 6-10 01/21/2023 10:45 AM    BLOODU Negative 01/21/2023 10:45 AM    SPECGRAV 1.019 01/21/2023 10:45 AM    GLUCOSEU Negative 01/21/2023 10:45 AM       Radiology:   Most recent    Chest CT      WITH CONTRAST:No results found for this or any previous visit. WITHOUT CONTRAST: No results found for this or any previous visit. CXR      2-view: Results for orders placed in visit on 01/14/20    XR CHEST STANDARD (2 VW)    Narrative  EXAMINATION: XR CHEST (2 VW)    DATE AND TIME:1/14/2020 1:16 PM    CLINICAL HISTORY: Shortness of breath  R93.89 Abnormal CXR ICD10    COMPARISONS: January 8, 2020    FINDINGS: Chronic elevation of the right hemidiaphragm with some crowding of vessels at the right base. No fresh infiltrate. No overt pulmonary edema. No pneumothorax. Heart and mediastinum are within normal limits. Chronic degenerative change in right  shoulder. Impression  NO  ACTIVE LUNG DISEASE. Results for orders placed during the hospital encounter of 01/05/20    XR CHEST STANDARD (2 VW)    Narrative  EXAMINATION: XR CHEST (2 VW)    CLINICAL HISTORY: COUGH, SHORTNESS OF BREATH    COMPARISONS: DECEMBER 23, 2018    FINDINGS: Degenerative changes right glenohumeral joint identified. Right diaphragm elevated. Cardiopericardial silhouette normal. Ill-defined areas increase opacity at lung bases bilaterally. Impression  BIBASILAR SUBSEGMENTAL ATELECTASIS/PNEUMONIA. Portable: Results for orders placed during the hospital encounter of 07/12/20    XR CHEST PORTABLE    Narrative  Exam: XR CHEST PORTABLE    History: Chest pain    Technique: AP portable view of the chest obtained. Comparison: Portable chest radiograph from  May 31, 2020    Findings:    Chronic elevation of the right hemidiaphragm The cardiomediastinal silhouette is within normal limits. No pneumothorax, pleural effusion, or consolidation.   Degenerative changes of the spine and both shoulders. Impression  No radiographic evidence of acute intrathoracic process. Echo No results found for this or any previous visit. Disposition: assisted living    In process/preliminary results:  Outstanding Order Results       No orders found from 12/23/2022 to 1/22/2023.             Patient Instructions:   Current Discharge Medication List        START taking these medications    Details   guaiFENesin-dextromethorphan (ROBITUSSIN DM) 100-10 MG/5ML syrup Take 5 mLs by mouth every 4 hours as needed for Cough  Qty: 120 mL      timolol (TIMOPTIC) 0.5 % ophthalmic solution Place 1 drop into both eyes 2 times daily      dorzolamide (TRUSOPT) 2 % ophthalmic solution Place 1 drop into both eyes in the morning and at bedtime  Qty: 10 mL           CONTINUE these medications which have NOT CHANGED    Details   vitamin D 25 MCG (1000 UT) CAPS Take by mouth daily      finasteride (PROSCAR) 5 MG tablet TAKE 1 TABLET BY MOUTH DAILY  Qty: 30 tablet, Refills: 0    Associated Diagnoses: Benign prostatic hyperplasia, unspecified whether lower urinary tract symptoms present; NSTEMI (non-ST elevated myocardial infarction) ([facility])      nitroGLYCERIN (NITROSTAT) 0.4 MG SL tablet DISSOLVE 1 TABLET UNDER THE TONGUE AS NEEDED FOR CHEST PAIN-  MAY REPEAT EVERY 5 MINUTES IF NEEDED ( MAX 3 DOSES.- IF NO RELIEF CALL 911)  Qty: 25 tablet, Refills: 0      pravastatin (PRAVACHOL) 80 MG tablet TAKE 1 TABLET BY MOUTH EVERY EVENING  Qty: 30 tablet, Refills: 0      donepezil (ARICEPT) 5 MG tablet Take 1 tablet by mouth nightly  Qty: 210 tablet, Refills: 0      lisinopril (PRINIVIL;ZESTRIL) 10 MG tablet TAKE 1 TABLET BY MOUTH DAILY  Qty: 30 tablet, Refills: 2      carvedilol (COREG) 6.25 MG tablet TAKE 1 TABLET BY MOUTH TWICE DAILY  Qty: 60 tablet, Refills: 2      isosorbide mononitrate (IMDUR) 30 MG extended release tablet Take 1 tablet by mouth daily  Qty: 90 tablet, Refills: 3    Associated Diagnoses: Angina, class III ([facility]) clopidogrel (PLAVIX) 75 MG tablet Take 1 tablet by mouth daily  Qty: 90 tablet, Refills: 3      latanoprost (XALATAN) 0.005 % ophthalmic solution use 1 drop in each eye every day  Qty: 2.5 mL, Refills: 5      vitamin B-12 (CYANOCOBALAMIN) 1000 MCG tablet Take 500 mcg by mouth daily      Multiple Vitamins-Minerals (MULTIVITAMIN PO) Take by mouth daily       dorzolamide-timolol (COSOPT) 22.3-6.8 MG/ML ophthalmic solution Place 1 drop into both eyes 2 times daily            STOP taking these medications       isosorbide dinitrate (ISORDIL) 30 MG tablet Comments:   Reason for Stopping:         HYDROcodone-acetaminophen (NORCO) 5-325 MG per tablet Comments:   Reason for Stopping:             Activity: activity as tolerated  Diet: regular diet  Wound Care: none needed    Follow-up with pcp 1-2 weeks.     DC time 35 minutes    Signed:  Electronically signed by Chepe Perry MD on 1/24/2023 at 3:58 PM

## 2023-01-24 NOTE — PROGRESS NOTES
Assessment completed, pt denies pain, uses urinal and bsc in room, pm meds given, will continue to monitor

## 2023-01-24 NOTE — DISCHARGE INSTR - COC
Continuity of Care Form    Patient Name: Ion Davis   :  1935  MRN:  [id number]    Admit date:  2023  Discharge date:  ***    Code Status Order: DNR-CC   Advance Directives:     Admitting Physician:  Princess Honeycutt MD  PCP: Jayden Ch    Discharging Nurse: [facility] Unit/Room#: T506/S102-62  Discharging Unit Phone Number: ***    Emergency Contact:   Extended Emergency Contact Information  Primary Emergency Contact: GarciaOlga   [facility] [address] [facility] Phone: [phone]  Mobile Phone: [phone]  Relation: Child  Secondary Emergency Contact: [facility] [address] [facility] Phone: [phone]  Relation: Child    Past Surgical History:  Past Surgical History:   Procedure Laterality Date    [address]    CATARACT REMOVAL      COLONOSCOPY  12/10/10,2007    DR Christina Brannon - DONE AT [facility]    COLONOSCOPY      hx polyps needs     COLONOSCOPY  9/21/15     DR. YARBROUGH     CORONARY ANGIOPLASTY WITH STENT PLACEMENT  2020    DIAGNOSTIC CARDIAC CATH LAB PROCEDURE      HERNIA REPAIR Bilateral     x 2    HERNIA REPAIR Right 2020    RIGHT INGUINAL HERNIA REPAIR performed by Darius Portillo MD at [address] [facility]         Immunization History:   Immunization History   Administered Date(s) Administered    COVID-19, PFIZER PURPLE top, DILUTE for use, (age 15 y+), 30mcg/0.3mL 2021, 2021, 10/18/2021    Influenza 10/12/2011, 10/08/2012, 10/10/2013    Influenza Virus Vaccine 10/16/2014    Influenza, FLUAD, (age 72 y+), Adjuvanted, 0.5mL 10/27/2020    Influenza, High Dose (Fluzone 65 yrs and older) 2015, 2016, 2017, 10/20/2018    Influenza, Triv, inactivated, subunit, adjuvanted, IM (Fluad 65 yrs and older) 10/08/2019    Pneumococcal Conjugate 13-valent ([id number]) 2016    Pneumococcal Polysaccharide (Gjhmthtrg20) 10/16/2014    Tdap (Boostrix, Adacel) 2016       Active Problems:  Patient Active Problem List   Diagnosis Code    Hyperlipidemia E78.5    Carotid stenosis, bilateral I65.23    Dementia ([facility]) F03.90    Hypertensive urgency I16.0    NSTEMI (non-ST elevated myocardial infarction) ([facility]) I21.4    Essential hypertension I10    HESS (dyspnea on exertion) R06.09    Leg edema G27.1    Acute diastolic heart failure ([facility]) I50.31    Chest pain R07.9    Angina, class III ([facility]) I20.9    S/P cardiac cath Z98.890    Anginal equivalent ([facility]) I20.8    Dizziness R42    Coronary artery disease involving native coronary artery of native heart without angina pectoris I25.10    CHF (congestive heart failure) ([facility]) I50.9    Congestive heart failure ([facility]) I50.9    Non-STEMI (non-ST elevated myocardial infarction) ([facility] I21.4    Unstable angina ([facility]) I20.0    ACS (acute coronary syndrome) ([facility] I24.9    Bilateral carotid bruits R09.89    Syncope and collapse R55    Post PTCA Z98.61    Right inguinal hernia K40.90    COVID-19 virus infection U07.1       Isolation/Infection:   Isolation            Droplet Plus          Patient Infection Status       Infection Onset Added Last Indicated Last Indicated By Review Planned Expiration Resolved Resolved By    COVID-19 01/21/23 01/21/23 01/21/23 COVID-19, Rapid 01/31/23 02/04/23      Resolved    COVID-19 (Rule Out) 01/21/23 01/21/23 01/21/23 COVID-19, Rapid (Ordered)   01/21/23 Rule-Out Test Resulted    COVID-19 (Rule Out) 11/09/20 11/09/20 11/09/20 Covid-19 Ambulatory (Ordered)   11/11/20 Rule-Out Test Resulted    COVID-19 (Rule Out) 05/31/20 05/31/20 06/01/20 COVID-19 (Ordered)   06/01/20 Rule-Out Test Resulted    COVID-19 (Rule Out) 05/31/20 05/31/20 05/31/20 COVID-19 (Ordered)   05/31/20 Rule-Out Test Resulted            Nurse Assessment:  Last Vital Signs: BP (!) 158/71   Pulse 56   Temp 97.5 °F (36.4 °C) (Oral)   Resp 20   Ht 5' 9\" (1.753 m)   Wt 184 lb 4.8 oz (83.6 kg)   SpO2 98%   BMI 27.22 kg/m²     Last documented pain score (0-10 scale): Last Weight:   Wt Readings from Last 1 Encounters:   23 184 lb 4.8 oz (83.6 kg)     Mental Status:  {IP PT MENTAL STATUS:}    IV Access:  { GERDA IV ACCESS:439754001}    Nursing Mobility/ADLs:  Walking   {CHP DME HRNZ:741892840}  Transfer  {CHP DME FTAJ:671190407}  Bathing  {CHP DME HSY}  Dressing  {CHP DME VEDV:013274221}  Toileting  {CHP DME YUEI:190303176}  Feeding  {CHP DME DIEJ:391304771}  Med Admin  {P DME FEPX:571826389}  Med Delivery   { GERDA MED Delivery:620526299}    Wound Care Documentation and Therapy:  Incision 20 Groin Anterior (Active)   Number of days: 680        Elimination:  Continence: Bowel: {YES / DT:13106}  Bladder: {YES / BW:85630}  Urinary Catheter: {Urinary Catheter:212613933}   Colostomy/Ileostomy/Ileal Conduit: {YES / ZZ:70285}       Date of Last BM: ***    Intake/Output Summary (Last 24 hours) at 2023 1623  Last data filed at 2023  Gross per 24 hour   Intake 10 ml   Output --   Net 10 ml     I/O last 3 completed shifts: In: 230 [P.O.:220;  I.V.:10]  Out: 850 [Urine:850]    Safety Concerns:     [address] Safety Concerns:386910249}    Impairments/Disabilities:      [address] Impairments/Disabilities:921536377}    Nutrition Therapy:  Current Nutrition Therapy:   [address] Diet List:381198480}    Routes of Feeding: {P DME Other Feedings:513910027}  Liquids: {Slp liquid thickness:97545}  Daily Fluid Restriction: {CHP DME Yes amt example:133309203}  Last Modified Barium Swallow with Video (Video Swallowing Test): {Done Not Done SZ:350881099}    Treatments at the Time of Hospital Discharge:   Respiratory Treatments: ***  Oxygen Therapy:  {Therapy; copd oxygen:81813}  Ventilator:    { CC Vent CLXJ:123766045}    Rehab Therapies: {THERAPEUTIC INTERVENTION:5039965210}  Weight Bearing Status/Restrictions: [address] Weight Bearin}  Other Medical Equipment (for information only, NOT a DME order):  {EQUIPMENT:479373748}  Other Treatments: ***    Patient's personal belongings (please select all that are sent with patient):  {CHP DME Belongings:157301063}    RN SIGNATURE:  {Esignature:387618358}    CASE MANAGEMENT/SOCIAL WORK SECTION    Inpatient Status Date: ***    Readmission Risk Assessment Score:  Readmission Risk              Risk of Unplanned Readmission:  12           Discharging to Facility/ Agency   Name:  [facility]   Address:  Phone:  [phone]  Fax:        / signature: {Esignature:867194907}    PHYSICIAN SECTION    Prognosis: {Prognosis:5683426650}    Condition at Discharge: [address] [place] [address] Patient Condition:317314025}    Rehab Potential (if transferring to Rehab): {Prognosis:2712030162}    Recommended Labs or Other Treatments After Discharge: ***    Physician Certification: I certify the above information and transfer of Roopa Eid  is necessary for the continuing treatment of the diagnosis listed and that he requires {Admit to Appropriate Level of Care:85193} for {GREATER/LESS:367694504} 30 days.      Update Admission H&P: {CHP DME Changes in SLWUP:458733465}    PHYSICIAN SIGNATURE:  {Esignature:600409037}

## 2023-01-24 NOTE — PROGRESS NOTES
Hospitalist Daily Progress Note  Name: Roopa Eid  Age: 82 y.o. Gender: male  CodeStatus: DNR-CC  Allergies: No Known Allergies    Chief Complaint:Fall      Primary Care Provider: [address] [place] [address] Team: Treatment Team: Attending Provider: Tony Duran MD; Utilization Reviewer: Kishan Alvarez, RN; Utilization Reviewer: Sisi Olivares RN; Registered Nurse: Aisha Tomas RN    Admission Date: 1/21/2023      Subjective: No chest pain, sob, nausea. Feels significantly improved. Physical Exam  Vitals and nursing note reviewed. Constitutional:       Appearance: Normal appearance. Cardiovascular:      Rate and Rhythm: Normal rate and regular rhythm. Pulmonary:      Effort: Pulmonary effort is normal.      Breath sounds: Normal breath sounds. Abdominal:      General: Bowel sounds are normal.      Palpations: Abdomen is soft. Musculoskeletal:         General: Normal range of motion. Skin:     General: Skin is warm and dry. Neurological:      General: No focal deficit present. Mental Status: He is alert. Mental status is at baseline.        Medications:  Reviewed    Infusion Medications:    sodium chloride      sodium chloride 100 mL/hr at 01/23/23 0347     Scheduled Medications:    pravastatin  80 mg Oral QPM    finasteride  5 mg Oral Daily    latanoprost  1 drop Both Eyes Daily    timolol  1 drop Both Eyes BID    And    dorzolamide  1 drop Both Eyes BID    carvedilol  6.25 mg Oral BID    clopidogrel  75 mg Oral Daily    donepezil  5 mg Oral Nightly    isosorbide mononitrate  30 mg Oral Daily    lisinopril  10 mg Oral Daily    sodium chloride flush  5-40 mL IntraVENous 2 times per day    enoxaparin  40 mg SubCUTAneous Daily     PRN Meds: sodium chloride flush, sodium chloride, ondansetron **OR** ondansetron, polyethylene glycol, acetaminophen **OR** acetaminophen, guaiFENesin-dextromethorphan    Labs:   Recent Labs     01/21/23  1045   WBC 9.6   HGB 13.8*   HCT 42.5        Recent Labs     01/21/23  1045      K 4.1      CO2 24   BUN 20   CREATININE 1.10   CALCIUM 9.1       Recent Labs     01/21/23  1045   AST 16   ALT 10   BILITOT 0.9*   ALKPHOS 95       No results for input(s): INR in the last 72 hours. No results for input(s): Rosette Earnest in the last 72 hours. Urinalysis:   Lab Results   Component Value Date/Time    NITRU Negative 01/21/2023 10:45 AM    WBCUA 3-5 01/21/2023 10:45 AM    BACTERIA Negative 01/21/2023 10:45 AM    RBCUA 6-10 01/21/2023 10:45 AM    BLOODU Negative 01/21/2023 10:45 AM    SPECGRAV 1.019 01/21/2023 10:45 AM    GLUCOSEU Negative 01/21/2023 10:45 AM       Radiology:   Most recent    Chest CT      WITH CONTRAST:No results found for this or any previous visit. WITHOUT CONTRAST: No results found for this or any previous visit. CXR      2-view: Results for orders placed in visit on 01/14/20    XR CHEST STANDARD (2 VW)    Narrative  EXAMINATION: XR CHEST (2 VW)    DATE AND TIME:1/14/2020 1:16 PM    CLINICAL HISTORY: Shortness of breath  R93.89 Abnormal CXR ICD10    COMPARISONS: January 8, 2020    FINDINGS: Chronic elevation of the right hemidiaphragm with some crowding of vessels at the right base. No fresh infiltrate. No overt pulmonary edema. No pneumothorax. Heart and mediastinum are within normal limits. Chronic degenerative change in right  shoulder. Impression  NO  ACTIVE LUNG DISEASE. Results for orders placed during the hospital encounter of 01/05/20    XR CHEST STANDARD (2 VW)    Narrative  EXAMINATION: XR CHEST (2 VW)    CLINICAL HISTORY: COUGH, SHORTNESS OF BREATH    COMPARISONS: DECEMBER 23, 2018    FINDINGS: Degenerative changes right glenohumeral joint identified. Right diaphragm elevated. Cardiopericardial silhouette normal. Ill-defined areas increase opacity at lung bases bilaterally. Impression  BIBASILAR SUBSEGMENTAL ATELECTASIS/PNEUMONIA.        Portable: Results for orders placed during the hospital encounter of 07/12/20    XR CHEST PORTABLE    Narrative  Exam: XR CHEST PORTABLE    History: Chest pain    Technique: AP portable view of the chest obtained. Comparison: Portable chest radiograph from  May 31, 2020    Findings:    Chronic elevation of the right hemidiaphragm The cardiomediastinal silhouette is within normal limits. No pneumothorax, pleural effusion, or consolidation. Degenerative changes of the spine and both shoulders. Impression  No radiographic evidence of acute intrathoracic process. Echo No results found for this or any previous visit. Assessment/Plan:    Active Hospital Problems    Diagnosis Date Noted    COVID-19 virus infection [U07.1] 01/21/2023     Priority: Medium     Falls 2/2 COVID infection  Not hypoxic, symptomatic mgmt, pt/ot, iv fluid, dc planning to snf likely.   1/22 - cont iv fluids, pt/ot planning  1/23 - rapidly improved, probable dc back to AL tomorrow  Hx HTN, HLD  Cont home medications when available  Hx CAD, MI  Cont home medications  DVT proph       Electronically signed by Jun Sandoval MD on 1/23/2023 at 8:12 PM

## 2023-01-24 NOTE — PROGRESS NOTES
Physical Therapy Med Surg Daily Treatment Note  Facility/Department: [address]  [facility]       NAME: Ion Davis  : 1935 (82 y.o.)  MRN: [id number]  CODE STATUS: DNR-CC    Date of Service: 2023    Patient Diagnosis(es): Orthostatic hypotension [I95.1]  General weakness [R53.1]  COVID-19 virus infection [U07.1]   Chief Complaint   Patient presents with    Fall     Patient Active Problem List    Diagnosis Date Noted    Congestive heart failure ([address] 2020    COVID-19 virus infection 2023    Right inguinal hernia 2020    Syncope and collapse 2020    Post PTCA     Bilateral carotid bruits 2020    Unstable angina ([address] 2020    ACS (acute coronary syndrome) ([address] 2020    Non-STEMI (non-ST elevated myocardial infarction) ([address] 2020    CHF (congestive heart failure) ([address]     Coronary artery disease involving native coronary artery of native heart without angina pectoris 2019    Dizziness     Anginal equivalent ([address] 2018    S/P cardiac cath 2018    Angina, class III ([address]     Chest pain 2018    HESS (dyspnea on exertion) 2018    Leg edema     Acute diastolic heart failure ([address] 2018    Essential hypertension 2018    NSTEMI (non-ST elevated myocardial infarction) ([address] 2018    Hypertensive urgency 2018    Dementia ([address]     Carotid stenosis, bilateral 2013    Hyperlipidemia         Past Medical History:   Diagnosis Date    Anticoagulant long-term use     Anxiety     CAD (coronary artery disease)     Carotid stenosis     2013 16-49%    CHF (congestive heart failure) (HCC)     Dementia (HCC)     Dementia ([address]     Hyperlipidemia     Hypertension     MI (myocardial infarction) ([address]     Osteoarthritis      Past Surgical History:   Procedure Laterality Date    [facility]    CATARACT REMOVAL      COLONOSCOPY  12/10/10,2007    DR MATTHEWS - DONE AT [facility]    COLONOSCOPY  2007    hx polyps needs 2015    COLONOSCOPY  9/21/15     DR. YARBROUGH     CORONARY ANGIOPLASTY WITH STENT PLACEMENT  06/07/2020    DIAGNOSTIC CARDIAC CATH LAB PROCEDURE      HERNIA REPAIR Bilateral     x 2    HERNIA REPAIR Right 11/13/2020    RIGHT INGUINAL HERNIA REPAIR performed [place] Cop[place] [facility]    PTCA         Chart Reviewed: Yes  Family / Caregiver Present: No  Diagnosis: COVID-19 virus infection  General Comment  Comments: Pt resting in bed - agreeable to PT tx    Restrictions:  Restrictions/Precautions: Isolation;Fall Risk (Covid+)    SUBJECTIVE:   Subjective: \"I'm going to show you what I can do, and then you don't come back.\"    Pain  Pain: Denies pre and post session pain.    OBJECTIVE:        Bed mobility  Rolling to Left: Modified independent  Rolling to Right: Modified independent  Supine to Sit: Modified independent    Transfers  Sit to Stand: Modified independent  Stand to Sit: Modified independent  Bed to Chair: Modified independent    Ambulation  Surface: Level tile  Device: Rolling Walker  Assistance: Modified Independent  Quality of Gait: FF over WW, however does not negatively impact gait quality. Safe management of obstacles and turns.  Distance: 25ft                              Activity Tolerance  Activity Tolerance: Patient tolerated treatment well    Patient Education  Education Given To: Patient  Education Provided: Role of Therapy  Education Outcome: Verbalized understanding     ASSESSMENT   Assessment: Pt meeting all goals and requesting no further PT while in hospital. Completes basic mobility indep. DC from current PT program.  Requires PT Follow-Up: No     Discharge Recommendations:  Continue to assess pending progress         Goals  Short Term Goals  Short Term Goal 1: indep transfers - MET  Short Term Goal 2: indep gait in room with ww or rollator 25 feet - MET    PLAN    General Plan: 1 time a day 3-6 times a week  Additional Comments: DC from  PT  Safety Devices  Type of Devices: All fall risk precautions in place     [facility] (6 CLICK) Radha Nabeel [address] Inpatient Mobility Raw Score : 24     Therapy Time   Individual   Time In 1405   Time Out 1415   Minutes 10     Timed Code Treatment Minutes: 10 Minutes (transfers 3min; 7min gait)       Moo Garrison PT, 01/24/23 at 2:22 PM         Definitions for assistance levels  Independent = pt does not require any physical supervision or assistance from another person for activity completion. Device may be needed.   Stand by assistance = pt requires verbal cues or instructions from another person, close to but not touching, to perform the activity  Minimal assistance= pt performs 75% or more of the activity; assistance is required to complete the activity  Moderate assistance= pt performs 50% of the activity; assistance is required to complete the activity  Maximal assistance = pt performs 25% of the activity; assistance is required to complete the activity  Dependent = pt requires total physical assistance to accomplish the task

## 2023-01-25 VITALS
OXYGEN SATURATION: 99 % | WEIGHT: 184.3 LBS | RESPIRATION RATE: 18 BRPM | HEART RATE: 65 BPM | HEIGHT: 69 IN | DIASTOLIC BLOOD PRESSURE: 55 MMHG | TEMPERATURE: 97.9 F | SYSTOLIC BLOOD PRESSURE: 122 MMHG | BODY MASS INDEX: 27.3 KG/M2

## 2023-01-25 ASSESSMENT — PAIN SCALES - GENERAL: PAINLEVEL_OUTOF10: 0

## 2023-01-25 NOTE — PROGRESS NOTES
Pt dtr called again and stressed that she did not want her father to leave in the middle of the night. Nursing supervisor spoke with dtr, pt's transpo  time rescheduled for 8 am today. Pt, his dtr and Nicholas Landing at [facility] are all aware of the new pickup time.

## 2023-01-25 NOTE — PROGRESS NOTES
Report given to [facility] [place], nurse at [facility]

## 2023-01-25 NOTE — PROGRESS NOTES
Pt daughter, [facility], called to express concerns about her dad's transportation running late, stated she does not think it is safe for her dad to be dropped off at his AL apartment in the middle of the night. Spoke to Kristin and she said per medicare guidelines since transportation is already set up we have to send pt when transpo arrives or pt may have to pay out of pocket. Nurse will call pt's dtr and [facility] to notify them of pt's  time.

## 2023-01-26 ENCOUNTER — TELEPHONE (OUTPATIENT)
Dept: CARDIOLOGY CLINIC | Age: 88
End: 2023-01-26

## 2023-01-26 ENCOUNTER — CARE COORDINATION (OUTPATIENT)
Dept: CASE MANAGEMENT | Age: 88
End: 2023-01-26

## 2023-01-26 DIAGNOSIS — U07.1 COVID-19 VIRUS INFECTION: Primary | ICD-10-CM

## 2023-01-26 PROCEDURE — 1111F DSCHRG MED/CURRENT MED MERGE: CPT | Performed by: INTERNAL MEDICINE

## 2023-01-26 NOTE — CARE COORDINATION
[facility] Care Transitions Initial Follow Up Call    Call within 2 business days of discharge: Yes    Care Transition Nurse contacted the patient by telephone to perform post hospital discharge assessment. Verified name and  with patient as identifiers. Provided introduction to self, and explanation of the Care Transition Nurse role. Patient: Arvin Davidson Patient : 1935   MRN: [id number]  Reason for Admission: 2023 - 2023 [address] ([address]), Ethmoid and left maxillary sinusitis. Discharge Date: 23 RARS: Readmission Risk Score: 10.5  NR  CV-19    [facility] PCP  Dr Gregorio Tran 3/23 3:15    [address]    Pt resides at [facility] living. START taking:  dorzolamide (TRUSOPT)  guaiFENesin-dextromethorphan (ROBITUSSIN DM)  timolol (TIMOPTIC)  STOP taking:  HYDROcodone-acetaminophen 5-325 MG per  tablet (NORCO)  isosorbide dinitrate 30 MG tablet (ISORDIL)    Last Discharge [address]       Date Complaint Diagnosis Description Type Department Provider    23 Fall General weakness . .. ED to Hosp-Admission (Discharged) (ADMITTED) [id number] Leiva MD; Sudheer Michel. .. Was this an external facility discharge? No Discharge Facility: [facility]    Challenges to be reviewed by the provider   Additional needs identified to be addressed with provider: No  none               Method of communication with provider: none. Sienna Rios states he has some mild wheezes w/ exertions and feels \"winded\" w/ exertions. No home O2 use. Has clear sinus congestion. He can smell/taste. No acute coughing concerns. States loose diarrhea resolved and is still having 2-3 soft stools daily. No urinary concerns/difficulty. Reports good appetite and he is hydrating. [facility] living staff provide pts daily medications. His dtr is a [facility] nurse and will schedule his 1 wk hosp fu w/ PCP. He does have an appt  9:40.  [facility] to see pt today. Care Transition Nurse reviewed discharge instructions with patient who verbalized understanding. The patient was given an opportunity to ask questions and does not have any further questions or concerns at this time. Were discharge instructions available to patient? Yes. Reviewed appropriate site of care based on symptoms and resources available to patient including: When to call 911. The patient agrees to contact the PCP office for questions related to their healthcare. Advance Care Planning:   Does patient have an Advance Directive: Angelito Perera primary decision maker [phone]     Medication reconciliation was performed with patient, who verbalizes understanding of administration of home medications. Medications reviewed, 1111F entered: yes    Was patient discharged with a pulse oximeter? no    Non-face-to-face services provided:  Reviewed s/s sinus infection to report to his physician and/or assisted living staff. Reviewed s/s covid digression to report to physician/assisted living staff/return to ED. Enc routine sat checks by staff. Offered patient enrollment in the Remote Patient Monitoring (RPM) program for in-home monitoring: Patient is not eligible for RPM program.    Care Transitions 24 Hour Call    Do you have a copy of your discharge instructions?: Yes  Do you have all of your prescriptions and are they filled?: Yes  Have you scheduled your follow up appointment?: Yes  Do you have support at home?: Partner/Spouse/SO  Do you feel like you have everything you need to keep you well at home?: Yes  Are you an active caregiver in your home?: No  Care Transitions Interventions         Follow Up  Future Appointments   Date Time Provider Sam Cunha   3/23/2023  3:15 PM Dior Concepcion MD [address] Transition Nurse provided contact information. Plan for follow-up call in 5-7 days based on severity of symptoms and risk factors.   Plan for next call: CT FU, Check sinus pain/pressure/discharge, check wheezes and sob.     Kalani Ramos RN

## 2023-01-26 NOTE — TELEPHONE ENCOUNTER
Facility called and wants to know why the hospital stopped Imdur (30MG)-please call and ask to speak to [facility] nurse ([phone])

## 2023-02-03 ENCOUNTER — CARE COORDINATION (OUTPATIENT)
Dept: CASE MANAGEMENT | Age: 88
End: 2023-02-03

## 2023-02-03 NOTE — CARE COORDINATION
[facility] Care Transitions Follow Up Call    Care Transition Nurse contacted the family/dtr/Olga by telephone to follow up after admission. Verified name and  with family as identifiers. Patient: Matt Mendieta  Patient : 1935   MRN: [id number]  Reason for Admission: 2023 - 2023 [address]), Ethmoid and left maxillary sinusitis. Discharge Date: 23 RARS: Readmission Risk Score: 10.5  NR  CV-19     [facility] PCP 23 Attended. Dr Hiwot Paredes 3/23 3:15     [facility] [address]. Now declined need for. Pt resides at [facility] assisted living. Needs to be reviewed by the provider   Additional needs identified to be addressed with provider: No  none             Method of communication with provider: none. CTN spoke w/ dtr/Olga who shares pt saw PCP yesterday. States pt had RLL PNE and [facility] physician told them it was missed on xray during this hosp stay. This writer does not see xray report listed under imaging, but Brain CT done. States he is not having wheezing anymore and rare light cough. No sinus concerns, fevers, chills, or flu-like symptoms. No further stool concerns and having normal elimination patterns. Has improved appetite. HHC was not needed and canceled. Pt participates in AL functions. Has/taking meds as directed. States PCP advised still some diminished lung sounds to right lower lobe. Denies need for further telephonic fu. Addressed changes since last contact:  none  Discussed follow-up appointments. If no appointment was previously scheduled, appointment scheduling offered: Yes. Is follow up appointment scheduled within 7 days of discharge?  See above appt list.    Follow Up  Future Appointments   Date Time Provider Sam Cunha   2/15/2023  3:15 PM Cori Villa  [facility]   3/23/2023  3:15 PM Cori Villa MD [address] [place] [address]     Non-[facility] follow up appointment(s):     Care Transition Nurse reviewed red flags with family and discussed any barriers to care and/or understanding of plan of care after discharge. Discussed appropriate site of care based on symptoms and resources available to patient including: PCP  Urgent care clinics  [facility]  When to call 911  Condition related references. The family agrees to contact the PCP office for questions related to their healthcare. Advance Care Planning:   reviewed and current. Patients top risk factors for readmission: Recent Covid PNE  Interventions to address risk factors:  Pt resides at assisted living. Family v/u PNE symptoms to report to physician/return to ED. Offered patient enrollment in the Remote Patient Monitoring (RPM) program for in-home monitoring: NA.     Care Transitions Subsequent and Final Call    Subsequent and Final Calls  Do you have any ongoing symptoms?: No  Have your medications changed?: No  Do you have any questions related to your medications?: No  Do you currently have any active services?: No  Do you have any needs or concerns that I can assist you with?: No  Identified Barriers: Lack of Education  Care Transitions Interventions  Other Interventions:             Care Transition Nurse provided contact information for future needs. No further follow-up call indicated based on severity of symptoms and risk factors. CTN s/o.     Tiburcio Jauregui RN

## 2023-02-15 ENCOUNTER — OFFICE VISIT (OUTPATIENT)
Dept: CARDIOLOGY CLINIC | Age: 88
End: 2023-02-15
Payer: MEDICARE

## 2023-02-15 VITALS
SYSTOLIC BLOOD PRESSURE: 128 MMHG | OXYGEN SATURATION: 96 % | BODY MASS INDEX: 27.59 KG/M2 | HEART RATE: 67 BPM | DIASTOLIC BLOOD PRESSURE: 70 MMHG | WEIGHT: 186.8 LBS

## 2023-02-15 DIAGNOSIS — I10 ESSENTIAL HYPERTENSION: ICD-10-CM

## 2023-02-15 DIAGNOSIS — Z98.890 S/P CARDIAC CATH: ICD-10-CM

## 2023-02-15 DIAGNOSIS — I50.31 ACUTE DIASTOLIC HEART FAILURE (HCC): ICD-10-CM

## 2023-02-15 DIAGNOSIS — I25.10 CORONARY ARTERY DISEASE INVOLVING NATIVE CORONARY ARTERY OF NATIVE HEART WITHOUT ANGINA PECTORIS: Primary | ICD-10-CM

## 2023-02-15 DIAGNOSIS — I65.23 CAROTID STENOSIS, BILATERAL: ICD-10-CM

## 2023-02-15 DIAGNOSIS — R09.89 BILATERAL CAROTID BRUITS: ICD-10-CM

## 2023-02-15 DIAGNOSIS — I50.9 CONGESTIVE HEART FAILURE, UNSPECIFIED HF CHRONICITY, UNSPECIFIED HEART FAILURE TYPE (HCC): ICD-10-CM

## 2023-02-15 DIAGNOSIS — I25.10 CORONARY ARTERY DISEASE INVOLVING NATIVE HEART WITHOUT ANGINA PECTORIS, UNSPECIFIED VESSEL OR LESION TYPE: ICD-10-CM

## 2023-02-15 DIAGNOSIS — E78.5 HYPERLIPIDEMIA, UNSPECIFIED HYPERLIPIDEMIA TYPE: ICD-10-CM

## 2023-02-15 DIAGNOSIS — I21.4 NSTEMI (NON-ST ELEVATED MYOCARDIAL INFARCTION) (HCC): ICD-10-CM

## 2023-02-15 PROCEDURE — G8484 FLU IMMUNIZE NO ADMIN: HCPCS | Performed by: INTERNAL MEDICINE

## 2023-02-15 PROCEDURE — 99214 OFFICE O/P EST MOD 30 MIN: CPT | Performed by: INTERNAL MEDICINE

## 2023-02-15 PROCEDURE — 1123F ACP DISCUSS/DSCN MKR DOCD: CPT | Performed by: INTERNAL MEDICINE

## 2023-02-15 PROCEDURE — 3074F SYST BP LT 130 MM HG: CPT | Performed by: INTERNAL MEDICINE

## 2023-02-15 PROCEDURE — 1111F DSCHRG MED/CURRENT MED MERGE: CPT | Performed by: INTERNAL MEDICINE

## 2023-02-15 PROCEDURE — 1036F TOBACCO NON-USER: CPT | Performed by: INTERNAL MEDICINE

## 2023-02-15 PROCEDURE — 3078F DIAST BP <80 MM HG: CPT | Performed by: INTERNAL MEDICINE

## 2023-02-15 PROCEDURE — G8427 DOCREV CUR MEDS BY ELIG CLIN: HCPCS | Performed by: INTERNAL MEDICINE

## 2023-02-15 PROCEDURE — G8417 CALC BMI ABV UP PARAM F/U: HCPCS | Performed by: INTERNAL MEDICINE

## 2023-02-15 RX ORDER — ALBUTEROL SULFATE 90 UG/1
2 AEROSOL, METERED RESPIRATORY (INHALATION) EVERY 6 HOURS PRN
COMMUNITY
Start: 2023-01-27 | End: 2023-02-15 | Stop reason: ALTCHOICE

## 2023-02-15 ASSESSMENT — ENCOUNTER SYMPTOMS
EYES NEGATIVE: 1
SHORTNESS OF BREATH: 0
CHEST TIGHTNESS: 0
BLOOD IN STOOL: 0
GASTROINTESTINAL NEGATIVE: 1
STRIDOR: 0
NAUSEA: 0
WHEEZING: 0
RESPIRATORY NEGATIVE: 1
COUGH: 0

## 2023-02-15 NOTE — PROGRESS NOTES
Subsequent Progress Note  Patient: Alyssa Talley  YOB: 1935  MRN: [id number]    Chief Complaint: nstemi cad htn   Chief Complaint   Patient presents with    2 Week Follow-Up     Per Dr. Rahul Boyer Medications     Telephone encounter from 1/26 says Claudia Roblero was d/c for low BP- it is still in chart; isosorbide dinitrate was d/c'd at hosp       CV Data:  6/2018 spect negative  5/2018 echo EF 60%  11/2018 LAD AMBER  12/2018 CUS <. Left Vertebral 100  1/2020 echo ef 60 midl AR and MR  1/2020 SPECT - NEGATIVE  6/8/2020 Cath OM1 AMBER. EF 60%  9/2020  CUS mild  1/21 Renal Duplex- negative  1/21 spect negative    Subjective/HPI: no cp no sob no falls no bleed. R Inguinal hernia     9/25/2019:  No cp no osb no falls no bleed eats well. Takes meds. C/o Diuretic and frequent urine. 1/15/2020 recently in ER and hosp for HTN. Now stable without meds changes. Possible he did not take his meds. Recent Labs showed mild Troponin elevaton. 2/5/2020 NO CP NO SOB NO FALLS NO BLEED. TAKES MEDS. EATS WELL.     5/8/2020 TELEHEALTH EVALUATION -- Audio/Visual (During ERP-26 public health emergency)      137/70 home BP. Doing well no cp no osb no falls no bleed takes meds. Eats well. Son moved in w him. 6/17/2020 no cp no sob no edema no falls no bleed. Feels better since last stent. 8/5/2020 recent hosp for syncope related to Low BP. Imdur was stopped and Benazepril changed to Lisinopril. Feels much better. 10/22/2020 recent hosp for accelerated HTN. We added Imdur. He never had CP nor SOB. He feels well now. BOP better. Eating well. 8/18/21 now lives at the [facility] in [place]- assisted living. Feels well no cp no sob no falls no bleed. Walks with walker. 1/19/22 doing well lives at the [facility]. No cp no sob not dizzy no falls no bleed. 5/25/22 lives at the [facility]. No cp  No sob no falsl amy gordillo. Walks with walker. Feels well eats well.     11/22/22 doing well no cp no sob no falls nob rand. Still taking asa and Plavix    2/15/23 doing well no cp no sob no fall sno bleed takes  meds. Recent COVID    Nonsmoker   lives at assisted living       EKG:SR 76    Past Medical History:   Diagnosis Date    Anticoagulant long-term use     Anxiety     CAD (coronary artery disease)     Carotid stenosis     2/2013 16-49%    CHF (congestive heart failure) ([facility])     Dementia (HCC)     Dementia ([facility]     Hyperlipidemia     Hypertension     MI (myocardial infarction) ([facility]     Osteoarthritis        Past Surgical History:   Procedure Laterality Date    [facility]    CATARACT REMOVAL  2007    COLONOSCOPY  12/10/10,2007    DR Yolanda Mills - DONE AT [facility]    COLONOSCOPY  2007    hx polyps needs 2015    COLONOSCOPY  9/21/15     DR. YARBROUGH     CORONARY ANGIOPLASTY WITH STENT PLACEMENT  06/07/2020    DIAGNOSTIC CARDIAC CATH LAB PROCEDURE      HERNIA REPAIR Bilateral     x 2    HERNIA REPAIR Right 11/13/2020    RIGHT INGUINAL HERNIA REPAIR performed by Nevin Siegel MD at [facility]         Family History   Problem Relation Age of Onset    Heart Disease Mother     High Blood Pressure Mother        Social History     Socioeconomic History    Marital status:       Spouse name: None    Number of children: None    Years of education: None    Highest education level: None   Tobacco Use    Smoking status: Never    Smokeless tobacco: Never   Vaping Use    Vaping Use: Never used   Substance and Sexual Activity    Alcohol use: No     Comment: social    Drug use: No    Sexual activity: Not Currently       No Known Allergies    Current Outpatient Medications   Medication Sig Dispense Refill    timolol (TIMOPTIC) 0.5 % ophthalmic solution Place 1 drop into both eyes 2 times daily      dorzolamide (TRUSOPT) 2 % ophthalmic solution Place 1 drop into both eyes in the morning and at bedtime 10 mL     vitamin D 25 MCG (1000 UT) CAPS Take by mouth daily      finasteride (PROSCAR) 5 MG tablet TAKE 1 TABLET BY MOUTH DAILY 30 tablet 0    nitroGLYCERIN (NITROSTAT) 0.4 MG SL tablet DISSOLVE 1 TABLET UNDER THE TONGUE AS NEEDED FOR CHEST PAIN-  MAY REPEAT EVERY 5 MINUTES IF NEEDED ( MAX 3 DOSES.- IF NO RELIEF CALL 911) (Patient taking differently: DISSOLVE 1 TABLET UNDER THE TONGUE AS NEEDED FOR CHEST PAIN-  MAY REPEAT EVERY 5 MINUTES IF NEEDED ( MAX 3 DOSES.- IF NO RELIEF CALL 911)) 25 tablet 0    pravastatin (PRAVACHOL) 80 MG tablet TAKE 1 TABLET BY MOUTH EVERY EVENING 30 tablet 0    donepezil (ARICEPT) 5 MG tablet Take 1 tablet by mouth nightly 210 tablet 0    lisinopril (PRINIVIL;ZESTRIL) 10 MG tablet TAKE 1 TABLET BY MOUTH DAILY 30 tablet 2    carvedilol (COREG) 6.25 MG tablet TAKE 1 TABLET BY MOUTH TWICE DAILY 60 tablet 2    isosorbide mononitrate (IMDUR) 30 MG extended release tablet Take 1 tablet by mouth daily 90 tablet 3    clopidogrel (PLAVIX) 75 MG tablet Take 1 tablet by mouth daily 90 tablet 3    latanoprost (XALATAN) 0.005 % ophthalmic solution use 1 drop in each eye every day 2.5 mL 5    vitamin B-12 (CYANOCOBALAMIN) 1000 MCG tablet Take 500 mcg by mouth daily      Multiple Vitamins-Minerals (MULTIVITAMIN PO) Take by mouth daily       dorzolamide-timolol (COSOPT) 22.3-6.8 MG/ML ophthalmic solution Place 1 drop into both eyes 2 times daily        No current facility-administered medications for this visit. Review of Systems:   Review of Systems   Constitutional: Negative. Negative for diaphoresis and fatigue. HENT: Negative. Eyes: Negative. Respiratory: Negative. Negative for cough, chest tightness, shortness of breath, wheezing and stridor. Cardiovascular: Negative. Negative for chest pain, palpitations and leg swelling. Gastrointestinal: Negative. Negative for blood in stool and nausea. Genitourinary: Negative. Musculoskeletal: Negative. Skin: Negative. Neurological: Negative. Negative for dizziness, syncope, weakness and light-headedness. Hematological: Negative. Psychiatric/Behavioral: Negative. Physical Examination:    /70 (Site: Right Upper Arm, Position: Sitting, Cuff Size: Medium Adult)   Pulse 67   Wt 186 lb 12.8 oz (84.7 kg)   SpO2 96%   BMI 27.59 kg/m²    Physical Exam   Constitutional: He appears healthy. No distress. HENT:   Normal cephalic and Atraumatic   Eyes: Pupils are equal, round, and reactive to light. Neck: Thyroid normal. No JVD present. No neck adenopathy. No thyromegaly present. Cardiovascular: Normal rate, regular rhythm, intact distal pulses and normal pulses. Murmur heard. Pulmonary/Chest: Effort normal and breath sounds normal. He has no wheezes. He has no rales. He exhibits no tenderness. Abdominal: Soft. Bowel sounds are normal. There is no abdominal tenderness. Musculoskeletal:         General: No tenderness or edema. Normal range of motion. Cervical back: Normal range of motion and neck supple. Neurological: He is alert and oriented to person, place, and time. Skin: Skin is warm. No cyanosis. Nails show no clubbing.      LABS:  CBC:   Lab Results   Component Value Date/Time    WBC 9.6 01/21/2023 10:45 AM    RBC 4.62 01/21/2023 10:45 AM    RBC 4.69 10/25/2011 08:39 AM    HGB 13.8 01/21/2023 10:45 AM    HCT 42.5 01/21/2023 10:45 AM    MCV 91.9 01/21/2023 10:45 AM    MCH 29.9 01/21/2023 10:45 AM    MCHC 32.5 01/21/2023 10:45 AM    RDW 14.2 01/21/2023 10:45 AM     01/21/2023 10:45 AM    MPV 9.2 08/27/2015 09:22 AM     Lipids:  Lab Results   Component Value Date    CHOL 133 08/19/2021    CHOL 130 06/01/2020    CHOL 146 10/08/2019     Lab Results   Component Value Date    TRIG 73 08/19/2021    TRIG 86 06/01/2020    TRIG 93 10/08/2019     Lab Results   Component Value Date    HDL 53 08/19/2021    HDL 48 06/01/2020    HDL 53 10/08/2019     Lab Results   Component Value Date    LDLCALC 65 08/19/2021    LDLCALC 65 06/01/2020    LDLCALC 74 10/08/2019     Lab Results   Component Value Date    VLDL 15 08/19/2021     Lab Results   Component Value Date    CHOLHDLRATIO 2.51 08/19/2021    CHOLHDLRATIO 3.8 09/10/2012    CHOLHDLRATIO 3.3 10/25/2011     CMP:    Lab Results   Component Value Date/Time     01/21/2023 10:45 AM    K 4.1 01/21/2023 10:45 AM     01/21/2023 10:45 AM    CO2 24 01/21/2023 10:45 AM    BUN 20 01/21/2023 10:45 AM    CREATININE 1.10 01/21/2023 10:45 AM    GFRAA >60.0 11/09/2020 11:16 AM    LABGLOM >60.0 01/21/2023 10:45 AM    GLUCOSE 106 01/21/2023 10:45 AM    GLUCOSE 96 10/25/2011 08:39 AM    PROT 6.3 01/21/2023 10:45 AM    LABALBU 3.9 01/21/2023 10:45 AM    LABALBU 4.6 10/25/2011 08:39 AM    CALCIUM 9.1 01/21/2023 10:45 AM    BILITOT 0.9 01/21/2023 10:45 AM    ALKPHOS 95 01/21/2023 10:45 AM    AST 16 01/21/2023 10:45 AM    ALT 10 01/21/2023 10:45 AM     BMP:    Lab Results   Component Value Date/Time     01/21/2023 10:45 AM    K 4.1 01/21/2023 10:45 AM     01/21/2023 10:45 AM    CO2 24 01/21/2023 10:45 AM    BUN 20 01/21/2023 10:45 AM    LABALBU 3.9 01/21/2023 10:45 AM    LABALBU 4.6 10/25/2011 08:39 AM    CREATININE 1.10 01/21/2023 10:45 AM    CALCIUM 9.1 01/21/2023 10:45 AM    GFRAA >60.0 11/09/2020 11:16 AM    LABGLOM >60.0 01/21/2023 10:45 AM    GLUCOSE 106 01/21/2023 10:45 AM    GLUCOSE 96 10/25/2011 08:39 AM     Magnesium:    Lab Results   Component Value Date/Time    MG 2.3 07/13/2020 05:45 AM     TSH:  Lab Results   Component Value Date    TSH 2.280 10/08/2019       Patient Active Problem List   Diagnosis    Hyperlipidemia    Carotid stenosis, bilateral    Dementia ([facility])    Hypertensive urgency    NSTEMI (non-ST elevated myocardial infarction) [facility]    Essential hypertension    HESS (dyspnea on exertion)    Leg edema    Acute diastolic heart failure ([facility])    Chest pain    Angina, class III ([facility])    S/P cardiac cath    Anginal equivalent [facility])    Dizziness    Coronary artery disease involving native coronary artery of native heart without angina pectoris    CHF (congestive heart failure) (HCC)    Congestive heart failure (HCC)    Non-STEMI (non-ST elevated myocardial infarction) (HCC)    Unstable angina (HCC)    ACS (acute coronary syndrome) (HCC)    Bilateral carotid bruits    Syncope and collapse    Post PTCA    Right inguinal hernia    COVID-19 virus infection       Medications Discontinued During This Encounter   Medication Reason    albuterol sulfate HFA (PROVENTIL;VENTOLIN;PROAIR) 108 (90 Base) MCG/ACT inhaler Therapy completed         Modified Medications    No medications on file       No orders of the defined types were placed in this encounter. Assessment/Plan:    1. Hyperlipidemia, unspecified hyperlipidemia type  Statin - continue. Labs reviewed- excellent. 2. NSTEMI (non-ST elevated myocardial infarction) ([facility]  no ANGINA - conitnue all CV protective meds. SL NTG for angina discussed. 3. Essential hypertension   stable now. continue same mdes. Low salt diet. 4. Coronary artery disease involving native coronary artery of native heart without angina pectoris  No angina. Dc ASA continue Plavix. 5. R Inguinal hernia- asymptomatic . Avoid heavylifting or constipation- stable      6. Carotid Bruits- most recent CUS images reviewed- stable. Counseling:  Heart Healthy Lifestyle, Decrease Alcohol Consumption, Take Precautions to Prevent Falls, Regular Exercise and Walk Daily    Return in about 4 months (around 6/15/2023).       Electronically signed by Xiomara Alvarez MD on 2/15/2023 at 3:49 PM

## 2023-02-16 ENCOUNTER — TELEPHONE (OUTPATIENT)
Dept: CARDIOLOGY CLINIC | Age: 88
End: 2023-02-16

## 2023-02-16 NOTE — TELEPHONE ENCOUNTER
Assistant YEVGENIY from [employer] medication list doesn't match the list sent. Have questions regarding pravastatin and isosorbide.  Should pravachol be 40 mg or 80 and states isosorbide er 30 mg was d/c'd and is on current med list. Please advise and call Taylor back

## 2023-06-21 ENCOUNTER — OFFICE VISIT (OUTPATIENT)
Dept: CARDIOLOGY CLINIC | Age: 88
End: 2023-06-21
Payer: MEDICARE

## 2023-06-21 VITALS
WEIGHT: 186 LBS | DIASTOLIC BLOOD PRESSURE: 72 MMHG | OXYGEN SATURATION: 92 % | BODY MASS INDEX: 27.47 KG/M2 | SYSTOLIC BLOOD PRESSURE: 130 MMHG | HEART RATE: 84 BPM

## 2023-06-21 DIAGNOSIS — I10 ESSENTIAL HYPERTENSION: ICD-10-CM

## 2023-06-21 DIAGNOSIS — I25.10 CORONARY ARTERY DISEASE INVOLVING NATIVE CORONARY ARTERY OF NATIVE HEART WITHOUT ANGINA PECTORIS: Primary | ICD-10-CM

## 2023-06-21 DIAGNOSIS — Z98.890 S/P CARDIAC CATH: ICD-10-CM

## 2023-06-21 DIAGNOSIS — I50.31 ACUTE DIASTOLIC HEART FAILURE (HCC): ICD-10-CM

## 2023-06-21 DIAGNOSIS — I21.4 NSTEMI (NON-ST ELEVATED MYOCARDIAL INFARCTION) (HCC): ICD-10-CM

## 2023-06-21 DIAGNOSIS — I65.23 CAROTID STENOSIS, BILATERAL: ICD-10-CM

## 2023-06-21 DIAGNOSIS — I25.10 CORONARY ARTERY DISEASE INVOLVING NATIVE HEART WITHOUT ANGINA PECTORIS, UNSPECIFIED VESSEL OR LESION TYPE: ICD-10-CM

## 2023-06-21 DIAGNOSIS — E78.5 HYPERLIPIDEMIA, UNSPECIFIED HYPERLIPIDEMIA TYPE: ICD-10-CM

## 2023-06-21 DIAGNOSIS — R09.89 BILATERAL CAROTID BRUITS: ICD-10-CM

## 2023-06-21 DIAGNOSIS — I50.9 CONGESTIVE HEART FAILURE, UNSPECIFIED HF CHRONICITY, UNSPECIFIED HEART FAILURE TYPE (HCC): ICD-10-CM

## 2023-06-21 PROCEDURE — 99214 OFFICE O/P EST MOD 30 MIN: CPT | Performed by: INTERNAL MEDICINE

## 2023-06-21 PROCEDURE — 1036F TOBACCO NON-USER: CPT | Performed by: INTERNAL MEDICINE

## 2023-06-21 PROCEDURE — 1123F ACP DISCUSS/DSCN MKR DOCD: CPT | Performed by: INTERNAL MEDICINE

## 2023-06-21 PROCEDURE — G8428 CUR MEDS NOT DOCUMENT: HCPCS | Performed by: INTERNAL MEDICINE

## 2023-06-21 PROCEDURE — 3075F SYST BP GE 130 - 139MM HG: CPT | Performed by: INTERNAL MEDICINE

## 2023-06-21 PROCEDURE — G8417 CALC BMI ABV UP PARAM F/U: HCPCS | Performed by: INTERNAL MEDICINE

## 2023-06-21 PROCEDURE — 3078F DIAST BP <80 MM HG: CPT | Performed by: INTERNAL MEDICINE

## 2023-06-21 RX ORDER — ALBUTEROL SULFATE 90 UG/1
2 AEROSOL, METERED RESPIRATORY (INHALATION) EVERY 6 HOURS PRN
COMMUNITY

## 2023-06-21 ASSESSMENT — ENCOUNTER SYMPTOMS
COUGH: 0
RESPIRATORY NEGATIVE: 1
GASTROINTESTINAL NEGATIVE: 1
EYES NEGATIVE: 1
NAUSEA: 0
STRIDOR: 0
WHEEZING: 0
SHORTNESS OF BREATH: 0
CHEST TIGHTNESS: 0
BLOOD IN STOOL: 0

## 2023-06-21 NOTE — PROGRESS NOTES
Lab Results   Component Value Date    CHOLHDLRATIO 2.51 08/19/2021    CHOLHDLRATIO 3.8 09/10/2012    CHOLHDLRATIO 3.3 10/25/2011     CMP:    Lab Results   Component Value Date/Time     01/21/2023 10:45 AM    K 4.1 01/21/2023 10:45 AM     01/21/2023 10:45 AM    CO2 24 01/21/2023 10:45 AM    BUN 20 01/21/2023 10:45 AM    CREATININE 1.10 01/21/2023 10:45 AM    GFRAA >60.0 11/09/2020 11:16 AM    LABGLOM >60.0 01/21/2023 10:45 AM    GLUCOSE 106 01/21/2023 10:45 AM    GLUCOSE 96 10/25/2011 08:39 AM    PROT 6.3 01/21/2023 10:45 AM    LABALBU 3.9 01/21/2023 10:45 AM    LABALBU 4.6 10/25/2011 08:39 AM    CALCIUM 9.1 01/21/2023 10:45 AM    BILITOT 0.9 01/21/2023 10:45 AM    ALKPHOS 95 01/21/2023 10:45 AM    AST 16 01/21/2023 10:45 AM    ALT 10 01/21/2023 10:45 AM     BMP:    Lab Results   Component Value Date/Time     01/21/2023 10:45 AM    K 4.1 01/21/2023 10:45 AM     01/21/2023 10:45 AM    CO2 24 01/21/2023 10:45 AM    BUN 20 01/21/2023 10:45 AM    LABALBU 3.9 01/21/2023 10:45 AM    LABALBU 4.6 10/25/2011 08:39 AM    CREATININE 1.10 01/21/2023 10:45 AM    CALCIUM 9.1 01/21/2023 10:45 AM    GFRAA >60.0 11/09/2020 11:16 AM    LABGLOM >60.0 01/21/2023 10:45 AM    GLUCOSE 106 01/21/2023 10:45 AM    GLUCOSE 96 10/25/2011 08:39 AM     Magnesium:    Lab Results   Component Value Date/Time    MG 2.3 07/13/2020 05:45 AM     TSH:  Lab Results   Component Value Date    TSH 2.280 10/08/2019       Patient Active Problem List   Diagnosis    Hyperlipidemia    Carotid stenosis, bilateral    Dementia ([facility])    Hypertensive urgency    [facility]non-ST elevated [facility] infarction) [facility]    Essential hypertension    HESS (dyspnea on exertion)    Leg edema    Acute diastolic heart failure ([facility])    Chest pain    Angina, class III ([facility])    S/P cardiac cath    Anginal equivalent ([facility])    Dizziness    Coronary artery disease involving native coronary artery of native heart without angina pectoris    CHF (congestive heart

## 2023-10-25 ENCOUNTER — OFFICE VISIT (OUTPATIENT)
Dept: CARDIOLOGY CLINIC | Age: 88
End: 2023-10-25
Payer: MEDICARE

## 2023-10-25 VITALS
SYSTOLIC BLOOD PRESSURE: 124 MMHG | OXYGEN SATURATION: 95 % | WEIGHT: 187.4 LBS | HEART RATE: 62 BPM | BODY MASS INDEX: 27.76 KG/M2 | HEIGHT: 69 IN | DIASTOLIC BLOOD PRESSURE: 72 MMHG

## 2023-10-25 DIAGNOSIS — E78.5 HYPERLIPIDEMIA, UNSPECIFIED HYPERLIPIDEMIA TYPE: ICD-10-CM

## 2023-10-25 DIAGNOSIS — I65.23 CAROTID STENOSIS, BILATERAL: ICD-10-CM

## 2023-10-25 DIAGNOSIS — I25.10 CORONARY ARTERY DISEASE INVOLVING NATIVE HEART WITHOUT ANGINA PECTORIS, UNSPECIFIED VESSEL OR LESION TYPE: ICD-10-CM

## 2023-10-25 DIAGNOSIS — I25.10 CORONARY ARTERY DISEASE INVOLVING NATIVE CORONARY ARTERY OF NATIVE HEART WITHOUT ANGINA PECTORIS: Primary | ICD-10-CM

## 2023-10-25 DIAGNOSIS — I50.31 ACUTE DIASTOLIC HEART FAILURE (HCC): ICD-10-CM

## 2023-10-25 DIAGNOSIS — I50.9 CONGESTIVE HEART FAILURE, UNSPECIFIED HF CHRONICITY, UNSPECIFIED HEART FAILURE TYPE (HCC): ICD-10-CM

## 2023-10-25 DIAGNOSIS — I21.4 NSTEMI (NON-ST ELEVATED MYOCARDIAL INFARCTION) (HCC): ICD-10-CM

## 2023-10-25 DIAGNOSIS — I10 ESSENTIAL HYPERTENSION: ICD-10-CM

## 2023-10-25 DIAGNOSIS — R09.89 BILATERAL CAROTID BRUITS: ICD-10-CM

## 2023-10-25 PROCEDURE — G8484 FLU IMMUNIZE NO ADMIN: HCPCS | Performed by: INTERNAL MEDICINE

## 2023-10-25 PROCEDURE — 1123F ACP DISCUSS/DSCN MKR DOCD: CPT | Performed by: INTERNAL MEDICINE

## 2023-10-25 PROCEDURE — G8417 CALC BMI ABV UP PARAM F/U: HCPCS | Performed by: INTERNAL MEDICINE

## 2023-10-25 PROCEDURE — 3078F DIAST BP <80 MM HG: CPT | Performed by: INTERNAL MEDICINE

## 2023-10-25 PROCEDURE — 99214 OFFICE O/P EST MOD 30 MIN: CPT | Performed by: INTERNAL MEDICINE

## 2023-10-25 PROCEDURE — G8427 DOCREV CUR MEDS BY ELIG CLIN: HCPCS | Performed by: INTERNAL MEDICINE

## 2023-10-25 PROCEDURE — 1036F TOBACCO NON-USER: CPT | Performed by: INTERNAL MEDICINE

## 2023-10-25 PROCEDURE — 3074F SYST BP LT 130 MM HG: CPT | Performed by: INTERNAL MEDICINE

## 2023-10-25 ASSESSMENT — ENCOUNTER SYMPTOMS
BLOOD IN STOOL: 0
CHEST TIGHTNESS: 0
STRIDOR: 0
COUGH: 0
GASTROINTESTINAL NEGATIVE: 1
EYES NEGATIVE: 1
SHORTNESS OF BREATH: 0
NAUSEA: 0
RESPIRATORY NEGATIVE: 1
WHEEZING: 0

## 2024-02-22 ENCOUNTER — OFFICE VISIT (OUTPATIENT)
Dept: CARDIOLOGY CLINIC | Age: 89
End: 2024-02-22
Payer: MEDICARE

## 2024-02-22 VITALS
HEIGHT: 69 IN | OXYGEN SATURATION: 96 % | WEIGHT: 184 LBS | SYSTOLIC BLOOD PRESSURE: 130 MMHG | RESPIRATION RATE: 15 BRPM | BODY MASS INDEX: 27.25 KG/M2 | HEART RATE: 61 BPM | DIASTOLIC BLOOD PRESSURE: 68 MMHG

## 2024-02-22 DIAGNOSIS — I25.10 CORONARY ARTERY DISEASE INVOLVING NATIVE CORONARY ARTERY OF NATIVE HEART WITHOUT ANGINA PECTORIS: Primary | ICD-10-CM

## 2024-02-22 PROCEDURE — 1123F ACP DISCUSS/DSCN MKR DOCD: CPT | Performed by: INTERNAL MEDICINE

## 2024-02-22 PROCEDURE — G8427 DOCREV CUR MEDS BY ELIG CLIN: HCPCS | Performed by: INTERNAL MEDICINE

## 2024-02-22 PROCEDURE — 93000 ELECTROCARDIOGRAM COMPLETE: CPT | Performed by: INTERNAL MEDICINE

## 2024-02-22 PROCEDURE — G8484 FLU IMMUNIZE NO ADMIN: HCPCS | Performed by: INTERNAL MEDICINE

## 2024-02-22 PROCEDURE — G8417 CALC BMI ABV UP PARAM F/U: HCPCS | Performed by: INTERNAL MEDICINE

## 2024-02-22 PROCEDURE — 1036F TOBACCO NON-USER: CPT | Performed by: INTERNAL MEDICINE

## 2024-02-22 PROCEDURE — 99214 OFFICE O/P EST MOD 30 MIN: CPT | Performed by: INTERNAL MEDICINE

## 2024-02-22 ASSESSMENT — ENCOUNTER SYMPTOMS
WHEEZING: 0
STRIDOR: 0
NAUSEA: 0
BLOOD IN STOOL: 0
SHORTNESS OF BREATH: 0
EYES NEGATIVE: 1
RESPIRATORY NEGATIVE: 1
COUGH: 0
CHEST TIGHTNESS: 0
GASTROINTESTINAL NEGATIVE: 1

## 2024-02-22 NOTE — PROGRESS NOTES
Daily    Return in about 4 months (around 6/22/2024).      Electronically signed by TENISHA CARRIZALES MD on 2/22/2024 at 2:30 PM

## 2024-06-13 ENCOUNTER — OFFICE VISIT (OUTPATIENT)
Dept: CARDIOLOGY CLINIC | Age: 89
End: 2024-06-13
Payer: MEDICARE

## 2024-06-13 VITALS
HEART RATE: 68 BPM | RESPIRATION RATE: 15 BRPM | WEIGHT: 184 LBS | OXYGEN SATURATION: 97 % | HEIGHT: 69 IN | DIASTOLIC BLOOD PRESSURE: 64 MMHG | SYSTOLIC BLOOD PRESSURE: 128 MMHG | BODY MASS INDEX: 27.25 KG/M2

## 2024-06-13 DIAGNOSIS — I25.10 CORONARY ARTERY DISEASE INVOLVING NATIVE CORONARY ARTERY OF NATIVE HEART WITHOUT ANGINA PECTORIS: Primary | ICD-10-CM

## 2024-06-13 DIAGNOSIS — I50.9 CONGESTIVE HEART FAILURE, UNSPECIFIED HF CHRONICITY, UNSPECIFIED HEART FAILURE TYPE (HCC): ICD-10-CM

## 2024-06-13 DIAGNOSIS — I25.10 CORONARY ARTERY DISEASE INVOLVING NATIVE HEART WITHOUT ANGINA PECTORIS, UNSPECIFIED VESSEL OR LESION TYPE: ICD-10-CM

## 2024-06-13 DIAGNOSIS — E78.5 HYPERLIPIDEMIA, UNSPECIFIED HYPERLIPIDEMIA TYPE: ICD-10-CM

## 2024-06-13 DIAGNOSIS — I10 ESSENTIAL HYPERTENSION: ICD-10-CM

## 2024-06-13 DIAGNOSIS — R09.89 BILATERAL CAROTID BRUITS: ICD-10-CM

## 2024-06-13 DIAGNOSIS — I21.4 NSTEMI (NON-ST ELEVATED MYOCARDIAL INFARCTION) (HCC): ICD-10-CM

## 2024-06-13 DIAGNOSIS — I50.31 ACUTE DIASTOLIC HEART FAILURE (HCC): ICD-10-CM

## 2024-06-13 DIAGNOSIS — I65.23 CAROTID STENOSIS, BILATERAL: ICD-10-CM

## 2024-06-13 DIAGNOSIS — Z98.890 S/P CARDIAC CATH: ICD-10-CM

## 2024-06-13 PROCEDURE — G8427 DOCREV CUR MEDS BY ELIG CLIN: HCPCS | Performed by: INTERNAL MEDICINE

## 2024-06-13 PROCEDURE — 1036F TOBACCO NON-USER: CPT | Performed by: INTERNAL MEDICINE

## 2024-06-13 PROCEDURE — 1123F ACP DISCUSS/DSCN MKR DOCD: CPT | Performed by: INTERNAL MEDICINE

## 2024-06-13 PROCEDURE — 99214 OFFICE O/P EST MOD 30 MIN: CPT | Performed by: INTERNAL MEDICINE

## 2024-06-13 PROCEDURE — G8417 CALC BMI ABV UP PARAM F/U: HCPCS | Performed by: INTERNAL MEDICINE

## 2024-06-13 ASSESSMENT — ENCOUNTER SYMPTOMS
STRIDOR: 0
BLOOD IN STOOL: 0
GASTROINTESTINAL NEGATIVE: 1
SHORTNESS OF BREATH: 0
RESPIRATORY NEGATIVE: 1
COUGH: 0
EYES NEGATIVE: 1
NAUSEA: 0
WHEEZING: 0
CHEST TIGHTNESS: 0

## 2024-06-13 NOTE — PROGRESS NOTES
07/12/2020       Patient Active Problem List   Diagnosis    Hyperlipidemia    Carotid stenosis, bilateral    Dementia ([facility])    Hypertensive urgency    NSTEMI (non-ST elevated myocardial infarction) ([facility])    Essential hypertension    HESS (dyspnea on exertion)    Leg edema    Acute diastolic heart failure ([facility])    Chest pain    Angina, class III ([facility])    S/P cardiac cath    Anginal equivalent ([facility])    Dizziness    Coronary artery disease involving native coronary artery of native heart without angina pectoris    CHF (congestive heart failure) ([facility])    Congestive heart failure ([facility])    Non-STEMI (non-ST elevated myocardial infarction) ([facility])    Unstable angina ([facility])    ACS (acute coronary syndrome) ([facility])    Bilateral carotid bruits    Syncope and collapse    Post PTCA    Right inguinal hernia    COVID-19 virus infection       There are no discontinued medications.        Modified Medications    No medications on file       No orders of the defined types were placed in this encounter.      Assessment/Plan:    1. Hyperlipidemia, unspecified hyperlipidemia type  Statin - continue.  Labs reviewed- excellent.     2. NSTEMI (non-ST elevated myocardial infarction) ([facility])  no ANGINA - conitnue all CV protective meds. SL NTG for angina discussed.     3. Essential hypertension   stable now.continue same mdes. Low salt diet.     4. Coronary artery disease involving native coronary artery of native heart without angina pectoris  No angina.  Dc ASA continue Plavix.     5. R Inguinal hernia- asymptomatic . Avoid heavylifting or constipation- stable      6. Carotid Bruits- most recent CUS images reviewed- stable.     7. Knee arthritis -  stable.     Counseling:  Heart Healthy Lifestyle, Decrease Alcohol Consumption, Take Precautions to Prevent Falls, Regular Exercise and Walk Daily    Return in about 4 months (around 10/13/2024).      Electronically signed by TENISHA CARRIZALES MD on 6/13/2024 at 12:22 PM

## 2024-07-03 ENCOUNTER — HOSPITAL ENCOUNTER (OUTPATIENT)
Age: 89
Setting detail: OBSERVATION
Discharge: NURSING, REHAB OR LONG-TERM CARE | End: 2024-07-06
Attending: EMERGENCY MEDICINE | Admitting: INTERNAL MEDICINE
Payer: MEDICARE

## 2024-07-03 ENCOUNTER — APPOINTMENT (OUTPATIENT)
Dept: CT IMAGING | Age: 89
End: 2024-07-03
Attending: EMERGENCY MEDICINE
Payer: MEDICARE

## 2024-07-03 DIAGNOSIS — R26.2 DIFFICULTY IN WALKING: ICD-10-CM

## 2024-07-03 DIAGNOSIS — R29.6 FREQUENT FALLS: ICD-10-CM

## 2024-07-03 DIAGNOSIS — S09.90XA CLOSED HEAD INJURY, INITIAL ENCOUNTER: Primary | ICD-10-CM

## 2024-07-03 LAB
ALBUMIN SERPL-MCNC: 3.7 G/DL (ref 3.5–4.6)
ALP SERPL-CCNC: 85 U/L (ref 35–104)
ALT SERPL-CCNC: 10 U/L (ref 0–41)
ANION GAP SERPL CALCULATED.3IONS-SCNC: 10 MEQ/L (ref 9–15)
APTT PPP: 26.2 SEC (ref 24.4–36.8)
AST SERPL-CCNC: 13 U/L (ref 0–40)
BASOPHILS # BLD: 0.1 K/UL (ref 0–0.2)
BASOPHILS NFR BLD: 1.4 %
BILIRUB SERPL-MCNC: 0.4 MG/DL (ref 0.2–0.7)
BUN SERPL-MCNC: 17 MG/DL (ref 8–23)
CALCIUM SERPL-MCNC: 8.9 MG/DL (ref 8.5–9.9)
CHLORIDE SERPL-SCNC: 105 MEQ/L (ref 95–107)
CO2 SERPL-SCNC: 26 MEQ/L (ref 20–31)
CREAT SERPL-MCNC: 0.77 MG/DL (ref 0.7–1.2)
EOSINOPHIL # BLD: 0.3 K/UL (ref 0–0.7)
EOSINOPHIL NFR BLD: 6 %
ERYTHROCYTE [DISTWIDTH] IN BLOOD BY AUTOMATED COUNT: 14.2 % (ref 11.5–14.5)
ETHANOL PERCENT: NORMAL G/DL
ETHANOLAMINE SERPL-MCNC: <10 MG/DL (ref 0–0.08)
GLOBULIN SER CALC-MCNC: 2.3 G/DL (ref 2.3–3.5)
GLUCOSE SERPL-MCNC: 90 MG/DL (ref 70–99)
HCT VFR BLD AUTO: 39.7 % (ref 42–52)
HGB BLD-MCNC: 12.9 G/DL (ref 14–18)
INR PPP: 1.1
LYMPHOCYTES # BLD: 1 K/UL (ref 1–4.8)
LYMPHOCYTES NFR BLD: 20 %
MAGNESIUM SERPL-MCNC: 2 MG/DL (ref 1.7–2.4)
MCH RBC QN AUTO: 30.4 PG (ref 27–31.3)
MCHC RBC AUTO-ENTMCNC: 32.5 % (ref 33–37)
MCV RBC AUTO: 93.4 FL (ref 79–92.2)
MONOCYTES # BLD: 0.5 K/UL (ref 0.2–0.8)
MONOCYTES NFR BLD: 10.6 %
NEUTROPHILS # BLD: 3 K/UL (ref 1.4–6.5)
NEUTS SEG NFR BLD: 60.4 %
PLATELET # BLD AUTO: 191 K/UL (ref 130–400)
POTASSIUM SERPL-SCNC: 3.9 MEQ/L (ref 3.4–4.9)
PROT SERPL-MCNC: 6 G/DL (ref 6.3–8)
PROTHROMBIN TIME: 14.3 SEC (ref 12.3–14.9)
RBC # BLD AUTO: 4.25 M/UL (ref 4.7–6.1)
REASON FOR REJECTION: NORMAL
REJECTED TEST: NORMAL
SODIUM SERPL-SCNC: 141 MEQ/L (ref 135–144)
TROPONIN, HIGH SENSITIVITY: 53 NG/L (ref 0–19)
TROPONIN, HIGH SENSITIVITY: 54 NG/L (ref 0–19)
TROPONIN, HIGH SENSITIVITY: 55 NG/L (ref 0–19)
TSH REFLEX: 2.5 UIU/ML (ref 0.44–3.86)
WBC # BLD AUTO: 5 K/UL (ref 4.8–10.8)

## 2024-07-03 PROCEDURE — 80053 COMPREHEN METABOLIC PANEL: CPT

## 2024-07-03 PROCEDURE — G0378 HOSPITAL OBSERVATION PER HR: HCPCS

## 2024-07-03 PROCEDURE — 83735 ASSAY OF MAGNESIUM: CPT

## 2024-07-03 PROCEDURE — 6830039000 HC L3 TRAUMA ALERT

## 2024-07-03 PROCEDURE — 72125 CT NECK SPINE W/O DYE: CPT

## 2024-07-03 PROCEDURE — 82077 ASSAY SPEC XCP UR&BREATH IA: CPT

## 2024-07-03 PROCEDURE — 70450 CT HEAD/BRAIN W/O DYE: CPT

## 2024-07-03 PROCEDURE — 6370000000 HC RX 637 (ALT 250 FOR IP)

## 2024-07-03 PROCEDURE — 84443 ASSAY THYROID STIM HORMONE: CPT

## 2024-07-03 PROCEDURE — 93005 ELECTROCARDIOGRAM TRACING: CPT | Performed by: EMERGENCY MEDICINE

## 2024-07-03 PROCEDURE — 85025 COMPLETE CBC W/AUTO DIFF WBC: CPT

## 2024-07-03 PROCEDURE — 36415 COLL VENOUS BLD VENIPUNCTURE: CPT

## 2024-07-03 PROCEDURE — 2580000003 HC RX 258

## 2024-07-03 PROCEDURE — 84484 ASSAY OF TROPONIN QUANT: CPT

## 2024-07-03 PROCEDURE — 85610 PROTHROMBIN TIME: CPT

## 2024-07-03 PROCEDURE — 99285 EMERGENCY DEPT VISIT HI MDM: CPT

## 2024-07-03 PROCEDURE — 85730 THROMBOPLASTIN TIME PARTIAL: CPT

## 2024-07-03 RX ORDER — SODIUM CHLORIDE 0.9 % (FLUSH) 0.9 %
5-40 SYRINGE (ML) INJECTION PRN
Status: DISCONTINUED | OUTPATIENT
Start: 2024-07-03 | End: 2024-07-06 | Stop reason: HOSPADM

## 2024-07-03 RX ORDER — ONDANSETRON 4 MG/1
4 TABLET, ORALLY DISINTEGRATING ORAL EVERY 8 HOURS PRN
Status: DISCONTINUED | OUTPATIENT
Start: 2024-07-03 | End: 2024-07-06 | Stop reason: HOSPADM

## 2024-07-03 RX ORDER — POTASSIUM CHLORIDE 7.45 MG/ML
10 INJECTION INTRAVENOUS PRN
Status: DISCONTINUED | OUTPATIENT
Start: 2024-07-03 | End: 2024-07-06 | Stop reason: HOSPADM

## 2024-07-03 RX ORDER — ONDANSETRON 2 MG/ML
4 INJECTION INTRAMUSCULAR; INTRAVENOUS EVERY 6 HOURS PRN
Status: DISCONTINUED | OUTPATIENT
Start: 2024-07-03 | End: 2024-07-06 | Stop reason: HOSPADM

## 2024-07-03 RX ORDER — SODIUM CHLORIDE 9 MG/ML
INJECTION, SOLUTION INTRAVENOUS PRN
Status: DISCONTINUED | OUTPATIENT
Start: 2024-07-03 | End: 2024-07-06 | Stop reason: HOSPADM

## 2024-07-03 RX ORDER — ACETAMINOPHEN 325 MG/1
650 TABLET ORAL EVERY 6 HOURS PRN
Status: DISCONTINUED | OUTPATIENT
Start: 2024-07-03 | End: 2024-07-06 | Stop reason: HOSPADM

## 2024-07-03 RX ORDER — POLYETHYLENE GLYCOL 3350 17 G/17G
17 POWDER, FOR SOLUTION ORAL DAILY PRN
Status: DISCONTINUED | OUTPATIENT
Start: 2024-07-03 | End: 2024-07-06 | Stop reason: HOSPADM

## 2024-07-03 RX ORDER — ACETAMINOPHEN 650 MG/1
650 SUPPOSITORY RECTAL EVERY 6 HOURS PRN
Status: DISCONTINUED | OUTPATIENT
Start: 2024-07-03 | End: 2024-07-06 | Stop reason: HOSPADM

## 2024-07-03 RX ORDER — LIDOCAINE 4 G/G
2 PATCH TOPICAL DAILY
Status: DISCONTINUED | OUTPATIENT
Start: 2024-07-03 | End: 2024-07-06 | Stop reason: HOSPADM

## 2024-07-03 RX ORDER — MAGNESIUM SULFATE IN WATER 40 MG/ML
2000 INJECTION, SOLUTION INTRAVENOUS PRN
Status: DISCONTINUED | OUTPATIENT
Start: 2024-07-03 | End: 2024-07-06 | Stop reason: HOSPADM

## 2024-07-03 RX ORDER — LANOLIN ALCOHOL/MO/W.PET/CERES
3 CREAM (GRAM) TOPICAL NIGHTLY
Status: DISCONTINUED | OUTPATIENT
Start: 2024-07-03 | End: 2024-07-06 | Stop reason: HOSPADM

## 2024-07-03 RX ORDER — POTASSIUM CHLORIDE 20 MEQ/1
40 TABLET, EXTENDED RELEASE ORAL PRN
Status: DISCONTINUED | OUTPATIENT
Start: 2024-07-03 | End: 2024-07-06 | Stop reason: HOSPADM

## 2024-07-03 RX ORDER — SODIUM CHLORIDE 0.9 % (FLUSH) 0.9 %
5-40 SYRINGE (ML) INJECTION EVERY 12 HOURS SCHEDULED
Status: DISCONTINUED | OUTPATIENT
Start: 2024-07-03 | End: 2024-07-06 | Stop reason: HOSPADM

## 2024-07-03 RX ORDER — ENOXAPARIN SODIUM 100 MG/ML
40 INJECTION SUBCUTANEOUS DAILY
Status: DISCONTINUED | OUTPATIENT
Start: 2024-07-04 | End: 2024-07-06 | Stop reason: HOSPADM

## 2024-07-03 RX ADMIN — Medication 3 MG: at 23:53

## 2024-07-03 RX ADMIN — Medication 10 ML: at 23:58

## 2024-07-03 ASSESSMENT — LIFESTYLE VARIABLES
HOW MANY STANDARD DRINKS CONTAINING ALCOHOL DO YOU HAVE ON A TYPICAL DAY: PATIENT DOES NOT DRINK
HOW OFTEN DO YOU HAVE A DRINK CONTAINING ALCOHOL: NEVER
HOW MANY STANDARD DRINKS CONTAINING ALCOHOL DO YOU HAVE ON A TYPICAL DAY: PATIENT DOES NOT DRINK
HOW OFTEN DO YOU HAVE A DRINK CONTAINING ALCOHOL: NEVER

## 2024-07-03 ASSESSMENT — ENCOUNTER SYMPTOMS
BACK PAIN: 0
SHORTNESS OF BREATH: 0
ABDOMINAL PAIN: 0

## 2024-07-03 ASSESSMENT — PAIN - FUNCTIONAL ASSESSMENT: PAIN_FUNCTIONAL_ASSESSMENT: NONE - DENIES PAIN

## 2024-07-03 NOTE — ED PROVIDER NOTES
[facility] ED  EMERGENCY DEPARTMENT ENCOUNTER      Pt Name: Hamilton Torres  MRN: [id number]  Birthdate 1935  Date of evaluation: 7/3/2024  Provider: Jus Blount MD  7:43 PM    CHIEF COMPLAINT       Chief Complaint   Patient presents with    Fall     Lost balance, fall from standing         HISTORY OF PRESENT ILLNESS    Hamilton Torres is a 88 y.o. male who presents to the emergency department via EMS for evaluation.  He says that he recently finished PT/OT/rehab for frequent falls.  That he was walking in to his house when he lost his balance and fell backwards striking his head.  No LOC or amnesia, denies other antecedent symptoms.  He denies recent illness, fever, vision change, neck pain, chest pain, abdominal pain, back pain, vomiting, dizziness, numbness or weakness.  Did not receive anything for relief prior to arrival.  Denies anticoagulation use but is on antiplatelets.  He presents in a cervical collar  HPI  Chart review notes history of anxiety, CAD on Plavix, carotid stenosis, CHF, dimension, hyperlipidemia, hypertension  Nursing Notes were reviewed.    REVIEW OF SYSTEMS       Review of Systems   Constitutional:  Negative for fever.        Fall backwards striking head   Eyes:  Negative for visual disturbance.   Respiratory:  Negative for shortness of breath.    Cardiovascular:  Negative for chest pain.   Gastrointestinal:  Negative for abdominal pain.   Genitourinary:  Negative for flank pain.   Musculoskeletal:  Negative for back pain and neck pain.   Neurological:  Negative for dizziness, syncope, facial asymmetry, speech difficulty, weakness and numbness.   All other systems reviewed and are negative.      Except as noted above the remainder of the review of systems was reviewed and negative.       PAST MEDICAL HISTORY     Past Medical History:   Diagnosis Date    Anticoagulant long-term use     Anxiety     CAD (coronary artery disease)     Carotid stenosis     2/2013 16-49%    CHF

## 2024-07-03 NOTE — ED TRIAGE NOTES
Patient arrived to ER by [facility] from [facility] after falling.   Patient states he was walking into his apartment when he lost his balance and fell backwards onto his buttocks and hit the back of his head on the wall.   Patient has a laceration to head.   Patient is A&O x3, pleasant and cooperative.  Patient has a c-collar in place.

## 2024-07-03 NOTE — ED NOTES
Attempted to ambulate patient with Dr Blount. Patient was very unstable while standing. This RN had to hold patient up to stand. Patient unable to take steps without legs partially buckling. Patient guided back to bed.

## 2024-07-04 LAB
ALBUMIN SERPL-MCNC: 3.7 G/DL (ref 3.5–4.6)
ALP SERPL-CCNC: 82 U/L (ref 35–104)
ALT SERPL-CCNC: 9 U/L (ref 0–41)
ANION GAP SERPL CALCULATED.3IONS-SCNC: 9 MEQ/L (ref 9–15)
AST SERPL-CCNC: 14 U/L (ref 0–40)
BASOPHILS # BLD: 0.1 K/UL (ref 0–0.2)
BASOPHILS NFR BLD: 0.9 %
BILIRUB SERPL-MCNC: 0.7 MG/DL (ref 0.2–0.7)
BUN SERPL-MCNC: 14 MG/DL (ref 8–23)
CALCIUM SERPL-MCNC: 8.8 MG/DL (ref 8.5–9.9)
CHLORIDE SERPL-SCNC: 107 MEQ/L (ref 95–107)
CO2 SERPL-SCNC: 24 MEQ/L (ref 20–31)
CREAT SERPL-MCNC: 0.68 MG/DL (ref 0.7–1.2)
EOSINOPHIL # BLD: 0.3 K/UL (ref 0–0.7)
EOSINOPHIL NFR BLD: 5 %
ERYTHROCYTE [DISTWIDTH] IN BLOOD BY AUTOMATED COUNT: 13.9 % (ref 11.5–14.5)
GLOBULIN SER CALC-MCNC: 2.1 G/DL (ref 2.3–3.5)
GLUCOSE SERPL-MCNC: 97 MG/DL (ref 70–99)
HCT VFR BLD AUTO: 40.8 % (ref 42–52)
HGB BLD-MCNC: 13.3 G/DL (ref 14–18)
LYMPHOCYTES # BLD: 1.3 K/UL (ref 1–4.8)
LYMPHOCYTES NFR BLD: 19.8 %
MCH RBC QN AUTO: 30 PG (ref 27–31.3)
MCHC RBC AUTO-ENTMCNC: 32.6 % (ref 33–37)
MCV RBC AUTO: 92.1 FL (ref 79–92.2)
MONOCYTES # BLD: 0.7 K/UL (ref 0.2–0.8)
MONOCYTES NFR BLD: 10.8 %
NEUTROPHILS # BLD: 4.1 K/UL (ref 1.4–6.5)
NEUTS SEG NFR BLD: 63.2 %
PLATELET # BLD AUTO: 162 K/UL (ref 130–400)
POTASSIUM SERPL-SCNC: 3.7 MEQ/L (ref 3.4–4.9)
PROT SERPL-MCNC: 5.8 G/DL (ref 6.3–8)
RBC # BLD AUTO: 4.43 M/UL (ref 4.7–6.1)
SODIUM SERPL-SCNC: 140 MEQ/L (ref 135–144)
WBC # BLD AUTO: 6.5 K/UL (ref 4.8–10.8)

## 2024-07-04 PROCEDURE — G0378 HOSPITAL OBSERVATION PER HR: HCPCS

## 2024-07-04 PROCEDURE — 2580000003 HC RX 258

## 2024-07-04 PROCEDURE — 97162 PT EVAL MOD COMPLEX 30 MIN: CPT

## 2024-07-04 PROCEDURE — 85025 COMPLETE CBC W/AUTO DIFF WBC: CPT

## 2024-07-04 PROCEDURE — 6370000000 HC RX 637 (ALT 250 FOR IP)

## 2024-07-04 PROCEDURE — 80053 COMPREHEN METABOLIC PANEL: CPT

## 2024-07-04 PROCEDURE — 36415 COLL VENOUS BLD VENIPUNCTURE: CPT

## 2024-07-04 PROCEDURE — 97166 OT EVAL MOD COMPLEX 45 MIN: CPT

## 2024-07-04 PROCEDURE — 96372 THER/PROPH/DIAG INJ SC/IM: CPT

## 2024-07-04 PROCEDURE — 6360000002 HC RX W HCPCS

## 2024-07-04 RX ORDER — HYDRALAZINE HYDROCHLORIDE 20 MG/ML
10 INJECTION INTRAMUSCULAR; INTRAVENOUS EVERY 4 HOURS PRN
Status: DISCONTINUED | OUTPATIENT
Start: 2024-07-04 | End: 2024-07-06 | Stop reason: HOSPADM

## 2024-07-04 RX ORDER — ALBUTEROL SULFATE 90 UG/1
2 AEROSOL, METERED RESPIRATORY (INHALATION) EVERY 6 HOURS PRN
Status: DISCONTINUED | OUTPATIENT
Start: 2024-07-04 | End: 2024-07-06 | Stop reason: HOSPADM

## 2024-07-04 RX ORDER — ISOSORBIDE MONONITRATE 30 MG/1
30 TABLET, EXTENDED RELEASE ORAL DAILY
Status: DISCONTINUED | OUTPATIENT
Start: 2024-07-04 | End: 2024-07-06 | Stop reason: HOSPADM

## 2024-07-04 RX ORDER — DORZOLAMIDE HCL 20 MG/ML
1 SOLUTION/ DROPS OPHTHALMIC 2 TIMES DAILY
Status: DISCONTINUED | OUTPATIENT
Start: 2024-07-04 | End: 2024-07-06 | Stop reason: HOSPADM

## 2024-07-04 RX ORDER — UREA 10 %
500 LOTION (ML) TOPICAL DAILY
Status: DISCONTINUED | OUTPATIENT
Start: 2024-07-04 | End: 2024-07-06 | Stop reason: HOSPADM

## 2024-07-04 RX ORDER — LATANOPROST 50 UG/ML
1 SOLUTION/ DROPS OPHTHALMIC DAILY
Status: DISCONTINUED | OUTPATIENT
Start: 2024-07-04 | End: 2024-07-06 | Stop reason: HOSPADM

## 2024-07-04 RX ORDER — LISINOPRIL 10 MG/1
10 TABLET ORAL DAILY
Status: DISCONTINUED | OUTPATIENT
Start: 2024-07-04 | End: 2024-07-06 | Stop reason: HOSPADM

## 2024-07-04 RX ORDER — TIMOLOL MALEATE 5 MG/ML
1 SOLUTION/ DROPS OPHTHALMIC 2 TIMES DAILY
Status: DISCONTINUED | OUTPATIENT
Start: 2024-07-04 | End: 2024-07-06 | Stop reason: HOSPADM

## 2024-07-04 RX ORDER — DORZOLAMIDE HYDROCHLORIDE AND TIMOLOL MALEATE 20; 5 MG/ML; MG/ML
1 SOLUTION/ DROPS OPHTHALMIC 2 TIMES DAILY
Status: DISCONTINUED | OUTPATIENT
Start: 2024-07-04 | End: 2024-07-04 | Stop reason: CLARIF

## 2024-07-04 RX ORDER — CARVEDILOL 6.25 MG/1
6.25 TABLET ORAL 2 TIMES DAILY
Status: DISCONTINUED | OUTPATIENT
Start: 2024-07-04 | End: 2024-07-06 | Stop reason: HOSPADM

## 2024-07-04 RX ORDER — DONEPEZIL HYDROCHLORIDE 5 MG/1
5 TABLET, FILM COATED ORAL NIGHTLY
Status: DISCONTINUED | OUTPATIENT
Start: 2024-07-04 | End: 2024-07-06 | Stop reason: HOSPADM

## 2024-07-04 RX ORDER — CLOPIDOGREL BISULFATE 75 MG/1
75 TABLET ORAL DAILY
Status: DISCONTINUED | OUTPATIENT
Start: 2024-07-04 | End: 2024-07-06 | Stop reason: HOSPADM

## 2024-07-04 RX ORDER — FINASTERIDE 5 MG/1
5 TABLET, FILM COATED ORAL DAILY
Status: DISCONTINUED | OUTPATIENT
Start: 2024-07-04 | End: 2024-07-06 | Stop reason: HOSPADM

## 2024-07-04 RX ORDER — PRAVASTATIN SODIUM 40 MG
80 TABLET ORAL EVERY EVENING
Status: DISCONTINUED | OUTPATIENT
Start: 2024-07-04 | End: 2024-07-06 | Stop reason: HOSPADM

## 2024-07-04 RX ADMIN — CYANOCOBALAMIN TAB 500 MCG 500 MCG: 500 TAB at 09:28

## 2024-07-04 RX ADMIN — CARVEDILOL 6.25 MG: 6.25 TABLET, FILM COATED ORAL at 00:38

## 2024-07-04 RX ADMIN — TIMOLOL MALEATE 1 DROP: 5 SOLUTION OPHTHALMIC at 11:20

## 2024-07-04 RX ADMIN — CARVEDILOL 6.25 MG: 6.25 TABLET, FILM COATED ORAL at 09:42

## 2024-07-04 RX ADMIN — ACETAMINOPHEN 325MG 650 MG: 325 TABLET ORAL at 17:24

## 2024-07-04 RX ADMIN — CARVEDILOL 6.25 MG: 6.25 TABLET, FILM COATED ORAL at 20:08

## 2024-07-04 RX ADMIN — ISOSORBIDE MONONITRATE 30 MG: 30 TABLET, EXTENDED RELEASE ORAL at 09:27

## 2024-07-04 RX ADMIN — DONEPEZIL HYDROCHLORIDE 5 MG: 5 TABLET, FILM COATED ORAL at 00:38

## 2024-07-04 RX ADMIN — LISINOPRIL 10 MG: 10 TABLET ORAL at 09:28

## 2024-07-04 RX ADMIN — Medication 10 ML: at 10:33

## 2024-07-04 RX ADMIN — PRAVASTATIN SODIUM 80 MG: 40 TABLET ORAL at 00:38

## 2024-07-04 RX ADMIN — DONEPEZIL HYDROCHLORIDE 5 MG: 5 TABLET, FILM COATED ORAL at 20:08

## 2024-07-04 RX ADMIN — FINASTERIDE 5 MG: 5 TABLET, FILM COATED ORAL at 09:41

## 2024-07-04 RX ADMIN — CLOPIDOGREL BISULFATE 75 MG: 75 TABLET ORAL at 09:28

## 2024-07-04 RX ADMIN — ACETAMINOPHEN 325MG 650 MG: 325 TABLET ORAL at 09:42

## 2024-07-04 RX ADMIN — ENOXAPARIN SODIUM 40 MG: 100 INJECTION SUBCUTANEOUS at 09:27

## 2024-07-04 RX ADMIN — Medication 3 MG: at 20:07

## 2024-07-04 RX ADMIN — Medication 10 ML: at 20:08

## 2024-07-04 RX ADMIN — LATANOPROST 1 DROP: 50 SOLUTION OPHTHALMIC at 11:20

## 2024-07-04 RX ADMIN — PRAVASTATIN SODIUM 80 MG: 40 TABLET ORAL at 20:07

## 2024-07-04 ASSESSMENT — PAIN SCALES - GENERAL
PAINLEVEL_OUTOF10: 3
PAINLEVEL_OUTOF10: 5

## 2024-07-04 ASSESSMENT — PAIN DESCRIPTION - ORIENTATION
ORIENTATION: RIGHT;LEFT
ORIENTATION: LEFT;RIGHT

## 2024-07-04 ASSESSMENT — PAIN DESCRIPTION - DESCRIPTORS
DESCRIPTORS: ACHING;SORE;TENDER
DESCRIPTORS: ACHING

## 2024-07-04 ASSESSMENT — PAIN DESCRIPTION - LOCATION
LOCATION: BACK
LOCATION: BACK

## 2024-07-04 NOTE — H&P
Hospitalist Group   History and Physical      CHIEF COMPLAINT:  Fall from standing    History of Present Illness:  Hamilton Torres is a 88 y.o. male with a PMHx of arthritis of bilateral knees, NSTEMI, hypertension, hyperlipidemia, and CAD presents with fall from standing.  He reports he was walking in his house, lost his balance and/his knees gave out and he fell backwards.  He did hit his head on the floor.  Denies LOC.  Denies any other injury.  He reports that he was recently at a SNF getting PT and OT due to frequent falls and felt like he was getting stronger.  He reports that his knees do give out sometimes.  He denies chest pain, shortness of breath, neck pain, nausea, vomiting, dizziness, lightheadedness, numbness    REVIEW OF SYSTEMS:  no fevers, chills, cp, sob, n/v, ha, vision/hearing changes, wt changes, hot/cold flashes, other open skin lesions, diarrhea, constipation, dysuria/hematuria unless noted in HPI. Complete ROS performed with the patient and is otherwise negative.      PMH:  Past Medical History:   Diagnosis Date    Anticoagulant long-term use     Anxiety     CAD (coronary artery disease)     Carotid stenosis     2/2013 16-49%    CHF (congestive heart failure) (HCC)     Dementia (HCC)     Dementia (HCC)     Hyperlipidemia     Hypertension     MI (myocardial infarction) (HCC)     Osteoarthritis        Surgical History:  Past Surgical History:   Procedure Laterality Date    CARPAL TUNNEL RELEASE  1998    CATARACT REMOVAL  2007    COLONOSCOPY  12/10/10,2007    DR MATTHEWS - DONE AT [facility]    COLONOSCOPY  2007    hx polyps needs 2015    COLONOSCOPY  9/21/15     DR. YARBROUGH     CORONARY ANGIOPLASTY WITH STENT PLACEMENT  06/07/2020    DIAGNOSTIC CARDIAC CATH LAB PROCEDURE      HERNIA REPAIR Bilateral     x 2    HERNIA REPAIR Right 11/13/2020    RIGHT INGUINAL HERNIA REPAIR [facility] [place] [facility]    PTCA         Medications Prior to Admission:    Prior to Admission  unselect if not a suspected or confirmed emergency medical condition->Emergency Medical Condition (MA) What reading provider will be dictating this exam?->CRC; ORDERING SYSTEM PROVIDED HISTORY: fall TECHNOLOGIST PROVIDED HISTORY: Reason for exam:->fall Decision Support Exception - unselect if not a suspected or confirmed emergency medical condition->Emergency Medical Condition (MA) What reading provider will be dictating this exam?->CRC FINDINGS: CT OF THE BRAIN: BRAIN/VENTRICLES: There is no acute intracranial hemorrhage, mass effect or midline shift.  No abnormal extra-axial fluid collection.  Global involutional changes are seen.  Also mild periventricular white matter changes are visualized.  The gray-white differentiation is maintained without evidence of an acute infarct.  There is no evidence of hydrocephalus. ORBITS: The visualized portion of the orbits demonstrate no acute abnormality. SINUSES: The visualized paranasal sinuses and mastoid air cells demonstrate no acute abnormality. SOFT TISSUES/SKULL:  No acute abnormality of the visualized skull or soft tissues. CT OF THE CERVICAL SPINE: ALIGNMENT: There is straightening of the spine. DEGENERATIVE CHANGES: Very slight anterolisthesis of C4 on C5 is seen. Intervertebral disc space narrowing is seen at C5-6 and C6-C7.  Slight retrolisthesis of C6 on C7 is seen.  Anterior osteophytes are seen at C5-C7. Severe neural foraminal narrowing is seen bilaterally at its is C3-4.  No canal stenosis is seen.  At C4-5 neural foraminal narrowing is seen bilaterally.  At C5-6 severe neural foraminal narrowing is seen on the right. No canal stenosis is seen.  At C6-7 moderate neural foraminal narrowing is seen bilaterally.  No canal stenosis is visualized. SOFT TISSUES: No pneumothorax is seen in the visualized lung apices.  The airway is patent.  No subdural hematoma or mass visualized.     1.  No acute intracranial abnormality. 2.  Degenerative changes are seen in the

## 2024-07-04 NOTE — PROGRESS NOTES
Hospitalist Progress Note      Date of Admission: 7/3/2024  Chief Complaint:    Chief Complaint   Patient presents with    Fall     Lost balance, fall from standing     Subjective:  No new complaints.  No nausea, vomiting, chest pain, or headache      Medications:    Infusion Medications    sodium chloride       Scheduled Medications    carvedilol  6.25 mg Oral BID    clopidogrel  75 mg Oral Daily    donepezil  5 mg Oral Nightly    dorzolamide  1 drop Both Eyes BID    finasteride  5 mg Oral Daily    isosorbide mononitrate  30 mg Oral Daily    latanoprost  1 drop Both Eyes Daily    lisinopril  10 mg Oral Daily    pravastatin  80 mg Oral QPM    vitamin B-12  500 mcg Oral Daily    timolol  1 drop Both Eyes BID    Tetanus-Diphth-Acell Pertussis  0.5 mL IntraMUSCular Once    sodium chloride flush  5-40 mL IntraVENous 2 times per day    enoxaparin  40 mg SubCUTAneous Daily    melatonin  3 mg Oral Nightly    lidocaine  2 patch TransDERmal Daily     PRN Meds: hydrALAZINE, albuterol sulfate HFA, sodium chloride flush, sodium chloride, potassium chloride **OR** potassium alternative oral replacement **OR** potassium chloride, magnesium sulfate, ondansetron **OR** ondansetron, polyethylene glycol, acetaminophen **OR** acetaminophen    Intake/Output Summary (Last 24 hours) at 7/4/2024 1253  Last data filed at 7/4/2024 0724  Gross per 24 hour   Intake 240 ml   Output 480 ml   Net -240 ml     Exam:  BP (!) 143/57   Pulse 57   Temp 97.5 °F (36.4 °C) (Oral)   Resp 18   Ht 1.753 m (5' 9\")   Wt 83.9 kg (185 lb)   SpO2 96%   BMI 27.32 kg/m²   Head: Normocephalic, atraumatic  Sclera clear  Neck JVD flat  Lungs: normal effort of breathing    Labs:   Recent Labs     07/03/24  1630 07/04/24  0541   WBC 5.0 6.5   HGB 12.9* 13.3*   HCT 39.7* 40.8*    162     Recent Labs     07/03/24  1737 07/04/24  0541    140   K 3.9 3.7    107   CO2 26 24   BUN 17 14   CREATININE 0.77 0.68*   CALCIUM 8.9 8.8   AST 13 14   ALT 10

## 2024-07-04 NOTE — ED NOTES
Dr. Gordon called @2009   odynophagia    s/p  upper gastrointestinal endoscopy; normal esophagus, mid esophageal biopsy performed; mild gastritis  path shows minimal chronic inflammation  now with poor po intake  not candidate for peg  start marinol 2.5mg po bid  repeat s/s eval  d/w wife at bedside who agrees with plan

## 2024-07-04 NOTE — PROGRESS NOTES
balance    Patient ambulated to/from sink with WW at Mod A level. Pt requires moderate A for safety throughout. Pt observed with feet turned in slightly during ambulation and decreased balance.    Bed Mobility  Bed mobility  Rolling to Right: Moderate assistance  Supine to Sit: Maximum assistance  Sit to Supine: Moderate assistance  Scooting: Minimal assistance  Bed Mobility Comments: no rails with HOB flat - assist for lifting required with first trial of supine to sit - mod assist for second trial    Seated and Standing Balance:  Balance  Sitting: Intact  Standing:  (mod A without device, min A with device)    Functional Endurance:  Activity Tolerance  Activity Tolerance: Patient Tolerated treatment well    D/C Recommendations:  OT D/C RECOMMENDATIONS  REQUIRES OT FOLLOW-UP: Yes    Equipment Recommendations:  OT Equipment Recommendations  Equipment Needed:  (TBD)  Other: continue to assess    OT Education:   Patient Education  Education Given To: Patient;Family  Education Provided: Role of Therapy;Plan of Care  Education Method: Verbal  Barriers to Learning: Hearing  Education Outcome: Continued education needed    OT Follow Up:   OT D/C RECOMMENDATIONS  REQUIRES OT FOLLOW-UP: Yes       Assessment/Discharge Disposition:  Assessment: Pt is an 89yo male presenting with above stated deficits. pt would benefit from additional OT services to increase IND and safety with daily living tasks and functional mobility prior to home going as pt is previouly IND at assisted living facility.  Performance deficits / Impairments: Decreased functional mobility , Decreased safe awareness, Decreased balance, Decreased ADL status, Decreased cognition, Decreased endurance, Decreased high-level IADLs, Decreased strength  Prognosis: Good  Discharge Recommendations: Continue to assess pending progress  Decision Making: Medium Complexity     History: med  Exam: 8 performance deficits  Assistance / Modification: mod A    [facility]  Access appropriate D/C site with as few architectural barriers as possible.  - Sequence self-care tasks with no VC    Patient Goal: Patient goals : return home when able     Discussed and agreed upon: Yes Comments:       Therapy Time:   Individual   Time In 1004   Time Out 1025   Minutes 21          Eval: 21 minutes     Electronically signed by:    Landy Medel OT,   7/4/2024, 10:37 AM

## 2024-07-04 NOTE — PROGRESS NOTES
Physical Therapy Med Surg Initial Assessment  Facility/Department: [address] ORTHO TELE  Room: [facility]       NAME: Hamilton Torres  : 1935 (88 y.o.)  MRN: [id number]  CODE STATUS: DNR-CCA    Date of Service: 2024    Patient Diagnosis(es): Falls frequently [R29.6]  Frequent falls [R29.6]  Closed head injury, initial encounter [S09.90XA]  Difficulty in walking [R26.2]   Chief Complaint   Patient presents with    Fall     Lost balance, fall from standing     Patient Active Problem List    Diagnosis Date Noted    Congestive heart failure (HCC) 2020    COVID-19 virus infection 2023    Falls frequently 2024    Right inguinal hernia 2020    Syncope and collapse 2020    Post PTCA     Bilateral carotid bruits 2020    Unstable angina ([facility]) 2020    ACS (acute coronary syndrome) ([facility]) 2020    Non-STEMI (non-ST elevated myocardial infarction) ([facility]) 2020    CHF (congestive heart failure) ([facility])     Coronary artery disease involving native coronary artery of native heart without angina pectoris 2019    Dizziness     Anginal equivalent ([facility]) 2018    S/P cardiac cath 2018    Angina, class III ([facility])     Chest pain 2018    HESS (dyspnea on exertion) 2018    Leg edema 2018    Acute diastolic heart failure ([facility]) 2018    Essential hypertension 2018    NSTEMI (non-ST elevated myocardial infarction) ([facility]) 2018    Hypertensive urgency 2018    Dementia ([facility])     Carotid stenosis, bilateral 2013    Hyperlipidemia         Past Medical History:   Diagnosis Date    Anticoagulant long-term use     Anxiety     CAD (coronary artery disease)     Carotid stenosis     2013 16-49%    CHF (congestive heart failure) ([facility])     Dementia ([facility])     Dementia ([facility])     Hyperlipidemia     Hypertension     MI (myocardial infarction) ([facility])     Osteoarthritis      Past Surgical History:   Procedure Laterality Date    CARPAL TUNNEL  RLE  Comment: 4/5  Strength LLE  Comment: 4/5  Strength Other  Other: fair abdominal activation    Neuro:  Balance  Sitting - Static: Good  Sitting - Dynamic: Good;- (reaching limited by back discomfort)  Standing - Static: Poor (pt requires assistance for balance maintenance with and without bilat UE support)  Standing - Dynamic: Poor                                Bed mobility  Rolling to Right: Moderate assistance  Supine to Sit: Maximum assistance  Sit to Supine: Moderate assistance  Scooting: Minimal assistance  Bed Mobility Comments: no rails with HOB flat - assist for lifting required with first trial of supine to sit - mod assist for second trial    Transfers  Sit to Stand: Moderate Assistance (posterior lean requiring a'lisfting assistance throughout transition to standing)  Stand to Sit: Moderate Assistance    Ambulation  Surface: Level tile  Device: Rolling Walker  Assistance: Moderate assistance  Quality of Gait: bilat toe in position, short step length , decreased walker control and position of body with walker, LOB in any direction throughout requiring assistance for correction  Distance: 30 feet    Stairs/Curb  Stairs?: No              Activity Tolerance  Activity Tolerance: Patient tolerated evaluation without incident    Patient Education  Education Given To: Patient  Education Provided: Role of Therapy;Plan of Care  Education Method: Verbal  Education Outcome: Verbalized understanding       ASSESSMENT:   Body Structures, Functions, Activity Limitations Requiring Skilled Therapeutic Intervention: Decreased functional mobility ;Decreased balance;Decreased endurance;Decreased safe awareness;Decreased strength;Decreased posture;Decreased coordination  Decision Making: Medium Complexity  History: high  Exam: med  Clinical Presentation: med    Barriers to Learning: none         DISCHARGE RECOMMENDATIONS:  Discharge Recommendations: Continue to assess pending progress    Assessment: Pt demonstrates

## 2024-07-04 NOTE — PLAN OF CARE
Problem: Discharge Planning  Goal: Discharge to home or other facility with appropriate resources  Outcome: Progressing  Flowsheets (Taken 7/3/2024 2145)  Discharge to home or other facility with appropriate resources: Identify barriers to discharge with patient and caregiver     Problem: ABCDS Injury Assessment  Goal: Absence of physical injury  Outcome: Progressing     Problem: Safety - Adult  Goal: Free from fall injury  Outcome: Progressing

## 2024-07-05 LAB
ALBUMIN SERPL-MCNC: 3.6 G/DL (ref 3.5–4.6)
ALP SERPL-CCNC: 93 U/L (ref 35–104)
ALT SERPL-CCNC: 8 U/L (ref 0–41)
ANION GAP SERPL CALCULATED.3IONS-SCNC: 9 MEQ/L (ref 9–15)
AST SERPL-CCNC: 12 U/L (ref 0–40)
BASOPHILS # BLD: 0 K/UL (ref 0–0.2)
BASOPHILS NFR BLD: 0.7 %
BILIRUB SERPL-MCNC: 0.8 MG/DL (ref 0.2–0.7)
BUN SERPL-MCNC: 13 MG/DL (ref 8–23)
CALCIUM SERPL-MCNC: 8.9 MG/DL (ref 8.5–9.9)
CHLORIDE SERPL-SCNC: 104 MEQ/L (ref 95–107)
CO2 SERPL-SCNC: 22 MEQ/L (ref 20–31)
CREAT SERPL-MCNC: 0.7 MG/DL (ref 0.7–1.2)
EKG ATRIAL RATE: 79 BPM
EKG P AXIS: 32 DEGREES
EKG P-R INTERVAL: 196 MS
EKG Q-T INTERVAL: 390 MS
EKG QRS DURATION: 118 MS
EKG QTC CALCULATION (BAZETT): 447 MS
EKG R AXIS: -50 DEGREES
EKG T AXIS: 53 DEGREES
EKG VENTRICULAR RATE: 79 BPM
EOSINOPHIL # BLD: 0.3 K/UL (ref 0–0.7)
EOSINOPHIL NFR BLD: 5.5 %
ERYTHROCYTE [DISTWIDTH] IN BLOOD BY AUTOMATED COUNT: 14.2 % (ref 11.5–14.5)
GLOBULIN SER CALC-MCNC: 2.3 G/DL (ref 2.3–3.5)
GLUCOSE SERPL-MCNC: 91 MG/DL (ref 70–99)
HCT VFR BLD AUTO: 41.7 % (ref 42–52)
HGB BLD-MCNC: 13.7 G/DL (ref 14–18)
LYMPHOCYTES # BLD: 1.2 K/UL (ref 1–4.8)
LYMPHOCYTES NFR BLD: 22 %
MCH RBC QN AUTO: 30.1 PG (ref 27–31.3)
MCHC RBC AUTO-ENTMCNC: 32.9 % (ref 33–37)
MCV RBC AUTO: 91.6 FL (ref 79–92.2)
MONOCYTES # BLD: 0.5 K/UL (ref 0.2–0.8)
MONOCYTES NFR BLD: 9 %
NEUTROPHILS # BLD: 3.4 K/UL (ref 1.4–6.5)
NEUTS SEG NFR BLD: 62.6 %
PLATELET # BLD AUTO: 171 K/UL (ref 130–400)
POTASSIUM SERPL-SCNC: 3.8 MEQ/L (ref 3.4–4.9)
PROT SERPL-MCNC: 5.9 G/DL (ref 6.3–8)
RBC # BLD AUTO: 4.55 M/UL (ref 4.7–6.1)
SODIUM SERPL-SCNC: 135 MEQ/L (ref 135–144)
WBC # BLD AUTO: 5.4 K/UL (ref 4.8–10.8)

## 2024-07-05 PROCEDURE — 93010 ELECTROCARDIOGRAM REPORT: CPT | Performed by: INTERNAL MEDICINE

## 2024-07-05 PROCEDURE — 96374 THER/PROPH/DIAG INJ IV PUSH: CPT

## 2024-07-05 PROCEDURE — 85025 COMPLETE CBC W/AUTO DIFF WBC: CPT

## 2024-07-05 PROCEDURE — G0378 HOSPITAL OBSERVATION PER HR: HCPCS

## 2024-07-05 PROCEDURE — 97116 GAIT TRAINING THERAPY: CPT

## 2024-07-05 PROCEDURE — 2580000003 HC RX 258

## 2024-07-05 PROCEDURE — 6370000000 HC RX 637 (ALT 250 FOR IP)

## 2024-07-05 PROCEDURE — 97535 SELF CARE MNGMENT TRAINING: CPT

## 2024-07-05 PROCEDURE — 80053 COMPREHEN METABOLIC PANEL: CPT

## 2024-07-05 PROCEDURE — 6360000002 HC RX W HCPCS

## 2024-07-05 PROCEDURE — 99212 OFFICE O/P EST SF 10 MIN: CPT

## 2024-07-05 PROCEDURE — 36415 COLL VENOUS BLD VENIPUNCTURE: CPT

## 2024-07-05 PROCEDURE — 96372 THER/PROPH/DIAG INJ SC/IM: CPT

## 2024-07-05 RX ADMIN — DONEPEZIL HYDROCHLORIDE 5 MG: 5 TABLET, FILM COATED ORAL at 20:32

## 2024-07-05 RX ADMIN — CLOPIDOGREL BISULFATE 75 MG: 75 TABLET ORAL at 08:25

## 2024-07-05 RX ADMIN — TIMOLOL MALEATE 1 DROP: 5 SOLUTION OPHTHALMIC at 20:33

## 2024-07-05 RX ADMIN — Medication 10 ML: at 20:32

## 2024-07-05 RX ADMIN — LISINOPRIL 10 MG: 10 TABLET ORAL at 08:26

## 2024-07-05 RX ADMIN — Medication 10 ML: at 08:26

## 2024-07-05 RX ADMIN — ISOSORBIDE MONONITRATE 30 MG: 30 TABLET, EXTENDED RELEASE ORAL at 08:26

## 2024-07-05 RX ADMIN — ACETAMINOPHEN 325MG 650 MG: 325 TABLET ORAL at 20:43

## 2024-07-05 RX ADMIN — HYDRALAZINE HYDROCHLORIDE 10 MG: 20 INJECTION, SOLUTION INTRAMUSCULAR; INTRAVENOUS at 03:51

## 2024-07-05 RX ADMIN — FINASTERIDE 5 MG: 5 TABLET, FILM COATED ORAL at 08:26

## 2024-07-05 RX ADMIN — CARVEDILOL 6.25 MG: 6.25 TABLET, FILM COATED ORAL at 08:25

## 2024-07-05 RX ADMIN — Medication 3 MG: at 20:36

## 2024-07-05 RX ADMIN — ENOXAPARIN SODIUM 40 MG: 100 INJECTION SUBCUTANEOUS at 08:27

## 2024-07-05 RX ADMIN — CYANOCOBALAMIN TAB 500 MCG 500 MCG: 500 TAB at 08:26

## 2024-07-05 RX ADMIN — CARVEDILOL 6.25 MG: 6.25 TABLET, FILM COATED ORAL at 20:32

## 2024-07-05 RX ADMIN — PRAVASTATIN SODIUM 80 MG: 40 TABLET ORAL at 20:32

## 2024-07-05 ASSESSMENT — PAIN SCALES - GENERAL
PAINLEVEL_OUTOF10: 0
PAINLEVEL_OUTOF10: 5
PAINLEVEL_OUTOF10: 3
PAINLEVEL_OUTOF10: 0
PAINLEVEL_OUTOF10: 3

## 2024-07-05 ASSESSMENT — PAIN DESCRIPTION - LOCATION
LOCATION: KNEE

## 2024-07-05 ASSESSMENT — PAIN - FUNCTIONAL ASSESSMENT: PAIN_FUNCTIONAL_ASSESSMENT: ACTIVITIES ARE NOT PREVENTED

## 2024-07-05 ASSESSMENT — PAIN DESCRIPTION - DESCRIPTORS
DESCRIPTORS: ACHING
DESCRIPTORS: ACHING;DULL

## 2024-07-05 ASSESSMENT — PAIN DESCRIPTION - ORIENTATION
ORIENTATION: RIGHT;LEFT

## 2024-07-05 NOTE — PROGRESS NOTES
Hospitalist Progress Note      Date of Admission: 7/3/2024  Chief Complaint:    Chief Complaint   Patient presents with    Fall     Lost balance, fall from standing     Subjective:  No new complaints.  No nausea, vomiting, chest pain, or headache      Medications:    Infusion Medications    sodium chloride       Scheduled Medications    carvedilol  6.25 mg Oral BID    clopidogrel  75 mg Oral Daily    donepezil  5 mg Oral Nightly    dorzolamide  1 drop Both Eyes BID    finasteride  5 mg Oral Daily    isosorbide mononitrate  30 mg Oral Daily    latanoprost  1 drop Both Eyes Daily    lisinopril  10 mg Oral Daily    pravastatin  80 mg Oral QPM    vitamin B-12  500 mcg Oral Daily    timolol  1 drop Both Eyes BID    Tetanus-Diphth-Acell Pertussis  0.5 mL IntraMUSCular Once    sodium chloride flush  5-40 mL IntraVENous 2 times per day    enoxaparin  40 mg SubCUTAneous Daily    melatonin  3 mg Oral Nightly    lidocaine  2 patch TransDERmal Daily     PRN Meds: hydrALAZINE, albuterol sulfate HFA, sodium chloride flush, sodium chloride, potassium chloride **OR** potassium alternative oral replacement **OR** potassium chloride, magnesium sulfate, ondansetron **OR** ondansetron, polyethylene glycol, acetaminophen **OR** acetaminophen    Intake/Output Summary (Last 24 hours) at 7/5/2024 1707  Last data filed at 7/5/2024 1426  Gross per 24 hour   Intake 1210 ml   Output 1100 ml   Net 110 ml       Exam:  /72   Pulse 78   Temp 98.4 °F (36.9 °C) (Oral)   Resp 16   Ht 1.753 m (5' 9\")   Wt 79.6 kg (175 lb 7.8 oz)   SpO2 99%   BMI 25.91 kg/m²   Head: Normocephalic, atraumatic  Sclera clear  Neck JVD flat  Lungs: normal effort of breathing    Labs:   Recent Labs     07/03/24  1630 07/04/24  0541 07/05/24  0531   WBC 5.0 6.5 5.4   HGB 12.9* 13.3* 13.7*   HCT 39.7* 40.8* 41.7*    162 171       Recent Labs     07/03/24  1737 07/04/24  0541 07/05/24  0531    140 135   K 3.9 3.7 3.8    107 104   CO2 26 24 22

## 2024-07-05 NOTE — CARE COORDINATION
Inpatient Rehab referral received. Met with patient and explained [facility] Inpatient Rehab program and requirements, including 3 hours of intense therapy daily, team meetings on Monday and Thursday, anticipated length of stay and goal of discharge to home. All questions answered and patient verbalized understanding. Freedom of choice provided and patient requests admit to [facility] if appropriate.  PM&R consult pending/completed. Met with pt and he states he lives at [facility] and went through 4 months of therapy. Pt states he was doing ok until he fell 2 days ago. Pt is pleasant and wants to come to rehab.  Electronically signed by Darshana Nolan RN on 7/5/2024 at 2:02 PM

## 2024-07-05 NOTE — PROGRESS NOTES
Wound Ostomy Continence Nurse  Consult Note       NAME:  Hamilton Torres  MEDICAL RECORD NUMBER:  [id number]  AGE: 88 y.o.   GENDER: male  : 1935  TODAY'S DATE:  2024    Subjective   Reason for WOC Nurse Evaluation and Assessment: gluteal cleft wound      Hamilton Torres is a 88 y.o. male referred by:   [] Physician  [x] Nursing  [] Other:     Wound Identification:  Wound Type: traumatic and skin tear  Contributing Factors:  s/p fall     Wound History: Patient admitted to [facility] on 7/3/2024 with wound to right buttock. Patient states he fell on his butt at home and believes that is where he sustained the injury.   Current Wound Care Treatment:  Recommending 1) continue pressure injury prevention interventions 2) zinc paste BID and prn     Patient Goal of Care:  [x] Wound Healing  [] Odor Control  [] Palliative Care  [] Pain Control   [] Other:         PAST MEDICAL HISTORY        Diagnosis Date    Anticoagulant long-term use     Anxiety     CAD (coronary artery disease)     Carotid stenosis     2013 16-49%    CHF (congestive heart failure) (HCC)     Dementia (HCC)     Dementia (HCC)     Hyperlipidemia     Hypertension     MI (myocardial infarction) (HCC)     Osteoarthritis        PAST SURGICAL HISTORY    Past Surgical History:   Procedure Laterality Date    CARPAL TUNNEL RELEASE  1998    CATARACT REMOVAL      COLONOSCOPY  12/10/10,2007    DR MATTHEWS - DONE AT [facility]    COLONOSCOPY      hx polyps needs     COLONOSCOPY  9/21/15     DR. YARBROUGH     CORONARY ANGIOPLASTY WITH STENT PLACEMENT  2020    DIAGNOSTIC CARDIAC CATH LAB PROCEDURE      HERNIA REPAIR Bilateral     x 2    HERNIA REPAIR Right 2020    RIGHT INGUINAL HERNIA REPAIR performed by Roscoe Copeland MD [facility]         FAMILY HISTORY    Family History   Problem Relation Age of Onset    Heart Disease Mother     High Blood Pressure Mother        SOCIAL HISTORY    Social History     Tobacco Use  Pink/red;Superficial 07/05/24 0830   Drainage Amount None (dry) 07/05/24 0830   Odor None 07/05/24 0830   Nneka-wound Assessment Intact 07/05/24 0830   Margins Defined edges 07/05/24 0830   Wound Thickness Description not for Pressure Injury Partial thickness 07/05/24 0830   Number of days: 2     Incision 11/13/20 Groin Anterior (Active)   Number of days: 1330     Assessment: Patient minimal assist to turn to right side for assessment. Skin tear to right buttock noted. Wound is pink/red and superficial and periwound skin is intact. Patient states he believes he got the injury when he fell on his butt at home. Recommending zinc paste BID and prn.       Plan   Plan of Care: Wound 07/03/24 Buttocks Right;Medial-Dressing/Treatment: Zinc paste    Specialty Bed Required : No   [] Low Air Loss   [] Pressure Redistribution  [] Fluid Immersion  [] Bariatric  [] Other:     Current Diet: ADULT DIET; Regular  Dietician consult:  No    Discharge Plan:  Placement for patient upon discharge: home with support    Patient appropriate for Outpatient Wound Care Center: No    Referrals:  []   [] Home Health Care  [] Supplies  [] Other    Patient/Caregiver Teaching:  Level of patient/caregiver understanding able to:   [] Indicates understanding       [] Needs reinforcement  [] Unsuccessful      [x] Verbal Understanding  [] Demonstrated understanding       [] No evidence of learning  [] Refused teaching         [] N/A       Electronically signed by MIROSLAVA Solano, RN, Wound/Ostomy Nurse on 7/5/2024 at 9:12 AM

## 2024-07-05 NOTE — PROGRESS NOTES
Physical Therapy Med Surg Daily Treatment Note  Facility/Department: [address] TELEMETRY  Room: [place] [address]       NAME: Hamilton Torres  : 1935 (88 y.o.)  MRN: [id number]  CODE STATUS: DNR-CCA    Date of Service: 2024    Patient Diagnosis(es): Falls frequently [R29.6]  Frequent falls [R29.6]  Closed head injury, initial encounter [S09.90XA]  Difficulty in walking [R26.2]   Chief Complaint   Patient presents with    Fall     Lost balance, fall from standing     Patient Active Problem List    Diagnosis Date Noted    Congestive heart failure (HCC) 2020    COVID-19 virus infection 2023    Falls frequently 2024    Right inguinal hernia 2020    Syncope and collapse 2020    Post PTCA     Bilateral carotid bruits 2020    Unstable angina ([facility]) 2020    ACS (acute coronary syndrome) ([facility]) 2020    Non-STEMI (non-ST elevated myocardial infarction) ([facility]) 2020    CHF (congestive heart failure) ([facility])     Coronary artery disease involving native coronary artery of native heart without angina pectoris 2019    Dizziness     Anginal equivalent ([facility]) 2018    S/P cardiac cath 2018    Angina, class III ([facility])     Chest pain 2018    HESS (dyspnea on exertion) 2018    Leg edema 2018    Acute diastolic heart failure ([facility]) 2018    Essential hypertension 2018    NSTEMI (non-ST elevated myocardial infarction) ([facility]) 2018    Hypertensive urgency 2018    Dementia ([facility])     Carotid stenosis, bilateral 2013    Hyperlipidemia         Past Medical History:   Diagnosis Date    Anticoagulant long-term use     Anxiety     CAD (coronary artery disease)     Carotid stenosis     2013 16-49%    CHF (congestive heart failure) ([facility])     Dementia ([facility])     Dementia ([facility])     Hyperlipidemia     Hypertension     MI (myocardial infarction) ([facility])     Osteoarthritis      Past Surgical History:   Procedure Laterality Date    CARPAL TUNNEL  RELEASE  1998    CATARACT REMOVAL  2007    COLONOSCOPY  12/10/10,2007    DR MATTHEWS - DONE AT [facility]    COLONOSCOPY  2007    hx polyps needs 2015    COLONOSCOPY  9/21/15     DR. YARBROUGH     CORONARY ANGIOPLASTY WITH STENT PLACEMENT  06/07/2020    DIAGNOSTIC CARDIAC CATH LAB PROCEDURE      HERNIA REPAIR Bilateral     x 2    HERNIA REPAIR Right 11/13/2020    RIGHT INGUINAL HERNIA REPAIR performed by Roscoe Copeland MD at [facility] [place]    PTCA         Chart Reviewed: Yes  Family / Caregiver Present: No    Restrictions:  Restrictions/Precautions: Fall Risk    SUBJECTIVE:   Subjective: Patient resting in bed. Motivated to participate.    Pain  Patient c/o B knee pain that worsens with movement. 5/10. Denies need for intervention    OBJECTIVE:        Bed mobility  Rolling to Left: Stand by assistance  Supine to Sit: Stand by assistance  Scooting: Stand by assistance  Bed Mobility Comments: HOB slightly elevated. Patient utilizes bed rail.    Transfers  Sit to Stand: Stand by assistance;Minimal Assistance   Stand to Sit: Stand by assistance;Minimal Assistance  Comment: Provided verbal cues for hand placement and safety.    Ambulation  Surface: Level tile  Device: Rolling Walker  Quality of Gait: NBOS, B toe in, shortened step length bilaterally, mild balance deficits that worsen upon fatigue  Distance: 50 feet x2    Exercises:  Seated: LAQ, march x20 ea   STS x5 from bed   Standing march x10      ASSESSMENT   Body Structures, Functions, Activity Limitations Requiring Skilled Therapeutic Intervention: Decreased functional mobility ;Decreased balance;Decreased endurance;Decreased safe awareness;Decreased strength;Decreased posture;Decreased coordination  Assessment: Patient demonstrates progress towards mobility goals. He completes transfers and gait training at [facility] this date. Continued to observe balance deficits that worsen once fatigued or knee pain increases.  Barriers to Learning: none     Discharge

## 2024-07-05 NOTE — CARE COORDINATION
Issues/Barriers to RETURNING to current housing: PT/OT  Potential Assistance needed at discharge: Extended Care Facility            Potential DME:    Patient expects to discharge to: Assisted living  Plan for transportation at discharge:      Financial    Payor: MEDICARE / Plan: MEDICARE PART A AND B / Product Type: *No Product type* /     Does insurance require precert for SNF: No    Potential assistance Purchasing Medications:    Meds-to-Beds request: Yes      [facility] - [place], [place] - [address] - P [phone] - F [phone]  [address] [address]  Phone: [phone] Fax: [phone]    [facility] - [place] - [address] - P [phone] - F [phone]  [facility]  [place] [address]  Phone: [phone] Fax: [phone]    [facility] - [place], [place] - [address] - P [phone] - F [phone]  [address] [address]  Phone: [phone] Fax: [phone]    [facility] - [place], [place] - [address] - P [phone] - F [phone]  [address] [facility] [address]  Phone: [phone] Fax: [phone]    [place] - [address] - P [phone] - F [phone]  [address]  [address] [place] [address]  Phone: [phone] Fax: [phone]      Notes:    Factors facilitating achievement of predicted outcomes: Family support, Cooperative, and Pleasant    Barriers to discharge: Medical complications    Additional Case Management Notes: MET WITH PATIENT TO DISCUSS DISCHARGE PLAN. PATIENT HOME NO O2, NO DME. PATIENT DOES FOLLOW WITH V/A. PATIENT STATES HAD OUTPATIENT REHAB FOR 4 MONTHS. FREEDOM OF CHOICE GIVEN PATIENT AGREEABLE TO [facility]. PATIENT HAS SON AND DAUGHTER TO ASSIST IF NEEDED. DISCHARGE PLAN [facility] - REFERRAL ORDERED. PATIENT SEEN BY LINDA KELLER AT [facility] NOTIFIED OF  ADMISSION.    The Plan for Transition of Care is related to the following treatment goals of Falls frequently [R29.6]  Frequent falls [R29.6]  Closed head injury, initial encounter [S09.90XA]  Difficulty in walking [R26.2]    IF APPLICABLE: The Patient and/or patient representative Edward and his family were provided with a choice of provider and agrees with the discharge plan. Freedom of choice list with basic dialogue that supports the patient's individualized plan of care/goals and shares the quality data associated with the providers was provided to:     Patient Representative Name:       The Patient and/or Patient Representative Agree with the Discharge Plan?      Karla Cade  Case Management Department

## 2024-07-05 NOTE — CARE COORDINATION
[facility] Pre-Admission Screening Document      Patient Name: Hamilton Torres       MRN: [id number]    : 1935    Age: 88 y.o.  Gender: male   Payor: Payor: MEDICARE / Plan: MEDICARE PART A AND B / Product Type: *No Product type* /   MSSP: No    Admitted from: [facility] Floor: 1w  Attending Care Provider: Ramu Howard MD  Inpatient Rehab Referring Care Provider: Dr. Krueger  Primary Care Provider: Cesilia Martínez MD  Inpatient Treatment Team including Consults: Treatment Team: Attending Provider: Ramu Howard MD; Registered Nurse: Tata Taylor RN; : Lydia Odonnell RN; : Karla Cade; Utilization Reviewer: Sharifa Henson RN; Registered Nurse: Esperanza Younger RN    Reason for Hospitalization:   1. Closed head injury, initial encounter    2. Frequent falls    3. Difficulty in walking      Chief Complaint   Patient presents with    Fall     Lost balance, fall from standing     Isolation:No active isolations    Hospital Course:  Admit Date: 7/3/2024  4:19 PM  Inpatient Rehab Referral Date: 24  Narrative of hospital course/history of present illness: Pt is a 88 y.o. male who presents to the emergency department via EMS for evaluation.  He says that he recently finished PT/OT/rehab for frequent falls.  That he was walking in to his house when he lost his balance and fell backwards striking his head.  No LOC or amnesia, denies other antecedent symptoms.  He denies recent illness, fever, vision change, neck pain, chest pain, abdominal pain, back pain, vomiting, dizziness, numbness or weakness .Pt is currently on antiplatelets.  FINAL IMPRESSION       1. Closed head injury, initial encounter    2. Frequent falls    3. Difficulty in walking      Internal Medicine 24:  Assessment/Plan:   Fall due to knees \"giving out\".   Hx of similar episodes in the past including snf for the same.     HTN: monitor  (07/04/24 1030)  Toilet Transfers  Toilet Transfers Comments: anticipate mod - max A for standing (07/04/24 1033)            Speech Language Pathology    N/a      Diet/Swallow:   Regular     Current Conditions Requiring Inpatient Rehabilitation  Bowel/Bladder Dysfunction: Yes  Intervention Required = Frequent toileting, Bowel program, and Check post void residual  Risk for Medical/Clinical Complications = moderate  Skin Healing/Breakdown Risk: Yes  Intervention Required = Side to side turns  Risk for Medical/Clinical Complications = moderate  Nutrition/Hydration Deficiency: Yes  Intervention Required = Monitor I&Os, Check Labs, and Dietary Eval  Risk for Medical/Clinical Complications = moderate  Medical Comorbidities: Yes  Intervention Required = DVT risk, CAD, and dementia  Risk for Medical/Clinical Complications = moderate    Rehab/Skilled Needs:   3 hours of Intensive Acute Rehab therapy daily, 5 days/week for a total of 900 minutes  PT Treatment Time:  1.5 hrs/day  OT Treatment Time: 1.5 hrs/day  Rehabilitation Nursing   Case management/Social work  Dietitian/Nutrition  Oxygen/CPAP/BiPAP    Cultural needs:   Ethnic, Cultural, Spiritual, and Evangelical Needs  Do you have any ethnic, cultural, Taoist practices or restrictions we should know about during your stay in the hospital?  : No  Are you able to do the things that help you spiritually even though you are sick? : No  How often do you feel lonely or isolated from those around you?: Never  Do you need support with your Taoist or spiritual needs?: No  It is part of the job for the Spiritual Health Team to stop by for a visit, would you like to specifically request a visit from our ?: No   Funding needs: none noted        Expected Level of Improvement with Rehab  Assist for ADL Modified [place]  Assist for Transfers [place]  Assist for Gait [place]    Patient's willingness to participate: Yes  Patient's ability to tolerate proposed

## 2024-07-05 NOTE — ACP (ADVANCE CARE PLANNING)
Advance Care Planning   Healthcare Decision Maker:    Primary Decision Maker: Olga Garcia - Child - [phone]    Primary Decision Maker: Wilton Torres - Child - [phone]    Click here to complete Healthcare Decision Makers including selection of the Healthcare Decision Maker Relationship (ie \"Primary\").

## 2024-07-06 ENCOUNTER — HOSPITAL ENCOUNTER (INPATIENT)
Age: 89
LOS: 12 days | Discharge: HOME | End: 2024-07-18
Attending: PHYSICAL MEDICINE & REHABILITATION | Admitting: PHYSICAL MEDICINE & REHABILITATION
Payer: MEDICARE

## 2024-07-06 VITALS
RESPIRATION RATE: 16 BRPM | OXYGEN SATURATION: 95 % | SYSTOLIC BLOOD PRESSURE: 137 MMHG | TEMPERATURE: 97.9 F | BODY MASS INDEX: 25.99 KG/M2 | DIASTOLIC BLOOD PRESSURE: 78 MMHG | WEIGHT: 175.49 LBS | HEIGHT: 69 IN | HEART RATE: 59 BPM

## 2024-07-06 DIAGNOSIS — Z74.09 IMPAIRED MOBILITY AND ACTIVITIES OF DAILY LIVING: Primary | ICD-10-CM

## 2024-07-06 DIAGNOSIS — Z78.9 IMPAIRED MOBILITY AND ACTIVITIES OF DAILY LIVING: Primary | ICD-10-CM

## 2024-07-06 LAB
ALBUMIN SERPL-MCNC: 3.5 G/DL (ref 3.5–4.6)
ALP SERPL-CCNC: 87 U/L (ref 35–104)
ALT SERPL-CCNC: 7 U/L (ref 0–41)
ANION GAP SERPL CALCULATED.3IONS-SCNC: 11 MEQ/L (ref 9–15)
AST SERPL-CCNC: 11 U/L (ref 0–40)
BASOPHILS # BLD: 0.1 K/UL (ref 0–0.2)
BASOPHILS NFR BLD: 1 %
BILIRUB SERPL-MCNC: 0.7 MG/DL (ref 0.2–0.7)
BUN SERPL-MCNC: 20 MG/DL (ref 8–23)
CALCIUM SERPL-MCNC: 8.8 MG/DL (ref 8.5–9.9)
CHLORIDE SERPL-SCNC: 101 MEQ/L (ref 95–107)
CO2 SERPL-SCNC: 24 MEQ/L (ref 20–31)
CREAT SERPL-MCNC: 0.76 MG/DL (ref 0.7–1.2)
EOSINOPHIL # BLD: 0.3 K/UL (ref 0–0.7)
EOSINOPHIL NFR BLD: 5.1 %
ERYTHROCYTE [DISTWIDTH] IN BLOOD BY AUTOMATED COUNT: 14.1 % (ref 11.5–14.5)
GLOBULIN SER CALC-MCNC: 2.3 G/DL (ref 2.3–3.5)
GLUCOSE SERPL-MCNC: 94 MG/DL (ref 70–99)
HCT VFR BLD AUTO: 42 % (ref 42–52)
HGB BLD-MCNC: 13.8 G/DL (ref 14–18)
LYMPHOCYTES # BLD: 1.2 K/UL (ref 1–4.8)
LYMPHOCYTES NFR BLD: 18.8 %
MCH RBC QN AUTO: 29.8 PG (ref 27–31.3)
MCHC RBC AUTO-ENTMCNC: 32.9 % (ref 33–37)
MCV RBC AUTO: 90.7 FL (ref 79–92.2)
MONOCYTES # BLD: 0.7 K/UL (ref 0.2–0.8)
MONOCYTES NFR BLD: 10.8 %
NEUTROPHILS # BLD: 3.9 K/UL (ref 1.4–6.5)
NEUTS SEG NFR BLD: 64 %
PLATELET # BLD AUTO: 172 K/UL (ref 130–400)
POTASSIUM SERPL-SCNC: 3.8 MEQ/L (ref 3.4–4.9)
PROT SERPL-MCNC: 5.8 G/DL (ref 6.3–8)
RBC # BLD AUTO: 4.63 M/UL (ref 4.7–6.1)
SODIUM SERPL-SCNC: 136 MEQ/L (ref 135–144)
WBC # BLD AUTO: 6.1 K/UL (ref 4.8–10.8)

## 2024-07-06 PROCEDURE — 2580000003 HC RX 258

## 2024-07-06 PROCEDURE — 80053 COMPREHEN METABOLIC PANEL: CPT

## 2024-07-06 PROCEDURE — 85025 COMPLETE CBC W/AUTO DIFF WBC: CPT

## 2024-07-06 PROCEDURE — 6370000000 HC RX 637 (ALT 250 FOR IP)

## 2024-07-06 PROCEDURE — 2580000003 HC RX 258: Performed by: INTERNAL MEDICINE

## 2024-07-06 PROCEDURE — 96372 THER/PROPH/DIAG INJ SC/IM: CPT

## 2024-07-06 PROCEDURE — 36415 COLL VENOUS BLD VENIPUNCTURE: CPT

## 2024-07-06 PROCEDURE — G0378 HOSPITAL OBSERVATION PER HR: HCPCS

## 2024-07-06 PROCEDURE — 6370000000 HC RX 637 (ALT 250 FOR IP): Performed by: INTERNAL MEDICINE

## 2024-07-06 PROCEDURE — 6360000002 HC RX W HCPCS

## 2024-07-06 PROCEDURE — 1180000000 HC REHAB R&B

## 2024-07-06 RX ORDER — LISINOPRIL 10 MG/1
10 TABLET ORAL DAILY
Status: DISCONTINUED | OUTPATIENT
Start: 2024-07-07 | End: 2024-07-18 | Stop reason: HOSPADM

## 2024-07-06 RX ORDER — SODIUM CHLORIDE 0.9 % (FLUSH) 0.9 %
5-40 SYRINGE (ML) INJECTION EVERY 12 HOURS SCHEDULED
Status: DISCONTINUED | OUTPATIENT
Start: 2024-07-06 | End: 2024-07-07

## 2024-07-06 RX ORDER — CARVEDILOL 6.25 MG/1
6.25 TABLET ORAL 2 TIMES DAILY
Status: DISCONTINUED | OUTPATIENT
Start: 2024-07-06 | End: 2024-07-18 | Stop reason: HOSPADM

## 2024-07-06 RX ORDER — LIDOCAINE 4 G/G
2 PATCH TOPICAL DAILY
Status: CANCELLED | OUTPATIENT
Start: 2024-07-07

## 2024-07-06 RX ORDER — TIMOLOL MALEATE 5 MG/ML
1 SOLUTION/ DROPS OPHTHALMIC 2 TIMES DAILY
Status: DISCONTINUED | OUTPATIENT
Start: 2024-07-06 | End: 2024-07-18 | Stop reason: HOSPADM

## 2024-07-06 RX ORDER — DORZOLAMIDE HCL 20 MG/ML
1 SOLUTION/ DROPS OPHTHALMIC 2 TIMES DAILY
Status: DISCONTINUED | OUTPATIENT
Start: 2024-07-06 | End: 2024-07-18 | Stop reason: HOSPADM

## 2024-07-06 RX ORDER — ONDANSETRON 2 MG/ML
4 INJECTION INTRAMUSCULAR; INTRAVENOUS EVERY 6 HOURS PRN
Status: CANCELLED | OUTPATIENT
Start: 2024-07-06

## 2024-07-06 RX ORDER — ISOSORBIDE MONONITRATE 30 MG/1
30 TABLET, EXTENDED RELEASE ORAL DAILY
Status: DISCONTINUED | OUTPATIENT
Start: 2024-07-07 | End: 2024-07-18 | Stop reason: HOSPADM

## 2024-07-06 RX ORDER — DORZOLAMIDE HCL 20 MG/ML
1 SOLUTION/ DROPS OPHTHALMIC 2 TIMES DAILY
Status: CANCELLED | OUTPATIENT
Start: 2024-07-06

## 2024-07-06 RX ORDER — POTASSIUM CHLORIDE 20 MEQ/1
40 TABLET, EXTENDED RELEASE ORAL PRN
Status: CANCELLED | OUTPATIENT
Start: 2024-07-06

## 2024-07-06 RX ORDER — POTASSIUM CHLORIDE 20 MEQ/1
40 TABLET, EXTENDED RELEASE ORAL PRN
Status: DISCONTINUED | OUTPATIENT
Start: 2024-07-06 | End: 2024-07-07

## 2024-07-06 RX ORDER — DONEPEZIL HYDROCHLORIDE 10 MG/1
5 TABLET, FILM COATED ORAL NIGHTLY
Status: DISCONTINUED | OUTPATIENT
Start: 2024-07-06 | End: 2024-07-18 | Stop reason: HOSPADM

## 2024-07-06 RX ORDER — CLOPIDOGREL BISULFATE 75 MG/1
75 TABLET ORAL DAILY
Status: CANCELLED | OUTPATIENT
Start: 2024-07-07

## 2024-07-06 RX ORDER — FINASTERIDE 5 MG/1
5 TABLET, FILM COATED ORAL DAILY
Status: CANCELLED | OUTPATIENT
Start: 2024-07-07

## 2024-07-06 RX ORDER — SODIUM CHLORIDE 0.9 % (FLUSH) 0.9 %
5-40 SYRINGE (ML) INJECTION PRN
Status: DISCONTINUED | OUTPATIENT
Start: 2024-07-06 | End: 2024-07-18 | Stop reason: HOSPADM

## 2024-07-06 RX ORDER — TIMOLOL MALEATE 5 MG/ML
1 SOLUTION/ DROPS OPHTHALMIC 2 TIMES DAILY
Status: CANCELLED | OUTPATIENT
Start: 2024-07-06

## 2024-07-06 RX ORDER — POLYETHYLENE GLYCOL 3350 17 G/17G
17 POWDER, FOR SOLUTION ORAL DAILY PRN
Status: CANCELLED | OUTPATIENT
Start: 2024-07-06

## 2024-07-06 RX ORDER — ENOXAPARIN SODIUM 100 MG/ML
40 INJECTION SUBCUTANEOUS DAILY
Status: DISCONTINUED | OUTPATIENT
Start: 2024-07-07 | End: 2024-07-11

## 2024-07-06 RX ORDER — HYDRALAZINE HYDROCHLORIDE 20 MG/ML
10 INJECTION INTRAMUSCULAR; INTRAVENOUS EVERY 4 HOURS PRN
Status: DISCONTINUED | OUTPATIENT
Start: 2024-07-06 | End: 2024-07-18 | Stop reason: HOSPADM

## 2024-07-06 RX ORDER — CARVEDILOL 6.25 MG/1
6.25 TABLET ORAL 2 TIMES DAILY
Status: CANCELLED | OUTPATIENT
Start: 2024-07-06

## 2024-07-06 RX ORDER — LANOLIN ALCOHOL/MO/W.PET/CERES
3 CREAM (GRAM) TOPICAL NIGHTLY
Status: CANCELLED | OUTPATIENT
Start: 2024-07-06

## 2024-07-06 RX ORDER — MAGNESIUM SULFATE IN WATER 40 MG/ML
2000 INJECTION, SOLUTION INTRAVENOUS PRN
Status: CANCELLED | OUTPATIENT
Start: 2024-07-06

## 2024-07-06 RX ORDER — POLYETHYLENE GLYCOL 3350 17 G/17G
17 POWDER, FOR SOLUTION ORAL DAILY PRN
Status: DISCONTINUED | OUTPATIENT
Start: 2024-07-06 | End: 2024-07-18 | Stop reason: HOSPADM

## 2024-07-06 RX ORDER — HYDRALAZINE HYDROCHLORIDE 20 MG/ML
10 INJECTION INTRAMUSCULAR; INTRAVENOUS EVERY 4 HOURS PRN
Status: CANCELLED | OUTPATIENT
Start: 2024-07-06

## 2024-07-06 RX ORDER — ACETAMINOPHEN 325 MG/1
650 TABLET ORAL EVERY 6 HOURS PRN
Status: CANCELLED | OUTPATIENT
Start: 2024-07-06

## 2024-07-06 RX ORDER — LIDOCAINE 4 G/G
2 PATCH TOPICAL DAILY
Status: DISCONTINUED | OUTPATIENT
Start: 2024-07-07 | End: 2024-07-08

## 2024-07-06 RX ORDER — LATANOPROST 50 UG/ML
1 SOLUTION/ DROPS OPHTHALMIC DAILY
Status: DISCONTINUED | OUTPATIENT
Start: 2024-07-07 | End: 2024-07-18 | Stop reason: HOSPADM

## 2024-07-06 RX ORDER — SODIUM CHLORIDE 9 MG/ML
INJECTION, SOLUTION INTRAVENOUS PRN
Status: CANCELLED | OUTPATIENT
Start: 2024-07-06

## 2024-07-06 RX ORDER — SODIUM CHLORIDE 0.9 % (FLUSH) 0.9 %
5-40 SYRINGE (ML) INJECTION EVERY 12 HOURS SCHEDULED
Status: CANCELLED | OUTPATIENT
Start: 2024-07-06

## 2024-07-06 RX ORDER — UREA 10 %
500 LOTION (ML) TOPICAL DAILY
Status: CANCELLED | OUTPATIENT
Start: 2024-07-07

## 2024-07-06 RX ORDER — ONDANSETRON 4 MG/1
4 TABLET, ORALLY DISINTEGRATING ORAL EVERY 8 HOURS PRN
Status: CANCELLED | OUTPATIENT
Start: 2024-07-06

## 2024-07-06 RX ORDER — PRAVASTATIN SODIUM 40 MG
80 TABLET ORAL EVERY EVENING
Status: CANCELLED | OUTPATIENT
Start: 2024-07-06

## 2024-07-06 RX ORDER — CLOPIDOGREL BISULFATE 75 MG/1
75 TABLET ORAL DAILY
Status: DISCONTINUED | OUTPATIENT
Start: 2024-07-07 | End: 2024-07-18 | Stop reason: HOSPADM

## 2024-07-06 RX ORDER — ONDANSETRON 4 MG/1
4 TABLET, ORALLY DISINTEGRATING ORAL EVERY 8 HOURS PRN
Status: DISCONTINUED | OUTPATIENT
Start: 2024-07-06 | End: 2024-07-18 | Stop reason: HOSPADM

## 2024-07-06 RX ORDER — LISINOPRIL 10 MG/1
10 TABLET ORAL DAILY
Status: CANCELLED | OUTPATIENT
Start: 2024-07-07

## 2024-07-06 RX ORDER — DONEPEZIL HYDROCHLORIDE 5 MG/1
5 TABLET, FILM COATED ORAL NIGHTLY
Status: CANCELLED | OUTPATIENT
Start: 2024-07-06

## 2024-07-06 RX ORDER — FINASTERIDE 5 MG/1
5 TABLET, FILM COATED ORAL DAILY
Status: DISCONTINUED | OUTPATIENT
Start: 2024-07-07 | End: 2024-07-18 | Stop reason: HOSPADM

## 2024-07-06 RX ORDER — SODIUM CHLORIDE 0.9 % (FLUSH) 0.9 %
5-40 SYRINGE (ML) INJECTION PRN
Status: CANCELLED | OUTPATIENT
Start: 2024-07-06

## 2024-07-06 RX ORDER — POTASSIUM CHLORIDE 7.45 MG/ML
10 INJECTION INTRAVENOUS PRN
Status: DISCONTINUED | OUTPATIENT
Start: 2024-07-06 | End: 2024-07-07

## 2024-07-06 RX ORDER — PRAVASTATIN SODIUM 40 MG
80 TABLET ORAL EVERY EVENING
Status: DISCONTINUED | OUTPATIENT
Start: 2024-07-06 | End: 2024-07-18 | Stop reason: HOSPADM

## 2024-07-06 RX ORDER — LATANOPROST 50 UG/ML
1 SOLUTION/ DROPS OPHTHALMIC DAILY
Status: CANCELLED | OUTPATIENT
Start: 2024-07-07

## 2024-07-06 RX ORDER — ONDANSETRON 2 MG/ML
4 INJECTION INTRAMUSCULAR; INTRAVENOUS EVERY 6 HOURS PRN
Status: DISCONTINUED | OUTPATIENT
Start: 2024-07-06 | End: 2024-07-18 | Stop reason: HOSPADM

## 2024-07-06 RX ORDER — ISOSORBIDE MONONITRATE 30 MG/1
30 TABLET, EXTENDED RELEASE ORAL DAILY
Status: CANCELLED | OUTPATIENT
Start: 2024-07-07

## 2024-07-06 RX ORDER — UREA 10 %
500 LOTION (ML) TOPICAL DAILY
Status: DISCONTINUED | OUTPATIENT
Start: 2024-07-07 | End: 2024-07-18 | Stop reason: HOSPADM

## 2024-07-06 RX ORDER — ACETAMINOPHEN 325 MG/1
650 TABLET ORAL EVERY 6 HOURS PRN
Status: DISCONTINUED | OUTPATIENT
Start: 2024-07-06 | End: 2024-07-18 | Stop reason: HOSPADM

## 2024-07-06 RX ORDER — LANOLIN ALCOHOL/MO/W.PET/CERES
3 CREAM (GRAM) TOPICAL NIGHTLY
Status: DISCONTINUED | OUTPATIENT
Start: 2024-07-06 | End: 2024-07-18 | Stop reason: HOSPADM

## 2024-07-06 RX ORDER — SODIUM CHLORIDE 9 MG/ML
INJECTION, SOLUTION INTRAVENOUS PRN
Status: DISCONTINUED | OUTPATIENT
Start: 2024-07-06 | End: 2024-07-18 | Stop reason: HOSPADM

## 2024-07-06 RX ORDER — ACETAMINOPHEN 650 MG/1
650 SUPPOSITORY RECTAL EVERY 6 HOURS PRN
Status: DISCONTINUED | OUTPATIENT
Start: 2024-07-06 | End: 2024-07-08

## 2024-07-06 RX ORDER — MAGNESIUM SULFATE IN WATER 40 MG/ML
2000 INJECTION, SOLUTION INTRAVENOUS PRN
Status: DISCONTINUED | OUTPATIENT
Start: 2024-07-06 | End: 2024-07-07

## 2024-07-06 RX ORDER — ALBUTEROL SULFATE 90 UG/1
2 AEROSOL, METERED RESPIRATORY (INHALATION) EVERY 6 HOURS PRN
Status: DISCONTINUED | OUTPATIENT
Start: 2024-07-06 | End: 2024-07-18 | Stop reason: HOSPADM

## 2024-07-06 RX ORDER — ALBUTEROL SULFATE 90 UG/1
2 AEROSOL, METERED RESPIRATORY (INHALATION) EVERY 6 HOURS PRN
Status: CANCELLED | OUTPATIENT
Start: 2024-07-06

## 2024-07-06 RX ORDER — POTASSIUM CHLORIDE 7.45 MG/ML
10 INJECTION INTRAVENOUS PRN
Status: CANCELLED | OUTPATIENT
Start: 2024-07-06

## 2024-07-06 RX ORDER — ACETAMINOPHEN 650 MG/1
650 SUPPOSITORY RECTAL EVERY 6 HOURS PRN
Status: CANCELLED | OUTPATIENT
Start: 2024-07-06

## 2024-07-06 RX ORDER — ENOXAPARIN SODIUM 100 MG/ML
40 INJECTION SUBCUTANEOUS DAILY
Status: CANCELLED | OUTPATIENT
Start: 2024-07-07

## 2024-07-06 RX ADMIN — Medication 10 ML: at 09:34

## 2024-07-06 RX ADMIN — FINASTERIDE 5 MG: 5 TABLET, FILM COATED ORAL at 09:33

## 2024-07-06 RX ADMIN — Medication 3 MG: at 21:25

## 2024-07-06 RX ADMIN — CLOPIDOGREL BISULFATE 75 MG: 75 TABLET ORAL at 09:33

## 2024-07-06 RX ADMIN — SODIUM CHLORIDE, PRESERVATIVE FREE 10 ML: 5 INJECTION INTRAVENOUS at 21:28

## 2024-07-06 RX ADMIN — ISOSORBIDE MONONITRATE 30 MG: 30 TABLET, EXTENDED RELEASE ORAL at 09:33

## 2024-07-06 RX ADMIN — PRAVASTATIN SODIUM 80 MG: 40 TABLET ORAL at 21:25

## 2024-07-06 RX ADMIN — ACETAMINOPHEN 325MG 650 MG: 325 TABLET ORAL at 09:32

## 2024-07-06 RX ADMIN — CYANOCOBALAMIN TAB 500 MCG 500 MCG: 500 TAB at 09:34

## 2024-07-06 RX ADMIN — ENOXAPARIN SODIUM 40 MG: 100 INJECTION SUBCUTANEOUS at 09:33

## 2024-07-06 RX ADMIN — CARVEDILOL 6.25 MG: 6.25 TABLET, FILM COATED ORAL at 09:33

## 2024-07-06 RX ADMIN — CARVEDILOL 6.25 MG: 6.25 TABLET, FILM COATED ORAL at 21:25

## 2024-07-06 RX ADMIN — TIMOLOL MALEATE 1 DROP: 5 SOLUTION OPHTHALMIC at 12:39

## 2024-07-06 RX ADMIN — DONEPEZIL HYDROCHLORIDE 5 MG: 10 TABLET, FILM COATED ORAL at 21:25

## 2024-07-06 RX ADMIN — LISINOPRIL 10 MG: 10 TABLET ORAL at 09:33

## 2024-07-06 RX ADMIN — TIMOLOL MALEATE 1 DROP: 5 SOLUTION OPHTHALMIC at 21:27

## 2024-07-06 ASSESSMENT — PAIN DESCRIPTION - DESCRIPTORS: DESCRIPTORS: ACHING

## 2024-07-06 ASSESSMENT — PAIN SCALES - GENERAL
PAINLEVEL_OUTOF10: 7
PAINLEVEL_OUTOF10: 0

## 2024-07-06 ASSESSMENT — PAIN DESCRIPTION - LOCATION: LOCATION: KNEE;BACK

## 2024-07-06 NOTE — PROGRESS NOTES
Patient admitted from 1W dx: closed head injury. Vitals stable, no c/o pain. Alert and oriented x4, pleasant. Skin assessed with LPN. Redness noted to right heel ( mepilex applied), bruising to right shoulder/flank, small healing abrasion to back of head, and tear to right buttocks/gluteal cleft. Consents signed and oriented to call light. Electronically signed by Michelle Esqueda RN on 7/6/24 at 6:29 PM EDT

## 2024-07-06 NOTE — DISCHARGE SUMMARY
Hospital Medicine Discharge Summary    Hamilton Torres  :  1935  MRN:  [id number]    Admit date:  7/3/2024  Discharge date:  2024    Admitting Physician:  Rosalia Trimble DO  Primary Care Physician:  Cesilia Martínez MD    Hamilton Torres is a 88 y.o. male that was admitted and treated for the following medical issues:     Principal Problem:    Falls frequently  Resolved Problems:    * No resolved hospital problems. *      Discharge Diagnoses:    Principal Problem:    Falls frequently  Resolved Problems:    * No resolved hospital problems. *    Chief Complaint   Patient presents with    Fall     Lost balance, fall from standing       Hospital Course:   Hamilton Torres is a 88 y.o. male who was admitted to the hospital with fall.  Seen by physical/occupational therapy, recommended for acute rehab.  Seen by physical medicine and rehabilitation consultant, recommended for and then subsequently admitted to acute rehab  Pt was discharge in a stable condition.       /78   Pulse 59   Temp 97.9 °F (36.6 °C) (Oral)   Resp 16   Ht 1.753 m (5' 9\")   Wt 79.6 kg (175 lb 7.8 oz)   SpO2 95%   BMI 25.91 kg/m²     Patient was seen by the following consultants  Consults:  IP CONSULT TO REHAB/TCU ADMISSION COORDINATOR    Significant Diagnostic Studies:    Refer to chart if  CT HEAD WO CONTRAST    Result Date: 7/3/2024  EXAMINATION: CT OF THE HEAD WITHOUT CONTRAST; CT OF THE CERVICAL SPINE WITHOUT CONTRAST 7/3/2024 4:46 pm TECHNIQUE: CT of the head was performed without the administration of intravenous contrast. Automated exposure control, iterative reconstruction, and/or weight based adjustment of the mA/kV was utilized to reduce the radiation dose to as low as reasonably achievable.; CT of the cervical spine was performed without the administration of intravenous contrast. Multiplanar reformatted images are provided for review. Automated exposure control, iterative reconstruction, and/or weight based  adjustment of the mA/kV was utilized to reduce the radiation dose to as low as reasonably achievable. COMPARISON: CT of the brain, January 21, 2023. HISTORY: ORDERING SYSTEM PROVIDED HISTORY: fall TECHNOLOGIST PROVIDED HISTORY: Reason for exam:->fall Has a \"code stroke\" or \"stroke alert\" been called?->No Decision Support Exception - unselect if not a suspected or confirmed emergency medical condition->Emergency Medical Condition (MA) What reading provider will be dictating this exam?->CRC; ORDERING SYSTEM PROVIDED HISTORY: fall TECHNOLOGIST PROVIDED HISTORY: Reason for exam:->fall Decision Support Exception - unselect if not a suspected or confirmed emergency medical condition->Emergency Medical Condition (MA) What reading provider will be dictating this exam?->CRC FINDINGS: CT OF THE BRAIN: BRAIN/VENTRICLES: There is no acute intracranial hemorrhage, mass effect or midline shift.  No abnormal extra-axial fluid collection.  Global involutional changes are seen.  Also mild periventricular white matter changes are visualized.  The gray-white differentiation is maintained without evidence of an acute infarct.  There is no evidence of hydrocephalus. ORBITS: The visualized portion of the orbits demonstrate no acute abnormality. SINUSES: The visualized paranasal sinuses and mastoid air cells demonstrate no acute abnormality. SOFT TISSUES/SKULL:  No acute abnormality of the visualized skull or soft tissues. CT OF THE CERVICAL SPINE: ALIGNMENT: There is straightening of the spine. DEGENERATIVE CHANGES: Very slight anterolisthesis of C4 on C5 is seen. Intervertebral disc space narrowing is seen at C5-6 and C6-C7.  Slight retrolisthesis of C6 on C7 is seen.  Anterior osteophytes are seen at C5-C7. Severe neural foraminal narrowing is seen bilaterally at its is C3-4.  No canal stenosis is seen.  At C4-5 neural foraminal narrowing is seen bilaterally.  At C5-6 severe neural foraminal narrowing is seen on the right. No canal

## 2024-07-06 NOTE — PLAN OF CARE
Problem: Discharge Planning  Goal: Discharge to home or other facility with appropriate resources  Outcome: Progressing  Flowsheets (Taken 7/5/2024 2000 by Tata Taylor, RN)  Discharge to home or other facility with appropriate resources: Identify barriers to discharge with patient and caregiver     Problem: ABCDS Injury Assessment  Goal: Absence of physical injury  Outcome: Progressing     Problem: Safety - Adult  Goal: Free from fall injury  Outcome: Progressing  Flowsheets (Taken 7/5/2024 2206 by Tata Taylor, RN)  Free From Fall Injury: Instruct family/caregiver on patient safety     Problem: Chronic Conditions and Co-morbidities  Goal: Patient's chronic conditions and co-morbidity symptoms are monitored and maintained or improved  Outcome: Progressing  Flowsheets (Taken 7/5/2024 2000 by Tata Taylor, RN)  Care Plan - Patient's Chronic Conditions and Co-Morbidity Symptoms are Monitored and Maintained or Improved: Monitor and assess patient's chronic conditions and comorbid symptoms for stability, deterioration, or improvement     Problem: Pain  Goal: Verbalizes/displays adequate comfort level or baseline comfort level  Outcome: Progressing

## 2024-07-06 NOTE — CONSULTS
intracranial abnormality. 2.  Degenerative changes are seen in the spine and there is straightening of the spine which may reflect spasm.     CT CERVICAL SPINE WO CONTRAST    Result Date: 7/3/2024  EXAMINATION: CT OF THE HEAD WITHOUT CONTRAST; CT OF THE CERVICAL SPINE WITHOUT CONTRAST 7/3/2024 4:46 pm TECHNIQUE: CT of the head was performed without the administration of intravenous contrast. Automated exposure control, iterative reconstruction, and/or weight based adjustment of the mA/kV was utilized to reduce the radiation dose to as low as reasonably achievable.; CT of the cervical spine was performed without the administration of intravenous contrast. Multiplanar reformatted images are provided for review. Automated exposure control, iterative reconstruction, and/or weight based adjustment of the mA/kV was utilized to reduce the radiation dose to as low as reasonably achievable. COMPARISON: CT of the brain, January 21, 2023. HISTORY: ORDERING SYSTEM PROVIDED HISTORY: fall TECHNOLOGIST PROVIDED HISTORY: Reason for exam:->fall Has a \"code stroke\" or \"stroke alert\" been called?->No Decision Support Exception - unselect if not a suspected or confirmed emergency medical condition->Emergency Medical Condition (MA) What reading provider will be dictating this exam?->CRC; ORDERING SYSTEM PROVIDED HISTORY: fall TECHNOLOGIST PROVIDED HISTORY: Reason for exam:->fall Decision Support Exception - unselect if not a suspected or confirmed emergency medical condition->Emergency Medical Condition (MA) What reading provider will be dictating this exam?->CRC FINDINGS: CT OF THE BRAIN: BRAIN/VENTRICLES: There is no acute intracranial hemorrhage, mass effect or midline shift.  No abnormal extra-axial fluid collection.  Global involutional changes are seen.  Also mild periventricular white matter changes are visualized.  The gray-white differentiation is maintained without evidence of an acute infarct.  There is no evidence of  2.3 2.3 - 3.5 g/dL   CBC with Auto Differential    Collection Time: 07/06/24  5:22 AM   Result Value Ref Range    WBC 6.1 4.8 - 10.8 K/uL    RBC 4.63 (L) 4.70 - 6.10 M/uL    Hemoglobin 13.8 (L) 14.0 - 18.0 g/dL    Hematocrit 42.0 42.0 - 52.0 %    MCV 90.7 79.0 - 92.2 fL    MCH 29.8 27.0 - 31.3 pg    MCHC 32.9 (L) 33.0 - 37.0 %    RDW 14.1 11.5 - 14.5 %    Platelets 172 130 - 400 K/uL    Neutrophils % 64.0 %    Lymphocytes % 18.8 %    Monocytes % 10.8 %    Eosinophils % 5.1 %    Basophils % 1.0 %    Neutrophils Absolute 3.9 1.4 - 6.5 K/uL    Lymphocytes Absolute 1.2 1.0 - 4.8 K/uL    Monocytes Absolute 0.7 0.2 - 0.8 K/uL    Eosinophils Absolute 0.3 0.0 - 0.7 K/uL    Basophils Absolute 0.1 0.0 - 0.2 K/uL              Impression:    Impaired mobility and ADLs due to  closed head injury secondary to fall   Factors favoring recovery include:  near independent premorbid function        Complex Active General Medical Issues that complicate care and require daily medical supervision:     1.Principal Problem:    Falls frequently  Resolved Problems:    * No resolved hospital problems. *    Patient Active Problem List   Diagnosis    Hyperlipidemia    Carotid stenosis, bilateral    Dementia ([facility])    Hypertensive urgency    NSTEMI (non-ST elevated myocardial infarction) ([facility])    Essential hypertension    HESS (dyspnea on exertion)    Leg edema    Acute diastolic heart failure ([facility])    Chest pain    Angina, class III ([facility])    S/P cardiac cath    Anginal equivalent ([facility])    Dizziness    Coronary artery disease involving native coronary artery of native heart without angina pectoris    CHF (congestive heart failure) ([facility])    Congestive heart failure ([facility])    Non-STEMI (non-ST elevated myocardial infarction) ([facility])    Unstable angina ([facility])    ACS (acute coronary syndrome) ([facility])    Bilateral carotid bruits    Syncope and collapse    Post PTCA    Right inguinal hernia    COVID-19 virus infection    Falls frequently

## 2024-07-07 PROCEDURE — 6360000002 HC RX W HCPCS: Performed by: INTERNAL MEDICINE

## 2024-07-07 PROCEDURE — 97166 OT EVAL MOD COMPLEX 45 MIN: CPT

## 2024-07-07 PROCEDURE — 6370000000 HC RX 637 (ALT 250 FOR IP): Performed by: INTERNAL MEDICINE

## 2024-07-07 PROCEDURE — 92523 SPEECH SOUND LANG COMPREHEN: CPT

## 2024-07-07 PROCEDURE — 92610 EVALUATE SWALLOWING FUNCTION: CPT

## 2024-07-07 PROCEDURE — 97162 PT EVAL MOD COMPLEX 30 MIN: CPT

## 2024-07-07 PROCEDURE — 1180000000 HC REHAB R&B

## 2024-07-07 RX ADMIN — TIMOLOL MALEATE 1 DROP: 5 SOLUTION OPHTHALMIC at 08:10

## 2024-07-07 RX ADMIN — LISINOPRIL 10 MG: 10 TABLET ORAL at 11:25

## 2024-07-07 RX ADMIN — PRAVASTATIN SODIUM 80 MG: 40 TABLET ORAL at 21:06

## 2024-07-07 RX ADMIN — CARVEDILOL 6.25 MG: 6.25 TABLET, FILM COATED ORAL at 21:06

## 2024-07-07 RX ADMIN — CYANOCOBALAMIN TAB 500 MCG 500 MCG: 500 TAB at 08:01

## 2024-07-07 RX ADMIN — TIMOLOL MALEATE 1 DROP: 5 SOLUTION OPHTHALMIC at 21:07

## 2024-07-07 RX ADMIN — Medication 3 MG: at 21:06

## 2024-07-07 RX ADMIN — CARVEDILOL 6.25 MG: 6.25 TABLET, FILM COATED ORAL at 08:01

## 2024-07-07 RX ADMIN — DONEPEZIL HYDROCHLORIDE 5 MG: 10 TABLET, FILM COATED ORAL at 21:06

## 2024-07-07 RX ADMIN — ISOSORBIDE MONONITRATE 30 MG: 30 TABLET, EXTENDED RELEASE ORAL at 08:01

## 2024-07-07 RX ADMIN — FINASTERIDE 5 MG: 5 TABLET, FILM COATED ORAL at 08:01

## 2024-07-07 RX ADMIN — ENOXAPARIN SODIUM 40 MG: 100 INJECTION SUBCUTANEOUS at 08:01

## 2024-07-07 RX ADMIN — CLOPIDOGREL BISULFATE 75 MG: 75 TABLET ORAL at 08:01

## 2024-07-07 ASSESSMENT — PAIN SCALES - GENERAL
PAINLEVEL_OUTOF10: 0
PAINLEVEL_OUTOF10: 0

## 2024-07-07 NOTE — PROGRESS NOTES
Physical Therapy  Facility/Department: [facility] MED SURG W187/W187-01  Physical Therapy Discharge      NAME: Hamilton Torres    : 1935 (88 y.o.)  MRN: [id number]    Account: [id number]  Gender: male      Patient has been discharged from acute care hospital. DC patient from current PT program.      Electronically signed by Judith Roy PT on 24 at 1:23 PM EDT

## 2024-07-07 NOTE — CONSULTS
Consult      Patient:  Hamilton Torres  YOB: 1935    MRN: [id number]     Acct: [id number]    Primary Care Physician: Cesilia Martínez MD    HISTORY OF PRESENT ILLNESS:   History obtained from chart review and the patient.    The patient is a 88 y.o. male whom I have been requested to see by Dr. Castillo for evaluation of HTN. Patient was admitted under medical service to [facility] after repeated falls at assistant living, was found to have weakness, chronically elevated troponins and after completing his acute stay was transferred to acute rehab status. Denied dizziness/fever/dyspnea or CP      Past Medical History:        Diagnosis Date    Anticoagulant long-term use     Anxiety     CAD (coronary artery disease)     Carotid stenosis     2/2013 16-49%    CHF (congestive heart failure) (HCC)     Dementia (HCC)     Dementia (HCC)     Hyperlipidemia     Hypertension     MI (myocardial infarction) (HCC)     Osteoarthritis        Past Surgical History:        Procedure Laterality Date    CARPAL TUNNEL RELEASE  1998    CATARACT REMOVAL  2007    COLONOSCOPY  12/10/10,2007    DR MATTHEWS - DONE AT [facility]    COLONOSCOPY  2007    hx polyps needs 2015    COLONOSCOPY  9/21/15     DR. YARBROUGH     CORONARY ANGIOPLASTY WITH STENT PLACEMENT  06/07/2020    DIAGNOSTIC CARDIAC CATH LAB PROCEDURE      HERNIA REPAIR Bilateral     x 2    HERNIA REPAIR Right 11/13/2020    RIGHT INGUINAL HERNIA REPAIR performed by Roscoe Copeland MD at [place]    PTCA         Medications:    No current facility-administered medications on file prior to encounter.     Current Outpatient Medications on File Prior to Encounter   Medication Sig Dispense Refill    albuterol sulfate HFA (PROVENTIL;VENTOLIN;PROAIR) 108 (90 Base) MCG/ACT inhaler Inhale 2 puffs into the lungs every 6 hours as needed for Wheezing      dorzolamide (TRUSOPT) 2 % ophthalmic solution Place 1 drop into both eyes in the morning and at bedtime 10 mL     vitamin D

## 2024-07-07 NOTE — PROGRESS NOTES
Facility/Department: [facility]  Rehabilitation Initial Assessment: Physical Therapy  Room: R250/R250-01    NAME: Hamilton Torres  : 1935  MRN: [id number]    Date of Service: 2024    Rehab Diagnosis(es):    Patient Active Problem List    Diagnosis Date Noted    Congestive heart failure (HCC) 2020    COVID-19 virus infection 2023    Falls frequently 2024    Right inguinal hernia 2020    Syncope and collapse 2020    Post PTCA     Bilateral carotid bruits 2020    Unstable angina (HCC) 2020    ACS (acute coronary syndrome) ([facility]) 2020    Non-STEMI (non-ST elevated myocardial infarction) ([facility]) 2020    CHF (congestive heart failure) ([facility])     Coronary artery disease involving native coronary artery of native heart without angina pectoris 2019    Dizziness     Anginal equivalent ([facility]) 2018    S/P cardiac cath 2018    Angina, class III ([facility])     Chest pain 2018    HESS (dyspnea on exertion) 2018    Leg edema 2018    Acute diastolic heart failure ([facility]) 2018    Essential hypertension 2018    NSTEMI (non-ST elevated myocardial infarction) ([facility]) 2018    Hypertensive urgency 2018    Dementia ([facility])     Carotid stenosis, bilateral 2013    Hyperlipidemia        Past Medical History:   Diagnosis Date    Anticoagulant long-term use     Anxiety     CAD (coronary artery disease)     Carotid stenosis     2013 16-49%    CHF (congestive heart failure) ([facility])     Dementia ([facility])     Dementia ([facility])     Hyperlipidemia     Hypertension     MI (myocardial infarction) ([facility])     Osteoarthritis      Past Surgical History:   Procedure Laterality Date    CARPAL TUNNEL RELEASE  1998    CATARACT REMOVAL      COLONOSCOPY  12/10/10,    DR MATTHEWS - DONE AT [facility]    COLONOSCOPY      hx polyps needs     COLONOSCOPY  9/21/15     DR. YARBROUGH     CORONARY ANGIOPLASTY WITH STENT PLACEMENT  2020

## 2024-07-07 NOTE — PROGRESS NOTES
Facility/Department: [facility]  Rehabilitation Initial Assessment: Occupational Therapy  Room: R250R250-01    NAME: Hamilton Torres  : 1935  MRN: [id number]    Date of Service: 2024    Rehab Diagnosis(es): Impaired mobility and ADLs d/t closed head injury s/p fall  Patient Active Problem List    Diagnosis Date Noted    Congestive heart failure (HCC) 2020    COVID-19 virus infection 2023    Falls frequently 2024    Right inguinal hernia 2020    Syncope and collapse 2020    Post PTCA     Bilateral carotid bruits 2020    Unstable angina ([facility]) 2020    ACS (acute coronary syndrome) ([facility]) 2020    Non-STEMI (non-ST elevated myocardial infarction) ([facility]) 2020    CHF (congestive heart failure) ([facility])     Coronary artery disease involving native coronary artery of native heart without angina pectoris 2019    Dizziness     Anginal equivalent ([facility]) 2018    S/P cardiac cath 2018    Angina, class III ([facility])     Chest pain 2018    HESS (dyspnea on exertion) 2018    Leg edema 2018    Acute diastolic heart failure ([facility]) 2018    Essential hypertension 2018    NSTEMI (non-ST elevated myocardial infarction) ([facility]) 2018    Hypertensive urgency 2018    Dementia ([facility])     Carotid stenosis, bilateral 2013    Hyperlipidemia        Past Medical History:   Diagnosis Date    Anticoagulant long-term use     Anxiety     CAD (coronary artery disease)     Carotid stenosis     2013 16-49%    CHF (congestive heart failure) ([facility])     Dementia ([facility])     Dementia ([facility])     Hyperlipidemia     Hypertension     MI (myocardial infarction) ([facility])     Osteoarthritis      Past Surgical History:   Procedure Laterality Date    CARPAL TUNNEL RELEASE  1998    CATARACT REMOVAL      COLONOSCOPY  12/10/10,    DR MATTHEWS - DONE AT [facility]    COLONOSCOPY      hx polyps needs 2015    COLONOSCOPY  9/21/15     DR. YARBROUGH      with ADLs.  Pt currently presents with above stated deficits. PT would benefit from additional OT services to increase IND and safety with daily living tasks and functional mobility.  Treatment Diagnosis: impaired ADLs and mobility d/t closed head injury s/p fall  Prognosis: Good  Decision Making: Medium Complexity  History: med  Exam: 10 performance deficits  Assistance / Modification: MIN  A  Discharge Recommendations: Continue to assess pending progress  Plan  REQUIRES OT FOLLOW-UP: Yes    Equipment needed:  OT Equipment Recommendations  Other: continue to assess    OT Education:  Education Given To: Patient  Education Provided: Role of Therapy, Plan of Care  Education Method: Verbal  Barriers to Learning: None  Education Outcome: Verbalized understanding      Plan:  Occupational Therapy Plan  Times Per Week: 5-7-x/wk  Hours Per Day: 1.5 hours  Therapy Duration: 2 Weeks  Current Treatment Recommendations: Strengthening;Balance training;Functional mobility training;Endurance training;Cognitive reorientation;Pain management;Patient/Caregiver education & training;Safety education & training;Equipment evaluation, education, & procurement;Self-Care / ADL;Home management training;Cognitive/Perceptual training;Coordination training    Goals:  Patient goals : To complete rehab and get stronger    Time Frame for Long Term Goals : Within 2 weeks, pt to demo progress in the following areas listed below to achieve specific LTGs as stated in the initial eval  Long Term Goal 1: Pt to demo improved overall ADL levels  Long Term Goal 2: Pt to demo improved overall standing balance and tolerance    - Patient will complete self care as followed using the recommended adaptive equipment and/or adaptive techniques as instructed:  Feeding: IND  Grooming: Mod I  Bathing: Mod I  UE Dressing: Mod I  LE Dressing: Mod I  Footwear: Mod I  Toileting: Mod I  Toilet Transfer: Mod I  Shower/Tub Transfer: Mod I  - Patient will sequence self-care

## 2024-07-07 NOTE — PROGRESS NOTES
[facility]   Facility/Department: [facility]  Speech Language Pathology  Initial Speech/Language/Cognitive Assessment    NAME:Hamilton Torres  : 1935 (88 y.o.)   [x]   confirmed    MRN: [id number]  ROOM: [place] [address]  ADMISSION DATE: 2024  PATIENT DIAGNOSIS(ES): Closed head injury [S09.90XA]  No chief complaint on file.    Patient Active Problem List    Diagnosis Date Noted    Congestive heart failure ([facility]) 2020    COVID-19 virus infection 2023    Falls frequently 2024    Right inguinal hernia 2020    Syncope and collapse 2020    Post PTCA     Bilateral carotid bruits 2020    Unstable angina ([facility]) 2020    ACS (acute coronary syndrome) ([facility]) 2020    Non-STEMI (non-ST elevated myocardial infarction) ([facility]) 2020    CHF (congestive heart failure) ([facility])     Coronary artery disease involving native coronary artery of native heart without angina pectoris 2019    Dizziness     Anginal equivalent ([facility]) 2018    S/P cardiac cath 2018    Angina, class III ([facility])     Chest pain 2018    HESS (dyspnea on exertion) 2018    Leg edema 2018    Acute diastolic heart failure ([facility]) 2018    Essential hypertension 2018    NSTEMI (non-ST elevated myocardial infarction) ([facility]) 2018    Hypertensive urgency 2018    Dementia ([facility])     Carotid stenosis, bilateral 2013    Hyperlipidemia      Past Medical History:   Diagnosis Date    Anticoagulant long-term use     Anxiety     CAD (coronary artery disease)     Carotid stenosis     2013 16-49%    CHF (congestive heart failure) ([facility])     Dementia ([facility])     Dementia ([facility])     Hyperlipidemia     Hypertension     MI (myocardial infarction) ([facility])     Osteoarthritis      Past Surgical History:   Procedure Laterality Date    CARPAL TUNNEL RELEASE  1998    CATARACT REMOVAL      COLONOSCOPY  12/10/10,    DR MATTHEWS - DONE AT [facility]    COLONOSCOPY  2007    hx

## 2024-07-07 NOTE — H&P
cognitive and emotional issues will also be addressed during this rehabilitation stay by rehabilitation psychologist or speech therapist as appropriate.    I reviewed the patient's old and current charts and discussed other rehabilitation options with the rehabilitation team including the rehab RN and the admission team as well as the patient.  I feel that the patient's functional recovery would be best served at an acute inpatient rehabilitation program because the patient needs intense therapy three hours per day, direct RN supervision and daily monitoring by a physician for medical status. This cannot be sufficiently provided by home health care, a skilled nursing facility or in an outpatient setting.  I further feel that the patient has the potential to improve functional abilities in an acute intensive rehabilitation program.    Old records were reviewed and summarized.    2.  Other diagnoses which complicate rehabilitation stay include:     Active Problems:    * No active hospital problems. *  Resolved Problems:    * No resolved hospital problems. *      I am especially concerned about their recent medical complexities.    The patient's high risk medication use includes  see mar .    The patient is high risk for urinary tract infection, an admission urinalysis has been ordered.  I will have the nurses check post void residual bladder volumes and place acatheter if excessive urine is retained in the bladder after voiding.     The patient is risk for deep venous thromosis, complex deep venous thrombosis protocol prophylaxis has been ordered see mar  .    The patient is high risk for orthostasis and a hydration program and orthostatic blood pressure screening have been ordered.    I will attempt to get old records from the patient's previous hospital stay.  Care everywhere on [facility] was utilized.    3.  Current and previous medications were reviewed and summarized and compared to old medication lists from home and  from the acute floor.    4.  Complex labs and x-rays were reviewed.  I will review patient's old EKG and labs.    5.  Will provide emotional support for this patient regarding adjustment to their disability.  Cognition and mood will be screened daily and addressed by rehabilitation psychology and/or speech therapy as appropriate.  I have encouraged the patient to attend the Rehab Adjustment to Disability Support Group and recreational therapy.    6.  Estimated length of stay is   2-3 weeks.  Discharge to home with help from family and home health PT, OT, RN, and aide.  Patient should be independent at discharge.     7.  The patient's medical and rehab prognosis are good.      8.  [facility] up to general medical needs.    A welcome letter was presented with an explanation of my services, my specialty and what to expect during the rehabilitation process.  As well as introducing myself, I also wrote my name on their bedside marker board with their name as well as the names of the other physicians with an explanation of our individual roles in their care, as well as the rehab process.    Officially signed 1917, therapy notes updated    YESSI Castillo D.O., F.A.A.P.TATIANA&NEELIMA

## 2024-07-07 NOTE — PROGRESS NOTES
[facility]   [facility]  Speech Language Pathology  Clinical Bedside Swallow Evaluation    NAME:Hamilton Torres  : 1935 (88 y.o.)   [x]   confirmed    MRN: [id number]  ROOM: [place] [address]  ADMISSION DATE: 2024  PATIENT DIAGNOSIS(ES): Closed head injury [S09.90XA]  No chief complaint on file.    Patient Active Problem List    Diagnosis Date Noted    Congestive heart failure ([facility]) 2020    COVID-19 virus infection 2023    Falls frequently 2024    Right inguinal hernia 2020    Syncope and collapse 2020    Post PTCA     Bilateral carotid bruits 2020    Unstable angina ([facility]) 2020    ACS (acute coronary syndrome) ([facility]) 2020    Non-STEMI (non-ST elevated myocardial infarction) ([facility]) 2020    CHF (congestive heart failure) ([facility])     Coronary artery disease involving native coronary artery of native heart without angina pectoris 2019    Dizziness     Anginal equivalent ([facility]) 2018    S/P cardiac cath 2018    Angina, class III ([facility])     Chest pain 2018    HESS (dyspnea on exertion) 2018    Leg edema 2018    Acute diastolic heart failure ([facility]) 2018    Essential hypertension 2018    NSTEMI (non-ST elevated myocardial infarction) ([facility]) 2018    Hypertensive urgency 2018    Dementia ([facility])     Carotid stenosis, bilateral 2013    Hyperlipidemia      Past Medical History:   Diagnosis Date    Anticoagulant long-term use     Anxiety     CAD (coronary artery disease)     Carotid stenosis     2013 16-49%    CHF (congestive heart failure) ([facility])     Dementia ([facility])     Dementia ([facility])     Hyperlipidemia     Hypertension     MI (myocardial infarction) ([facility])     Osteoarthritis      Past Surgical History:   Procedure Laterality Date    CARPAL TUNNEL RELEASE  1998    CATARACT REMOVAL      COLONOSCOPY  12/10/10,2007    DR MATTHEWS - DONE AT [facility]    COLONOSCOPY      hx polyps

## 2024-07-07 NOTE — PLAN OF CARE
Problem: Safety - Adult  Goal: Free from fall injury  Outcome: Progressing     Problem: Discharge Planning  Goal: Discharge to home or other facility with appropriate resources  Outcome: Progressing  Flowsheets (Taken 7/6/2024 2200 by Kamini Ferreira, RN)  Discharge to home or other facility with appropriate resources: Identify barriers to discharge with patient and caregiver     Problem: ABCDS Injury Assessment  Goal: Absence of physical injury  Outcome: Progressing     Problem: Skin/Tissue Integrity  Goal: Absence of new skin breakdown  Description: 1.  Monitor for areas of redness and/or skin breakdown  2.  Assess vascular access sites hourly  3.  Every 4-6 hours minimum:  Change oxygen saturation probe site  4.  Every 4-6 hours:  If on nasal continuous positive airway pressure, respiratory therapy assess nares and determine need for appliance change or resting period.  Outcome: Progressing

## 2024-07-08 PROBLEM — Z78.9 IMPAIRED MOBILITY AND ACTIVITIES OF DAILY LIVING: Status: ACTIVE | Noted: 2024-07-08

## 2024-07-08 PROBLEM — H35.053: Status: ACTIVE | Noted: 2023-04-07

## 2024-07-08 PROBLEM — M48.02 CERVICAL SPINAL STENOSIS: Status: ACTIVE | Noted: 2024-07-08

## 2024-07-08 PROBLEM — Z74.09 IMPAIRED MOBILITY AND ACTIVITIES OF DAILY LIVING: Status: ACTIVE | Noted: 2024-07-08

## 2024-07-08 PROBLEM — U07.1 COVID-19 VIRUS INFECTION: Status: RESOLVED | Noted: 2023-01-21 | Resolved: 2024-07-08

## 2024-07-08 LAB
ANION GAP SERPL CALCULATED.3IONS-SCNC: 9 MEQ/L (ref 9–15)
BASOPHILS # BLD: 0.1 K/UL (ref 0–0.2)
BASOPHILS NFR BLD: 1 %
BUN SERPL-MCNC: 19 MG/DL (ref 8–23)
CALCIUM SERPL-MCNC: 8.9 MG/DL (ref 8.5–9.9)
CHLORIDE SERPL-SCNC: 102 MEQ/L (ref 95–107)
CO2 SERPL-SCNC: 25 MEQ/L (ref 20–31)
CREAT SERPL-MCNC: 0.76 MG/DL (ref 0.7–1.2)
EOSINOPHIL # BLD: 0.4 K/UL (ref 0–0.7)
EOSINOPHIL NFR BLD: 6.3 %
ERYTHROCYTE [DISTWIDTH] IN BLOOD BY AUTOMATED COUNT: 13.8 % (ref 11.5–14.5)
GLUCOSE SERPL-MCNC: 90 MG/DL (ref 70–99)
HCT VFR BLD AUTO: 40.5 % (ref 42–52)
HGB BLD-MCNC: 13.7 G/DL (ref 14–18)
LYMPHOCYTES # BLD: 1.3 K/UL (ref 1–4.8)
LYMPHOCYTES NFR BLD: 21.4 %
MCH RBC QN AUTO: 30.4 PG (ref 27–31.3)
MCHC RBC AUTO-ENTMCNC: 33.8 % (ref 33–37)
MCV RBC AUTO: 89.8 FL (ref 79–92.2)
MONOCYTES # BLD: 0.7 K/UL (ref 0.2–0.8)
MONOCYTES NFR BLD: 11.2 %
NEUTROPHILS # BLD: 3.7 K/UL (ref 1.4–6.5)
NEUTS SEG NFR BLD: 59.8 %
PLATELET # BLD AUTO: 183 K/UL (ref 130–400)
POTASSIUM SERPL-SCNC: 3.7 MEQ/L (ref 3.4–4.9)
RBC # BLD AUTO: 4.51 M/UL (ref 4.7–6.1)
SODIUM SERPL-SCNC: 136 MEQ/L (ref 135–144)
WBC # BLD AUTO: 6.2 K/UL (ref 4.8–10.8)

## 2024-07-08 PROCEDURE — 97535 SELF CARE MNGMENT TRAINING: CPT

## 2024-07-08 PROCEDURE — 97116 GAIT TRAINING THERAPY: CPT

## 2024-07-08 PROCEDURE — 36415 COLL VENOUS BLD VENIPUNCTURE: CPT

## 2024-07-08 PROCEDURE — 6370000000 HC RX 637 (ALT 250 FOR IP): Performed by: PHYSICAL MEDICINE & REHABILITATION

## 2024-07-08 PROCEDURE — 6360000002 HC RX W HCPCS: Performed by: PHYSICAL MEDICINE & REHABILITATION

## 2024-07-08 PROCEDURE — 6370000000 HC RX 637 (ALT 250 FOR IP): Performed by: INTERNAL MEDICINE

## 2024-07-08 PROCEDURE — 97530 THERAPEUTIC ACTIVITIES: CPT

## 2024-07-08 PROCEDURE — 97130 THER IVNTJ EA ADDL 15 MIN: CPT

## 2024-07-08 PROCEDURE — 97129 THER IVNTJ 1ST 15 MIN: CPT

## 2024-07-08 PROCEDURE — 80048 BASIC METABOLIC PNL TOTAL CA: CPT

## 2024-07-08 PROCEDURE — 6360000002 HC RX W HCPCS: Performed by: INTERNAL MEDICINE

## 2024-07-08 PROCEDURE — 85025 COMPLETE CBC W/AUTO DIFF WBC: CPT

## 2024-07-08 PROCEDURE — 1180000000 HC REHAB R&B

## 2024-07-08 PROCEDURE — 97112 NEUROMUSCULAR REEDUCATION: CPT

## 2024-07-08 PROCEDURE — 99233 SBSQ HOSP IP/OBS HIGH 50: CPT | Performed by: PHYSICAL MEDICINE & REHABILITATION

## 2024-07-08 RX ORDER — CYANOCOBALAMIN 1000 UG/ML
1000 INJECTION, SOLUTION INTRAMUSCULAR; SUBCUTANEOUS WEEKLY
Status: DISCONTINUED | OUTPATIENT
Start: 2024-07-08 | End: 2024-07-18 | Stop reason: HOSPADM

## 2024-07-08 RX ORDER — UBIDECARENONE 100 MG
100 CAPSULE ORAL DAILY
Status: DISCONTINUED | OUTPATIENT
Start: 2024-07-08 | End: 2024-07-18 | Stop reason: HOSPADM

## 2024-07-08 RX ORDER — LIDOCAINE 4 G/G
3 PATCH TOPICAL DAILY
Status: DISCONTINUED | OUTPATIENT
Start: 2024-07-08 | End: 2024-07-18 | Stop reason: HOSPADM

## 2024-07-08 RX ORDER — VITAMIN B COMPLEX
2000 TABLET ORAL
Status: DISCONTINUED | OUTPATIENT
Start: 2024-07-08 | End: 2024-07-18 | Stop reason: HOSPADM

## 2024-07-08 RX ADMIN — CYANOCOBALAMIN 1000 MCG: 1000 INJECTION INTRAMUSCULAR; SUBCUTANEOUS at 10:34

## 2024-07-08 RX ADMIN — CARVEDILOL 6.25 MG: 6.25 TABLET, FILM COATED ORAL at 09:40

## 2024-07-08 RX ADMIN — Medication 2000 UNITS: at 16:58

## 2024-07-08 RX ADMIN — TIMOLOL MALEATE 1 DROP: 5 SOLUTION OPHTHALMIC at 20:26

## 2024-07-08 RX ADMIN — CARVEDILOL 6.25 MG: 6.25 TABLET, FILM COATED ORAL at 20:26

## 2024-07-08 RX ADMIN — DORZOLAMIDE HYDROCHLORIDE 1 DROP: 20 SOLUTION/ DROPS OPHTHALMIC at 10:41

## 2024-07-08 RX ADMIN — LISINOPRIL 10 MG: 10 TABLET ORAL at 09:40

## 2024-07-08 RX ADMIN — CLOPIDOGREL BISULFATE 75 MG: 75 TABLET ORAL at 09:40

## 2024-07-08 RX ADMIN — ISOSORBIDE MONONITRATE 30 MG: 30 TABLET, EXTENDED RELEASE ORAL at 09:40

## 2024-07-08 RX ADMIN — Medication 3 MG: at 20:25

## 2024-07-08 RX ADMIN — FINASTERIDE 5 MG: 5 TABLET, FILM COATED ORAL at 09:40

## 2024-07-08 RX ADMIN — LATANOPROST 1 DROP: 50 SOLUTION OPHTHALMIC at 13:21

## 2024-07-08 RX ADMIN — TIMOLOL MALEATE 1 DROP: 5 SOLUTION OPHTHALMIC at 10:33

## 2024-07-08 RX ADMIN — DONEPEZIL HYDROCHLORIDE 5 MG: 10 TABLET, FILM COATED ORAL at 20:25

## 2024-07-08 RX ADMIN — PRAVASTATIN SODIUM 80 MG: 40 TABLET ORAL at 20:25

## 2024-07-08 RX ADMIN — CYANOCOBALAMIN TAB 500 MCG 500 MCG: 500 TAB at 09:40

## 2024-07-08 RX ADMIN — ENOXAPARIN SODIUM 40 MG: 100 INJECTION SUBCUTANEOUS at 10:32

## 2024-07-08 RX ADMIN — Medication 100 MG: at 09:40

## 2024-07-08 NOTE — PROGRESS NOTES
impaired self-care and ADL's secondary to progressive weakness dt [facility] .  Functional and medical status reassessed regarding patient’s ability to participate in therapies and patient found to be able to participate in acute intensive comprehensive inpatient rehabilitation program including PT/OT to improve balance, ambulation, ADL’s, and to improve the P/AROM.  Therapeutic modifications regarding activities in therapies, place, amount of time per day and intensity of therapy made daily.  In bed therapies or bedside therapies prn.   Bowel progressive constipation, and Bladder dysfunction monitoring neurogenic bladder:  frequent toileting, ambulate to bathroom with assistance, check post void residuals.  Check for C.difficile x1 if >2 loose stools in 24 hours, continue bowel & bladder program.  Monitor bowel and bladder function.  Lactinex 2 PO every AC.  MOM prn, Brown Bomb prn, Glycerin suppository prn, enema prn.  Severe low back pain as well as generalized OA pain: reassess pain every shift and prior to and after each therapy session, give prn Tylenol and  , modalities prn in therapy, Lidoderm, K-pad prn.   Skin healing--redness right heel ( mepilex), bruising to right shoulder/flank, small healing abrasion to back of head, and tear to right buttocks/gluteal cleft. and breakdown risk:  continue pressure relief program.  Daily skin exams and reports from nursing.  Severe fatigue due to nutritional and hydration deficiency: Add vitamin B12 vitamin D and CoQ10 continue to monitor I&O’s, calorie counts prn, dietary consult prn.  Add healthy HS snack.  Acute episodic insomnia with situational adjustment disorder:  consider prn low dose Ambien, monitor for day time sedation.  Add HS \"Tuck In\"  Falls risk elevated:  patient to use call light to get nursing assistance to get up, bed and chair alarm.  Elevated DVT risk: progressive activities in PT, continue prophylaxis CASIMIRO hose, elevation Lovenox at lowest effective

## 2024-07-08 NOTE — CARE COORDINATION
[facility]  INPATIENT REHABILITATION  TEAM CONFERENCE NOTE  Room: R250/R250-01  Admit Date: 2024       Date: 2024  Patient Name: Hamilton Torres        MRN: [id number]    : 1935  (88 y.o.)  Gender: male        REHAB DIAGNOSIS:    Impaired mobility & ADLs dt [facility]     CO MORBIDITIES:      Past Medical History:   Diagnosis Date    Anticoagulant long-term use     Anxiety     CAD (coronary artery disease)     Carotid stenosis     2013 16-49%    Cervical disc disorder     CHF (congestive heart failure) ([facility])     COVID-19 virus infection 2023    Dementia ([facility])     Dementia ([facility])     Hyperlipidemia     Hypertension     MI (myocardial infarction) ([facility])     Osteoarthritis      Past Surgical History:   Procedure Laterality Date    CARPAL TUNNEL RELEASE      CATARACT REMOVAL      COLONOSCOPY  12/10/10,2007    DR MATTHEWS - DONE AT [facility]    COLONOSCOPY      hx polyps needs     COLONOSCOPY  9/21/15     DR. YARBROUGH     CORONARY ANGIOPLASTY WITH STENT PLACEMENT  2020    DIAGNOSTIC CARDIAC CATH LAB PROCEDURE      HERNIA REPAIR Bilateral     x 2    HERNIA REPAIR Right 2020    RIGHT INGUINAL HERNIA REPAIR performed by Roscoe Copeland MD at [facility]    PTCA          Restrictions  Restrictions/Precautions: Fall Risk  CASE MANAGEMENT    Social/Functional History  Social/Functional History  Lives With: Alone  Type of Home: Assisted living ([facility])  Home Layout: One level  Home Access: Level entry  Bathroom Shower/Tub: Walk-in shower, Curtain  Bathroom Toilet: Standard  Bathroom Equipment: Hand-held shower, Grab bars in shower, Built-in shower seat  Bathroom Accessibility: Wheelchair accessible  Home Equipment: Cane, Grab bars, Reacher, Walker - Rolling  Has the patient had two or more falls in the past year or any fall with injury in the past year?: Yes  ADL Assistance: Independent  Homemaking Assistance: Needs assistance  Homemaking Responsibilities:  Independent  Upper body dressing:Discharge Goal: Independent  Lower body dressing:Discharge Goal: Independent  Putting on taking off footwear:Discharge Goal: Independent   Toileting: Discharge Goal: Independent  Toilet transfer:Discharge Goal: Independent  OT Treatment Time: 2.0 hrs      SPEECH THERAPY       Comprehension: Exceptions (mild deficits likely due to hard of hearing status and no hearing aids present)  Verbal Expression: Exceptions to functional limits (mild convergent naming and moderate divergent naming deficits)      Diet/Swallow:        Dysphagia Outcome Severity Scale: Level 7: Normal in all situations    Compensatory Swallowing Strategies : Upright as possible for all oral intake         LTG:  Long Term Goals  Time Frame for Long Term Goals: 2-3 weeks for LOS or until goals met  Goal 1: Patient will demonstrate functional cognitive-linguistic abilities in all opportunities with modified independence in order to safely complete ADLs.             COGNITION  OT: Cognition  Overall Cognitive Status: Exceptions (07/08/24 1142)  Arousal/Alertness: Appropriate responses to stimuli (07/08/24 1142)  Following Commands: Follows one step commands with increased time (07/08/24 1142)  Attention Span: Appears intact (07/08/24 1142)  Memory: Decreased recall of biographical Information;Decreased short term memory (07/08/24 1142)  Safety Judgement: Decreased awareness of need for assistance;Decreased awareness of need for safety (07/08/24 1142)  Problem Solving: Assistance required to correct errors made;Assistance required to identify errors made;Assistance required to implement solutions;Assistance required to generate solutions (07/08/24 1142)  Insights: Decreased awareness of deficits (07/08/24 1142)  Initiation: Requires cues for some (07/08/24 1142)  Sequencing: Requires cues for some (07/08/24 1142)  Cognition Comment: Comprehension: MOD I   Expression: MIN A   Social Interaction: IND   Problem Solving:

## 2024-07-08 NOTE — PROGRESS NOTES
OCCUPATIONAL THERAPY  INPATIENT REHAB TREATMENT NOTE  [facility]      NAME: Hamilton Torres  : 1935 (88 y.o.)  MRN: [id number]  CODE STATUS: DNR-CCA  Room: R250/R250-01    Date of Service: 2024    Referring Physician: Dr. Troy  Rehab Diagnosis: Impaired mobility and ADLs d/t closed head injury s/p fall    Hospital course:   Comments: Pt is a 89 y/o male who presents to the ED via EMS for evaluation. He says that he recently finished PT/OT rehab for frequent falls. That he was walking in to his house when he lost his balance and fell backwards striking his head. No LOC or amnesia, denies antecedent symptoms. He denies recent illness, fever, vision changes, neck pain, chest pain, abdominal pain, back pain, vomiting, dizziness, numbness or weakness. Pt is currently on antiplatelets.      Restrictions  Restrictions/Precautions  Restrictions/Precautions: Fall Risk     Patient's date of birth confirmed: Yes    SAFETY:  Safety Devices  Safety Devices in place: Yes  Type of devices: All fall risk precautions in place    SUBJECTIVE: \"I live in [facility].\"    Pain at start of treatment: No    Pain at end of treatment: No    COGNITION:  Orientation  Overall Orientation Status: Within Normal Limits  Cognition  Overall Cognitive Status: Exceptions  Arousal/Alertness: Appropriate responses to stimuli  Following Commands: Follows one step commands with increased time  Attention Span: Appears intact  Memory: Decreased recall of biographical Information;Decreased short term memory  Safety Judgement: Decreased awareness of need for assistance;Decreased awareness of need for safety  Problem Solving: Assistance required to correct errors made;Assistance required to identify errors made;Assistance required to implement solutions;Assistance required to generate solutions  Insights: Decreased awareness of deficits  Initiation: Requires cues for some  Sequencing: Requires cues for some  Cognition Comment:

## 2024-07-08 NOTE — PROGRESS NOTES
Topics Concern    Not on file   Social History Narrative    He is retired from  [employer], he is  and currently lives With: Alone    Type of Home: Assisted living-[address] in [facility]         Home Layout: One level- Level entry    Bathroom Shower/Tub: Walk-in shower    Bathroom Toilet: Standard, Equipment: Hand-held shower, Grab bars in shower, Built-in shower seat    Home Equipment: Cane, Grab bars, Reacher, Walker - Rolling (does not normally use canes)    Has the patient had two or more falls in the past year or any fall with injury in the past year?: Yes    ADL Assistance: Independent (occasionally asks for help)    Homemaking Assistance: Needs assistance, Homemaking Responsibilities: No    Ambulation Assistance: Independent (uses WW), Transfer Assistance: Independent    Active : No    Patient's  Info: dtr, facility    Leisure & Hobbies: puzzles, socializing in facility with family and neighbors, read,    Additional Comments: staff available to assist with showers as needed; pt reports he previously completed INDly. R handed; walks outside with WW         Social Determinants of Health     Financial Resource Strain: Low Risk  (10/27/2020)    Overall Financial Resource Strain (CARDIA)     Difficulty of Paying Living Expenses: Not hard at all   Food Insecurity: No Food Insecurity (7/3/2024)    Hunger Vital Sign     Worried About Running Out of Food in the Last Year: Never true     Ran Out of Food in the Last Year: Never true   Transportation Needs: No Transportation Needs (7/3/2024)    PRAPARE - Transportation     Lack of Transportation (Medical): No     Lack of Transportation (Non-Medical): No   Physical Activity: Not on file   Stress: Not on file   Social Connections: Not on file   Intimate Partner Violence: Not on file   Housing Stability: Low Risk  (7/3/2024)    Housing Stability Vital Sign     Unable to Pay for Housing in the Last Year: No     Number of Places Lived in the  abilities in all opportunities with modified independence in order to safely complete ADLs.              From a cognitive standpoint they will need:        24 hr vs daily transitioning to supervision  -->progress to occasional             Significant problems/ barriers to functional progress include: Pt is at a high risk for functional loss,      []  Acute infection/UTI    []  Low BP's     []  COPD flare-up   []  Uncontrolled blood sugar     []  Progressive anemia     [x]  poor endurance    []  Severe pain     []  Impaired mental status    []  Urinary incontinence    []  Bowel incontinence           Plan to correct barriers to functional progress:   Add scheduled rest breaks, CoQ 10 and Vit B 12, control pain by using ice Lidoderm rest and massage as well as pain medications prior to therapy.  Spread therapy of 15 hours out over a 7 day window to accommodate rest breaks and medical interventions.  Patient seems to be making fair to good response to these interventions.         Based on a comprehensive evaluation of the above, the individualized therapy and Discharge plan will be:    -Times stated are an average that will be varied based on the patient's daily need.        PT   1 1/2  hrs/day 5-7 days per wk      OT   1 1/2 hrs per day 5-7 days per wk     ST  1/2    hrs /day 3-5 days per week       Estimated LOS   1-2 week(s)    -Overall functional prognosis:     [x]  Good    []  Fair    []  Poor     -Medical Prognosis:   [x]  Good    []  Fair    []  Poor    This patient was made aware of the discussion of Plan of Care, their projected dicharge date and their projected function at discharge.         Judith Castillo, DO

## 2024-07-08 NOTE — PROGRESS NOTES
[facility]  Facility/Department: [facility] REHAB  Speech Language Pathology   Treatment Note          Hamilton Torres  1935  R250/R250-01  [x]   confirmed    Date: 2024      Restrictions/Precautions: Fall Risk     ADULT DIET; Regular     Respiratory Status:   room air  No active isolations    Rehab Diagnosis:  Impaired mobility and ADLs d/t closed head injury s/p fall      Subjective:  Alert, Cooperative, and Pleasant        Interventions used this date:  Cognitive Skill Development and Instruction in Compensatory Strategies    Objective/Assessment:  Patient progressing towards goals:  Short Term Goals  Goal 1: To increase safety awareness and judgment for safe completion of ADLs secondary to patient's cognitive deficits, patient will complete abstract reasoning tasks (i.e. word deduction, convergent and divergent naming, similarities/differences) with 80% accuracy and minimal cues.  Pt completed wrong category task with 60% acc Ind and increased to 90% acc min-mod verbal cues  Goal 2: To increase independence for functional activities for home and community, patient will complete mid level executive functioning tasks (i.e. path finding, scheduling appointments, prioritizing tasks) with 80% accuracy and min cues.  Goal 3: To address patient's cognitive deficits and promote recall of personal and medical information, the patient will answer questions addressing (recent and delayed) recall with 80% accuracy and min cues.  Pt completed delayed memory task using grouping strategy. Pt recalled two word lists after 5 minute delays. 4/8 min assist 1st recall and 4/8 Ind and then increased to 7/8 min assist on 2nd recall  Goal 4: To decrease patient's cognitive deficits through the use of compensatory strategies, the patient will be educated on 3 different memory strategies and verbalize how he might use them at home in 3 ways with min cues.  SLP educated on use of repetition, grouping, reminder alarms and

## 2024-07-08 NOTE — PROGRESS NOTES
OCCUPATIONAL THERAPY  INPATIENT REHAB TREATMENT NOTE  [facility]      NAME: Hamilton Torres  : 1935 (88 y.o.)  MRN: [id number]  CODE STATUS: DNR-CCA  Room: R250/R250-01    Date of Service: 2024    Referring Physician: Dr. Troy  Rehab Diagnosis: Impaired mobility and ADLs d/t closed head injury s/p fall    Hospital course:   Comments: Pt is a 87 y/o male who presents to the ED via EMS for evaluation. He says that he recently finished PT/OT rehab for frequent falls. That he was walking in to his house when he lost his balance and fell backwards striking his head. No LOC or amnesia, denies antecedent symptoms. He denies recent illness, fever, vision changes, neck pain, chest pain, abdominal pain, back pain, vomiting, dizziness, numbness or weakness. Pt is currently on antiplatelets.      Restrictions  Restrictions/Precautions  Restrictions/Precautions: Fall Risk     Patient's date of birth confirmed: Yes    SAFETY:  Safety Devices  Safety Devices in place: Yes  Type of devices: All fall risk precautions in place    SUBJECTIVE: \"I got a puzzle in December. It was 750 pieces and let me tell you, those pieces were the small pieces. I worked on it a little everyday. You know sometimes, you have to walk away. I got it done the second week of February.\"    Pain at start of treatment: No    Pain at end of treatment: No    COGNITION:  Orientation  Overall Orientation Status: Within Normal Limits  Cognition  Overall Cognitive Status: Exceptions  Arousal/Alertness: Appropriate responses to stimuli  Following Commands: Follows one step commands with increased time  Attention Span: Appears intact  Memory: Decreased recall of biographical Information;Decreased short term memory  Safety Judgement: Decreased awareness of need for assistance;Decreased awareness of need for safety  Problem Solving: Assistance required to correct errors made;Assistance required to identify errors made;Assistance required to

## 2024-07-08 NOTE — PROGRESS NOTES
Physical Therapy Rehab Treatment Note  Facility/Department: [facility]  [facility]       NAME: Hamilton Torres  : 1935 (88 y.o.)  MRN: [id number]  CODE STATUS: DNR-CCA    Date of Service: 2024     Restrictions:  Restrictions/Precautions: Fall Risk    SUBJECTIVE:   Subjective: Pt states he uses a walker at home all the time.  States his knees are bad.  States the pain comes and goes.  Pt states he is hard of hearing.  Pt states he sits in a recliner that is built up with cushions at home.  \"I want to get back to dancing.  I love to dance.\"    Pain  Pain: \"not yet\"    OBJECTIVE:            Transfers  Sit to Stand: Minimal Assistance;Moderate Assistance  Stand to Sit: Stand by assistance  Comment: pt uses posterior LEs to assist with sit to stand; posterior lean; increased time and effort; Instructed for set up and to increase anterior wt shift.  Improved with increased reps progressing from mod to min.    Ambulation  Surface: Level tile;Carpet  Device: Rolling Walker  Assistance: Stand by assistance  Quality of Gait: Narrow SANNA, B flexed knees, decreased stance on L LE  Gait Deviations: Slow Nette;Decreased step length;Decreased step height  Distance: 150ft           PT Exercises  PROM Exercises: manual gastroc and ham string stretces in seated [id number] Malick  A/AROM Exercises: seated AP, LAQ, march, hip add, hip abd x20 ea  Functional Mobility Circuit Training: STS x3 from elevated mat.  Dynamic Standing Balance Exercises: standing ball toss/catch with posterior LOB min/mod; beach ball tap with 1 UE support on WW BBA     ASSESSMENT/PROGRESS TOWARDS GOALS:   Assessment: Pt challanged in standing balance without UE support with decreased balance reactions.  Retropulsive in sit to stand.  Improves with verbal and manual cues however decreased follow through.  Initiated gastroc and hamstring stretches.    Goals:  Long Term Goals  Long Term Goal 1: indep and efficient bed mobility  Long Term Goal 2:  indep sit to stand bed and car transfers  Long Term Goal 3: indep gait with ww >/= 150 feet with safest AD  Long Term Goal 4: pt to complete TUG in </= 19 sec with device and good safety  Patient Goals   Patient Goals : \"walk more\"    PLAN OF CARE/Safety: Cont per POC.       Therapy Time:   Individual   Time In 1330   Time Out 1400   Minutes 30      Minutes:  Transfer/Bed mobility training:10  Gait training:10  Neuro re education:10  Therapeutic ex:0    Anne Randolph PTA, 07/08/24 at 2:51 PM

## 2024-07-08 NOTE — PROGRESS NOTES
Physical Therapy Rehab Treatment Note  [facility]  [facility]       NAME: Hamilton Torres  : 1935 (88 y.o.)  MRN: [id number]  CODE STATUS: DNR-CCA    Date of Service: 2024     Restrictions:  Restrictions/Precautions: Fall Risk     SUBJECTIVE:   Subjective: Pt states he uses a walker at home all the time.  States his knees are bad.  States the pain comes and goes.  Pt states he is hard of hearing.  Pt states he sits in a recliner that is built up with cushions at home.  \"I want to get back to dancing.  I love to dance.\"    Pain  Pain: Denies pre and post session pain.    OBJECTIVE:            Transfers  Sit to Stand: Minimal Assistance;Moderate Assistance  Stand to Sit: Stand by assistance  Comment: pt uses posterior LEs to assist with sit to stand; posterior lean; increased time and effort; Instructed for set up and to increase anterior wt shift.  Improved with increased reps progressing from mod to min.    Ambulation  Surface: Level tile;Carpet  Device: Rolling Walker  Assistance: Stand by assistance  Quality of Gait: Narrow SANNA, B flexed knees, decreased stance on L LE  Gait Deviations: Slow Nette;Decreased step length;Decreased step height  Distance: 150ft           PT Exercises  A/AROM Exercises: seated AP, LAQ, march, hip add, hip abd x20 ea  Functional Mobility Circuit Training: TUG 46.5sec with WW; STS x3            ASSESSMENT/PROGRESS TOWARDS GOALS:   Assessment: Increased difficulty and retropulsive with sit to stand transfers improving with practice and VCs.  Initiated TUG requiring increased time to complete.    Goals:  Long Term Goals  Long Term Goal 1: indep and efficient bed mobility  Long Term Goal 2: indep sit to stand bed and car transfers  Long Term Goal 3: indep gait with ww >/= 150 feet with safest AD  Long Term Goal 4: pt to complete TUG in </= 19 sec with device and good safety  Patient Goals   Patient Goals : \"walk more\"    PLAN OF CARE/Safety: Cont

## 2024-07-08 NOTE — CARE COORDINATION
Case Management Initial Assessment        NAME:  Hamilton Torres  ROOM: R250/R250-01  :  1935  DATE: 2024        Social Functional:  Social/Functional History  Lives With: Alone  Type of Home: Assisted living ([facility])  Home Layout: One level  Home Access: Level entry  Bathroom Shower/Tub: Walk-in shower;Curtain  Bathroom Toilet: Standard  Bathroom Equipment: Hand-held shower;Grab bars in shower;Built-in shower seat  Bathroom Accessibility: Wheelchair accessible  Home Equipment: Cane;Grab bars;Reacher;Walker - Rolling  ADL Assistance: Independent  Homemaking Assistance: Needs assistance  Homemaking Responsibilities: No  Bill Paying/Finance Responsibility: No (dtr manages)  Health Care Management: No (nurses manage)  Active : No  Patient's  Info: dtr, facility  Mode of Transportation: Bus;Car  Education: High school  Occupation: Retired  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 Patient questions answered appropriately. Explained discharge process. Patient stated understanding. 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 His support system consists of his dtr and son, both live nearby. The plan is to return to his assisted living apartment at [facility]. There is a level entry. The bathroom is wheelchair accessible and has a walk in-shower w/ curtain, hand held shower, grab bars in shower, and built in shower seat. DME consists of a cane, reacher, and wheeled walker. He ambulated independently with a ww at baseline. He was independent with ADLs and facility completes almost all IADLs, except his dtr manages his finances. His dtr provides transport via car or the facility will via bus. Pt gave permission for [facility] to update dtr.         Electronically signed by:

## 2024-07-09 PROBLEM — R29.818 PARKINSONIAN FEATURES: Status: ACTIVE | Noted: 2024-07-09

## 2024-07-09 PROCEDURE — 6360000002 HC RX W HCPCS: Performed by: INTERNAL MEDICINE

## 2024-07-09 PROCEDURE — 97116 GAIT TRAINING THERAPY: CPT

## 2024-07-09 PROCEDURE — 1180000000 HC REHAB R&B

## 2024-07-09 PROCEDURE — 97129 THER IVNTJ 1ST 15 MIN: CPT

## 2024-07-09 PROCEDURE — 97530 THERAPEUTIC ACTIVITIES: CPT

## 2024-07-09 PROCEDURE — 6370000000 HC RX 637 (ALT 250 FOR IP): Performed by: INTERNAL MEDICINE

## 2024-07-09 PROCEDURE — 97112 NEUROMUSCULAR REEDUCATION: CPT

## 2024-07-09 PROCEDURE — 97535 SELF CARE MNGMENT TRAINING: CPT

## 2024-07-09 PROCEDURE — 97110 THERAPEUTIC EXERCISES: CPT

## 2024-07-09 PROCEDURE — 97130 THER IVNTJ EA ADDL 15 MIN: CPT

## 2024-07-09 PROCEDURE — 99232 SBSQ HOSP IP/OBS MODERATE 35: CPT | Performed by: PHYSICAL MEDICINE & REHABILITATION

## 2024-07-09 PROCEDURE — 6370000000 HC RX 637 (ALT 250 FOR IP): Performed by: PHYSICAL MEDICINE & REHABILITATION

## 2024-07-09 RX ADMIN — Medication 100 MG: at 08:32

## 2024-07-09 RX ADMIN — CARVEDILOL 6.25 MG: 6.25 TABLET, FILM COATED ORAL at 08:32

## 2024-07-09 RX ADMIN — DONEPEZIL HYDROCHLORIDE 5 MG: 10 TABLET, FILM COATED ORAL at 20:55

## 2024-07-09 RX ADMIN — CARVEDILOL 6.25 MG: 6.25 TABLET, FILM COATED ORAL at 20:56

## 2024-07-09 RX ADMIN — ACETAMINOPHEN 325MG 650 MG: 325 TABLET ORAL at 09:55

## 2024-07-09 RX ADMIN — Medication 3 MG: at 20:56

## 2024-07-09 RX ADMIN — TIMOLOL MALEATE 1 DROP: 5 SOLUTION OPHTHALMIC at 20:56

## 2024-07-09 RX ADMIN — Medication 2000 UNITS: at 17:02

## 2024-07-09 RX ADMIN — TIMOLOL MALEATE 1 DROP: 5 SOLUTION OPHTHALMIC at 08:33

## 2024-07-09 RX ADMIN — LISINOPRIL 10 MG: 10 TABLET ORAL at 08:32

## 2024-07-09 RX ADMIN — PRAVASTATIN SODIUM 80 MG: 40 TABLET ORAL at 20:55

## 2024-07-09 RX ADMIN — FINASTERIDE 5 MG: 5 TABLET, FILM COATED ORAL at 08:32

## 2024-07-09 RX ADMIN — CYANOCOBALAMIN TAB 500 MCG 500 MCG: 500 TAB at 08:32

## 2024-07-09 RX ADMIN — CLOPIDOGREL BISULFATE 75 MG: 75 TABLET ORAL at 08:32

## 2024-07-09 RX ADMIN — ISOSORBIDE MONONITRATE 30 MG: 30 TABLET, EXTENDED RELEASE ORAL at 08:32

## 2024-07-09 RX ADMIN — ENOXAPARIN SODIUM 40 MG: 100 INJECTION SUBCUTANEOUS at 08:31

## 2024-07-09 RX ADMIN — LATANOPROST 1 DROP: 50 SOLUTION OPHTHALMIC at 08:32

## 2024-07-09 ASSESSMENT — PAIN DESCRIPTION - DESCRIPTORS: DESCRIPTORS: ACHING

## 2024-07-09 ASSESSMENT — PAIN DESCRIPTION - LOCATION: LOCATION: KNEE

## 2024-07-09 ASSESSMENT — PAIN SCALES - GENERAL: PAINLEVEL_OUTOF10: 3

## 2024-07-09 ASSESSMENT — PAIN DESCRIPTION - ORIENTATION: ORIENTATION: RIGHT

## 2024-07-09 NOTE — PROGRESS NOTES
OCCUPATIONAL THERAPY  INPATIENT REHAB TREATMENT NOTE  [facility]      NAME: Hamilton Torres  : 1935 (88 y.o.)  MRN: [id number]  CODE STATUS: DNR-CCA  Room: R250/R250-01    Date of Service: 2024    Referring Physician: Dr. Troy  Rehab Diagnosis: Impaired mobility and ADLs d/t closed head injury s/p fall    Hospital course:   Comments: Pt is a 87 y/o male who presents to the ED via EMS for evaluation. He says that he recently finished PT/OT rehab for frequent falls. That he was walking in to his house when he lost his balance and fell backwards striking his head. No LOC or amnesia, denies antecedent symptoms. He denies recent illness, fever, vision changes, neck pain, chest pain, abdominal pain, back pain, vomiting, dizziness, numbness or weakness. Pt is currently on antiplatelets.      Restrictions  Restrictions/Precautions  Restrictions/Precautions: Fall Risk     Patient's date of birth confirmed: Yes    SAFETY:  Safety Devices  Safety Devices in place: Yes  Type of devices: All fall risk precautions in place    SUBJECTIVE: \"I got my hearing aids.\"    Pain at start of treatment: No    Pain at end of treatment: Yes: 2/10    Location: R knee  Description ache  Nursing notified: Declined  Intervention: None    COGNITION:  Orientation  Overall Orientation Status: Within Normal Limits  Cognition  Overall Cognitive Status: Exceptions  Arousal/Alertness: Appropriate responses to stimuli  Following Commands: Follows one step commands with increased time  Attention Span: Appears intact  Memory: Decreased recall of recent events  Safety Judgement: Decreased awareness of need for assistance  Problem Solving: Assistance required to identify errors made  Insights: Appears intact  Initiation: Does not require cues  Sequencing: Does not require cues  Cognition Comment: Comprehension: MOD I   Expression: MOD I   Social Interaction: IND   Problem Solving: Supervision   Memory:  Supervision    OBJECTIVE    Grooming/Oral Hygiene  Assistance Level: Modified independent  Upper Extremity Bathing  Assistance Level: Set-up  Lower Extremity Bathing  Assistance Level: Minimal assistance  Skilled Clinical Factors: assist for posterior thoroughness  Upper Extremity Dressing  Assistance Level: Supervision  Lower Extremity Dressing  Assistance Level: Stand by assist  Putting On/Taking Off Footwear  Assistance Level: Stand by assist  Toileting  Skilled Clinical Factors: denied need  Tub/Shower Transfers  Type: Shower  Transfer From: Rolling walker  Transfer To: Shower chair with back  Assistance Level: Contact guard assist;Verbal cues  Skilled Clinical Factors: grab bars - steadying assist - verbal cues for hand placement    Functional Mobility  Device: Rolling walker  Activity: To/From bathroom  Assistance Level: Contact guard assist  Skilled Clinical Factors: steadying assist  Sit to Stand  Assistance Level: Stand by assist  Stand to Sit  Assistance Level: Stand by assist    ASSESSMENT: Patient pleasant and motivated while completing ADL tasks.    Activity Tolerance: Patient tolerated treatment well      PLAN OF CARE: Patient's plan of care was discussed by team OTs and OTAs at Monday POC review meeting.     Strengthening, Balance training, Functional mobility training, Endurance training, Cognitive reorientation, Pain management, Patient/Caregiver education & training, Safety education & training, Equipment evaluation, education, & procurement, Self-Care / ADL, Home management training, Cognitive/Perceptual training, Coordination training    Continue per OT POC for planned discharge on 7-18-24.    Patient goals : To complete rehab and get stronger  Time Frame for Long Term Goals : Within 2 weeks, pt to demo progress in the following areas listed below to achieve specific LTGs as stated in the initial eval  Long Term Goal 1: Pt to demo improved overall ADL levels  Long Term Goal 2: Pt to demo improved

## 2024-07-09 NOTE — PROGRESS NOTES
[facility]   Acute  Rehabilitation  MUSIC THERAPY      Date:  7/9/2024        Patient Name: Hamilton Torres       MRN: [id number]        YOB: 1935 (88 y.o.)       Gender: male  Diagnosis: Impaired mobility and ADLs d/t closed head injury s/p fall  Referring Practitioner: Dr. Troy    RESTRICTIONS/PRECAUTIONS:  Restrictions/Precautions: Fall Risk  Vision: Impaired  Hearing: Exceptions to WFL  Hearing Exceptions: Hard of hearing/hearing concerns, Bilateral hearing aid      TIME OF SESSION: 1:30pm - 2:00pm     SUBJECTIVE:  \"I love music, that sounds great!\"     OBJECTIVE:        [x] To Improve Mood     [x] To Increase Social Well-Being  [] To Increase Focus   [x] To Increase Emotional Well-Being  [] To Increase Eye Contact    [] To Increase Spiritual Well-Being   [] To Decrease Anxiety   [x] To Increase Relaxation   [] To Decrease Pain    [] To Increase Communication  [] To Increase Movement to Music     MUSIC INTERVENTION PROVIDED:     [x] Live Music on Voice  [] Recorded Music   [x] Live Music on Guitar  [x] Discussion Related to Music   [] Live Music on Q-chord  [x] Discussion Related to Pt Experience   [] Live Music on Percussion      PARTICIPATION LEVEL OF PATIENT:     [x] Active with discussion   [] Passive with discussion   [x] Active with singing    [] Passive with singing   [] Active with instrument playing  [] Passive with instrument playing   [x] Actively listening to music   [] Passively listening to music.     OUTCOMES OBSERVED:      [x] Improved Mood   [x] Increased Social Well-Being  [] Increased Focus   [x] Increased Emotional Well-Being  [] Increased Eye Contact    [] Increased Spiritual Well-Being   [] Decreased Anxiety   [x] Increased Relaxation   [] Decreased Pain    [] Increased Communication   [] Increased Movement to Music     PAIN ASSESSMENT    Before MT:      [x] No     [] Yes   Location:    Rating:  /10    Comment(s):    After MT:         [x] No     [] Yes    Location:     Rating:   /10    Comment(s):     ANXIETY ASSESSMENT    Before MT:      [x] No     [] Yes   Rating:  /10    Comment(s):    After MT:         [x] No     [] Yes   Rating:   /10    Comment(s):       ASSESSMENT/OBSERVATIONS:     Patient's music interests and/or background: Country     Patient tolerated today’s treatment session:      [x] Good          [] Fair          [] Poor         Comment(s): Patient found sitting up in a wheelchair in his room when [facility] arrived to offer Music Therapy Services. Patient accepted music therapy visit.  MT-BC provided live, patient preferred music on guitar and voice.  Patient actively engaged in the music therapy by discussing topics related to the music, discussing topics related to their hospital stay, making song suggestions, singing along at times,  and by actively listening to the music. Patient responded positively to the music therapy as evidence by improved mood, increased reminiscing, increased tearfulness at times when reminiscing, increased relaxation, increased socialization and by making positive comments about the music therapy.      PLAN:      [x] Pt interested in having music therapy again.     [x] [facility] will attempt to see pt again another day, if time allows.      [] Pt's planned d/c date is before MT-BC is scheduled on unit again.     [] Pt NOT interested in having music therapy again.            DC GallowayBC    7/9/2024  Electronically signed by Emerald Lopez on 7/9/2024 at 2:17 PM

## 2024-07-09 NOTE — PLAN OF CARE
Problem: Safety - Adult  Goal: Free from fall injury  Outcome: Progressing     Problem: Discharge Planning  Goal: Discharge to home or other facility with appropriate resources  Outcome: Progressing     Problem: ABCDS Injury Assessment  Goal: Absence of physical injury  Outcome: Progressing     Problem: Skin/Tissue Integrity  Goal: Absence of new skin breakdown  Description: 1.  Monitor for areas of redness and/or skin breakdown  2.  Assess vascular access sites hourly  3.  Every 4-6 hours minimum:  Change oxygen saturation probe site  4.  Every 4-6 hours:  If on nasal continuous positive airway pressure, respiratory therapy assess nares and determine need for appliance change or resting period.  Outcome: Progressing     Problem: Chronic Conditions and Co-morbidities  Goal: Patient's chronic conditions and co-morbidity symptoms are monitored and maintained or improved  Outcome: Progressing

## 2024-07-09 NOTE — PROGRESS NOTES
Physical Therapy Rehab Treatment Note  Facility/Department: [facility] REHAB  Room: [place] [address]       NAME: Hamilton Torres  : 1935 (88 y.o.)  MRN: [id number]  CODE STATUS: DNR-CCA    Date of Service: 2024     Restrictions:  Restrictions/Precautions: Fall Risk     SUBJECTIVE:      Pain  Pain: 2/10 R knee Pt requests Tylonol.  Nsg notified.    OBJECTIVE:            Transfers  Sit to Stand: Stand by assistance  Stand to Sit: Stand by assistance  Comment: pt uses posterior LEs to assist with sit to stand;  increased time to complete.  VCs for sequencing.    Ambulation  Surface: Level tile;Carpet  Device: Rolling Walker  Assistance: Stand by assistance  Quality of Gait: Narrow SANNA, B flexed knees, decreased stance on L LE, B toe in  Gait Deviations: Slow Nette;Decreased step length;Decreased step height  Distance: 150ft           PT Exercises  PROM Exercises: manual gastroc and ham string stretces in seated [id number] Malick  A/AROM Exercises: seated AP, LAQ, march, hip add, hip abd x20 ea  Functional Mobility Circuit Training: STS x3 from             ASSESSMENT/PROGRESS TOWARDS GOALS:   Assessment: Pt requires increased time to complete sit to stand transfers however required decreased physicl assist completing at SBA level.    Goals:  Long Term Goals  Long Term Goal 1: indep and efficient bed mobility  Long Term Goal 2: indep sit to stand bed and car transfers  Long Term Goal 3: indep gait with ww >/= 150 feet with safest AD  Long Term Goal 4: pt to complete TUG in </= 19 sec with device and good safety  Patient Goals   Patient Goals : \"walk more\"    PLAN OF CARE/Safety: Cont per POC.       Therapy Time:   Individual   Time In 930   Time Out 1000   Minutes 30      Minutes:  Transfer/Bed mobility training:10  Gait training:10  Neuro re education:0  Therapeutic ex:10    Anne Randolph PTA, 24 at 9:58 AM

## 2024-07-09 NOTE — PROGRESS NOTES
Subjective:   The patient complains of severe acute on chronic progressive fatigue and post fall neck and headache partially relieved by rest, medications, PT,  OT,   SLP and rest and exacerbated by recent closed head injury.      I am concerned about patient’s medical complexities and barriers to advancing in rehab goals including addressing poor balance and falls risk.        I discussed current functional, rehabilitation, medical status with other rehabilitation providers including nursing and case management.  According to recent nursing note,  no substantive notes-- only form notes from the nursing over the past 24 hours any and all other notes reviewed as well.      ROS x10:  The patient also complains of severely impaired mobility and activities of daily living.  Otherwise no new problems with vision, hearing, nose, mouth, throat, dermal, cardiovascular, GI, , pulmonary, musculoskeletal, psychiatric or neurological. See Rehab H&P on Rehab chart dated .       Vital signs:  BP (!) 180/74   Pulse 63   Temp 98.2 °F (36.8 °C) (Oral)   Resp 17   Wt 78.4 kg (172 lb 12.8 oz)   SpO2 98%   BMI 25.52 kg/m²   I/O:   PO/Intake:  fair PO intake, no problems observed or reported.    Bowel/Bladder:  continent, constipation and urinary urgency.  General:  Patient is well developed, adequately nourished, non-obese and     well kempt.     HEENT:    PERRLA, hearing intact to loud voice, external inspection of ear     and nose benign.  Inspection of lips, tongue and gums    Musculoskeletal: No significant change in strength or tone.  All joints stable.      Inspection and palpation of digits and nails show no clubbing,       cyanosis or inflammatory conditions.   Neuro/Psychiatric: Affect: flat.  Alert and oriented to person, place and     situation.  No significant change in deep tendon reflexes or     Sensation    Lungs:  Diminished, CTA-B. Respiration effort is normal at rest.     Heart:   S1 = S2, RRR.  No loud

## 2024-07-09 NOTE — PLAN OF CARE
Problem: Safety - Adult  Goal: Free from fall injury  7/9/2024 1211 by Marisela Drake RN  Outcome: Progressing  7/8/2024 2239 by Gladis Evans RN  Outcome: Progressing     Problem: Discharge Planning  Goal: Discharge to home or other facility with appropriate resources  7/9/2024 1211 by Marisela Drake RN  Outcome: Progressing  7/8/2024 2239 by Gladis Evans RN  Outcome: Progressing     Problem: ABCDS Injury Assessment  Goal: Absence of physical injury  7/9/2024 1211 by Marisela Drake RN  Outcome: Progressing  7/8/2024 2239 by Gladis Evans RN  Outcome: Progressing     Problem: Skin/Tissue Integrity  Goal: Absence of new skin breakdown  Description: 1.  Monitor for areas of redness and/or skin breakdown  2.  Assess vascular access sites hourly  3.  Every 4-6 hours minimum:  Change oxygen saturation probe site  4.  Every 4-6 hours:  If on nasal continuous positive airway pressure, respiratory therapy assess nares and determine need for appliance change or resting period.  7/9/2024 1211 by Marisela Drake RN  Outcome: Progressing  7/8/2024 2239 by Gladis Evans RN  Outcome: Progressing     Problem: Chronic Conditions and Co-morbidities  Goal: Patient's chronic conditions and co-morbidity symptoms are monitored and maintained or improved  7/9/2024 1211 by Marisela Drake RN  Outcome: Progressing  7/8/2024 2239 by Gladis Evans RN  Outcome: Progressing

## 2024-07-09 NOTE — FLOWSHEET NOTE
Patient in bed incontinent linen soiled to bed woke patient up to clean and change him patient was not understanding , explained to patient situation  and patient still was not understanding, kept patient in chair for breakfast gave him his hearing aides and his dentures were rinsed and given to patient vitals were taken .

## 2024-07-09 NOTE — PROGRESS NOTES
Occupational Therapy Get up and Go Note            Date: 2024  Patient Name: Hamilton Torres        MRN: [id number]    Account #: [id number]  : 1935  (88 y.o.)    Subjective:  Patient states:  I gotta use the urinal  Pain:  Pain at start of treatment: No    Pain at end of treatment: No    No complains of pain    Objective:    Call light on- pt assisted to use urinal    ADL:  Feeding:  Modified independent     Toileting:  MaxA to use urinal in standing. Assist for clothing and urinal     Transfers: Cheri for STS. ModA needed to maintain upright balance at FWW d.t posterior lean when standing to urinate despite cues for weight shift    Pt voiced being upset regarding being woken up earlier this AM. Spoke to PCA about complaint. Pt provided coffee and was happy at the end of the session    Treatment consisted of:   [x] ADL Training  [] Strengthening   [] Transfer Training    [] DME Education  [] HEP   [] Patient Education  [] Other:    Safety:  Safety Devices  Safety Devices in place:   Type of devices: All fall risk precautions in place    Therapy Time:   Individual Group Co-Treat   Time In 726       Time Out 0735         Minutes 9           ADL/IADL trainin minutes     Electronically signed by:    Toribio Botello OT    2024, 8:13 AM

## 2024-07-09 NOTE — PROGRESS NOTES
Physical Therapy Rehab Treatment Note  Facility/Department: [facility] [place] [facility]  Room: [place] [address]       NAME: Hamilton Torres  : 1935 (88 y.o.)  MRN: [id number]  CODE STATUS: DNR-CCA    Date of Service: 2024     Restrictions:  Restrictions/Precautions: Fall Risk    SUBJECTIVE:      Pain  Pain: 2/10 R knee Pt requests Tylonol.  Nsg notified.    OBJECTIVE:            Transfers  Sit to Stand: Stand by assistance  Stand to Sit: Stand by assistance  Comment: pt uses posterior LEs to assist with sit to stand;  increased time to complete.  VCs for sequencing.    Ambulation  Surface: Level tile;Carpet  Device: Rolling Walker  Assistance: Stand by assistance  Quality of Gait: Narrow SANNA, B flexed knees, decreased stance on L LE, B toe in  Gait Deviations: Slow Nette;Decreased step length;Decreased step height  Distance: 150ft           PT Exercises  Functional Mobility Circuit Training: STS x3 from   Dynamic Standing Balance Exercises: Single stepping alternating LEs Fwd and retro at WW; standing reaching out of SANNA for bean bags with 1 UE support on WW SBA-Min assist.  Picking up objects off floor with reacher standing with 1 UE support on WW.  Stepping over obstacles in //bars SBA.  Standing Open/Closed Kinetic Chain Exercises: squats/heel raises x10 ea            ASSESSMENT/PROGRESS TOWARDS GOALS:   Assessment: Pt retopulsive in standing with dereased balance reactions without UE support.    Goals:  Long Term Goals  Long Term Goal 1: indep and efficient bed mobility  Long Term Goal 2: indep sit to stand bed and car transfers  Long Term Goal 3: indep gait with ww >/= 150 feet with safest AD  Long Term Goal 4: pt to complete TUG in </= 19 sec with device and good safety  Patient Goals   Patient Goals : \"walk more\"    PLAN OF CARE/Safety:Cont per POC.       Therapy Time:   Individual   Time In 1400   Time Out 1430   Minutes 30      Minutes:  Transfer/Bed mobility trainin  Gait training:10  Neuro re  education:15  Therapeutic ex:0    Anne Randolph, PTA, 07/09/24 at 4:05 PM

## 2024-07-09 NOTE — PROGRESS NOTES
OCCUPATIONAL THERAPY  INPATIENT REHAB TREATMENT NOTE  [facility]      NAME: Hamilton Torres  : 1935 (88 y.o.)  MRN: [id number]  CODE STATUS: DNR-CCA  Room: R250/R250-01    Date of Service: 2024    Referring Physician: Dr. Troy  Rehab Diagnosis: Impaired mobility and ADLs d/t closed head injury s/p fall    Hospital course:   Comments: Pt is a 87 y/o male who presents to the ED via EMS for evaluation. He says that he recently finished PT/OT rehab for frequent falls. That he was walking in to his house when he lost his balance and fell backwards striking his head. No LOC or amnesia, denies antecedent symptoms. He denies recent illness, fever, vision changes, neck pain, chest pain, abdominal pain, back pain, vomiting, dizziness, numbness or weakness. Pt is currently on antiplatelets.      Restrictions  Restrictions/Precautions  Restrictions/Precautions: Fall Risk     Patient's date of birth confirmed: Yes    SAFETY:  Safety Devices  Safety Devices in place: Yes  Type of devices: All fall risk precautions in place    SUBJECTIVE: \"I do a lot of walking. It's 170 steps to the cafeteria where I eat. And that's just 1 way.\"    Pain at start of treatment: No    Pain at end of treatment: No    COGNITION:  Orientation  Overall Orientation Status: Within Normal Limits  Cognition  Overall Cognitive Status: Exceptions  Arousal/Alertness: Appropriate responses to stimuli  Following Commands: Follows one step commands with increased time  Attention Span: Appears intact  Memory: Decreased recall of recent events  Safety Judgement: Decreased awareness of need for assistance  Problem Solving: Assistance required to identify errors made  Insights: Appears intact  Initiation: Does not require cues  Sequencing: Does not require cues  Cognition Comment: Comprehension: MOD I   Expression: MOD I   Social Interaction: IND   Problem Solving: Supervision   Memory: Supervision    OBJECTIVE:    Functional

## 2024-07-09 NOTE — PROGRESS NOTES
[facility]  Facility/Department: [facility]  Speech Language Pathology   Treatment Note          Hamilton Torres  1935  R250/R250-01  [x]   confirmed    Date: 2024      Restrictions/Precautions: Fall Risk     ADULT DIET; Regular     Respiratory Status:   Room air  No active isolations    Rehab Diagnosis:   Impaired mobility and ADL's due to Closed Head Injury s/p fall     Subjective:  Alert and Cooperative        Interventions used this date:  Cognitive Skill Development    Objective/Assessment:  Patient progressing towards goals:  Goal 1: To increase safety awareness and judgment for safe completion of ADLs secondary to patient's cognitive deficits, patient will complete abstract reasoning tasks (i.e. word deduction, convergent and divergent naming, similarities/differences) with 80% accuracy and minimal cues.  Pt stated similarity and difference between 2 words with stand by cues with 100% accuracy.  Goal 2: To increase independence for functional activities for home and community, patient will complete mid level executive functioning tasks (i.e. path finding, scheduling appointments, prioritizing tasks) with 80% accuracy and min cues.  Pt answered written questions related to a prescription label with 70% accuracy, increasing to 100% accuracy with min cues.  Goal 3: To address patient's cognitive deficits and promote recall of personal and medical information, the patient will answer questions addressing (recent and delayed) recall with 80% accuracy and min cues.  Pt recalled lunch items consumed and previous events of day.  Pt unaware of his afternoon schedule.  SLP educated pt on therapy schedule card hanging from wheelchair since pt reported no one showed him that.    Goal 4: To decrease patient's cognitive deficits through the use of compensatory strategies, the patient will be educated on 3 different memory strategies and verbalize how he might use them at home in 3 ways with min cues.  Pt  completed word list retention (category inclusion) of 4 words heard aloud followed by question with 70% accuracy.      Treatment/Activity Tolerance:  Patient tolerated treatment well    Plan:  Continue per POC    Pain Assessment:  Patient does not c/o pain.    Pain Re-assessment:  Patient does not c/o pain.    Patient/Caregiver Education:  Patient educated on session and progression towards goals.  Patient stated verbal understanding of directions.    Safety Devices:  Call light within reach and Chair alarm in place      Speech Therapy Level of Assistance Scale    AUDITORY COMPREHENSION  Rating:  Modified Independent    VERBAL EXPRESSION  Rating:  Modified Independent    MOTOR SPEECH  Rating: Independent    PROBLEM SOLVING  Rating: Supervised Assistance    MEMORY  Rating:  Supervised Assistance-Minimal Assistance        Therapy Time  SLP Individual Minutes  Time In: 1300  Time Out: 1330  Minutes: 30            Signature: Electronically signed by BENITO Mix on 7/9/2024 at 2:04 PM

## 2024-07-09 NOTE — CARE COORDINATION
CM spoke with Olga (DTR)gave her an update on discharge date and went over therapy goals. CM then spoke with the pt went over discharge date and goals. Pt was agreeable to d/c date. Electronically signed by Darshana Nolan RN on 7/9/2024 at 3:41 PM

## 2024-07-10 PROCEDURE — 97535 SELF CARE MNGMENT TRAINING: CPT

## 2024-07-10 PROCEDURE — 97130 THER IVNTJ EA ADDL 15 MIN: CPT

## 2024-07-10 PROCEDURE — 97129 THER IVNTJ 1ST 15 MIN: CPT

## 2024-07-10 PROCEDURE — 97112 NEUROMUSCULAR REEDUCATION: CPT

## 2024-07-10 PROCEDURE — 6370000000 HC RX 637 (ALT 250 FOR IP): Performed by: PHYSICAL MEDICINE & REHABILITATION

## 2024-07-10 PROCEDURE — 97110 THERAPEUTIC EXERCISES: CPT

## 2024-07-10 PROCEDURE — 6360000002 HC RX W HCPCS: Performed by: INTERNAL MEDICINE

## 2024-07-10 PROCEDURE — 97116 GAIT TRAINING THERAPY: CPT

## 2024-07-10 PROCEDURE — 99232 SBSQ HOSP IP/OBS MODERATE 35: CPT | Performed by: PHYSICAL MEDICINE & REHABILITATION

## 2024-07-10 PROCEDURE — 1180000000 HC REHAB R&B

## 2024-07-10 PROCEDURE — 99222 1ST HOSP IP/OBS MODERATE 55: CPT | Performed by: NURSE PRACTITIONER

## 2024-07-10 PROCEDURE — 97530 THERAPEUTIC ACTIVITIES: CPT

## 2024-07-10 PROCEDURE — 6370000000 HC RX 637 (ALT 250 FOR IP): Performed by: INTERNAL MEDICINE

## 2024-07-10 RX ADMIN — LATANOPROST 1 DROP: 50 SOLUTION OPHTHALMIC at 09:44

## 2024-07-10 RX ADMIN — FINASTERIDE 5 MG: 5 TABLET, FILM COATED ORAL at 09:41

## 2024-07-10 RX ADMIN — CARVEDILOL 6.25 MG: 6.25 TABLET, FILM COATED ORAL at 09:41

## 2024-07-10 RX ADMIN — Medication 2000 UNITS: at 17:15

## 2024-07-10 RX ADMIN — DONEPEZIL HYDROCHLORIDE 5 MG: 10 TABLET, FILM COATED ORAL at 20:20

## 2024-07-10 RX ADMIN — ISOSORBIDE MONONITRATE 30 MG: 30 TABLET, EXTENDED RELEASE ORAL at 09:41

## 2024-07-10 RX ADMIN — CARVEDILOL 6.25 MG: 6.25 TABLET, FILM COATED ORAL at 20:21

## 2024-07-10 RX ADMIN — TIMOLOL MALEATE 1 DROP: 5 SOLUTION OPHTHALMIC at 09:45

## 2024-07-10 RX ADMIN — CLOPIDOGREL BISULFATE 75 MG: 75 TABLET ORAL at 09:41

## 2024-07-10 RX ADMIN — Medication 3 MG: at 20:20

## 2024-07-10 RX ADMIN — PRAVASTATIN SODIUM 80 MG: 40 TABLET ORAL at 20:20

## 2024-07-10 RX ADMIN — Medication 100 MG: at 09:41

## 2024-07-10 RX ADMIN — LISINOPRIL 10 MG: 10 TABLET ORAL at 09:41

## 2024-07-10 RX ADMIN — TIMOLOL MALEATE 1 DROP: 5 SOLUTION OPHTHALMIC at 20:21

## 2024-07-10 RX ADMIN — CYANOCOBALAMIN TAB 500 MCG 500 MCG: 500 TAB at 09:41

## 2024-07-10 RX ADMIN — ENOXAPARIN SODIUM 40 MG: 100 INJECTION SUBCUTANEOUS at 09:40

## 2024-07-10 NOTE — FLOWSHEET NOTE
Patient alert, oriented and cooperative. Denies pain or any further needs or complaints at this time. Call light within reach.

## 2024-07-10 NOTE — PROGRESS NOTES
normal.  No rashes or lesions.  Neurologic:  Neurovascularly intact without any focal sensory/motor deficits. Cranial nerves: II-XII intact, grossly non-focal.  Psychiatric: Alert and oriented, thought content appropriate, normal insight  Capillary Refill: Brisk,< 3 seconds   Peripheral Pulses: +2 palpable, equal bilaterally       Labs:   Recent Labs     07/08/24  0530   WBC 6.2   HGB 13.7*   HCT 40.5*        Recent Labs     07/08/24  0530      K 3.7      CO2 25   BUN 19   CREATININE 0.76   CALCIUM 8.9     No results for input(s): \"AST\", \"ALT\", \"BILIDIR\", \"BILITOT\", \"ALKPHOS\" in the last 72 hours.  No results for input(s): \"INR\" in the last 72 hours.  No results for input(s): \"CKTOTAL\", \"TROPONINI\" in the last 72 hours.    Urinalysis:      Lab Results   Component Value Date/Time    NITRU Negative 01/21/2023 10:45 AM    WBCUA 3-5 01/21/2023 10:45 AM    BACTERIA Negative 01/21/2023 10:45 AM    RBCUA 6-10 01/21/2023 10:45 AM    BLOODU Negative 01/21/2023 10:45 AM    GLUCOSEU Negative 01/21/2023 10:45 AM       Radiology:  No orders to display           Assessment/Plan:    Active Hospital Problems    Diagnosis Date Noted    Parkinsonian features [R29.818] 07/09/2024    Impaired mobility & ADLs dt CHI [Z74.09, Z78.9] 07/08/2024    Cervical spinal stenosis [M48.02] 07/08/2024    Essential hypertension [I10] 06/29/2018    Dementia (HCC) [F03.90]          DVT Prophylaxis: lovenox  Diet: ADULT DIET; Regular  Code Status: DNR-CCA    PT/OT Eval Status: done  Dispo -   Weakness/repeated mechanical falls- PT on case, management per primary service  HTN- well controlled with currne tmedications  CAD/NSTEMI, chronically elevated troponins without dyspnea/CP- following by dr Duvall as outpatient- continue with statin/plavix/B blocker   Knee OA- PT, pain meds PRN  Baseline dementia- supportive care, on arisept  Medically stable for acute admission to [facility] rehab     TTS: 45 minutes where I focused more than 75% of my  attention on rendering care, and planning treatment course for this patient, in addition to talking to RN team, mid levels, consulting with other physicians and following up on labs and imaging.    Electronically signed by Mario Troncoso MD on 7/10/2024 at 11:50 AM

## 2024-07-10 NOTE — PROGRESS NOTES
insomnia with situational adjustment disorder:  consider prn low dose Ambien, monitor for day time sedation.    HS \"Tuck In\"  Falls risk elevated:  patient to use call light to get nursing assistance to get up, bed and chair alarm.  Elevated DVT risk: progressive activities in PT, continue prophylaxis CASIMIRO hose, elevation Lovenox at lowest effective dose.  Complex discharge planning:  DC-7/18/24 back t AL.  Progress toward his final planned weekly team meeting    Monday to re-assess progress towards goals, discuss and address social, psychological and medical comorbidities and to address difficulties they may be having progressing in therapy.  Patient and family education is in progress.  The patient is to follow-up with their family physician after discharge.        Complex Active General Medical Issues that complicate care Assess & Plan:          Dementia-limit toxic medications, titrate Aricept  GERD-Elevate head of bed after meals, monitor stools for blood, lowest effective dose of PPI, consider Tums.  Anemia-Monitor vital signs monitor for orthostasis and tachycardia, check H&H prn.  Vitamin B12 and iron-dose iron with food to prevent GI upset.  Monitor for constipation.  Monitor stools for blood.    Essential hypertension-Acute rehab to monitor heart rate and rhythm with the option of telemetry and the effects of chronotropic medication with respect to increasing physical activity and exercise in PT, OT, ADLs with medication titration to lowest effective dosing.  Continue blood signs every shift focusing on heart rate, rhythm and blood pressure checks with orthostatic checks-monitoring the effect of exercise, therapy and posture.  Consult hospitalist for backup medical and adjust/add medications.  Monitor heart rate and blood pressure as well as medications effects on vital signs before during and after therapy with especial focus on preventing orthostasis and falls risk.    Cervical spinal stenosis and  generalized osteoarthritis-lowest effective dose of pain medication Tylenol and topical     Review and simplify inpatient and outpatient medications and dosing times.  Transition to self medication program if able.  Await neuro consult regarding parkinsonian features.        Electronically signed by Judith Castillo DO on 7/8/24 at 7:42 AM EDT       Judith Castillo D.O., PM&R     Attending    [phone]   [facility]

## 2024-07-10 NOTE — PROGRESS NOTES
OCCUPATIONAL THERAPY  INPATIENT REHAB TREATMENT NOTE  [facility]      NAME: Hamilton oTrres  : 1935 (88 y.o.)  MRN: [id number]  CODE STATUS: DNR-CCA  Room: R250/R250-01    Date of Service: 7/10/2024    Referring Physician: Dr. Troy  Rehab Diagnosis: Impaired mobility and ADLs d/t closed head injury s/p fall    Hospital course:   Comments: Pt is a 87 y/o male who presents to the ED via EMS for evaluation. He says that he recently finished PT/OT rehab for frequent falls. That he was walking in to his house when he lost his balance and fell backwards striking his head. No LOC or amnesia, denies antecedent symptoms. He denies recent illness, fever, vision changes, neck pain, chest pain, abdominal pain, back pain, vomiting, dizziness, numbness or weakness. Pt is currently on antiplatelets.      Restrictions  Restrictions/Precautions  Restrictions/Precautions: Fall Risk     Patient's date of birth confirmed: Yes    SAFETY:  Safety Devices  Safety Devices in place: Yes  Type of devices: All fall risk precautions in place    SUBJECTIVE: \"I don't know who the oneidak-o was that did this. Here I'll show you.\" Patient sits on raised toilet seated, lifts hospital gown and asks, \"How are you supposed to fit your penis in there?\" Therapist removed raised toilet. Patient satisfied.     Pain at start of treatment: No    Pain at end of treatment: No    COGNITION:  Orientation  Overall Orientation Status: Within Normal Limits  Cognition  Overall Cognitive Status: Exceptions  Arousal/Alertness: Appropriate responses to stimuli  Following Commands: Follows one step commands with increased time  Attention Span: Appears intact  Memory: Decreased recall of recent events  Safety Judgement: Decreased awareness of need for assistance  Problem Solving: Assistance required to identify errors made  Insights: Appears intact  Initiation: Does not require cues  Sequencing: Does not require cues  Cognition Comment:

## 2024-07-10 NOTE — PROGRESS NOTES
[facility]  [place]ility/[place] [facility]  Speech Language Pathology   Treatment Note          Hamilton Torres  1935  R250/R250-01  [x]   confirmed    Date: 7/10/2024      Restrictions/Precautions: Fall Risk     ADULT DIET; Regular     Respiratory Status:   Room air  No active isolations    Rehab Diagnosis:   Impaired mobility and ADL's due to Closed Head Injury s/p fall     Subjective:  Alert, Cooperative, and Pleasant        Interventions used this date:  Cognitive Skill Development    Objective/Assessment:  Patient progressing towards goals:  Goal 1: To increase safety awareness and judgment for safe completion of ADLs secondary to patient's cognitive deficits, patient will complete abstract reasoning tasks (i.e. word deduction, convergent and divergent naming, similarities/differences) with 80% accuracy and minimal cues.  Goal 2: To increase independence for functional activities for home and community, patient will complete mid level executive functioning tasks (i.e. path finding, scheduling appointments, prioritizing tasks) with 80% accuracy and min cues.  Pt sequenced 4 pictures in correct order in 3/5 trials, increasing to 5/5 trials with min cues.    Pt answered word problems related to time in 2/4 trials, increasing to 4/4 trials with min cues.  Goal 3: To address patient's cognitive deficits and promote recall of personal and medical information, the patient will answer questions addressing (recent and delayed) recall with 80% accuracy and min cues.  Pt recalled 3/5 pictures after 8 minute delay (with use of strategy when cued).  Pt recalled 5/5 pictures on second attempt 5 minutes later.  Goal 4: To decrease patient's cognitive deficits through the use of compensatory strategies, the patient will be educated on 3 different memory strategies and verbalize how he might use them at home in 3 ways with min cues.  Educated pt on internal memory strategies of grouping and storytelling.  Good

## 2024-07-10 NOTE — PROGRESS NOTES
Physical Therapy Rehab Treatment Note  [facility]  [facility]       NAME: Hamilton Torres  : 1935 (88 y.o.)  MRN: [id number]  CODE STATUS: DNR-CCA    Date of Service: 7/10/2024       Restrictions:  Restrictions/Precautions: Fall Risk    SUBJECTIVE:   Subjective: Pt states he showered and dressed himself today and it went well.  Pt states he does not have stairs at AL but goes to his daughters house that has 5 steps with a R hand rail.  Pt states he goes down backwards.  Pt reports 170ft to dining uribe one way.  Stes he walks this multiple times a day.    Pain  Pain: Pt denies pain    OBJECTIVE:            Transfers  Sit to Stand: Stand by assistance  Stand to Sit: Stand by assistance  Bed to Chair: Stand by assistance  Car Transfer: Stand by assistance;Moderate Assistance (Instructed pt on technique.  SBA except required lifting assist to stand fro low car simulator.)    Ambulation  Surface: Level tile  Device: Rolling Walker  Assistance: Stand by assistance  Quality of Gait: Narrow SANNA, B flexed knees, decreased stance on L LE, B toe in, fwd flexed trunk  Gait Deviations: Slow Nette;Decreased step length;Decreased step height  Distance: 150ft  Comments: Verbal/tactile cues for upright posture and to ambulate closer to WW.    Stairs/Curb  Stairs?: Yes  Stairs  # Steps : 4  Stairs Height: 6\"  Rails: Bilateral  Assistance: Minimal assistance  Comment: Ascends fwd/descends retro        PT Exercises  PROM Exercises: manual gastroc and hamstring stretces in seated [id number] [place]  Functional Mobility Circuit Training: STS x10 from WC; TUG 36sec            ASSESSMENT/PROGRESS TOWARDS GOALS:   Assessment: Increased ease with sit to stand noted.  Tolerated increased STS reps progressing from 3 to 10 reps.  Improved TUG score by 10 sec.  Initated stair training to be able to enter/exit diaughter's home.    Goals:  Long Term Goals  Long Term Goal 1: indep and efficient bed mobility  Long

## 2024-07-10 NOTE — PLAN OF CARE
Problem: Safety - Adult  Goal: Free from fall injury  7/9/2024 2134 by Gladis Evans RN  Outcome: Progressing  7/9/2024 1211 by Marisela Drake RN  Outcome: Progressing     Problem: Discharge Planning  Goal: Discharge to home or other facility with appropriate resources  7/9/2024 2134 by Gladis Evans RN  Outcome: Progressing  7/9/2024 1211 by Marisela Drake RN  Outcome: Progressing     Problem: ABCDS Injury Assessment  Goal: Absence of physical injury  7/9/2024 2134 by Gladis Evans RN  Outcome: Progressing  7/9/2024 1211 by Marisela Drake RN  Outcome: Progressing     Problem: Skin/Tissue Integrity  Goal: Absence of new skin breakdown  Description: 1.  Monitor for areas of redness and/or skin breakdown  2.  Assess vascular access sites hourly  3.  Every 4-6 hours minimum:  Change oxygen saturation probe site  4.  Every 4-6 hours:  If on nasal continuous positive airway pressure, respiratory therapy assess nares and determine need for appliance change or resting period.  7/9/2024 2134 by Gladis Evans RN  Outcome: Progressing  7/9/2024 1211 by Marisela Drake RN  Outcome: Progressing     Problem: Chronic Conditions and Co-morbidities  Goal: Patient's chronic conditions and co-morbidity symptoms are monitored and maintained or improved  7/9/2024 2134 by Gladis Evans RN  Outcome: Progressing  7/9/2024 1211 by Marisela Drake RN  Outcome: Progressing

## 2024-07-10 NOTE — PROGRESS NOTES
Physical Therapy Rehab Treatment Note  [facility]  [facility]       NAME: Hamilton Torres  : 1935 (88 y.o.)  MRN: [id number]  CODE STATUS: DNR-CCA    Date of Service: 7/10/2024     Restrictions:  Restrictions/Precautions: Fall Risk    SUBJECTIVE:   Subjective: Pt states he showered and dressed himself today and it went well.  Pt states he does not have stairs at AL but goes to his daughters house that has 5 steps with a R hand rail.  Pt states he goes down backwards.  Pt reports 170ft to dining uribe one way.  Stes he walks this multiple times a day.    Pain  Pain: Pt denies pain      OBJECTIVE:         Bed mobility  Rolling to Left: Independent  Rolling to Right: Independent  Supine to Sit: Independent  Sit to Supine: Independent    Transfers  Sit to Stand: Stand by assistance  Stand to Sit: Stand by assistance  Bed to Chair: Stand by assistance  Car Transfer: Stand by assistance;Moderate Assistance (Instructed pt on technique.  SBA except required lifting assist to stand fro low car simulator.)    Ambulation  Surface: Level tile  Device: Rolling Walker  Assistance: Stand by assistance  Quality of Gait: Narrow SANNA, B flexed knees, decreased stance on L LE, B toe in, fwd flexed trunk  Gait Deviations: Slow Nette;Decreased step length;Decreased step height  Distance: 150ft  Comments: Verbal/tactile cues for upright posture and to ambulate closer to WW.    Stairs/Curb  Stairs?: Yes  Stairs  # Steps : 4  Stairs Height: 6\"  Rails: Bilateral  Assistance: Minimal assistance  Comment: Ascends fwd/descends retro        PT Exercises  Dynamic Standing Balance Exercises: Gait drills F/R/L B UE on //bars; single stepping alternating LEs B UE support.  Trial'd 1 UE support with R knee buckeling recovering with UEs.            ASSESSMENT/PROGRESS TOWARDS GOALS:   Assessment: Increased ease with sit to stand noted.  Pt has met bed mobility goal.  Standing balance challanged without UE

## 2024-07-10 NOTE — PROGRESS NOTES
OCCUPATIONAL THERAPY  INPATIENT REHAB TREATMENT NOTE  [facility]      NAME: Hamilton Torres  : 1935 (88 y.o.)  MRN: [id number]  CODE STATUS: DNR-CCA  Room: R250R250-01    Date of Service: 7/10/2024    Referring Physician: Dr. Troy  Rehab Diagnosis: Impaired mobility and ADLs d/t closed head injury s/p fall    Hospital course:   Comments: Pt is a 89 y/o male who presents to the ED via EMS for evaluation. He says that he recently finished PT/OT rehab for frequent falls. That he was walking in to his house when he lost his balance and fell backwards striking his head. No LOC or amnesia, denies antecedent symptoms. He denies recent illness, fever, vision changes, neck pain, chest pain, abdominal pain, back pain, vomiting, dizziness, numbness or weakness. Pt is currently on antiplatelets.      Restrictions  Restrictions/Precautions  Restrictions/Precautions: Fall Risk     Patient's date of birth confirmed: Yes    SAFETY:  Safety Devices  Safety Devices in place: Yes  Type of devices: All fall risk precautions in place    SUBJECTIVE: \"I don't need my glasses.\"    Pain at start of treatment: No    Pain at end of treatment: No    COGNITION:  Orientation  Overall Orientation Status: Within Normal Limits  Cognition  Overall Cognitive Status: Exceptions  Arousal/Alertness: Appropriate responses to stimuli  Following Commands: Follows one step commands with increased time  Attention Span: Appears intact  Memory: Decreased recall of recent events  Safety Judgement: Decreased awareness of need for assistance  Problem Solving: Assistance required to identify errors made  Insights: Appears intact  Initiation: Does not require cues  Sequencing: Does not require cues  Cognition Comment: Comprehension: MOD I   Expression: MOD I   Social Interaction: IND   Problem Solving: Supervision   Memory: Supervision    OBJECTIVE:    [place] Activity:  Patient engaged in B UE ROM/strengthening, B FM coordination and

## 2024-07-10 NOTE — CONSULTS
[facility] Consult Note  Name: Hamilton Torres  Age: 88 y.o.  Gender: male  CodeStatus: DNR-CCA  Allergies: No Known Allergies    Chief Complaint:No chief complaint on file.    Primary Care Provider: Cesilia Martínez MD  InpatientTreatment Team: Treatment Team: Attending Provider: Ingrid Schofield MD; Consulting Physician: Mario Troncoso MD; Consulting Physician: Keith Sanchez MD; : Loren Gonzalez, BREANNA, LSW; Patient Care Tech: Roscoe Salgado; Registered Nurse: Kerri Leroy RN; Occupational Therapist Assistant: Yahaira Burnett OTA; Occupational Therapist: Toribio Botello OT; Occupational Therapist: Cortney Alvarez OTR/L; Occupational Therapist Assistant: Inocente Bueno OTA  Admission Date: 7/6/2024      HPI   Consulting provider: Dr. Judith Castillo for parkinsonian features consider dopaminergic medication  Pt seen and examined on rehab for neurology consult.  Patient is 88-year-old  male with past medical history of CAD with MI, hypertension, hyperlipidemia, dementia, CHF, carotid stenosis, CAD, anxiety, long-term anticoagulant use who was admitted to [facility] from 7/3/2024 until 7/6/2024 due to frequent falls.  Falls were thought to be secondary to arthritis of the knees and knees giving out.  Patient was transferred to [facility] rehab on 7/6/2024.  We been consulted due to concern of parkinsonian features.  Patient is currently alert and oriented x 3, no acute distress, cooperative.  Forgetful at times.  No obvious resting tremors noted.  Patient has bradykinesia with decreased blink.  Rigidity noted with distraction maneuvers only.  Informed that patient was shuffling gait and stooped posture.  Was unable to observe this during my exam.    Patient seen and examined on the rehab floor weekly rehab rounds done.  Patient has parkinsonian features and likely responsive to carbidopa levodopa.  Vitals:    07/09/24 2056   BP: 133/61   Pulse: 68  Daniel    Electronically signed by ILYA Kee - CNP on 7/10/2024 at 3:45 PM

## 2024-07-11 PROCEDURE — 97535 SELF CARE MNGMENT TRAINING: CPT

## 2024-07-11 PROCEDURE — 97530 THERAPEUTIC ACTIVITIES: CPT

## 2024-07-11 PROCEDURE — 99232 SBSQ HOSP IP/OBS MODERATE 35: CPT | Performed by: PHYSICAL MEDICINE & REHABILITATION

## 2024-07-11 PROCEDURE — 97130 THER IVNTJ EA ADDL 15 MIN: CPT

## 2024-07-11 PROCEDURE — 6370000000 HC RX 637 (ALT 250 FOR IP): Performed by: INTERNAL MEDICINE

## 2024-07-11 PROCEDURE — 97112 NEUROMUSCULAR REEDUCATION: CPT

## 2024-07-11 PROCEDURE — 1180000000 HC REHAB R&B

## 2024-07-11 PROCEDURE — 6370000000 HC RX 637 (ALT 250 FOR IP): Performed by: PHYSICAL MEDICINE & REHABILITATION

## 2024-07-11 PROCEDURE — 97116 GAIT TRAINING THERAPY: CPT

## 2024-07-11 PROCEDURE — 97129 THER IVNTJ 1ST 15 MIN: CPT

## 2024-07-11 RX ADMIN — Medication 3 MG: at 20:25

## 2024-07-11 RX ADMIN — TIMOLOL MALEATE 1 DROP: 5 SOLUTION OPHTHALMIC at 20:26

## 2024-07-11 RX ADMIN — PRAVASTATIN SODIUM 80 MG: 40 TABLET ORAL at 20:25

## 2024-07-11 RX ADMIN — Medication 100 MG: at 10:57

## 2024-07-11 RX ADMIN — CLOPIDOGREL BISULFATE 75 MG: 75 TABLET ORAL at 10:58

## 2024-07-11 RX ADMIN — FINASTERIDE 5 MG: 5 TABLET, FILM COATED ORAL at 10:57

## 2024-07-11 RX ADMIN — LATANOPROST 1 DROP: 50 SOLUTION OPHTHALMIC at 11:02

## 2024-07-11 RX ADMIN — CARVEDILOL 6.25 MG: 6.25 TABLET, FILM COATED ORAL at 10:57

## 2024-07-11 RX ADMIN — DONEPEZIL HYDROCHLORIDE 5 MG: 10 TABLET, FILM COATED ORAL at 20:25

## 2024-07-11 RX ADMIN — CARVEDILOL 6.25 MG: 6.25 TABLET, FILM COATED ORAL at 20:26

## 2024-07-11 RX ADMIN — CYANOCOBALAMIN TAB 500 MCG 500 MCG: 500 TAB at 10:58

## 2024-07-11 RX ADMIN — DORZOLAMIDE HYDROCHLORIDE 1 DROP: 20 SOLUTION/ DROPS OPHTHALMIC at 11:01

## 2024-07-11 RX ADMIN — TIMOLOL MALEATE 1 DROP: 5 SOLUTION OPHTHALMIC at 11:02

## 2024-07-11 RX ADMIN — Medication 2000 UNITS: at 17:44

## 2024-07-11 RX ADMIN — LISINOPRIL 10 MG: 10 TABLET ORAL at 10:57

## 2024-07-11 RX ADMIN — ISOSORBIDE MONONITRATE 30 MG: 30 TABLET, EXTENDED RELEASE ORAL at 10:57

## 2024-07-11 NOTE — PROGRESS NOTES
Facility/Department: [facility] REHAB  Rehabilitation Goal update: Physical Therapy  Room: [place] [address]        Pts goals updated as listed below:  Long Term Goal 1: indep and efficient bed mobility  Long Term Goal 2: indep sit to stand bed and car transfers  Long Term Goal 3: indep gait with ww >/= 150 feet with safest AD  Long Term Goal 4: pt to complete TUG in </= 19 sec with device and good safety  Long Term Goal 5: SBA 4 stairs wth appropriate rails for safe ability to visit family

## 2024-07-11 NOTE — PROGRESS NOTES
Physical Therapy Rehab Treatment Note  Facility/Department: [facility] REHAB  [facility]       NAME: Hamilton Torres  : 1935 (88 y.o.)  MRN: [id number]  CODE STATUS: DNR-CCA    Date of Service: 2024  Patient assessed for rehabilitation services?: Yes  Family / Caregiver Present: No    Restrictions:  Restrictions/Precautions: Fall Risk       SUBJECTIVE:   Subjective: Patient without new reports     Pain  Patient denies pain pre/post session       OBJECTIVE:     Transfers  Sit to Stand: Stand by assistance;Minimal Assistance  Stand to Sit: Stand by assistance;Minimal Assistance  Comment: STS completed from multiple surface heights with and without ww. Patient demonstrates posterior LOB without ww.     Ambulation  Surface: Level tile  Device: Rolling Walker  Assistance: Stand by assistance  Quality of Gait: Narrow SANNA, B flexed knees, decreased stance on L LE, B toe in, fwd flexed trunk  Gait Deviations: Slow Nette;Decreased step length;Decreased step height  Distance: 300 feet    PT Exercises  Exercise Treatment: seated lateral step overs x10 B  Dynamic Standing Balance Exercises: single steps laterally with reach- opp UE support x10 B; gait drills: lateral, march x3 ea length of // bars 1 UE     ASSESSMENT/PROGRESS TOWARDS GOALS:   Body Structures, Functions, Activity Limitations Requiring Skilled Therapeutic Intervention: Decreased functional mobility ;Decreased balance;Decreased endurance;Decreased safe awareness;Decreased strength;Decreased posture;Decreased coordination  Assessment: PM session focused on dynamic balance activities to decrease falls risk. Patient requires definite need of 1-2 UE support. He demonstrates 2 episodes of knee buckling however is able to correct without physical assistance.    Goals:  Long Term Goals  Long Term Goal 1: indep and efficient bed mobility  Long Term Goal 2: indep sit to stand bed and car transfers  Long Term Goal 3: indep gait with ww >/= 150 feet with

## 2024-07-11 NOTE — FLOWSHEET NOTE
Patient up to restroom , bck to wheelchair for breakfast call light within reach wheelchair locked .

## 2024-07-11 NOTE — PROGRESS NOTES
OCCUPATIONAL THERAPY  INPATIENT REHAB TREATMENT NOTE  [facility]      NAME: Hamilton Torres  : 1935 (88 y.o.)  MRN: [id number]  CODE STATUS: DNR-CCA  Room: R250/R250-01    Date of Service: 2024    Referring Physician: Dr. Troy  Rehab Diagnosis: Impaired mobility and ADLs d/t closed head injury s/p fall    Hospital course:   Comments: Pt is a 87 y/o male who presents to the ED via EMS for evaluation. He says that he recently finished PT/OT rehab for frequent falls. That he was walking in to his house when he lost his balance and fell backwards striking his head. No LOC or amnesia, denies antecedent symptoms. He denies recent illness, fever, vision changes, neck pain, chest pain, abdominal pain, back pain, vomiting, dizziness, numbness or weakness. Pt is currently on antiplatelets.      Restrictions  Restrictions/Precautions  Restrictions/Precautions: Fall Risk                 Patient's date of birth confirmed: Yes    SAFETY:  Safety Devices  Safety Devices in place: Yes  Type of devices: All fall risk precautions in place    SUBJECTIVE:  Subjective  Subjective: \"Took me long enough to get dressed.\"    Pain at start of treatment: No    Pain at end of treatment: No    COGNITION:  Orientation  Overall Orientation Status: Within Normal Limits  Orientation Level: Oriented to person;Oriented to time;Oriented to situation;Oriented to place  Cognition  Overall Cognitive Status: Exceptions  Arousal/Alertness: Appropriate responses to stimuli  Following Commands: Follows one step commands with increased time  Attention Span: Appears intact  Memory: Decreased recall of recent events  Safety Judgement: Decreased awareness of need for assistance  Insights: Appears intact  Initiation: Does not require cues  Sequencing: Does not require cues    OBJECTIVE:    Pt participated in a seated tabletop activity with a PVC pipe tree. Pt able to replicate a PVC pipe pattern with increased time. Pt removed all

## 2024-07-11 NOTE — PROGRESS NOTES
Subjective:   The patient complains of severe acute on chronic progressive fatigue and post fall neck and headache partially relieved by rest, medications, PT,  OT,   SLP and rest and exacerbated by recent closed head injury.      I am concerned about patient’s medical complexities and barriers to advancing in rehab goals including addressing poor balance and falls risk.        I discussed current functional, rehabilitation, medical status with other rehabilitation providers including nursing and case management.  According to recent nursing note,  \"went to bathroom and up in chair for therapy \"up to restroom , bck to wheelchair for breakfast call light within reach wheelchair locked . \", and, \"alert, oriented and cooperative. Denies pain or any further needs or complaints at this time. Call light within reach. \".    I still do not see a consult note regarding the Parkinson's features-I checked of the consult was placed earlier this week.  He is working on ambulation today [place] ambulated greater than 300 feet and will also work on stairs-I will go ahead and discontinue his Lovenox    ROS x10:  The patient also complains of severely impaired mobility and activities of daily living.  Otherwise no new problems with vision, hearing, nose, mouth, throat, dermal, cardiovascular, GI, , pulmonary, musculoskeletal, psychiatric or neurological. See Rehab H&P on Rehab chart dated .       Vital signs:  BP (!) 110/53   Pulse 72   Temp 98.6 °F (37 °C) (Oral)   Resp 18   Wt 80.3 kg (177 lb)   SpO2 96%   BMI 26.14 kg/m²   I/O:   PO/Intake:  fair PO intake, no problems observed or reported.    Bowel/Bladder:  continent, constipation and urinary urgency.  General:  Patient is well developed, adequately nourished, non-obese and     well kempt.     HEENT:    PERRLA, hearing intact to loud voice, external inspection of ear     and nose benign.  Inspection of lips, tongue and gums    Musculoskeletal: No significant change in

## 2024-07-11 NOTE — PLAN OF CARE
Problem: Safety - Adult  Goal: Free from fall injury  7/10/2024 2138 by Gladis Evans RN  Outcome: Progressing  7/10/2024 1135 by Kerri Leroy RN  Outcome: Progressing     Problem: Discharge Planning  Goal: Discharge to home or other facility with appropriate resources  7/10/2024 2138 by Gladis Evans RN  Outcome: Progressing  7/10/2024 1135 by Kerri Leroy RN  Outcome: Progressing     Problem: ABCDS Injury Assessment  Goal: Absence of physical injury  7/10/2024 2138 by Gladis Evans RN  Outcome: Progressing  7/10/2024 1135 by Kerri Leroy RN  Outcome: Progressing     Problem: Skin/Tissue Integrity  Goal: Absence of new skin breakdown  Description: 1.  Monitor for areas of redness and/or skin breakdown  2.  Assess vascular access sites hourly  3.  Every 4-6 hours minimum:  Change oxygen saturation probe site  4.  Every 4-6 hours:  If on nasal continuous positive airway pressure, respiratory therapy assess nares and determine need for appliance change or resting period.  7/10/2024 2138 by Gladis Evans RN  Outcome: Progressing  7/10/2024 1135 by Kerri Leroy RN  Outcome: Progressing     Problem: Chronic Conditions and Co-morbidities  Goal: Patient's chronic conditions and co-morbidity symptoms are monitored and maintained or improved  7/10/2024 2138 by Gladis Evans RN  Outcome: Progressing  7/10/2024 1135 by Kerri Leroy RN  Outcome: Progressing

## 2024-07-11 NOTE — PROGRESS NOTES
[facility]  Facility/Department: [facility]  Speech Language Pathology   Treatment Note          Hamilton Torres  1935  R250/R250-01  [x]   confirmed    Date: 2024      Restrictions/Precautions: Fall Risk     ADULT DIET; Regular  ADULT ORAL NUTRITION SUPPLEMENT; HS Snack; Snack; Healthy snack cheese plate fruit plate no junk food please     Respiratory Status:     No active isolations    Rehab Diagnosis:       Subjective:  Alert, Cooperative, and Pleasant        Interventions used this date:  Cognitive Skill Development    Objective/Assessment:  Patient progressing towards goals:  Short Term Goals  Goal 1: To increase safety awareness and judgment for safe completion of ADLs secondary to patient's cognitive deficits, patient will complete abstract reasoning tasks (i.e. word deduction, convergent and divergent naming, similarities/differences) with 80% accuracy and minimal cues.  Pt completed a category inclusion task when given 4 items with 60% accuracy increasing to 90% given moderate cues.   Goal 2: To increase independence for functional activities for home and community, patient will complete mid level executive functioning tasks (i.e. path finding, scheduling appointments, prioritizing tasks) with 80% accuracy and min cues.  Pt completed comprehension of a calendar with 50% accuracy increasing to 100% given max cues.   Goal 3: To address patient's cognitive deficits and promote recall of personal and medical information, the patient will answer questions addressing (recent and delayed) recall with 80% accuracy and min cues.  Goal 4: To decrease patient's cognitive deficits through the use of compensatory strategies, the patient will be educated on 3 different memory strategies and verbalize how he might use them at home in 3 ways with min cues.  Pt re-educated on the following compensatory strategies, repetition, grouping, and writing notes, reinforcement needed.  Pt utilized repetition

## 2024-07-11 NOTE — PROGRESS NOTES
OCCUPATIONAL THERAPY  INPATIENT REHAB TREATMENT NOTE  [facility]      NAME: Hamilton Torres  : 1935 (88 y.o.)  MRN: [id number]  CODE STATUS: DNR-CCA  Room: R250/R250-01    Date of Service: 2024    Referring Physician: Dr. Troy  Rehab Diagnosis: Impaired mobility and ADLs d/t closed head injury s/p fall    Hospital course:   Comments: Pt is a 87 y/o male who presents to the ED via EMS for evaluation. He says that he recently finished PT/OT rehab for frequent falls. That he was walking in to his house when he lost his balance and fell backwards striking his head. No LOC or amnesia, denies antecedent symptoms. He denies recent illness, fever, vision changes, neck pain, chest pain, abdominal pain, back pain, vomiting, dizziness, numbness or weakness. Pt is currently on antiplatelets.      Restrictions  Restrictions/Precautions  Restrictions/Precautions: Fall Risk     Patient's date of birth confirmed: Yes    SAFETY:  Safety Devices  Safety Devices in place: Yes  Type of devices: All fall risk precautions in place    SUBJECTIVE: \"This is harder than it looks.\"    Pain at start of treatment: No    Pain at end of treatment: No    COGNITION:  Orientation  Overall Orientation Status: Within Normal Limits  Cognition  Overall Cognitive Status: Exceptions  Arousal/Alertness: Appropriate responses to stimuli  Following Commands: Follows one step commands with increased time  Attention Span: Appears intact  Memory: Decreased recall of recent events  Safety Judgement: Decreased awareness of need for assistance  Problem Solving: Assistance required to identify errors made  Insights: Appears intact  Initiation: Does not require cues  Sequencing: Does not require cues    OBJECTIVE:    Gardening IADL:  Patient engaged in therapeutic activity to increase B FM coordination and cognition for ADL's and IADL\"s.   Patient provided with container of supplies and written instructions to place and fill 6 small

## 2024-07-11 NOTE — PROGRESS NOTES
Physical Therapy Rehab Treatment Note  [facility]ility/Department[facility]  [facility]       NAME: Hamilton Torres  : 1935 (88 y.o.)  MRN: [id number]  CODE STATUS: DNR-CCA    Date of Service: 2024  Patient assessed for rehabilitation services?: Yes  Family / Caregiver Present: No    Restrictions:  Restrictions/Precautions: Fall Risk       SUBJECTIVE:   Subjective: \"I am having a good day so far. Breakfast was good.\"    Pain  2-3/10 B knees. Patient denies need for intervention.       OBJECTIVE:     Transfers  Sit to Stand: Stand by assistance;Minimal Assistance  Stand to Sit: Stand by assistance;Minimal Assistance  Comment: STS completed from multiple surface heights with and without ww. Patient demonstrates posterior LOB without ww. He requires intermittent lifting min A to stand from low mat.    Ambulation  Surface: Level tile  Device: Rolling Walker  Assistance: Stand by assistance  Quality of Gait: Narrow SANNA, B flexed knees, decreased stance on L LE, B toe in, fwd flexed trunk  Gait Deviations: Slow Nette;Decreased step length;Decreased step height  Distance: 300 feet    Stairs  # Steps : 8  Stairs Height: 6\"  Rails: Right ascending (B UE on Right HR to simulate entrance into daughters home)  Assistance: Minimal assistance  Comment: Ascends fwd/descends retro     PT Exercises  Exercise Treatment: STS x5 from mat table- requires definite need of UE support  Dynamic Standing Balance Exercises: bean bag toss with reach to grab bean bag 1 UE support; beach ball catch/through without UE support; standing cone taps 2 way 1 UE support x5 B       ASSESSMENT/PROGRESS TOWARDS GOALS:   Body Structures, Functions, Activity Limitations Requiring Skilled Therapeutic Intervention: Decreased functional mobility ;Decreased balance;Decreased endurance;Decreased safe awareness;Decreased strength;Decreased posture;Decreased coordination  Assessment: Patient demonstrates several posterior LOB during STS from low  mat. Able to increase ambulation distance without change in gait deviations or fatigue. Patient requires 1 UE support for stepping/tapping exercises.    Goals:  Long Term Goals  Long Term Goal 1: indep and efficient bed mobility  Long Term Goal 2: indep sit to stand bed and car transfers  Long Term Goal 3: indep gait with ww >/= 150 feet with safest AD  Long Term Goal 4: pt to complete TUG in </= 19 sec with device and good safety  Long Term Goal 5: SBA 4 stairs wth appropriate rails for safe ability to visit family  Patient Goals   Patient Goals : \"walk more\"    PLAN OF CARE/Safety: all safety precautions in place.   Safety Devices  Type of Devices: All fall risk precautions in place;Chair alarm in place;Call light within reach      Therapy Time:   Individual   Time In 1000   Time Out 1100   Minutes 60      Minutes:60  Transfer/Bed mobility training:10  Gait trainin  Neuro re education:30        Jayleen Jacobs PTA, 24 at 12:18 PM

## 2024-07-12 PROBLEM — G20.C PARKINSONISM (HCC): Status: ACTIVE | Noted: 2024-07-12

## 2024-07-12 PROCEDURE — 97535 SELF CARE MNGMENT TRAINING: CPT

## 2024-07-12 PROCEDURE — 6370000000 HC RX 637 (ALT 250 FOR IP): Performed by: NURSE PRACTITIONER

## 2024-07-12 PROCEDURE — 97129 THER IVNTJ 1ST 15 MIN: CPT

## 2024-07-12 PROCEDURE — 97530 THERAPEUTIC ACTIVITIES: CPT

## 2024-07-12 PROCEDURE — 97116 GAIT TRAINING THERAPY: CPT

## 2024-07-12 PROCEDURE — 99232 SBSQ HOSP IP/OBS MODERATE 35: CPT | Performed by: PHYSICAL MEDICINE & REHABILITATION

## 2024-07-12 PROCEDURE — APPSS15 APP SPLIT SHARED TIME 0-15 MINUTES: Performed by: NURSE PRACTITIONER

## 2024-07-12 PROCEDURE — 1180000000 HC REHAB R&B

## 2024-07-12 PROCEDURE — 99222 1ST HOSP IP/OBS MODERATE 55: CPT | Performed by: PSYCHIATRY & NEUROLOGY

## 2024-07-12 PROCEDURE — 6370000000 HC RX 637 (ALT 250 FOR IP): Performed by: INTERNAL MEDICINE

## 2024-07-12 PROCEDURE — 6370000000 HC RX 637 (ALT 250 FOR IP): Performed by: PHYSICAL MEDICINE & REHABILITATION

## 2024-07-12 PROCEDURE — 97130 THER IVNTJ EA ADDL 15 MIN: CPT

## 2024-07-12 PROCEDURE — 97112 NEUROMUSCULAR REEDUCATION: CPT

## 2024-07-12 RX ADMIN — DONEPEZIL HYDROCHLORIDE 5 MG: 10 TABLET, FILM COATED ORAL at 20:34

## 2024-07-12 RX ADMIN — ISOSORBIDE MONONITRATE 30 MG: 30 TABLET, EXTENDED RELEASE ORAL at 09:29

## 2024-07-12 RX ADMIN — LATANOPROST 1 DROP: 50 SOLUTION OPHTHALMIC at 11:44

## 2024-07-12 RX ADMIN — CLOPIDOGREL BISULFATE 75 MG: 75 TABLET ORAL at 09:29

## 2024-07-12 RX ADMIN — Medication 3 MG: at 20:34

## 2024-07-12 RX ADMIN — TIMOLOL MALEATE 1 DROP: 5 SOLUTION OPHTHALMIC at 20:34

## 2024-07-12 RX ADMIN — CARVEDILOL 6.25 MG: 6.25 TABLET, FILM COATED ORAL at 09:29

## 2024-07-12 RX ADMIN — CARBIDOPA AND LEVODOPA 1 TABLET: 25; 100 TABLET ORAL at 15:22

## 2024-07-12 RX ADMIN — Medication 2000 UNITS: at 17:45

## 2024-07-12 RX ADMIN — PRAVASTATIN SODIUM 80 MG: 40 TABLET ORAL at 20:34

## 2024-07-12 RX ADMIN — TIMOLOL MALEATE 1 DROP: 5 SOLUTION OPHTHALMIC at 11:44

## 2024-07-12 RX ADMIN — CARBIDOPA AND LEVODOPA 1 TABLET: 25; 100 TABLET ORAL at 20:34

## 2024-07-12 RX ADMIN — CYANOCOBALAMIN TAB 500 MCG 500 MCG: 500 TAB at 09:29

## 2024-07-12 RX ADMIN — LISINOPRIL 10 MG: 10 TABLET ORAL at 09:29

## 2024-07-12 RX ADMIN — Medication 100 MG: at 09:29

## 2024-07-12 RX ADMIN — CARVEDILOL 6.25 MG: 6.25 TABLET, FILM COATED ORAL at 20:34

## 2024-07-12 RX ADMIN — FINASTERIDE 5 MG: 5 TABLET, FILM COATED ORAL at 09:29

## 2024-07-12 NOTE — PLAN OF CARE
Problem: Safety - Adult  Goal: Free from fall injury  7/11/2024 2216 by Gladis Evans RN  Outcome: Progressing  7/11/2024 1841 by Kerri Leroy RN  Outcome: Progressing     Problem: Discharge Planning  Goal: Discharge to home or other facility with appropriate resources  7/11/2024 2216 by Gladis Evans RN  Outcome: Progressing  7/11/2024 1841 by Kerri Leroy RN  Outcome: Progressing     Problem: ABCDS Injury Assessment  Goal: Absence of physical injury  7/11/2024 2216 by Gladis Evans RN  Outcome: Progressing  7/11/2024 1841 by Kerri Leroy RN  Outcome: Progressing     Problem: Skin/Tissue Integrity  Goal: Absence of new skin breakdown  Description: 1.  Monitor for areas of redness and/or skin breakdown  2.  Assess vascular access sites hourly  3.  Every 4-6 hours minimum:  Change oxygen saturation probe site  4.  Every 4-6 hours:  If on nasal continuous positive airway pressure, respiratory therapy assess nares and determine need for appliance change or resting period.  7/11/2024 2216 by Gladis Evans RN  Outcome: Progressing  7/11/2024 1841 by Kerri Leroy RN  Outcome: Progressing     Problem: Chronic Conditions and Co-morbidities  Goal: Patient's chronic conditions and co-morbidity symptoms are monitored and maintained or improved  7/11/2024 2216 by Gladis Evans RN  Outcome: Progressing  7/11/2024 1841 by Kerri Leroy RN  Outcome: Progressing

## 2024-07-12 NOTE — PLAN OF CARE
Problem: Safety - Adult  Goal: Free from fall injury  7/12/2024 1043 by Michelle Esqueda RN  Outcome: Progressing  7/11/2024 2216 by Gladis Evans RN  Outcome: Progressing     Problem: Discharge Planning  Goal: Discharge to home or other facility with appropriate resources  7/12/2024 1043 by Michelle Esqueda RN  Outcome: Progressing  7/11/2024 2216 by Gladis Evans RN  Outcome: Progressing     Problem: ABCDS Injury Assessment  Goal: Absence of physical injury  7/12/2024 1043 by Michelle Esqueda RN  Outcome: Progressing  7/11/2024 2216 by Gladis Evans RN  Outcome: Progressing     Problem: Skin/Tissue Integrity  Goal: Absence of new skin breakdown  Description: 1.  Monitor for areas of redness and/or skin breakdown  2.  Assess vascular access sites hourly  3.  Every 4-6 hours minimum:  Change oxygen saturation probe site  4.  Every 4-6 hours:  If on nasal continuous positive airway pressure, respiratory therapy assess nares and determine need for appliance change or resting period.  7/12/2024 1043 by Michelle Esqueda RN  Outcome: Progressing  7/11/2024 2216 by Gladis Evans RN  Outcome: Progressing     Problem: Chronic Conditions and Co-morbidities  Goal: Patient's chronic conditions and co-morbidity symptoms are monitored and maintained or improved  7/12/2024 1043 by Michelle Esqueda RN  Outcome: Progressing  7/11/2024 2216 by Gladis Evans RN  Outcome: Progressing

## 2024-07-12 NOTE — PROGRESS NOTES
previous visit.  Results for orders placed in visit on 02/04/13    CT head with and without contrast      Carotid duplex: No results found for this or any previous visit.  No results found for this or any previous visit.  Results for orders placed during the hospital encounter of 09/01/20    US CAROTID ARTERY BILATERAL    Narrative  EXAMINATION: US CAROTID ARTERY BILATERAL    DATE AND TIME:9/1/2020 1:55 PM    CLINICAL HISTORY: Carotid artery stenosis  R09.89 Bilateral carotid bruits ICD10    COMPARISON:None    TECHNIQUE:Carotid duplex sonograms which include gray scale and color flow evaluation are complimented with spectral waveform analysis. Please refer to chart below for specific carotid velocity measurements. The degree of stenosis recorded on this exam  uses the same method of stratification used in the NASCET trials. This complies with ACR practice guidelines and the Society of radiology in ultrasound consensus statement and provides adequate information for clinical decision making. Society of  Radiologists in Ultrasound (SRU) consensus statement was used to estimate internal carotid artery stenosis.    FINDINGS: There is no significant plaque identified within the internal carotid arteries bilaterally.    No significant increase in systolic flow or turbulence to indicate any hemodynamically significant narrowing in the right or left internal carotid  arteries.  There is antegrade flow identified in both vertebral arteries.    ARTERIAL BLOOD FLOW VELOCITY    RIGHT PS    Prox CCA    93 cm/s  Mid CCA     97 cm/s  Dist CCA    85 cm/s  Prox ICA    80 cm/s  Mid ICA     104 cm/s  Dist ICA    112 cm/s  Prox ECA    171 cm/s  Prox VERT cm/s    ICA/CCA     1.15    LEFT PS    Prox CCA    135 cm/s  Mid CCA     113 cm/s  Dist CCA    85 cm/s  Prox ICA    124 cm/s  Mid ICA     101 cm/s  Dist ICA    113 cm/s  Prox ECA    137 cm/s  Prox VERT cm/s    ICA/CCA     1.09    Impression  <50  % STENOSIS IN THE RIGHT ICA    <50 %  STENOSIS IN THE LEFT ICA      Echo No results found for this or any previous visit.            Assessment/Plan:    Parkinson's disease  Sinemet trial initiated  Monitor for adverse effects  Orthostatic blood pressures daily  Continue PT/OT.    I have personally performed a face to face diagnostic evaluation on this patient, reviewed all data and investigations, and am the sole provider of all clinical decisions on the neurological status of this patient.  Parkinson's disease and Sinemet initiated FIM scores noted.  Weekly rehab rounds done.  FIM scores noted.  Patient actually doing much better with the carbidopa levodopa his walking is improved his tremors are better and overall good response.  60% time spent on evaluating patient      Keith Sanchez MD, MIRIAM  Diplomate, American Board of Psychiatry & Neurology  Board Certified in Vascular Neurology  Board Certified in Neuromuscular Medicine  Certified in Neurorehabilitation         Collaborating physicians: Dr Sanchez    Electronically signed by ILYA Kee CNP on 7/12/2024 at 3:32 PM

## 2024-07-12 NOTE — PROGRESS NOTES
OCCUPATIONAL THERAPY  INPATIENT REHAB TREATMENT NOTE  [facility]      NAME: Hamilton Torres  : 1935 (88 y.o.)  MRN: [id number]  CODE STATUS: DNR-CCA  Room: R250/R250-01    Date of Service: 2024    Referring Physician: Dr. Troy  Rehab Diagnosis: Impaired mobility and ADLs d/t closed head injury s/p fall    Hospital course:   Comments: Pt is a 87 y/o male who presents to the ED via EMS for evaluation. He says that he recently finished PT/OT rehab for frequent falls. That he was walking in to his house when he lost his balance and fell backwards striking his head. No LOC or amnesia, denies antecedent symptoms. He denies recent illness, fever, vision changes, neck pain, chest pain, abdominal pain, back pain, vomiting, dizziness, numbness or weakness. Pt is currently on antiplatelets.      Restrictions  Restrictions/Precautions  Restrictions/Precautions: Fall Risk     Patient's date of birth confirmed: Yes    SAFETY:  Safety Devices  Safety Devices in place: Yes  Type of devices: All fall risk precautions in place    SUBJECTIVE: \"He was funny.\" - Dr. Sanchez    Pain at start of treatment: No    Pain at end of treatment: No    COGNITION:  Orientation  Overall Orientation Status: Within Normal Limits  Cognition  Overall Cognitive Status: Exceptions  Arousal/Alertness: Appropriate responses to stimuli  Following Commands: Follows one step commands with increased time  Attention Span: Appears intact  Memory: Appears intact  Safety Judgement: Decreased awareness of need for assistance  Problem Solving: Assistance required to identify errors made  Insights: Appears intact  Initiation: Appears intact  Sequencing: Does not require cues    OBJECTIVE:    Parquetry:   Patient engaged in therapeutic activity to improve B UE ROM/strengthening, B FM coordination and cognition in order to increase [place] with ADL's and IADL's leisure activities.   Patient donned B 2 # wrist weights.  Patient with 0

## 2024-07-12 NOTE — PROGRESS NOTES
Occupational Therapy Get up and Go Note            Date: 2024  Patient Name: Hamilton Torres        MRN: [id number]    Account #: [id number]  : 1935  (88 y.o.)      Subjective:  Patient states:  Pt states he would like to be cleaned up he feels he \"stinks.\"  Pain:  Pain at start of treatment: No    Pain at end of treatment: No      Objective:  Functional Mobility  Sitting at Eob upon arrival- discussed with OTR/L Courtney about this  Pt completed transfer from EOB to w/c with x 2 attempts, increased time needed, Mod A provided.  Vc's for BUE sequencing for successful transfer.  Roscoe VILLAGOMEZ took over to asst pt necessary ADL care d/t saturation/bowel incontinence    Education  Pt was educated on facility policy of not sitting at EOB for his safety and that all patients are to follow this guideline.  Offered to assist pt back to bed or transfer into w/c  Encouraged pt to sit in w/c to prep for ADL with therapy and d/t seeing that pt would like to sit up, d/t pt sitting at EOB.  Pt opted to sit in w/c.    Treatment consisted of:   [] ADL Training  [] Strengthening   [x] Transfer Training    [] DME Education  [] HEP   [] Patient Education  [] Other:    Safety:  Safety Devices  Safety Devices in place:   Type of devices: All fall risk precautions in place      Therapy Time:   Individual Group Co-Treat   Time In 0752       Time Out 0802         Minutes 10             Therapeutic activities: 10 minutes         Electronically signed by:    DEISY Whiteside    2024, 8:32 AM

## 2024-07-12 NOTE — PROGRESS NOTES
Subjective:   The patient complains of severe acute on chronic progressive fatigue and post fall neck and headache partially relieved by rest, medications, PT,  OT,   SLP and rest and exacerbated by recent closed head injury.      I am concerned about patient’s medical complexities and barriers to advancing in rehab goals including addressing poor balance and falls risk.        I discussed current functional, rehabilitation, medical status with other rehabilitation providers including nursing and case management.  According to recent nursing note,  \"alert, oriented and cooperative. Denies pain or any further needs or complaints at this time. Call light within reach.  \".    He is doing well off the Lovenox bruising is fading.    ROS x10:  The patient also complains of severely impaired mobility and activities of daily living.  Otherwise no new problems with vision, hearing, nose, mouth, throat, dermal, cardiovascular, GI, , pulmonary, musculoskeletal, psychiatric or neurological. See Rehab H&P on Rehab chart dated .       Vital signs:  BP (!) 142/65   Pulse 65   Temp 97.5 °F (36.4 °C) (Oral)   Resp 18   Wt 80 kg (176 lb 4.8 oz)   SpO2 98%   BMI 26.03 kg/m²   I/O:   PO/Intake:  fair PO intake, no problems observed or reported.    Bowel/Bladder:  continent, constipation and urinary urgency.  General:  Patient is well developed, adequately nourished, non-obese and     well kempt.     HEENT:    PERRLA, hearing intact to loud voice, external inspection of ear     and nose benign.  Inspection of lips, tongue and gums    Musculoskeletal: No significant change in strength or tone.  All joints stable.      Inspection and palpation of digits and nails show no clubbing,       cyanosis or inflammatory conditions.   Neuro/Psychiatric: Affect: flat.  Alert and oriented to person, place and     situation.  No significant change in deep tendon reflexes or     Sensation    Lungs:  Diminished, CTA-B. Respiration effort is

## 2024-07-12 NOTE — PROGRESS NOTES
family  Patient Goals   Patient Goals : \"walk more\"    PLAN OF CARE/Safety:   Safety Devices  Type of Devices: All fall risk precautions in place;Chair alarm in place;Call light within reach      Therapy Time:   Individual   Time In 1430   Time Out 1500   Minutes 30      Minutes:30    Gait training:15  Neuro re education:15      Jayleen Jacobs PTA, 07/12/24 at 4:12 PM

## 2024-07-12 NOTE — PROGRESS NOTES
[facility]  Facility/Department: [facility] REHAB  Speech Language Pathology   Treatment Note          Hamilton Torres  1935  R250/R250-01  [x]   confirmed    Date: 2024      Restrictions/Precautions: Fall Risk     ADULT DIET; Regular  ADULT ORAL NUTRITION SUPPLEMENT; HS Snack; Snack; Healthy snack cheese plate fruit plate no junk food please  ADULT ORAL NUTRITION SUPPLEMENT; Breakfast, Dinner; Standard High Calorie/High Protein Oral Supplement     Respiratory Status:   Room air  No active isolations    Rehab Diagnosis:    Impaired mobility and ADL's due to Closed Head Injury s/p fall        Subjective:  Alert, Cooperative, and Pleasant        Interventions used this date:  Cognitive Skill Development    Objective/Assessment:  Patient progressing towards goals:  Goal 1: To increase safety awareness and judgment for safe completion of ADLs secondary to patient's cognitive deficits, patient will complete abstract reasoning tasks (i.e. word deduction, convergent and divergent naming, similarities/differences) with 80% accuracy and minimal cues.  Pt completed word deduction given 3 words with 100% accuracy.  Goal 2: To increase independence for functional activities for home and community, patient will complete mid level executive functioning tasks (i.e. path finding, scheduling appointments, prioritizing tasks) with 80% accuracy and min cues.  Pt completed deductive reasoning worksheet given a story in 4/6 trials, increasing to 6/6 trials with min cues.  Goal 3: To address patient's cognitive deficits and promote recall of personal and medical information, the patient will answer questions addressing (recent and delayed) recall with 80% accuracy and min cues.  Pt had adequate recall of prior events of day.  Pt recalled 5 pairs of person and object associations, immediate and delay of 15 minutes with 100% accuracy.  Goal 4: To decrease patient's cognitive deficits through the use of compensatory strategies,

## 2024-07-12 NOTE — PROGRESS NOTES
OCCUPATIONAL THERAPY  INPATIENT REHAB TREATMENT NOTE  [facility]      NAME: Hamilton Torres  : 1935 (88 y.o.)  MRN: [id number]  CODE STATUS: DNR-CCA  Room: R250/R250-01    Date of Service: 2024    Referring Physician: Dr. Troy  Rehab Diagnosis: Impaired mobility and ADLs d/t closed head injury s/p fall    Hospital course:   Comments: Pt is a 89 y/o male who presents to the ED via EMS for evaluation. He says that he recently finished PT/OT rehab for frequent falls. That he was walking in to his house when he lost his balance and fell backwards striking his head. No LOC or amnesia, denies antecedent symptoms. He denies recent illness, fever, vision changes, neck pain, chest pain, abdominal pain, back pain, vomiting, dizziness, numbness or weakness. Pt is currently on antiplatelets.      Restrictions  Restrictions/Precautions  Restrictions/Precautions: Fall Risk     Patient's date of birth confirmed: Yes    SAFETY:  Safety Devices  Safety Devices in place: Yes  Type of devices: All fall risk precautions in place    SUBJECTIVE: \"I tell you one thing. I'm never come back here again.\"    Pain at start of treatment: No    Pain at end of treatment: No    COGNITION:  Orientation  Overall Orientation Status: Within Normal Limits  Cognition  Overall Cognitive Status: Exceptions  Arousal/Alertness: Appropriate responses to stimuli  Following Commands: Follows one step commands with increased time  Attention Span: Appears intact  Memory: Appears intact  Safety Judgement: Decreased awareness of need for assistance  Problem Solving: Assistance required to identify errors made  Insights: Appears intact  Initiation: Appears intact  Sequencing: Does not require cues    OBJECTIVE:    Grooming/Oral Hygiene  Assistance Level: Modified independent;Increased time to complete  Skilled Clinical Factors: Increased time 2° shaving  Upper Extremity Bathing  Assistance Level: Modified independent  Lower Extremity  areas listed below to achieve specific LTGs as stated in the initial eval  Long Term Goal 1: Pt to demo improved overall ADL levels  Long Term Goal 2: Pt to demo improved overall standing balance and tolerance        Therapy Time:   Individual Group Co-Treat   Time In 830       Time Out 0930         Minutes 60                   ADL/IADL trainin minutes     Electronically signed by:    DEISY Hernandez,   2024, 12:33 PM

## 2024-07-12 NOTE — PROGRESS NOTES
Physical Therapy Rehab Treatment Note  Facility/Department: [facility]  [facility]       NAME: Hamilton Torres  : 1935 (88 y.o.)  MRN: [id number]  CODE STATUS: DNR-CCA    Date of Service: 2024  Patient assessed for rehabilitation services?: Yes  Family / Caregiver Present: No    Restrictions:  Restrictions/Precautions: Fall Risk       SUBJECTIVE: \"I am having a good day so far.\"        Pain   Patient denies pain pre/post session       OBJECTIVE:   Transfers  Sit to Stand: Stand by assistance;Minimal Assistance  Stand to Sit: Stand by assistance;Minimal Assistance  Comment: STS completed from multiple surface heights with ww.    Ambulation  Surface: Level tile;Outdoors;Ramp  Device: Rolling Walker  Assistance: Stand by assistance  Quality of Gait: Narrow SANNA, B flexed knees, decreased stance on L LE, B toe in, fwd flexed trunk  Gait Deviations: Slow Nette;Decreased step length;Decreased step height  Distance: 300 feet, multiple trials of ambulation up to 150 feet outdoors    Stairs/Curb  Stairs?: Yes  Stairs  # Steps : 4  Stairs Height: 6\"  Rails: Right ascending (B UE on Right HR to simulate entrance into daughters home)  Assistance: Stand by assistance  Comment: Ascends fwd/descends retro      PT Exercises  Exercise Treatment: standing march x20  Static Standing Balance Exercises: WBOs 1 minute x2; ball lifts, push/pull, ball toss  Dynamic Standing Balance Exercises: weaving around bolsters     ASSESSMENT/PROGRESS TOWARDS GOALS:   Body Structures, Functions, Activity Limitations Requiring Skilled Therapeutic Intervention: Decreased functional mobility ;Decreased balance;Decreased endurance;Decreased safe awareness;Decreased strength;Decreased posture;Decreased coordination  Assessment: Initiated outdoor ambulation to challenge balance on uneven surfaces. Patient demonstrates decreased gait speed and foot clearance ambulating on pavers.No \LOB observed. Patient continues to be challenged by

## 2024-07-13 PROCEDURE — 6370000000 HC RX 637 (ALT 250 FOR IP): Performed by: NURSE PRACTITIONER

## 2024-07-13 PROCEDURE — 99232 SBSQ HOSP IP/OBS MODERATE 35: CPT | Performed by: PHYSICAL MEDICINE & REHABILITATION

## 2024-07-13 PROCEDURE — 1180000000 HC REHAB R&B

## 2024-07-13 PROCEDURE — 97535 SELF CARE MNGMENT TRAINING: CPT

## 2024-07-13 PROCEDURE — 6370000000 HC RX 637 (ALT 250 FOR IP): Performed by: PHYSICAL MEDICINE & REHABILITATION

## 2024-07-13 PROCEDURE — 97116 GAIT TRAINING THERAPY: CPT

## 2024-07-13 PROCEDURE — 6370000000 HC RX 637 (ALT 250 FOR IP): Performed by: INTERNAL MEDICINE

## 2024-07-13 RX ADMIN — CARVEDILOL 6.25 MG: 6.25 TABLET, FILM COATED ORAL at 09:38

## 2024-07-13 RX ADMIN — LATANOPROST 1 DROP: 50 SOLUTION OPHTHALMIC at 09:39

## 2024-07-13 RX ADMIN — CARVEDILOL 6.25 MG: 6.25 TABLET, FILM COATED ORAL at 21:55

## 2024-07-13 RX ADMIN — CARBIDOPA AND LEVODOPA 1 TABLET: 25; 100 TABLET ORAL at 09:38

## 2024-07-13 RX ADMIN — TIMOLOL MALEATE 1 DROP: 5 SOLUTION OPHTHALMIC at 09:39

## 2024-07-13 RX ADMIN — LISINOPRIL 10 MG: 10 TABLET ORAL at 09:38

## 2024-07-13 RX ADMIN — CARBIDOPA AND LEVODOPA 1 TABLET: 25; 100 TABLET ORAL at 21:55

## 2024-07-13 RX ADMIN — FINASTERIDE 5 MG: 5 TABLET, FILM COATED ORAL at 09:38

## 2024-07-13 RX ADMIN — Medication 100 MG: at 09:38

## 2024-07-13 RX ADMIN — PRAVASTATIN SODIUM 80 MG: 40 TABLET ORAL at 21:55

## 2024-07-13 RX ADMIN — CLOPIDOGREL BISULFATE 75 MG: 75 TABLET ORAL at 09:38

## 2024-07-13 RX ADMIN — ISOSORBIDE MONONITRATE 30 MG: 30 TABLET, EXTENDED RELEASE ORAL at 09:38

## 2024-07-13 RX ADMIN — Medication 3 MG: at 21:55

## 2024-07-13 RX ADMIN — DONEPEZIL HYDROCHLORIDE 5 MG: 10 TABLET, FILM COATED ORAL at 21:54

## 2024-07-13 RX ADMIN — CYANOCOBALAMIN TAB 500 MCG 500 MCG: 500 TAB at 09:38

## 2024-07-13 RX ADMIN — TIMOLOL MALEATE 1 DROP: 5 SOLUTION OPHTHALMIC at 21:56

## 2024-07-13 RX ADMIN — Medication 2000 UNITS: at 17:13

## 2024-07-13 ASSESSMENT — PAIN SCALES - GENERAL: PAINLEVEL_OUTOF10: 0

## 2024-07-13 NOTE — PLAN OF CARE
Problem: Safety - Adult  Goal: Free from fall injury  7/12/2024 2217 by Gladis Evans RN  Outcome: Progressing  7/12/2024 1043 by Michelle Esqueda RN  Outcome: Progressing     Problem: Discharge Planning  Goal: Discharge to home or other facility with appropriate resources  7/12/2024 2217 by Gladis Evans RN  Outcome: Progressing  7/12/2024 1043 by Michelle Esqueda RN  Outcome: Progressing     Problem: ABCDS Injury Assessment  Goal: Absence of physical injury  7/12/2024 2217 by Gladis Evans RN  Outcome: Progressing  7/12/2024 1043 by Michelle Esqueda RN  Outcome: Progressing     Problem: Skin/Tissue Integrity  Goal: Absence of new skin breakdown  Description: 1.  Monitor for areas of redness and/or skin breakdown  2.  Assess vascular access sites hourly  3.  Every 4-6 hours minimum:  Change oxygen saturation probe site  4.  Every 4-6 hours:  If on nasal continuous positive airway pressure, respiratory therapy assess nares and determine need for appliance change or resting period.  7/12/2024 2217 by Gladis Evans RN  Outcome: Progressing  7/12/2024 1043 by Michelle Esqueda RN  Outcome: Progressing     Problem: Chronic Conditions and Co-morbidities  Goal: Patient's chronic conditions and co-morbidity symptoms are monitored and maintained or improved  7/12/2024 2217 by Gladis Evans RN  Outcome: Progressing  7/12/2024 1043 by Michelle Esqueda RN  Outcome: Progressing

## 2024-07-13 NOTE — PROGRESS NOTES
Physical Therapy Rehab Treatment Note  [facility]  [facility]       NAME: Hamilton Torres  : 1935 (88 y.o.)  MRN: [id number]  CODE STATUS: DNR-CCA    Date of Service: 2024       Restrictions:fall risk          SUBJECTIVE:   Subjective: \"I'm looking forward to going home Tuesday\"    Pain  Pain: denies pain      OBJECTIVE:   Orientation  Overall Orientation Status: Within Normal Limits  Cognition  Overall Cognitive Status: Exceptions  Arousal/Alertness: Appropriate responses to stimuli  Following Commands: Follows one step commands with increased time  Attention Span: Appears intact  Memory: Appears intact  Safety Judgement: Decreased awareness of need for assistance  Problem Solving: Assistance required to identify errors made  Insights: Appears intact  Initiation: Appears intact  Sequencing: Does not require cues         Bed Mobility  Overall Assistance Level: Minimal Assistance  Roll Left  Assistance Level: Stand by assist  Supine to Sit  Assistance Level: Minimal assistance    Transfers  Surface: From bed;From chair with arms  Sit to Stand  Assistance Level: Stand by assist;Minimal assistance  Stand to Sit  Assistance Level: Minimal assistance;Stand by assist    Ambulation  Surface: Level surface  Device: Rolling walker  Gait Deviations: Decreased weight shift bilateral;Decreased heel strike left;Decreased heel strike right  Pt ambulated with ww 150 feet with turns.   Stairs  Stair Height: 6''  Device: One handrail  Assistance Level: Minimal assistance  Descends retro for safety.        Balance  Sitting Balance: Stand by assistance                             ASSESSMENT/PROGRESS TOWARDS GOALS: pt started new med today and his hopeful it helps his mobility.        Goals:  Long Term Goals  Long Term Goal 1: indep and efficient bed mobility  Long Term Goal 2: indep sit to stand bed and car transfers  Long Term Goal 3: indep gait with ww >/= 150 feet with safest AD  Long Term

## 2024-07-13 NOTE — PROGRESS NOTES
well controlled with currne tmedications  CAD/NSTEMI, chronically elevated troponins without dyspnea/CP- following by dr Duvall as outpatient- continue with statin/plavix/B blocker   Knee OA- PT, pain meds PRN  Baseline dementia- supportive care, on arisept  Parkinson- sinemet per neurology service   Medically stable for acute admission to [facility] rehab     TTS: 45 minutes where I focused more than 75% of my attention on rendering care, and planning treatment course for this patient, in addition to talking to RN team, mid levels, consulting with other physicians and following up on labs and imaging.    Electronically signed by Mario Troncoso MD on 7/13/2024 at 10:21 AM

## 2024-07-13 NOTE — PROGRESS NOTES
Subjective:   The patient complains of severe acute on chronic progressive fatigue and post fall neck and headache partially relieved by rest, medications, PT,  OT,   SLP and rest and exacerbated by recent closed head injury.      I am concerned about patient’s medical complexities and barriers to advancing in rehab goals including addressing poor balance and falls risk.        I discussed current functional, rehabilitation, medical status with other rehabilitation providers, neurology consult,   nursing and case management.  According to recent   note,  \"Parkinson's disease  Sinemet trial initiated  Monitor for adverse effects  Orthostatic blood pressures daily  Continue PT/OT.\".    I appreciate neurology's input and hopeful that the Sinemet helps-he seems to feel like it is helping already.  He denies any lightheadedness.  Orthostatic BPs this morning were negative.    ROS x10:  The patient also complains of severely impaired mobility and activities of daily living.  Otherwise no new problems with vision, hearing, nose, mouth, throat, dermal, cardiovascular, GI, , pulmonary, musculoskeletal, psychiatric or neurological. See Rehab H&P on Rehab chart dated .       Vital signs:  /63   Pulse 69   Temp 98.1 °F (36.7 °C) (Oral)   Resp 18   Wt 79.2 kg (174 lb 11.2 oz)   SpO2 100%   BMI 25.80 kg/m²   I/O:   PO/Intake:  fair PO intake, no problems observed or reported.    Bowel/Bladder:  continent, constipation and urinary urgency.  General:  Patient is well developed, adequately nourished, non-obese and     well kempt.     HEENT:    PERRLA, hearing intact to loud voice, external inspection of ear     and nose benign.  Inspection of lips, tongue and gums    Musculoskeletal: No significant change in strength or tone.  All joints stable.      Inspection and palpation of digits and nails show no clubbing,       cyanosis or inflammatory conditions.   Neuro/Psychiatric: Affect: flat.  Alert and oriented to person,

## 2024-07-14 LAB
BACTERIA URNS QL MICRO: ABNORMAL /HPF
BILIRUB UR QL STRIP: NEGATIVE
CLARITY UR: CLEAR
COLOR UR: YELLOW
CRYSTALS URNS MICRO: ABNORMAL /HPF
EPI CELLS #/AREA URNS AUTO: ABNORMAL /HPF (ref 0–5)
GLUCOSE UR STRIP-MCNC: NEGATIVE MG/DL
HGB UR QL STRIP: NEGATIVE
HYALINE CASTS #/AREA URNS AUTO: ABNORMAL /HPF (ref 0–5)
KETONES UR STRIP-MCNC: NEGATIVE MG/DL
LEUKOCYTE ESTERASE UR QL STRIP: ABNORMAL
NITRITE UR QL STRIP: POSITIVE
PH UR STRIP: 6 [PH] (ref 5–9)
PROT UR STRIP-MCNC: NEGATIVE MG/DL
RBC #/AREA URNS AUTO: ABNORMAL /HPF (ref 0–5)
SP GR UR STRIP: 1.02 (ref 1–1.03)
URINE REFLEX TO CULTURE: YES
UROBILINOGEN UR STRIP-ACNC: 1 E.U./DL
WBC #/AREA URNS AUTO: >100 /HPF (ref 0–5)

## 2024-07-14 PROCEDURE — 6370000000 HC RX 637 (ALT 250 FOR IP): Performed by: INTERNAL MEDICINE

## 2024-07-14 PROCEDURE — 87088 URINE BACTERIA CULTURE: CPT

## 2024-07-14 PROCEDURE — 87186 SC STD MICRODIL/AGAR DIL: CPT

## 2024-07-14 PROCEDURE — 1180000000 HC REHAB R&B

## 2024-07-14 PROCEDURE — 81001 URINALYSIS AUTO W/SCOPE: CPT

## 2024-07-14 PROCEDURE — 87086 URINE CULTURE/COLONY COUNT: CPT

## 2024-07-14 PROCEDURE — 99232 SBSQ HOSP IP/OBS MODERATE 35: CPT | Performed by: PHYSICAL MEDICINE & REHABILITATION

## 2024-07-14 PROCEDURE — 6370000000 HC RX 637 (ALT 250 FOR IP): Performed by: NURSE PRACTITIONER

## 2024-07-14 PROCEDURE — 6370000000 HC RX 637 (ALT 250 FOR IP): Performed by: PHYSICAL MEDICINE & REHABILITATION

## 2024-07-14 RX ORDER — CIPROFLOXACIN 500 MG/1
500 TABLET, FILM COATED ORAL EVERY 12 HOURS SCHEDULED
Status: DISCONTINUED | OUTPATIENT
Start: 2024-07-14 | End: 2024-07-18 | Stop reason: HOSPADM

## 2024-07-14 RX ADMIN — ISOSORBIDE MONONITRATE 30 MG: 30 TABLET, EXTENDED RELEASE ORAL at 09:46

## 2024-07-14 RX ADMIN — LISINOPRIL 10 MG: 10 TABLET ORAL at 09:49

## 2024-07-14 RX ADMIN — LATANOPROST 1 DROP: 50 SOLUTION OPHTHALMIC at 09:50

## 2024-07-14 RX ADMIN — FINASTERIDE 5 MG: 5 TABLET, FILM COATED ORAL at 09:49

## 2024-07-14 RX ADMIN — PRAVASTATIN SODIUM 80 MG: 40 TABLET ORAL at 20:21

## 2024-07-14 RX ADMIN — DONEPEZIL HYDROCHLORIDE 5 MG: 10 TABLET, FILM COATED ORAL at 20:21

## 2024-07-14 RX ADMIN — DORZOLAMIDE HYDROCHLORIDE 1 DROP: 20 SOLUTION/ DROPS OPHTHALMIC at 09:50

## 2024-07-14 RX ADMIN — CARBIDOPA AND LEVODOPA 1 TABLET: 25; 100 TABLET ORAL at 20:21

## 2024-07-14 RX ADMIN — CLOPIDOGREL BISULFATE 75 MG: 75 TABLET ORAL at 09:48

## 2024-07-14 RX ADMIN — CARBIDOPA AND LEVODOPA 1 TABLET: 25; 100 TABLET ORAL at 09:48

## 2024-07-14 RX ADMIN — TIMOLOL MALEATE 1 DROP: 5 SOLUTION OPHTHALMIC at 09:50

## 2024-07-14 RX ADMIN — TIMOLOL MALEATE 1 DROP: 5 SOLUTION OPHTHALMIC at 20:26

## 2024-07-14 RX ADMIN — Medication 2000 UNITS: at 18:08

## 2024-07-14 RX ADMIN — CIPROFLOXACIN 500 MG: 500 TABLET, FILM COATED ORAL at 20:21

## 2024-07-14 RX ADMIN — DORZOLAMIDE HYDROCHLORIDE 1 DROP: 20 SOLUTION/ DROPS OPHTHALMIC at 20:25

## 2024-07-14 RX ADMIN — CARVEDILOL 6.25 MG: 6.25 TABLET, FILM COATED ORAL at 09:48

## 2024-07-14 RX ADMIN — CARVEDILOL 6.25 MG: 6.25 TABLET, FILM COATED ORAL at 20:21

## 2024-07-14 RX ADMIN — Medication 3 MG: at 20:21

## 2024-07-14 RX ADMIN — Medication 100 MG: at 09:46

## 2024-07-14 RX ADMIN — CYANOCOBALAMIN TAB 500 MCG 500 MCG: 500 TAB at 09:49

## 2024-07-14 ASSESSMENT — PAIN SCALES - GENERAL
PAINLEVEL_OUTOF10: 0
PAINLEVEL_OUTOF10: 0

## 2024-07-14 NOTE — PLAN OF CARE
Problem: Safety - Adult  Goal: Free from fall injury  7/14/2024 0027 by Jeannine Avilez RN  Outcome: Progressing  7/13/2024 1432 by Cortney Randle RN  Outcome: Progressing     Problem: Discharge Planning  Goal: Discharge to home or other facility with appropriate resources  7/14/2024 0027 by Jeannine Avilez RN  Outcome: Progressing  Flowsheets (Taken 7/13/2024 2159)  Discharge to home or other facility with appropriate resources:   Identify barriers to discharge with patient and caregiver   Arrange for needed discharge resources and transportation as appropriate   Identify discharge learning needs (meds, wound care, etc)  7/13/2024 1432 by Cortney Randle RN  Outcome: Progressing     Problem: ABCDS Injury Assessment  Goal: Absence of physical injury  7/13/2024 1432 by Cortney Randle RN  Outcome: Progressing     Problem: Skin/Tissue Integrity  Goal: Absence of new skin breakdown  Description: 1.  Monitor for areas of redness and/or skin breakdown  2.  Assess vascular access sites hourly  3.  Every 4-6 hours minimum:  Change oxygen saturation probe site  4.  Every 4-6 hours:  If on nasal continuous positive airway pressure, respiratory therapy assess nares and determine need for appliance change or resting period.  7/13/2024 1432 by Cortney Randle RN  Outcome: Progressing     Problem: Chronic Conditions and Co-morbidities  Goal: Patient's chronic conditions and co-morbidity symptoms are monitored and maintained or improved  7/14/2024 0027 by Jeannine Avilez RN  Outcome: Progressing  Flowsheets (Taken 7/13/2024 2159)  Care Plan - Patient's Chronic Conditions and Co-Morbidity Symptoms are Monitored and Maintained or Improved:   Monitor and assess patient's chronic conditions and comorbid symptoms for stability, deterioration, or improvement   Collaborate with multidisciplinary team to address chronic and comorbid conditions and prevent exacerbation or deterioration   Update acute care plan with appropriate

## 2024-07-14 NOTE — PROGRESS NOTES
patient and staff:   No results found for this or any previous visit (from the past 24 hour(s)).      Previous extensive, complex labs, notes and diagnostics reviewed and analyzed.     ALLERGIES:    Allergies as of 07/06/2024    (No Known Allergies)      (please also verify by checking MAR)       Complex Physical Medicine & Rehab Issues Assess & Plan:   Severe abnormality of gait and mobility and impaired self-care and ADL's secondary to progressive weakness dt CHI .  Functional and medical status reassessed regarding patient’s ability to participate in therapies and patient found to be able to participate in acute intensive comprehensive inpatient rehabilitation program including PT/OT to improve balance, ambulation, ADL’s, and to improve the P/AROM.  Therapeutic modifications regarding activities in therapies, place, amount of time per day and intensity of therapy made daily.  In bed therapies or bedside therapies prn.   Bowel progressive constipation, and Bladder dysfunction monitoring neurogenic bladder:  frequent toileting, ambulate to bathroom with assistance, check post void residuals.  Check for C.difficile x1 if >2 loose stools in 24 hours, continue bowel & bladder program.  Monitor bowel and bladder function.  Lactinex 2 PO every AC.  MOM prn, Brown Bomb prn, Glycerin suppository prn, enema prn.  Severe low back pain as well as generalized OA pain: reassess pain every shift and prior to and after each therapy session, give prn Tylenol and  , modalities prn in therapy, Lidoderm, K-pad prn.   Skin healing--redness right heel (mepilex), bruising to right shoulder/flank, small healing abrasion to back of head, and tear to right buttocks/gluteal cleft. and breakdown risk:  continue pressure relief program.  Daily skin exams and reports from nursing.  Severe fatigue due to nutritional and hydration deficiency:   vitamin B12 vitamin D and CoQ10 continue to monitor I&O’s, calorie counts prn, dietary consult prn.     healthy HS snack.  Acute episodic insomnia with situational adjustment disorder:  consider prn low dose Ambien, monitor for day time sedation.    HS \"Tuck In\"  Falls risk elevated:  patient to use call light to get nursing assistance to get up, bed and chair alarm.  Elevated DVT risk: progressive activities in PT, continue prophylaxis CASIMIRO hose, elevation -DC Lovenox this patient is ambulating well  Complex discharge planning:  DC-7/18/24 back t [place].  Progress toward his final planned weekly team meeting    Monday to re-assess progress towards goals, discuss and address social, psychological and medical comorbidities and to address difficulties they may be having progressing in therapy.  Patient and family education is in progress.  The patient is to follow-up with their family physician after discharge.        Complex Active General Medical Issues that complicate care Assess & Plan:        Newly recognized and diagnosed Parkinson's disease with movement and cognitive deficits--Sinemet trial initiated, Monitor for adverse effects, Orthostatic blood pressures, Continue PT/OT.   -limit toxic medications, titrate Aricept  GERD-Elevate head of bed after meals, monitor stools for blood, lowest effective dose of PPI, consider Tums.  Anemia-Monitor vital signs monitor for orthostasis and tachycardia, check H&H prn.  Vitamin B12 and iron-dose iron with food to prevent GI upset.  Monitor for constipation.  Monitor stools for blood.    Essential hypertension-Acute rehab to monitor heart rate and rhythm with the option of telemetry and the effects of chronotropic medication with respect to increasing physical activity and exercise in PT, OT, ADLs with medication titration to lowest effective dosing.  Continue blood signs every shift focusing on heart rate, rhythm and blood pressure checks with orthostatic checks-monitoring the effect of exercise, therapy and posture.  Consult hospitalist for backup medical and adjust/add

## 2024-07-14 NOTE — PROGRESS NOTES
Hospitalist Progress Note      PCP: Cesilia Martínez MD    Date of Admission: 7/6/2024    Chief Complaint: weakness    Subjective: patient awake/alert     Medications:  Reviewed    Infusion Medications    sodium chloride       Scheduled Medications    carbidopa-levodopa  1 tablet Oral BID    Vitamin D  2,000 Units Oral Dinner    cyanocobalamin  1,000 mcg IntraMUSCular Weekly    coenzyme Q10  100 mg Oral Daily    lidocaine  3 patch TransDERmal Daily    melatonin  3 mg Oral Nightly    carvedilol  6.25 mg Oral BID    clopidogrel  75 mg Oral Daily    donepezil  5 mg Oral Nightly    dorzolamide  1 drop Both Eyes BID    finasteride  5 mg Oral Daily    isosorbide mononitrate  30 mg Oral Daily    latanoprost  1 drop Both Eyes Daily    lisinopril  10 mg Oral Daily    pravastatin  80 mg Oral QPM    vitamin B-12  500 mcg Oral Daily    timolol  1 drop Both Eyes BID     PRN Meds: sodium chloride flush, sodium chloride, ondansetron **OR** ondansetron, polyethylene glycol, acetaminophen **OR** [DISCONTINUED] acetaminophen, hydrALAZINE, albuterol sulfate HFA      Intake/Output Summary (Last 24 hours) at 7/14/2024 1043  Last data filed at 7/13/2024 1744  Gross per 24 hour   Intake --   Output 350 ml   Net -350 ml       Exam:    BP (!) 152/57   Pulse 76   Temp 98 °F (36.7 °C) (Oral)   Resp 18   Wt 77.7 kg (171 lb 4.8 oz)   SpO2 100%   BMI 25.30 kg/m²     General appearance: No apparent distress, appears stated age and cooperative.  HEENT: Pupils equal, round, and reactive to light. Conjunctivae/corneas clear.  Neck: Supple, with full range of motion. No jugular venous distention. Trachea midline.  Respiratory:  Normal respiratory effort. Clear to auscultation, bilaterally without Rales/Wheezes/Rhonchi.  Cardiovascular: Regular rate and rhythm with normal S1/S2 without murmurs, rubs or gallops.  Abdomen: Soft, non-tender, non-distended with normal bowel sounds.  Musculoskeletal: No clubbing, cyanosis or edema  service  Checking UA/UC  HTN- well controlled with currne tmedications  CAD/NSTEMI, chronically elevated troponins without dyspnea/CP- following by dr Duvall as outpatient- continue with statin/plavix/B blocker   Knee OA- PT, pain meds PRN  Baseline dementia- supportive care, on arisept  Parkinson- sinemet per neurology service   Medically stable for acute admission to [facility] rehab     TTS: 45 minutes where I focused more than 75% of my attention on rendering care, and planning treatment course for this patient, in addition to talking to RN team, mid levels, consulting with other physicians and following up on labs and imaging.    Electronically signed by Mario Troncoso MD on 7/14/2024 at 10:43 AM

## 2024-07-15 LAB
ANION GAP SERPL CALCULATED.3IONS-SCNC: 7 MEQ/L (ref 9–15)
BASOPHILS # BLD: 0.1 K/UL (ref 0–0.2)
BASOPHILS NFR BLD: 0.6 %
BUN SERPL-MCNC: 31 MG/DL (ref 8–23)
CALCIUM SERPL-MCNC: 9.3 MG/DL (ref 8.5–9.9)
CHLORIDE SERPL-SCNC: 104 MEQ/L (ref 95–107)
CO2 SERPL-SCNC: 28 MEQ/L (ref 20–31)
CREAT SERPL-MCNC: 0.77 MG/DL (ref 0.7–1.2)
EOSINOPHIL # BLD: 0.3 K/UL (ref 0–0.7)
EOSINOPHIL NFR BLD: 3.4 %
ERYTHROCYTE [DISTWIDTH] IN BLOOD BY AUTOMATED COUNT: 14.2 % (ref 11.5–14.5)
GLUCOSE SERPL-MCNC: 96 MG/DL (ref 70–99)
HCT VFR BLD AUTO: 42.2 % (ref 42–52)
HGB BLD-MCNC: 13.8 G/DL (ref 14–18)
LYMPHOCYTES # BLD: 1.3 K/UL (ref 1–4.8)
LYMPHOCYTES NFR BLD: 13.4 %
MCH RBC QN AUTO: 30.3 PG (ref 27–31.3)
MCHC RBC AUTO-ENTMCNC: 32.7 % (ref 33–37)
MCV RBC AUTO: 92.5 FL (ref 79–92.2)
MONOCYTES # BLD: 0.9 K/UL (ref 0.2–0.8)
MONOCYTES NFR BLD: 9.6 %
NEUTROPHILS # BLD: 7.1 K/UL (ref 1.4–6.5)
NEUTS SEG NFR BLD: 72.7 %
PLATELET # BLD AUTO: 203 K/UL (ref 130–400)
POTASSIUM SERPL-SCNC: 4.4 MEQ/L (ref 3.4–4.9)
RBC # BLD AUTO: 4.56 M/UL (ref 4.7–6.1)
SODIUM SERPL-SCNC: 139 MEQ/L (ref 135–144)
WBC # BLD AUTO: 9.8 K/UL (ref 4.8–10.8)

## 2024-07-15 PROCEDURE — 1180000000 HC REHAB R&B

## 2024-07-15 PROCEDURE — 6370000000 HC RX 637 (ALT 250 FOR IP): Performed by: PHYSICAL MEDICINE & REHABILITATION

## 2024-07-15 PROCEDURE — 97129 THER IVNTJ 1ST 15 MIN: CPT

## 2024-07-15 PROCEDURE — 6370000000 HC RX 637 (ALT 250 FOR IP): Performed by: NURSE PRACTITIONER

## 2024-07-15 PROCEDURE — 97530 THERAPEUTIC ACTIVITIES: CPT

## 2024-07-15 PROCEDURE — 99233 SBSQ HOSP IP/OBS HIGH 50: CPT | Performed by: PHYSICAL MEDICINE & REHABILITATION

## 2024-07-15 PROCEDURE — 85025 COMPLETE CBC W/AUTO DIFF WBC: CPT

## 2024-07-15 PROCEDURE — 97130 THER IVNTJ EA ADDL 15 MIN: CPT

## 2024-07-15 PROCEDURE — 97535 SELF CARE MNGMENT TRAINING: CPT

## 2024-07-15 PROCEDURE — 6370000000 HC RX 637 (ALT 250 FOR IP): Performed by: INTERNAL MEDICINE

## 2024-07-15 PROCEDURE — 80048 BASIC METABOLIC PNL TOTAL CA: CPT

## 2024-07-15 PROCEDURE — 97116 GAIT TRAINING THERAPY: CPT

## 2024-07-15 PROCEDURE — 36415 COLL VENOUS BLD VENIPUNCTURE: CPT

## 2024-07-15 PROCEDURE — 99231 SBSQ HOSP IP/OBS SF/LOW 25: CPT | Performed by: NURSE PRACTITIONER

## 2024-07-15 PROCEDURE — 97112 NEUROMUSCULAR REEDUCATION: CPT

## 2024-07-15 PROCEDURE — 6360000002 HC RX W HCPCS: Performed by: PHYSICAL MEDICINE & REHABILITATION

## 2024-07-15 RX ORDER — LACTOBACILLUS RHAMNOSUS GG 10B CELL
2 CAPSULE ORAL 3 TIMES DAILY
Status: DISCONTINUED | OUTPATIENT
Start: 2024-07-15 | End: 2024-07-18 | Stop reason: HOSPADM

## 2024-07-15 RX ADMIN — CARBIDOPA AND LEVODOPA 1 TABLET: 25; 100 TABLET ORAL at 20:27

## 2024-07-15 RX ADMIN — Medication 100 MG: at 07:49

## 2024-07-15 RX ADMIN — Medication 2 CAPSULE: at 11:14

## 2024-07-15 RX ADMIN — CYANOCOBALAMIN 1000 MCG: 1000 INJECTION INTRAMUSCULAR; SUBCUTANEOUS at 07:49

## 2024-07-15 RX ADMIN — Medication 2000 UNITS: at 16:24

## 2024-07-15 RX ADMIN — CYANOCOBALAMIN TAB 500 MCG 500 MCG: 500 TAB at 07:49

## 2024-07-15 RX ADMIN — TIMOLOL MALEATE 1 DROP: 5 SOLUTION OPHTHALMIC at 07:56

## 2024-07-15 RX ADMIN — DONEPEZIL HYDROCHLORIDE 5 MG: 10 TABLET, FILM COATED ORAL at 20:27

## 2024-07-15 RX ADMIN — TIMOLOL MALEATE 1 DROP: 5 SOLUTION OPHTHALMIC at 20:34

## 2024-07-15 RX ADMIN — PRAVASTATIN SODIUM 80 MG: 40 TABLET ORAL at 20:27

## 2024-07-15 RX ADMIN — CARVEDILOL 6.25 MG: 6.25 TABLET, FILM COATED ORAL at 20:27

## 2024-07-15 RX ADMIN — CARVEDILOL 6.25 MG: 6.25 TABLET, FILM COATED ORAL at 07:49

## 2024-07-15 RX ADMIN — CIPROFLOXACIN 500 MG: 500 TABLET, FILM COATED ORAL at 20:27

## 2024-07-15 RX ADMIN — LISINOPRIL 10 MG: 10 TABLET ORAL at 07:49

## 2024-07-15 RX ADMIN — CLOPIDOGREL BISULFATE 75 MG: 75 TABLET ORAL at 07:49

## 2024-07-15 RX ADMIN — CIPROFLOXACIN 500 MG: 500 TABLET, FILM COATED ORAL at 07:49

## 2024-07-15 RX ADMIN — LATANOPROST 1 DROP: 50 SOLUTION OPHTHALMIC at 07:52

## 2024-07-15 RX ADMIN — Medication 3 MG: at 20:27

## 2024-07-15 RX ADMIN — Medication 2 CAPSULE: at 16:24

## 2024-07-15 RX ADMIN — CARBIDOPA AND LEVODOPA 1 TABLET: 25; 100 TABLET ORAL at 07:49

## 2024-07-15 RX ADMIN — FINASTERIDE 5 MG: 5 TABLET, FILM COATED ORAL at 07:49

## 2024-07-15 RX ADMIN — Medication 2 CAPSULE: at 20:27

## 2024-07-15 RX ADMIN — ISOSORBIDE MONONITRATE 30 MG: 30 TABLET, EXTENDED RELEASE ORAL at 07:49

## 2024-07-15 ASSESSMENT — ENCOUNTER SYMPTOMS
CHEST TIGHTNESS: 0
BACK PAIN: 0
TROUBLE SWALLOWING: 0
COUGH: 0
WHEEZING: 0
NAUSEA: 0
COLOR CHANGE: 0
VOMITING: 0
SHORTNESS OF BREATH: 0

## 2024-07-15 ASSESSMENT — PAIN SCALES - GENERAL: PAINLEVEL_OUTOF10: 0

## 2024-07-15 NOTE — PROGRESS NOTES
Physical Therapy Rehab Treatment Note  Facility/Department: [facility] REHAB  Room: [place] [address]       NAME: Hamilton Torres  : 1935 (88 y.o.)  MRN: [id number]  CODE STATUS: DNR-CCA    Date of Service: 7/15/2024     Restrictions:  Restrictions/Precautions: Fall Risk    SUBJECTIVE:   Subjective: Pt states he feels good about going home on Thursday.  Reports not noticing any change since starting Sinemet.    Pain  Pain: denies pain    OBJECTIVE:         Bed mobility  Rolling to Left: Independent  Rolling to Right: Independent  Supine to Sit: Independent  Sit to Supine: Independent  Bed Mobility Comments: Completed on flat bed without rails.    Transfers  Sit to Stand: Supervision  Stand to Sit: Supervision  Bed to Chair: Supervision    Ambulation  Surface: Level tile;Carpet  Device: Rolling Walker  Assistance: Supervision  Quality of Gait: B flexed knees,  L toe in, fwd flexed trunk with increased UE support on WW  Gait Deviations: Slow Nette;Decreased step height  Distance: [id number]    Stairs/Curb  Stairs?: Yes  Stairs  # Steps : 4  Stairs Height: 6\"  Rails: Right ascending (B grasp one rail)  Assistance: Stand by assistance  Comment: Ascends fwd/descends retro        PT Exercises  Functional Mobility Circuit Training: TUG 28sec            ASSESSMENT/PROGRESS TOWARDS GOALS:   Assessment: Progressing towards TUG goal improving time from 36 to 28sec.  Pt has also met stair goal.  Progressing towards all other goals.  Required decreased assist with mobility from last visit.    Goals:  Long Term Goals  Long Term Goal 1: indep and efficient bed mobility  Long Term Goal 2: indep sit to stand bed and car transfers  Long Term Goal 3: indep gait with ww >/= 150 feet with safest AD  Long Term Goal 4: pt to complete TUG in </= 19 sec with device and good safety  Long Term Goal 5: SBA 4 stairs wth appropriate rails for safe ability to visit family  Patient Goals   Patient Goals : \"walk more\"    PLAN OF CARE/Safety: Cont per

## 2024-07-15 NOTE — PROGRESS NOTES
[facility]  Facility/Department: [facility]  Speech Language Pathology   Treatment Note          Hamilton Torres  1935  R250/R250-01  [x]   confirmed    Date: 7/15/2024      Restrictions/Precautions: Fall Risk     ADULT DIET; Regular  ADULT ORAL NUTRITION SUPPLEMENT; HS Snack; Snack; Healthy snack cheese plate fruit plate no junk food please  ADULT ORAL NUTRITION SUPPLEMENT; Breakfast, Dinner; Standard High Calorie/High Protein Oral Supplement     Respiratory Status:  Room air  No active isolations    Rehab Diagnosis:  Impaired mobility and ADL's due to Closed Head Injury s/p fall     Subjective:  Alert, Cooperative, and Pleasant        Interventions used this date:  Cognitive Skill Development    Objective/Assessment:  Patient progressing towards goals:  Goal 1: To increase safety awareness and judgment for safe completion of ADLs secondary to patient's cognitive deficits, patient will complete abstract reasoning tasks (i.e. word deduction, convergent and divergent naming, similarities/differences) with 80% accuracy and minimal cues.  Goal 2: To increase independence for functional activities for home and community, patient will complete mid level executive functioning tasks (i.e. path finding, scheduling appointments, prioritizing tasks) with 80% accuracy and min cues.  Pt completed mid-high level deduction puzzle with mod cues throughout task.  Pt motivated to complete second deduction puzzle with min cues throughout task.  Pt responded well to cues and increased time.  Goal 3: To address patient's cognitive deficits and promote recall of personal and medical information, the patient will answer questions addressing (recent and delayed) recall with 80% accuracy and min cues.  Pt answered recent recall questions with 100% accuracy.  Pt recalled 3/4 words after 5 minute delay, increasing to 4/4 words given cue.  Goal 4: To decrease patient's cognitive deficits through the use of compensatory strategies,

## 2024-07-15 NOTE — PROGRESS NOTES
C6-7 moderate neural foraminal narrowing is  seen bilaterally.  No canal stenosis is visualized.    SOFT TISSUES: No pneumothorax is seen in the visualized lung apices.  The  airway is patent.  No subdural hematoma or mass visualized.    Impression  1.  No acute intracranial abnormality.    2.  Degenerative changes are seen in the spine and there is straightening of  the spine which may reflect spasm.  No results found for this or any previous visit.  Results for orders placed in visit on 02/04/13    CT head with and without contrast      Carotid duplex: No results found for this or any previous visit.  No results found for this or any previous visit.  Results for orders placed during the hospital encounter of 09/01/20    US CAROTID ARTERY BILATERAL    Narrative  EXAMINATION: US CAROTID ARTERY BILATERAL    DATE AND TIME:9/1/2020 1:55 PM    CLINICAL HISTORY: Carotid artery stenosis  R09.89 Bilateral carotid bruits ICD10    COMPARISON:None    TECHNIQUE:Carotid duplex sonograms which include gray scale and color flow evaluation are complimented with spectral waveform analysis. Please refer to chart below for specific carotid velocity measurements. The degree of stenosis recorded on this exam  uses the same method of stratification used in the NASCET trials. This complies with ACR practice guidelines and the Society of radiology in ultrasound consensus statement and provides adequate information for clinical decision making. Society of  Radiologists in Ultrasound (SRU) consensus statement was used to estimate internal carotid artery stenosis.    FINDINGS: There is no significant plaque identified within the internal carotid arteries bilaterally.    No significant increase in systolic flow or turbulence to indicate any hemodynamically significant narrowing in the right or left internal carotid  arteries.  There is antegrade flow identified in both vertebral arteries.    ARTERIAL BLOOD FLOW VELOCITY    RIGHT PS    Prox

## 2024-07-15 NOTE — PROGRESS NOTES
OCCUPATIONAL THERAPY  INPATIENT REHAB TREATMENT NOTE  [facility]      NAME: Hamilton Torres  : 1935 (88 y.o.)  MRN: [id number]  CODE STATUS: DNR-CCA  Room: R250/R250-01    Date of Service: 7/15/2024    Referring Physician: Dr. Troy  Rehab Diagnosis: Impaired mobility and ADLs d/t closed head injury s/p fall    Hospital course:   Comments: Pt is a 89 y/o male who presents to the ED via EMS for evaluation. He says that he recently finished PT/OT rehab for frequent falls. That he was walking in to his house when he lost his balance and fell backwards striking his head. No LOC or amnesia, denies antecedent symptoms. He denies recent illness, fever, vision changes, neck pain, chest pain, abdominal pain, back pain, vomiting, dizziness, numbness or weakness. Pt is currently on antiplatelets.      Restrictions  Restrictions/Precautions  Restrictions/Precautions: Fall Risk     Patient's date of birth confirmed: Yes    SAFETY:  Safety Devices  Safety Devices in place: Yes  Type of devices: All fall risk precautions in place    SUBJECTIVE: \"I'm ready.\"    Pain at start of treatment: No    Pain at end of treatment: No    COGNITION:  Orientation  Overall Orientation Status: Within Normal Limits  Cognition  Overall Cognitive Status: Exceptions  Arousal/Alertness: Appropriate responses to stimuli  Following Commands: Follows one step commands with increased time  Attention Span: Appears intact  Memory: Appears intact  Safety Judgement: Decreased awareness of need for assistance  Problem Solving: Assistance required to identify errors made  Insights: Appears intact  Initiation: Appears intact  Sequencing: Does not require cues  Cognition Comment: Comprehension: MOD I   Expression: MOD I   Social Interaction: IND   Problem Solving: Supervision   Memory: Supervision    OBJECTIVE:    Grooming/Oral Hygiene  Assistance Level: Modified independent  Upper Extremity Bathing  Assistance Level: Modified  trainin minutes     Electronically signed by:    DEISY Hernandez,   7/15/2024, 1:26 PM

## 2024-07-15 NOTE — FLOWSHEET NOTE
Patient assessment completed.  Patient is A&Ox4, able to make needs known, denies pain/sob.  Patient is continent of B&B, up with 1 person SBA and FWW to bathroom.  Patient began ATB therapy for UTI (po Cipro), tolerating well so far, afebrile, no s/s of adverse reaction.  Currently resting quietly in bed, call light in reach, bed alarm engaged, will monitor.  Electronically signed by Mei Zamora RN on 7/14/2024 at 8:26 PM

## 2024-07-15 NOTE — PLAN OF CARE
Problem: Safety - Adult  Goal: Free from fall injury  7/15/2024 0914 by Michelle Esqueda RN  Outcome: Progressing  7/14/2024 2134 by Mei Zamora RN  Outcome: Progressing     Problem: Discharge Planning  Goal: Discharge to home or other facility with appropriate resources  7/15/2024 0914 by Michelle Esqueda RN  Outcome: Progressing  7/14/2024 2134 by Mei Zamora RN  Outcome: Progressing     Problem: ABCDS Injury Assessment  Goal: Absence of physical injury  7/15/2024 0914 by Michelle Esqueda RN  Outcome: Progressing  7/14/2024 2134 by Mei Zamora RN  Outcome: Progressing     Problem: Skin/Tissue Integrity  Goal: Absence of new skin breakdown  Description: 1.  Monitor for areas of redness and/or skin breakdown  2.  Assess vascular access sites hourly  3.  Every 4-6 hours minimum:  Change oxygen saturation probe site  4.  Every 4-6 hours:  If on nasal continuous positive airway pressure, respiratory therapy assess nares and determine need for appliance change or resting period.  7/15/2024 0914 by Michelle Esqueda RN  Outcome: Progressing  7/14/2024 2134 by Mei Zamora RN  Outcome: Progressing     Problem: Chronic Conditions and Co-morbidities  Goal: Patient's chronic conditions and co-morbidity symptoms are monitored and maintained or improved  7/15/2024 0914 by Michelle Esqueda RN  Outcome: Progressing  7/14/2024 2134 by Mei Zamora RN  Outcome: Progressing     Problem: Pain  Goal: Verbalizes/displays adequate comfort level or baseline comfort level  7/15/2024 0914 by Michelle Esqueda RN  Outcome: Progressing  7/14/2024 2134 by Mei Zamora RN  Outcome: Progressing

## 2024-07-15 NOTE — PROGRESS NOTES
No c/o pain thus far. No c/o dysuria or increased frequency. Awaiting urine culture. Electronically signed by Michelle Esqueda RN on 7/15/24 at 11:16 AM EDT     Trusopt eye drops still unavailable per pharmacy. Electronically signed by Michelle Esqueda RN on 7/15/24 at 1:46 PM EDT

## 2024-07-15 NOTE — PROGRESS NOTES
Physical Therapy Rehab Treatment Note  Facility/Department: [facility]  [facility]       NAME: Hamilton Torres  : 1935 (88 y.o.)  MRN: [id number]  CODE STATUS: DNR-CCA    Date of Service: 7/15/2024     Restrictions:  Restrictions/Precautions: Fall Risk    SUBJECTIVE:   Subjective: Pt states he walks down to the dining uribe multiple times a day.      Pain  Pain: denies pain    OBJECTIVE:         Transfers  Sit to Stand: Supervision  Stand to Sit: Supervision  Bed to Chair: Supervision    Ambulation  Surface: Level tile;Carpet  Device: Rolling Walker  Assistance: Supervision  Quality of Gait: B flexed knees,  L toe in, fwd flexed trunk with increased UE support on WW  Gait Deviations: Slow Nette;Decreased step height  Distance: 200ft  Pt ambulated from 250 to therapy gym.          PT Exercises  PROM Exercises: hamstring stretches in seated [id number] Malick  Functional Mobility Circuit Training: STS x6  Static Standing Balance Exercises: Standing without UE support 60sec  Dynamic Standing Balance Exercises: Standing without UE support ball bounce/catch SBA-min assist to correct posterior LOB.  Standing shld elevation and scap retract with ball  SBA-min assist.            ASSESSMENT/PROGRESS TOWARDS GOALS:     Assessment: Dynamic balance challanged in standing without UE support.  Occasional posterior LOB with decreased reactions requiring min assist to recover.  No LOB with WW.    Goals:  Long Term Goals  Long Term Goal 1: indep and efficient bed mobility  Long Term Goal 2: indep sit to stand bed and car transfers  Long Term Goal 3: indep gait with ww >/= 150 feet with safest AD  Long Term Goal 4: pt to complete TUG in </= 19 sec with device and good safety  Long Term Goal 5: SBA 4 stairs wth appropriate rails for safe ability to visit family  Patient Goals   Patient Goals : \"walk more\"    PLAN OF CARE/Safety: Cont per POC     Therapy Time:   Individual   Time In 1300   Time Out 1330   Minutes 30       Minutes:  Transfer/Bed mobility trainin  Gait training:10  Neuro re education:10  Therapeutic ex:5    Anne Randolph PTA, 07/15/24 at 3:12 PM

## 2024-07-15 NOTE — CARE COORDINATION
[facility]  INPATIENT REHABILITATION  TEAM CONFERENCE NOTE  Room: R250/R250-01  Admit Date: 2024       Date: 7/15/2024  Patient Name: Hamilton Torres        MRN: [id number]    : 1935  (88 y.o.)  Gender: male        REHAB DIAGNOSIS:        CO MORBIDITIES:      Past Medical History:   Diagnosis Date    Anticoagulant long-term use     Anxiety     CAD (coronary artery disease)     Carotid stenosis     2013 16-49%    Cervical disc disorder     CHF (congestive heart failure) (HCC)     COVID-19 virus infection 2023    Dementia (HCC)     Dementia (HCC)     Hyperlipidemia     Hypertension     MI (myocardial infarction) (HCC)     Osteoarthritis      Past Surgical History:   Procedure Laterality Date    CARPAL TUNNEL RELEASE      CATARACT REMOVAL      COLONOSCOPY  12/10/10,2007    DR MATTHEWS - DONE AT [facility]    COLONOSCOPY      hx polyps needs     COLONOSCOPY  9/21/15     DR. YARBROUGH     CORONARY ANGIOPLASTY WITH STENT PLACEMENT  2020    DIAGNOSTIC CARDIAC CATH LAB PROCEDURE      HERNIA REPAIR Bilateral     x 2    HERNIA REPAIR Right 2020    RIGHT INGUINAL HERNIA REPAIR performed by Roscoe Copeland MD at [facility]    PTCA          Restrictions  Restrictions/Precautions: Fall Risk  CASE MANAGEMENT    Social/Functional History  Social/Functional History  Lives With: Alone  Type of Home: Assisted living ([facility])  Home Layout: One level  Home Access: Level entry  Bathroom Shower/Tub: Walk-in shower, Curtain  Bathroom Toilet: Standard  Bathroom Equipment: Hand-held shower, Grab bars in shower, Built-in shower seat  Bathroom Accessibility: Wheelchair accessible  Home Equipment: Cane, Grab bars, Reacher, Walker - Rolling  Has the patient had two or more falls in the past year or any fall with injury in the past year?: Yes  ADL Assistance: Independent  Homemaking Assistance: Needs assistance  Homemaking Responsibilities: No  Bill Paying/Finance  1111)  Rolling to Right: Independent (07/15/24 1111)  Supine to Sit: Independent (07/15/24 1111)  Sit to Supine: Independent (07/15/24 1111)  Bed Mobility Comments: Completed on flat bed without rails. (07/15/24 1111)  Bed Mobility  Overall Assistance Level: Minimal Assistance (07/13/24 0851)  Overall Assistance Level: Minimal Assistance (07/13/24 0851)  Roll Left  Assistance Level: Stand by assist (07/13/24 0851)  Supine to Sit  Assistance Level: Minimal assistance (07/13/24 0851)  Transfers:  Bed mobility  Rolling to Left: Independent (07/15/24 1111)  Rolling to Right: Independent (07/15/24 1111)  Supine to Sit: Independent (07/15/24 1111)  Sit to Supine: Independent (07/15/24 1111)  Bed Mobility Comments: Completed on flat bed without rails. (07/15/24 1111)  Bed Mobility  Overall Assistance Level: Minimal Assistance (07/13/24 0851)  Overall Assistance Level: Minimal Assistance (07/13/24 0851)  Roll Left  Assistance Level: Stand by assist (07/13/24 0851)  Supine to Sit  Assistance Level: Minimal assistance (07/13/24 0851)  Gait:   Ambulation  Surface: Level tile;Carpet (07/15/24 1024)  Device: Rolling Walker (07/15/24 1024)  Assistance: Supervision (07/15/24 1024)  Quality of Gait: B flexed knees,  L toe in, fwd flexed trunk with increased UE support on WW (07/15/24 1024)  Gait Deviations: Slow Nette;Decreased step height (07/15/24 1024)  Distance: [id number] (07/15/24 1024)  Comments: Verbal/tactile cues for upright posture and to ambulate closer to WW. (07/10/24 1014)  Ambulation  Surface: Level surface (07/13/24 0852)  Device: Rolling walker (07/13/24 0852)  Gait Deviations: Decreased weight shift bilateral;Decreased heel strike left;Decreased heel strike right (07/13/24 0852)  Stairs:  Stairs/Curb  Stairs?: Yes (07/15/24 1025)  Stairs  # Steps : 4 (07/15/24 1025)  Stairs Height: 6\" (07/15/24 1025)  Rails: Right ascending (B grasp one rail) (07/15/24 1025)  Assistance: Stand by assistance (07/15/24

## 2024-07-15 NOTE — PROGRESS NOTES
OCCUPATIONAL THERAPY  INPATIENT REHAB TREATMENT NOTE  [facility]      NAME: Hamilton Torres  : 1935 (88 y.o.)  MRN: [id number]  CODE STATUS: DNR-CCA  Room: R250/R250-01    Date of Service: 7/15/2024    Referring Physician: Dr. Troy  Rehab Diagnosis: Impaired mobility and ADLs d/t closed head injury s/p fall    Hospital course:   Comments: Pt is a 87 y/o male who presents to the ED via EMS for evaluation. He says that he recently finished PT/OT rehab for frequent falls. That he was walking in to his house when he lost his balance and fell backwards striking his head. No LOC or amnesia, denies antecedent symptoms. He denies recent illness, fever, vision changes, neck pain, chest pain, abdominal pain, back pain, vomiting, dizziness, numbness or weakness. Pt is currently on antiplatelets.      Restrictions  Restrictions/Precautions  Restrictions/Precautions: Fall Risk     Patient's date of birth confirmed: Yes    SAFETY:  Safety Devices  Safety Devices in place: Yes  Type of devices: All fall risk precautions in place    SUBJECTIVE: \"I'm crazy. That's the reason I'm here.\"    Pain at start of treatment: No    Pain at end of treatment: No    COGNITION:  Orientation  Overall Orientation Status: Within Normal Limits  Cognition  Overall Cognitive Status: Exceptions  Arousal/Alertness: Appropriate responses to stimuli  Following Commands: Follows one step commands with increased time  Attention Span: Appears intact  Memory: Appears intact  Safety Judgement: Decreased awareness of need for assistance  Problem Solving: Assistance required to identify errors made  Insights: Appears intact  Initiation: Appears intact  Sequencing: Does not require cues  Cognition Comment: Comprehension: MOD I   Expression: MOD I   Social Interaction: IND   Problem Solving: Supervision   Memory: Supervision    OBJECTIVE:    Parquetry:   Patient engaged in therapeutic activity to improve B UE ROM/strengthening, B FM

## 2024-07-15 NOTE — PROGRESS NOTES
Nutrition Assessment    Type and Reason for Visit:  RD Nutrition Re-Screen/LOS    Nutrition Recommendations/Plan:   Continue with General diet and high calorie high protein oral supplement @ B & D     Malnutrition Assessment:  Malnutrition Status:  No malnutrition (07/15/24 1358)        Nutrition Assessment:        New admission to rehab floor. Nutritional status appears adequate at this time, based on available information. Pt denies any GI &/or Nutrition related concerns at this time. Counseling provided on the importance of consuming adequate calories and protein to aid in recovery with emphasis on nutrient dense food choices.  Discussed the role of proper nutrition for building & maintaining strength, to maximize participation in therapy for achieving ADL goals. .  Current diet is appropriate and tolerated,  no discharge needs identified at this time. RDN to follow up in 7-10 days per protocol.    Nutrition Related Findings:    admittted 7/3 with recurrent falls, tx to rehab 7/6. Hx includes : dementia, CHF, COVId ( 1/2023), Well noursished, denies any GI &/or nutrtion related complaints Wound Type:  (traumatic wound, buttock)       Current Nutrition Intake & Therapies:    Average Meal Intake: 51-75%, %  Average Supplements Intake: 51-75%, %  ADULT DIET; Regular  ADULT ORAL NUTRITION SUPPLEMENT; HS Snack; Snack; Healthy snack cheese plate fruit plate no junk food please  ADULT ORAL NUTRITION SUPPLEMENT; Breakfast, Dinner; Standard High Calorie/High Protein Oral Supplement    Anthropometric Measures:  Height: 175.3 cm (5' 9\")  Ideal Body Weight (IBW): 160 lbs (73 kg)    Admission Body Weight: 77.6 kg (171 lb)  Current Body Weight: 76.2 kg (168 lb), 105 % IBW. Weight Source: Bed Scale  Current BMI (kg/m2): 24.8  Usual Body Weight: 83.5 kg (184 lb) (( 2/2024), 184# ( 6/2024))  % Weight Change (Calculated): -8.7                    BMI Categories: Normal Weight (BMI 22.0 to 24.9) age over

## 2024-07-15 NOTE — PROGRESS NOTES
Subjective:   The patient complains of severe acute on chronic progressive fatigue and post fall neck and headache partially relieved by rest, medications, PT,  OT,   SLP and rest and exacerbated by recent closed head injury.      I am concerned about patient’s medical complexities and barriers to advancing in rehab goals including addressing poor balance and falls risk.        I discussed current functional, rehabilitation, medical status with other rehabilitation providers, neurology consult,   nursing and case management.  According to recent   note, \"A&Ox4, able to make needs known, denies pain/sob. Patient is continent of B&B, up with 1 person SBA and FWW to bathroom. Patient began ATB therapy for UTI (po Cipro), tolerating well so far, afebrile, no s/s of adverse reaction. Currently resting quietly in bed, call light in reach, bed alarm engaged, will monitor. \"      I appreciate neurology's input and hopeful that the Sinemet helps-he seems to feel like it is helping already.  He denies any lightheadedness.  Orthostatic BPs this morning were negative.  He denies any SEs from the new meds.    We discussed his benign prostatic hypertrophy and urinary retention with UTI he is now on Cipro I will add a probiotic to help prevent superinfection and repopulate healthy gut microbiome.    ROS x10:  The patient also complains of severely impaired mobility and activities of daily living.  Otherwise no new problems with vision, hearing, nose, mouth, throat, dermal, cardiovascular, GI, , pulmonary, musculoskeletal, psychiatric or neurological. See Rehab H&P on Rehab chart dated .       Vital signs:  BP (!) 168/57   Pulse 64   Temp 98.4 °F (36.9 °C) (Oral)   Resp 17   Ht 1.753 m (5' 9\")   Wt 76.4 kg (168 lb 8 oz)   SpO2 97%   BMI 24.88 kg/m²   I/O:   PO/Intake:  fair PO intake, no problems observed or reported.    Bowel/Bladder:  continent, constipation and urinary urgency-UTI (po Cipro),   General:  Patient is well

## 2024-07-15 NOTE — PLAN OF CARE
Problem: Safety - Adult  Goal: Free from fall injury  7/14/2024 2134 by Mei Zamora RN  Outcome: Progressing  7/14/2024 1539 by Tara Khan RN  Outcome: Progressing     Problem: Discharge Planning  Goal: Discharge to home or other facility with appropriate resources  Outcome: Progressing     Problem: ABCDS Injury Assessment  Goal: Absence of physical injury  7/14/2024 2134 by Mei Zamora RN  Outcome: Progressing  7/14/2024 1539 by Tara Khan RN  Outcome: Progressing     Problem: Skin/Tissue Integrity  Goal: Absence of new skin breakdown  Description: 1.  Monitor for areas of redness and/or skin breakdown  2.  Assess vascular access sites hourly  3.  Every 4-6 hours minimum:  Change oxygen saturation probe site  4.  Every 4-6 hours:  If on nasal continuous positive airway pressure, respiratory therapy assess nares and determine need for appliance change or resting period.  7/14/2024 2134 by Mei Zamora RN  Outcome: Progressing  7/14/2024 1539 by Tara Khan RN  Outcome: Progressing     Problem: Chronic Conditions and Co-morbidities  Goal: Patient's chronic conditions and co-morbidity symptoms are monitored and maintained or improved  Outcome: Progressing     Problem: Pain  Goal: Verbalizes/displays adequate comfort level or baseline comfort level  7/14/2024 2134 by Mei Zamora RN  Outcome: Progressing  7/14/2024 1539 by Tara Khan RN  Outcome: Progressing

## 2024-07-15 NOTE — PROGRESS NOTES
INDIVIDUALIZED OVERALL REHAB PLAN OF CARE  ADDENDUM TO REHAB PROGRESS NOTE-for audit purposes must also refer to this day's clinical note and combine the information      Date: 7/15/2024  Patient Name: Hamilton Torres   Room: R250/R250-01    MRN: [id number]    : 1935  (88 y.o.)  Gender: male       Today 7/15/2024 during weekly team meeting, I reviewed the patient Hamilton Torres in detail with the therapists and nurses involved in patient's care gathering complex physiatric data regarding current medical issues, progress in therapies, factors limiting progress, social issues, psychological issues, ongoing therapeutic plans and discharge planning.    Legend:  I= independent Im =Modified independent  S=Supervised SB=stand by KAUR=set up CG=contact alfonzo Min= minimal Mod=Moderate Max=maximal Max of 2 =maximal assist of 2 people      CURRENT FUNCTIONAL STATUS:    Closed head injury, initial encounter     2. Frequent falls    3. Difficulty in walking        NURSING ISSUES:  UTI (po Cipro),    Nursing will continue to focus on bowel and bladder continence transitioning toward independence by time of discharge.  Monitoring post void residuals monitoring for severe constipation and bowel obstruction.      Barriers to progress and discharge complex medical conditions, severe pain, and complex social situations      Bowel function- continent/normal  Plans to address- scheduled voids     Bladder function-  PureWik    Plans to address- schedule voids before bed and therapy     Skin deficits- dressing changes   Plans to address- special mattress     Hydration/Nutritional deficits- monitoring for dysphagia  Plans to address-Push PO, assist with feeds as needed      BP- decline in BP intake is a concern  Plans to address- check ortho BPs before therapies-and use abdominal binder and TEDs as needed    Pt and Family training goals-  teach home technology options like Bernadine use and home assistive devices      Focus on achieving  good response to these interventions.         Based on a comprehensive evaluation of the above, the individualized therapy and Discharge plan will be:    -Times stated are an average that will be varied based on the patient's daily need.        PT   1 1/2  hrs/day 5-7 days per wk      OT   1 1/2 hrs per day 5-7 days per wk     ST  1/2    hrs /day 3-5 days per week       Estimated LOS   1-2 week(s)    -Overall functional prognosis:     [x]  Good    []  Fair    []  Poor     -Medical Prognosis:   [x]  Good    []  Fair    []  Poor    This patient was made aware of the discussion of Plan of Care, their projected dicharge date and their projected function at discharge.         Judith Castillo, DO

## 2024-07-15 NOTE — CARE COORDINATION
CM called [facility] and spoke with Taylor to let know pt's d/c is 7/18/24. Electronically signed by Darshana Nolan RN on 7/15/2024 at 9:34 AM

## 2024-07-16 LAB
BACTERIA UR CULT: ABNORMAL
BACTERIA UR CULT: ABNORMAL
ORGANISM: ABNORMAL

## 2024-07-16 PROCEDURE — 97112 NEUROMUSCULAR REEDUCATION: CPT

## 2024-07-16 PROCEDURE — 97535 SELF CARE MNGMENT TRAINING: CPT

## 2024-07-16 PROCEDURE — 6370000000 HC RX 637 (ALT 250 FOR IP): Performed by: INTERNAL MEDICINE

## 2024-07-16 PROCEDURE — 6370000000 HC RX 637 (ALT 250 FOR IP): Performed by: NURSE PRACTITIONER

## 2024-07-16 PROCEDURE — 97116 GAIT TRAINING THERAPY: CPT

## 2024-07-16 PROCEDURE — 51798 US URINE CAPACITY MEASURE: CPT

## 2024-07-16 PROCEDURE — 97530 THERAPEUTIC ACTIVITIES: CPT

## 2024-07-16 PROCEDURE — 6370000000 HC RX 637 (ALT 250 FOR IP): Performed by: PHYSICAL MEDICINE & REHABILITATION

## 2024-07-16 PROCEDURE — 97110 THERAPEUTIC EXERCISES: CPT

## 2024-07-16 PROCEDURE — 1180000000 HC REHAB R&B

## 2024-07-16 PROCEDURE — 99232 SBSQ HOSP IP/OBS MODERATE 35: CPT | Performed by: PHYSICAL MEDICINE & REHABILITATION

## 2024-07-16 RX ADMIN — DONEPEZIL HYDROCHLORIDE 5 MG: 10 TABLET, FILM COATED ORAL at 21:07

## 2024-07-16 RX ADMIN — LATANOPROST 1 DROP: 50 SOLUTION OPHTHALMIC at 08:57

## 2024-07-16 RX ADMIN — LISINOPRIL 10 MG: 10 TABLET ORAL at 08:54

## 2024-07-16 RX ADMIN — CLOPIDOGREL BISULFATE 75 MG: 75 TABLET ORAL at 08:55

## 2024-07-16 RX ADMIN — Medication 2 CAPSULE: at 08:54

## 2024-07-16 RX ADMIN — PRAVASTATIN SODIUM 80 MG: 40 TABLET ORAL at 21:08

## 2024-07-16 RX ADMIN — CARVEDILOL 6.25 MG: 6.25 TABLET, FILM COATED ORAL at 21:07

## 2024-07-16 RX ADMIN — CARVEDILOL 6.25 MG: 6.25 TABLET, FILM COATED ORAL at 08:55

## 2024-07-16 RX ADMIN — ISOSORBIDE MONONITRATE 30 MG: 30 TABLET, EXTENDED RELEASE ORAL at 08:54

## 2024-07-16 RX ADMIN — FINASTERIDE 5 MG: 5 TABLET, FILM COATED ORAL at 08:55

## 2024-07-16 RX ADMIN — CIPROFLOXACIN 500 MG: 500 TABLET, FILM COATED ORAL at 21:08

## 2024-07-16 RX ADMIN — CYANOCOBALAMIN TAB 500 MCG 500 MCG: 500 TAB at 08:55

## 2024-07-16 RX ADMIN — CARBIDOPA AND LEVODOPA 1 TABLET: 25; 100 TABLET ORAL at 21:08

## 2024-07-16 RX ADMIN — Medication 2 CAPSULE: at 16:14

## 2024-07-16 RX ADMIN — Medication 2000 UNITS: at 16:15

## 2024-07-16 RX ADMIN — Medication 100 MG: at 08:55

## 2024-07-16 RX ADMIN — CIPROFLOXACIN 500 MG: 500 TABLET, FILM COATED ORAL at 08:54

## 2024-07-16 RX ADMIN — CARBIDOPA AND LEVODOPA 1 TABLET: 25; 100 TABLET ORAL at 08:55

## 2024-07-16 RX ADMIN — TIMOLOL MALEATE 1 DROP: 5 SOLUTION OPHTHALMIC at 21:13

## 2024-07-16 RX ADMIN — Medication 2 CAPSULE: at 21:08

## 2024-07-16 RX ADMIN — Medication 3 MG: at 21:07

## 2024-07-16 NOTE — PROGRESS NOTES
Hospitalist Progress Note      PCP: Cesilia Martínez MD    Date of Admission: 7/6/2024    Chief Complaint: weakness    Subjective: patient awake/alert     Medications:  Reviewed    Infusion Medications    sodium chloride       Scheduled Medications    lactobacillus  2 capsule Oral TID    ciprofloxacin  500 mg Oral 2 times per day    carbidopa-levodopa  1 tablet Oral BID    Vitamin D  2,000 Units Oral Dinner    cyanocobalamin  1,000 mcg IntraMUSCular Weekly    coenzyme Q10  100 mg Oral Daily    lidocaine  3 patch TransDERmal Daily    melatonin  3 mg Oral Nightly    carvedilol  6.25 mg Oral BID    clopidogrel  75 mg Oral Daily    donepezil  5 mg Oral Nightly    dorzolamide  1 drop Both Eyes BID    finasteride  5 mg Oral Daily    isosorbide mononitrate  30 mg Oral Daily    latanoprost  1 drop Both Eyes Daily    lisinopril  10 mg Oral Daily    pravastatin  80 mg Oral QPM    vitamin B-12  500 mcg Oral Daily    timolol  1 drop Both Eyes BID     PRN Meds: sodium chloride flush, sodium chloride, ondansetron **OR** ondansetron, polyethylene glycol, acetaminophen **OR** [DISCONTINUED] acetaminophen, hydrALAZINE, albuterol sulfate HFA      Intake/Output Summary (Last 24 hours) at 7/16/2024 1031  Last data filed at 7/15/2024 2027  Gross per 24 hour   Intake 240 ml   Output --   Net 240 ml       Exam:    BP (!) 144/64   Pulse 70   Temp 98.6 °F (37 °C)   Resp 18   Ht 1.753 m (5' 9\")   Wt 78.7 kg (173 lb 6.4 oz)   SpO2 98%   BMI 25.61 kg/m²     General appearance: No apparent distress, appears stated age and cooperative.  HEENT: Pupils equal, round, and reactive to light. Conjunctivae/corneas clear.  Neck: Supple, with full range of motion. No jugular venous distention. Trachea midline.  Respiratory:  Normal respiratory effort. Clear to auscultation, bilaterally without Rales/Wheezes/Rhonchi.  Cardiovascular: Regular rate and rhythm with normal S1/S2 without murmurs, rubs or gallops.  Abdomen: Soft, non-tender,

## 2024-07-16 NOTE — PLAN OF CARE
Problem: Safety - Adult  Goal: Free from fall injury  7/15/2024 2300 by Kristan Smith, RN  Outcome: Progressing     Problem: Discharge Planning  Goal: Discharge to home or other facility with appropriate resources  7/15/2024 2300 by Kristan Smith, RN  Outcome: Progressing     Problem: ABCDS Injury Assessment  Goal: Absence of physical injury  7/15/2024 2300 by Kristan Smith, RN  Outcome: Progressing

## 2024-07-16 NOTE — PROGRESS NOTES
OCCUPATIONAL THERAPY  INPATIENT REHAB TREATMENT NOTE  [facility]      NAME: Hamilton Torres  : 1935 (88 y.o.)  MRN: [id number]  CODE STATUS: DNR-CCA  Room: R250/R250-01    Date of Service: 2024    Referring Physician: Dr. Troy  Rehab Diagnosis: Impaired mobility and ADLs d/t closed head injury s/p fall    Hospital course:   Comments: Pt is a 87 y/o male who presents to the ED via EMS for evaluation. He says that he recently finished PT/OT rehab for frequent falls. That he was walking in to his house when he lost his balance and fell backwards striking his head. No LOC or amnesia, denies antecedent symptoms. He denies recent illness, fever, vision changes, neck pain, chest pain, abdominal pain, back pain, vomiting, dizziness, numbness or weakness. Pt is currently on antiplatelets.      Restrictions  Restrictions/Precautions  Restrictions/Precautions: Fall Risk     Patient's date of birth confirmed: Yes    SAFETY:  Safety Devices  Safety Devices in place: Yes  Type of devices: All fall risk precautions in place    SUBJECTIVE: \"That's what I do at home.\"    Pain at start of treatment: No    Pain at end of treatment: No    COGNITION:  Orientation  Overall Orientation Status: Within Normal Limits  Cognition  Overall Cognitive Status: Exceptions  Arousal/Alertness: Appropriate responses to stimuli  Following Commands: Follows one step commands with increased time  Attention Span: Appears intact  Memory: Appears intact  Safety Judgement: Decreased awareness of need for assistance  Problem Solving: Assistance required to identify errors made  Insights: Appears intact  Initiation: Appears intact  Sequencing: Does not require cues  Cognition Comment: Comprehension: MOD I   Expression: MOD I   Social Interaction: IND   Problem Solving: Supervision   Memory: Supervision    OBJECTIVE:    Grooming/Oral Hygiene  Assistance Level: Modified independent  Upper Extremity Bathing  Assistance Level:

## 2024-07-16 NOTE — PROGRESS NOTES
[facility]  INPATIENT REHABILITATION  INDEPENDENT IN ROOM NOTE  Room: R250/R250-01  Admit Date: 2024       Date: 2024  Patient Name: Hamilton Torres        MRN: [id number]    : 1935  (88 y.o.)  Gender: male                      Patient orientation to the independent living suite(256)  [] Yes    []   No    [x] NA  (Safety checks to consider: Oxygen, Fire Alarm, Stove safety, Call alarm, Bed alarm,   Bed power, TV, Phone)       Nursin. Pt physical ability to be independent in room/suite:  [x] Yes    []   No   Comment:    2. Pt demonstrates cognitive ability to be independent in room/suite:  [x] Yes    []   No   Comment:    3. Pt demonstrates emotional ability to be independent in room/suite:  [x] Yes    []   No   Comment:    4. Special Considerations/Equipment if needed: [x] NA   Comment:walker    Recommend independent in room/suite:  [x] Yes    []   No            Signature: Cortney Ortega RN 2024      Occupational Therapy:  1. Pt physical ability to be independent in room/suite:  [x] Yes    []   No   Comment:     2. Pt demonstrates cognitive ability to be independent in room/suite:  [x] Yes    []   No   Comment:    3. Pt demonstrates emotional ability to be independent in room/suite:  [x] Yes    []   No   Comment:    4. Special Considerations/Equipment if needed: [] NA   Comment: remove w/c from room - use high back chair    Recommend independent in room/suite:  [x] Yes    []   No       Signature: Electronically signed by DEISY Hernandez on 24 at 9:43 AM EDT      Physical Therapy:  1. Pt physical ability to be independent in room/suite:  [x] Yes    []   No   Comment:     2. Pt demonstrates cognitive ability to be independent in room/suite:  [x] Yes    []   No   Comment:    3. Pt demonstrates emotional ability to be independent in room/suite:  [x] Yes    []   No   Comment:    4. Special Considerations/Equipment if needed: [] NA   Comment:WW for ambulation.

## 2024-07-16 NOTE — PROGRESS NOTES
from additional OT services to increase IND and safety with daily living tasks and functional mobility.    Speech therapy: FIMS:        Lab/X-ray studies reviewed, analyzed and discussed with patient and staff:   No results found for this or any previous visit (from the past 24 hour(s)).        Previous extensive, complex labs, notes and diagnostics reviewed and analyzed.     ALLERGIES:    Allergies as of 07/06/2024    (No Known Allergies)      (please also verify by checking MAR)       Recently, I evaluated this patient for periodic reassessment of medical and functional status.  The patient was discussed in detail at the treatment team meeting focusing on current medical issues, progress in therapies, social issues, psychological issues, barriers to progress and strategies to address these barriers, and discharge planning.  See the hand written addendum to rehab progress note.  The patient continues to be high risk for future disability and their medical and rehabilitation prognosis continue to be good and therefore, we will continue the patient's rehabilitation course as planned.  The patient's tentative discharge date was set. Patient and family education was discussed.  The patient was made aware of the team discussion regarding their progress.  Discharge plans were discussed along with barriers to progress and strategies to address these barriers, patient encouraged to continue to discuss discharge plans with .       Complex Physical Medicine & Rehab Issues Assess & Plan:   Severe abnormality of gait and mobility and impaired self-care and ADL's secondary to progressive weakness dt CHI .  Functional and medical status reassessed regarding patient’s ability to participate in therapies and patient found to be able to participate in acute intensive comprehensive inpatient rehabilitation program including PT/OT to improve balance, ambulation, ADL’s, and to improve the P/AROM.  Therapeutic modifications  regarding activities in therapies, place, amount of time per day and intensity of therapy made daily.  In bed therapies or bedside therapies prn.   Bowel progressive constipation, and Bladder dysfunction monitoring neurogenic bladder:  frequent toileting, ambulate to bathroom with assistance, check post void residuals.  Check for C.difficile x1 if >2 loose stools in 24 hours, continue bowel & bladder program.  Monitor bowel and bladder function.  Lactinex 2 PO every AC.  MOM prn, Brown Bomb prn, Glycerin suppository prn, enema prn.  Cipro for UTI -awaiting urine culture.   Severe low back pain as well as generalized OA pain: reassess pain every shift and prior to and after each therapy session, give prn Tylenol and  , modalities prn in therapy, Lidoderm, K-pad prn.   Skin healing--redness right heel (mepilex), bruising to right shoulder/flank, small healing abrasion to back of head, and tear to right buttocks/gluteal cleft and breakdown risk:  continue pressure relief program.  Daily skin exams and reports from nursing.  Severe fatigue due to nutritional and hydration deficiency:   vitamin B12 vitamin D and CoQ10 continue to monitor I&O’s, calorie counts prn, dietary consult prn.    healthy HS snack.  Acute episodic insomnia with situational adjustment disorder:  consider prn low dose Ambien, monitor for day time sedation.    HS \"Tuck In\"  Falls risk elevated:  patient to use call light to get nursing assistance to get up, bed and chair alarm.  Elevated DVT risk: progressive activities in PT, continue prophylaxis CASIMIRO hose, elevation -DC Lovenox this patient is ambulating well  Complex discharge planning:  DC-7/18/24 back to [place].  Progress toward his final planned weekly team meeting    Monday to re-assess progress towards goals, discuss and address social, psychological and medical comorbidities and to address difficulties they may be having progressing in therapy.  Patient and family education is in progress.  The

## 2024-07-16 NOTE — PROGRESS NOTES
Physical Therapy Rehab Treatment Note  Facility/Department: [facility]  Room: R2Critical access hospital[facility]250-01       NAME: Hamilton Torres  : 1935 (88 y.o.)  MRN: [id number]  CODE STATUS: DNR-CCA    Date of Service: 2024     Restrictions:  Restrictions/Precautions: Fall Risk    SUBJECTIVE: Pt states he is ready to go home.       Pain   Denies pain    OBJECTIVE:         Bed mobility  Rolling to Left: Independent  Rolling to Right: Independent  Supine to Sit: Independent  Sit to Supine: Independent    Transfers  Sit to Stand: Supervision  Stand to Sit: Supervision  Bed to Chair: Supervision  Car Transfer: Supervision (Instructed pt to sit on car seat then bring LEs into car for safety.)  Comment: Pt utilizes heavy posterior LEs against chair to come to standing.  Improved technique with VCs.    Ambulation  Surface: Level tile;Carpet  Device: Rolling Walker  Assistance: Supervision  Quality of Gait: B flexed knees,  L toe in, fwd flexed trunk with increased UE support on WW  Gait Deviations: Slow Nette;Decreased step height  Distance: 200ft  Comments: Verbal/tactile cues for upright posture and to ambulate closer to WW.  Completed functional gait tasks in hospital room with WW.  Vcs for manageing door.  Pt demo'd ability to manage tray table.    Stairs/Curb  Stairs?: Yes  Stairs  # Steps : 4  Stairs Height: 6\"  Rails: Right ascending (B grasp)  Assistance: Stand by assistance                     ASSESSMENT/PROGRESS TOWARDS GOALS:   Assessment: Pt has met bed mob and stair goal.  Is progressing towards all other goals.  Requires occasional cues to improve safety and technique with transfers and gait.    Goals:  Long Term Goals  Long Term Goal 1: indep and efficient bed mobility  Long Term Goal 2: indep sit to stand bed and car transfers  Long Term Goal 3: indep gait with ww >/= 150 feet with safest AD  Long Term Goal 4: pt to complete TUG in </= 19 sec with device and good safety  Long Term Goal 5: SBA 4 stairs wt

## 2024-07-16 NOTE — PROGRESS NOTES
OCCUPATIONAL THERAPY  INPATIENT REHAB TREATMENT NOTE  [facility]      NAME: Hamilton Torres  : 1935 (88 y.o.)  MRN: [id number]  CODE STATUS: DNR-CCA  Room: R250/R250-01    Date of Service: 2024    Referring Physician: Dr. Troy  Rehab Diagnosis: Impaired mobility and ADLs d/t closed head injury s/p fall    Hospital course:   Comments: Pt is a 87 y/o male who presents to the ED via EMS for evaluation. He says that he recently finished PT/OT rehab for frequent falls. That he was walking in to his house when he lost his balance and fell backwards striking his head. No LOC or amnesia, denies antecedent symptoms. He denies recent illness, fever, vision changes, neck pain, chest pain, abdominal pain, back pain, vomiting, dizziness, numbness or weakness. Pt is currently on antiplatelets.      Restrictions  Restrictions/Precautions  Restrictions/Precautions: Fall Risk                 Patient's date of birth confirmed: Yes    SAFETY:  Safety Devices  Safety Devices in place: Yes  Type of devices: All fall risk precautions in place    SUBJECTIVE:  Subjective  Subjective: Pt states he wants to stand during task.    Pain at start of treatment: No    Pain at end of treatment: No    COGNITION:  Orientation  Overall Orientation Status: Within Functional Limits  Orientation Level: Oriented to person;Oriented to time;Oriented to situation;Oriented to place  Cognition  Overall Cognitive Status: WFL  Arousal/Alertness: Appropriate responses to stimuli  Following Commands: Follows one step commands consistently  Attention Span: Appears intact  Memory: Appears intact  Safety Judgement: Decreased awareness of need for assistance  Problem Solving: Assistance required to identify errors made;Assistance required to implement solutions;Assistance required to correct errors made;Decreased awareness of errors  Insights: Decreased awareness of deficits  Initiation: Appears intact  Sequencing: Does not require  cues      OBJECTIVE:     Pt completed horizontal dowel alayna tree/ring task standing/seated at table top height with SBA when standing and Mod Ind while seated.  Pt instructed to complete each side of tree with coordinating UE.  Pt required Min A d/t retro leaning to stand and CGA (touching assist) to sit each time.  Pt donned/doffed 12 rings on each dowel, except last alayna having 11.  Pt stood as long as able to don/doff rings, and then sat and completed task with dynamic overhead seated reaching.  Pt stood several times: 3:46, 2:10, 4:14.  Pt completed task to increase standing tolerance, stability while standing, improve functional transfers, and increase functional act tolerance to continue improving levels of Ind with functional daily tasks.       Education:  Education  Education Given To: Patient;Family  Education Provided: Role of Therapy;Safety  Education Provided Comments: pt family memeber (possibly daughter) asked about wheels for back of pt w/c; looked into pt PT notes as PTA was not available- d/t noted retro LOB with standing- pt family was informed wheels on back of walker were not probably appropriate and should use skis- provided picture of what they look like- addressed this with Anne, pt previously treating PTA, and Anne agreed that wheels were not appropriate and skis were better- concern of family member is that pt back legs of walker were getting caught in the carpet.  Education Method: Verbal;Demonstration  Barriers to Learning: Hearing  Education Outcome: Verbalized understanding;Demonstrated understanding  Skilled Clinical Factors: hearing limitation with pt      ASSESSMENT:  Activity Tolerance: Patient tolerated treatment well      PLAN OF CARE:  Strengthening, Balance training, Functional mobility training, Endurance training, Cognitive reorientation, Pain management, Patient/Caregiver education & training, Safety education & training, Equipment evaluation, education, & procurement,

## 2024-07-16 NOTE — PROGRESS NOTES
[facility]  Occupational Therapy      NAME:  Hamilton Torres  ROOM: R250/R250-01  :  1935  DATE: 2024    Attempted to see Hamilton Torres on this date at 0724 for    []  Initial Evaluation   []  Scheduled therapy    []  Make-up therapy   []  Group therapy   [x]  Get up and go    Patient was unable to be seen due to:   [] Off unit for testing/procedure   [] Patient refused, stating \"    [] Therapy on hold due to     [] Nursing deferred due to    [x] Other:  Pt resting in bed and had already eaten breakfast. Pt declines to get into w/c or use restroom at this time.     Electronically signed by Toribio Botello OT on 24 at 8:01 AM JERARDO

## 2024-07-16 NOTE — PROGRESS NOTES
Physical Therapy Rehab Treatment Note  Facility/Department: [facility] REHAB  Room[facility]       NAME: Hamilton Torres  : 1935 (88 y.o.)  MRN: [id number]  CODE STATUS: DNR-CCA    Date of Service: 2024     Restrictions:  Restrictions/Precautions: Fall Risk     SUBJECTIVE:   Subjective: Pt states he feels like he could get up and go to the bathroom on his own.    Pain  Pain: denies pain    OBJECTIVE:         Bed mobility  Rolling to Left: Independent  Rolling to Right: Independent  Supine to Sit: Independent  Sit to Supine: Independent    Transfers  Sit to Stand: Modified independent;Supervision  Stand to Sit: Modified independent  Bed to Chair: Modified independent  Car Transfer: Supervision (Instructed pt to sit on car seat then bring LEs into car for safety.)  Comment: Pt utilizes heavy posterior LEs against chair to come to standing.  Improved technique with VCs.    Ambulation  Surface: Level tile;Carpet  Device: Rolling Walker  Assistance: Supervision  Quality of Gait: B flexed knees,  L toe in, fwd flexed trunk with increased UE support on WW  Gait Deviations: Slow Nette;Decreased step height  Distance: 200ft  Comments: Verbal/tactile cues for upright posture and to ambulate closer to WW.  Completed functional gait tasks in hospital room with WW.  Pt ambulated to and from bathroom with WW managing doors and lights without assist.        PT Exercises  PROM Exercises: hamstring stretches in seated [id number] Malick  Functional Mobility Circuit Training: STS x10  Dynamic Standing Balance Exercises: Standing beach ball tap with 1 UE support SBA progresing to without UE support ball bounce/catch SBA-min assist to correct posterior LOB.  Standing shld elevation with ball x10.  Gait drills F/R/L x3 reps ea with B UE support.  Step taps with 1-2 UE support.  Postural Correction Exercises: seated rows/lats RTB x20ea            ASSESSMENT/PROGRESS TOWARDS GOALS:   Assessment: Pt has met bed mob and stair goal.   Is progressing towards all other goals.  Requires occasional cues to improve safety and technique with transfers and gait.  Requires WW for gait and 1-2 UE support to maintain balance with standing dynamic tasks.      Goals:  Long Term Goals  Long Term Goal 1: indep and efficient bed mobility  Long Term Goal 2: indep sit to stand bed and car transfers  Long Term Goal 3: indep gait with ww >/= 150 feet with safest AD  Long Term Goal 4: pt to complete TUG in </= 19 sec with device and good safety  Long Term Goal 5: SBA 4 stairs wth appropriate rails for safe ability to visit family  Patient Goals   Patient Goals : \"walk more\"    PLAN OF CARE/Safety: Cont per POC.       Therapy Time:   Individual   Time In 1300   Time Out 1400   Minutes 60      Minutes:  Transfer/Bed mobility training:10  Gait training:10  Neuro re education:25  Therapeutic ex:15    Anne Randolph PTA, 07/16/24 at 3:53 PM

## 2024-07-17 PROCEDURE — 97112 NEUROMUSCULAR REEDUCATION: CPT

## 2024-07-17 PROCEDURE — 97129 THER IVNTJ 1ST 15 MIN: CPT

## 2024-07-17 PROCEDURE — 99231 SBSQ HOSP IP/OBS SF/LOW 25: CPT | Performed by: NURSE PRACTITIONER

## 2024-07-17 PROCEDURE — 6370000000 HC RX 637 (ALT 250 FOR IP): Performed by: INTERNAL MEDICINE

## 2024-07-17 PROCEDURE — 6370000000 HC RX 637 (ALT 250 FOR IP): Performed by: PHYSICAL MEDICINE & REHABILITATION

## 2024-07-17 PROCEDURE — 97130 THER IVNTJ EA ADDL 15 MIN: CPT

## 2024-07-17 PROCEDURE — 97110 THERAPEUTIC EXERCISES: CPT

## 2024-07-17 PROCEDURE — 99232 SBSQ HOSP IP/OBS MODERATE 35: CPT | Performed by: PHYSICAL MEDICINE & REHABILITATION

## 2024-07-17 PROCEDURE — 6370000000 HC RX 637 (ALT 250 FOR IP): Performed by: NURSE PRACTITIONER

## 2024-07-17 PROCEDURE — 97535 SELF CARE MNGMENT TRAINING: CPT

## 2024-07-17 PROCEDURE — 97116 GAIT TRAINING THERAPY: CPT

## 2024-07-17 PROCEDURE — 1180000000 HC REHAB R&B

## 2024-07-17 PROCEDURE — 97530 THERAPEUTIC ACTIVITIES: CPT

## 2024-07-17 RX ORDER — LACTOBACILLUS RHAMNOSUS GG 10B CELL
2 CAPSULE ORAL 3 TIMES DAILY
Qty: 60 CAPSULE | Refills: 0 | Status: SHIPPED | OUTPATIENT
Start: 2024-07-17 | End: 2024-07-27

## 2024-07-17 RX ORDER — CIPROFLOXACIN 500 MG/1
500 TABLET, FILM COATED ORAL EVERY 12 HOURS SCHEDULED
Qty: 4 TABLET | Refills: 0 | Status: SHIPPED | OUTPATIENT
Start: 2024-07-17 | End: 2024-07-19

## 2024-07-17 RX ORDER — LIDOCAINE 4 G/G
3 PATCH TOPICAL DAILY
Qty: 30 EACH | Refills: 0 | Status: SHIPPED | OUTPATIENT
Start: 2024-07-18 | End: 2024-07-28

## 2024-07-17 RX ADMIN — Medication 2 CAPSULE: at 14:45

## 2024-07-17 RX ADMIN — Medication 2 CAPSULE: at 08:01

## 2024-07-17 RX ADMIN — TIMOLOL MALEATE 1 DROP: 5 SOLUTION OPHTHALMIC at 20:35

## 2024-07-17 RX ADMIN — ISOSORBIDE MONONITRATE 30 MG: 30 TABLET, EXTENDED RELEASE ORAL at 08:01

## 2024-07-17 RX ADMIN — Medication 2000 UNITS: at 18:23

## 2024-07-17 RX ADMIN — FINASTERIDE 5 MG: 5 TABLET, FILM COATED ORAL at 08:01

## 2024-07-17 RX ADMIN — CARVEDILOL 6.25 MG: 6.25 TABLET, FILM COATED ORAL at 08:01

## 2024-07-17 RX ADMIN — CARBIDOPA AND LEVODOPA 1 TABLET: 25; 100 TABLET ORAL at 20:32

## 2024-07-17 RX ADMIN — CARVEDILOL 6.25 MG: 6.25 TABLET, FILM COATED ORAL at 20:32

## 2024-07-17 RX ADMIN — CIPROFLOXACIN 500 MG: 500 TABLET, FILM COATED ORAL at 20:32

## 2024-07-17 RX ADMIN — CYANOCOBALAMIN TAB 500 MCG 500 MCG: 500 TAB at 08:01

## 2024-07-17 RX ADMIN — DORZOLAMIDE HYDROCHLORIDE 1 DROP: 20 SOLUTION/ DROPS OPHTHALMIC at 20:35

## 2024-07-17 RX ADMIN — LATANOPROST 1 DROP: 50 SOLUTION OPHTHALMIC at 08:02

## 2024-07-17 RX ADMIN — CIPROFLOXACIN 500 MG: 500 TABLET, FILM COATED ORAL at 08:02

## 2024-07-17 RX ADMIN — PRAVASTATIN SODIUM 80 MG: 40 TABLET ORAL at 20:32

## 2024-07-17 RX ADMIN — Medication 100 MG: at 08:01

## 2024-07-17 RX ADMIN — Medication 3 MG: at 20:32

## 2024-07-17 RX ADMIN — TIMOLOL MALEATE 1 DROP: 5 SOLUTION OPHTHALMIC at 08:02

## 2024-07-17 RX ADMIN — Medication 2 CAPSULE: at 20:32

## 2024-07-17 RX ADMIN — CARBIDOPA AND LEVODOPA 1 TABLET: 25; 100 TABLET ORAL at 08:01

## 2024-07-17 RX ADMIN — CLOPIDOGREL BISULFATE 75 MG: 75 TABLET ORAL at 08:02

## 2024-07-17 RX ADMIN — DONEPEZIL HYDROCHLORIDE 5 MG: 10 TABLET, FILM COATED ORAL at 20:35

## 2024-07-17 RX ADMIN — LISINOPRIL 10 MG: 10 TABLET ORAL at 08:02

## 2024-07-17 ASSESSMENT — ENCOUNTER SYMPTOMS
WHEEZING: 0
VOMITING: 0
BACK PAIN: 0
SHORTNESS OF BREATH: 0
COLOR CHANGE: 0
COUGH: 0
TROUBLE SWALLOWING: 0
CHEST TIGHTNESS: 0
NAUSEA: 0

## 2024-07-17 ASSESSMENT — PAIN SCALES - GENERAL: PAINLEVEL_OUTOF10: 0

## 2024-07-17 NOTE — PROGRESS NOTES
Subjective:   The patient complains of severe acute on chronic progressive fatigue and post fall neck and headache partially relieved by rest, medications, PT,  OT,   SLP and rest and exacerbated by recent closed head injury.  He is supposed sequently been diagnosed with a UTI and Parkinson's flare and is being treated for both.    I am concerned about patient’s medical complexities and barriers to advancing in rehab goals including addressing poor balance and falls risk.        I discussed current functional, rehabilitation, medical status with other rehabilitation providers, neurology consult,   nursing and case management.  According to recent   note, \"assessment completed. VSS taken.  Patient is A&O x4. Pt denies having any pain or discomfort. Medications given per MAR. Call light in reach, bed alarm engaged, bed locked and in lowest position. Pt denies any other needs at this time. \"      I have asked the nurses again to check postvoid residuals.  He has progressed to be I in the room.    ROS x10:  The patient also complains of severely impaired mobility and activities of daily living.  Otherwise no new problems with vision, hearing, nose, mouth, throat, dermal, cardiovascular, GI, , pulmonary, musculoskeletal, psychiatric or neurological. See Rehab H&P on Rehab chart dated .       Vital signs:  BP (!) 138/59   Pulse 66   Temp (!) 0.5 °F (-17.5 °C) (Oral)   Resp 16   Ht 1.753 m (5' 9\")   Wt 78.7 kg (173 lb 6.4 oz)   SpO2 98%   BMI 25.61 kg/m²   I/O:   PO/Intake:  fair PO intake, no problems observed or reported.    Bowel/Bladder:  continent, constipation and urinary urgency-UTI (po Cipro), Awaiting urine culture.   General:  Patient is well developed, adequately nourished, non-obese and     well kempt.     HEENT:    PERRLA, hearing intact to loud voice, external inspection of ear     and nose benign.  Inspection of lips, tongue and gums    Musculoskeletal: No significant change in strength or tone.

## 2024-07-17 NOTE — PROGRESS NOTES
Patient assessment completed. VSS taken.  Patient is A&O x4. Pt denies having any pain or discomfort. Medications given per MAR. Call light in reach, bed alarm engaged, bed locked and in lowest position. Pt denies any other needs at this time.

## 2024-07-17 NOTE — PROGRESS NOTES
Physical Therapy Rehab Treatment Note  [facility]Department[facility]  [facility]       NAME: Hamilton Torres  : 1935 (88 y.o.)  MRN: [id number]  CODE STATUS: DNR-CCA    Date of Service: 2024     150ft  Restrictions:  Restrictions/Precautions: Fall Risk       SUBJECTIVE:   Subjective: Pt states he feels \"great\" about going home tomorrow.  Pt states he was able to  get up and get ready in the bathroom on his own this morning and it went well.    Pain  Pain: denies pain    OBJECTIVE:            Transfers  Sit to Stand: Modified independent  Stand to Sit: Modified independent  Car Transfer: Supervision (Difficulty standing up from low car seat simulator requiring Vcs for handplacement.)    Ambulation  Surface: Level tile;Carpet;Uneven  Device: Rolling Walker  Assistance: Modified Independent  Quality of Gait: B flexed knees,  L toe in, fwd flexed trunk with increased UE support on WW  Distance: 200ft    Stairs/Curb  Stairs?: Yes  Stairs  # Steps : 4  Stairs Height: 6\"  Rails: Right ascending (B grasp)  Assistance: Stand by assistance  Comment: Ascends fwd/descends retro        PT Exercises  PROM Exercises: hamstring stretches in seated [id number] Malick  Issued for HEP  A/AROM Exercises: seated AP, LAQ, march, hip add, hip abd x20 ea  Reviewed and isued for HEP.         Education Provided: Home Exercise Program  Education  Education Given To: Patient  Education Provided: Home Exercise Program  Education Provided Comments: Reviewed and issued seated LE therex and hamstring stretches for HEP.  Family not present for observation or training  Education Method: Printed Information/Hand-outs  Barriers to Learning: None  Education Outcome: Verbalized understanding;Demonstrated understanding        ASSESSMENT/PROGRESS TOWARDS GOALS:   Assessment: Pt has met bed chair transfer goal.  Required VCs for handplacement for sit to stand from car simulator.  Pt has et gait and stair goal.    Goals:  Long Term

## 2024-07-17 NOTE — PROGRESS NOTES
OCCUPATIONAL THERAPY  INPATIENT REHAB TREATMENT NOTE  [facility]      NAME: Hamilton Torres  : 1935 (88 y.o.)  MRN: [id number]  CODE STATUS: DNR-CCA  Room: 50R250-01    Date of Service: 2024    Referring Physician: Dr. Troy  Rehab Diagnosis: Impaired mobility and ADLs d/t closed head injury s/p fall    Hospital course:   Comments: Pt is a 89 y/o male who presents to the ED via EMS for evaluation. He says that he recently finished PT/OT rehab for frequent falls. That he was walking in to his house when he lost his balance and fell backwards striking his head. No LOC or amnesia, denies antecedent symptoms. He denies recent illness, fever, vision changes, neck pain, chest pain, abdominal pain, back pain, vomiting, dizziness, numbness or weakness. Pt is currently on antiplatelets.      Restrictions  Restrictions/Precautions  Restrictions/Precautions: Fall Risk     Patient's date of birth confirmed: Yes    SAFETY:  Safety Devices  Safety Devices in place: Not Applicable (Patient Independent in room.)    SUBJECTIVE: \"We play [place] and [place] ball. We do that word game (wordle).\"    Pain at start of treatment: No    Pain at end of treatment: No    COGNITION:  Orientation  Overall Orientation Status: Within Functional Limits  Cognition  Overall Cognitive Status: WFL  Arousal/Alertness: Appropriate responses to stimuli  Following Commands: Follows one step commands consistently  Attention Span: Appears intact  Memory: Appears intact  Safety Judgement: Appears intact  Problem Solving: Assistance required to identify errors made  Insights: Fully aware of deficits  Initiation: Appears intact  Sequencing: Does not require cues  Cognition Comment: Comprehension: MOD I   Expression: MOD I   Social Interaction: IND   Problem Solving: Supervision   Memory: Supervision    OBJECTIVE:    HiQ Activity:  Patient engaged in B FM coordination/strengthening, B UE ROM/strengthening and cognition to increase

## 2024-07-17 NOTE — PROGRESS NOTES
Hospitalist Progress Note      PCP: Cesilia Martínez MD    Date of Admission: 7/6/2024    Chief Complaint: weakness     Subjective: patient eating lunch    Medications:  Reviewed    Infusion Medications    sodium chloride       Scheduled Medications    lactobacillus  2 capsule Oral TID    ciprofloxacin  500 mg Oral 2 times per day    carbidopa-levodopa  1 tablet Oral BID    Vitamin D  2,000 Units Oral Dinner    cyanocobalamin  1,000 mcg IntraMUSCular Weekly    coenzyme Q10  100 mg Oral Daily    lidocaine  3 patch TransDERmal Daily    melatonin  3 mg Oral Nightly    carvedilol  6.25 mg Oral BID    clopidogrel  75 mg Oral Daily    donepezil  5 mg Oral Nightly    dorzolamide  1 drop Both Eyes BID    finasteride  5 mg Oral Daily    isosorbide mononitrate  30 mg Oral Daily    latanoprost  1 drop Both Eyes Daily    lisinopril  10 mg Oral Daily    pravastatin  80 mg Oral QPM    vitamin B-12  500 mcg Oral Daily    timolol  1 drop Both Eyes BID     PRN Meds: sodium chloride flush, sodium chloride, ondansetron **OR** ondansetron, polyethylene glycol, acetaminophen **OR** [DISCONTINUED] acetaminophen, hydrALAZINE, albuterol sulfate HFA    No intake or output data in the 24 hours ending 07/17/24 1257    Exam:    BP (!) 138/59   Pulse 66   Temp (!) 0.5 °F (-17.5 °C) (Oral)   Resp 16   Ht 1.753 m (5' 9\")   Wt 78.7 kg (173 lb 6.4 oz)   SpO2 98%   BMI 25.61 kg/m²     General appearance: No apparent distress, appears stated age and cooperative.  HEENT: Pupils equal, round, and reactive to light. Conjunctivae/corneas clear.  Neck: Supple, with full range of motion. No jugular venous distention. Trachea midline.  Respiratory:  Normal respiratory effort. Clear to auscultation, bilaterally without Rales/Wheezes/Rhonchi.  Cardiovascular: Regular rate and rhythm with normal S1/S2 without murmurs, rubs or gallops.  Abdomen: Soft, non-tender, non-distended with normal bowel sounds.  Musculoskeletal: No clubbing, cyanosis or  report seeing   HTN- well controlled with currne tmedications  CAD/NSTEMI, chronically elevated troponins without dyspnea/CP- following by dr Duvall as outpatient- continue with statin/plavix/B blocker   Knee OA- PT, pain meds PRN  Baseline dementia- supportive care, on arisept  Parkinson- sinemet per neurology service   Medically stable for acute admission to [facility] rehab    TTS: 45 minutes where I focused more than 75% of my attention on rendering care, and planning treatment course for this patient, in addition to talking to RN team, mid levels, consulting with other physicians and following up on labs and imaging.    Electronically signed by Mario Troncoso MD on 7/17/2024 at 12:57 PM

## 2024-07-17 NOTE — CARE COORDINATION
KLAPESH spoke with Taylor at [facility] earlier and she confirmed they are able to accept pt. She also stated that pt's medications need to be called in to [facility], OZZYW notified RN. OZZYW spoke with dtr Olga and confirmed d/c for tomorrow. She would like for pt to complete OP therapy with [facility] as they have their own onsite team. DME was discussed and dtr is requesting a lightweight 18\" wheelchair if possible, LSW notified PMR. Olga plans to be in tomorrow around 6PM to discharge pt. Electronically signed by BREANNA Rahman, KALPESH on 7/17/2024 at 4:57 PM

## 2024-07-17 NOTE — PROGRESS NOTES
OCCUPATIONAL THERAPY  INPATIENT REHAB TREATMENT NOTE  [facility]      NAME: Hamilton Torres  : 1935 (88 y.o.)  MRN: [id number]  CODE STATUS: DNR-CCA  Room: R250R250-01    Date of Service: 2024    Referring Physician: Dr. Troy  Rehab Diagnosis: Impaired mobility and ADLs d/t closed head injury s/p fall    Hospital course:   Comments: Pt is a 89 y/o male who presents to the ED via EMS for evaluation. He says that he recently finished PT/OT rehab for frequent falls. That he was walking in to his house when he lost his balance and fell backwards striking his head. No LOC or amnesia, denies antecedent symptoms. He denies recent illness, fever, vision changes, neck pain, chest pain, abdominal pain, back pain, vomiting, dizziness, numbness or weakness. Pt is currently on antiplatelets.      Restrictions  Restrictions/Precautions  Restrictions/Precautions: Fall Risk     Patient's date of birth confirmed: Yes    SAFETY:  Safety Devices  Safety Devices in place: Not Applicable (Patient Independent in room.)    SUBJECTIVE: \"I use this light (bedside) all the time.\"    Pain at start of treatment: No    Pain at end of treatment: No    COGNITION:  Orientation  Overall Orientation Status: Within Functional Limits  Cognition  Overall Cognitive Status: WFL  Arousal/Alertness: Appropriate responses to stimuli  Following Commands: Follows one step commands consistently  Attention Span: Appears intact  Memory: Appears intact  Safety Judgement: Appears intact  Problem Solving: Assistance required to identify errors made  Insights: Fully aware of deficits  Initiation: Appears intact  Sequencing: Does not require cues  Cognition Comment: Comprehension: MOD I   Expression: MOD I   Social Interaction: IND   Problem Solving: Supervision   Memory: Supervision    OBJECTIVE:    Grooming/Oral Hygiene  Assistance Level: Modified independent  Upper Extremity Bathing  Assistance Level: Modified independent  Lower  Out 0930         Minutes 60                   ADL/IADL trainin minutes     Electronically signed by:    DEISY Hernandez,   2024, 9:46 AM

## 2024-07-17 NOTE — PROGRESS NOTES
[facility]   [facility]  Speech Language Pathology  Discharge Report        Patient: Hamilton Torres  : 1935    Date: 2024    Initial Status:  Diet:   Regular diet with thin liquids  Dysphagia Outcome Severity Scale:  Ratin    Speech Therapy Level of Assistance Scale:  Auditory Comprehension:  Rating: Modified Independent  Verbal Expression:  Rating:Minimal Assistance  Motor Speech:  Rating: Independent  Problem Solving:  Rating: Supervised Assistance  Memory:  Rating: Minimal Assistance      Long Term Goals:  Long Term Goals  Time Frame for Long Term Goals: 2-3 weeks for LOS or until goals met  Goal 1: Patient will demonstrate functional cognitive-linguistic abilities in all opportunities with modified independence in order to safely complete ADLs.        Patient's Response to Therapy:  Patient participated in speech therapy sessions targeting cognition.  Pt had excellent motivation and participation.  He has met all goals.      Discharge Status:  Diet:   ADULT DIET; Regular  ADULT ORAL NUTRITION SUPPLEMENT; HS Snack; Snack; Healthy snack cheese plate fruit plate no junk food please  ADULT ORAL NUTRITION SUPPLEMENT; Breakfast, Dinner; Standard High Calorie/High Protein Oral Supplement  Compensatory Swallowing Strategies : Upright as possible for all oral intake  Dysphagia Outcome Severity Scale:  Ratin    Speech Therapy Level of Assistance Scale:  Auditory Comprehension:  Rating: Modified Independent  Verbal Expression:  Rating:Independent  Motor Speech:  Rating: Independent  Problem Solving:  Rating: Supervised Assistance  Memory:  Rating: Modified Independent        Functional Status at time of Discharge:    Cognition: Patient demonstrates no cognitive deficits.    Language: Patient demonstrates no language deficits.    Motor Speech: Patient demonstrates no motor speech deficits.    Swallow: Patient demonstrates no dysphagia.                                 Patient is  discharged to Assisted Living               [] Recommend continued speech therapy   [x] Speech Therapy is no longer warranted      Signature:  Electronically signed by BENITO Mix on 7/17/2024 at 1:18 PM

## 2024-07-17 NOTE — PROGRESS NOTES
[facility] Neurology Daily Progress Note  Name: Hamilton Torres  Age: 88 y.o.  Gender: male  CodeStatus: DNR-CCA  Allergies: No Known Allergies    Chief Complaint:No chief complaint on file.    Primary Care Provider: Cesilia Martínez MD  InpatientTreatment Team: Treatment Team: Attending Provider: Ingrid Schofield MD; Consulting Physician: Mario Troncoso MD; Consulting Physician: Keith Sanchez MD; Patient Care Tech: Roscoe Salgado; : Loren Gonzalez, MSW, LSW; Occupational Therapist: Toribio Botello OT; Occupational Therapist Assistant: Yahaira Burnett OTA; Registered Nurse: Cortney Randle RN; Occupational Therapist Assistant: Inocente Bueno OTA; Nursing Student: Tracie Vela  Admission Date: 7/6/2024      HPI   Pt seen and examined on rehab for neurology follow-up.  Patient is alert and oriented x 3, no acute distress, cooperative.    Patient with parkinsonian features including masked facies, decreased blink, hypophonia, rigidity with distraction maneuvers, shuffling gait.  Very minimal resting upper extremity tremor baseline blood pressure stable  No dyskinesias. Denies lightheadedness, nausea.   Vitals:    07/17/24 0745   BP: (!) 138/59   Pulse: 66   Resp:    Temp: (!) 0.5 °F (-17.5 °C)   SpO2: 98%        Review of Systems   Constitutional:  Negative for appetite change, chills, fatigue and fever.   HENT:  Negative for hearing loss and trouble swallowing.    Eyes:  Negative for visual disturbance.   Respiratory:  Negative for cough, chest tightness, shortness of breath and wheezing.    Cardiovascular:  Negative for chest pain and palpitations.   Gastrointestinal:  Negative for nausea and vomiting.   Musculoskeletal:  Positive for gait problem. Negative for back pain and neck pain.   Skin:  Negative for color change and rash.   Neurological:  Positive for speech difficulty (hypophonia). Negative for dizziness, tremors, seizures, syncope, facial asymmetry, weakness,

## 2024-07-17 NOTE — PROGRESS NOTES
[facility]  [facility] [facility]  Speech Language Pathology   Treatment Note          Hamilton Torres  1935  R250/R250-01  [x]   confirmed    Date: 2024      Restrictions/Precautions: Fall Risk     ADULT DIET; Regular  ADULT ORAL NUTRITION SUPPLEMENT; HS Snack; Snack; Healthy snack cheese plate fruit plate no junk food please  ADULT ORAL NUTRITION SUPPLEMENT; Breakfast, Dinner; Standard High Calorie/High Protein Oral Supplement     Respiratory Status:   Room air  No active isolations    Rehab Diagnosis:   Impaired mobility and ADL's due to Closed Head Injury s/p fall     Subjective:  Alert, Cooperative, Pleasant, and Motivated        Interventions used this date:  Cognitive Skill Development    Objective/Assessment:  Patient progressing towards goals:  Goal 1: To increase safety awareness and judgment for safe completion of ADLs secondary to patient's cognitive deficits, patient will complete abstract reasoning tasks (i.e. word deduction, convergent and divergent naming, similarities/differences) with 80% accuracy and minimal cues.  Pt identified picture that did not belong in group with 80% accuracy, increasing to 100% accuracy with min cues.  Goal 2: To increase independence for functional activities for home and community, patient will complete mid level executive functioning tasks (i.e. path finding, scheduling appointments, prioritizing tasks) with 80% accuracy and min cues.  Pt sequenced 6 pictures in correct order with 80% accuracy, increasing to 100% accuracy with min cues.  Goal 3: To address patient's cognitive deficits and promote recall of personal and medical information, the patient will answer questions addressing (recent and delayed) recall with 80% accuracy and min cues.  Recalled 3/3 words after 5 minute delay independently.  Goal 4: To decrease patient's cognitive deficits through the use of compensatory strategies, the patient will be educated on 3 different memory

## 2024-07-17 NOTE — PROGRESS NOTES
Physical Therapy Rehab Treatment Note  Facility/Department: [facility]  Room: [email or web address]R2-01       NAME: Hamilton Torres  : 1935 (88 y.o.)  MRN: [id number]  CODE STATUS: DNR-CCA    Date of Service: 2024     Restrictions:  Restrictions/Precautions: Fall Risk    SUBJECTIVE:   Subjective: Pt states he feels \"great\" about going home tomorrow.  Pt states he was able to  get up and get ready in the bathroom on his own this morning and it went well.    Pain  Pain: denies pain      OBJECTIVE:         Bed mobility  Rolling to Left: Independent  Rolling to Right: Independent  Supine to Sit: Independent  Sit to Supine: Independent    Transfers  Sit to Stand: Modified independent  Stand to Sit: Modified independent  Car Transfer: Supervision (Difficulty standing up from low car seat simulator requiring Vcs for handplacement.)    Ambulation  Surface: Level tile;Carpet;Uneven  Device: Rolling Walker  Assistance: Modified Independent;Supervision  Quality of Gait: B flexed knees,  L toe in, fwd flexed trunk with increased UE support on WW  Gait Deviations: Slow Nette;Decreased step height  Distance: [id number] to and from room 250 to therapy gym supervision; multiple short distances around hospital room and in gym indep        PT Exercises  Functional Mobility Circuit Training: TUG 25.39sec with WW  Dynamic Standing Balance Exercises: Standing picking up bean bags off floor with reacher No LOB.  Standing ball bounce/catch SBA 60sec before posterior LOB requiring UE on bar to correct.  Gait drills: F/R/L with B UE support in //bars.         ASSESSMENT/PROGRESS TOWARDS GOALS:   Assessment: Progressing towards TUG goal improving time from initial 45 sec to 25sec.    Goals:  Long Term Goals  Long Term Goal 1: indep and efficient bed mobility-met  Long Term Goal 2: indep sit to stand bed and car transfers-partially met: indep with bed chair transfer.  Supervision car transfer  Long Term Goal 3: indep gait with ww >/= 150 feet

## 2024-07-18 VITALS
WEIGHT: 172.3 LBS | DIASTOLIC BLOOD PRESSURE: 64 MMHG | HEIGHT: 69 IN | HEART RATE: 69 BPM | BODY MASS INDEX: 25.52 KG/M2 | TEMPERATURE: 98.4 F | SYSTOLIC BLOOD PRESSURE: 130 MMHG | OXYGEN SATURATION: 100 % | RESPIRATION RATE: 18 BRPM

## 2024-07-18 PROCEDURE — 6370000000 HC RX 637 (ALT 250 FOR IP): Performed by: PHYSICAL MEDICINE & REHABILITATION

## 2024-07-18 PROCEDURE — 6370000000 HC RX 637 (ALT 250 FOR IP): Performed by: NURSE PRACTITIONER

## 2024-07-18 PROCEDURE — 6370000000 HC RX 637 (ALT 250 FOR IP): Performed by: INTERNAL MEDICINE

## 2024-07-18 PROCEDURE — 97110 THERAPEUTIC EXERCISES: CPT

## 2024-07-18 PROCEDURE — 97530 THERAPEUTIC ACTIVITIES: CPT

## 2024-07-18 PROCEDURE — 97116 GAIT TRAINING THERAPY: CPT

## 2024-07-18 PROCEDURE — 99239 HOSP IP/OBS DSCHRG MGMT >30: CPT | Performed by: PHYSICAL MEDICINE & REHABILITATION

## 2024-07-18 RX ADMIN — LATANOPROST 1 DROP: 50 SOLUTION OPHTHALMIC at 09:13

## 2024-07-18 RX ADMIN — TIMOLOL MALEATE 1 DROP: 5 SOLUTION OPHTHALMIC at 09:13

## 2024-07-18 RX ADMIN — LISINOPRIL 10 MG: 10 TABLET ORAL at 09:08

## 2024-07-18 RX ADMIN — Medication 2 CAPSULE: at 09:07

## 2024-07-18 RX ADMIN — ISOSORBIDE MONONITRATE 30 MG: 30 TABLET, EXTENDED RELEASE ORAL at 09:08

## 2024-07-18 RX ADMIN — CYANOCOBALAMIN TAB 500 MCG 500 MCG: 500 TAB at 09:08

## 2024-07-18 RX ADMIN — FINASTERIDE 5 MG: 5 TABLET, FILM COATED ORAL at 09:08

## 2024-07-18 RX ADMIN — CLOPIDOGREL BISULFATE 75 MG: 75 TABLET ORAL at 09:08

## 2024-07-18 RX ADMIN — CARBIDOPA AND LEVODOPA 1 TABLET: 25; 100 TABLET ORAL at 09:08

## 2024-07-18 RX ADMIN — Medication 2 CAPSULE: at 15:55

## 2024-07-18 RX ADMIN — Medication 100 MG: at 09:07

## 2024-07-18 RX ADMIN — CARVEDILOL 6.25 MG: 6.25 TABLET, FILM COATED ORAL at 09:07

## 2024-07-18 RX ADMIN — CIPROFLOXACIN 500 MG: 500 TABLET, FILM COATED ORAL at 09:07

## 2024-07-18 RX ADMIN — Medication 2000 UNITS: at 15:55

## 2024-07-18 NOTE — DISCHARGE INSTRUCTIONS
Take medications as prescribed. Keep all follow up appointments. Activity as per therapy guidelines/recommendations.

## 2024-07-18 NOTE — FLOWSHEET NOTE
Patient assessment completed. Patient is A&Ox4, able to make needs known, denies pain/sob. Patient is continent of B&B, up with 1 person SBA and FWW to bathroom, independent in room. Patient began ATB therapy for UTI (po Cipro), tolerating well so far, afebrile, no s/s of adverse reaction. Currently resting quietly in bed, call light in reach, will monitor.  Electronically signed by Mei Zamora RN on 7/17/2024 at 8:36 PM

## 2024-07-18 NOTE — PROGRESS NOTES
[facility] OCCUPATIONAL THERAPY DISCHARGE SUMMARY- REHAB     Date: 2024  Patient Name: Hamilton Torres        MRN: [id number]  Account: [id number]   : 1935  (88 y.o.)  Room: [place] [address]    Diagnosis:  Impaired mobility and ADLs d/t closed head injury s/p fall    Past Medical History:   Diagnosis Date    Anticoagulant long-term use     Anxiety     CAD (coronary artery disease)     Carotid stenosis     2013 16-49%    Cervical disc disorder     CHF (congestive heart failure) (HCC)     COVID-19 virus infection 2023    Dementia (HCC)     Dementia (HCC)     Hyperlipidemia     Hypertension     MI (myocardial infarction) (HCC)     Osteoarthritis      Past Surgical History:   Procedure Laterality Date    CARPAL TUNNEL RELEASE      CATARACT REMOVAL      COLONOSCOPY  12/10/10,2007    DR MATTHEWS - DONE AT [facility]    COLONOSCOPY      hx polyps needs     COLONOSCOPY  9/21/15     DR. YARBROUGH     CORONARY ANGIOPLASTY WITH STENT PLACEMENT  2020    DIAGNOSTIC CARDIAC CATH LAB PROCEDURE      HERNIA REPAIR Bilateral     x 2    HERNIA REPAIR Right 2020    RIGHT INGUINAL HERNIA REPAIR performed by Roscoe Copeland MD at [facility] OR    PTCA         Precautions:   Restrictions/Precautions: Fall Risk     Social/Functional History:  Social/Functional History  Lives With: Alone  Type of Home: Assisted living ([facility])  Home Layout: One level  Home Access: Level entry  Bathroom Shower/Tub: Walk-in shower, Curtain  Bathroom Toilet: Standard  Bathroom Equipment: Hand-held shower, Grab bars in shower, Built-in shower seat  Bathroom Accessibility: Wheelchair accessible  Home Equipment: Cane, Grab bars, Reacher, Walker - Rolling  Has the patient had two or more falls in the past year or any fall with injury in the past year?: Yes  ADL Assistance: Independent  Homemaking Assistance: Needs assistance  Homemaking Responsibilities: No  Bill Paying/Finance Responsibility: No (dtr  manages)  Health Care Management: No (nurses manage)  Ambulation Assistance: Independent (uses WW)  Transfer Assistance: Independent  Active : No  Patient's  Info: dtr, facility  Mode of Transportation: Bus, Car  Education: High school  Occupation: Retired  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 R handed; walks outside with WW    Current Functional Status:  ADL  Feeding: Modified independent   Grooming: Modified independent   UE Bathing: Modified independent   LE Bathing: Modified independent   UE Dressing: Independent  LE Dressing: Modified independent   Toileting: Modified independent   Functional Mobility: Modified independent     Toilet Transfers  Toilet - Technique: Ambulating  Equipment Used: Standard toilet  Toilet Transfer: Modified independent  Toilet Transfers Comments: aisha - tania august     Shower Transfers  Shower - Transfer From: Walker  Shower - Transfer Type: To and From  Shower - Transfer To: Shower seat with back  Shower - Technique: Ambulating  Shower Transfers: Modified independence  Shower Transfers Comments: tania august    Orientation Status:  Orientation  Overall Orientation Status: Within Functional Limits  Orientation Level: Oriented to person, Oriented to time, Oriented to situation, Oriented to place  Orientation  Overall Orientation Status: Within Functional Limits    Cognition Status:  Cognition  Overall Cognitive Status: WFL  Arousal/Alertness: Appropriate responses to stimuli  Following Commands: Follows one step commands consistently  Attention Span: Appears intact  Memory: Appears intact  Safety Judgement: Appears intact  Problem Solving: Assistance required to identify errors made  Insights: Fully aware of deficits  Initiation: Appears intact  Sequencing:

## 2024-07-18 NOTE — PLAN OF CARE
Problem: Safety - Adult  Goal: Free from fall injury  7/18/2024 1126 by Marisela Drake RN  Outcome: Progressing  7/17/2024 2232 by Mei Zamora RN  Outcome: Progressing     Problem: Discharge Planning  Goal: Discharge to home or other facility with appropriate resources  7/18/2024 1126 by Marisela Drake RN  Outcome: Progressing  Flowsheets (Taken 7/18/2024 0910)  Discharge to home or other facility with appropriate resources:   Identify barriers to discharge with patient and caregiver   Arrange for needed discharge resources and transportation as appropriate   Identify discharge learning needs (meds, wound care, etc)   Refer to discharge planning if patient needs post-hospital services based on physician order or complex needs related to functional status, cognitive ability or social support system  7/17/2024 2232 by Mei Zamora RN  Outcome: Progressing     Problem: ABCDS Injury Assessment  Goal: Absence of physical injury  7/18/2024 1126 by Marisela Drake RN  Outcome: Progressing  7/17/2024 2232 by Mei Zamora RN  Outcome: Progressing     Problem: Skin/Tissue Integrity  Goal: Absence of new skin breakdown  Description: 1.  Monitor for areas of redness and/or skin breakdown  2.  Assess vascular access sites hourly  3.  Every 4-6 hours minimum:  Change oxygen saturation probe site  4.  Every 4-6 hours:  If on nasal continuous positive airway pressure, respiratory therapy assess nares and determine need for appliance change or resting period.  7/18/2024 1126 by Marisela Drake RN  Outcome: Progressing  7/17/2024 2232 by Mei Zamora RN  Outcome: Progressing     Problem: Chronic Conditions and Co-morbidities  Goal: Patient's chronic conditions and co-morbidity symptoms are monitored and maintained or improved  7/18/2024 1126 by Marisela Drake RN  Outcome: Progressing  7/17/2024 2232 by Mei Zamora RN  Outcome: Progressing     Problem: Pain  Goal: Verbalizes/displays adequate

## 2024-07-18 NOTE — PROGRESS NOTES
Hospitalist Progress Note      PCP: Cesilia Martínez MD    Date of Admission: 7/6/2024    Chief Complaint: weakness    Subjective: patient eating lunch    Medications:  Reviewed    Infusion Medications    sodium chloride       Scheduled Medications    lactobacillus  2 capsule Oral TID    ciprofloxacin  500 mg Oral 2 times per day    carbidopa-levodopa  1 tablet Oral BID    Vitamin D  2,000 Units Oral Dinner    cyanocobalamin  1,000 mcg IntraMUSCular Weekly    coenzyme Q10  100 mg Oral Daily    lidocaine  3 patch TransDERmal Daily    melatonin  3 mg Oral Nightly    carvedilol  6.25 mg Oral BID    clopidogrel  75 mg Oral Daily    donepezil  5 mg Oral Nightly    dorzolamide  1 drop Both Eyes BID    finasteride  5 mg Oral Daily    isosorbide mononitrate  30 mg Oral Daily    latanoprost  1 drop Both Eyes Daily    lisinopril  10 mg Oral Daily    pravastatin  80 mg Oral QPM    vitamin B-12  500 mcg Oral Daily    timolol  1 drop Both Eyes BID     PRN Meds: sodium chloride flush, sodium chloride, ondansetron **OR** ondansetron, polyethylene glycol, acetaminophen **OR** [DISCONTINUED] acetaminophen, hydrALAZINE, albuterol sulfate HFA    No intake or output data in the 24 hours ending 07/18/24 1221    Exam:    /64   Pulse 69   Temp 98.4 °F (36.9 °C) (Oral)   Resp 18   Ht 1.753 m (5' 9\")   Wt 78.2 kg (172 lb 4.8 oz)   SpO2 100%   BMI 25.44 kg/m²     General appearance: No apparent distress, appears stated age and cooperative.  HEENT: Pupils equal, round, and reactive to light. Conjunctivae/corneas clear.  Neck: Supple, with full range of motion. No jugular venous distention. Trachea midline.  Respiratory:  Normal respiratory effort. Clear to auscultation, bilaterally without Rales/Wheezes/Rhonchi.  Cardiovascular: Regular rate and rhythm with normal S1/S2 without murmurs, rubs or gallops.  Abdomen: Soft, non-tender, non-distended with normal bowel sounds.  Musculoskeletal: No clubbing, cyanosis or edema

## 2024-07-18 NOTE — CARE COORDINATION
PIPO spoke with Mira at [facility] and they confirmed they received the information on this pt this am. Electronically signed by Darshana Nolan RN on 7/18/2024 at 10:39 AM

## 2024-07-18 NOTE — CARE COORDINATION
CM faxed information over to [facility] for the w/c. Pt stated he is ready to go home. Pt filled the survey. Electronically signed by Darshana Nolan RN on 7/18/2024 at 12:34 PM

## 2024-07-18 NOTE — DISCHARGE INSTR - COC
Continuity of Care Form    Patient Name: Hamilton Torres   :  1935  MRN:  [id number]    Admit date:  2024  Discharge date:  24    Code Status Order: DNR-CCA   Advance Directives:     Admitting Physician:  Ingrid Schofield MD  PCP: Cesilia Martínez MD    Discharging Nurse: Chelsea Drake RN  Discharging Hospital Unit/Room#: R250/R250-01  Discharging Unit Phone Number: [phone]    Emergency Contact:   Extended Emergency Contact Information  Primary Emergency Contact: Olga Garcia   [facility]  Home Phone: [phone]  Mobile Phone: [phone]  Relation: Child  Secondary Emergency Contact: Wilton Torres   [facility]  Home Phone: [phone]  Relation: Child    Past Surgical History:  Past Surgical History:   Procedure Laterality Date    CARPAL TUNNEL RELEASE      CATARACT REMOVAL      COLONOSCOPY  12/10/10,    DR MATTHEWS - DONE AT [facility]    COLONOSCOPY      hx polyps needs     COLONOSCOPY  9/21/15     DR. YARBROUGH     CORONARY ANGIOPLASTY WITH STENT PLACEMENT  2020    DIAGNOSTIC CARDIAC CATH LAB PROCEDURE      HERNIA REPAIR Bilateral     x 2    HERNIA REPAIR Right 2020    RIGHT INGUINAL HERNIA REPAIR performed [facility] Ros[facility]         Immunization History:   Immunization History   Administered Date(s) Administered    COVID-19, PFIZER PURPLE top, DILUTE for use, (age 12 y+), 30mcg/0.3mL 2021, 2021, 10/18/2021    Influenza 10/12/2011, 10/08/2012, 10/10/2013    Influenza Virus Vaccine 10/16/2014    Influenza, FLUAD, (age 65 y+), Adjuvanted, 0.5mL 10/27/2020    Influenza, High Dose (Fluzone 65 yrs and older) 2015, 2016, 2017, 10/20/2018    Influenza, Triv, inactivated, subunit, adjuvanted, IM (Fluad 65 yrs and older) 10/08/2019    Pneumococcal, PCV-13, PREVNAR 13, (age 6w+), IM, 0.5mL 2016    Pneumococcal, PPSV23, PNEUMOVAX 23, (age 2y+), SC/IM, 0.5mL 10/16/2014  Status:  oriented, alert, and able to concentrate and follow conversation    IV Access:  - None    Nursing Mobility/ADLs:  Walking   Independent  Transfer  Independent  Bathing  Independent  Dressing  Independent  Toileting  Independent  Feeding  Independent  Med Admin  Independent  Med Delivery   whole    Wound Care Documentation and Therapy:        Elimination:  Continence:   Bowel: Yes  Bladder: Yes  Urinary Catheter: None   Colostomy/Ileostomy/Ileal Conduit: No       Date of Last BM: 7/18/24  No intake or output data in the 24 hours ending 07/18/24 1132  No intake/output data recorded.    Safety Concerns:     History of Falls (last 30 days) and At Risk for Falls    Impairments/Disabilities:      Vision and Hearing    Nutrition Therapy:  Current Nutrition Therapy:   - Oral Diet:  General    Routes of Feeding: Oral  Liquids: Thin Liquids  Daily Fluid Restriction: no  Last Modified Barium Swallow with Video (Video Swallowing Test): not done    Treatments at the Time of Hospital Discharge:   Respiratory Treatments: N/A  Oxygen Therapy:  is not on home oxygen therapy.  Ventilator:    - No ventilator support    Rehab Therapies: Physical Therapy, Occupational Therapy, and Speech/Language Therapy  Weight Bearing Status/Restrictions: No weight bearing restrictions  Other Medical Equipment (for information only, NOT a DME order):  walker  Other Treatments: N/A    Patient's personal belongings (please select all that are sent with patient):  Glasses, and all other personal belongings sent home with patient.    RN SIGNATURE:  Electronically signed by Marisela Drake RN on 7/18/24 at 11:35 AM EDT    CASE MANAGEMENT/SOCIAL WORK SECTION    Inpatient Status Date: ***    Readmission Risk Assessment Score:  Readmission Risk              Risk of Unplanned Readmission:  14           Discharging to Facility/ Agency   Name:   Address:  Phone:  Fax:    Dialysis Facility (if applicable)   Name:  Address:  Dialysis

## 2024-07-18 NOTE — PROGRESS NOTES
Physical Therapy Rehab Treatment Note  [place] [facility]  [place]: [place] [address]       NAME: Hamilton Torres  : 1935 (88 y.o.)  MRN: [id number]  CODE STATUS: DNR-CCA    Date of Service: 2024  Chart Reviewed: Yes    Restrictions:  Restrictions/Precautions: Fall Risk       SUBJECTIVE:   Subjective: I'm having a good day.    Pain  Pain: denies pain       OBJECTIVE:   Orientation  Overall Orientation Status: Within Functional Limits  Cognition  Overall Cognitive Status: WFL  Arousal/Alertness: Appropriate responses to stimuli  Following Commands: Follows one step commands consistently  Attention Span: Appears intact  Memory: Appears intact  Safety Judgement: Appears intact  Problem Solving: Assistance required to identify errors made  Insights: Fully aware of deficits  Initiation: Appears intact  Sequencing: Does not require cues  Cognition Comment: Comprehension: MOD I   Expression: MOD I   Social Interaction: IND   Problem Solving: Supervision   Memory: Supervision         Bed mobility  Rolling to Left: Independent  Rolling to Right: Independent  Supine to Sit: Independent  Sit to Supine: Independent  Bed Mobility Comments: Bed flat without use of hand rails;    Transfers  Sit to Stand: Modified independent  Stand to Sit: Modified independent  Bed to Chair: Modified independent  Comment: vc's for technique; good hand placement; increased time to complete transition.    Ambulation  Surface: Level tile;Carpet;Uneven;Ramp  Device: Rolling Walker  Assistance: Modified Independent;Supervision  Quality of Gait: B flexed knees with increased UE support on WW; vc's for posture and scanning environment  Gait Deviations: Slow Nette;Decreased step height  Distance: [id number] to and from room 250 to therapy gym supervision; multiple short distances around hospital room and in gym indep  Comments: Verbal/tactile cues for upright posture and to ambulate closer to WW.    Stairs  # Steps : 4  Stairs Height:

## 2024-07-18 NOTE — PROGRESS NOTES
Pt's daughter here to drive pt back to [facility]. Discharge instructions and medications reviewed, AVS provided. Pt in stable condition, taken via w/c to hospital exit by transport personnel with all personal belongings. Electronically signed by Marisela Drake RN on 7/18/2024 at 6:44 PM

## 2024-07-18 NOTE — CARE COORDINATION
PIPO spoke with Olga(dtr) that the pt is refusing the w/c that was ordered for him. Olga had questions about meds being sent to [facility] and PIPO called and spoke with Mira and she is checking into this currently. Electronically signed by Darshana Nolan RN on 7/18/2024 at 3:21 PM

## 2024-07-18 NOTE — DISCHARGE INSTR - DIET
Good nutrition is important when healing from an illness, injury, or surgery.  Follow any nutrition recommendations given to you during your hospital stay.   If you were given an oral nutrition supplement while in the hospital, continue to take this supplement at home.  You can take it with meals, in-between meals, and/or before bedtime. These supplements can be purchased at most local grocery stores, pharmacies, and chain [employer]-stores.   If you have any questions about your diet or nutrition, call the hospital and ask for the dietitian.      Regularl diet with high calorie/high protein supplements (like Ensure)

## 2024-07-18 NOTE — DISCHARGE SUMMARY
Subjective:   The patient complains of severe acute on chronic progressive fatigue and post fall neck and headache partially relieved by rest, medications, PT,  OT,   SLP and rest and exacerbated by recent closed head injury.  He is supposed sequently been diagnosed with a UTI and Parkinson's flare and is being treated for both.    I have been concerned about patient’s medical complexities and barriers to advancing in rehab goals including addressing poor balance and falls risk.        I discussed current functional, rehabilitation, medical status with other rehabilitation providers, neurology consult,   nursing and case management.  According to recent   note, \"assessment completed. VSS taken.  Patient is A&O x4. Pt denies having any pain or discomfort. Medications given per MAR. Call light in reach, bed alarm engaged, bed locked and in lowest position. Pt denies any other needs at this time. \"      I have asked the nurses again to check postvoid residuals.  He has progressed to be I in the room.    DISCHARGE SUMMARY    Hospital Course: The patient was admitted to the Rehabilitation Unit to address ADL and mobility deficits-as detailed above and below.  The patient was enrolled in acute PT, OT program.  Weekly team meetings were held to assess functional progress toward their goals-and modify the therapy program.  The patient's medical, emotional, psychosocial and functional issues were addressed.  The patient progressed in the rehab program and is now ready for discharge home.  Refer to functional assessments summary report for detailed functional status.  Refer to the medical problem list below to see the medical issues addressed.  The social and DC complexities are detailed in the DC planning section below.    Greater than 3 5 minutes was spent on coordinating patients discharge including follow-up care, medications and patient/family education.  Extended time needed because of the potential use of controlled  or any previous visit (from the past 24 hour(s)).        Previous extensive, complex labs, notes and diagnostics reviewed and analyzed.     ALLERGIES:    Allergies as of 07/06/2024    (No Known Allergies)      (please also verify by checking MAR)       Complex Physical Medicine & Rehab Issues Assessed & Planned during rehab stay included:   Severe abnormality of gait and mobility and impaired self-care and ADL's secondary to progressive weakness dt [facility] .  Functional and medical status have improved SP acute rehab at [facility] in [facility].  Patient has met functional goals stabilize medically and is ready for discharge home.  Bowel progressive constipation, and Bladder dysfunction monitoring neurogenic bladder:  frequent toileting, ambulate to bathroom with assistance, check post void residuals.  Check for C.difficile x1 if >2 loose stools in 24 hours, continue bowel & bladder program.  Monitor bowel and bladder function.  Lactinex 2 PO every AC.  MOM prn, Brown Bomb prn, Glycerin suppository prn, enema prn.  Cipro for UTI -awaiting urine culture.   Severe low back pain as well as generalized OA pain: reassess pain every shift and prior to and after each therapy session, give prn Tylenol and  , modalities prn in therapy, Lidoderm, K-pad prn.   Skin healing--redness right heel (mepilex), bruising to right shoulder/flank, small healing abrasion to back of head, and tear to right buttocks/gluteal cleft and breakdown risk:  continue pressure relief program.  Daily skin exams and reports from nursing.  Severe fatigue due to nutritional and hydration deficiency:   vitamin B12 vitamin D and CoQ10 continue to monitor I&O’s, calorie counts prn, dietary consult prn.    healthy HS snack.  Acute episodic insomnia with situational adjustment disorder:  consider prn low dose Ambien, monitor for day time sedation.    HS \"Tuck In\"  Falls risk elevated:  patient to use call light to get nursing assistance to get up, bed and

## 2024-07-18 NOTE — PROGRESS NOTES
OCCUPATIONAL THERAPY  INPATIENT REHAB TREATMENT NOTE  [facility]      NAME: Hamilton Torres  : 1935 (88 y.o.)  MRN: [id number]  CODE STATUS: DNR-CCA  Room: 50R250-01    Date of Service: 2024    Referring Physician: Dr. Troy  Rehab Diagnosis: Impaired mobility and ADLs d/t closed head injury s/p fall    Hospital course:   Comments: Pt is a 87 y/o male who presents to the ED via EMS for evaluation. He says that he recently finished PT/OT rehab for frequent falls. That he was walking in to his house when he lost his balance and fell backwards striking his head. No LOC or amnesia, denies antecedent symptoms. He denies recent illness, fever, vision changes, neck pain, chest pain, abdominal pain, back pain, vomiting, dizziness, numbness or weakness. Pt is currently on antiplatelets.      Restrictions  Restrictions/Precautions  Restrictions/Precautions: Fall Risk     Patient's date of birth confirmed: Yes    SAFETY:  Safety Devices  Safety Devices in place: Not Applicable (Patient Independent in room.)    SUBJECTIVE:  \"Let's work on my balance.\"    Pain at start of treatment: No    Pain at end of treatment: No    COGNITION:  Orientation  Overall Orientation Status: Within Functional Limits  Cognition  Overall Cognitive Status: WFL  Arousal/Alertness: Appropriate responses to stimuli  Following Commands: Follows one step commands consistently  Attention Span: Appears intact  Memory: Appears intact  Safety Judgement: Appears intact  Problem Solving: Assistance required to identify errors made  Insights: Fully aware of deficits  Initiation: Appears intact  Sequencing: Does not require cues  Cognition Comment: Comprehension: MOD I   Expression: MOD I   Social Interaction: IND   Problem Solving: Supervision   Memory: Supervision    OBJECTIVE:    Patient issued UE strengthening HEP.  Patient declined to complete exercises at this time.  Patient instead requested to work on dynamic standing

## 2024-07-19 NOTE — PROGRESS NOTES
Facility/Department: [facility]  Physical Therapy Acute Rehab Discharge Summary  [facility]    NAME: Hamilton Torres  : 1935  MRN: [id number]    Admission Date: 2024  5:12 PM  Discharge Date: 24    Rehab Diagnosis(es):  Impaired mobility & ADLs dt [facility]   Patient Active Problem List    Diagnosis Date Noted    Congestive heart failure ([facility]) 2020    Parkinsonism ([facility]) 2024    Parkinsonian features 2024    Impaired mobility & ADLs dt [facility] 2024    Cervical spinal stenosis 2024    Falls frequently 2024    CNVM (choroidal neovascular membrane), bilateral 2023    Right inguinal hernia 2020    Syncope and collapse 2020    Post PTCA     Bilateral carotid bruits 2020    Unstable angina ([facility]) 2020    ACS (acute coronary syndrome) ([facility]) 2020    Non-STEMI (non-ST elevated myocardial infarction) ([facility]) 2020    CHF (congestive heart failure) ([facility])     Coronary artery disease involving native coronary artery of native heart without angina pectoris 2019    Dizziness     Anginal equivalent ([facility]) 2018    S/P cardiac cath 2018    Angina, class III ([facility])     Chest pain 2018    HESS (dyspnea on exertion) 2018    Leg edema 2018    Acute diastolic heart failure ([facility]) 2018    Essential hypertension 2018    NSTEMI (non-ST elevated myocardial infarction) ([facility]) 2018    Hypertensive urgency 2018    Dementia ([facility])     Carotid stenosis, bilateral 2013    Hyperlipidemia        Past Medical History:   Diagnosis Date    Anticoagulant long-term use     Anxiety     CAD (coronary artery disease)     Carotid stenosis     2013 16-49%    Cervical disc disorder     CHF (congestive heart failure) ([facility])     COVID-19 virus infection 2023    Dementia ([facility])     Dementia ([facility])     Hyperlipidemia     Hypertension     MI (myocardial infarction) ([facility])     Osteoarthritis      Past Surgical

## 2024-09-30 ENCOUNTER — OFFICE VISIT (OUTPATIENT)
Dept: NEUROLOGY | Age: 89
End: 2024-09-30
Payer: MEDICARE

## 2024-09-30 VITALS
DIASTOLIC BLOOD PRESSURE: 70 MMHG | HEART RATE: 78 BPM | BODY MASS INDEX: 26.43 KG/M2 | SYSTOLIC BLOOD PRESSURE: 136 MMHG | WEIGHT: 179 LBS

## 2024-09-30 DIAGNOSIS — G20.A2 PARKINSON'S DISEASE WITHOUT DYSKINESIA, WITH FLUCTUATING MANIFESTATIONS (HCC): Primary | ICD-10-CM

## 2024-09-30 DIAGNOSIS — R25.1 TREMORS OF NERVOUS SYSTEM: ICD-10-CM

## 2024-09-30 DIAGNOSIS — R26.0 ATAXIC GAIT: ICD-10-CM

## 2024-09-30 PROCEDURE — 1036F TOBACCO NON-USER: CPT | Performed by: PSYCHIATRY & NEUROLOGY

## 2024-09-30 PROCEDURE — G8427 DOCREV CUR MEDS BY ELIG CLIN: HCPCS | Performed by: PSYCHIATRY & NEUROLOGY

## 2024-09-30 PROCEDURE — 1123F ACP DISCUSS/DSCN MKR DOCD: CPT | Performed by: PSYCHIATRY & NEUROLOGY

## 2024-09-30 PROCEDURE — G8417 CALC BMI ABV UP PARAM F/U: HCPCS | Performed by: PSYCHIATRY & NEUROLOGY

## 2024-09-30 PROCEDURE — 99214 OFFICE O/P EST MOD 30 MIN: CPT | Performed by: PSYCHIATRY & NEUROLOGY

## 2024-09-30 RX ORDER — VITAMIN B COMPLEX
100 TABLET ORAL DAILY
COMMUNITY

## 2024-09-30 RX ORDER — TIMOLOL MALEATE 5 MG/ML
SOLUTION/ DROPS OPHTHALMIC
COMMUNITY
Start: 2024-07-18

## 2024-09-30 NOTE — PROGRESS NOTES
Subjective:      Patient ID: Hamilton Torres is a 88 y.o. male who presents today for:  Chief Complaint   Patient presents with    Follow-Up from Hospital     Pt reports no falls since hospital stay in July 2024. Pt uses his walker everyday. Pt does shuffle his feet a little while walking.        HPI 88-year-old right-handed gentleman with history of frequent falls.  Falls were thought to be secondary to arthritis of the knees and knees giving out.  Patient was then transferred to [facility] rehab bed we had seen him and there have been some concerns about parkinsonian features    Patient now continues on carbidopa levodopa 2 a day and is doing much better.  His walking is better is walking with a walker denies any hallucinations dizzy spells.  His tremors are actually improved considerably since we started the carbidopa levodopa.  Still has walking issues secondary to his knees.  Is here with his son.      Past Medical History:   Diagnosis Date    Anticoagulant long-term use     Anxiety     CAD (coronary artery disease)     Carotid stenosis     2/2013 16-49%    Cervical disc disorder     CHF (congestive heart failure) ([facility])     COVID-19 virus infection 01/21/2023    Dementia ([facility])     Dementia ([facility])     Hyperlipidemia     Hypertension     MI (myocardial infarction) ([facility])     Osteoarthritis      Past Surgical History:   Procedure Laterality Date    CARPAL TUNNEL RELEASE  1998    CATARACT REMOVAL  2007    COLONOSCOPY  12/10/10,2007    DR MATTHEWS - DONE AT [facility]    COLONOSCOPY  2007    hx polyps needs 2015    COLONOSCOPY  9/21/15     DR. YARBROUGH     CORONARY ANGIOPLASTY WITH STENT PLACEMENT  06/07/2020    DIAGNOSTIC CARDIAC CATH LAB PROCEDURE      HERNIA REPAIR Bilateral     x 2    HERNIA REPAIR Right 11/13/2020    RIGHT INGUINAL [place]ERNIA REPAIR [place] [facility] [place]    [facility]       Social History     Socioeconomic History    Marital status:      Spouse name: Not on file    Number of

## 2024-10-17 ENCOUNTER — OFFICE VISIT (OUTPATIENT)
Dept: CARDIOLOGY CLINIC | Age: 89
End: 2024-10-17
Payer: MEDICARE

## 2024-10-17 VITALS
SYSTOLIC BLOOD PRESSURE: 138 MMHG | RESPIRATION RATE: 16 BRPM | HEART RATE: 60 BPM | BODY MASS INDEX: 25.99 KG/M2 | OXYGEN SATURATION: 97 % | DIASTOLIC BLOOD PRESSURE: 68 MMHG | WEIGHT: 176 LBS

## 2024-10-17 DIAGNOSIS — R09.89 BILATERAL CAROTID BRUITS: ICD-10-CM

## 2024-10-17 DIAGNOSIS — I25.10 CORONARY ARTERY DISEASE INVOLVING NATIVE CORONARY ARTERY OF NATIVE HEART WITHOUT ANGINA PECTORIS: ICD-10-CM

## 2024-10-17 DIAGNOSIS — I65.23 CAROTID STENOSIS, BILATERAL: ICD-10-CM

## 2024-10-17 DIAGNOSIS — I10 ESSENTIAL HYPERTENSION: ICD-10-CM

## 2024-10-17 DIAGNOSIS — I50.9 CONGESTIVE HEART FAILURE, UNSPECIFIED HF CHRONICITY, UNSPECIFIED HEART FAILURE TYPE (HCC): Primary | ICD-10-CM

## 2024-10-17 DIAGNOSIS — E78.5 HYPERLIPIDEMIA, UNSPECIFIED HYPERLIPIDEMIA TYPE: ICD-10-CM

## 2024-10-17 PROCEDURE — 99214 OFFICE O/P EST MOD 30 MIN: CPT | Performed by: INTERNAL MEDICINE

## 2024-10-17 PROCEDURE — G8427 DOCREV CUR MEDS BY ELIG CLIN: HCPCS | Performed by: INTERNAL MEDICINE

## 2024-10-17 PROCEDURE — G8484 FLU IMMUNIZE NO ADMIN: HCPCS | Performed by: INTERNAL MEDICINE

## 2024-10-17 PROCEDURE — G8417 CALC BMI ABV UP PARAM F/U: HCPCS | Performed by: INTERNAL MEDICINE

## 2024-10-17 PROCEDURE — 1036F TOBACCO NON-USER: CPT | Performed by: INTERNAL MEDICINE

## 2024-10-17 PROCEDURE — 1123F ACP DISCUSS/DSCN MKR DOCD: CPT | Performed by: INTERNAL MEDICINE

## 2024-10-17 ASSESSMENT — ENCOUNTER SYMPTOMS
CHEST TIGHTNESS: 0
WHEEZING: 0
RESPIRATORY NEGATIVE: 1
EYES NEGATIVE: 1
GASTROINTESTINAL NEGATIVE: 1
NAUSEA: 0
COUGH: 0
SHORTNESS OF BREATH: 0
BLOOD IN STOOL: 0
STRIDOR: 0

## 2024-10-17 NOTE — PROGRESS NOTES
protective meds. SL NTG for angina discussed.     3. Essential hypertension   stable now.continue same mdes. Low salt diet.     4. Coronary artery disease involving native coronary artery of native heart without angina pectoris  No angina.  Dc ASA continue Plavix.     5. R Inguinal hernia- asymptomatic . Avoid heavylifting or constipation- stable      6. Carotid Bruits- most recent CUS images reviewed- f/u     7. Knee arthritis -  stable.     Counseling:  Heart Healthy Lifestyle, Decrease Alcohol Consumption, Take Precautions to Prevent Falls, Regular Exercise and Walk Daily    Return in about 4 months (around 2/17/2025).      Electronically signed by TENISHA CARRIZALES MD on 10/17/2024 at 12:59 PM

## 2024-12-09 ENCOUNTER — APPOINTMENT (OUTPATIENT)
Dept: GENERAL RADIOLOGY | Age: 89
DRG: 947 | End: 2024-12-09
Payer: MEDICARE

## 2024-12-09 ENCOUNTER — HOSPITAL ENCOUNTER (INPATIENT)
Age: 89
LOS: 14 days | Discharge: HOME | DRG: 947 | End: 2024-12-23
Attending: EMERGENCY MEDICINE | Admitting: PHYSICAL MEDICINE & REHABILITATION
Payer: MEDICARE

## 2024-12-09 DIAGNOSIS — M25.562 PAIN IN BOTH KNEES, UNSPECIFIED CHRONICITY: ICD-10-CM

## 2024-12-09 DIAGNOSIS — M25.561 PAIN IN BOTH KNEES, UNSPECIFIED CHRONICITY: ICD-10-CM

## 2024-12-09 DIAGNOSIS — R29.6 FALLING: Primary | ICD-10-CM

## 2024-12-09 DIAGNOSIS — Z78.9 IMPAIRED MOBILITY AND ACTIVITIES OF DAILY LIVING: ICD-10-CM

## 2024-12-09 DIAGNOSIS — R07.2 PRECORDIAL PAIN: ICD-10-CM

## 2024-12-09 DIAGNOSIS — M15.0 PRIMARY OSTEOARTHRITIS INVOLVING MULTIPLE JOINTS: ICD-10-CM

## 2024-12-09 DIAGNOSIS — I26.99 OTHER ACUTE PULMONARY EMBOLISM WITHOUT ACUTE COR PULMONALE (HCC): ICD-10-CM

## 2024-12-09 DIAGNOSIS — G20.A2 PARKINSON'S DISEASE WITHOUT DYSKINESIA, WITH FLUCTUATING MANIFESTATIONS (HCC): ICD-10-CM

## 2024-12-09 DIAGNOSIS — Z74.09 IMPAIRED MOBILITY AND ACTIVITIES OF DAILY LIVING: ICD-10-CM

## 2024-12-09 DIAGNOSIS — R26.9 GAIT DISTURBANCE: ICD-10-CM

## 2024-12-09 DIAGNOSIS — I26.01 ACUTE SEPTIC PULMONARY EMBOLISM WITH ACUTE COR PULMONALE (HCC): ICD-10-CM

## 2024-12-09 PROCEDURE — 97167 OT EVAL HIGH COMPLEX 60 MIN: CPT

## 2024-12-09 PROCEDURE — 6370000000 HC RX 637 (ALT 250 FOR IP): Performed by: PHYSICAL MEDICINE & REHABILITATION

## 2024-12-09 PROCEDURE — 73560 X-RAY EXAM OF KNEE 1 OR 2: CPT

## 2024-12-09 PROCEDURE — 97162 PT EVAL MOD COMPLEX 30 MIN: CPT

## 2024-12-09 PROCEDURE — 99285 EMERGENCY DEPT VISIT HI MDM: CPT

## 2024-12-09 PROCEDURE — 6370000000 HC RX 637 (ALT 250 FOR IP): Performed by: EMERGENCY MEDICINE

## 2024-12-09 PROCEDURE — 99222 1ST HOSP IP/OBS MODERATE 55: CPT | Performed by: PHYSICAL MEDICINE & REHABILITATION

## 2024-12-09 PROCEDURE — 1180000000 HC REHAB R&B

## 2024-12-09 RX ORDER — CARBIDOPA AND LEVODOPA 25; 100 MG/1; MG/1
1 TABLET ORAL 2 TIMES DAILY
Status: DISCONTINUED | OUTPATIENT
Start: 2024-12-09 | End: 2024-12-22

## 2024-12-09 RX ORDER — ACETAMINOPHEN 325 MG/1
650 TABLET ORAL EVERY 6 HOURS PRN
COMMUNITY

## 2024-12-09 RX ORDER — MULTIVITAMIN WITH IRON
1000 TABLET ORAL DAILY
Status: DISCONTINUED | OUTPATIENT
Start: 2024-12-09 | End: 2024-12-23 | Stop reason: HOSPADM

## 2024-12-09 RX ORDER — ISOSORBIDE MONONITRATE 30 MG/1
30 TABLET, EXTENDED RELEASE ORAL DAILY
Status: DISCONTINUED | OUTPATIENT
Start: 2024-12-09 | End: 2024-12-23 | Stop reason: HOSPADM

## 2024-12-09 RX ORDER — HYDROCODONE BITARTRATE AND ACETAMINOPHEN 5; 325 MG/1; MG/1
1 TABLET ORAL ONCE
Status: COMPLETED | OUTPATIENT
Start: 2024-12-09 | End: 2024-12-09

## 2024-12-09 RX ORDER — SODIUM PHOSPHATE, DIBASIC AND SODIUM PHOSPHATE, MONOBASIC 7; 19 G/230ML; G/230ML
1 ENEMA RECTAL DAILY PRN
Status: DISCONTINUED | OUTPATIENT
Start: 2024-12-09 | End: 2024-12-23 | Stop reason: HOSPADM

## 2024-12-09 RX ORDER — TIMOLOL MALEATE 5 MG/ML
1 SOLUTION/ DROPS OPHTHALMIC 2 TIMES DAILY
Status: DISCONTINUED | OUTPATIENT
Start: 2024-12-09 | End: 2024-12-23 | Stop reason: HOSPADM

## 2024-12-09 RX ORDER — HYDROCODONE BITARTRATE AND ACETAMINOPHEN 5; 325 MG/1; MG/1
1 TABLET ORAL ONCE
Status: DISCONTINUED | OUTPATIENT
Start: 2024-12-09 | End: 2024-12-23 | Stop reason: HOSPADM

## 2024-12-09 RX ORDER — CARVEDILOL 6.25 MG/1
6.25 TABLET ORAL 2 TIMES DAILY WITH MEALS
Status: DISCONTINUED | OUTPATIENT
Start: 2024-12-09 | End: 2024-12-23 | Stop reason: HOSPADM

## 2024-12-09 RX ORDER — LISINOPRIL 10 MG/1
10 TABLET ORAL DAILY
Status: DISCONTINUED | OUTPATIENT
Start: 2024-12-09 | End: 2024-12-23 | Stop reason: HOSPADM

## 2024-12-09 RX ORDER — ACETAMINOPHEN 325 MG/1
650 TABLET ORAL EVERY 4 HOURS PRN
Status: DISCONTINUED | OUTPATIENT
Start: 2024-12-09 | End: 2024-12-10

## 2024-12-09 RX ORDER — DONEPEZIL HYDROCHLORIDE 10 MG/1
5 TABLET, FILM COATED ORAL NIGHTLY
Status: DISCONTINUED | OUTPATIENT
Start: 2024-12-09 | End: 2024-12-23 | Stop reason: HOSPADM

## 2024-12-09 RX ORDER — BISACODYL 10 MG
10 SUPPOSITORY, RECTAL RECTAL DAILY
Status: DISCONTINUED | OUTPATIENT
Start: 2024-12-09 | End: 2024-12-09

## 2024-12-09 RX ORDER — DORZOLAMIDE HCL 20 MG/ML
1 SOLUTION/ DROPS OPHTHALMIC 2 TIMES DAILY
Status: DISCONTINUED | OUTPATIENT
Start: 2024-12-09 | End: 2024-12-23 | Stop reason: HOSPADM

## 2024-12-09 RX ORDER — DORZOLAMIDE HYDROCHLORIDE AND TIMOLOL MALEATE 20; 5 MG/ML; MG/ML
1 SOLUTION/ DROPS OPHTHALMIC 2 TIMES DAILY
Status: DISCONTINUED | OUTPATIENT
Start: 2024-12-09 | End: 2024-12-09

## 2024-12-09 RX ORDER — BISACODYL 10 MG
10 SUPPOSITORY, RECTAL RECTAL DAILY PRN
Status: DISCONTINUED | OUTPATIENT
Start: 2024-12-09 | End: 2024-12-23 | Stop reason: HOSPADM

## 2024-12-09 RX ORDER — UBIDECARENONE 100 MG
100 CAPSULE ORAL DAILY
Status: DISCONTINUED | OUTPATIENT
Start: 2024-12-09 | End: 2024-12-23 | Stop reason: HOSPADM

## 2024-12-09 RX ORDER — ALBUTEROL SULFATE 90 UG/1
2 INHALANT RESPIRATORY (INHALATION) EVERY 6 HOURS PRN
Status: DISCONTINUED | OUTPATIENT
Start: 2024-12-09 | End: 2024-12-23 | Stop reason: HOSPADM

## 2024-12-09 RX ORDER — ACETAMINOPHEN 500 MG
500 TABLET ORAL 3 TIMES DAILY
Status: DISCONTINUED | OUTPATIENT
Start: 2024-12-09 | End: 2024-12-23 | Stop reason: HOSPADM

## 2024-12-09 RX ORDER — FINASTERIDE 5 MG/1
5 TABLET, FILM COATED ORAL DAILY
Status: DISCONTINUED | OUTPATIENT
Start: 2024-12-09 | End: 2024-12-23 | Stop reason: HOSPADM

## 2024-12-09 RX ORDER — CLOPIDOGREL BISULFATE 75 MG/1
75 TABLET ORAL DAILY
Status: DISCONTINUED | OUTPATIENT
Start: 2024-12-09 | End: 2024-12-13

## 2024-12-09 RX ORDER — MULTIVITAMIN WITH IRON
1 TABLET ORAL EVERY MORNING
COMMUNITY
End: 2024-12-09 | Stop reason: ALTCHOICE

## 2024-12-09 RX ORDER — LATANOPROST 50 UG/ML
1 SOLUTION/ DROPS OPHTHALMIC NIGHTLY
Status: DISCONTINUED | OUTPATIENT
Start: 2024-12-09 | End: 2024-12-23 | Stop reason: HOSPADM

## 2024-12-09 RX ORDER — PRAVASTATIN SODIUM 40 MG
80 TABLET ORAL EVERY EVENING
Status: DISCONTINUED | OUTPATIENT
Start: 2024-12-09 | End: 2024-12-15

## 2024-12-09 RX ORDER — BISACODYL 10 MG
10 SUPPOSITORY, RECTAL RECTAL DAILY
COMMUNITY

## 2024-12-09 RX ADMIN — Medication 3 MG: at 20:37

## 2024-12-09 RX ADMIN — DONEPEZIL HYDROCHLORIDE 5 MG: 10 TABLET, FILM COATED ORAL at 20:31

## 2024-12-09 RX ADMIN — ACETAMINOPHEN 500 MG: 500 TABLET ORAL at 20:32

## 2024-12-09 RX ADMIN — PRAVASTATIN SODIUM 80 MG: 40 TABLET ORAL at 18:04

## 2024-12-09 RX ADMIN — HYDROCODONE BITARTRATE AND ACETAMINOPHEN 1 TABLET: 5; 325 TABLET ORAL at 08:14

## 2024-12-09 RX ADMIN — DORZOLAMIDE HYDROCHLORIDE 1 DROP: 20 SOLUTION OPHTHALMIC at 20:45

## 2024-12-09 RX ADMIN — CARVEDILOL 6.25 MG: 6.25 TABLET, FILM COATED ORAL at 18:04

## 2024-12-09 RX ADMIN — CYANOCOBALAMIN TAB 500 MCG 1000 MCG: 500 TAB at 18:04

## 2024-12-09 RX ADMIN — Medication 100 MG: at 17:04

## 2024-12-09 RX ADMIN — DICLOFENAC SODIUM 4 G: 10 GEL TOPICAL at 20:39

## 2024-12-09 RX ADMIN — TIMOLOL MALEATE 1 DROP: 5 SOLUTION OPHTHALMIC at 20:45

## 2024-12-09 RX ADMIN — CARBIDOPA AND LEVODOPA 1 TABLET: 25; 100 TABLET ORAL at 20:36

## 2024-12-09 RX ADMIN — LATANOPROST 1 DROP: 50 SOLUTION OPHTHALMIC at 20:45

## 2024-12-09 ASSESSMENT — PAIN - FUNCTIONAL ASSESSMENT
PAIN_FUNCTIONAL_ASSESSMENT: PREVENTS OR INTERFERES SOME ACTIVE ACTIVITIES AND ADLS
PAIN_FUNCTIONAL_ASSESSMENT: 0-10
PAIN_FUNCTIONAL_ASSESSMENT: PREVENTS OR INTERFERES SOME ACTIVE ACTIVITIES AND ADLS

## 2024-12-09 ASSESSMENT — PAIN DESCRIPTION - LOCATION
LOCATION: KNEE

## 2024-12-09 ASSESSMENT — PAIN DESCRIPTION - ORIENTATION
ORIENTATION: LEFT;RIGHT
ORIENTATION: LEFT;RIGHT
ORIENTATION: RIGHT;LEFT
ORIENTATION: RIGHT;LEFT

## 2024-12-09 ASSESSMENT — LIFESTYLE VARIABLES
HOW OFTEN DO YOU HAVE A DRINK CONTAINING ALCOHOL: NEVER
HOW MANY STANDARD DRINKS CONTAINING ALCOHOL DO YOU HAVE ON A TYPICAL DAY: PATIENT DOES NOT DRINK
HOW MANY STANDARD DRINKS CONTAINING ALCOHOL DO YOU HAVE ON A TYPICAL DAY: PATIENT DOES NOT DRINK
HOW OFTEN DO YOU HAVE A DRINK CONTAINING ALCOHOL: NEVER

## 2024-12-09 ASSESSMENT — PAIN SCALES - GENERAL
PAINLEVEL_OUTOF10: 10
PAINLEVEL_OUTOF10: 10
PAINLEVEL_OUTOF10: 6
PAINLEVEL_OUTOF10: 5

## 2024-12-09 ASSESSMENT — PAIN DESCRIPTION - DESCRIPTORS
DESCRIPTORS: ACHING
DESCRIPTORS: THROBBING

## 2024-12-09 ASSESSMENT — PAIN DESCRIPTION - ONSET: ONSET: PROGRESSIVE

## 2024-12-09 ASSESSMENT — PAIN DESCRIPTION - FREQUENCY: FREQUENCY: INTERMITTENT

## 2024-12-09 ASSESSMENT — PAIN DESCRIPTION - PAIN TYPE: TYPE: CHRONIC PAIN

## 2024-12-09 NOTE — ED NOTES
I provided the pt with a sandwich, pretzels, applesauce and ginger ale.   I also provided pt with a specimen cup for his hearing aids.

## 2024-12-09 NOTE — ED PROVIDER NOTES
[facility] ED  EMERGENCY DEPARTMENT ENCOUNTER      Pt Name: Hamilton Torres  MRN: [id number]  Birthdate 1935  Date of evaluation: 12/9/2024  Provider: Meche Nava DO    CHIEF COMPLAINT       Chief Complaint   Patient presents with    Joint Pain     Patient had gel injection to bilateral knees 12/2 for arthritis. Patient daughter states around 6am nurse called daughter stating patient was in so much pain at the knees, could not walk and called EMS.          HISTORY OF PRESENT ILLNESS   (Location/Symptom, Timing/Onset, Context/Setting, Quality, Duration, Modifying Factors, Severity)  Note limiting factors.   Hamilton Torres is a 89 y.o. male who presents to the emergency department .  Patient presents with inability to walk today.  On December 2 he had bilateral knee joint injections with hyaluronic acid.  His knees were hurting a lot more for the few days and then it seems to be a little bit better.  Today both knees hurt too bad to walk.  He has also been falling and has been diagnosed with parkinsonism.  He he is taking carbidopa levodopa which seem to have helped after starting it.  He was having less falls.  He is not falling more again in addition to the severe knee pain.  Dr.Vikas Sanchez, orthopedics, performed the injection December 2.  Daughter states that if that did not work he was considering injecting him with some steroids at a later date.  Patient walks with a rollator.    HPI    Nursing Notes were reviewed.    REVIEW OF SYSTEMS    (2-9 systems for level 4, 10 or more for level 5)     Review of Systems   Constitutional:  Negative for activity change, appetite change and fatigue.   HENT:  Negative for congestion and sore throat.    Eyes:  Negative for pain and visual disturbance.   Respiratory:  Negative for chest tightness and shortness of breath.    Cardiovascular:  Negative for chest pain.   Gastrointestinal:  Negative for abdominal pain, nausea and vomiting.   Endocrine:

## 2024-12-09 NOTE — ED NOTES
Updated patient daughter, daughter states she needs to leave for work. She left her name and number.   [facility] [phone]

## 2024-12-09 NOTE — FLOWSHEET NOTE
Patient admitted from ED to room 247 in stable condition. Complains of pain to bilateral knees, denies the need for pain medication. Complains of weakness and is excited to begin therapy. Denies any further needs or complaints. Patient oriented to room and unit procedures with verbalization of understanding.

## 2024-12-09 NOTE — ED NOTES
Patient had gel injection to bilateral knees 12/2 for arthritis. Patient daughter states around 6am nurse called daughter stating patient was in so much pain at the knees, could not walk and called EMS. Patient hard of hearing.

## 2024-12-10 ENCOUNTER — APPOINTMENT (OUTPATIENT)
Dept: GENERAL RADIOLOGY | Age: 89
DRG: 947 | End: 2024-12-10
Payer: MEDICARE

## 2024-12-10 PROBLEM — R29.818 PARKINSONIAN FEATURES: Status: RESOLVED | Noted: 2024-07-09 | Resolved: 2024-12-10

## 2024-12-10 PROBLEM — R26.9 GAIT DISTURBANCE: Status: ACTIVE | Noted: 2024-12-10

## 2024-12-10 LAB
ALBUMIN SERPL-MCNC: 3.8 G/DL (ref 3.5–4.6)
ALP SERPL-CCNC: 87 U/L (ref 35–104)
ALT SERPL-CCNC: <5 U/L (ref 0–41)
ANION GAP SERPL CALCULATED.3IONS-SCNC: 8 MEQ/L (ref 9–15)
AST SERPL-CCNC: 18 U/L (ref 0–40)
B PARAP IS1001 DNA NPH QL NAA+NON-PROBE: NOT DETECTED
B PERT.PT PRMT NPH QL NAA+NON-PROBE: NOT DETECTED
BASOPHILS # BLD: 0.1 K/UL (ref 0–0.2)
BASOPHILS NFR BLD: 0.6 %
BILIRUB DIRECT SERPL-MCNC: <0.2 MG/DL (ref 0–0.4)
BILIRUB INDIRECT SERPL-MCNC: NORMAL MG/DL (ref 0–0.6)
BILIRUB SERPL-MCNC: 0.6 MG/DL (ref 0.2–0.7)
BUN SERPL-MCNC: 25 MG/DL (ref 8–23)
C PNEUM DNA NPH QL NAA+NON-PROBE: NOT DETECTED
CALCIUM SERPL-MCNC: 9.4 MG/DL (ref 8.5–9.9)
CHLORIDE SERPL-SCNC: 104 MEQ/L (ref 95–107)
CK SERPL-CCNC: 99 U/L (ref 0–190)
CO2 SERPL-SCNC: 28 MEQ/L (ref 20–31)
CREAT SERPL-MCNC: 0.83 MG/DL (ref 0.7–1.2)
EOSINOPHIL # BLD: 0.3 K/UL (ref 0–0.7)
EOSINOPHIL NFR BLD: 3.7 %
ERYTHROCYTE [DISTWIDTH] IN BLOOD BY AUTOMATED COUNT: 14.4 % (ref 11.5–14.5)
FLUAV RNA NPH QL NAA+NON-PROBE: NOT DETECTED
FLUBV RNA NPH QL NAA+NON-PROBE: NOT DETECTED
GLUCOSE SERPL-MCNC: 119 MG/DL (ref 70–99)
HADV DNA NPH QL NAA+NON-PROBE: NOT DETECTED
HCOV 229E RNA NPH QL NAA+NON-PROBE: NOT DETECTED
HCOV HKU1 RNA NPH QL NAA+NON-PROBE: NOT DETECTED
HCOV NL63 RNA NPH QL NAA+NON-PROBE: NOT DETECTED
HCOV OC43 RNA NPH QL NAA+NON-PROBE: NOT DETECTED
HCT VFR BLD AUTO: 42.1 % (ref 42–52)
HGB BLD-MCNC: 13.9 G/DL (ref 14–18)
HMPV RNA NPH QL NAA+NON-PROBE: NOT DETECTED
HPIV1 RNA NPH QL NAA+NON-PROBE: NOT DETECTED
HPIV2 RNA NPH QL NAA+NON-PROBE: NOT DETECTED
HPIV3 RNA NPH QL NAA+NON-PROBE: NOT DETECTED
HPIV4 RNA NPH QL NAA+NON-PROBE: NOT DETECTED
LYMPHOCYTES # BLD: 0.8 K/UL (ref 1–4.8)
LYMPHOCYTES NFR BLD: 10.2 %
M PNEUMO DNA NPH QL NAA+NON-PROBE: NOT DETECTED
MCH RBC QN AUTO: 30.5 PG (ref 27–31.3)
MCHC RBC AUTO-ENTMCNC: 33 % (ref 33–37)
MCV RBC AUTO: 92.3 FL (ref 79–92.2)
MONOCYTES # BLD: 0.8 K/UL (ref 0.2–0.8)
MONOCYTES NFR BLD: 10.6 %
NEUTROPHILS # BLD: 5.9 K/UL (ref 1.4–6.5)
NEUTS SEG NFR BLD: 74.3 %
PLATELET # BLD AUTO: 225 K/UL (ref 130–400)
POTASSIUM SERPL-SCNC: 4.2 MEQ/L (ref 3.4–4.9)
PROT SERPL-MCNC: 6.4 G/DL (ref 6.3–8)
RBC # BLD AUTO: 4.56 M/UL (ref 4.7–6.1)
RSV RNA NPH QL NAA+NON-PROBE: NOT DETECTED
RV+EV RNA NPH QL NAA+NON-PROBE: NOT DETECTED
SARS-COV-2 RNA NPH QL NAA+NON-PROBE: DETECTED
SODIUM SERPL-SCNC: 140 MEQ/L (ref 135–144)
WBC # BLD AUTO: 7.9 K/UL (ref 4.8–10.8)

## 2024-12-10 PROCEDURE — 82550 ASSAY OF CK (CPK): CPT

## 2024-12-10 PROCEDURE — 71045 X-RAY EXAM CHEST 1 VIEW: CPT

## 2024-12-10 PROCEDURE — 1180000000 HC REHAB R&B

## 2024-12-10 PROCEDURE — 80076 HEPATIC FUNCTION PANEL: CPT

## 2024-12-10 PROCEDURE — 97112 NEUROMUSCULAR REEDUCATION: CPT

## 2024-12-10 PROCEDURE — 97162 PT EVAL MOD COMPLEX 30 MIN: CPT

## 2024-12-10 PROCEDURE — 85025 COMPLETE CBC W/AUTO DIFF WBC: CPT

## 2024-12-10 PROCEDURE — 99223 1ST HOSP IP/OBS HIGH 75: CPT | Performed by: PHYSICAL MEDICINE & REHABILITATION

## 2024-12-10 PROCEDURE — 36415 COLL VENOUS BLD VENIPUNCTURE: CPT

## 2024-12-10 PROCEDURE — 97530 THERAPEUTIC ACTIVITIES: CPT

## 2024-12-10 PROCEDURE — 92523 SPEECH SOUND LANG COMPREHEN: CPT

## 2024-12-10 PROCEDURE — 99222 1ST HOSP IP/OBS MODERATE 55: CPT | Performed by: INTERNAL MEDICINE

## 2024-12-10 PROCEDURE — 6370000000 HC RX 637 (ALT 250 FOR IP): Performed by: PHYSICAL MEDICINE & REHABILITATION

## 2024-12-10 PROCEDURE — 97116 GAIT TRAINING THERAPY: CPT

## 2024-12-10 PROCEDURE — 97166 OT EVAL MOD COMPLEX 45 MIN: CPT

## 2024-12-10 PROCEDURE — 80048 BASIC METABOLIC PNL TOTAL CA: CPT

## 2024-12-10 PROCEDURE — 0202U NFCT DS 22 TRGT SARS-COV-2: CPT

## 2024-12-10 RX ORDER — ACETAMINOPHEN 500 MG
500 TABLET ORAL EVERY 6 HOURS PRN
Status: DISCONTINUED | OUTPATIENT
Start: 2024-12-10 | End: 2024-12-23 | Stop reason: HOSPADM

## 2024-12-10 RX ADMIN — ISOSORBIDE MONONITRATE 30 MG: 30 TABLET, EXTENDED RELEASE ORAL at 08:21

## 2024-12-10 RX ADMIN — FINASTERIDE 5 MG: 5 TABLET, FILM COATED ORAL at 08:21

## 2024-12-10 RX ADMIN — TIMOLOL MALEATE 1 DROP: 5 SOLUTION OPHTHALMIC at 17:15

## 2024-12-10 RX ADMIN — LISINOPRIL 10 MG: 10 TABLET ORAL at 08:21

## 2024-12-10 RX ADMIN — CARBIDOPA AND LEVODOPA 1 TABLET: 25; 100 TABLET ORAL at 08:22

## 2024-12-10 RX ADMIN — Medication 100 MG: at 08:21

## 2024-12-10 RX ADMIN — ACETAMINOPHEN 500 MG: 500 TABLET ORAL at 08:20

## 2024-12-10 RX ADMIN — CARBIDOPA AND LEVODOPA 1 TABLET: 25; 100 TABLET ORAL at 22:01

## 2024-12-10 RX ADMIN — CARVEDILOL 6.25 MG: 6.25 TABLET, FILM COATED ORAL at 08:22

## 2024-12-10 RX ADMIN — ACETAMINOPHEN 500 MG: 500 TABLET ORAL at 22:02

## 2024-12-10 RX ADMIN — ACETAMINOPHEN 500 MG: 500 TABLET ORAL at 17:19

## 2024-12-10 RX ADMIN — TIMOLOL MALEATE 1 DROP: 5 SOLUTION OPHTHALMIC at 22:05

## 2024-12-10 RX ADMIN — LATANOPROST 1 DROP: 50 SOLUTION OPHTHALMIC at 22:05

## 2024-12-10 RX ADMIN — CARVEDILOL 6.25 MG: 6.25 TABLET, FILM COATED ORAL at 17:29

## 2024-12-10 RX ADMIN — DONEPEZIL HYDROCHLORIDE 5 MG: 10 TABLET, FILM COATED ORAL at 22:01

## 2024-12-10 RX ADMIN — DORZOLAMIDE HYDROCHLORIDE 1 DROP: 20 SOLUTION OPHTHALMIC at 22:05

## 2024-12-10 RX ADMIN — Medication 3 MG: at 22:02

## 2024-12-10 RX ADMIN — DORZOLAMIDE HYDROCHLORIDE 1 DROP: 20 SOLUTION OPHTHALMIC at 17:17

## 2024-12-10 RX ADMIN — CYANOCOBALAMIN TAB 500 MCG 1000 MCG: 500 TAB at 08:21

## 2024-12-10 RX ADMIN — CLOPIDOGREL BISULFATE 75 MG: 75 TABLET ORAL at 08:21

## 2024-12-10 ASSESSMENT — PAIN DESCRIPTION - ORIENTATION: ORIENTATION: RIGHT;LEFT

## 2024-12-10 ASSESSMENT — PAIN SCALES - GENERAL
PAINLEVEL_OUTOF10: 4
PAINLEVEL_OUTOF10: 0
PAINLEVEL_OUTOF10: 0

## 2024-12-10 ASSESSMENT — PAIN DESCRIPTION - DESCRIPTORS: DESCRIPTORS: ACHING

## 2024-12-10 ASSESSMENT — PAIN DESCRIPTION - LOCATION: LOCATION: KNEE

## 2024-12-10 NOTE — FLOWSHEET NOTE
Pt is resting in bed and states he has pain of knees.  Pt refused [place] for pain.  Straight order for acetaminophen taken as Pt states he prefers acetaminophen.  Pt was educated on Diclofenac Sodium Gel that was applied to knees for pain.

## 2024-12-10 NOTE — H&P
HISTORY & PHYSICAL       DATE OF ADMISSION:  12/9/2024    DATE OF SERVICE:  12/10/24    Subjective:    Hamilton Torres, 89 y.o. male presents today with:     CHIEF COMPLAINT:   89 y.o. male admitted to [facility] on 12/9/2024-had severe pain flareup in bilateral knees status post hyaluronic acid injections at the [facility].  According to the care everywhere function in epic he had the joints injected on 12/2/2024 by Dr. Joe Sanchez.  He also recently saw [facility] ophthalmology for intra vitreal eye injection on 11/19.  Patient has stayed at our rehab facility in the past and did well.     Joint Pain   This is a recurrent problem. The current episode started more than 1 year ago. The problem occurs constantly. The problem has been rapidly worsening. Pertinent negatives include no fever.   Fatigue  This is a recurrent problem. The current episode started 1 to 4 weeks ago. The problem occurs constantly. The problem has been gradually improving. Associated symptoms include arthralgias, coughing, fatigue, myalgias and weakness. Pertinent negatives include no abdominal pain, chest pain, chills, congestion, diaphoresis, fever, headaches, nausea, neck pain, rash, sore throat or vomiting. The symptoms are aggravated by walking. He has tried relaxation for the symptoms. The treatment provided mild relief.       I reviewed recent nursing note, \" admitted from ED to room 247 in stable condition. Complains of pain to bilateral knees, denies the need for pain medication. Complains of weakness and is excited to begin therapy. Denies any further needs or complaints. Patient oriented to room and unit procedures with verbalization of understanding. \"....\"resting in bed and states he has pain of knees. Pt refused [place] for pain. Straight order for acetaminophen taken as Pt states he prefers acetaminophen. Pt was educated on Diclofenac Sodium Gel that was applied to knees for pain \".        The patient has

## 2024-12-11 ENCOUNTER — APPOINTMENT (OUTPATIENT)
Dept: CT IMAGING | Age: 89
DRG: 947 | End: 2024-12-11
Payer: MEDICARE

## 2024-12-11 LAB
AMORPH SED URNS QL MICRO: ABNORMAL
BACTERIA URNS QL MICRO: ABNORMAL /HPF
BILIRUB UR QL STRIP: NEGATIVE
CLARITY UR: CLEAR
COLOR UR: ABNORMAL
CRYSTALS URNS MICRO: ABNORMAL /HPF
EPI CELLS #/AREA URNS AUTO: ABNORMAL /HPF (ref 0–5)
GLUCOSE UR STRIP-MCNC: NEGATIVE MG/DL
HGB UR QL STRIP: ABNORMAL
HYALINE CASTS #/AREA URNS AUTO: ABNORMAL /HPF (ref 0–5)
KETONES UR STRIP-MCNC: ABNORMAL MG/DL
LEUKOCYTE ESTERASE UR QL STRIP: NEGATIVE
MUCOUS THREADS URNS QL MICRO: PRESENT /LPF
NITRITE UR QL STRIP: NEGATIVE
PH UR STRIP: 6.5 [PH] (ref 5–9)
PROT UR STRIP-MCNC: ABNORMAL MG/DL
RBC #/AREA URNS AUTO: ABNORMAL /HPF (ref 0–5)
SP GR UR STRIP: 1.02 (ref 1–1.03)
UROBILINOGEN UR STRIP-ACNC: 0.2 E.U./DL
WBC #/AREA URNS AUTO: ABNORMAL /HPF (ref 0–5)

## 2024-12-11 PROCEDURE — 99233 SBSQ HOSP IP/OBS HIGH 50: CPT | Performed by: PHYSICAL MEDICINE & REHABILITATION

## 2024-12-11 PROCEDURE — 71260 CT THORAX DX C+: CPT

## 2024-12-11 PROCEDURE — 6360000002 HC RX W HCPCS: Performed by: INTERNAL MEDICINE

## 2024-12-11 PROCEDURE — 6370000000 HC RX 637 (ALT 250 FOR IP): Performed by: PHYSICAL MEDICINE & REHABILITATION

## 2024-12-11 PROCEDURE — 97130 THER IVNTJ EA ADDL 15 MIN: CPT

## 2024-12-11 PROCEDURE — 97535 SELF CARE MNGMENT TRAINING: CPT

## 2024-12-11 PROCEDURE — 97530 THERAPEUTIC ACTIVITIES: CPT

## 2024-12-11 PROCEDURE — 97110 THERAPEUTIC EXERCISES: CPT

## 2024-12-11 PROCEDURE — 6360000004 HC RX CONTRAST MEDICATION: Performed by: INTERNAL MEDICINE

## 2024-12-11 PROCEDURE — 97129 THER IVNTJ 1ST 15 MIN: CPT

## 2024-12-11 PROCEDURE — 97116 GAIT TRAINING THERAPY: CPT

## 2024-12-11 PROCEDURE — 81001 URINALYSIS AUTO W/SCOPE: CPT

## 2024-12-11 PROCEDURE — 1180000000 HC REHAB R&B

## 2024-12-11 RX ORDER — IOPAMIDOL 755 MG/ML
75 INJECTION, SOLUTION INTRAVASCULAR
Status: COMPLETED | OUTPATIENT
Start: 2024-12-11 | End: 2024-12-11

## 2024-12-11 RX ORDER — ENOXAPARIN SODIUM 100 MG/ML
1 INJECTION SUBCUTANEOUS 2 TIMES DAILY
Status: DISCONTINUED | OUTPATIENT
Start: 2024-12-11 | End: 2024-12-12

## 2024-12-11 RX ADMIN — IOPAMIDOL 75 ML: 755 INJECTION, SOLUTION INTRAVENOUS at 18:32

## 2024-12-11 RX ADMIN — Medication 3 MG: at 21:46

## 2024-12-11 RX ADMIN — ISOSORBIDE MONONITRATE 30 MG: 30 TABLET, EXTENDED RELEASE ORAL at 09:13

## 2024-12-11 RX ADMIN — ACETAMINOPHEN 500 MG: 500 TABLET ORAL at 09:12

## 2024-12-11 RX ADMIN — ENOXAPARIN SODIUM 80 MG: 100 INJECTION SUBCUTANEOUS at 22:04

## 2024-12-11 RX ADMIN — TIMOLOL MALEATE 1 DROP: 5 SOLUTION OPHTHALMIC at 09:16

## 2024-12-11 RX ADMIN — DORZOLAMIDE HYDROCHLORIDE 1 DROP: 20 SOLUTION OPHTHALMIC at 09:16

## 2024-12-11 RX ADMIN — CARVEDILOL 6.25 MG: 6.25 TABLET, FILM COATED ORAL at 09:13

## 2024-12-11 RX ADMIN — DONEPEZIL HYDROCHLORIDE 5 MG: 10 TABLET, FILM COATED ORAL at 21:47

## 2024-12-11 RX ADMIN — CARBIDOPA AND LEVODOPA 1 TABLET: 25; 100 TABLET ORAL at 09:13

## 2024-12-11 RX ADMIN — LISINOPRIL 10 MG: 10 TABLET ORAL at 09:14

## 2024-12-11 RX ADMIN — ACETAMINOPHEN 500 MG: 500 TABLET ORAL at 21:46

## 2024-12-11 RX ADMIN — LATANOPROST 1 DROP: 50 SOLUTION OPHTHALMIC at 21:49

## 2024-12-11 RX ADMIN — FINASTERIDE 5 MG: 5 TABLET, FILM COATED ORAL at 09:14

## 2024-12-11 RX ADMIN — DORZOLAMIDE HYDROCHLORIDE 1 DROP: 20 SOLUTION OPHTHALMIC at 21:53

## 2024-12-11 RX ADMIN — ACETAMINOPHEN 500 MG: 500 TABLET ORAL at 17:07

## 2024-12-11 RX ADMIN — CLOPIDOGREL BISULFATE 75 MG: 75 TABLET ORAL at 09:13

## 2024-12-11 RX ADMIN — CARVEDILOL 6.25 MG: 6.25 TABLET, FILM COATED ORAL at 17:07

## 2024-12-11 RX ADMIN — TIMOLOL MALEATE 1 DROP: 5 SOLUTION OPHTHALMIC at 21:50

## 2024-12-11 RX ADMIN — CYANOCOBALAMIN TAB 500 MCG 1000 MCG: 500 TAB at 09:13

## 2024-12-11 RX ADMIN — Medication 100 MG: at 09:13

## 2024-12-11 RX ADMIN — DICLOFENAC SODIUM 4 G: 10 GEL TOPICAL at 21:48

## 2024-12-11 RX ADMIN — CARBIDOPA AND LEVODOPA 1 TABLET: 25; 100 TABLET ORAL at 21:46

## 2024-12-11 ASSESSMENT — PAIN DESCRIPTION - ORIENTATION
ORIENTATION: RIGHT;LEFT
ORIENTATION: LEFT
ORIENTATION: LEFT

## 2024-12-11 ASSESSMENT — PAIN DESCRIPTION - LOCATION
LOCATION: KNEE

## 2024-12-11 ASSESSMENT — PAIN SCALES - GENERAL
PAINLEVEL_OUTOF10: 5
PAINLEVEL_OUTOF10: 3
PAINLEVEL_OUTOF10: 5

## 2024-12-11 ASSESSMENT — PAIN DESCRIPTION - DESCRIPTORS
DESCRIPTORS: ACHING
DESCRIPTORS: ACHING
DESCRIPTORS: SHARP;ACHING

## 2024-12-12 ENCOUNTER — APPOINTMENT (OUTPATIENT)
Dept: ULTRASOUND IMAGING | Age: 89
DRG: 947 | End: 2024-12-12
Payer: MEDICARE

## 2024-12-12 PROBLEM — I26.99 PULMONARY EMBOLUS (HCC): Status: ACTIVE | Noted: 2024-12-12

## 2024-12-12 PROBLEM — D35.02 ADRENAL ADENOMA, LEFT: Status: ACTIVE | Noted: 2024-12-12

## 2024-12-12 LAB
CORTISOL COLLECTION INFO: NORMAL
CORTISOL: 11 UG/DL (ref 2.5–19.5)
GLUCOSE BLD-MCNC: 85 MG/DL (ref 70–99)
PERFORMED ON: NORMAL

## 2024-12-12 PROCEDURE — 6370000000 HC RX 637 (ALT 250 FOR IP): Performed by: PHYSICAL MEDICINE & REHABILITATION

## 2024-12-12 PROCEDURE — 97535 SELF CARE MNGMENT TRAINING: CPT

## 2024-12-12 PROCEDURE — 1180000000 HC REHAB R&B

## 2024-12-12 PROCEDURE — 82533 TOTAL CORTISOL: CPT

## 2024-12-12 PROCEDURE — 93970 EXTREMITY STUDY: CPT

## 2024-12-12 PROCEDURE — 6360000002 HC RX W HCPCS: Performed by: INTERNAL MEDICINE

## 2024-12-12 PROCEDURE — 99222 1ST HOSP IP/OBS MODERATE 55: CPT | Performed by: INTERNAL MEDICINE

## 2024-12-12 PROCEDURE — 83835 ASSAY OF METANEPHRINES: CPT

## 2024-12-12 PROCEDURE — 97530 THERAPEUTIC ACTIVITIES: CPT

## 2024-12-12 PROCEDURE — 84244 ASSAY OF RENIN: CPT

## 2024-12-12 PROCEDURE — 36415 COLL VENOUS BLD VENIPUNCTURE: CPT

## 2024-12-12 PROCEDURE — 97110 THERAPEUTIC EXERCISES: CPT

## 2024-12-12 PROCEDURE — 99232 SBSQ HOSP IP/OBS MODERATE 35: CPT | Performed by: INTERNAL MEDICINE

## 2024-12-12 PROCEDURE — 82088 ASSAY OF ALDOSTERONE: CPT

## 2024-12-12 PROCEDURE — 97116 GAIT TRAINING THERAPY: CPT

## 2024-12-12 PROCEDURE — 99233 SBSQ HOSP IP/OBS HIGH 50: CPT | Performed by: PHYSICAL MEDICINE & REHABILITATION

## 2024-12-12 PROCEDURE — 82024 ASSAY OF ACTH: CPT

## 2024-12-12 RX ORDER — ENOXAPARIN SODIUM 100 MG/ML
1 INJECTION SUBCUTANEOUS 2 TIMES DAILY
Status: COMPLETED | OUTPATIENT
Start: 2024-12-12 | End: 2024-12-15

## 2024-12-12 RX ORDER — DEXAMETHASONE 1 MG
1 TABLET ORAL ONCE
Status: COMPLETED | OUTPATIENT
Start: 2024-12-12 | End: 2024-12-12

## 2024-12-12 RX ORDER — DEXAMETHASONE 1 MG
1 TABLET ORAL EVERY 12 HOURS SCHEDULED
Status: DISCONTINUED | OUTPATIENT
Start: 2024-12-12 | End: 2024-12-12

## 2024-12-12 RX ADMIN — ACETAMINOPHEN 500 MG: 500 TABLET ORAL at 11:01

## 2024-12-12 RX ADMIN — DORZOLAMIDE HYDROCHLORIDE 1 DROP: 20 SOLUTION OPHTHALMIC at 21:33

## 2024-12-12 RX ADMIN — CARBIDOPA AND LEVODOPA 1 TABLET: 25; 100 TABLET ORAL at 11:08

## 2024-12-12 RX ADMIN — LISINOPRIL 10 MG: 10 TABLET ORAL at 11:02

## 2024-12-12 RX ADMIN — CARVEDILOL 6.25 MG: 6.25 TABLET, FILM COATED ORAL at 11:34

## 2024-12-12 RX ADMIN — FINASTERIDE 5 MG: 5 TABLET, FILM COATED ORAL at 11:13

## 2024-12-12 RX ADMIN — DORZOLAMIDE HYDROCHLORIDE 1 DROP: 20 SOLUTION OPHTHALMIC at 11:11

## 2024-12-12 RX ADMIN — CARVEDILOL 6.25 MG: 6.25 TABLET, FILM COATED ORAL at 17:12

## 2024-12-12 RX ADMIN — ACETAMINOPHEN 500 MG: 500 TABLET ORAL at 21:23

## 2024-12-12 RX ADMIN — CYANOCOBALAMIN TAB 500 MCG 1000 MCG: 500 TAB at 11:02

## 2024-12-12 RX ADMIN — TIMOLOL MALEATE 1 DROP: 5 SOLUTION OPHTHALMIC at 11:11

## 2024-12-12 RX ADMIN — Medication 100 MG: at 11:08

## 2024-12-12 RX ADMIN — DEXAMETHASONE 1 MG: 1 TABLET ORAL at 21:23

## 2024-12-12 RX ADMIN — CARBIDOPA AND LEVODOPA 1 TABLET: 25; 100 TABLET ORAL at 21:23

## 2024-12-12 RX ADMIN — ENOXAPARIN SODIUM 80 MG: 100 INJECTION SUBCUTANEOUS at 11:01

## 2024-12-12 RX ADMIN — DONEPEZIL HYDROCHLORIDE 5 MG: 10 TABLET, FILM COATED ORAL at 21:23

## 2024-12-12 RX ADMIN — ENOXAPARIN SODIUM 80 MG: 100 INJECTION SUBCUTANEOUS at 21:23

## 2024-12-12 RX ADMIN — ISOSORBIDE MONONITRATE 30 MG: 30 TABLET, EXTENDED RELEASE ORAL at 11:08

## 2024-12-12 RX ADMIN — CLOPIDOGREL BISULFATE 75 MG: 75 TABLET ORAL at 11:01

## 2024-12-12 RX ADMIN — Medication 3 MG: at 21:23

## 2024-12-12 RX ADMIN — TIMOLOL MALEATE 1 DROP: 5 SOLUTION OPHTHALMIC at 21:34

## 2024-12-12 RX ADMIN — LATANOPROST 1 DROP: 50 SOLUTION OPHTHALMIC at 21:34

## 2024-12-12 ASSESSMENT — PAIN DESCRIPTION - ORIENTATION: ORIENTATION: MID

## 2024-12-12 ASSESSMENT — PAIN DESCRIPTION - DESCRIPTORS: DESCRIPTORS: ACHING

## 2024-12-12 ASSESSMENT — PAIN DESCRIPTION - LOCATION: LOCATION: BACK

## 2024-12-12 ASSESSMENT — PAIN SCALES - GENERAL
PAINLEVEL_OUTOF10: 3
PAINLEVEL_OUTOF10: 0

## 2024-12-12 NOTE — FLOWSHEET NOTE
Patient requesting to know why his nurse DANIEL Rothman came in and put telemetry on him at midnight, states he is taking it off. Patient educated that per Dr. Haider the CT scan he went down for on day shift showed left upper lobe PE. Patient notified that per Dr. Haider's note he ordered lab work, Lovenox, venous study and echo to be completed for this AM. As well as endocrinology consult. Electronically signed by Janessa Cardona RN on 12/12/2024 at 4:01 AM

## 2024-12-12 NOTE — PLAN OF CARE
Problem: Safety - Adult  Goal: Free from fall injury  12/11/2024 2334 by Stephan Aaron RN  Outcome: Progressing  12/11/2024 1331 by Tara Khan RN  Outcome: Progressing     Problem: Chronic Conditions and Co-morbidities  Goal: Patient's chronic conditions and co-morbidity symptoms are monitored and maintained or improved  12/11/2024 2334 by Stephan Aaron RN  Outcome: Progressing  12/11/2024 1331 by Tara Khan RN  Outcome: Progressing     Problem: Discharge Planning  Goal: Discharge to home or other facility with appropriate resources  Outcome: Progressing     Problem: Pain  Goal: Verbalizes/displays adequate comfort level or baseline comfort level  12/11/2024 2334 by Stephan Aaron RN  Outcome: Progressing  12/11/2024 1331 by Tara Khan RN  Outcome: Progressing     Problem: ABCDS Injury Assessment  Goal: Absence of physical injury  12/11/2024 2334 by Stephan Aaron RN  Outcome: Progressing  12/11/2024 1331 by Tara Khan RN  Outcome: Progressing     Problem: Skin/Tissue Integrity  Goal: Absence of new skin breakdown  Description: 1.  Monitor for areas of redness and/or skin breakdown  2.  Assess vascular access sites hourly  3.  Every 4-6 hours minimum:  Change oxygen saturation probe site  4.  Every 4-6 hours:  If on nasal continuous positive airway pressure, respiratory therapy assess nares and determine need for appliance change or resting period.  12/11/2024 2334 by Stephan Aaron RN  Outcome: Progressing  12/11/2024 1331 by Tara Khan RN  Outcome: Progressing

## 2024-12-12 NOTE — FLOWSHEET NOTE
This nurse perfect serve message sent to hospitalist; Dr. DEAN Patient's daughter is expressing concerns regarding patient taking lovenox, She wants to talk to you. She also stated, Dr. Duvall was the one reviewing his BT medications outpatient. Patient daughter's name is Olga and her phone number is [phone]. Thanks.

## 2024-12-13 ENCOUNTER — HOSPITAL ENCOUNTER (INPATIENT)
Age: 89
Discharge: HOME | DRG: 947 | End: 2024-12-15
Attending: INTERNAL MEDICINE
Payer: MEDICARE

## 2024-12-13 VITALS
WEIGHT: 175 LBS | HEIGHT: 70 IN | SYSTOLIC BLOOD PRESSURE: 130 MMHG | DIASTOLIC BLOOD PRESSURE: 69 MMHG | BODY MASS INDEX: 25.05 KG/M2

## 2024-12-13 LAB
ACTH PLAS-MCNC: 7.7 PG/ML (ref 7.2–63.3)
ANION GAP SERPL CALCULATED.3IONS-SCNC: 7 MEQ/L (ref 9–15)
BUN SERPL-MCNC: 24 MG/DL (ref 8–23)
CALCIUM SERPL-MCNC: 9.3 MG/DL (ref 8.5–9.9)
CHLORIDE SERPL-SCNC: 104 MEQ/L (ref 95–107)
CO2 SERPL-SCNC: 27 MEQ/L (ref 20–31)
CORTISOL COLLECTION INFO: NORMAL
CORTISOL: 6.7 UG/DL (ref 2.5–19.5)
CREAT SERPL-MCNC: 0.8 MG/DL (ref 0.7–1.2)
DHEA-S SERPL-MCNC: 54 UG/DL (ref 16.2–123)
ECHO AO ROOT DIAM: 3.3 CM
ECHO AO ROOT INDEX: 1.68 CM/M2
ECHO AR MAX VEL PISA: 3.8 M/S
ECHO AV AREA PEAK VELOCITY: 3.1 CM2
ECHO AV AREA VTI: 2.9 CM2
ECHO AV AREA/BSA PEAK VELOCITY: 1.6 CM2/M2
ECHO AV AREA/BSA VTI: 1.5 CM2/M2
ECHO AV MEAN GRADIENT: 3 MMHG
ECHO AV MEAN VELOCITY: 0.9 M/S
ECHO AV PEAK GRADIENT: 7 MMHG
ECHO AV PEAK VELOCITY: 1.4 M/S
ECHO AV REGURGITANT PHT: 816.4 MILLISECOND
ECHO AV VELOCITY RATIO: 0.79
ECHO AV VTI: 31.8 CM
ECHO BSA: 1.98 M2
ECHO EST RA PRESSURE: 3 MMHG
ECHO LA DIAMETER INDEX: 1.98 CM/M2
ECHO LA DIAMETER: 3.9 CM
ECHO LA TO AORTIC ROOT RATIO: 1.18
ECHO LA VOL A-L A2C: 78 ML (ref 18–58)
ECHO LA VOL A-L A4C: 42 ML (ref 18–58)
ECHO LA VOL MOD A2C: 75 ML (ref 18–58)
ECHO LA VOL MOD A4C: 41 ML (ref 18–58)
ECHO LA VOLUME AREA LENGTH: 60 ML
ECHO LA VOLUME INDEX A-L A2C: 40 ML/M2 (ref 16–34)
ECHO LA VOLUME INDEX A-L A4C: 21 ML/M2 (ref 16–34)
ECHO LA VOLUME INDEX AREA LENGTH: 30 ML/M2 (ref 16–34)
ECHO LA VOLUME INDEX MOD A2C: 38 ML/M2 (ref 16–34)
ECHO LA VOLUME INDEX MOD A4C: 21 ML/M2 (ref 16–34)
ECHO LV E' LATERAL VELOCITY: 5.12 CM/S
ECHO LV E' SEPTAL VELOCITY: 5.86 CM/S
ECHO LV EDV A2C: 92 ML
ECHO LV EDV A4C: 80 ML
ECHO LV EDV BP: 85 ML (ref 67–155)
ECHO LV EDV INDEX A4C: 41 ML/M2
ECHO LV EDV INDEX BP: 43 ML/M2
ECHO LV EDV NDEX A2C: 47 ML/M2
ECHO LV EJECTION FRACTION A2C: 64 %
ECHO LV EJECTION FRACTION A4C: 55 %
ECHO LV EJECTION FRACTION BIPLANE: 59 % (ref 55–100)
ECHO LV ESV A2C: 33 ML
ECHO LV ESV A4C: 36 ML
ECHO LV ESV BP: 35 ML (ref 22–58)
ECHO LV ESV INDEX A2C: 17 ML/M2
ECHO LV ESV INDEX A4C: 18 ML/M2
ECHO LV ESV INDEX BP: 18 ML/M2
ECHO LV FRACTIONAL SHORTENING: 29 % (ref 28–44)
ECHO LV INTERNAL DIMENSION DIASTOLE INDEX: 2.08 CM/M2
ECHO LV INTERNAL DIMENSION DIASTOLIC: 4.1 CM (ref 4.2–5.9)
ECHO LV INTERNAL DIMENSION SYSTOLIC INDEX: 1.47 CM/M2
ECHO LV INTERNAL DIMENSION SYSTOLIC: 2.9 CM
ECHO LV IVSD: 1.2 CM (ref 0.6–1)
ECHO LV IVSS: 1.2 CM
ECHO LV MASS 2D: 182.5 G (ref 88–224)
ECHO LV MASS INDEX 2D: 92.6 G/M2 (ref 49–115)
ECHO LV POSTERIOR WALL DIASTOLIC: 1.3 CM (ref 0.6–1)
ECHO LV POSTERIOR WALL SYSTOLIC: 1.6 CM
ECHO LV RELATIVE WALL THICKNESS RATIO: 0.63
ECHO LVOT AREA: 4.2 CM2
ECHO LVOT AV VTI INDEX: 0.73
ECHO LVOT DIAM: 2.3 CM
ECHO LVOT MEAN GRADIENT: 2 MMHG
ECHO LVOT PEAK GRADIENT: 4 MMHG
ECHO LVOT PEAK VELOCITY: 1.1 M/S
ECHO LVOT STROKE VOLUME INDEX: 49.1 ML/M2
ECHO LVOT SV: 96.8 ML
ECHO LVOT VTI: 23.3 CM
ECHO MV A VELOCITY: 1.13 M/S
ECHO MV AREA VTI: 2.8 CM2
ECHO MV E DECELERATION TIME (DT): 299.8 MS
ECHO MV E VELOCITY: 0.58 M/S
ECHO MV E/A RATIO: 0.51
ECHO MV E/E' LATERAL: 11.33
ECHO MV E/E' RATIO (AVERAGED): 10.61
ECHO MV E/E' SEPTAL: 9.9
ECHO MV LVOT VTI INDEX: 1.48
ECHO MV MAX VELOCITY: 1.2 M/S
ECHO MV MEAN GRADIENT: 2 MMHG
ECHO MV MEAN VELOCITY: 0.6 M/S
ECHO MV PEAK GRADIENT: 6 MMHG
ECHO MV REGURGITANT PEAK GRADIENT: 149 MMHG
ECHO MV REGURGITANT PEAK VELOCITY: 6.1 M/S
ECHO MV VTI: 34.4 CM
ECHO PULMONARY ARTERY END DIASTOLIC PRESSURE: 2 MMHG
ECHO PV MAX VELOCITY: 1 M/S
ECHO PV PEAK GRADIENT: 4 MMHG
ECHO PV REGURGITANT MAX VELOCITY: 0.8 M/S
ECHO RIGHT VENTRICULAR SYSTOLIC PRESSURE (RVSP): 39 MMHG
ECHO RV INTERNAL DIMENSION: 3.7 CM
ECHO RV TAPSE: 1.9 CM (ref 1.7–?)
ECHO TV REGURGITANT MAX VELOCITY: 3 M/S
ECHO TV REGURGITANT PEAK GRADIENT: 36 MMHG
ERYTHROCYTE [DISTWIDTH] IN BLOOD BY AUTOMATED COUNT: 14.2 % (ref 11.5–14.5)
GLUCOSE SERPL-MCNC: 120 MG/DL (ref 70–99)
HCT VFR BLD AUTO: 41.3 % (ref 42–52)
HGB BLD-MCNC: 13.3 G/DL (ref 14–18)
MCH RBC QN AUTO: 28.9 PG (ref 27–31.3)
MCHC RBC AUTO-ENTMCNC: 32.2 % (ref 33–37)
MCV RBC AUTO: 89.6 FL (ref 79–92.2)
PLATELET # BLD AUTO: 230 K/UL (ref 130–400)
POTASSIUM SERPL-SCNC: 4.6 MEQ/L (ref 3.4–4.9)
RBC # BLD AUTO: 4.61 M/UL (ref 4.7–6.1)
SODIUM SERPL-SCNC: 138 MEQ/L (ref 135–144)
WBC # BLD AUTO: 5.5 K/UL (ref 4.8–10.8)

## 2024-12-13 PROCEDURE — 6370000000 HC RX 637 (ALT 250 FOR IP): Performed by: INTERNAL MEDICINE

## 2024-12-13 PROCEDURE — 97535 SELF CARE MNGMENT TRAINING: CPT

## 2024-12-13 PROCEDURE — 97130 THER IVNTJ EA ADDL 15 MIN: CPT

## 2024-12-13 PROCEDURE — 1180000000 HC REHAB R&B

## 2024-12-13 PROCEDURE — 97129 THER IVNTJ 1ST 15 MIN: CPT

## 2024-12-13 PROCEDURE — 85027 COMPLETE CBC AUTOMATED: CPT

## 2024-12-13 PROCEDURE — 82627 DEHYDROEPIANDROSTERONE: CPT

## 2024-12-13 PROCEDURE — 82533 TOTAL CORTISOL: CPT

## 2024-12-13 PROCEDURE — 6360000002 HC RX W HCPCS: Performed by: INTERNAL MEDICINE

## 2024-12-13 PROCEDURE — 93306 TTE W/DOPPLER COMPLETE: CPT

## 2024-12-13 PROCEDURE — 36415 COLL VENOUS BLD VENIPUNCTURE: CPT

## 2024-12-13 PROCEDURE — 80048 BASIC METABOLIC PNL TOTAL CA: CPT

## 2024-12-13 PROCEDURE — 97116 GAIT TRAINING THERAPY: CPT

## 2024-12-13 PROCEDURE — 6370000000 HC RX 637 (ALT 250 FOR IP): Performed by: PHYSICAL MEDICINE & REHABILITATION

## 2024-12-13 PROCEDURE — 93306 TTE W/DOPPLER COMPLETE: CPT | Performed by: INTERNAL MEDICINE

## 2024-12-13 PROCEDURE — 99232 SBSQ HOSP IP/OBS MODERATE 35: CPT | Performed by: PHYSICAL MEDICINE & REHABILITATION

## 2024-12-13 PROCEDURE — 97530 THERAPEUTIC ACTIVITIES: CPT

## 2024-12-13 RX ORDER — ASPIRIN 81 MG/1
81 TABLET, CHEWABLE ORAL DAILY
Status: DISCONTINUED | OUTPATIENT
Start: 2024-12-13 | End: 2024-12-23 | Stop reason: HOSPADM

## 2024-12-13 RX ADMIN — ACETAMINOPHEN 500 MG: 500 TABLET ORAL at 09:16

## 2024-12-13 RX ADMIN — DORZOLAMIDE HYDROCHLORIDE 1 DROP: 20 SOLUTION OPHTHALMIC at 09:18

## 2024-12-13 RX ADMIN — ACETAMINOPHEN 500 MG: 500 TABLET ORAL at 21:33

## 2024-12-13 RX ADMIN — Medication 3 MG: at 21:34

## 2024-12-13 RX ADMIN — Medication 100 MG: at 09:16

## 2024-12-13 RX ADMIN — ISOSORBIDE MONONITRATE 30 MG: 30 TABLET, EXTENDED RELEASE ORAL at 09:16

## 2024-12-13 RX ADMIN — ASPIRIN 81 MG CHEWABLE TABLET 81 MG: 81 TABLET CHEWABLE at 16:54

## 2024-12-13 RX ADMIN — DICLOFENAC SODIUM 4 G: 10 GEL TOPICAL at 21:54

## 2024-12-13 RX ADMIN — CARBIDOPA AND LEVODOPA 1 TABLET: 25; 100 TABLET ORAL at 09:16

## 2024-12-13 RX ADMIN — ENOXAPARIN SODIUM 80 MG: 100 INJECTION SUBCUTANEOUS at 09:17

## 2024-12-13 RX ADMIN — TIMOLOL MALEATE 1 DROP: 5 SOLUTION OPHTHALMIC at 09:18

## 2024-12-13 RX ADMIN — DICLOFENAC SODIUM 4 G: 10 GEL TOPICAL at 09:18

## 2024-12-13 RX ADMIN — ACETAMINOPHEN 500 MG: 500 TABLET ORAL at 16:55

## 2024-12-13 RX ADMIN — CARBIDOPA AND LEVODOPA 1 TABLET: 25; 100 TABLET ORAL at 21:34

## 2024-12-13 RX ADMIN — DONEPEZIL HYDROCHLORIDE 5 MG: 10 TABLET, FILM COATED ORAL at 21:34

## 2024-12-13 RX ADMIN — CARVEDILOL 6.25 MG: 6.25 TABLET, FILM COATED ORAL at 09:16

## 2024-12-13 RX ADMIN — TIMOLOL MALEATE 1 DROP: 5 SOLUTION OPHTHALMIC at 21:00

## 2024-12-13 RX ADMIN — DORZOLAMIDE HYDROCHLORIDE 1 DROP: 20 SOLUTION OPHTHALMIC at 21:00

## 2024-12-13 RX ADMIN — FINASTERIDE 5 MG: 5 TABLET, FILM COATED ORAL at 09:16

## 2024-12-13 RX ADMIN — LATANOPROST 1 DROP: 50 SOLUTION OPHTHALMIC at 21:00

## 2024-12-13 RX ADMIN — CARVEDILOL 6.25 MG: 6.25 TABLET, FILM COATED ORAL at 16:55

## 2024-12-13 RX ADMIN — CYANOCOBALAMIN TAB 500 MCG 1000 MCG: 500 TAB at 09:16

## 2024-12-13 RX ADMIN — ENOXAPARIN SODIUM 80 MG: 100 INJECTION SUBCUTANEOUS at 21:33

## 2024-12-13 RX ADMIN — LISINOPRIL 10 MG: 10 TABLET ORAL at 09:16

## 2024-12-13 ASSESSMENT — PAIN SCALES - GENERAL: PAINLEVEL_OUTOF10: 0

## 2024-12-13 NOTE — PLAN OF CARE
Problem: Safety - Adult  Goal: Free from fall injury  12/13/2024 1418 by Marisela Drake, RN  Outcome: Progressing  12/13/2024 0421 by Kristan Smith RN  Outcome: Progressing     Problem: Chronic Conditions and Co-morbidities  Goal: Patient's chronic conditions and co-morbidity symptoms are monitored and maintained or improved  12/13/2024 1418 by Marisela Drake, RN  Outcome: Progressing  12/13/2024 0421 by Kristan Smith RN  Outcome: Progressing     Problem: Discharge Planning  Goal: Discharge to home or other facility with appropriate resources  Outcome: Progressing     Problem: Pain  Goal: Verbalizes/displays adequate comfort level or baseline comfort level  Outcome: Progressing     Problem: ABCDS Injury Assessment  Goal: Absence of physical injury  Outcome: Progressing     Problem: Skin/Tissue Integrity  Goal: Absence of new skin breakdown  Description: 1.  Monitor for areas of redness and/or skin breakdown  2.  Assess vascular access sites hourly  3.  Every 4-6 hours minimum:  Change oxygen saturation probe site  4.  Every 4-6 hours:  If on nasal continuous positive airway pressure, respiratory therapy assess nares and determine need for appliance change or resting period.  Outcome: Progressing

## 2024-12-13 NOTE — PLAN OF CARE
Problem: Safety - Adult  Goal: Free from fall injury  12/13/2024 0421 by Kristan Smith, RN  Outcome: Progressing     Problem: Chronic Conditions and Co-morbidities  Goal: Patient's chronic conditions and co-morbidity symptoms are monitored and maintained or improved  12/13/2024 0421 by Kristan Smith, RN  Outcome: Progressing

## 2024-12-13 NOTE — FLOWSHEET NOTE
This nurse perfect serve message sent to hospitalist; Dr. Prakash Per ultrasound tech, patient is positive for right leg DVT \"

## 2024-12-14 PROCEDURE — 97112 NEUROMUSCULAR REEDUCATION: CPT

## 2024-12-14 PROCEDURE — 97110 THERAPEUTIC EXERCISES: CPT

## 2024-12-14 PROCEDURE — 97535 SELF CARE MNGMENT TRAINING: CPT

## 2024-12-14 PROCEDURE — 97116 GAIT TRAINING THERAPY: CPT

## 2024-12-14 PROCEDURE — 6370000000 HC RX 637 (ALT 250 FOR IP): Performed by: INTERNAL MEDICINE

## 2024-12-14 PROCEDURE — 97530 THERAPEUTIC ACTIVITIES: CPT

## 2024-12-14 PROCEDURE — 6360000002 HC RX W HCPCS: Performed by: INTERNAL MEDICINE

## 2024-12-14 PROCEDURE — 1180000000 HC REHAB R&B

## 2024-12-14 PROCEDURE — 99232 SBSQ HOSP IP/OBS MODERATE 35: CPT | Performed by: INTERNAL MEDICINE

## 2024-12-14 PROCEDURE — 99232 SBSQ HOSP IP/OBS MODERATE 35: CPT | Performed by: PHYSICAL MEDICINE & REHABILITATION

## 2024-12-14 PROCEDURE — 6370000000 HC RX 637 (ALT 250 FOR IP): Performed by: PHYSICAL MEDICINE & REHABILITATION

## 2024-12-14 RX ADMIN — DICLOFENAC SODIUM 4 G: 10 GEL TOPICAL at 19:55

## 2024-12-14 RX ADMIN — LATANOPROST 1 DROP: 50 SOLUTION OPHTHALMIC at 19:55

## 2024-12-14 RX ADMIN — ACETAMINOPHEN 500 MG: 500 TABLET ORAL at 14:31

## 2024-12-14 RX ADMIN — ACETAMINOPHEN 500 MG: 500 TABLET ORAL at 09:58

## 2024-12-14 RX ADMIN — DICLOFENAC SODIUM 4 G: 10 GEL TOPICAL at 10:02

## 2024-12-14 RX ADMIN — FINASTERIDE 5 MG: 5 TABLET, FILM COATED ORAL at 09:58

## 2024-12-14 RX ADMIN — ASPIRIN 81 MG CHEWABLE TABLET 81 MG: 81 TABLET CHEWABLE at 09:58

## 2024-12-14 RX ADMIN — CARBIDOPA AND LEVODOPA 1 TABLET: 25; 100 TABLET ORAL at 19:54

## 2024-12-14 RX ADMIN — ENOXAPARIN SODIUM 80 MG: 100 INJECTION SUBCUTANEOUS at 19:54

## 2024-12-14 RX ADMIN — ACETAMINOPHEN 500 MG: 500 TABLET ORAL at 19:54

## 2024-12-14 RX ADMIN — LISINOPRIL 10 MG: 10 TABLET ORAL at 09:57

## 2024-12-14 RX ADMIN — DONEPEZIL HYDROCHLORIDE 5 MG: 10 TABLET, FILM COATED ORAL at 19:54

## 2024-12-14 RX ADMIN — Medication 100 MG: at 09:58

## 2024-12-14 RX ADMIN — CARVEDILOL 6.25 MG: 6.25 TABLET, FILM COATED ORAL at 17:20

## 2024-12-14 RX ADMIN — CARBIDOPA AND LEVODOPA 1 TABLET: 25; 100 TABLET ORAL at 09:58

## 2024-12-14 RX ADMIN — TIMOLOL MALEATE 1 DROP: 5 SOLUTION OPHTHALMIC at 10:03

## 2024-12-14 RX ADMIN — CARVEDILOL 6.25 MG: 6.25 TABLET, FILM COATED ORAL at 09:57

## 2024-12-14 RX ADMIN — TIMOLOL MALEATE 1 DROP: 5 SOLUTION OPHTHALMIC at 19:54

## 2024-12-14 RX ADMIN — Medication 3 MG: at 19:54

## 2024-12-14 RX ADMIN — ISOSORBIDE MONONITRATE 30 MG: 30 TABLET, EXTENDED RELEASE ORAL at 09:58

## 2024-12-14 RX ADMIN — ENOXAPARIN SODIUM 80 MG: 100 INJECTION SUBCUTANEOUS at 09:57

## 2024-12-14 RX ADMIN — DICLOFENAC SODIUM 4 G: 10 GEL TOPICAL at 15:17

## 2024-12-14 RX ADMIN — DORZOLAMIDE HYDROCHLORIDE 1 DROP: 20 SOLUTION OPHTHALMIC at 19:54

## 2024-12-14 RX ADMIN — CYANOCOBALAMIN TAB 500 MCG 1000 MCG: 500 TAB at 09:57

## 2024-12-14 RX ADMIN — DORZOLAMIDE HYDROCHLORIDE 1 DROP: 20 SOLUTION OPHTHALMIC at 10:03

## 2024-12-14 ASSESSMENT — PAIN SCALES - GENERAL: PAINLEVEL_OUTOF10: 0

## 2024-12-14 NOTE — PLAN OF CARE
Problem: Safety - Adult  Goal: Free from fall injury  12/14/2024 0303 by Kristan Smith, RN  Outcome: Progressing     Problem: Chronic Conditions and Co-morbidities  Goal: Patient's chronic conditions and co-morbidity symptoms are monitored and maintained or improved  12/14/2024 0303 by Kristan Smith, RN  Outcome: Progressing

## 2024-12-14 NOTE — FLOWSHEET NOTE
Patient alert, oriented and cooperative. Incontinent of bowel, able to call for help with urination. Complains of pain to bilateral knees, voltaren effective. Complains of cough. Denies any further needs or complaints at this time. Call light within reach.

## 2024-12-14 NOTE — PLAN OF CARE
Problem: Safety - Adult  Goal: Free from fall injury  12/14/2024 1609 by Kerri Leroy, RN  Outcome: Progressing  12/14/2024 0303 by Kristan Smith RN  Outcome: Progressing     Problem: Chronic Conditions and Co-morbidities  Goal: Patient's chronic conditions and co-morbidity symptoms are monitored and maintained or improved  12/14/2024 1609 by Kerri Leroy RN  Outcome: Progressing  12/14/2024 0303 by Kristan Smith RN  Outcome: Progressing     Problem: Discharge Planning  Goal: Discharge to home or other facility with appropriate resources  12/14/2024 1609 by Kerri Leroy RN  Outcome: Progressing  12/14/2024 0303 by Kristan Smith RN  Outcome: Progressing     Problem: Pain  Goal: Verbalizes/displays adequate comfort level or baseline comfort level  Outcome: Progressing     Problem: ABCDS Injury Assessment  Goal: Absence of physical injury  Outcome: Progressing     Problem: Skin/Tissue Integrity  Goal: Absence of new skin breakdown  Outcome: Progressing

## 2024-12-15 LAB
ANION GAP SERPL CALCULATED.3IONS-SCNC: 8 MEQ/L (ref 9–15)
BUN SERPL-MCNC: 35 MG/DL (ref 8–23)
CALCIUM SERPL-MCNC: 9.1 MG/DL (ref 8.5–9.9)
CHLORIDE SERPL-SCNC: 102 MEQ/L (ref 95–107)
CO2 SERPL-SCNC: 29 MEQ/L (ref 20–31)
CREAT SERPL-MCNC: 0.96 MG/DL (ref 0.7–1.2)
ERYTHROCYTE [DISTWIDTH] IN BLOOD BY AUTOMATED COUNT: 14.5 % (ref 11.5–14.5)
GLUCOSE SERPL-MCNC: 120 MG/DL (ref 70–99)
HCT VFR BLD AUTO: 42.2 % (ref 42–52)
HGB BLD-MCNC: 13.4 G/DL (ref 14–18)
MCH RBC QN AUTO: 28.9 PG (ref 27–31.3)
MCHC RBC AUTO-ENTMCNC: 31.8 % (ref 33–37)
MCV RBC AUTO: 91.1 FL (ref 79–92.2)
PLATELET # BLD AUTO: 243 K/UL (ref 130–400)
POTASSIUM SERPL-SCNC: 4.5 MEQ/L (ref 3.4–4.9)
RBC # BLD AUTO: 4.63 M/UL (ref 4.7–6.1)
SODIUM SERPL-SCNC: 139 MEQ/L (ref 135–144)
WBC # BLD AUTO: 8.5 K/UL (ref 4.8–10.8)

## 2024-12-15 PROCEDURE — 85027 COMPLETE CBC AUTOMATED: CPT

## 2024-12-15 PROCEDURE — 36415 COLL VENOUS BLD VENIPUNCTURE: CPT

## 2024-12-15 PROCEDURE — 6360000002 HC RX W HCPCS: Performed by: INTERNAL MEDICINE

## 2024-12-15 PROCEDURE — 1180000000 HC REHAB R&B

## 2024-12-15 PROCEDURE — 6370000000 HC RX 637 (ALT 250 FOR IP): Performed by: PHYSICAL MEDICINE & REHABILITATION

## 2024-12-15 PROCEDURE — 80048 BASIC METABOLIC PNL TOTAL CA: CPT

## 2024-12-15 PROCEDURE — 97116 GAIT TRAINING THERAPY: CPT

## 2024-12-15 PROCEDURE — 97535 SELF CARE MNGMENT TRAINING: CPT

## 2024-12-15 PROCEDURE — 6370000000 HC RX 637 (ALT 250 FOR IP): Performed by: INTERNAL MEDICINE

## 2024-12-15 RX ORDER — PRAVASTATIN SODIUM 40 MG
80 TABLET ORAL EVERY EVENING
Status: DISCONTINUED | OUTPATIENT
Start: 2024-12-16 | End: 2024-12-23 | Stop reason: HOSPADM

## 2024-12-15 RX ADMIN — DORZOLAMIDE HYDROCHLORIDE 1 DROP: 20 SOLUTION OPHTHALMIC at 21:57

## 2024-12-15 RX ADMIN — ENOXAPARIN SODIUM 80 MG: 100 INJECTION SUBCUTANEOUS at 09:30

## 2024-12-15 RX ADMIN — DORZOLAMIDE HYDROCHLORIDE 1 DROP: 20 SOLUTION OPHTHALMIC at 09:36

## 2024-12-15 RX ADMIN — DONEPEZIL HYDROCHLORIDE 5 MG: 10 TABLET, FILM COATED ORAL at 21:53

## 2024-12-15 RX ADMIN — FINASTERIDE 5 MG: 5 TABLET, FILM COATED ORAL at 09:30

## 2024-12-15 RX ADMIN — ACETAMINOPHEN 500 MG: 500 TABLET ORAL at 09:30

## 2024-12-15 RX ADMIN — CARVEDILOL 6.25 MG: 6.25 TABLET, FILM COATED ORAL at 17:04

## 2024-12-15 RX ADMIN — ASPIRIN 81 MG CHEWABLE TABLET 81 MG: 81 TABLET CHEWABLE at 09:30

## 2024-12-15 RX ADMIN — ACETAMINOPHEN 500 MG: 500 TABLET ORAL at 21:54

## 2024-12-15 RX ADMIN — LISINOPRIL 10 MG: 10 TABLET ORAL at 09:30

## 2024-12-15 RX ADMIN — CARVEDILOL 6.25 MG: 6.25 TABLET, FILM COATED ORAL at 09:29

## 2024-12-15 RX ADMIN — LATANOPROST 1 DROP: 50 SOLUTION OPHTHALMIC at 21:57

## 2024-12-15 RX ADMIN — DICLOFENAC SODIUM 4 G: 10 GEL TOPICAL at 21:55

## 2024-12-15 RX ADMIN — TIMOLOL MALEATE 1 DROP: 5 SOLUTION OPHTHALMIC at 21:57

## 2024-12-15 RX ADMIN — Medication 3 MG: at 21:54

## 2024-12-15 RX ADMIN — APIXABAN 10 MG: 5 TABLET, FILM COATED ORAL at 21:53

## 2024-12-15 RX ADMIN — Medication 100 MG: at 09:29

## 2024-12-15 RX ADMIN — CYANOCOBALAMIN TAB 500 MCG 1000 MCG: 500 TAB at 09:30

## 2024-12-15 RX ADMIN — CARBIDOPA AND LEVODOPA 1 TABLET: 25; 100 TABLET ORAL at 09:30

## 2024-12-15 RX ADMIN — ACETAMINOPHEN 500 MG: 500 TABLET ORAL at 13:57

## 2024-12-15 RX ADMIN — TIMOLOL MALEATE 1 DROP: 5 SOLUTION OPHTHALMIC at 09:36

## 2024-12-15 RX ADMIN — CARBIDOPA AND LEVODOPA 1 TABLET: 25; 100 TABLET ORAL at 21:54

## 2024-12-15 RX ADMIN — ISOSORBIDE MONONITRATE 30 MG: 30 TABLET, EXTENDED RELEASE ORAL at 09:29

## 2024-12-15 ASSESSMENT — PAIN SCALES - GENERAL
PAINLEVEL_OUTOF10: 0
PAINLEVEL_OUTOF10: 0

## 2024-12-15 NOTE — PLAN OF CARE
Problem: Safety - Adult  Goal: Free from fall injury  12/15/2024 0221 by Kiely Cortez RN  Outcome: Progressing  12/14/2024 1609 by Kerri Leroy RN  Outcome: Progressing     Problem: Chronic Conditions and Co-morbidities  Goal: Patient's chronic conditions and co-morbidity symptoms are monitored and maintained or improved  12/15/2024 0221 by Kiley Cortez RN  Outcome: Progressing  12/14/2024 1609 by Kerri Leroy RN  Outcome: Progressing     Problem: Discharge Planning  Goal: Discharge to home or other facility with appropriate resources  12/15/2024 0221 by Kiley Cortez RN  Outcome: Progressing  12/14/2024 1609 by Kerri Leroy RN  Outcome: Progressing     Problem: Pain  Goal: Verbalizes/displays adequate comfort level or baseline comfort level  12/15/2024 0221 by Kiley Cortez RN  Outcome: Progressing  12/14/2024 1609 by Kerri Leroy RN  Outcome: Progressing     Problem: ABCDS Injury Assessment  Goal: Absence of physical injury  12/15/2024 0221 by Kiley Cortez RN  Outcome: Progressing  12/14/2024 1609 by Kerri Leroy RN  Outcome: Progressing     Problem: Skin/Tissue Integrity  Goal: Absence of new skin breakdown  Description: 1.  Monitor for areas of redness and/or skin breakdown  2.  Assess vascular access sites hourly  3.  Every 4-6 hours minimum:  Change oxygen saturation probe site  4.  Every 4-6 hours:  If on nasal continuous positive airway pressure, respiratory therapy assess nares and determine need for appliance change or resting period.  12/15/2024 0221 by Kiley Cortez RN  Outcome: Progressing  12/14/2024 1609 by Kerri Leroy RN  Outcome: Progressing

## 2024-12-16 LAB
ALDOST SERPL-MCNC: 5 NG/DL
ALDOST SERPL-MCNC: 7.5 NG/DL
ALDOST/RENIN PLAS-RTO: 2.6 RATIO (ref 0.1–3.7)
ALDOST/RENIN PLAS-RTO: NORMAL RATIO
ANION GAP SERPL CALCULATED.3IONS-SCNC: 8 MEQ/L (ref 9–15)
ANNOTATION COMMENT IMP: NORMAL
ANNOTATION COMMENT IMP: NORMAL
BASOPHILS # BLD: 0.1 K/UL (ref 0–0.2)
BASOPHILS NFR BLD: 0.7 %
BUN SERPL-MCNC: 28 MG/DL (ref 8–23)
CALCIUM SERPL-MCNC: 8.2 MG/DL (ref 8.5–9.9)
CHLORIDE SERPL-SCNC: 102 MEQ/L (ref 95–107)
CO2 SERPL-SCNC: 27 MEQ/L (ref 20–31)
CREAT SERPL-MCNC: 0.79 MG/DL (ref 0.7–1.2)
EOSINOPHIL # BLD: 0.1 K/UL (ref 0–0.7)
EOSINOPHIL NFR BLD: 1.2 %
ERYTHROCYTE [DISTWIDTH] IN BLOOD BY AUTOMATED COUNT: 14.5 % (ref 11.5–14.5)
GLUCOSE SERPL-MCNC: 99 MG/DL (ref 70–99)
HCT VFR BLD AUTO: 42.1 % (ref 42–52)
HGB BLD-MCNC: 13.9 G/DL (ref 14–18)
LYMPHOCYTES # BLD: 1.5 K/UL (ref 1–4.8)
LYMPHOCYTES NFR BLD: 20.9 %
MCH RBC QN AUTO: 29.9 PG (ref 27–31.3)
MCHC RBC AUTO-ENTMCNC: 33 % (ref 33–37)
MCV RBC AUTO: 90.5 FL (ref 79–92.2)
METANEPHS SERPL-SCNC: 0.38 NMOL/L (ref 0–0.49)
METANEPHS SERPL-SCNC: 0.4 NMOL/L (ref 0–0.49)
MONOCYTES # BLD: 0.7 K/UL (ref 0.2–0.8)
MONOCYTES NFR BLD: 9.3 %
NEUTROPHILS # BLD: 4.9 K/UL (ref 1.4–6.5)
NEUTS SEG NFR BLD: 66.9 %
NORMETANEPHRINE SERPL-SCNC: 0.6 NMOL/L (ref 0–0.89)
NORMETANEPHRINE SERPL-SCNC: 0.67 NMOL/L (ref 0–0.89)
PLATELET # BLD AUTO: 255 K/UL (ref 130–400)
POTASSIUM SERPL-SCNC: 4.6 MEQ/L (ref 3.4–4.9)
RBC # BLD AUTO: 4.65 M/UL (ref 4.7–6.1)
RENIN PLAS-CCNC: 2.9 PG/ML
RENIN PLAS-CCNC: <0.1 NG/ML/HR
SODIUM SERPL-SCNC: 137 MEQ/L (ref 135–144)
WBC # BLD AUTO: 7.3 K/UL (ref 4.8–10.8)

## 2024-12-16 PROCEDURE — 36415 COLL VENOUS BLD VENIPUNCTURE: CPT

## 2024-12-16 PROCEDURE — 1180000000 HC REHAB R&B

## 2024-12-16 PROCEDURE — 97535 SELF CARE MNGMENT TRAINING: CPT

## 2024-12-16 PROCEDURE — 97530 THERAPEUTIC ACTIVITIES: CPT

## 2024-12-16 PROCEDURE — 80048 BASIC METABOLIC PNL TOTAL CA: CPT

## 2024-12-16 PROCEDURE — 99232 SBSQ HOSP IP/OBS MODERATE 35: CPT | Performed by: INTERNAL MEDICINE

## 2024-12-16 PROCEDURE — 97110 THERAPEUTIC EXERCISES: CPT

## 2024-12-16 PROCEDURE — 85025 COMPLETE CBC W/AUTO DIFF WBC: CPT

## 2024-12-16 PROCEDURE — 99232 SBSQ HOSP IP/OBS MODERATE 35: CPT | Performed by: PHYSICAL MEDICINE & REHABILITATION

## 2024-12-16 PROCEDURE — 97116 GAIT TRAINING THERAPY: CPT

## 2024-12-16 PROCEDURE — 6370000000 HC RX 637 (ALT 250 FOR IP): Performed by: INTERNAL MEDICINE

## 2024-12-16 PROCEDURE — 6370000000 HC RX 637 (ALT 250 FOR IP): Performed by: PHYSICAL MEDICINE & REHABILITATION

## 2024-12-16 PROCEDURE — 97112 NEUROMUSCULAR REEDUCATION: CPT

## 2024-12-16 RX ADMIN — APIXABAN 10 MG: 5 TABLET, FILM COATED ORAL at 10:57

## 2024-12-16 RX ADMIN — TIMOLOL MALEATE 1 DROP: 5 SOLUTION OPHTHALMIC at 20:45

## 2024-12-16 RX ADMIN — TIMOLOL MALEATE 1 DROP: 5 SOLUTION OPHTHALMIC at 11:00

## 2024-12-16 RX ADMIN — Medication 3 MG: at 20:30

## 2024-12-16 RX ADMIN — ASPIRIN 81 MG CHEWABLE TABLET 81 MG: 81 TABLET CHEWABLE at 10:57

## 2024-12-16 RX ADMIN — DONEPEZIL HYDROCHLORIDE 5 MG: 10 TABLET, FILM COATED ORAL at 20:30

## 2024-12-16 RX ADMIN — CARVEDILOL 6.25 MG: 6.25 TABLET, FILM COATED ORAL at 10:57

## 2024-12-16 RX ADMIN — DORZOLAMIDE HYDROCHLORIDE 1 DROP: 20 SOLUTION OPHTHALMIC at 11:00

## 2024-12-16 RX ADMIN — CARVEDILOL 6.25 MG: 6.25 TABLET, FILM COATED ORAL at 17:33

## 2024-12-16 RX ADMIN — ACETAMINOPHEN 500 MG: 500 TABLET ORAL at 20:30

## 2024-12-16 RX ADMIN — DORZOLAMIDE HYDROCHLORIDE 1 DROP: 20 SOLUTION OPHTHALMIC at 20:43

## 2024-12-16 RX ADMIN — ACETAMINOPHEN 500 MG: 500 TABLET ORAL at 10:57

## 2024-12-16 RX ADMIN — LATANOPROST 1 DROP: 50 SOLUTION OPHTHALMIC at 20:45

## 2024-12-16 RX ADMIN — ISOSORBIDE MONONITRATE 30 MG: 30 TABLET, EXTENDED RELEASE ORAL at 10:57

## 2024-12-16 RX ADMIN — APIXABAN 10 MG: 5 TABLET, FILM COATED ORAL at 20:30

## 2024-12-16 RX ADMIN — LISINOPRIL 10 MG: 10 TABLET ORAL at 10:57

## 2024-12-16 RX ADMIN — FINASTERIDE 5 MG: 5 TABLET, FILM COATED ORAL at 10:56

## 2024-12-16 RX ADMIN — CYANOCOBALAMIN TAB 500 MCG 1000 MCG: 500 TAB at 10:57

## 2024-12-16 RX ADMIN — CARBIDOPA AND LEVODOPA 1 TABLET: 25; 100 TABLET ORAL at 11:00

## 2024-12-16 RX ADMIN — CARBIDOPA AND LEVODOPA 1 TABLET: 25; 100 TABLET ORAL at 20:29

## 2024-12-16 RX ADMIN — Medication 100 MG: at 10:56

## 2024-12-16 RX ADMIN — PRAVASTATIN SODIUM 80 MG: 40 TABLET ORAL at 17:33

## 2024-12-16 ASSESSMENT — PAIN SCALES - GENERAL
PAINLEVEL_OUTOF10: 0

## 2024-12-16 NOTE — PLAN OF CARE
Nutrition Problem #1: No nutrition diagnosis at this time  Intervention: Food and/or Nutrient Delivery: Continue Current Diet, Modify Oral Nutrition Supplement

## 2024-12-16 NOTE — PLAN OF CARE
Problem: Safety - Adult  Goal: Free from fall injury  12/16/2024 1217 by Marisela Drake RN  Outcome: Progressing  12/16/2024 0241 by Xiomara Albarado RN  Outcome: Progressing     Problem: Chronic Conditions and Co-morbidities  Goal: Patient's chronic conditions and co-morbidity symptoms are monitored and maintained or improved  12/16/2024 1217 by Marisela Drake RN  Outcome: Progressing  12/16/2024 0241 by Xiomara Albarado RN  Outcome: Progressing  Flowsheets (Taken 12/15/2024 2050)  Care Plan - Patient's Chronic Conditions and Co-Morbidity Symptoms are Monitored and Maintained or Improved: Monitor and assess patient's chronic conditions and comorbid symptoms for stability, deterioration, or improvement     Problem: Discharge Planning  Goal: Discharge to home or other facility with appropriate resources  12/16/2024 1217 by Marisela Drake RN  Outcome: Progressing  12/16/2024 0241 by Xiomara Albarado RN  Outcome: Progressing  Flowsheets (Taken 12/15/2024 2050)  Discharge to home or other facility with appropriate resources: Identify barriers to discharge with patient and caregiver     Problem: Pain  Goal: Verbalizes/displays adequate comfort level or baseline comfort level  12/16/2024 1217 by Marisela Drake RN  Outcome: Progressing  12/16/2024 0241 by Xiomara Albarado RN  Outcome: Progressing     Problem: ABCDS Injury Assessment  Goal: Absence of physical injury  12/16/2024 1217 by Marisela Drake RN  Outcome: Progressing  12/16/2024 0241 by Xiomara Albarado RN  Outcome: Progressing     Problem: Skin/Tissue Integrity  Goal: Absence of new skin breakdown  Description: 1.  Monitor for areas of redness and/or skin breakdown  2.  Assess vascular access sites hourly  3.  Every 4-6 hours minimum:  Change oxygen saturation probe site  4.  Every 4-6 hours:  If on nasal continuous positive airway pressure, respiratory therapy assess nares and determine need for appliance change or resting period.  12/16/2024 1217 by

## 2024-12-16 NOTE — PLAN OF CARE
Problem: Safety - Adult  Goal: Free from fall injury  Outcome: Progressing     Problem: Chronic Conditions and Co-morbidities  Goal: Patient's chronic conditions and co-morbidity symptoms are monitored and maintained or improved  Outcome: Progressing  Flowsheets (Taken 12/15/2024 2050)  Care Plan - Patient's Chronic Conditions and Co-Morbidity Symptoms are Monitored and Maintained or Improved: Monitor and assess patient's chronic conditions and comorbid symptoms for stability, deterioration, or improvement     Problem: Discharge Planning  Goal: Discharge to home or other facility with appropriate resources  Outcome: Progressing  Flowsheets (Taken 12/15/2024 2050)  Discharge to home or other facility with appropriate resources: Identify barriers to discharge with patient and caregiver     Problem: Pain  Goal: Verbalizes/displays adequate comfort level or baseline comfort level  Outcome: Progressing     Problem: ABCDS Injury Assessment  Goal: Absence of physical injury  Outcome: Progressing     Problem: Skin/Tissue Integrity  Goal: Absence of new skin breakdown  Description: 1.  Monitor for areas of redness and/or skin breakdown  2.  Assess vascular access sites hourly  3.  Every 4-6 hours minimum:  Change oxygen saturation probe site  4.  Every 4-6 hours:  If on nasal continuous positive airway pressure, respiratory therapy assess nares and determine need for appliance change or resting period.  Outcome: Progressing

## 2024-12-17 LAB — CORTIS SAL-MCNC: 0.15 UG/DL

## 2024-12-17 PROCEDURE — 97535 SELF CARE MNGMENT TRAINING: CPT

## 2024-12-17 PROCEDURE — 6370000000 HC RX 637 (ALT 250 FOR IP): Performed by: INTERNAL MEDICINE

## 2024-12-17 PROCEDURE — 99232 SBSQ HOSP IP/OBS MODERATE 35: CPT | Performed by: PHYSICIAN ASSISTANT

## 2024-12-17 PROCEDURE — 1180000000 HC REHAB R&B

## 2024-12-17 PROCEDURE — 6370000000 HC RX 637 (ALT 250 FOR IP): Performed by: PHYSICAL MEDICINE & REHABILITATION

## 2024-12-17 PROCEDURE — 99222 1ST HOSP IP/OBS MODERATE 55: CPT | Performed by: INTERNAL MEDICINE

## 2024-12-17 PROCEDURE — 99232 SBSQ HOSP IP/OBS MODERATE 35: CPT | Performed by: PHYSICAL MEDICINE & REHABILITATION

## 2024-12-17 PROCEDURE — 97530 THERAPEUTIC ACTIVITIES: CPT

## 2024-12-17 PROCEDURE — 97116 GAIT TRAINING THERAPY: CPT

## 2024-12-17 PROCEDURE — 97110 THERAPEUTIC EXERCISES: CPT

## 2024-12-17 PROCEDURE — 97129 THER IVNTJ 1ST 15 MIN: CPT

## 2024-12-17 PROCEDURE — 99232 SBSQ HOSP IP/OBS MODERATE 35: CPT | Performed by: INTERNAL MEDICINE

## 2024-12-17 PROCEDURE — 97130 THER IVNTJ EA ADDL 15 MIN: CPT

## 2024-12-17 RX ORDER — LACTULOSE 10 G/15ML
20 SOLUTION ORAL DAILY
Status: DISCONTINUED | OUTPATIENT
Start: 2024-12-17 | End: 2024-12-23 | Stop reason: HOSPADM

## 2024-12-17 RX ADMIN — DORZOLAMIDE HYDROCHLORIDE 1 DROP: 20 SOLUTION OPHTHALMIC at 20:39

## 2024-12-17 RX ADMIN — PRAVASTATIN SODIUM 80 MG: 40 TABLET ORAL at 18:11

## 2024-12-17 RX ADMIN — Medication 100 MG: at 09:27

## 2024-12-17 RX ADMIN — FINASTERIDE 5 MG: 5 TABLET, FILM COATED ORAL at 09:28

## 2024-12-17 RX ADMIN — DONEPEZIL HYDROCHLORIDE 5 MG: 10 TABLET, FILM COATED ORAL at 20:37

## 2024-12-17 RX ADMIN — CARVEDILOL 6.25 MG: 6.25 TABLET, FILM COATED ORAL at 18:11

## 2024-12-17 RX ADMIN — APIXABAN 10 MG: 5 TABLET, FILM COATED ORAL at 20:37

## 2024-12-17 RX ADMIN — LATANOPROST 1 DROP: 50 SOLUTION OPHTHALMIC at 20:38

## 2024-12-17 RX ADMIN — ASPIRIN 81 MG CHEWABLE TABLET 81 MG: 81 TABLET CHEWABLE at 09:28

## 2024-12-17 RX ADMIN — LACTULOSE 20 G: 20 SOLUTION ORAL at 15:26

## 2024-12-17 RX ADMIN — ACETAMINOPHEN 500 MG: 500 TABLET ORAL at 09:27

## 2024-12-17 RX ADMIN — DICLOFENAC SODIUM 4 G: 10 GEL TOPICAL at 09:28

## 2024-12-17 RX ADMIN — ACETAMINOPHEN 500 MG: 500 TABLET ORAL at 20:36

## 2024-12-17 RX ADMIN — CARVEDILOL 6.25 MG: 6.25 TABLET, FILM COATED ORAL at 09:27

## 2024-12-17 RX ADMIN — APIXABAN 10 MG: 5 TABLET, FILM COATED ORAL at 09:27

## 2024-12-17 RX ADMIN — DICLOFENAC SODIUM 4 G: 10 GEL TOPICAL at 20:39

## 2024-12-17 RX ADMIN — TIMOLOL MALEATE 1 DROP: 5 SOLUTION OPHTHALMIC at 20:39

## 2024-12-17 RX ADMIN — TIMOLOL MALEATE 1 DROP: 5 SOLUTION OPHTHALMIC at 09:28

## 2024-12-17 RX ADMIN — DORZOLAMIDE HYDROCHLORIDE 1 DROP: 20 SOLUTION OPHTHALMIC at 09:28

## 2024-12-17 RX ADMIN — CYANOCOBALAMIN TAB 500 MCG 1000 MCG: 500 TAB at 09:27

## 2024-12-17 RX ADMIN — LISINOPRIL 10 MG: 10 TABLET ORAL at 09:27

## 2024-12-17 RX ADMIN — Medication 3 MG: at 20:37

## 2024-12-17 RX ADMIN — CARBIDOPA AND LEVODOPA 1 TABLET: 25; 100 TABLET ORAL at 09:27

## 2024-12-17 RX ADMIN — CARBIDOPA AND LEVODOPA 1 TABLET: 25; 100 TABLET ORAL at 20:37

## 2024-12-17 RX ADMIN — ISOSORBIDE MONONITRATE 30 MG: 30 TABLET, EXTENDED RELEASE ORAL at 09:27

## 2024-12-17 ASSESSMENT — PAIN SCALES - GENERAL: PAINLEVEL_OUTOF10: 3

## 2024-12-17 ASSESSMENT — PAIN DESCRIPTION - LOCATION: LOCATION: KNEE

## 2024-12-17 ASSESSMENT — PAIN DESCRIPTION - DESCRIPTORS: DESCRIPTORS: ACHING

## 2024-12-17 ASSESSMENT — PAIN DESCRIPTION - ORIENTATION: ORIENTATION: RIGHT;LEFT

## 2024-12-17 NOTE — CONSULTS
[facility]                   [address] [place] [address]                              CONSULTATION      PATIENT NAME: LYNN YOUNGBLOOD             : 1935  MED REC NO: [id number]                        ROOM: R247  ACCOUNT NO: [id number]                       ADMIT DATE: 2024  PROVIDER: Jose Manuel Hutton MD    ENDOCRINE CONSULT    CONSULT DATE: 2024    REFERRING PHYSICIAN:  Jaz Haider MD    This is a virtual visit.  History and physical examination obtained through prior H and P and also consulting notes.    REASON FOR CONSULTATION:  4.4 cm left adrenal nodule/adenoma with 35 Hounsfield unit.    CHIEF COMPLAINT AND HISTORY OF PRESENT ILLNESS:  The patient is an 89-year-old male with history of coronary artery disease, carotid artery disease, cervical disk disease, congestive heart failure, dementia, hyperlipidemia, hypertension, osteoarthritis, spinal stenosis, and anxiety, admitted to rehab after he was having difficulty walking, gait difficulty after having knee pain.  He did get hyaluronic acid on .  The patient was having symptoms of URI, tested positive for COVID-19.  Currently, he is in isolation.  Cortisol test has been ordered, results are pending.  Chemistries were reviewed.  Sodium 140, potassium 4.2, chloride 104, CO2 of 28, BUN 25, and creatinine 0.83.  Hemoglobin A1c was 6.6.  Cortisol was drawn today, results are pending.  CBC; WBC count 7.9 and hemoglobin 42.1.  Reviewed the patient's medication, started on Plavix after he was found to have a PE on CT of the chest, also on Paxlovid for COVID-19.    PAST MEDICAL HISTORY:  Significant for coronary artery disease, history of anxiety, bursitis, dementia, hyperlipidemia, and spinal stenosis.    PAST SURGICAL HISTORY:  Carpal tunnel release, colonoscopy, angioplasty with stent placement, and hernia repair.    FAMILY HISTORY:  Significant for heart disease and hypertension.    PERSONAL AND 
Hospital Medicine  Consult    Patient:  Hamilton Torres  MRN: [id number]    CHIEF COMPLAINT:    Chief Complaint   Patient presents with    Joint Pain     Patient had gel injection to bilateral knees 12/2 for arthritis. Patient daughter states around 6am nurse called daughter stating patient was in so much pain at the knees, could not walk and called EMS.        History Obtained From:  Patient, EMR  Primary Care Physician: Cesilia Martínez MD    HISTORY OF PRESENT ILLNESS:   The patient is a 89 y.o. male with PMH of hypertension, CAD, previous stent.  The patient came in due to bilateral knee pain and discomfort associated with weakness.  He was admitted to rehab unit for physical therapy and rehabilitation.  The patient denies any other acute signs or symptoms.  No chest pain palpitation.  No abdominal pain, nausea or vomiting.  No headaches, loss of conscious or seizure.  No focal weakness or numbness      Past Medical History:      Diagnosis Date    Anticoagulant long-term use     Anxiety     Bursitis of hip     CAD (coronary artery disease)     Carotid stenosis     2/2013 16-49%    Cervical disc disorder     CHF (congestive heart failure) (HCC)     COVID-19 virus infection 01/21/2023    Dementia (HCC)     Dementia (HCC)     Hyperlipidemia     Hypertension     MI (myocardial infarction) (HCC)     Osteoarthritis     Spinal stenosis        Past Surgical History:      Procedure Laterality Date    CARPAL TUNNEL RELEASE  1998    CATARACT REMOVAL  2007    COLONOSCOPY  12/10/10,2007    DR MATTHEWS - DONE AT [facility]    COLONOSCOPY  2007    hx polyps needs 2015    COLONOSCOPY  9/21/15     DR. YARBROUGH     CORONARY ANGIOPLASTY WITH STENT PLACEMENT  06/07/2020    DIAGNOSTIC CARDIAC CATH LAB PROCEDURE      HERNIA REPAIR Bilateral     x 2    HERNIA REPAIR Right 11/13/2020    RIGHT INGUINAL HERNIA REPAIR performed by Roscoe Copeland MD at [facility] [place]    PTCA         Medications Prior to Admission:    Prior to Admission 
Inpatient consult to Cardiology  Consult performed by: Nahid Duvall MD  Consult ordered by: Judith Castillo DO          Patient Name: Hamilton Torres  Admit Date: 2024  7:41 AM  MR #: [id number]  : 1935    Attending Physician: Judith Castillo DO  Reason for consult: CV evaluation    History of Presenting Illness:      Hamilton Torres is a 89 y.o. male on hospital day 8 with a history of .   History Obtained From:  patient, electronic medical record    88 yo man with CAD HTN HPL admitted to acute Rehab for strengthening after hospitalization for RLE DVT and Left Pulmonary embolisms.  He is on Loading regimen of Eliquis,  He is also diagnosed with COVID.     He is lying flat in bed and appears comfortable with no CP nor SOB. Not requiring supplemental O2.     Echo No RV Strain noted. EF 55-60% mild AR and MRAugustin     He does have CAD with 2018 LAD AMBER and 2020 OMB AMBER.  History:      EKG:  Past Medical History:   Diagnosis Date    Anticoagulant long-term use     Anxiety     Bursitis of hip     CAD (coronary artery disease)     Carotid stenosis     2013 16-49%    Cervical disc disorder     CHF (congestive heart failure) (HCC)     COVID-19 virus infection 2023    Dementia (HCC)     Dementia (HCC)     Hyperlipidemia     Hypertension     MI (myocardial infarction) (HCC)     Osteoarthritis     Spinal stenosis      Past Surgical History:   Procedure Laterality Date    CARPAL TUNNEL RELEASE  1998    CATARACT REMOVAL      COLONOSCOPY  12/10/10,2007    DR MATTHEWS - DONE AT [facility]    COLONOSCOPY      hx polyps needs     COLONOSCOPY  9/21/15     DR. YARBROUGH     CORONARY ANGIOPLASTY WITH STENT PLACEMENT  2020    DIAGNOSTIC CARDIAC CATH LAB PROCEDURE      HERNIA REPAIR Bilateral     x 2    HERNIA REPAIR Right 2020    RIGHT INGUINAL HERNIA REPAIR [place] [facility] [place]    [facility]         Family History  Family History   Problem Relation Age of Onset    
12/11/2024] nirmatrelvir/ritonavir  2 tablet Oral Q12H    vitamin B-12  1,000 mcg Oral Daily    latanoprost  1 drop Both Eyes Nightly    isosorbide mononitrate  30 mg Oral Daily    clopidogrel  75 mg Oral Daily    lisinopril  10 mg Oral Daily    carvedilol  6.25 mg Oral BID WC    donepezil  5 mg Oral Nightly    [Held by provider] pravastatin  80 mg Oral QPM    finasteride  5 mg Oral Daily    carbidopa-levodopa  1 tablet Oral BID    coenzyme Q10  100 mg Oral Daily    melatonin  3 mg Oral Nightly    HYDROcodone 5 mg - acetaminophen  1 tablet Oral Once    acetaminophen  500 mg Oral TID    diclofenac sodium  4 g Topical TID    dorzolamide  1 drop Both Eyes BID    And    timolol  1 drop Both Eyes BID       Allergies:  Patient has no known allergies.    Social History     Socioeconomic History    Marital status:      Spouse name: Not on file    Number of children: Not on file    Years of education: Not on file    Highest education level: Not on file   Occupational History    Not on file   Tobacco Use    Smoking status: Never    Smokeless tobacco: Never   Vaping Use    Vaping status: Never Used   Substance and Sexual Activity    Alcohol use: No     Comment: social    Drug use: No    Sexual activity: Not Currently   Other Topics Concern    Not on file   Social History Narrative    He is retired from  [employer], he is  and currently lives With: Alone    Type of Home: Assisted living-[facility] [address] [facility]         Home Layout: One level- Level entry    Bathroom Shower/Tub: Walk-in shower    Bathroom Toilet: Standard, Equipment: Hand-held shower, Grab bars in shower, Built-in shower seat    Home Equipment: Cane, Grab bars, Reacher, Walker - Rolling (does not normally use canes)    Has the patient had two or more falls in the past year or any fall with injury in the past year?: Yes    ADL Assistance: Independent (occasionally asks for help)    Homemaking Assistance: Needs assistance, Homemaking 
risk medications,   blood pressure and blood sugar control.    It is my opinion that they will be able to tolerate and benefit from 3 hours of therapy a day.  I reviewed the various options re: levels of care with the patient and family.  Please see pre-admission screen note for further details. I discussed acute rehab with the patient and verify that the patient is able and willing to participate in 3 hours of therapy a day.  Rehab and Acute Care Case Management has also reinforced this expectation.  This patient requires multidisciplinary rehabilitation treatment, including daily care and management from a PM&R physician, 24-hour rehabilitation nursing, Physical Therapy, Occupational Therapy, rehabilitation psychology, consideration of speech and language pathology, recreational therapy, nutritional services, and a rehabilitation .  I feel that it is reasonable to plan for a discharge to home setting after acute rehab.         Specialized nursing care to focus on:  Bowel and bladder issues-Monitor for urinary retention-check PVRs, bladder scan--cath if no void.  Wound risk and management   -pressure relief protocols-side to side turns  IVF medication administration      Monitor endurance and if necessary spread therapy out over a 7-day window-adding scheduled rest breaks when needed.  Focus on energy conservation.  Monitor heart rate and   cardiac medications effects on heart rate and blood pressure before, during and after therapy.  Progress toward endurance training with pulse ox monitoring for saturation and heart rate.    continue to monitor closely for dehydration-- Improve hydration and nutrition by adding Vitamin B12 shot times one, adding Protein supplements and push PO fluids.    Treat and monitor for higher level cognitive deficits, focus on difficulty with sequencing and problem-solving.    Focus on higher-level balance and falls risk issues focusing on balance training and monitoring for

## 2024-12-17 NOTE — PLAN OF CARE
Problem: Safety - Adult  Goal: Free from fall injury  12/17/2024 0056 by Kristan Smith RN  Outcome: Progressing     Problem: Chronic Conditions and Co-morbidities  Goal: Patient's chronic conditions and co-morbidity symptoms are monitored and maintained or improved  12/17/2024 0056 by Kristan Smith, RN  Outcome: Progressing     Problem: Discharge Planning  Goal: Discharge to home or other facility with appropriate resources  12/17/2024 0056 by Kristan Smith, RN  Outcome: Progressing

## 2024-12-18 PROBLEM — E27.8 ADRENAL INCIDENTALOMA (HCC): Status: ACTIVE | Noted: 2024-12-18

## 2024-12-18 PROCEDURE — 97110 THERAPEUTIC EXERCISES: CPT

## 2024-12-18 PROCEDURE — 97535 SELF CARE MNGMENT TRAINING: CPT

## 2024-12-18 PROCEDURE — 6370000000 HC RX 637 (ALT 250 FOR IP): Performed by: PHYSICAL MEDICINE & REHABILITATION

## 2024-12-18 PROCEDURE — 97129 THER IVNTJ 1ST 15 MIN: CPT

## 2024-12-18 PROCEDURE — 99232 SBSQ HOSP IP/OBS MODERATE 35: CPT | Performed by: PHYSICAL MEDICINE & REHABILITATION

## 2024-12-18 PROCEDURE — 97116 GAIT TRAINING THERAPY: CPT

## 2024-12-18 PROCEDURE — 97112 NEUROMUSCULAR REEDUCATION: CPT

## 2024-12-18 PROCEDURE — 1180000000 HC REHAB R&B

## 2024-12-18 PROCEDURE — 6370000000 HC RX 637 (ALT 250 FOR IP): Performed by: INTERNAL MEDICINE

## 2024-12-18 PROCEDURE — 97530 THERAPEUTIC ACTIVITIES: CPT

## 2024-12-18 PROCEDURE — 99232 SBSQ HOSP IP/OBS MODERATE 35: CPT | Performed by: INTERNAL MEDICINE

## 2024-12-18 PROCEDURE — 97130 THER IVNTJ EA ADDL 15 MIN: CPT

## 2024-12-18 RX ADMIN — CARBIDOPA AND LEVODOPA 1 TABLET: 25; 100 TABLET ORAL at 20:45

## 2024-12-18 RX ADMIN — Medication 100 MG: at 09:49

## 2024-12-18 RX ADMIN — APIXABAN 10 MG: 5 TABLET, FILM COATED ORAL at 09:49

## 2024-12-18 RX ADMIN — APIXABAN 10 MG: 5 TABLET, FILM COATED ORAL at 20:38

## 2024-12-18 RX ADMIN — Medication 3 MG: at 20:38

## 2024-12-18 RX ADMIN — FINASTERIDE 5 MG: 5 TABLET, FILM COATED ORAL at 09:48

## 2024-12-18 RX ADMIN — CARVEDILOL 6.25 MG: 6.25 TABLET, FILM COATED ORAL at 17:21

## 2024-12-18 RX ADMIN — LISINOPRIL 10 MG: 10 TABLET ORAL at 09:49

## 2024-12-18 RX ADMIN — CARBIDOPA AND LEVODOPA 1 TABLET: 25; 100 TABLET ORAL at 09:49

## 2024-12-18 RX ADMIN — PRAVASTATIN SODIUM 80 MG: 40 TABLET ORAL at 17:20

## 2024-12-18 RX ADMIN — DONEPEZIL HYDROCHLORIDE 5 MG: 10 TABLET, FILM COATED ORAL at 20:38

## 2024-12-18 RX ADMIN — DORZOLAMIDE HYDROCHLORIDE 1 DROP: 20 SOLUTION OPHTHALMIC at 09:51

## 2024-12-18 RX ADMIN — CYANOCOBALAMIN TAB 500 MCG 1000 MCG: 500 TAB at 09:48

## 2024-12-18 RX ADMIN — DORZOLAMIDE HYDROCHLORIDE 1 DROP: 20 SOLUTION OPHTHALMIC at 20:41

## 2024-12-18 RX ADMIN — LATANOPROST 1 DROP: 50 SOLUTION OPHTHALMIC at 20:40

## 2024-12-18 RX ADMIN — ACETAMINOPHEN 500 MG: 500 TABLET ORAL at 15:40

## 2024-12-18 RX ADMIN — DICLOFENAC SODIUM 4 G: 10 GEL TOPICAL at 15:40

## 2024-12-18 RX ADMIN — ACETAMINOPHEN 500 MG: 500 TABLET ORAL at 20:38

## 2024-12-18 RX ADMIN — TIMOLOL MALEATE 1 DROP: 5 SOLUTION OPHTHALMIC at 20:42

## 2024-12-18 RX ADMIN — DICLOFENAC SODIUM 4 G: 10 GEL TOPICAL at 20:42

## 2024-12-18 RX ADMIN — CARVEDILOL 6.25 MG: 6.25 TABLET, FILM COATED ORAL at 09:49

## 2024-12-18 RX ADMIN — TIMOLOL MALEATE 1 DROP: 5 SOLUTION OPHTHALMIC at 09:51

## 2024-12-18 RX ADMIN — ASPIRIN 81 MG CHEWABLE TABLET 81 MG: 81 TABLET CHEWABLE at 09:48

## 2024-12-18 RX ADMIN — ACETAMINOPHEN 500 MG: 500 TABLET ORAL at 09:48

## 2024-12-18 RX ADMIN — ISOSORBIDE MONONITRATE 30 MG: 30 TABLET, EXTENDED RELEASE ORAL at 09:48

## 2024-12-18 NOTE — PLAN OF CARE
Problem: Safety - Adult  Goal: Free from fall injury  Outcome: Progressing     Problem: Chronic Conditions and Co-morbidities  Goal: Patient's chronic conditions and co-morbidity symptoms are monitored and maintained or improved  Outcome: Progressing     Problem: Discharge Planning  Goal: Discharge to home or other facility with appropriate resources  Outcome: Progressing     Problem: Pain  Goal: Verbalizes/displays adequate comfort level or baseline comfort level  Outcome: Progressing     Problem: ABCDS Injury Assessment  Goal: Absence of physical injury  Outcome: Progressing     Problem: Skin/Tissue Integrity  Goal: Absence of new skin breakdown  Outcome: Progressing     Problem: Nutrition Deficit:  Goal: Optimize nutritional status  Outcome: Progressing

## 2024-12-18 NOTE — PLAN OF CARE
Problem: Safety - Adult  Goal: Free from fall injury  Outcome: Progressing     Problem: Chronic Conditions and Co-morbidities  Goal: Patient's chronic conditions and co-morbidity symptoms are monitored and maintained or improved  Outcome: Progressing     Problem: Discharge Planning  Goal: Discharge to home or other facility with appropriate resources  Outcome: Progressing     Problem: Pain  Goal: Verbalizes/displays adequate comfort level or baseline comfort level  Outcome: Progressing     Problem: ABCDS Injury Assessment  Goal: Absence of physical injury  Outcome: Progressing     Problem: Skin/Tissue Integrity  Goal: Absence of new skin breakdown  Description: 1.  Monitor for areas of redness and/or skin breakdown  2.  Assess vascular access sites hourly  3.  Every 4-6 hours minimum:  Change oxygen saturation probe site  4.  Every 4-6 hours:  If on nasal continuous positive airway pressure, respiratory therapy assess nares and determine need for appliance change or resting period.  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 Pt nonsmoker report extensive hx of second hand smoke and family hx of malignant neoplasm( Father). Pt report weight loss, denied difficulty tolerating liquid and solid food since he had his esophageal stent placement(2020). Seen today for a scheduled procedure with Dr. Santiago. Surgery protocol reviewed with pt and his sister today, Rashmi Luciano. Pt to follow-up with his PCP for medical clearance. Noted with  bpm, today, pt asymptomatic,  pt was prescribed Metoprolol, On  2020 for persistent trends of elevated HR which ranges from 140-145 for the past few days as per sister and patient. Called PCP office  today to verity elevated HR trends, spoke to Man who confirmed HR was 140 on 2020 and Metoprolol was prescribed. Pt to pick-up his metoprolol from his pharmacy on his way home today and start his medication today.   CAPRINI VTE 2.0 SCORE [CLOT updated 2019]    AGE RELATED RISK FACTORS                                                       MOBILITY RELATED FACTORS  [x ] Age 41-60 years                                            (1 Point)                    [ ] Bed rest                                                        (1 Point)  [ ] Age: 61-74 years                                           (2 Points)                  [ ] Plaster cast                                                   (2 Points)  [ ] Age= 75 years                                              (3 Points)                    [ ] Bed bound for more than 72 hours                 (2 Points)    DISEASE RELATED RISK FACTORS                                               GENDER SPECIFIC FACTORS  [ ] Edema in the lower extremities                       (1 Point)              [ ] Pregnancy                                                     (1 Point)  [ ] Varicose veins                                               (1 Point)                     [ ] Post-partum < 6 weeks                                   (1 Point)             [ ] BMI > 25 Kg/m2                                            (1 Point)                     [ ] Hormonal therapy  or oral contraception          (1 Point)                 [ ] Sepsis (in the previous month)                        (1 Point)               [ ] History of pregnancy complications                 (1 point)  [ ] Pneumonia or serious lung disease                                               [ ] Unexplained or recurrent                     (1 Point)           (in the previous month)                               (1 Point)  [ ] Abnormal pulmonary function test                     (1 Point)                 SURGERY RELATED RISK FACTORS  [ ] Acute myocardial infarction                              (1 Point)               [ ]  Section                                             (1 Point)  [ ] Congestive heart failure (in the previous month)  (1 Point)      [ ] Minor surgery                                                  (1 Point)   [ ] Inflammatory bowel disease                             (1 Point)               [ ] Arthroscopic surgery                                        (2 Points)  [ ] Central venous access                                      (2 Points)                [ x] General surgery lasting more than 45 minutes (2 points)  [x ] Malignancy- Present or previous                   (2 Points)                [ ] Elective arthroplasty                                         (5 points)    [ ] Stroke (in the previous month)                          (5 Points)                                                                                                                                                           HEMATOLOGY RELATED FACTORS                                                 TRAUMA RELATED RISK FACTORS  [ ] Prior episodes of VTE                                     (3 Points)                [ ] Fracture of the hip, pelvis, or leg                       (5 Points)  [ ] Positive family history for VTE                         (3 Points)             [ ] Acute spinal cord injury (in the previous month)  (5 Points)  [ ] Prothrombin 86289 A                                     (3 Points)               [ ] Paralysis  (less than 1 month)                             (5 Points)  [ ] Factor V Leiden                                             (3 Points)                  [ ] Multiple Trauma within 1 month                        (5 Points)  [ ] Lupus anticoagulants                                     (3 Points)                                                           [ ] Anticardiolipin antibodies                               (3 Points)                                                       [ ] High homocysteine in the blood                      (3 Points)                                             [ ] Other congenital or acquired thrombophilia      (3 Points)                                                [ ] Heparin induced thrombocytopenia                  (3 Points)                                     Total Score [     5     ]  OPIOID RISK TOOL    RAY EACH BOX THAT APPLIES AND ADD TOTALS AT THE END    FAMILY HISTORY OF SUBSTANCE ABUSE                 FEMALE         MALE                                                Alcohol                             [  ]1 pt          [  ]3pts                                               Illegal Durgs                     [  ]2 pts        [  ]3pts                                               Rx Drugs                           [  ]4 pts        [  ]4 pts    PERSONAL HISTORY OF SUBSTANCE ABUSE                                                                                          Alcohol                             [  ]3 pts       [  ]3 pts                                               Illegal Drugs                     [  ]4 pts        [  ]4 pts                                               Rx Drugs                           [  ]5 pts        [  ]5 pts    AGE BETWEEN 16-45 YEARS                                      [  ]1 pt         [  ]1 pt    HISTORY OF PREADOLESCENT   SEXUAL ABUSE                                                             [  ]3 pts        [  ]0pts    PSYCHOLOGICAL DISEASE                     ADD, OCD, Bipolar, Schizophrenia        [  ]2 pts         [  ]2 pts                      Depression                                               [  ]1 pt           [  ]1 pt           SCORING TOTAL   (add numbers and type here)              (*0**)                                     A score of 3 or lower indicated LOW risk for future opioid abuse  A score of 4 to 7 indicated moderate risk for future opioid abuse  A score of 8 or higher indicates a high risk for opioid abuse

## 2024-12-18 NOTE — FLOWSHEET NOTE
Patient alert, cooperative, oriented/disoriented at times. Complains of pain to bilateral knees and left foot pain, voltaren gel effective. Denies any further needs or complaints at this time. Call light within reach.

## 2024-12-19 LAB
ANION GAP SERPL CALCULATED.3IONS-SCNC: 6 MEQ/L (ref 9–15)
BASOPHILS # BLD: 0.1 K/UL (ref 0–0.2)
BASOPHILS NFR BLD: 0.9 %
BUN SERPL-MCNC: 50 MG/DL (ref 8–23)
CALCIUM SERPL-MCNC: 8.9 MG/DL (ref 8.5–9.9)
CHLORIDE SERPL-SCNC: 103 MEQ/L (ref 95–107)
CO2 SERPL-SCNC: 29 MEQ/L (ref 20–31)
CREAT SERPL-MCNC: 0.89 MG/DL (ref 0.7–1.2)
EOSINOPHIL # BLD: 0.2 K/UL (ref 0–0.7)
EOSINOPHIL NFR BLD: 2.4 %
ERYTHROCYTE [DISTWIDTH] IN BLOOD BY AUTOMATED COUNT: 14.4 % (ref 11.5–14.5)
GLUCOSE SERPL-MCNC: 93 MG/DL (ref 70–99)
HCT VFR BLD AUTO: 43.6 % (ref 42–52)
HGB BLD-MCNC: 14.1 G/DL (ref 14–18)
LYMPHOCYTES # BLD: 1.7 K/UL (ref 1–4.8)
LYMPHOCYTES NFR BLD: 20.7 %
MCH RBC QN AUTO: 29.6 PG (ref 27–31.3)
MCHC RBC AUTO-ENTMCNC: 32.3 % (ref 33–37)
MCV RBC AUTO: 91.4 FL (ref 79–92.2)
MONOCYTES # BLD: 0.6 K/UL (ref 0.2–0.8)
MONOCYTES NFR BLD: 7.4 %
NEUTROPHILS # BLD: 5.4 K/UL (ref 1.4–6.5)
NEUTS SEG NFR BLD: 67.2 %
PLATELET # BLD AUTO: 258 K/UL (ref 130–400)
POTASSIUM SERPL-SCNC: 4.8 MEQ/L (ref 3.4–4.9)
RBC # BLD AUTO: 4.77 M/UL (ref 4.7–6.1)
SODIUM SERPL-SCNC: 138 MEQ/L (ref 135–144)
WBC # BLD AUTO: 8.1 K/UL (ref 4.8–10.8)

## 2024-12-19 PROCEDURE — 97535 SELF CARE MNGMENT TRAINING: CPT

## 2024-12-19 PROCEDURE — 36415 COLL VENOUS BLD VENIPUNCTURE: CPT

## 2024-12-19 PROCEDURE — 99232 SBSQ HOSP IP/OBS MODERATE 35: CPT | Performed by: INTERNAL MEDICINE

## 2024-12-19 PROCEDURE — 80048 BASIC METABOLIC PNL TOTAL CA: CPT

## 2024-12-19 PROCEDURE — 99233 SBSQ HOSP IP/OBS HIGH 50: CPT | Performed by: PHYSICAL MEDICINE & REHABILITATION

## 2024-12-19 PROCEDURE — 97530 THERAPEUTIC ACTIVITIES: CPT

## 2024-12-19 PROCEDURE — 85025 COMPLETE CBC W/AUTO DIFF WBC: CPT

## 2024-12-19 PROCEDURE — 6370000000 HC RX 637 (ALT 250 FOR IP): Performed by: INTERNAL MEDICINE

## 2024-12-19 PROCEDURE — 97110 THERAPEUTIC EXERCISES: CPT

## 2024-12-19 PROCEDURE — 97116 GAIT TRAINING THERAPY: CPT

## 2024-12-19 PROCEDURE — 1180000000 HC REHAB R&B

## 2024-12-19 PROCEDURE — 97112 NEUROMUSCULAR REEDUCATION: CPT

## 2024-12-19 PROCEDURE — 6370000000 HC RX 637 (ALT 250 FOR IP): Performed by: PHYSICAL MEDICINE & REHABILITATION

## 2024-12-19 RX ORDER — POLYETHYLENE GLYCOL 3350 17 G/17G
17 POWDER, FOR SOLUTION ORAL 2 TIMES DAILY
Status: DISCONTINUED | OUTPATIENT
Start: 2024-12-19 | End: 2024-12-23 | Stop reason: HOSPADM

## 2024-12-19 RX ADMIN — Medication 100 MG: at 10:04

## 2024-12-19 RX ADMIN — CARBIDOPA AND LEVODOPA 1 TABLET: 25; 100 TABLET ORAL at 20:14

## 2024-12-19 RX ADMIN — FINASTERIDE 5 MG: 5 TABLET, FILM COATED ORAL at 10:04

## 2024-12-19 RX ADMIN — LISINOPRIL 10 MG: 10 TABLET ORAL at 10:05

## 2024-12-19 RX ADMIN — DICLOFENAC SODIUM 4 G: 10 GEL TOPICAL at 10:06

## 2024-12-19 RX ADMIN — CARBIDOPA AND LEVODOPA 1 TABLET: 25; 100 TABLET ORAL at 10:05

## 2024-12-19 RX ADMIN — DORZOLAMIDE HYDROCHLORIDE 1 DROP: 20 SOLUTION OPHTHALMIC at 20:16

## 2024-12-19 RX ADMIN — APIXABAN 10 MG: 5 TABLET, FILM COATED ORAL at 10:04

## 2024-12-19 RX ADMIN — TIMOLOL MALEATE 1 DROP: 5 SOLUTION OPHTHALMIC at 10:06

## 2024-12-19 RX ADMIN — CARVEDILOL 6.25 MG: 6.25 TABLET, FILM COATED ORAL at 10:05

## 2024-12-19 RX ADMIN — CARVEDILOL 6.25 MG: 6.25 TABLET, FILM COATED ORAL at 17:35

## 2024-12-19 RX ADMIN — DORZOLAMIDE HYDROCHLORIDE 1 DROP: 20 SOLUTION OPHTHALMIC at 10:06

## 2024-12-19 RX ADMIN — Medication 3 MG: at 20:14

## 2024-12-19 RX ADMIN — APIXABAN 10 MG: 5 TABLET, FILM COATED ORAL at 20:15

## 2024-12-19 RX ADMIN — TIMOLOL MALEATE 1 DROP: 5 SOLUTION OPHTHALMIC at 20:16

## 2024-12-19 RX ADMIN — ACETAMINOPHEN 500 MG: 500 TABLET ORAL at 10:04

## 2024-12-19 RX ADMIN — LATANOPROST 1 DROP: 50 SOLUTION OPHTHALMIC at 20:16

## 2024-12-19 RX ADMIN — LACTULOSE 20 G: 20 SOLUTION ORAL at 10:04

## 2024-12-19 RX ADMIN — CYANOCOBALAMIN TAB 500 MCG 1000 MCG: 500 TAB at 10:05

## 2024-12-19 RX ADMIN — DICLOFENAC SODIUM 4 G: 10 GEL TOPICAL at 13:36

## 2024-12-19 RX ADMIN — DONEPEZIL HYDROCHLORIDE 5 MG: 10 TABLET, FILM COATED ORAL at 20:15

## 2024-12-19 RX ADMIN — ISOSORBIDE MONONITRATE 30 MG: 30 TABLET, EXTENDED RELEASE ORAL at 10:04

## 2024-12-19 RX ADMIN — DICLOFENAC SODIUM 4 G: 10 GEL TOPICAL at 20:16

## 2024-12-19 RX ADMIN — ACETAMINOPHEN 500 MG: 500 TABLET ORAL at 13:36

## 2024-12-19 RX ADMIN — ACETAMINOPHEN 500 MG: 500 TABLET ORAL at 20:15

## 2024-12-19 RX ADMIN — ASPIRIN 81 MG CHEWABLE TABLET 81 MG: 81 TABLET CHEWABLE at 10:04

## 2024-12-19 RX ADMIN — PRAVASTATIN SODIUM 80 MG: 40 TABLET ORAL at 17:35

## 2024-12-19 ASSESSMENT — PAIN SCALES - GENERAL
PAINLEVEL_OUTOF10: 0
PAINLEVEL_OUTOF10: 1
PAINLEVEL_OUTOF10: 1

## 2024-12-19 NOTE — FLOWSHEET NOTE
New PA Started in [facility] for   [employer] films  Key: [id number]  PA Case ID: [id number]       Patient alert, cooperative, and oriented/disoriented at times. Complains of bilateral knee pain and left foot pain, voltaren gel effective. Denies any further needs or complaints at this time. Call light within reach.

## 2024-12-19 NOTE — PLAN OF CARE
Problem: Safety - Adult  Goal: Free from fall injury  12/19/2024 0026 by Vladimir Gtz RN  Outcome: Progressing     Problem: Chronic Conditions and Co-morbidities  Goal: Patient's chronic conditions and co-morbidity symptoms are monitored and maintained or improved  12/19/2024 0026 by Vladimir Gtz RN  Outcome: Progressing     Problem: Discharge Planning  Goal: Discharge to home or other facility with appropriate resources  12/19/2024 0026 by Vladimir Gtz RN  Outcome: Progressing

## 2024-12-20 PROCEDURE — 97116 GAIT TRAINING THERAPY: CPT

## 2024-12-20 PROCEDURE — 97112 NEUROMUSCULAR REEDUCATION: CPT

## 2024-12-20 PROCEDURE — 6370000000 HC RX 637 (ALT 250 FOR IP): Performed by: INTERNAL MEDICINE

## 2024-12-20 PROCEDURE — 99232 SBSQ HOSP IP/OBS MODERATE 35: CPT | Performed by: PHYSICAL MEDICINE & REHABILITATION

## 2024-12-20 PROCEDURE — 1180000000 HC REHAB R&B

## 2024-12-20 PROCEDURE — 97530 THERAPEUTIC ACTIVITIES: CPT

## 2024-12-20 PROCEDURE — 97110 THERAPEUTIC EXERCISES: CPT

## 2024-12-20 PROCEDURE — 97535 SELF CARE MNGMENT TRAINING: CPT

## 2024-12-20 PROCEDURE — 99232 SBSQ HOSP IP/OBS MODERATE 35: CPT | Performed by: INTERNAL MEDICINE

## 2024-12-20 PROCEDURE — 97129 THER IVNTJ 1ST 15 MIN: CPT

## 2024-12-20 PROCEDURE — 6370000000 HC RX 637 (ALT 250 FOR IP): Performed by: PHYSICAL MEDICINE & REHABILITATION

## 2024-12-20 PROCEDURE — 97130 THER IVNTJ EA ADDL 15 MIN: CPT

## 2024-12-20 RX ADMIN — DORZOLAMIDE HYDROCHLORIDE 1 DROP: 20 SOLUTION OPHTHALMIC at 09:41

## 2024-12-20 RX ADMIN — CARBIDOPA AND LEVODOPA 1 TABLET: 25; 100 TABLET ORAL at 21:49

## 2024-12-20 RX ADMIN — CARVEDILOL 6.25 MG: 6.25 TABLET, FILM COATED ORAL at 09:38

## 2024-12-20 RX ADMIN — DICLOFENAC SODIUM 4 G: 10 GEL TOPICAL at 14:47

## 2024-12-20 RX ADMIN — Medication 3 MG: at 21:49

## 2024-12-20 RX ADMIN — Medication 100 MG: at 09:40

## 2024-12-20 RX ADMIN — DONEPEZIL HYDROCHLORIDE 5 MG: 10 TABLET, FILM COATED ORAL at 21:49

## 2024-12-20 RX ADMIN — APIXABAN 10 MG: 5 TABLET, FILM COATED ORAL at 21:49

## 2024-12-20 RX ADMIN — ACETAMINOPHEN 500 MG: 500 TABLET ORAL at 14:45

## 2024-12-20 RX ADMIN — TIMOLOL MALEATE 1 DROP: 5 SOLUTION OPHTHALMIC at 21:50

## 2024-12-20 RX ADMIN — APIXABAN 10 MG: 5 TABLET, FILM COATED ORAL at 09:38

## 2024-12-20 RX ADMIN — FINASTERIDE 5 MG: 5 TABLET, FILM COATED ORAL at 09:38

## 2024-12-20 RX ADMIN — ACETAMINOPHEN 500 MG: 500 TABLET ORAL at 09:39

## 2024-12-20 RX ADMIN — LATANOPROST 1 DROP: 50 SOLUTION OPHTHALMIC at 21:50

## 2024-12-20 RX ADMIN — LISINOPRIL 10 MG: 10 TABLET ORAL at 09:38

## 2024-12-20 RX ADMIN — DORZOLAMIDE HYDROCHLORIDE 1 DROP: 20 SOLUTION OPHTHALMIC at 21:50

## 2024-12-20 RX ADMIN — CARBIDOPA AND LEVODOPA 1 TABLET: 25; 100 TABLET ORAL at 09:39

## 2024-12-20 RX ADMIN — TIMOLOL MALEATE 1 DROP: 5 SOLUTION OPHTHALMIC at 09:42

## 2024-12-20 RX ADMIN — ACETAMINOPHEN 500 MG: 500 TABLET ORAL at 21:48

## 2024-12-20 RX ADMIN — PRAVASTATIN SODIUM 80 MG: 40 TABLET ORAL at 17:37

## 2024-12-20 RX ADMIN — ISOSORBIDE MONONITRATE 30 MG: 30 TABLET, EXTENDED RELEASE ORAL at 09:39

## 2024-12-20 RX ADMIN — DICLOFENAC SODIUM 4 G: 10 GEL TOPICAL at 09:48

## 2024-12-20 RX ADMIN — CARVEDILOL 6.25 MG: 6.25 TABLET, FILM COATED ORAL at 17:37

## 2024-12-20 RX ADMIN — DICLOFENAC SODIUM 4 G: 10 GEL TOPICAL at 21:50

## 2024-12-20 RX ADMIN — CYANOCOBALAMIN TAB 500 MCG 1000 MCG: 500 TAB at 09:38

## 2024-12-20 RX ADMIN — ASPIRIN 81 MG CHEWABLE TABLET 81 MG: 81 TABLET CHEWABLE at 09:38

## 2024-12-20 ASSESSMENT — PAIN DESCRIPTION - ORIENTATION
ORIENTATION: RIGHT
ORIENTATION: LEFT
ORIENTATION: RIGHT

## 2024-12-20 ASSESSMENT — PAIN DESCRIPTION - DESCRIPTORS
DESCRIPTORS: ACHING

## 2024-12-20 ASSESSMENT — PAIN SCALES - GENERAL
PAINLEVEL_OUTOF10: 1
PAINLEVEL_OUTOF10: 3

## 2024-12-20 ASSESSMENT — PAIN DESCRIPTION - LOCATION
LOCATION: KNEE

## 2024-12-20 NOTE — PLAN OF CARE
Problem: Safety - Adult  Goal: Free from fall injury  12/19/2024 2352 by Mei Zamora RN  Outcome: Progressing     Problem: Chronic Conditions and Co-morbidities  Goal: Patient's chronic conditions and co-morbidity symptoms are monitored and maintained or improved  12/19/2024 2352 by Mei Zamora RN  Outcome: Progressing     Problem: Pain  Goal: Verbalizes/displays adequate comfort level or baseline comfort level  12/20/2024 1137 by Tara Khan RN  Outcome: Progressing  12/19/2024 2352 by Mei Zamora RN  Outcome: Progressing

## 2024-12-20 NOTE — FLOWSHEET NOTE
Patient assessment completed.  See head to toe flowsheet for assessment findings.  Patient resting quietly in bed, call light in reach, denies pain/sob, bed alarm engaged, has no needs at present time, continues on Droplet Plus precautions for + covid, plan of care ongoing.  Electronically signed by Mei Zamora RN on 12/19/2024 at 8:19 PM

## 2024-12-20 NOTE — PLAN OF CARE
Problem: Safety - Adult  Goal: Free from fall injury  12/19/2024 2352 by Mei Zamora RN  Outcome: Progressing  12/19/2024 1536 by Kerri Leroy RN  Outcome: Progressing     Problem: Chronic Conditions and Co-morbidities  Goal: Patient's chronic conditions and co-morbidity symptoms are monitored and maintained or improved  12/19/2024 2352 by Mei Zamora RN  Outcome: Progressing  12/19/2024 1536 by Kerri Leroy RN  Outcome: Progressing     Problem: Discharge Planning  Goal: Discharge to home or other facility with appropriate resources  12/19/2024 2352 by Mei Zamora RN  Outcome: Progressing  12/19/2024 1536 by Kerri Leroy RN  Outcome: Progressing     Problem: Pain  Goal: Verbalizes/displays adequate comfort level or baseline comfort level  12/19/2024 2352 by Mei Zamora RN  Outcome: Progressing  12/19/2024 1536 by Kerri Leroy RN  Outcome: Progressing     Problem: ABCDS Injury Assessment  Goal: Absence of physical injury  12/19/2024 2352 by Mei Zamora RN  Outcome: Progressing  12/19/2024 1536 by Kerri Leroy RN  Outcome: Progressing     Problem: Skin/Tissue Integrity  Goal: Absence of new skin breakdown  Description: 1.  Monitor for areas of redness and/or skin breakdown  2.  Assess vascular access sites hourly  3.  Every 4-6 hours minimum:  Change oxygen saturation probe site  4.  Every 4-6 hours:  If on nasal continuous positive airway pressure, respiratory therapy assess nares and determine need for appliance change or resting period.  12/19/2024 2352 by Mei Zamora RN  Outcome: Progressing  12/19/2024 1536 by Kerri Leroy RN  Outcome: Progressing     Problem: Nutrition Deficit:  Goal: Optimize nutritional status  12/19/2024 2352 by Mei Zamora RN  Outcome: Progressing  12/19/2024 1536 by Kerri Leroy RN  Outcome: Progressing

## 2024-12-21 PROCEDURE — 1180000000 HC REHAB R&B

## 2024-12-21 PROCEDURE — 6370000000 HC RX 637 (ALT 250 FOR IP): Performed by: INTERNAL MEDICINE

## 2024-12-21 PROCEDURE — 97110 THERAPEUTIC EXERCISES: CPT

## 2024-12-21 PROCEDURE — 6370000000 HC RX 637 (ALT 250 FOR IP): Performed by: PHYSICAL MEDICINE & REHABILITATION

## 2024-12-21 PROCEDURE — 97530 THERAPEUTIC ACTIVITIES: CPT

## 2024-12-21 PROCEDURE — 97116 GAIT TRAINING THERAPY: CPT

## 2024-12-21 RX ADMIN — APIXABAN 10 MG: 5 TABLET, FILM COATED ORAL at 20:16

## 2024-12-21 RX ADMIN — CYANOCOBALAMIN TAB 500 MCG 1000 MCG: 500 TAB at 09:42

## 2024-12-21 RX ADMIN — ISOSORBIDE MONONITRATE 30 MG: 30 TABLET, EXTENDED RELEASE ORAL at 09:41

## 2024-12-21 RX ADMIN — POLYETHYLENE GLYCOL 3350 17 G: 17 POWDER, FOR SOLUTION ORAL at 20:17

## 2024-12-21 RX ADMIN — LISINOPRIL 10 MG: 10 TABLET ORAL at 09:42

## 2024-12-21 RX ADMIN — ACETAMINOPHEN 500 MG: 500 TABLET ORAL at 20:16

## 2024-12-21 RX ADMIN — Medication 100 MG: at 09:42

## 2024-12-21 RX ADMIN — CARVEDILOL 6.25 MG: 6.25 TABLET, FILM COATED ORAL at 17:52

## 2024-12-21 RX ADMIN — TIMOLOL MALEATE 1 DROP: 5 SOLUTION OPHTHALMIC at 09:43

## 2024-12-21 RX ADMIN — DICLOFENAC SODIUM 4 G: 10 GEL TOPICAL at 20:17

## 2024-12-21 RX ADMIN — PRAVASTATIN SODIUM 80 MG: 40 TABLET ORAL at 17:52

## 2024-12-21 RX ADMIN — CARBIDOPA AND LEVODOPA 1 TABLET: 25; 100 TABLET ORAL at 09:41

## 2024-12-21 RX ADMIN — DONEPEZIL HYDROCHLORIDE 5 MG: 10 TABLET, FILM COATED ORAL at 20:17

## 2024-12-21 RX ADMIN — FINASTERIDE 5 MG: 5 TABLET, FILM COATED ORAL at 09:42

## 2024-12-21 RX ADMIN — CARVEDILOL 6.25 MG: 6.25 TABLET, FILM COATED ORAL at 09:42

## 2024-12-21 RX ADMIN — DORZOLAMIDE HYDROCHLORIDE 1 DROP: 20 SOLUTION OPHTHALMIC at 20:17

## 2024-12-21 RX ADMIN — CARBIDOPA AND LEVODOPA 1 TABLET: 25; 100 TABLET ORAL at 20:17

## 2024-12-21 RX ADMIN — ASPIRIN 81 MG CHEWABLE TABLET 81 MG: 81 TABLET CHEWABLE at 09:42

## 2024-12-21 RX ADMIN — LATANOPROST 1 DROP: 50 SOLUTION OPHTHALMIC at 20:17

## 2024-12-21 RX ADMIN — Medication 3 MG: at 20:16

## 2024-12-21 RX ADMIN — APIXABAN 10 MG: 5 TABLET, FILM COATED ORAL at 09:42

## 2024-12-21 RX ADMIN — DORZOLAMIDE HYDROCHLORIDE 1 DROP: 20 SOLUTION OPHTHALMIC at 09:43

## 2024-12-21 RX ADMIN — ACETAMINOPHEN 500 MG: 500 TABLET ORAL at 09:42

## 2024-12-21 RX ADMIN — DICLOFENAC SODIUM 4 G: 10 GEL TOPICAL at 09:43

## 2024-12-21 RX ADMIN — TIMOLOL MALEATE 1 DROP: 5 SOLUTION OPHTHALMIC at 20:17

## 2024-12-21 ASSESSMENT — PAIN SCALES - GENERAL: PAINLEVEL_OUTOF10: 0

## 2024-12-21 NOTE — PLAN OF CARE
Problem: Safety - Adult  Goal: Free from fall injury  12/21/2024 1225 by Marisela Drake, RN  Outcome: Progressing  12/21/2024 0229 by Kristan Smith RN  Outcome: Progressing     Problem: Chronic Conditions and Co-morbidities  Goal: Patient's chronic conditions and co-morbidity symptoms are monitored and maintained or improved  12/21/2024 1225 by Marisela Drake, RN  Outcome: Progressing  12/21/2024 0229 by Kristan Smith RN  Outcome: Progressing     Problem: Discharge Planning  Goal: Discharge to home or other facility with appropriate resources  Outcome: Progressing     Problem: Pain  Goal: Verbalizes/displays adequate comfort level or baseline comfort level  Outcome: Progressing     Problem: ABCDS Injury Assessment  Goal: Absence of physical injury  Outcome: Progressing     Problem: Skin/Tissue Integrity  Goal: Absence of new skin breakdown  Description: 1.  Monitor for areas of redness and/or skin breakdown  2.  Assess vascular access sites hourly  3.  Every 4-6 hours minimum:  Change oxygen saturation probe site  4.  Every 4-6 hours:  If on nasal continuous positive airway pressure, respiratory therapy assess nares and determine need for appliance change or resting period.  Outcome: Progressing     Problem: Nutrition Deficit:  Goal: Optimize nutritional status  Outcome: Progressing

## 2024-12-22 PROCEDURE — 6370000000 HC RX 637 (ALT 250 FOR IP): Performed by: PHYSICAL MEDICINE & REHABILITATION

## 2024-12-22 PROCEDURE — 97116 GAIT TRAINING THERAPY: CPT

## 2024-12-22 PROCEDURE — 6370000000 HC RX 637 (ALT 250 FOR IP): Performed by: INTERNAL MEDICINE

## 2024-12-22 PROCEDURE — 97530 THERAPEUTIC ACTIVITIES: CPT

## 2024-12-22 PROCEDURE — 97112 NEUROMUSCULAR REEDUCATION: CPT

## 2024-12-22 PROCEDURE — 1180000000 HC REHAB R&B

## 2024-12-22 PROCEDURE — 97110 THERAPEUTIC EXERCISES: CPT

## 2024-12-22 RX ORDER — CARBIDOPA AND LEVODOPA 25; 100 MG/1; MG/1
1 TABLET ORAL
Status: DISCONTINUED | OUTPATIENT
Start: 2024-12-22 | End: 2024-12-23 | Stop reason: HOSPADM

## 2024-12-22 RX ADMIN — POLYETHYLENE GLYCOL 3350 17 G: 17 POWDER, FOR SOLUTION ORAL at 21:11

## 2024-12-22 RX ADMIN — DICLOFENAC SODIUM 4 G: 10 GEL TOPICAL at 14:37

## 2024-12-22 RX ADMIN — CARBIDOPA AND LEVODOPA 1 TABLET: 25; 100 TABLET ORAL at 16:54

## 2024-12-22 RX ADMIN — DICLOFENAC SODIUM 4 G: 10 GEL TOPICAL at 10:20

## 2024-12-22 RX ADMIN — CARVEDILOL 6.25 MG: 6.25 TABLET, FILM COATED ORAL at 10:18

## 2024-12-22 RX ADMIN — ACETAMINOPHEN 500 MG: 500 TABLET ORAL at 21:01

## 2024-12-22 RX ADMIN — APIXABAN 5 MG: 5 TABLET, FILM COATED ORAL at 21:01

## 2024-12-22 RX ADMIN — CARBIDOPA AND LEVODOPA 1 TABLET: 25; 100 TABLET ORAL at 10:19

## 2024-12-22 RX ADMIN — ACETAMINOPHEN 500 MG: 500 TABLET ORAL at 10:18

## 2024-12-22 RX ADMIN — DICLOFENAC SODIUM 4 G: 10 GEL TOPICAL at 21:01

## 2024-12-22 RX ADMIN — ACETAMINOPHEN 500 MG: 500 TABLET ORAL at 14:36

## 2024-12-22 RX ADMIN — PRAVASTATIN SODIUM 80 MG: 40 TABLET ORAL at 16:55

## 2024-12-22 RX ADMIN — TIMOLOL MALEATE 1 DROP: 5 SOLUTION OPHTHALMIC at 10:20

## 2024-12-22 RX ADMIN — CARVEDILOL 6.25 MG: 6.25 TABLET, FILM COATED ORAL at 16:55

## 2024-12-22 RX ADMIN — TIMOLOL MALEATE 1 DROP: 5 SOLUTION OPHTHALMIC at 21:01

## 2024-12-22 RX ADMIN — LATANOPROST 1 DROP: 50 SOLUTION OPHTHALMIC at 21:01

## 2024-12-22 RX ADMIN — Medication 3 MG: at 21:01

## 2024-12-22 RX ADMIN — ASPIRIN 81 MG CHEWABLE TABLET 81 MG: 81 TABLET CHEWABLE at 10:20

## 2024-12-22 RX ADMIN — DONEPEZIL HYDROCHLORIDE 5 MG: 10 TABLET, FILM COATED ORAL at 21:01

## 2024-12-22 RX ADMIN — FINASTERIDE 5 MG: 5 TABLET, FILM COATED ORAL at 10:19

## 2024-12-22 RX ADMIN — ISOSORBIDE MONONITRATE 30 MG: 30 TABLET, EXTENDED RELEASE ORAL at 10:18

## 2024-12-22 RX ADMIN — LISINOPRIL 10 MG: 10 TABLET ORAL at 10:18

## 2024-12-22 RX ADMIN — DORZOLAMIDE HYDROCHLORIDE 1 DROP: 20 SOLUTION OPHTHALMIC at 21:01

## 2024-12-22 RX ADMIN — DORZOLAMIDE HYDROCHLORIDE 1 DROP: 20 SOLUTION OPHTHALMIC at 10:20

## 2024-12-22 RX ADMIN — Medication 100 MG: at 10:19

## 2024-12-22 RX ADMIN — CYANOCOBALAMIN TAB 500 MCG 1000 MCG: 500 TAB at 10:19

## 2024-12-22 RX ADMIN — APIXABAN 10 MG: 5 TABLET, FILM COATED ORAL at 10:19

## 2024-12-22 ASSESSMENT — PAIN SCALES - GENERAL
PAINLEVEL_OUTOF10: 5
PAINLEVEL_OUTOF10: 0

## 2024-12-22 NOTE — PLAN OF CARE
Problem: Safety - Adult  Goal: Free from fall injury  12/21/2024 2232 by Kiley Cortez RN  Outcome: Progressing  12/21/2024 1225 by Marisela Drake RN  Outcome: Progressing     Problem: Chronic Conditions and Co-morbidities  Goal: Patient's chronic conditions and co-morbidity symptoms are monitored and maintained or improved  12/21/2024 2232 by Kiley Cortez RN  Outcome: Progressing  12/21/2024 1225 by Marisela Drake RN  Outcome: Progressing     Problem: Discharge Planning  Goal: Discharge to home or other facility with appropriate resources  12/21/2024 2232 by Kiley Cortez RN  Outcome: Progressing  12/21/2024 1225 by Marisela Drake RN  Outcome: Progressing     Problem: Pain  Goal: Verbalizes/displays adequate comfort level or baseline comfort level  12/21/2024 2232 by Kiley Cortez RN  Outcome: Progressing  12/21/2024 1225 by Marisela Drake RN  Outcome: Progressing     Problem: ABCDS Injury Assessment  Goal: Absence of physical injury  12/21/2024 2232 by Kiley Cortez RN  Outcome: Progressing  12/21/2024 1225 by Marisela Drake RN  Outcome: Progressing     Problem: Skin/Tissue Integrity  Goal: Absence of new skin breakdown  Description: 1.  Monitor for areas of redness and/or skin breakdown  2.  Assess vascular access sites hourly  3.  Every 4-6 hours minimum:  Change oxygen saturation probe site  4.  Every 4-6 hours:  If on nasal continuous positive airway pressure, respiratory therapy assess nares and determine need for appliance change or resting period.  12/21/2024 2232 by Kiley Cortez RN  Outcome: Progressing  12/21/2024 1225 by Marisela Drake RN  Outcome: Progressing     Problem: Nutrition Deficit:  Goal: Optimize nutritional status  12/21/2024 2232 by Kiley Cortez RN  Outcome: Progressing  12/21/2024 1225 by Marisela Drake RN  Outcome: Progressing

## 2024-12-22 NOTE — PLAN OF CARE
Problem: Safety - Adult  Goal: Free from fall injury  12/22/2024 1237 by Marisela Drake RN  Outcome: Progressing  12/22/2024 1237 by Marisela Drake RN  Outcome: Progressing     Problem: Chronic Conditions and Co-morbidities  Goal: Patient's chronic conditions and co-morbidity symptoms are monitored and maintained or improved  12/22/2024 1237 by Marisela Drake RN  Outcome: Progressing  12/22/2024 1237 by Marisela Drake RN  Outcome: Progressing     Problem: Discharge Planning  Goal: Discharge to home or other facility with appropriate resources  12/22/2024 1237 by Marisela Drake RN  Outcome: Progressing  Flowsheets (Taken 12/22/2024 1020)  Discharge to home or other facility with appropriate resources:   Identify barriers to discharge with patient and caregiver   Arrange for needed discharge resources and transportation as appropriate   Identify discharge learning needs (meds, wound care, etc)   Refer to discharge planning if patient needs post-hospital services based on physician order or complex needs related to functional status, cognitive ability or social support system  12/22/2024 1237 by Marisela Drake RN  Outcome: Progressing  Flowsheets (Taken 12/22/2024 1020)  Discharge to home or other facility with appropriate resources:   Identify barriers to discharge with patient and caregiver   Arrange for needed discharge resources and transportation as appropriate   Identify discharge learning needs (meds, wound care, etc)   Refer to discharge planning if patient needs post-hospital services based on physician order or complex needs related to functional status, cognitive ability or social support system     Problem: Pain  Goal: Verbalizes/displays adequate comfort level or baseline comfort level  12/22/2024 1237 by Marisela Drake RN  Outcome: Progressing  12/22/2024 1237 by Marisela Drake RN  Outcome: Progressing     Problem: ABCDS Injury Assessment  Goal: Absence of physical injury  12/22/2024

## 2024-12-23 VITALS
SYSTOLIC BLOOD PRESSURE: 112 MMHG | WEIGHT: 159.7 LBS | BODY MASS INDEX: 22.86 KG/M2 | OXYGEN SATURATION: 99 % | TEMPERATURE: 97.6 F | DIASTOLIC BLOOD PRESSURE: 68 MMHG | HEART RATE: 64 BPM | RESPIRATION RATE: 18 BRPM | HEIGHT: 70 IN

## 2024-12-23 LAB
ANION GAP SERPL CALCULATED.3IONS-SCNC: 8 MEQ/L (ref 9–15)
BASOPHILS # BLD: 0.1 K/UL (ref 0–0.2)
BASOPHILS NFR BLD: 1 %
BUN SERPL-MCNC: 61 MG/DL (ref 8–23)
CALCIUM SERPL-MCNC: 9.4 MG/DL (ref 8.5–9.9)
CHLORIDE SERPL-SCNC: 100 MEQ/L (ref 95–107)
CO2 SERPL-SCNC: 26 MEQ/L (ref 20–31)
CREAT SERPL-MCNC: 0.83 MG/DL (ref 0.7–1.2)
EOSINOPHIL # BLD: 0.2 K/UL (ref 0–0.7)
EOSINOPHIL NFR BLD: 2.2 %
ERYTHROCYTE [DISTWIDTH] IN BLOOD BY AUTOMATED COUNT: 14.4 % (ref 11.5–14.5)
GLUCOSE SERPL-MCNC: 98 MG/DL (ref 70–99)
HCT VFR BLD AUTO: 40 % (ref 42–52)
HGB BLD-MCNC: 13.2 G/DL (ref 14–18)
LYMPHOCYTES # BLD: 1.5 K/UL (ref 1–4.8)
LYMPHOCYTES NFR BLD: 16.1 %
MCH RBC QN AUTO: 29.7 PG (ref 27–31.3)
MCHC RBC AUTO-ENTMCNC: 33 % (ref 33–37)
MCV RBC AUTO: 90.1 FL (ref 79–92.2)
MONOCYTES # BLD: 0.8 K/UL (ref 0.2–0.8)
MONOCYTES NFR BLD: 8.8 %
NEUTROPHILS # BLD: 6.4 K/UL (ref 1.4–6.5)
NEUTS SEG NFR BLD: 71.1 %
PLATELET # BLD AUTO: 262 K/UL (ref 130–400)
POTASSIUM SERPL-SCNC: 5 MEQ/L (ref 3.4–4.9)
RBC # BLD AUTO: 4.44 M/UL (ref 4.7–6.1)
SODIUM SERPL-SCNC: 134 MEQ/L (ref 135–144)
WBC # BLD AUTO: 9 K/UL (ref 4.8–10.8)

## 2024-12-23 PROCEDURE — 99232 SBSQ HOSP IP/OBS MODERATE 35: CPT | Performed by: INTERNAL MEDICINE

## 2024-12-23 PROCEDURE — 6370000000 HC RX 637 (ALT 250 FOR IP): Performed by: PHYSICAL MEDICINE & REHABILITATION

## 2024-12-23 PROCEDURE — 80048 BASIC METABOLIC PNL TOTAL CA: CPT

## 2024-12-23 PROCEDURE — 6370000000 HC RX 637 (ALT 250 FOR IP): Performed by: INTERNAL MEDICINE

## 2024-12-23 PROCEDURE — 97110 THERAPEUTIC EXERCISES: CPT

## 2024-12-23 PROCEDURE — 36415 COLL VENOUS BLD VENIPUNCTURE: CPT

## 2024-12-23 PROCEDURE — 97535 SELF CARE MNGMENT TRAINING: CPT

## 2024-12-23 PROCEDURE — 97129 THER IVNTJ 1ST 15 MIN: CPT

## 2024-12-23 PROCEDURE — 99239 HOSP IP/OBS DSCHRG MGMT >30: CPT | Performed by: PHYSICAL MEDICINE & REHABILITATION

## 2024-12-23 PROCEDURE — 85025 COMPLETE CBC W/AUTO DIFF WBC: CPT

## 2024-12-23 PROCEDURE — 97130 THER IVNTJ EA ADDL 15 MIN: CPT

## 2024-12-23 PROCEDURE — 97116 GAIT TRAINING THERAPY: CPT

## 2024-12-23 RX ORDER — HYDROCODONE BITARTRATE AND ACETAMINOPHEN 5; 325 MG/1; MG/1
1 TABLET ORAL EVERY 6 HOURS PRN
Qty: 20 TABLET | Refills: 0 | Status: SHIPPED | OUTPATIENT
Start: 2024-12-23 | End: 2024-12-30

## 2024-12-23 RX ORDER — CARBIDOPA AND LEVODOPA 25; 100 MG/1; MG/1
1 TABLET ORAL 2 TIMES DAILY
Qty: 60 TABLET | Refills: 3 | Status: SHIPPED | OUTPATIENT
Start: 2024-12-23

## 2024-12-23 RX ADMIN — ACETAMINOPHEN 500 MG: 500 TABLET ORAL at 08:23

## 2024-12-23 RX ADMIN — ACETAMINOPHEN 500 MG: 500 TABLET ORAL at 14:12

## 2024-12-23 RX ADMIN — CARVEDILOL 6.25 MG: 6.25 TABLET, FILM COATED ORAL at 07:31

## 2024-12-23 RX ADMIN — CARBIDOPA AND LEVODOPA 1 TABLET: 25; 100 TABLET ORAL at 10:55

## 2024-12-23 RX ADMIN — DORZOLAMIDE HYDROCHLORIDE 1 DROP: 20 SOLUTION OPHTHALMIC at 08:21

## 2024-12-23 RX ADMIN — Medication 100 MG: at 08:14

## 2024-12-23 RX ADMIN — APIXABAN 5 MG: 5 TABLET, FILM COATED ORAL at 08:20

## 2024-12-23 RX ADMIN — LISINOPRIL 10 MG: 10 TABLET ORAL at 08:15

## 2024-12-23 RX ADMIN — TIMOLOL MALEATE 1 DROP: 5 SOLUTION OPHTHALMIC at 08:22

## 2024-12-23 RX ADMIN — ASPIRIN 81 MG CHEWABLE TABLET 81 MG: 81 TABLET CHEWABLE at 08:14

## 2024-12-23 RX ADMIN — CARBIDOPA AND LEVODOPA 1 TABLET: 25; 100 TABLET ORAL at 16:05

## 2024-12-23 RX ADMIN — ISOSORBIDE MONONITRATE 30 MG: 30 TABLET, EXTENDED RELEASE ORAL at 08:15

## 2024-12-23 RX ADMIN — FINASTERIDE 5 MG: 5 TABLET, FILM COATED ORAL at 08:15

## 2024-12-23 RX ADMIN — DICLOFENAC SODIUM 4 G: 10 GEL TOPICAL at 09:59

## 2024-12-23 RX ADMIN — CYANOCOBALAMIN TAB 500 MCG 1000 MCG: 500 TAB at 08:13

## 2024-12-23 RX ADMIN — DICLOFENAC SODIUM 4 G: 10 GEL TOPICAL at 14:47

## 2024-12-23 RX ADMIN — CARBIDOPA AND LEVODOPA 1 TABLET: 25; 100 TABLET ORAL at 06:10

## 2024-12-23 ASSESSMENT — PAIN SCALES - GENERAL
PAINLEVEL_OUTOF10: 1

## 2024-12-23 ASSESSMENT — PAIN DESCRIPTION - LOCATION
LOCATION: KNEE

## 2024-12-23 ASSESSMENT — ENCOUNTER SYMPTOMS
GASTROINTESTINAL NEGATIVE: 1
CHEST TIGHTNESS: 0
COUGH: 0
NAUSEA: 0
SHORTNESS OF BREATH: 0
EYES NEGATIVE: 1
RESPIRATORY NEGATIVE: 1
BLOOD IN STOOL: 0
WHEEZING: 0
STRIDOR: 0

## 2024-12-23 NOTE — PLAN OF CARE
Problem: Safety - Adult  Goal: Free from fall injury  12/22/2024 2200 by Beatris Tomas RN  Outcome: Progressing  12/22/2024 1237 by Marisela Drake RN  Outcome: Progressing     Problem: Chronic Conditions and Co-morbidities  Goal: Patient's chronic conditions and co-morbidity symptoms are monitored and maintained or improved  12/22/2024 1237 by Marisela Drake RN  Outcome: Progressing     Problem: Pain  Goal: Verbalizes/displays adequate comfort level or baseline comfort level  12/22/2024 2200 by Beatris Tomas RN  Outcome: Progressing  12/22/2024 1237 by Marisela Drake RN  Outcome: Progressing     Problem: Skin/Tissue Integrity  Goal: Absence of new skin breakdown  Description: 1.  Monitor for areas of redness and/or skin breakdown  2.  Assess vascular access sites hourly  3.  Every 4-6 hours minimum:  Change oxygen saturation probe site  4.  Every 4-6 hours:  If on nasal continuous positive airway pressure, respiratory therapy assess nares and determine need for appliance change or resting period.  12/22/2024 2200 by Beatris Tomas RN  Outcome: Progressing  12/22/2024 1237 by Marisela Drake RN  Outcome: Progressing

## 2024-12-23 NOTE — DISCHARGE INSTR - COC
wheelchair, walker, and bath bench  Other Treatments: none    Patient's personal belongings (please select all that are sent with patient):  Glasses, Hearing Aides bilateral, Dentures upper    RN SIGNATURE:  Electronically signed by Kerri Leroy RN on 12/23/24 at 3:19 PM EST    CASE MANAGEMENT/SOCIAL WORK SECTION    Inpatient Status Date: Admitted to inpatient on 12/9/24    Readmission Risk Assessment Score:  [facility] RISK OF UNPLANNED READMISSION 2.0             16.1 Total Score        Discharging to Facility/ Agency   Name: [facility]  Address:  Phone: [phone]  Fax:    Dialysis Facility (if applicable)   Name:  Address:  Dialysis Schedule:  Phone:  Fax:    / signature: Electronically signed by BREANNA Rahman, OZZYW on 12/23/24 at 2:33 PM EST    PHYSICIAN SECTION    Prognosis: Good    Condition at Discharge: Stable    Rehab Potential (if transferring to Rehab):     Recommended Labs or Other Treatments After Discharge:     Physician Certification: I certify the above information and transfer of Hamilton Torres  is necessary for the continuing treatment of the diagnosis listed and that he requires Home Care for less 30 days.     Update Admission H&P: No change in H&P    PHYSICIAN SIGNATURE:  Judith Castillo DO

## 2024-12-23 NOTE — PLAN OF CARE
Problem: Safety - Adult  Goal: Free from fall injury  Outcome: Adequate for Discharge     Problem: Chronic Conditions and Co-morbidities  Goal: Patient's chronic conditions and co-morbidity symptoms are monitored and maintained or improved  Outcome: Adequate for Discharge     Problem: Discharge Planning  Goal: Discharge to home or other facility with appropriate resources  Outcome: Adequate for Discharge     Problem: Pain  Goal: Verbalizes/displays adequate comfort level or baseline comfort level  Outcome: Adequate for Discharge     Problem: ABCDS Injury Assessment  Goal: Absence of physical injury  Outcome: Adequate for Discharge     Problem: Skin/Tissue Integrity  Goal: Absence of new skin breakdown  Outcome: Adequate for Discharge     Problem: Nutrition Deficit:  Goal: Optimize nutritional status  Outcome: Adequate for Discharge

## 2024-12-23 NOTE — FLOWSHEET NOTE
Report called to [facility] [phone] with verbalization of understanding. Awaiting transport by [facility].

## 2024-12-23 NOTE — CARE COORDINATION
Case Management Initial Assessment        NAME:  Hamilton Torres  ROOM: R247/R247-01  :  1935  DATE: 12/10/2024        Social Functional:  Social/Functional History  Lives With: Other (comment)  Type of Home: Assisted living ([facility])  Home Layout: One level  Home Access: Level entry  Bathroom Shower/Tub: Walk-in shower  Bathroom Toilet: Standard  Bathroom Equipment: Grab bars in shower;Built-in shower seat;Hand-held shower;Grab bars around toilet  Bathroom Accessibility: Walker accessible  Home Equipment: Wheelchair - Manual;Hospital bed;Walker - Rolling  Prior Level of Assist for ADLs: Independent (needs assistance for dressing and bathe for standing by for approx 2 wks however prior to that was independent)  Prior Level of Assist for Homemaking: Needs assistance  Homemaking Responsibilities: No  Prior Level of Assist for Transfers: Independent (uses WW or w/c)  Active : No  Patient's  Info: dtr, facility  Mode of Transportation: Car (sedan)  Education: High school graduate and was in the [employer]  Occupation: Retired  Type of Occupation: [employer] 30 years      Spoke with patient and explained role in the team. Patient questions answered appropriately. Explained discharge process. Patient stated understanding. Pt was independent with ADL's and IADL's prior to hospitalization . Pt currently resides at [facility]. Pt has a son and a daughter that are local. Pt lives on the first level with level entry. Pt has standard height toilet, grab bars in the shower, built in shower seat,hand-held shower and grab bars around the toilet.Pt's DME consists of a manual w/c, hospital bed and a rolling walker.Pt is not an active . Pt's daughter or the facility will provide him a ride. Daughter transports via  a sedan. Pt graduated from High school and was in the [employer]. Pt is retired 
 [facility]  INPATIENT REHABILITATION  TEAM CONFERENCE NOTE  Room: R247/R247-01  Admit Date: 2024       Date: 2024  Patient Name: Hamilton Torres        MRN: [id number]    : 1935  (89 y.o.)  Gender: male        REHAB DIAGNOSIS:   Diagnosis: Impaired mobility and ADL's due to  flare of osteoarthritis especially knees status post hyaluronic acid injection    CO MORBIDITIES:      Past Medical History:   Diagnosis Date    Anticoagulant long-term use     Anxiety     Bursitis of hip     CAD (coronary artery disease)     Carotid stenosis     2013 16-49%    Cervical disc disorder     CHF (congestive heart failure) ([facility])     COVID-19 virus infection 2023    Dementia (HCC)     Dementia (HCC)     Hyperlipidemia     Hypertension     MI (myocardial infarction) ([facility])     Osteoarthritis     Spinal stenosis      Past Surgical History:   Procedure Laterality Date    CARPAL TUNNEL RELEASE      CATARACT REMOVAL      COLONOSCOPY  12/10/10,2007    DR MATTHEWS - DONE AT [facility]    COLONOSCOPY      hx polyps needs     COLONOSCOPY  9/21/15     DR. YARBROUGH     CORONARY ANGIOPLASTY WITH STENT PLACEMENT  2020    DIAGNOSTIC CARDIAC CATH LAB PROCEDURE      HERNIA REPAIR Bilateral     x 2    HERNIA REPAIR Right 2020    RIGHT INGUINAL HERNIA REPAIR performed by Roscoe Copeland MD at [facility]    PTCA          Restrictions  Restrictions/Precautions: Fall Risk  CASE MANAGEMENT    Social/Functional History  Social/Functional History  Lives With: Other (comment)  Type of Home: Assisted living  Home Layout: One level  Home Access: Level entry  Bathroom Shower/Tub: Walk-in shower  Bathroom Toilet: Standard  Bathroom Equipment: Grab bars in shower, Built-in shower seat, Hand-held shower, Grab bars around toilet  Bathroom Accessibility: Walker accessible  Home Equipment: Wheelchair - Manual, Hospital bed, Walker - Rolling  Has the patient had two or more falls in the past year 
Inpatient Rehab referral received. Met with patient and explained [facility] Inpatient Rehab program and requirements, including 3 hours of intense therapy daily,weekly team meetings,  anticipated length of stay and goal of discharge to home. All questions answered and patient verbalized understanding. Freedom of choice provided and patient requests admit to [facility]. Spoke with daughter Olga and told her the pt wants to come to our rehab. Daughter wanted [facility]  rehab  d/t his ortho MD is there. Explained to daughter  pt had been on our rehab at the beginning of the year and liked his therapist here and wants to stay here. Daughter in agreement of staying here . The only concern the daughter had was pt has an appointment 12/23 with ortho. CM explained that he might be discharged by then or we could make arrangements for him to go there if he is still a pt. Electronically signed by Darshana Nolan RN on 12/9/2024 at 11:04 AM   
KALPESH met with patient and daughter, Olga, at bedside. Patient was sleeping during this conversation. Olga stated they are both agreeable to acute rehab. She said patient has been to [facility] rehab before and would like to return. However, her preference is [facility] rehab since that is where his ortho physician is. Message left for [facility] rehab liaison to notify them [facility]. KALPESH also included in the message that Olga would like a call to ask some questions.   
KALPESH received a voicemail from DANIEL Tran over the weekend stating that pt's dtr would [facility] the [facility] his AL [facility] once discharged. LSW called the [facility] and spoke with Judith AUSTIN) who confirmed that pt needs to make an appointment to establish a PCP and SW with the [facility]. Once pt attends those appointments, then the SW will complete a case mix tool which determines what services pt will qualify for. LSW will discuss with dtr. Chelsea had also noted in the voicemail that pt may not be discharging today (12/23). Team meeting will be held at 12PM which will then determine the discharge plan. Electronically signed by BREANNA Rahman, KALPESH on 12/23/2024 at 9:49 AM    
LSW spoke with pt and provided an update on discharge plan and goals. Pt confirmed he feels he would be able to return back to [facility] if he reaches those goals. LSW then called Olga (dtr) and relayed what was discussed with pt, such as goals and d/c date. Olga voiced concern with pt receiving additional news of the adrenal mass, as pt has voiced feeling down after testing positive for covid and now a PE. Olga was hoping to speak with Hospitalist and possibly Dr. Hutton to discuss further before relaying info to patient. LSW perfect served Dr. Hutton and notified him of the concerns Olga relayed. Dr. Hutton read the message. LSW also spoke with Tara (RN). LSW mentioned that Olga had inquired if pt would benefit from having Dr. Duvall consulted r/t anticoagulants ordered as he had prescribed the plavix for stents previously. RN stated that the Hospitalist is planning to speak with Olga further and will address questions/concerns then. Electronically signed by BREANNA Rahman, KALPESH on 12/12/2024 at 3:00PM    
Pt has been cleared to discharge today, per RN. No new DME needed. LSW spoke with Serenity at [facility] and confirmed that they only need the [facility] order for [facility], which is the agency pt chose. Transport arranged with [facility] for a  of 3:30PM by wheelchair. Pt was notified of discharge for today and was pleased. LSW called dtr and notified her via voicemail. Electronically signed by BREANNA Rahman, KALPESH on 12/23/2024 at 1:57 PM    
Spoke with Jazmin in ER to let her know pt can admit to room 247 pending medical clearance. Electronically signed by Darshana Nolan RN on 12/9/2024 at 1:50 PM   
Spoke with the patient about HHC vs OP.  He feels ready for discharge and said he does not have transportation to do OP therapy.  He said HHC is ok the offered freedom of choice he would like [facility] at AL.  Electronically signed by Serenity Duffy RN on 12/23/24 at 11:06 AM EST   
Taylor came from [facility] and they have accepted him.  She was able to observe his level of care and they will be ready for him dc date 12/23/2024.  He does have a standard bed at the AL.  He will need transport as the AL can not provide on 12/23/2024.  He will need C orders and a had script if he still needs ([place]/pain med) at [place].  She does not need any additional paperwork at this time.  They would like nursing to call report to the nurse at [facility] on day of discharge.  Electronically signed by Sereniyt Duffy RN on 12/20/24 at 10:46 AM EST   
This nurse spoke with Olga Nolan RN [facility] rehab admission , she states that \"Dr Castillo will be coming to evaluate him\" and does ask that the patient's nurse reconcile his meds and that we print them out for them. This nurse will relay this to the patient's nurse.  
plans to purchase some. OZZYW then messaged Serenity relaying all of the discussed info. Electronically signed by BREANNA Rahman, KALPESH on 12/18/2024 at 3:09 PM    
(12/15/24 1229)  Stairs  Stair Height: 6'' (12/22/24 1346)  Device: One handrail (Malick grasp) (12/22/24 1346)  Number of Stairs: 4 (12/20/24 1338)  Additional Factors: Non-reciprocal going up;Non-reciprocal going down (12/22/24 1346)  Assistance Level: Minimal assistance (12/22/24 1346)  Skilled Clinical Factors: Mildly retropulsive (12/22/24 1346)  W/C mobility:  Wheelchair Activities  Level of Assistance for pressure relief activities: Independent (12/10/24 0853)  Propulsion: Yes (12/10/24 0853)  Propulsion 1  Level: Level Tile (12/10/24 0853)  Method: LLE;RLE (12/10/24 0853)  Level of Assistance: Independent (12/10/24 0853)  Distance: 150 feet (12/10/24 0853)  LTG:  Long Term Goal 1: Pt will demonstrate bed mobility indep  Long Term Goal 2: Pt will demonstrate transfers mod indep with safest AD  Long Term Goal 3: Pt will demonstrate amb 150 ft mod indep with safest AD  Long Term Goal 4: Pt to complete TUG in 25 sec or less with supervision  Long Term Goal 5: Pt able to stand with BUE support 60 sec with no assistance  PT Treatment Time:  1.5 hrs      OCCUPATIONAL THERAPY    EVALUATION SELF CARE STATUS:  Hand Dominance: Right  Feeding: Setup;Increased time to complete (12/10/24 1152)  Feeding Skilled Clinical Factors: setup only for lunch (12/10/24 1152)  Grooming: Modified independent ;Increased time to complete (12/10/24 1152)  Grooming Skilled Clinical Factors: oral hygiene, deodorant (12/10/24 1152)  UE Bathing: Setup (12/10/24 1152)  UE Bathing Skilled Clinical Factors: sponge bath only (12/10/24 1152)  LE Bathing: Moderate assistance;Increased time to complete (12/09/24 0922)  LE Bathing Skilled Clinical Factors: declined- reports already completed (12/10/24 1152)  UE Dressing: Setup;Increased time to complete (12/10/24 1152)  LE Dressing: Maximum assistance (12/10/24 1152)  LE Dressing Skilled Clinical Factors: ModA over feet, pt able to don over hips with Cheri for balance (12/10/24 5548)  Putting On/Taking 
therapy daily, 5 days/week for a total of 900 minutes  PT Treatment Time:  1.5 hrs/day  OT Treatment Time: 1.5 hrs/day  SLP Treatment Time: .5 hrs/day  Rehabilitation Nursing   Case management/Social work  Dietitian/Nutrition    Cultural needs:     None noted  Funding needs:     None noted    Expected Level of Improvement with Rehab  Assist for ADL Set up in UB ADLs and Min A in LB ADLs   Assist for Transfers Modified [place]  Assist for Gait Modified [place]    Patient's willingness to participate: Yes  Patient's ability to tolerate proposed care: Yes  Patient/Family Goals of Rehab (in patient's/family's own words): to back to [facility].    Anticipated Discharge Plan:  Home with   Home Health, RN PT OT Aide Social Work to be determined      Barriers to Discharge:  Equipment needs  Caregiver availability  Resource availability      Rehab evaluation plan: Recommend an IRF  Rehabilitation Impairment Group Code: 06.2  Rehab Impairment Group: Orthopedic / Rheumatologic: Other Arthritis  Estimated Length of Stay (days): 16 days  Rehab Diagnosis: Impaired mobility and ADL's due to  flare of osteoarthritis especially knees status post hyaluronic acid injection   Reviewer's Signature: Electronically signed by Darshana Nolan RN on 12/9/2024 at 12:25 PM     I have reviewed and concur with the above Preadmission Screening.   Rehab Admitting Doctor: Dr. Judith Castillo DO      
0615)  Cognition Comment: follows one step commands consistently with increased time for processing required (12/09/24 0946)          Orientation  Overall Orientation Status: Within Functional Limits (12/18/24 1615)  Orientation Level: Oriented X4 (12/18/24 1615)  SP:Memory: Exceptions to WFL        Problem Solving: Exceptions to WFL    RECREATIONAL THERAPY  Attendance to recreational therapy programs:    []  Pet Therapy  [] Music Therapy  [] Art Therapy    [] Recreation Therapy Group [] Support Group           Patient social interaction (mood, participation): good    Patient strengths: good support from AL staff, children supportive     Patients goal: return AL    Problems/Barriers: covid positive, PE discovered on 12/11, problem solving decreased, some cog deficits noted , cueing, mood swings        1. Safety:          - Intervention / Plan:    [x]  falls protocol     [x]  PT/OT    [x]  SP        - Results:         2. Potential DME needs:         - Intervention / Plan:  [x]  PT/OT     [x]  Assess equipment needs/access       - Results:         3.  Weakness:          - Intervention / Plan:  [x]  PT/OT      []  Other:         - Results:         4.  Discharge planning needs:          - Intervention / Plan:  [x]  Weekly team conference      [x]  family training        - Results:         5.            - Intervention / Plan:          - Results:         6.            - Intervention / Plan:         - Results:         7.            - Intervention / Plan:         - Results:           Discharge Plan   Estimated Length of Stay: 13 days    Tentative Discharge date: 12/23/24      Anticipated Discharge Destination:  AL      Team recommendations:    1. Follow up Therapy :    PT  OT  SLP  RN  HH Aide    2. Home Health vs OP    Other:     Equipment needed at Discharge: Other: TBD      Team Members Present at Conference:    Physician: Dr. Judith Castillo  RN: Kerri Hernandez RN  Physical Therapist: Caery Andujar

## 2024-12-24 NOTE — DISCHARGE SUMMARY
Subjective:  The patient complains of severe acute on chronic progressive fatigue and bilateral knee pain OA flare up and generalized weakness partially relieved by rest, medications, PT,  OT,     and rest and exacerbated by recent   knee injections and COVID-19 infection-Tx with Paxlovid.       89 y.o. male admitted to [facility] on 12/9/2024-had severe pain flareup in bilateral knees status post hyaluronic acid injections at the [facility].  According to the care everywhere function in epic he had the joints injected on 12/2/2024 by Dr. Joe Sanchez.  He also recently saw [facility] ophthalmology for intra vitreal eye injection on 11/19.  Patient has stayed at our rehab facility in the past and did well.     I am concerned about patient’s medical complexities and barriers to advancing in rehab goals including controlling his pain and recovering from COVID.    He developed a small PE--discovered while CT repeat is ordered to reevaluate his infiltrate.  He is placed on Eliquis and echo and duplex pending for 12/12/2024.    I reviewed current care and plans for further care with other rehab providers including nursing and case management.  According to recent nursing note, \"assessment completed. Pt is alert and oriented, resting comfortably no c/o of pain. Roland hernandez in reach. \".       Hospital Course: The patient was admitted to the Rehabilitation Unit to address ADL and mobility deficits-as detailed above and below.  The patient was enrolled in acute PT, OT program.  Weekly team meetings were held to assess functional progress toward their goals-and modify the therapy program.  The patient's medical, emotional, psychosocial and functional issues were addressed.  The patient progressed in the rehab program and is now ready for discharge home.  Refer to functional assessments summary report for detailed functional status.  Refer to the medical problem list below to see the medical issues addressed.

## 2024-12-26 NOTE — PROGRESS NOTES
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 +COVID-19 infection'. labs/meds reviewed. No edema noted. Wound Type: None       Current Nutrition Intake & Therapies:    Average Meal Intake: 51-75%, %     ADULT DIET; Regular  ADULT ORAL NUTRITION SUPPLEMENT; Breakfast, Dinner; Standard High Calorie/High Protein Oral Supplement    Anthropometric Measures:  Height: 177.8 cm (5' 10\")  Ideal Body Weight (IBW): 166 lbs (75 kg)    Admission Body Weight: 79.4 kg (175 lb) (stated)  Current Body Weight: 79.4 kg (175 lb 0.7 oz) (12/9),   IBW. Weight Source: Bed scale  Current BMI (kg/m2): 25.1  Usual Body Weight: 83.5 kg (184 lb) ((1/2023); 172lb (7/18/24 bedscale))     % Weight Change (Calculated): -4.9                    BMI Categories: Overweight (BMI 25.0-29.9)    Estimated Daily Nutrient Needs:  Energy Requirements Based On: Kcal/kg  Weight Used for Energy Requirements: Admission  Energy (kcal/day): ~7817-8550 (kg x 24-25)  Weight Used for Protein Requirements: Ideal  Protein (g/day): 98 (kg x 1.3)  Method Used for Fluid Requirements: 1 ml/kcal  Fluid (ml/day): ~1900    Nutrition Diagnosis:   Unintended weight loss related to inadequate oral intake as evidenced by hx of weight loss    Nutrition Interventions:   Food and/or Nutrient Delivery: Continue Current Diet, Modify Oral Nutrition Supplement  Nutrition Education/Counseling: Education/Counseling not 
Endocrinology Progress Note    Assessment and Plan:   Assessment-  Adrenal incidentaloma    Plan-  Repeat adrenal CT as an outpatient in 6 months  Follow-up with endocrinology as an outpatient in 6 months    POC Glucose:   No results for input(s): \"POCGLU\" in the last 72 hours.  HGBA1C:  Lab Results   Component Value Date    LABA1C 6.1 (H) 07/30/2018     CBC:   Recent Labs     12/16/24  0502   WBC 7.3   HGB 13.9*        CMP:    Lab Results   Component Value Date     12/16/2024    K 4.6 12/16/2024     12/16/2024    CO2 27 12/16/2024    BUN 28 (H) 12/16/2024    CREATININE 0.79 12/16/2024    GLUCOSE 99 12/16/2024    CALCIUM 8.2 (L) 12/16/2024    BILITOT 0.6 12/10/2024    ALKPHOS 87 12/10/2024    AST 18 12/10/2024    ALT <5 12/10/2024    LABGLOM 85.9 12/16/2024    GFRAA >60.0 11/09/2020    GLOB 2.3 07/06/2024       Lab Results   Component Value Date    TSH 2.500 07/03/2024    TSH 5.180 (H) 07/12/2020    TSH 2.280 10/08/2019    T4FREE 1.51 07/12/2020     No results found for: \"TPOABS\"      CC:   Chief Complaint   Patient presents with    Joint Pain     Patient had gel injection to bilateral knees 12/2 for arthritis. Patient daughter states around 6am nurse called daughter stating patient was in so much pain at the knees, could not walk and called EMS.        Subjective:   Interval History: Hamilton is an 89-year-old male admitted to our rehabilitation unit for strengthening and conditioning following admission for right lower extremity DVT and left pulmonary embolisms.  He had a CT of the chest which identified a 4.4 cm left adrenal adenoma.  Per the patient this was a new finding, he had not been diagnosed with this in the past, and there is no family history of adrenal adenomas or renal disease.  Laboratory studies showed normal salivary and serologic cortisol, ACTH, aldosterone, DHEA-S metanephrines, and normetanephrine's.  We discussed these findings and the possibility of this mass being adrenal 
[place] [facility] [place] [facility]  Rehabilitation Initial Assessment: Occupational Therapy  Room: R2/R247-01    NAME: Hamilton Torres  : 1935  MRN: [id number]    Date of Service: 12/10/2024    Rehab Diagnosis(es): Imp ADLs d/t PD and OA flare up  Patient Active Problem List    Diagnosis Date Noted    Congestive heart failure ([facility]) 2020    Primary osteoarthritis involving multiple joints 2024    Parkinsonism ([facility]) 2024    Impaired mobility & ADLs dt PD and OA flare up 2024    Cervical spinal stenosis 2024    Falls frequently 2024    CNVM (choroidal neovascular membrane), bilateral 2023    Right inguinal hernia 2020    Syncope and collapse 2020    Post PTCA     Bilateral carotid bruits 2020    Unstable angina ([facility]) 2020    ACS (acute coronary syndrome) ([facility]) 2020    Non-STEMI (non-ST elevated myocardial infarction) ([facility]) 2020    CHF (congestive heart failure) ([facility])     Coronary artery disease involving native coronary artery of native heart without angina pectoris 2019    Dizziness     Anginal equivalent ([facility]) 2018    S/P cardiac cath 2018    Angina, class III ([facility])     Chest pain 2018    HESS (dyspnea on exertion) 2018    Leg edema 2018    Acute diastolic heart failure ([facility]) 2018    Essential hypertension 2018    NSTEMI (non-ST elevated myocardial infarction) ([facility]) 2018    Hypertensive urgency 2018    Dementia ([facility])     Carotid stenosis, bilateral 2013    Hyperlipidemia        Past Medical History:   Diagnosis Date    Anticoagulant long-term use     Anxiety     Bursitis of hip     CAD (coronary artery disease)     Carotid stenosis     2013 16-49%    Cervical disc disorder     CHF (congestive heart failure) ([facility])     COVID-19 virus infection 2023    Dementia ([facility])     Dementia ([facility])     Hyperlipidemia     Hypertension     MI (myocardial infarction) ([facility])     
[place] [facility] [place]  Rehabilitation Initial Assessment: Physical Therapy  Room: R2/R247-01    NAME: Hamilton Torres  : 1935  MRN: [id number]    Date of Service: 12/10/2024    Rehab Diagnosis(es): Impaired mobility and ADL's due to  flare of osteoarthritis especially knees status post hyaluronic acid injection  Patient Active Problem List    Diagnosis Date Noted    Congestive heart failure ([facility]) 2020    Primary osteoarthritis involving multiple joints 2024    Parkinsonism ([facility]) 2024    Impaired mobility & ADLs dt PD and OA flare up 2024    Cervical spinal stenosis 2024    Falls frequently 2024    CNVM (choroidal neovascular membrane), bilateral 2023    Right inguinal hernia 2020    Syncope and collapse 2020    Post PTCA     Bilateral carotid bruits 2020    Unstable angina ([facility]) 2020    ACS (acute coronary syndrome) ([facility]) 2020    Non-STEMI (non-ST elevated myocardial infarction) ([facility]) 2020    CHF (congestive heart failure) ([facility])     Coronary artery disease involving native coronary artery of native heart without angina pectoris 2019    Dizziness     Anginal equivalent ([facility]) 2018    S/P cardiac cath 2018    Angina, class III ([facility])     Chest pain 2018    HESS (dyspnea on exertion) 2018    Leg edema 2018    Acute diastolic heart failure ([facility]) 2018    Essential hypertension 2018    NSTEMI (non-ST elevated myocardial infarction) ([facility]) 2018    Hypertensive urgency 2018    Dementia ([facility])     Carotid stenosis, bilateral 2013    Hyperlipidemia        Past Medical History:   Diagnosis Date    Anticoagulant long-term use     Anxiety     Bursitis of hip     CAD (coronary artery disease)     Carotid stenosis     2013 16-49%    Cervical disc disorder     CHF (congestive heart failure) ([facility])     COVID-19 virus infection 2023    Dementia ([facility])     Dementia ([facility])  
I called his dtr Olga and gave her update on diagnosis and tx plan      Olga stated that pt had a cardiac stent in 8066-2854    Given the initiation of anticoag and the fact that his stent was inserted more than a yr ago, I would recommend downgrading Antiplatelet from Plavix to Aspirin to reduce bleed risk while he is receiving anti coagulation.     Pt will be discharged on Eliquis and ASA    Eliquis will be started after he completes Paxlovid course.    Meanwhile keep Lovenox injection.    ANA LAURA Haidre MD      
INDIVIDUALIZED OVERALL REHAB PLAN OF CARE  ADDENDUM TO REHAB PROGRESS NOTE-for audit purposes must also refer to this day's clinical note and combine the information      Date: 2024  Patient Name: Hamilton Torres   Room: R247/R247-01    MRN: [id number]    : 1935  (89 y.o.)  Gender: male       Today 2024 during weekly team meeting, I reviewed the patient Hamilton Torres in detail with the therapists and nurses involved in patient's care gathering complex physiatric data regarding current medical issues, progress in therapies, factors limiting progress, social issues, psychological issues, ongoing therapeutic plans and discharge planning.      Note during this team meeting- I was in the [facility] building, and in the department of physical medicine rehabilitation.  Additionally, a video monitor live feed between the interdisciplinary team participants in the team conference was used to supplement connectivity and facilitate record review and allow immediate access to the EMR.   Our program views this an essential process to maintaining the integral rehab physician leadership role in team and the interdisciplinary discussion of our patient.    Legend:  I= independent Im =Modified independent  S=Supervised SB=stand by KAUR=set up CG=contact alfonzo Min= minimal Mod=Moderate Max=maximal Max of 2 =maximal assist of 2 people      CURRENT FUNCTIONAL STATUS:     89 y.o. male admitted to [facility] on 2024-had severe pain flareup in bilateral knees status post hyaluronic acid injections at the [facility].  According to the care everywhere function in epic he had the joints injected on 2024 by Dr. Joe Sanchez.  He also recently saw [facility] ophthalmology for intra vitreal eye injection on .  Patient has stayed at our rehab facility in the past and did well.  PE discovered while CT repeat is ordered to reevaluate his infiltrate.  He is placed on Eliquis 
Infectious Disease     Patient Name: Hamilton Torres  Date: 12/16/2024  YOB: 1935  Medical Record Number: [id number]                COVID-19 infection           cough and shortness of breath.    Persistent weakness/  wheezing on exertion.           Review of Systems   Respiratory:  Positive for cough, shortness of breath and wheezing.    Cardiovascular: Negative.    Gastrointestinal: Negative.    Musculoskeletal: Negative.        Review of Systems: All 14 review of systems negative other than as stated above    Social History     Tobacco Use    Smoking status: Never    Smokeless tobacco: Never   Vaping Use    Vaping status: Never Used   Substance Use Topics    Alcohol use: No     Comment: social    Drug use: No         Past Medical History:   Diagnosis Date    Anticoagulant long-term use     Anxiety     Bursitis of hip     CAD (coronary artery disease)     Carotid stenosis     2/2013 16-49%    Cervical disc disorder     CHF (congestive heart failure) ([facility])     COVID-19 virus infection 01/21/2023    Dementia ([facility])     Dementia ([facility])     Hyperlipidemia     Hypertension     MI (myocardial infarction) ([facility])     Osteoarthritis     Spinal stenosis            Past Surgical History:   Procedure Laterality Date    CARPAL TUNNEL RELEASE  1998    CATARACT REMOVAL  2007    COLONOSCOPY  12/10/10,2007    DR MATTHEWS - DONE AT [facility]    COLONOSCOPY  2007    hx polyps needs 2015    COLONOSCOPY  9/21/15     DR. YARBROUGH     CORONARY ANGIOPLASTY WITH STENT PLACEMENT  06/07/2020    DIAGNOSTIC CARDIAC CATH LAB PROCEDURE      HERNIA REPAIR Bilateral     x 2    HERNIA REPAIR Right 11/13/2020    RIGHT INGUINAL HERNIA REPAIR performed by Roscoe Copeland MD at [facility]    [facility]           No current facility-administered medications on file prior to encounter.     Current Outpatient Medications on File Prior to Encounter   Medication Sig Dispense Refill    acetaminophen (TYLENOL) 325 MG tablet Take 2 tablets by mouth every 
Infectious Disease     Patient Name: Hamilton Torres  Date: 12/17/2024  YOB: 1935  Medical Record Number: [id number]                COVID-19 infection           cough and shortness of breath.    Persistent weakness/  wheezing on exertion.           Review of Systems   Respiratory:  Positive for cough and shortness of breath.    Cardiovascular: Negative.    Gastrointestinal: Negative.    Musculoskeletal: Negative.          Physical Exam:      Physical Exam  Cardiovascular:      Heart sounds: Normal heart sounds. No murmur heard.  Pulmonary:      Effort: No respiratory distress.      Breath sounds: Normal breath sounds. No wheezing, rhonchi or rales.   Abdominal:      General: Abdomen is flat. Bowel sounds are normal. There is no distension.      Palpations: Abdomen is soft. There is no mass.      Tenderness: There is no abdominal tenderness. There is no guarding.         Blood pressure (!) 160/86, pulse 63, temperature 98 °F (36.7 °C), temperature source Oral, resp. rate 17, height 1.778 m (5' 10\"), weight 79.5 kg (175 lb 5.7 oz), SpO2 96%.      .   Lab Results   Component Value Date    WBC 7.3 12/16/2024    HGB 13.9 (L) 12/16/2024    HCT 42.1 12/16/2024    MCV 90.5 12/16/2024     12/16/2024     Lab Results   Component Value Date/Time     12/16/2024 05:02 AM    K 4.6 12/16/2024 05:02 AM    K 3.8 07/06/2024 05:22 AM     12/16/2024 05:02 AM    CO2 27 12/16/2024 05:02 AM    BUN 28 12/16/2024 05:02 AM    CREATININE 0.79 12/16/2024 05:02 AM    GLUCOSE 99 12/16/2024 05:02 AM    GLUCOSE 96 10/25/2011 08:39 AM    CALCIUM 8.2 12/16/2024 05:02 AM    LABGLOM 85.9 12/16/2024 05:02 AM    LABGLOM >60.0 01/21/2023 10:45 AM                ASSESSMENT:  Patient Active Problem List   Diagnosis    Hyperlipidemia    Carotid stenosis, bilateral    Dementia ([facility])    Hypertensive urgency    NSTEMI (non-ST elevated myocardial infarction) ([facility])    Essential hypertension    HESS (dyspnea on exertion)    Leg 
Infectious Disease     Patient Name: Hamilton Torres  Date: 12/18/2024  YOB: 1935  Medical Record Number: [id number]                COVID-19 infection           cough and shortness of breath.    Persistent weakness/  wheezing on exertion.           Review of Systems   Constitutional:  Negative for chills and fever.   Respiratory:  Positive for shortness of breath. Negative for cough.    Cardiovascular: Negative.    Gastrointestinal: Negative.    Musculoskeletal: Negative.          Physical Exam:      Physical Exam  Cardiovascular:      Heart sounds: Normal heart sounds. No murmur heard.  Pulmonary:      Effort: No respiratory distress.      Breath sounds: Normal breath sounds. No wheezing, rhonchi or rales.   Abdominal:      General: Abdomen is flat. Bowel sounds are normal. There is no distension.      Palpations: Abdomen is soft. There is no mass.      Tenderness: There is no abdominal tenderness. There is no guarding.         Blood pressure 116/63, pulse 73, temperature 98.6 °F (37 °C), temperature source Oral, resp. rate 17, height 1.778 m (5' 10\"), weight 79.5 kg (175 lb 5.7 oz), SpO2 94%.      .   Lab Results   Component Value Date    WBC 7.3 12/16/2024    HGB 13.9 (L) 12/16/2024    HCT 42.1 12/16/2024    MCV 90.5 12/16/2024     12/16/2024     Lab Results   Component Value Date/Time     12/16/2024 05:02 AM    K 4.6 12/16/2024 05:02 AM    K 3.8 07/06/2024 05:22 AM     12/16/2024 05:02 AM    CO2 27 12/16/2024 05:02 AM    BUN 28 12/16/2024 05:02 AM    CREATININE 0.79 12/16/2024 05:02 AM    GLUCOSE 99 12/16/2024 05:02 AM    GLUCOSE 96 10/25/2011 08:39 AM    CALCIUM 8.2 12/16/2024 05:02 AM    LABGLOM 85.9 12/16/2024 05:02 AM    LABGLOM >60.0 01/21/2023 10:45 AM                ASSESSMENT:  PLAN:      COVID-19 infection     Took Paxlovid           
Infectious Disease     Patient Name: Hamilton Torres  Date: 12/19/2024  YOB: 1935  Medical Record Number: [id number]                COVID-19 infection           cough and shortness of breath.    Persistent weakness/  wheezing on exertion.           Review of Systems   Constitutional:  Negative for chills and fever.   Respiratory:  Positive for shortness of breath. Negative for cough.    Cardiovascular: Negative.    Gastrointestinal: Negative.    Musculoskeletal: Negative.          Physical Exam:      Physical Exam  Cardiovascular:      Heart sounds: Normal heart sounds. No murmur heard.  Pulmonary:      Effort: No respiratory distress.      Breath sounds: Normal breath sounds. No wheezing, rhonchi or rales.   Abdominal:      General: Abdomen is flat. Bowel sounds are normal. There is no distension.      Palpations: Abdomen is soft. There is no mass.      Tenderness: There is no abdominal tenderness. There is no guarding.         Blood pressure 130/69, pulse 71, temperature 98.1 °F (36.7 °C), temperature source Oral, resp. rate 17, height 1.778 m (5' 10\"), weight 79.5 kg (175 lb 5.7 oz), SpO2 97%.      .   Lab Results   Component Value Date    WBC 8.1 12/19/2024    HGB 14.1 12/19/2024    HCT 43.6 12/19/2024    MCV 91.4 12/19/2024     12/19/2024     Lab Results   Component Value Date/Time     12/19/2024 06:37 AM    K 4.8 12/19/2024 06:37 AM    K 3.8 07/06/2024 05:22 AM     12/19/2024 06:37 AM    CO2 29 12/19/2024 06:37 AM    BUN 50 12/19/2024 06:37 AM    CREATININE 0.89 12/19/2024 06:37 AM    GLUCOSE 93 12/19/2024 06:37 AM    GLUCOSE 96 10/25/2011 08:39 AM    CALCIUM 8.9 12/19/2024 06:37 AM    LABGLOM 82.9 12/19/2024 06:37 AM    LABGLOM >60.0 01/21/2023 10:45 AM                ASSESSMENT:  PLAN:      COVID-19 infection     Requiring no O2          
Infectious Disease     Patient Name: Hamilton Torres  Date: 12/20/2024  YOB: 1935  Medical Record Number: [id number]                COVID-19 infection           cough and shortness of breath.    Persistent weakness/  wheezing on exertion.           Review of Systems   Constitutional:  Negative for chills and fever.   Respiratory:  Positive for shortness of breath. Negative for cough.    Cardiovascular: Negative.    Gastrointestinal: Negative.    Musculoskeletal: Negative.          Physical Exam:      Physical Exam  Cardiovascular:      Heart sounds: Normal heart sounds. No murmur heard.  Pulmonary:      Effort: No respiratory distress.      Breath sounds: Normal breath sounds. No wheezing, rhonchi or rales.   Abdominal:      General: Abdomen is flat. Bowel sounds are normal. There is no distension.      Palpations: Abdomen is soft. There is no mass.      Tenderness: There is no abdominal tenderness. There is no guarding.         Blood pressure (!) 145/62, pulse 61, temperature 97.5 °F (36.4 °C), temperature source Oral, resp. rate 18, height 1.778 m (5' 10\"), weight 80.2 kg (176 lb 12.8 oz), SpO2 98%.      .   Lab Results   Component Value Date    WBC 8.1 12/19/2024    HGB 14.1 12/19/2024    HCT 43.6 12/19/2024    MCV 91.4 12/19/2024     12/19/2024     Lab Results   Component Value Date/Time     12/19/2024 06:37 AM    K 4.8 12/19/2024 06:37 AM    K 3.8 07/06/2024 05:22 AM     12/19/2024 06:37 AM    CO2 29 12/19/2024 06:37 AM    BUN 50 12/19/2024 06:37 AM    CREATININE 0.89 12/19/2024 06:37 AM    GLUCOSE 93 12/19/2024 06:37 AM    GLUCOSE 96 10/25/2011 08:39 AM    CALCIUM 8.9 12/19/2024 06:37 AM    LABGLOM 82.9 12/19/2024 06:37 AM    LABGLOM >60.0 01/21/2023 10:45 AM                ASSESSMENT:  PLAN:      COVID-19 infection     Requiring no O2          
Infectious Diseases Inpatient Progress Note          HISTORY OF PRESENT ILLNESS:  Follow up COVID-19 infection on Paxlovid, well tolerated.  Persistent cough and shortness of breath.  Persistent weakness.  Reports good appetite.   Denies any leg cramps.   Decreased bilateral once pain.   Patient was found to have acute pulmonary embolus, currently started on Eliquis, well-tolerated.   No urinary symptoms.   No fevers or chills  Current Medications:     [START ON 12/15/2024] apixaban  10 mg Oral BID    Followed by    [START ON 12/22/2024] apixaban  5 mg Oral BID    enoxaparin  1 mg/kg SubCUTAneous BID    nirmatrelvir/ritonavir 300/100  3 tablet Oral Q12H    vitamin B-12  1,000 mcg Oral Daily    latanoprost  1 drop Both Eyes Nightly    isosorbide mononitrate  30 mg Oral Daily    clopidogrel  75 mg Oral Daily    lisinopril  10 mg Oral Daily    carvedilol  6.25 mg Oral BID WC    donepezil  5 mg Oral Nightly    [Held by provider] pravastatin  80 mg Oral QPM    finasteride  5 mg Oral Daily    carbidopa-levodopa  1 tablet Oral BID    coenzyme Q10  100 mg Oral Daily    melatonin  3 mg Oral Nightly    HYDROcodone 5 mg - acetaminophen  1 tablet Oral Once    acetaminophen  500 mg Oral TID    diclofenac sodium  4 g Topical TID    dorzolamide  1 drop Both Eyes BID    And    timolol  1 drop Both Eyes BID       Allergies:  Patient has no known allergies.      Review of Systems  Rest of system review is negative other than HPI    Physical Exam  Vitals:    12/11/24 0912 12/11/24 1706 12/11/24 2045 12/12/24 1100   BP: (!) 179/86 133/77 131/70 (!) 170/85   Pulse: 64 82 76 66   Resp:   18 18   Temp:   97.4 °F (36.3 °C) 97.6 °F (36.4 °C)   TempSrc:   Oral Oral   SpO2: 98%  95% 95%   Weight:       Height:         General Appearance: alert and oriented, well-developed and well-nourished, in no acute distress  On room air with oxygen saturation of 95%  Skin: warm and dry, no rash.   Head: normocephalic and atraumatic  Eyes: anicteric 
[facility]   [facility]  Speech Language Pathology    Hamilton Torres  1935  R247/R247-01    Date: 12/12/2024      Speech Therapy attempted to see Hamilton Torres at 1300 for 30 minute session.  Patient not seen due to:  Patient reported that he was not feeling well. Patient refused to participate in ST at this time. ST provided emotional support. ST will f/u with patient when able to             Electronically signed by BENITO Steven on 12/12/24 at 1:18 PM EST    
[facility]   Facility/Department: [facility]  Speech Language Pathology  Discharge Report        Patient: Hamilton Torres  : 1935    Date: 2024    Initial Status:  Diet:   Regular solids with thin liquids  Speech Therapy Level of Assistance Scale:  Auditory Comprehension:  Rating: Supervised Assistance-Minimal Assistance  Verbal Expression:  Rating:Supervised Assistance  Motor Speech:  Rating: Modified Independent  Problem Solving:  Rating: Minimal Assistance  Memory:  Rating: Minimal Assistance      Long Term Goals:  Long Term Goals  Time Frame for Long Term Goals: 2 weeks  Goal 1: Patient will demonstrate functional cognitive-linguistic abilities in all opportunities at a supervised level in order to safely complete ADLs.        Patient's Response to Therapy:  Improved cognitive linguistic deficits were noted. No f/u needed with ST secondary to suspected baseline deficits.       Discharge Status:  Diet:   ADULT DIET; Regular  ADULT ORAL NUTRITION SUPPLEMENT; Breakfast, Lunch, Dinner; Standard High Calorie/High Protein Oral Supplement   Speech Therapy Level of Assistance Scale:  Auditory Comprehension:  Rating: Modified Independent  Verbal Expression:  Rating:Modified Independent  Motor Speech:  Rating: Independent  Problem Solving:  Rating: Supervised Assistance-Minimal Assistance  Memory:  Rating: Supervised Assistance-Minimal Assistance        Functional Status at time of Discharge:    Cognition: Patient demonstrates mild cognitive deficits.    Language: Patient demonstrates no language deficits.    Motor Speech: Patient demonstrates no motor speech deficits.    Swallow: Patient demonstrates no dysphagia.                                 Patient is discharged to [place]               [] Recommend continued speech therapy   [x] Speech Therapy is no longer warranted      Signature:  Electronically signed by BENITO Steven on 2024 at 3:39 PM      
[facility]  Fac[place] [facility]  Speech Language Pathology   Treatment Note          Hamilton Torres  1935  R247/R247-01  [x]   confirmed    Date: 2024      Restrictions/Precautions: Fall Risk, Isolation  Position Activity Restriction  Other Position/Activity Restrictions: covid droplet plus precautions     ADULT DIET; Regular  ADULT ORAL NUTRITION SUPPLEMENT; Breakfast, Lunch, Dinner; Standard High Calorie/High Protein Oral Supplement     Respiratory Status:   Room air  Droplet Plus    Rehab Diagnosis: Impaired mobility and ADL's due to  flare of osteoarthritis especially knees status post hyaluronic acid injection     Subjective:  Alert and Cooperative        Interventions used this date:  Cognitive Skill Development    Objective/Assessment:  Patient progressing towards goals:  Goal 1: Patient will sequence 4-6 pictures steps with standby cues with 80% accuracy.  Pt given 6 pictured scenes to place in correct sequence.  Pt sequenced 5/6 on first trial and 4/6 on second trial.  Pt needed assist to correct errors.  Goal 2: Patient will complete delayed recall tasks (with increasing length of delay) with standby cues with 80% accuracy.  Goal 3: Patient will follow multi-step step verbal directions with 80% accuracy.  Goal 4: Patient will provide two similarities and two differences for two given words with standby cues with 80% accuracy.  Pt stated 2 similarities and 2 differences between 2 pictured items with min cues throughout task.  Pt would frequently describe items or only give minimal explanation and needed min cues for a more thorough explanation.    Pt appeared not as motivated this date and would get slightly agitated.  When prompted on this, pt stated he is getting tired of it and is motivated for d/c date.      Treatment/Activity Tolerance:  Patient tolerated treatment well    Plan:  Continue per POC    Pain Assessment:  Patient does not c/o pain.    Pain 
[facility]  Facility/Department: [facility]  Speech Language Pathology   Treatment Note          Hamilton oTrres  1935  R247/R247-01  [x]   confirmed    Date: 2024      Restrictions/Precautions: Fall Risk     ADULT DIET; Regular     Respiratory Status:   RA  No active isolations    Rehab Diagnosis: Impaired mobility and ADL's due to  flare of osteoarthritis especially knees status post hyaluronic acid injection     Subjective:  Alert, Cooperative, and Pleasant        Interventions used this date:  Cognitive Skill Development    Objective/Assessment:  Patient progressing towards goals:  Short Term Goals  Time Frame for Short Term Goals: 2 weeks  Goal 1: Patient will sequence 4-6 pictures steps with standby cues with 80% accuracy. Patient completed 4 step written sequencing task I with 100% accuracy.     Goal 2: Patient will complete delayed recall tasks (with increasing length of delay) with standby cues with80*% accuracy. Patient completed immediate recall task of placing 3 items in reverse order after repeating words X2 I with 70% accuracy.    Goal 3: Patient will follow multi-step step verbal directions with 80% accuracy.    Goal 4: Patient will provide two similarities and two differences for two given words with standby cues with 80% accuracy. Patient choose listed items in an abstract category I with 100% accuracy.       Treatment/Activity Tolerance:  Patient tolerated treatment well    Plan:  Continue per POC    Pain Assessment:  Patient does not c/o pain.    Pain Re-assessment:  Patient does not c/o pain.    Patient/Caregiver Education:  Patient educated on session and progression towards goals.    Safety Devices:  Call light within reach and Chair alarm in place        Speech Therapy Level of Assistance Scale    AUDITORY COMPREHENSION  Rating:  Supervised Assistance    VERBAL EXPRESSION  Rating:  Modified Independent-Supervised Assistance    MOTOR SPEECH  Rating: Independent-Modified 
[facility]  Facility/Department: [facility]  Speech Language Pathology   Treatment Note          Hamilton Torres  1935  R247/R247-01  [x]   confirmed    Date: 2024      Restrictions/Precautions: Fall Risk, Isolation  Position Activity Restriction  Other Position/Activity Restrictions: covid droplet plus precautions     ADULT DIET; Regular  ADULT ORAL NUTRITION SUPPLEMENT; Breakfast, Lunch, Dinner; Standard High Calorie/High Protein Oral Supplement     Respiratory Status:   RA  Droplet Plus    Rehab Diagnosis: Impaired mobility and ADL's due to  flare of osteoarthritis especially knees status post hyaluronic acid injection     Subjective:  Alert and Cooperative        Interventions used this date:  Cognitive Skill Development    Objective/Assessment:  Patient progressing towards goals:  Short Term Goals  Time Frame for Short Term Goals: 2 weeks  Goal 1: Patient will sequence 4-6 pictures steps with standby cues with 80% accuracy.     Goal 2: Patient will complete delayed recall tasks (with increasing length of delay) with standby cues with80*% accuracy. Patient recalled strategy of \"placing pressure on the mid to front of the foot\" when getting out of bed or out of a chair. Patient recalled strategy I at 30 second interval, not at 1 minute, at 30 second interval, at 1 minute interval, at 5 minute interval and at 10 minute interval.     Goal 3: Patient will follow multi-step step verbal directions with 80% accuracy.    Goal 4: Patient will provide two similarities and two differences for two given words with standby cues with 80% accuracy. Patient provided 1 similarity and 1 difference between 2 items with the following accuracy  Similarity: I with 87% accuracy  Difference: I with 87% accuracy       Treatment/Activity Tolerance:  Patient tolerated treatment well    Plan:  Continue per POC    Pain Assessment:  Patient does not c/o pain.    Pain Re-assessment:  Patient does not c/o 
[facility]  [place] [facility]  Speech Language Pathology   Treatment Note          Hamilton Torres  1935  R247/R247-01  [x]   confirmed    Date: 2024      Restrictions/Precautions: Fall Risk     ADULT DIET; Regular  ADULT ORAL NUTRITION SUPPLEMENT; Breakfast, Lunch, Dinner; Standard High Calorie/High Protein Oral Supplement     Respiratory Status:   Room air  Droplet Plus    Rehab Diagnosis: Impaired mobility and ADL's due to  flare of osteoarthritis especially knees status post hyaluronic acid injection     Subjective:  Alert, Cooperative, and Pleasant        Interventions used this date:  Cognitive Skill Development    Objective/Assessment:  Patient progressing towards goals:  Goal 1: Patient will sequence 4-6 pictures steps with standby cues with 80% accuracy.  Goal 2: Patient will complete delayed recall tasks (with increasing length of delay) with standby cues with80*% accuracy.  After reviewed memory strategy of association, pt utilized independently and performed immediate recall of 5 pairs of picture + object with 100% accuracy and delayed recall of 10 minutes with 80% accuracy.  Goal 3: Patient will follow multi-step step verbal directions with 80% accuracy.  Pt followed 3 step verbal directions in 4/5 trials.  Goal 4: Patient will provide two similarities and two differences for two given words with standby cues with 80% accuracy.      Treatment/Activity Tolerance:  Patient tolerated treatment well    Plan:  Continue per POC    Pain Assessment:  Patient does not c/o pain.    Pain Re-assessment:  Patient does not c/o pain.    Patient/Caregiver Education:  Patient educated on session and progression towards goals.  Patient stated verbal understanding of directions.    Safety Devices:  Call light within reach and Chair alarm in place      Speech Therapy Level of Assistance Scale    AUDITORY COMPREHENSION  Rating:  Modified Independent    VERBAL EXPRESSION  Rating:  Modified 
OCCUPATIONAL THERAPY  INPATIENT REHAB TREATMENT NOTE  [facility]      NAME: Hamilton Torres  : 1935 (89 y.o.)  MRN: [id number]  CODE STATUS: Full Code  Room: [facility]/R247-01    Date of Service: 12/10/2024    Referring Physician: Dr. 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 On  pt had bilateral knee injections with hyaluronic acid. Continues to have knee pain with worsening since getting the injections. Today pt unable to walk. Pt has also been falling and has been diagnosis with PD.      Restrictions  Restrictions/Precautions  Restrictions/Precautions: Fall Risk                 Patient's date of birth confirmed: Yes    SAFETY:  Safety Devices  Safety Devices in place: Yes  Type of devices: All fall risk precautions in place    SUBJECTIVE:  Subjective  Subjective: Pt states he is tired upon arrival.    Pain at start of treatment: No    Pain at end of treatment: No    COGNITION:  Orientation  Overall Orientation Status: Within Functional Limits  Orientation Level: Oriented X4  Cognition  Overall Cognitive Status: Exceptions  Arousal/Alertness: Appears intact  Following Commands: Appears intact  Attention Span: Appears intact  Memory: Appears intact  Problem Solving: Impaired  Insights: Impaired  Initiation: Requires cues for some  Sequencing: Requires cues for some          OBJECTIVE:     Pt completed seated AROM ex without wt, 10 x 1 set each with Sup.  Pt completed shoulder flex/ext, horiz ab/ad, chest presses, lateral ab/ad, chest presses,wrist flex/ext,and forearm sup/pro.  Pt completed task to continue improving BUE strength, movement and act tolerance to continue improving with transfers, safety with functional mobility via FWW, and ADL task completion.     Pt completed sit to 
OCCUPATIONAL THERAPY  INPATIENT REHAB TREATMENT NOTE  [facility]      NAME: Hamilton Torres  : 1935 (89 y.o.)  MRN: [id number]  CODE STATUS: Full Code  Room: [facility]/R247-01    Date of Service: 2024    Referring Physician: Dr. 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 On  pt had bilateral knee injections with hyaluronic acid. Continues to have knee pain with worsening since getting the injections. Today pt unable to walk. Pt has also been falling and has been diagnosis with PD.      Restrictions  Restrictions/Precautions  Restrictions/Precautions: Fall Risk                 Patient's date of birth confirmed: Yes    SAFETY:  Safety Devices  Safety Devices in place: Yes  Type of devices: All fall risk precautions in place    SUBJECTIVE:\"I have weights that I work out with at home.\"       Pain at start of treatment: No    Pain at end of treatment: No    Location: N/A  Description: N/A  Nursing notified: No  Nurse: N/A  Intervention: Repositioned    COGNITION:         Patient's cognition will improve or maintain baseline to safely perform ADLs:    OBJECTIVE:     Pt completed bilateral UE strength ex with 1# dumbbell 2 x 15 reps all UE planes as tolerated with rest breaks between each set to increase UE strength for functional acts completion.            Education:       Equipment recommendations:         ASSESSMENT:Pt participating in acts to increase UE strength for acts completion.         PLAN OF CARE:  Strengthening, Balance training, Functional mobility training, Neuromuscular re-education, Endurance training, Sensory integration, Cognitive/Perceptual training, Cognitive reorientation, Equipment evaluation, education, & procurement, Patient/Caregiver education & training, 
OCCUPATIONAL THERAPY  INPATIENT REHAB TREATMENT NOTE  [facility]      NAME: Hamilton Torres  : 1935 (89 y.o.)  MRN: [id number]  CODE STATUS: Full Code  Room: [facility]/R247-01    Date of Service: 2024    Referring Physician: Dr. Catsillo  Rehab Diagnosis: Imp ADLs d/t PD and OA flare up    Hospital course:   Comments: Admit Date: 2024  7:41 AM  Inpatient Rehab Referral Date: 24  Narrative of hospital course/history of present illness: Pt came to the ER not being able to walk. On  pt had bilateral knee injections with hyaluronic acid. Continues to have knee pain with worsening since getting the injections. Today pt unable to walk. Pt has also been falling and has been diagnosis with PD.      Restrictions  Restrictions/Precautions  Restrictions/Precautions: Fall Risk                 Patient's date of birth confirmed: Yes    SAFETY:  Safety Devices  Safety Devices in place: Yes  Type of devices: All fall risk precautions in place    SUBJECTIVE:       Pain at start of treatment: No    Pain at end of treatment: No    COGNITION:  Orientation  Overall Orientation Status: Within Functional Limits  Orientation Level: Oriented X4  Cognition  Overall Cognitive Status: WFL  Arousal/Alertness: Appears intact  Following Commands: Follows one step commands with repetition  Attention Span: Attends with cues to redirect  Memory: Decreased recall of precautions  Safety Judgement: Decreased awareness of need for assistance;Decreased awareness of need for safety  Problem Solving: Assistance required to generate solutions;Assistance required to implement solutions  Insights: Impaired  Initiation: Impaired  Sequencing: Impaired      OBJECTIVE:     Pt completed ring toss with CGA (touching assist) using FWW.  Pt completed 1st stand tossing rings into bucket with RUE.lateral reaching x 1:15.  Pt completed 2nd stand, tossing rings with L hand, lateral reaching, increased trunk flex noted, with mod vc's for 
OCCUPATIONAL THERAPY  INPATIENT REHAB TREATMENT NOTE  [facility]      NAME: Hamilton Torres  : 1935 (89 y.o.)  MRN: [id number]  CODE STATUS: Full Code  Room: [facility]/R247-01    Date of Service: 2024    Referring Physician: Dr. 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 On  pt had bilateral knee injections with hyaluronic acid. Continues to have knee pain with worsening since getting the injections. Today pt unable to walk. Pt has also been falling and has been diagnosis with PD.      Restrictions  Restrictions/Precautions  Restrictions/Precautions: Fall Risk, Isolation (covid)                 Patient's date of birth confirmed: Yes    SAFETY:  Safety Devices  Safety Devices in place: Yes  Type of devices: All fall risk precautions in place    SUBJECTIVE:  Subjective  Subjective: \"I want to show you my 750-piece puzzle.\"    Pain at start of treatment: Yes: 3/10    Pain at end of treatment: No    Location: L knee  Description: ache  Nursing notified: Not Applicable- pt already received pain medication prior to session.  Nurse: N/A  Intervention: None    COGNITION:  Orientation  Overall Orientation Status: Within Functional Limits  Orientation Level: Oriented X4  Cognition  Overall Cognitive Status: Exceptions  Arousal/Alertness: Appears intact  Following Commands: Appears intact  Attention Span: Appears intact  Memory: Appears intact  Safety Judgement: Decreased awareness of need for assistance  Problem Solving: Appears intact  Insights: Appears intact  Initiation: Requires cues for some  Sequencing: Requires cues for some      Pt's current cognitive status is:  Comprehension: Independent  Expression: Independent  Social Interaction: Independent  Problem Solving: 
OCCUPATIONAL THERAPY  INPATIENT REHAB TREATMENT NOTE  [facility]      NAME: Hamilton Torres  : 1935 (89 y.o.)  MRN: [id number]  CODE STATUS: Full Code  Room: [facility]/R247-01    Date of Service: 2024    Referring Physician: Dr. 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 On  pt had bilateral knee injections with hyaluronic acid. Continues to have knee pain with worsening since getting the injections. Today pt unable to walk. Pt has also been falling and has been diagnosis with PD.      Restrictions  Restrictions/Precautions  Restrictions/Precautions: Fall Risk, Isolation (Covid)                 Patient's date of birth confirmed: Yes    SAFETY:  Safety Devices  Safety Devices in place: Yes  Type of devices: All fall risk precautions in place    SUBJECTIVE:  Subjective  Subjective: \"I'd like a shower.\"    Pain at start of treatment: No    Pain at end of treatment: No    COGNITION:  Orientation  Overall Orientation Status: Within Functional Limits  Orientation Level: Oriented X4  Cognition  Overall Cognitive Status: Exceptions  Arousal/Alertness: Appears intact  Following Commands: Follows one step commands consistently;Follows multistep commands with repitition  Attention Span: Appears intact  Memory: Appears intact  Safety Judgement: Decreased awareness of need for assistance;Decreased awareness of need for safety  Problem Solving: Assistance required to generate solutions;Assistance required to identify errors made;Assistance required to implement solutions;Assistance required to correct errors made  Insights: Appears intact  Initiation: Requires cues for some  Sequencing: Requires cues for some      Pt's current cognitive status is:  Comprehension: SBA  Expression: 
OCCUPATIONAL THERAPY  INPATIENT REHAB TREATMENT NOTE  [facility]      NAME: Hamilton Torres  : 1935 (89 y.o.)  MRN: [id number]  CODE STATUS: Full Code  [facility]    Date of Service: 2024    Referring Physician: Dr. 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 On  pt had bilateral knee injections with hyaluronic acid. Continues to have knee pain with worsening since getting the injections. Today pt unable to walk. Pt has also been falling and has been diagnosis with PD.      Restrictions  Restrictions/Precautions  Restrictions/Precautions: Fall Risk, Isolation            Position Activity Restriction  Other Position/Activity Restrictions: covid droplet plus precautions    Patient's date of birth confirmed: Yes    SAFETY:  Safety Devices  Safety Devices in place: Yes  Type of devices: All fall risk precautions in place    SUBJECTIVE:       Pain at start of treatment: No    Pain at end of treatment: No    COGNITION:  Orientation  Overall Orientation Status: Within Functional Limits  Cognition  Overall Cognitive Status: Exceptions  Following Commands: Appears intact  Attention Span: Appears intact  Memory: Appears intact  Safety Judgement: Decreased awareness of need for safety  Problem Solving: Assistance required to generate solutions;Assistance required to implement solutions  Insights: Decreased awareness of deficits  Initiation: Requires cues for some  Sequencing: Requires cues for some      Pt's current cognitive status is:  Comprehension: Mod I  Expression: Mod I  Social Interaction: Mod I  Problem Solving: Min A  Memory: Supervision    OBJECTIVE:         Feeding  Assistance Level: Modified independent  Grooming/Oral Hygiene  Skilled Clinical Factors: none d/t 
OCCUPATIONAL THERAPY  INPATIENT REHAB TREATMENT NOTE  [facility]      NAME: Hamilton Torres  : 1935 (89 y.o.)  MRN: [id number]  CODE STATUS: Full Code  Room: [facility]/R247-01    Date of Service: 2024    Referring Physician: Dr. 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 On  pt had bilateral knee injections with hyaluronic acid. Continues to have knee pain with worsening since getting the injections. Today pt unable to walk. Pt has also been falling and has been diagnosis with PD.\    Restrictions  Restrictions/Precautions  Restrictions/Precautions: Fall Risk, Isolation       Patient's date of birth confirmed: Yes    SAFETY:  Safety Devices  Type of devices: All fall risk precautions in place    SUBJECTIVE:     Pain at start of treatment: No    Pain at end of treatment: No    OBJECTIVE:     Sit to Stand  Assistance Level: Stand by assist  Stand to Sit  Assistance Level: Stand by assist     While seated, completed fine motor task with focus on coordination, activity tolerance, and strength in order to improve general function.    Challenged pt through usage of small dowels. Pt required to manipulate dowels, through pinching/grasping, reaching and placing according to instruction into vertical peg tree. Repetitive bilateral UE reaches completed to forward/vertical planes and at times required >90 degrees of shoulder flexion    Pt overall demo'ed fair ability to grasp, manipulate, and place/remove pegs with increased time. Good ability to reach vertically     Transfers: SBA for STS with cues for technique- cued for anterior weight shift   Balance: Fair-. One hand needed on table for UE support throughout activity. Cues also provided for extension of the trunk/hips for posture 
OCCUPATIONAL THERAPY  INPATIENT REHAB TREATMENT NOTE  [facility]      NAME: Hamilton Torres  : 1935 (89 y.o.)  MRN: [id number]  CODE STATUS: Full Code  Room: [facility]/R247-01    Date of Service: 2024    Referring Physician: Dr. 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 On  pt had bilateral knee injections with hyaluronic acid. Continues to have knee pain with worsening since getting the injections. Today pt unable to walk. Pt has also been falling and has been diagnosis with PD.      Restrictions  Restrictions/Precautions  Restrictions/Precautions: Fall Risk            Position Activity Restriction  Other Position/Activity Restrictions: no longer on precautions    Patient's date of birth confirmed: Yes    SAFETY:  Safety Devices  Safety Devices in place: Yes  Type of devices: All fall risk precautions in place    SUBJECTIVE:  Subjective  Subjective: \" Don't shave off my mustache.\"    Pain at start of treatment: No    Pain at end of treatment: No    COGNITION:  Orientation  Overall Orientation Status: Within Functional Limits  Orientation Level: Oriented X4  Cognition  Overall Cognitive Status: Exceptions  Arousal/Alertness: Appears intact  Following Commands: Appears intact  Attention Span: Appears intact  Memory: Appears intact  Safety Judgement: Decreased awareness of need for safety  Problem Solving: Assistance required to generate solutions;Assistance required to implement solutions  Insights: Decreased awareness of deficits  Initiation: Requires cues for some  Sequencing: Requires cues for some    OBJECTIVE:       Grooming/Oral Hygiene  Assistance Level: Supervision  Skilled Clinical Factors: seated to shave- provided Dep A for using lonnie on face but did not score bc 
OCCUPATIONAL THERAPY  INPATIENT REHAB TREATMENT NOTE  [facility]      NAME: Hamilton Torres  : 1935 (89 y.o.)  MRN: [id number]  CODE STATUS: Full Code  Room: [facility]/R2-01    Date of Service: 2024    Referring Physician: Dr. 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 On  pt had bilateral knee injections with hyaluronic acid. Continues to have knee pain with worsening since getting the injections. Today pt unable to walk. Pt has also been falling and has been diagnosis with PD.      Restrictions  Restrictions/Precautions  Restrictions/Precautions: Fall Risk, Isolation            Position Activity Restriction  Other Position/Activity Restrictions: covid droplet plus precautions    Patient's date of birth confirmed: Yes    SAFETY:  Safety Devices  Safety Devices in place: Yes  Type of devices: All fall risk precautions in place    SUBJECTIVE:  Subjective  Subjective: \" My daughter got me Wendys.\"    Pain at start of treatment: No    Pain at end of treatment: No    COGNITION:  Orientation  Overall Orientation Status: Within Functional Limits  Orientation Level: Oriented X4  Cognition  Overall Cognitive Status: Exceptions  Arousal/Alertness: Appears intact  Following Commands: Appears intact  Attention Span: Appears intact  Memory: Appears intact  Safety Judgement: Decreased awareness of need for safety  Problem Solving: Assistance required to generate solutions;Assistance required to implement solutions  Insights: Decreased awareness of deficits  Initiation: Requires cues for some  Sequencing: Requires cues for some      Pt's current cognitive status is:  Comprehension: Mod I  Expression: Mod I  Social Interaction: Mod I  Problem Solving: Min A  Memory: 
OCCUPATIONAL THERAPY  INPATIENT REHAB TREATMENT NOTE  [facility]      NAME: Hamilton Torres  : 1935 (89 y.o.)  MRN: [id number]  CODE STATUS: Full Code  [facility]    Date of Service: 2024    Referring Physician: Dr. 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 On  pt had bilateral knee injections with hyaluronic acid. Continues to have knee pain with worsening since getting the injections. Today pt unable to walk. Pt has also been falling and has been diagnosis with PD.      Restrictions  Restrictions/Precautions  Restrictions/Precautions: Fall Risk, Isolation            Position Activity Restriction  Other Position/Activity Restrictions: covid droplet plus precautions    Patient's date of birth confirmed: Yes    SAFETY:  Safety Devices  Safety Devices in place: Yes  Type of devices: All fall risk precautions in place    SUBJECTIVE:       Pain at start of treatment: No    Pain at end of treatment: No    COGNITION:  Orientation  Overall Orientation Status: Within Functional Limits  Cognition  Overall Cognitive Status: Exceptions  Following Commands: Appears intact  Attention Span: Appears intact  Memory: Appears intact  Safety Judgement: Decreased awareness of need for assistance  Problem Solving: Appears intact  Insights: Decreased awareness of deficits  Initiation: Requires cues for some  Sequencing: Requires cues for some      Pt's current cognitive status is:  Comprehension: Supervision  Expression: Mod I  Social Interaction: Mod I  Problem Solving: Mod A  Memory: Mod I    OBJECTIVE:         Feeding  Assistance Level: Modified independent  Grooming/Oral Hygiene  Assistance Level: Set-up  Upper Extremity Bathing  Assistance Level: Modified independent  Skilled 
OCCUPATIONAL THERAPY  INPATIENT REHAB TREATMENT NOTE  [facility]      NAME: Hamilton Torres  : 1935 (89 y.o.)  MRN: [id number]  CODE STATUS: Full Code  Room: [facility]/R2-01    Date of Service: 2024    Referring Physician: Dr. 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 On  pt had bilateral knee injections with hyaluronic acid. Continues to have knee pain with worsening since getting the injections. Today pt unable to walk. Pt has also been falling and has been diagnosis with PD.    Restrictions  Restrictions/Precautions  Restrictions/Precautions: Fall Risk, Isolation         Position Activity Restriction  Other Position/Activity Restrictions: covid droplet plus precautions    Patient's date of birth confirmed: Yes    SAFETY:  Safety Devices  Type of devices: All fall risk precautions in place    SUBJECTIVE:     Pain at start of treatment: No    Pain at end of treatment: No    OBJECTIVE:    While standing, completed fine motor task with focus on coordination, standing tolerance, and balance in order to improve general function.    Completed card sorting and matching task. Pt required to manipulate, grasp, and place large cards by reaching/placing in cards into correct suites. Pt able to correctly separate the entire deck without any mistakes- good categorization and attention    Standing tolerance: 5-6 mins max  Balance: Fair-. One UE on FWW throughout task  Transfers: Cheri. Cues and assist provided for anterior weight shifting     Sit to Supine  Assistance Level: Stand by assist  Supine to Sit  Assistance Level: Minimal assistance  Scooting  Assistance Level: Stand by assist  Skilled Clinical Factors: increased time needed to scoot along EOB toward HOB before laying down  Sit 
OCCUPATIONAL THERAPY  INPATIENT REHAB TREATMENT NOTE  [facility]      NAME: Hamilton Torres  : 1935 (89 y.o.)  MRN: [id number]  CODE STATUS: Full Code  Room: [facility]/R2-01    Date of Service: 2024    Referring Physician: Dr. 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 On  pt had bilateral knee injections with hyaluronic acid. Continues to have knee pain with worsening since getting the injections. Today pt unable to walk. Pt has also been falling and has been diagnosis with PD.      Restrictions  Restrictions/Precautions  Restrictions/Precautions: Fall Risk, Isolation      Patient's date of birth confirmed: Yes    SAFETY:  Safety Devices  Safety Devices in place: Yes  Type of devices: All fall risk precautions in place;Call light within reach;Chair alarm in place;Left in chair    SUBJECTIVE:  Subjective  Subjective: \"I would love to shower\"    Pain at start of treatment: No    Pain at end of treatment: No    COGNITION:  Orientation  Overall Orientation Status: Within Functional Limits  Orientation Level: Oriented X4  Cognition  Overall Cognitive Status: Exceptions  Arousal/Alertness: Appears intact  Following Commands: Appears intact  Attention Span: Appears intact  Memory: Appears intact  Safety Judgement: Decreased awareness of need for assistance  Problem Solving: Appears intact  Insights: Appears intact  Initiation: Requires cues for some  Sequencing: Requires cues for some      Pt's current cognitive status is:WFL    OBJECTIVE:    Grooming/Oral Hygiene  Assistance Level: Set-up  Skilled Clinical Factors: Provided assist for s/u pt then completing task with mod Ind seated at sink to complete shaving task-doffed shirt/donned post task with Ind.  Upper Extremity 
OCCUPATIONAL THERAPY  INPATIENT REHAB TREATMENT NOTE  [facility]      NAME: Hamilton Torres  : 1935 (89 y.o.)  MRN: [id number]  CODE STATUS: Full Code  Room: [facility]/R2-01    Date of Service: 2024    Referring Physician: Dr. 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 On  pt had bilateral knee injections with hyaluronic acid. Continues to have knee pain with worsening since getting the injections. Today pt unable to walk. Pt has also been falling and has been diagnosis with PD.      Restrictions  Restrictions/Precautions  Restrictions/Precautions: Fall Risk, Isolation      Patient's date of birth confirmed: Yes    SAFETY:  Safety Devices  Safety Devices in place: Yes  Type of devices: All fall risk precautions in place;Call light within reach;Chair alarm in place;Left in chair    SUBJECTIVE:  Subjective  Subjective: \"I'm ready for therapy\"    Pain at start of treatment: No    Pain at end of treatment: No    COGNITION:  Orientation  Overall Orientation Status: Within Functional Limits  Orientation Level: Oriented X4  Cognition  Overall Cognitive Status: Exceptions  Arousal/Alertness: Appears intact  Following Commands: Appears intact  Attention Span: Appears intact  Memory: Appears intact  Safety Judgement: Decreased awareness of need for assistance  Problem Solving: Appears intact  Insights: Appears intact  Initiation: Requires cues for some  Sequencing: Requires cues for some      Pt's current cognitive status is:  Comprehension: Min A  Expression: Mod I  Social Interaction: Mod I  Problem Solving: Min A  Memory: Mod I    OBJECTIVE:     FM Activity   To target FM coordination, hand strengthening, and endurance, pt opened containers then placed small objects in with use of 
OCCUPATIONAL THERAPY  INPATIENT REHAB TREATMENT NOTE  [facility]      NAME: Hamilton Torres  : 1935 (89 y.o.)  MRN: [id number]  CODE STATUS: Full Code  Room: [facility]/R247-01    Date of Service: 2024    Referring Physician: Dr. 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 On  pt had bilateral knee injections with hyaluronic acid. Continues to have knee pain with worsening since getting the injections. Today pt unable to walk. Pt has also been falling and has been diagnosis with PD.      Restrictions  Restrictions/Precautions  Restrictions/Precautions: Fall Risk                 Patient's date of birth confirmed: Yes    SAFETY:  Safety Devices  Safety Devices in place: Yes  Type of devices: All fall risk precautions in place    SUBJECTIVE:  Subjective  Subjective: \"Do you have any colored pencils?\"    Pain at start of treatment: No    Pain at end of treatment: No    COGNITION:     WFL for the session     OBJECTIVE:     Patient stood X 2 with good balance noted while he used the right hand to move rings on the shoulder arc from right to left with minimal weight shift needed and then returned them to to the right side with the left hand.   Patient was provided with a variety of [place] cards to pick from in order to give to a family member. Patient then requested colored pencils vs just a pen. Patient spent extended time on creating the card with extra time on coloring it.       Equipment recommendations:  OT Equipment Recommendations  Other: Discussed patient's home set up in the bathroom and he reported that he has all needed equipment at home to allow him to increase his safety during self care      ASSESSMENT:  Assessment: Patient worked hard during the session and required 
OCCUPATIONAL THERAPY  INPATIENT REHAB TREATMENT NOTE  [facility]      NAME: Hamilton Torres  : 1935 (89 y.o.)  MRN: [id number]  CODE STATUS: Full Code  Room: R2/R247-01    Date of Service: 2024    Referring Physician: Dr. 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 On  pt had bilateral knee injections with hyaluronic acid. Continues to have knee pain with worsening since getting the injections. Today pt unable to walk. Pt has also been falling and has been diagnosis with PD.      Restrictions  Restrictions/Precautions  Restrictions/Precautions: Fall Risk, Isolation (Droplet+)    Patient's date of birth confirmed: Yes    SAFETY:  Safety Devices  Safety Devices in place: Yes  Type of devices: All fall risk precautions in place;Call light within reach;Chair alarm in place;Left in chair      SUBJECTIVE: \"I do these every day\" pt stated about BUE exercises. Pt reports that he uses a 2# dumbbell at home.     Pain  Pain at start of treatment: No    Pain at end of treatment: No      OBJECTIVE:    Bed Mobility  Supine to Sit  Assistance Level: Minimal assistance  Skilled Clinical Factors: HOB slightly elevated, No use of bed rails, Increased effort. Pt ridigid this AM this forward flexed posture  Scooting  Assistance Level: Stand by assist      Lower Extremity Dressing  Assistance Level: Minimal assistance  Skilled Clinical Factors: Pt donned pants while seated EOB with Min A to maintain sitting balance with pt retroleaning during more difficulty portions. Contact Guard Touching Assistance to maintain balance in standing while pt manage pants past hips  Putting On/Taking Off Footwear  Assistance Level: Minimal assistance  Skilled Clinical Factors: Pt able to doff bilateral socks 
OCCUPATIONAL THERAPY  INPATIENT REHAB TREATMENT NOTE  [facility]      NAME: Hamilton Torres  : 1935 (89 y.o.)  MRN: [id number]  CODE STATUS: Full Code  Room: [facility]/R2-01    Date of Service: 2024    Referring Physician: Dr. 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 On  pt had bilateral knee injections with hyaluronic acid. Continues to have knee pain with worsening since getting the injections. Today pt unable to walk. Pt has also been falling and has been diagnosis with PD.      Restrictions  Restrictions/Precautions  Restrictions/Precautions: Fall Risk      Patient's date of birth confirmed: Yes    SAFETY:  Safety Devices  Safety Devices in place: Yes  Type of devices: All fall risk precautions in place    SUBJECTIVE:  Subjective  Subjective: \"I don't understand.\"    Pain at start of treatment: No    Pain at end of treatment: No    COGNITION:  Orientation  Overall Orientation Status: Within Functional Limits  Orientation Level: Oriented X4  Cognition  Overall Cognitive Status: Exceptions  Arousal/Alertness: Appears intact  Following Commands: Appears intact  Attention Span: Appears intact  Memory: Appears intact  Safety Judgement: Decreased awareness of need for safety  Problem Solving: Assistance required to generate solutions;Assistance required to implement solutions  Insights: Decreased awareness of deficits  Initiation: Requires cues for some  Sequencing: Requires cues for some      OBJECTIVE:    Parquetry  Pt engaged in a parquetry activity to replicate a pattern using colored blocks. Pt was tasked to place the colored blocks on the table to replicate the pattern. The pt was able to successfully complete ~50% of the pattern with increased time. The pt was provided 
OCCUPATIONAL THERAPY  INPATIENT REHAB TREATMENT NOTE  [facility]      NAME: Hamilton Torres  : 1935 (89 y.o.)  MRN: [id number]  CODE STATUS: Full Code  Room: [facility]/R2-01    Date of Service: 2024    Referring Physician: Dr. 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 On  pt had bilateral knee injections with hyaluronic acid. Continues to have knee pain with worsening since getting the injections. Today pt unable to walk. Pt has also been falling and has been diagnosis with PD.      Restrictions  Restrictions/Precautions  Restrictions/Precautions: Fall Risk                 Patient's date of birth confirmed: Yes    SAFETY:  Safety Devices  Safety Devices in place: Yes  Type of devices: All fall risk precautions in place    SUBJECTIVE:       Pain at start of treatment: No    Pain at end of treatment: No    COGNITION:  Orientation  Overall Orientation Status: Within Functional Limits  Orientation Level: Oriented X4  Cognition  Overall Cognitive Status: WFL  Arousal/Alertness: Appears intact  Following Commands: Appears intact  Attention Span: Appears intact  Memory: Appears intact  Safety Judgement: Good awareness of safety precautions  Problem Solving: Assistance required to generate solutions  Insights: Impaired  Initiation: Appears intact  Sequencing: Appears intact    OBJECTIVE:         Grooming/Oral Hygiene  Assistance Level: Set-Up  Skilled Clinical Factors: Provided assist for s/u pt then completing task with mod Ind seated at sink to complete shaving task-doffed shirt/donned post task with Ind.     Pt completed BUE AROM ex seated in w/c with Sup, demo good follow through with each ex.  Pt completed shoulder flex/ext, horiz ab/ad, and chest presses, 10 x 1 set to continue 
Order for daily 81 mg aspirin noted, Plavix on hold now discontinued, and Eliquis to resume after course of Paxlovid completes per Hospitalist. Isolation precautions maintained. Pt resting in bed with eyes closed at this time. Electronically signed by Marisela Drake RN on 12/13/2024 at 3:23 PM    
PROGRESS NOTE    Patient Name: Hamilton Torres  Admit Date: 2024  7:41 AM  MR #: [id number]  : 1935    Attending Physician: Judith Castillo DO  Reason for consult: CV evaluation    History of Presenting Illness:      Hamilton Torres is a 89 y.o. male on hospital day 14 with a history of .   History Obtained From:  patient, electronic medical record    88 yo man with CAD HTN HPL admitted to acute Rehab for strengthening after hospitalization for RLE DVT and Left Pulmonary embolisms.  He is on Loading regimen of Eliquis,  He is also diagnosed with COVID.     He is lying flat in bed and appears comfortable with no CP nor SOB. Not requiring supplemental O2.     Echo No RV Strain noted. EF 55-60% mild AR and MR.     He does have CAD with 2018 LAD AMBER and 2020 OMB AMBER.    24 doing well sittingn in chair. walks better denies CP nor SOB. no LE Edema.     24 doing well denies CP nor SOB. Lying falt and appears comfortable walking better.  History:      EKG:  Past Medical History:   Diagnosis Date    Anticoagulant long-term use     Anxiety     Bursitis of hip     CAD (coronary artery disease)     Carotid stenosis     2013 16-49%    Cervical disc disorder     CHF (congestive heart failure) (HCC)     COVID-19 virus infection 2023    Dementia (HCC)     Dementia (HCC)     Hyperlipidemia     Hypertension     MI (myocardial infarction) (HCC)     Osteoarthritis     Spinal stenosis      Past Surgical History:   Procedure Laterality Date    CARPAL TUNNEL RELEASE  1998    CATARACT REMOVAL      COLONOSCOPY  12/10/10,    DR MATTHEWS - DONE AT [facility]    COLONOSCOPY      hx polyps needs     COLONOSCOPY  9/21/15     DR. YARBROUGH     CORONARY ANGIOPLASTY WITH STENT PLACEMENT  2020    DIAGNOSTIC CARDIAC CATH LAB PROCEDURE      HERNIA REPAIR Bilateral     x 2    HERNIA REPAIR Right 2020    RIGHT INGUINAL HERNIA REPAIR performed by Roscoe Copeland MD at [facility]    [facility]   
PROGRESS NOTE    Patient Name: Hamilton Torres  Admit Date: 2024  7:41 AM  MR #: [id number]  : 1935    Attending Physician: Judith Castillo DO  Reason for consult: CV evaluation    History of Presenting Illness:      Hamilton Torres is a 89 y.o. male on hospital day 11 with a history of .   History Obtained From:  patient, electronic medical record    89 yo man with CAD HTN HPL admitted to acute Rehab for strengthening after hospitalization for RLE DVT and Left Pulmonary embolisms.  He is on Loading regimen of Eliquis,  He is also diagnosed with COVID.     He is lying flat in bed and appears comfortable with no CP nor SOB. Not requiring supplemental O2.     Echo No RV Strain noted. EF 55-60% mild AR and MR.     He does have CAD with 2018 LAD AMBER and 2020 OMB AMBER.    24 doing well sittingn in chair. walks better denies CP nor SOB. no LE Edema.   History:      EKG:  Past Medical History:   Diagnosis Date    Anticoagulant long-term use     Anxiety     Bursitis of hip     CAD (coronary artery disease)     Carotid stenosis     2013 16-49%    Cervical disc disorder     CHF (congestive heart failure) (HCC)     COVID-19 virus infection 2023    Dementia (HCC)     Dementia (HCC)     Hyperlipidemia     Hypertension     MI (myocardial infarction) (HCC)     Osteoarthritis     Spinal stenosis      Past Surgical History:   Procedure Laterality Date    CARPAL TUNNEL RELEASE  1998    CATARACT REMOVAL      COLONOSCOPY  12/10/10,2007    DR MATTHEWS - DONE AT [place]    COLONOSCOPY      hx polyps needs     COLONOSCOPY  9/21/15     DR. YARBROUGH     CORONARY ANGIOPLASTY WITH STENT PLACEMENT  2020    DIAGNOSTIC CARDIAC CATH LAB PROCEDURE      HERNIA REPAIR Bilateral     x 2    HERNIA REPAIR Right 2020    RIGHT INGUINAL HERNIA REPAIR performed by Rocsoe Copeland MD at [facility]    [facility]         Family History  Family History   Problem Relation Age of Onset    Heart Disease Mother  
Patient complains of rafaela pain to knees. Scheduled tylenol given.  Electronically signed by Lexii Hartley LPN on 12/10/2024 at 8:30 AM    Patient states pain has improved since taking tylenol. Knee hi compression stockings applied.  Electronically signed by Lexii Hartley LPN on 12/10/2024 at 10:43 AM    Consents signed.  Electronically signed by Lexii Hartley LPN on 12/10/2024 at 5:42 PM    
Patient is feeling well.  He denies any shortness of breath.  No leg pain.  No chest pain.  No abdominal pain.    Patient is sitting in a chair.  No distress.  Saturation 96% on room air.  Blood pressure is fluctuating.  Chest is clear, heart is regular.  Abdomen soft.    *Pulmonary emboli  Continue anticoagulation with Lovenox  Start patient on Eliquis after he completes his Paxlovid course  .  Echocardiogram rule out right ventricular strain  No hypoxemia or hemodynamic instability to suggest indication for thrombolysis or thrombectomy    *Right leg DVT.  Patient is asymptomatic with it.  He is not aware that he has a clot there.  Patient is on anticoagulation already.  No strong indication for thrombolysis or thrombectomy.    *COVID-19 infection  Patient was started on Paxlovid by ID team.    *Adrenal mass  .  Requested metanephrine rule out pheochromocytoma  .  Requested salivary cortisol level rule out Cushing disease.  Proceed with a low-dose suppression test.  Dexamethasone 1 mg p.o. tonight and repeat cortisol level tomorrow.  Requested aldosterone and renin rule out hyperaldosterenmia.  All requested labs are pending    .  Requested endocrinology consultation  Defer further needed diagnostic and therapeutic invention relative to his adrenal mass to endocrinology team    *History of CAD and stent.  No active angina.  His daughter stated that last stent was inserted in 2019 by Dr. Gordon.  Given the fact that the patient will be on anticoagulation for the next 4 to 6 months and the fact that his stent was inserted more than a year ago I would recommend to change Plavix to aspirin in combination with Eliquis     *Hypertension, stable.  Continue Coreg     *Hyperlipidemia, holding statin at this time due to interaction with Paxlovid     *Glaucoma.  Continue Xalatan and Timoptic     *BPH, continue Proscar     *Probable cognitive loss.  Continue Aricept      
Patient stated 1/10 pain bilateral knees. Applied Voltaren gel. Call light in reach. Electronically signed by KRISTIN DAVIS LPN on 12/23/24 at 10:20 AM EST    
Patient tested positive for COVID and was started on Paxlovid by ID team    Yesterday I ordered CT chest due to dyspnea sensation.  Back and back positive for pulm embolism    Patient was started on low weight molecular weight heparin 1 mg/kg twice a day    We will start him on Eliquis after he completes Paxlovid course due to potential interaction    Echocardiogram is ordered today to rule out right ventricular same    Venous study of the legs is ordered to rule out lower extremities thrombosis that may require thrombolysis or thrombectomy    Otherwise the patient is doing well.  Chest is clear, heart is regular.  Abdomen soft    Hold statin and Plavix due to interaction with Paxlovid    Continue Lovenox 1 mg/kg twice a day  .  Start patient on Eliquis after he completes his Paxlovid course    *Pulmonary emboli  Continue anticoagulation with Lovenox  Start patient on Eliquis after he completes his Paxlovid course  .  Echocardiogram rule out right ventricular strain  .  Venous ultrasound rule out large or extensive venous thrombosis    *COVID-19 infection  Patient was started on Paxlovid by ID team.    *Adrenal mass  .  Requested metanephrine rule out pheochromocytoma  .  Requested salivary cortisol level rule out Cushing disease.  Proceed with a low-dose suppression test.  Dexamethasone 1 mg p.o. tonight and repeat cortisol level tomorrow.  Requested aldosterone and renin rule out hyperaldosterenmia.    .  Requested endocrinology consultation  Defer further needed diagnostic and therapeutic invention relative to his adrenal mass to endocrinology team    *History of CAD and stent.  No active angina.  Hold Plavix due to interaction with Paxlovid     *Hypertension, stable.  Continue Coreg     *Hyperlipidemia, holding statin at this time due to interaction with Paxlovid     *Glaucoma.  Continue Xalatan and Timoptic     *BPH, continue Proscar     *Probable cognitive loss.  Continue Aricept      
Patient using urinal as needed for urination.  Urine is clear, yellow.  Patient struggles with spilling urinal while trying to use.  Nneka-care rendered as needed.  Patient denies any pain/discomforts.  Rested quietly through night.  No distress observed.  
Physical Therapy  Facility/Department: [facility]  Rehabilitation Discharge note    NAME: Hamilton Torres  : 1935  MRN: [id number]    Date of discharge: 24        Past Medical History:   Diagnosis Date    Anticoagulant long-term use     Anxiety     Bursitis of hip     CAD (coronary artery disease)     Carotid stenosis     2013 16-49%    Cervical disc disorder     CHF (congestive heart failure) (HCC)     COVID-19 virus infection 2023    Dementia (HCC)     Dementia (HCC)     Hyperlipidemia     Hypertension     MI (myocardial infarction) (HCC)     Osteoarthritis     Spinal stenosis      Past Surgical History:   Procedure Laterality Date    CARPAL TUNNEL RELEASE  1998    CATARACT REMOVAL      COLONOSCOPY  12/10/10,    DR MATTHEWS - DONE AT [facility]    COLONOSCOPY      hx polyps needs     COLONOSCOPY  9/21/15     DR. YARBROUGH     CORONARY ANGIOPLASTY WITH STENT PLACEMENT  2020    DIAGNOSTIC CARDIAC CATH LAB PROCEDURE      HERNIA REPAIR Bilateral     x 2    HERNIA REPAIR Right 2020    RIGHT INGUINAL HERNIA REPAIR performed by Roscoe Copeland MD at [facility] OR    PTCA         Restrictions  Restrictions/Precautions  Restrictions/Precautions: Fall Risk  Position Activity Restriction  Other Position/Activity Restrictions: no longer on precautions    Objective        Roll Left  Assistance Level: Modified independent  Roll Right  Assistance Level: Modified independent  Sit to Supine  Assistance Level: Stand by assist  Skilled Clinical Factors: Increased time and effort to complete  Supine to Sit  Assistance Level: Stand by assist  Skilled Clinical Factors: Increased effort to complete  Scooting  Assistance Level: Stand by assist  Skilled Clinical Factors: Increased time and effort to complete     Sit to Stand  Assistance Level: Supervision  Stand to Sit  Assistance Level: Supervision  Bed To/From Chair  Technique: Stand step  Assistance Level: Supervision  Car 
Physical Therapy Med Surg Initial Assessment  Facility/Department: [facility] ED  Room:        NAME: Hamilton Torres  : 1935 (89 y.o.)  MRN: [id number]  CODE STATUS: Prior    Date of Service: 2024    Patient Diagnosis(es): No admission diagnoses are documented for this encounter.   Chief Complaint   Patient presents with    Joint Pain     Patient had gel injection to bilateral knees  for arthritis. Patient daughter states around 6am nurse called daughter stating patient was in so much pain at the knees, could not walk and called EMS.      Patient Active Problem List    Diagnosis Date Noted    Congestive heart failure (HCC) 2020    Parkinsonism (HCC) 2024    Parkinsonian features 2024    Impaired mobility & ADLs dt [facility] 2024    Cervical spinal stenosis 2024    Falls frequently 2024    CNVM (choroidal neovascular membrane), bilateral 2023    Right inguinal hernia 2020    Syncope and collapse 2020    Post PTCA     Bilateral carotid bruits 2020    Unstable angina ([facility]) 2020    ACS (acute coronary syndrome) ([facility]) 2020    Non-STEMI (non-ST elevated myocardial infarction) ([facility]) 2020    CHF (congestive heart failure) ([facility])     Coronary artery disease involving native coronary artery of native heart without angina pectoris 2019    Dizziness     Anginal equivalent ([facility]) 2018    S/P cardiac cath 2018    Angina, class III ([facility])     Chest pain 2018    HESS (dyspnea on exertion) 2018    Leg edema 2018    Acute diastolic heart failure ([facility]) 2018    Essential hypertension 2018    NSTEMI (non-ST elevated myocardial infarction) ([facility]) 2018    Hypertensive urgency 2018    Dementia ([facility])     Carotid stenosis, bilateral 2013    Hyperlipidemia         Past Medical History:   Diagnosis Date    Anticoagulant long-term use     Anxiety     CAD (coronary artery disease)  
Physical Therapy Rehab Treatment Note  Facility/Department: [facility]  Room: [place] [address]       NAME: Hamilton Torres  : 1935 (89 y.o.)  MRN: [id number]  CODE STATUS: Full Code    Date of Service: 2024     Restrictions:  Restrictions/Precautions: Fall Risk     SUBJECTIVE:   Subjective: \" I am tired\"    Pain  Pain: denies pain pre and post    OBJECTIVE:      Outcomes Measures:  Timed Up and Go: 30 (with ww)      Roll Left  Assistance Level: Stand by assist  Skilled Clinical Factors: Increased time and effort to complete. HOB flat with use of bedrail  Roll Right  Assistance Level: Stand by assist  Skilled Clinical Factors: Increased time and effort to complete. HOB flat with use of bedrail  Sit to Supine  Assistance Level: Moderate assistance  Skilled Clinical Factors: Assist trunk to seated position  Supine to Sit  Assistance Level: Stand by assist  Scooting  Assistance Level: Stand by assist    Stand to Sit  Assistance Level: Stand by assist  Skilled Clinical Factors: Multiple attempts to rise. Increased time and effort to complete  Bed To/From Chair  Technique: Stand step  Assistance Level: Stand by assist  Skilled Clinical Factors: With use of ww    Ambulation  Surface: Level surface  Device: Rolling walker  Distance: 60', 90'  Activity: Within Unit  Activity Comments: Forward flexed  posture difficulty maintaining walker proximity. Gets mildly agitated with gait ability and returns to bed and declines further therapy  Additional Factors: Increased time to complete;Verbal cues  Assistance Level: Minimal assistance  Gait Deviations: Slow giovanna;Decreased weight shift bilateral;Decreased trunk rotation;Narrow base of support    Neuromuscular Education  Neuromuscular Education: Yes  NDT Treatment: Standing  Neuromuscular Comments: Standing tolerance with BUE support on ww x 3 minutes with SBA/Supervision    PT Exercises  Exercise Treatment: Seated exercise: LAQ's, Hip flexion, Hip abduction MRE, 
Physical Therapy Rehab Treatment Note  [place] [facility]  Room: [place] [address]       NAME: Hamilton Torres  : 1935 (89 y.o.)  MRN: [id number]  CODE STATUS: Full Code    Date of Service: 2024     Restrictions:  Restrictions/Precautions: Fall Risk    SUBJECTIVE:   Subjective: \" I am doing pretty good today\"    Pain  Pain: denies pain pre and post    OBJECTIVE:     Bed Mobility  Overall Assistance Level: Supervision  Roll Left  Assistance Level: Stand by assist  Roll Right  Assistance Level: Stand by assist  Sit to Supine  Assistance Level: Stand by assist  Skilled Clinical Factors: Increased time and effort to complete  Supine to Sit  Assistance Level: Stand by assist  Scooting  Assistance Level: Stand by assist    Sit to Stand  Assistance Level: Stand by assist  Stand to Sit  Assistance Level: Stand by assist  Bed To/From Chair  Technique: Stand step  Assistance Level: Stand by assist  Skilled Clinical Factors: With use of ww    Ambulation  Surface: Level surface  Device: Rolling walker  Distance: 90' x 3  Activity: Within Unit  Activity Comments: Forward flexed  posture difficulty maintaining walker proximity. Gets mildly agitated with gait ability and returns to bed and declines further therapy  Additional Factors: Increased time to complete;Verbal cues  Assistance Level: Minimal assistance  Gait Deviations: Slow giovanna;Decreased weight shift bilateral;Decreased trunk rotation;Narrow base of support    PT Exercises  Exercise Treatment: Seated exercise: LAQ's, Hip flexion, Hip abduction MRE, Pillow squeeze, Ankle pumps x 15    Activity Tolerance  Activity Tolerance: Patient limited by endurance    ASSESSMENT/PROGRESS TOWARDS GOALS:   Assessment  Assessment: Patient fatigues with multiple gait attempts requiring rest breaks between trials. Increased time to complete all transfers and bed mobility. Patient continues to require cues for walker proximity and management with negotiation of 
Physical Therapy Rehab Treatment Note  [facility]  [facility]       NAME: Hamilton Torres  : 1935 (89 y.o.)  MRN: [id number]  CODE STATUS: Full Code    Date of Service: 2024     Restrictions:  Restrictions/Precautions: Fall Risk    SUBJECTIVE:   Subjective: \" I am doing pretty good today\"    Pain  Pain: denies pain pre and post    OBJECTIVE:     Roll Right  Assistance Level: Stand by assist  Skilled Clinical Factors: Increased time and effort to complete. HOB flat with use of bedrail  Supine to Sit  Assistance Level: Stand by assist  Skilled Clinical Factors: Increased effort to complete  Scooting  Assistance Level: Stand by assist  Skilled Clinical Factors: Increased time and effort to complete    Sit to Stand  Assistance Level: Stand by assist  Skilled Clinical Factors: Multiple attempts to rise  Stand to Sit  Assistance Level: Stand by assist  Skilled Clinical Factors: Utilizes BUE to control descent into chair  Bed To/From Chair  Technique: Stand step  Assistance Level: Stand by assist  Skilled Clinical Factors: With use of ww    Ambulation  Surface: Level surface  Device: Rolling walker  Distance: 120'  Activity: Within Unit  Activity Comments: Forward flexed  posture Improved ability maintaining walker proximity.  Additional Factors: Increased time to complete;Verbal cues  Assistance Level: Minimal assistance  Gait Deviations: Slow giovanna;Decreased weight shift bilateral;Decreased trunk rotation;Narrow base of support    PT Exercises  Exercise Treatment: Seated exercise: LAQ's, Hip flexion, Hip abduction MRE, Pillow squeeze, Ankle pumps x 20     ASSESSMENT/PROGRESS TOWARDS GOALS:   Assessment  Assessment: Patient continues to require increased time and effort for bed mobility and transferers Improved walker management with gait training this session  Activity Tolerance: Patient tolerated treatment well  Discharge Recommendations: Continue to assess pending 
Physical Therapy Rehab Treatment Note  [facility]ility[facility]Department[facility]  [facility]       NAME: Hamilton Torres  : 1935 (89 y.o.)  MRN: [id number]  CODE STATUS: Full Code    Date of Service: 12/15/2024     Restrictions:  Restrictions/Precautions: Fall Risk, Isolation    SUBJECTIVE:   Subjective: Patient agreeable to treatment.    Pain  Pain: denies pain pre and post    OBJECTIVE:         Bed mobility  Rolling to Left: Stand by assistance  Rolling to Right: Stand by assistance  Supine to Sit: Minimal assistance  Sit to Supine: Unable to assess  Bed Mobility Comments: increased time and effort   Sit to stand x5  Stood x1 min without ue support, 30 seconds, 15 seconds    Ambulation  Surface: Level tile  Device: Rolling Walker  Assistance: Minimal assistance  Quality of Gait: nbos, ff posture, shortened height and step length  Gait Deviations: Slow Nette;Decreased step length;Decreased step height;Decreased head and trunk rotation  Distance: 45 feet several turns    Stairs/Curb  Stairs?: No     Activity Tolerance  Activity Tolerance: Patient tolerated treatment well        ASSESSMENT/PROGRESS TOWARDS GOALS:   Assessment  Assessment: Gait and stair training limited dt in room treatment. Patient challenged with sit to stand blocked practice for improved ant weight shift and post push on ble's.  Activity Tolerance: Patient tolerated treatment well  Discharge Recommendations: Continue to assess pending progress    Goals:  Long Term Goals  Long Term Goal 1: Pt will demonstrate bed mobility indep  Long Term Goal 2: Pt will demonstrate transfers mod indep with safest AD  Long Term Goal 3: Pt will demonstrate amb 150 ft mod indep with safest AD  Long Term Goal 4: Pt to complete TUG in 25 sec or less with supervision  Long Term Goal 5: Pt able to stand with BUE support 60 sec with no assistance  Patient Goals   Patient Goals : to walk without help    PLAN OF CARE/Safety:    All precautions in place    Patient 
Physical Therapy Rehab Treatment Note  [place] [facility]  [place]: [place] [address]       NAME: Hamilton Torres  : 1935 (89 y.o.)  MRN: [id number]  CODE STATUS: Full Code    Date of Service: 2024     Restrictions:  Restrictions/Precautions: Fall Risk    SUBJECTIVE:   Subjective: I did not sleep well last night    Pain  Pain: denies pain pre and post    OBJECTIVE:     Roll Left  Assistance Level: Stand by assist  Roll Right  Assistance Level: Stand by assist  Sit to Supine  Assistance Level: Stand by assist  Supine to Sit  Assistance Level: Stand by assist  Scooting  Assistance Level: Stand by assist    Sit to Stand  Assistance Level: Stand by assist  Skilled Clinical Factors: Multiple attempts to rise. Increased time to complete  Stand to Sit  Assistance Level: Supervision  Skilled Clinical Factors: Utilizes BUE to control descent into chair  Bed To/From Chair  Technique: Stand step  Assistance Level: Stand by assist  Skilled Clinical Factors: With use of ww  Car Transfer  Assistance Level: Stand by assist  Skilled Clinical Factors: Multiple attempts to rise from low level seat    Ambulation  Surface: Level surface  Device: Rolling walker  Distance: 175'  Activity: Within Unit  Activity Comments: Forward flexed  posture Improved ability maintaining walker proximity. Cues for lifting gaze from floor  Additional Factors: Increased time to complete;Verbal cues  Assistance Level: Minimal assistance;Stand by assist  Gait Deviations: Slow giovanna;Decreased weight shift bilateral;Decreased trunk rotation;Narrow base of support  Skilled Clinical Factors: Touching assistance    Stairs  Stair Height: 6''  Device: One handrail (B grasp)  Number of Stairs: 4  Additional Factors: Non-reciprocal going up;Non-reciprocal going down  Assistance Level: Minimal assistance  Skilled Clinical Factors: Descnds retro. Increased assist with bottom two steps improved withlast two steps      Neuromuscular 
Physical Therapy Rehab Treatment Note  [facility]Department[facility]  [facility]       NAME: Hamilton Torres  : 1935 (89 y.o.)  MRN: [id number]  CODE STATUS: Full Code    Date of Service: 2024     Restrictions:  Restrictions/Precautions: Fall Risk     SUBJECTIVE:   Subjective: \" I am tired\"    Pain  Pain: denies pain pre and post    OBJECTIVE:     Roll Left  Assistance Level: Stand by assist  Skilled Clinical Factors: Increased time and effort to complete. HOB flat with use of bedrail  Roll Right  Assistance Level: Stand by assist  Skilled Clinical Factors: Increased time and effort to complete. HOB flat with use of bedrail  Sit to Supine  Assistance Level: Moderate assistance  Skilled Clinical Factors: Assist trunk to seated position  Supine to Sit  Assistance Level: Stand by assist  Scooting  Assistance Level: Stand by assist    Stand to Sit  Assistance Level: Stand by assist  Skilled Clinical Factors: Multiple attempts to rise. Increased time and effort to complete  Bed To/From Chair  Technique: Stand step  Assistance Level: Stand by assist  Skilled Clinical Factors: With use of ww    Ambulation  Surface: Level surface  Device: Rolling walker  Distance: 30',60'  Activity: Within Unit  Activity Comments: Forward flexed  posture difficulty maintaining walker proximity. Gets mildly agitated with gait ability and returns to bed and declines further therapy  Additional Factors: Increased time to complete;Verbal cues  Assistance Level: Minimal assistance  Gait Deviations: Slow giovanna;Decreased weight shift bilateral;Decreased trunk rotation;Narrow base of support    ASSESSMENT/PROGRESS TOWARDS GOALS:   Assessment  Assessment: Patient requires increased time and effort for all mobility this session and becomes mildly agitated with ability this session. Patient returns to bed and declines further therapy  Activity Tolerance: Patient tolerated treatment well  Discharge Recommendations: Continue to 
Physical Therapy Rehab Treatment Note  Facility/Department: [facility] [place] [facility]  Room: [place] [address]       NAME: Hamilton Torres  : 1935 (89 y.o.)  MRN: [id number]  CODE STATUS: Full Code    Date of Service: 2024       Restrictions:  Restrictions/Precautions: Fall Risk       SUBJECTIVE:   Subjective: \"I'm leaving tomorrow and I can't wait\"    Pain  Pain: denies pain pre and post      OBJECTIVE:          Transfers  Surface: Wheelchair  Sit to Stand  Assistance Level: Stand by assist  Stand to Sit  Assistance Level: Stand by assist  Skilled Clinical Factors: Utilizes BUE to control descent into chair  Car Transfer  Assistance Level: Stand by assist  Skilled Clinical Factors: Cues for safe hand placement    Ambulation  Surface: Level surface  Device: Rolling walker  Distance: 150 feet with turns  Activity Comments: Flexed posture with downward gaze, NBOS with decreased stride length  Assistance Level: Stand by assist    Stairs  Stair Height: 6''  Device: One handrail (Malick grasp)  Additional Factors: Non-reciprocal going up;Non-reciprocal going down  Assistance Level: Minimal assistance  Skilled Clinical Factors: Mildly retropulsive              PT Exercises  A/AROM Exercises: STS From chair x10- cues for control  Static Standing Balance Exercises: Standing without UE support 1', mild postural sway; NBOS            ASSESSMENT/PROGRESS TOWARDS GOALS:   Assessment  Assessment: Patient required increased time to complete TUG, difficulty with narrow turns. Mild postural sway with unsupported standing activities. VCs for control with sit to stands.    Goals:  Long Term Goals  Long Term Goal 1: Pt will demonstrate bed mobility indep  Long Term Goal 2: Pt will demonstrate transfers mod indep with safest AD  Long Term Goal 3: Pt will demonstrate amb 150 ft mod indep with safest AD  Long Term Goal 4: Pt to complete TUG in 25 sec or less with supervision  Long Term Goal 5: Pt able to stand with BUE support 60 sec with no 
Physical Therapy Rehab Treatment Note  [place] [facility]  [place]: [place] [address]       NAME: Hamilton Torres  : 1935 (89 y.o.)  MRN: [id number]  CODE STATUS: Full Code    Date of Service: 2024       Restrictions:  Restrictions/Precautions: Fall Risk       SUBJECTIVE:   Subjective: \" I am ready to go\"    Pain  Pain: denies pain pre and post    OBJECTIVE:     Roll Left  Assistance Level: Stand by assist  Skilled Clinical Factors: Decreased effort to complete with use of bedrails  Roll Right  Assistance Level: Stand by assist  Skilled Clinical Factors: Decreased effort to complete  Sit to Supine  Assistance Level: Stand by assist  Supine to Sit  Assistance Level: Stand by assist  Scooting  Assistance Level: Stand by assist    Sit to Stand  Assistance Level: Stand by assist  Skilled Clinical Factors: Multiple attempts to rise  Stand to Sit  Assistance Level: Stand by assist  Skilled Clinical Factors: Utilizes BUE to control descent into chair  Bed To/From Chair  Technique: Stand step  Assistance Level: Stand by assist  Skilled Clinical Factors: With use of ww    Ambulation  Surface: Level surface  Device: Rolling walker  Distance: 150' x 2  Activity: Within Unit  Activity Comments: Forward flexed  posture Improved ability maintaining walker proximity.  Additional Factors: Increased time to complete;Verbal cues  Assistance Level: Minimal assistance  Gait Deviations: Slow giovanna;Decreased weight shift bilateral;Decreased trunk rotation;Narrow base of support  Skilled Clinical Factors: Touching assistance    Neuromuscular Education  NDT Treatment: Standing;Gait   Neuromuscular Comments: Step ups onto 2\" block with touching assistance 2 sets x 15    PT Exercises  Exercise Treatment: Seated exercise: LAQ's, Hip flexion, Hip abduction MRE, Pillow squeeze, Ankle pumps x 20     Activity Tolerance  Activity Tolerance: Patient tolerated treatment well    ASSESSMENT/PROGRESS TOWARDS GOALS: 
Physical Therapy Rehab Treatment Note  Facility/Department: [facility] [place]  Room: [place] [address]       NAME: Hamilton Torres  : 1935 (89 y.o.)  MRN: [id number]  CODE STATUS: Full Code    Date of Service: 2024     Restrictions:  Restrictions/Precautions: Fall Risk    SUBJECTIVE:     Subjective: \" I am ready to go\"    Pain  Pain: denies pain pre and post session    OBJECTIVE:      Outcomes Measures:  Timed Up and Go: 52 (with ww/ minimal assistance)  \     Roll Left  Assistance Level: Supervision  Roll Right  Assistance Level: Supervision  Sit to Supine  Assistance Level: Supervision  Supine to Sit  Assistance Level: Stand by assist  Skilled Clinical Factors: Increased time and effort to complete in p.m.  Scooting  Assistance Level: Stand by assist  Skilled Clinical Factors: Increased time and effort to complete    Sit to Stand  Assistance Level: Minimal assistance;Stand by assist  Skilled Clinical Factors: Mildly retro with STS transfer assist for forward weight shifting  Stand to Sit  Assistance Level: Stand by assist  Skilled Clinical Factors: Reaches back prior to sitting to control eccentric lowering  Bed To/From Chair  Technique: Stand step  Assistance Level: Minimal assistance  Skilled Clinical Factors: Requires use of BUE to support    Ambulation  Surface: Level surface  Device: Rolling walker  Distance: 150'  Activity: Within Room  Activity Comments: Forward flexed  posture difficulty maintaining walker proximity. Frequently bumps obstacles able to self correct  Additional Factors: Verbal cues  Assistance Level: Minimal assistance  Gait Deviations: Slow giovanna;Decreased weight shift bilateral;Decreased trunk rotation;Narrow base of support    PT Exercises  Exercise Treatment: Seated exercise: LAQ's, Hip flexion, Hip abduction MRE, Pillow squeeze, Ankle pumps STS x 10 from wc      Activity Tolerance  Activity Tolerance: Patient tolerated treatment well    ASSESSMENT/PROGRESS TOWARDS GOALS: 
Physical Therapy Rehab Treatment Note  Facility/Department: [facility] [place] [facility]  Room: [place] [id number]       NAME: Hamilton Torres  : 1935 (89 y.o.)  MRN: [id number]  CODE STATUS: Full Code    Date of Service: 2024     Restrictions:  Restrictions/Precautions: Fall Risk    SUBJECTIVE:   Subjective: \" I am ready to go\"    Pain  Pain: denies pain pre and post session    OBJECTIVE:     Roll Left  Assistance Level: Supervision  Roll Right  Assistance Level: Supervision  Sit to Supine  Assistance Level: Supervision  Supine to Sit  Assistance Level: Supervision  Scooting  Assistance Level: Supervision    Sit to Stand  Assistance Level: Stand by assist  Skilled Clinical Factors: Multiple attempts to rise  Stand to Sit  Assistance Level: Stand by assist  Skilled Clinical Factors: Reaches back prior to sitting to control eccentric lowering  Bed To/From Chair  Technique: Stand step  Assistance Level: Stand by assist    Ambulation  Surface: Level surface  Device: Rolling walker  Distance: 150'  Activity: Within Room  Activity Comments: Forward flexed  posture difficulty maintaining walker proximity. Frequently bumps obstacles able to self correct  Additional Factors: Verbal cues  Assistance Level: Minimal assistance  Gait Deviations: Slow giovanna;Decreased weight shift bilateral;Decreased trunk rotation;Narrow base of support    ASSESSMENT/PROGRESS TOWARDS GOALS:   Assessment  Assessment: Patient continues to require increased time and effort for all mobility. Intermittently bumping obstacles with walker throughout gait training able to self correct.  Activity Tolerance: Patient tolerated treatment well  Discharge Recommendations: Continue to assess pending progress    Goals:  Long Term Goals  Long Term Goal 1: Pt will demonstrate bed mobility indep  Long Term Goal 2: Pt will demonstrate transfers mod indep with safest AD  Long Term Goal 3: Pt will demonstrate amb 150 ft mod indep with safest AD  Long Term Goal 4: Pt to 
Physical Therapy Rehab Treatment Note  Facility/Department: [facility]  Room: [place] [address]       NAME: Hamilton Torres  : 1935 (89 y.o.)  MRN: [id number]  CODE STATUS: Full Code    Date of Service: 12/10/2024     Restrictions:  Restrictions/Precautions: Fall Risk    SUBJECTIVE:   Subjective: \" I want to get out ASAP    Pain  Pain: denies pain pre and post session    OBJECTIVE:     Sit to Stand  Assistance Level: Stand by assist  Skilled Clinical Factors: Pulling to stand from // bars  Stand to Sit  Assistance Level: Stand by assist  Skilled Clinical Factors: Decreased eccentric lowering    Ambulation  Surface: Level surface  Device: Parallel Bars  Distance: Length of // bars x 3    Neuromuscular Education  Neuromuscular Education: Yes  NDT Treatment: Gait   Neuromuscular Comments: Gait drills in // bars forward and retro walking x 3, Lateral stepping x 3    ASSESSMENT/PROGRESS TOWARDS GOALS:   Assessment  Assessment: Patient progressing quickly towards goals this session requiring decreased assistance for all mobility. Gait drills in multiple planes completed for improved stability with gait. No LOB good stability with BLE  Activity Tolerance: Patient tolerated treatment well  Discharge Recommendations: Continue to assess pending progress    Goals:  Long Term Goals  Long Term Goal 1: Pt will demonstrate bed mobility indep  Long Term Goal 2: Pt will demonstrate transfers mod indep with safest AD  Long Term Goal 3: Pt will demonstrate amb 150 ft mod indep with safest AD  Long Term Goal 4: Pt to complete TUG in 25 sec or less with supervision  Long Term Goal 5: Pt able to stand with BUE support 60 sec with no assistance  Patient Goals   Patient Goals : to walk without help    PLAN OF CARE/Safety:   Safety Devices  Type of Devices: Call light within reach;Left in chair;Chair alarm in place    Therapy Time:   Individual   Time In 1330   Time Out 1400   Minutes 30      Minutes: 30  Transfer/Bed mobility training: 
Physical Therapy Rehab Treatment Note  [facility]  [facility]       NAME: Hamilton Torres  : 1935 (89 y.o.)  MRN: [id number]  CODE STATUS: Full Code    Date of Service: 2024       Restrictions:  Restrictions/Precautions: Fall Risk  Position Activity Restriction  Other Position/Activity Restrictions: no longer on precautions       SUBJECTIVE:   Subjective: \"I'm doing okay, going home Monday\"    Pain  Pain: denies pain pre and post      OBJECTIVE:   Orientation  Overall Orientation Status: Within Functional Limits  Cognition  Overall Cognitive Status: Exceptions  Arousal/Alertness: Appears intact  Following Commands: Appears intact  Attention Span: Appears intact  Memory: Appears intact  Safety Judgement: Decreased awareness of need for safety  Problem Solving: Assistance required to generate solutions;Assistance required to implement solutions  Insights: Decreased awareness of deficits  Initiation: Requires cues for some  Sequencing: Requires cues for some              Transfers  Surface: Wheelchair  Sit to Stand  Assistance Level: Minimal assistance  Skilled Clinical Factors: Multiple attempts to rise. Increased time to complete  Stand to Sit  Assistance Level: Minimal assistance    Ambulation  Surface: Level surface  Device: Rolling walker  Distance: 150 feet with turns and changes in terrain  Activity Comments: occasional shuffling pattern, especially over changes in terrain and with turns.  Additional Factors: Increased time to complete;Verbal cues  Assistance Level: Minimal assistance;Stand by assist  Gait Deviations: Slow giovanna;Decreased weight shift bilateral;Decreased trunk rotation;Narrow base of support  Skilled Clinical Factors: increased trunk flexion with fatigue                   PT Exercises  Exercise Equipment: swiss ball longseated. lateral flexion and knee flexion. cues for redirection.        Neuro: sit to stands x 3. Min assist.             
Physical Therapy Rehab Treatment Note  Facility/Department: [facility] [place] [facility]  Room: [place] [address]       NAME: Hamilton Torres  : 1935 (89 y.o.)  MRN: [id number]  CODE STATUS: Full Code    Date of Service: 2024       Restrictions:  Restrictions/Precautions: Fall Risk       SUBJECTIVE:   Subjective: \" I feel like I am doing better\"    Pain  Pain: denies pain pre and post session      OBJECTIVE:     Roll Left  Assistance Level: Supervision  Roll Right  Assistance Level: Supervision  Sit to Supine  Assistance Level: Supervision  Supine to Sit  Assistance Level: Supervision  Scooting  Assistance Level: Supervision    Sit to Stand  Assistance Level: Stand by assist  Skilled Clinical Factors: Increased time to complete  mildly retro initially able to self correct  Stand to Sit  Assistance Level: Stand by assist  Skilled Clinical Factors: Reaches back prior to sitting to control eccentric lowering  Bed To/From Chair  Technique: Stand step  Assistance Level: Stand by assist  Skilled Clinical Factors: Requires use of BUE to support    Ambulation  Surface: Level surface  Device: Rolling walker  Distance: 90' x 1, 120' x 1  Activity: Within Room  Activity Comments: Forward flexed  posture difficulty maintaining walker proximity. Frequently bumps obstacles able to self correct    PT Exercises  Exercise Treatment: Seated exercise: LAQ's, Hip flexion, Side kicks, Hip adduction MRE, Ankle pumps, STS x 5  PROM Exercises: hamstring stretches in seated 2 sets x 1 minute each, STS x 5 from  for improved technique/ LE strength     Activity Tolerance  Activity Tolerance: Patient tolerated treatment well    ASSESSMENT/PROGRESS TOWARDS GOALS:   Assessment  Assessment: Patient continues to exhibit difficulty managing walker around obstacles. Deecreased assistance required for bed mobility and transfers completing with SBA/ supervision. Continue to progress safety awareness for gait to decrease risk of falls  Activity Tolerance: 
Physical Therapy Rehab Treatment Note  Facility/Department: [facility]  Room: [place] [address]       NAME: Hamilton Torres  : 1935 (89 y.o.)  MRN: [id number]  CODE STATUS: Full Code    Date of Service: 2024     Restrictions:  Restrictions/Precautions: Fall Risk    SUBJECTIVE:   Subjective: \" I am tired today\"    Pain  Pain: denies pain pre and post session      OBJECTIVE:     Roll Left  Assistance Level: Supervision  Roll Right  Assistance Level: Supervision  Scooting  Assistance Level: Supervision    Sit to Stand  Skilled Clinical Factors: Not tested due to patient to remain in bed per RN    PT Exercises  Exercise Treatment: Supine exercise: Ankle pumps, Quad sets, Glute sets, Heel slides, Hip abduction, SLR x 15 each B hamstring/ gastroc stretch 2 sets x 1 minute     ASSESSMENT/PROGRESS TOWARDS GOALS:   Assessment  Assessment: Therapy session completed at bed level per nursing. Increased fatigue noted this session requiring increased time to complete tasks  Activity Tolerance: Patient limited by fatigue  Discharge Recommendations: Continue to assess pending progress    Goals:  Long Term Goals  Long Term Goal 1: Pt will demonstrate bed mobility indep  Long Term Goal 2: Pt will demonstrate transfers mod indep with safest AD  Long Term Goal 3: Pt will demonstrate amb 150 ft mod indep with safest AD  Long Term Goal 4: Pt to complete TUG in 25 sec or less with supervision  Long Term Goal 5: Pt able to stand with BUE support 60 sec with no assistance  Patient Goals   Patient Goals : to walk without help    PLAN OF CARE/Safety:   Safety Devices  Type of Devices: Call light within reach;Bed alarm in place;Left in bed    Therapy Time:   Individual   Time In 1130   Time Out 1200   Minutes 30      Minutes: 30  Transfer/Bed mobility training: 10  Therapeutic ex: 20    Salvador Novoa PTA, 24 at 12:16 PM     
Physical Therapy Rehab Treatment Note  [facility]  [facility]       NAME: Hamilton Torres  : 1935 (89 y.o.)  MRN: [id number]  CODE STATUS: Full Code    Date of Service: 2024       Restrictions:  Restrictions/Precautions: Fall Risk, Isolation       SUBJECTIVE:   Subjective: \"I'm tired today\"    Pain  Pain: denies pain pre and post session      OBJECTIVE:     Bed Mobility  Overall Assistance Level:  (unable to assess - pt. in chair pre/post tx.)    Sit to Stand  Assistance Level: Minimal assistance  Skilled Clinical Factors: Mildly retro with STS transfer assist for forward weight shifting. Min A for lifting.  Stand to Sit  Assistance Level: Stand by assist  Skilled Clinical Factors: Reaches back prior to sitting to control eccentric lowering    Ambulation  Surface: Level surface  Device: Rolling walker  Distance: 125'  Activity: Within Unit  Activity Comments: Forward flexed  posture difficulty maintaining walker proximity.  Additional Factors: Verbal cues;Increased time to complete  Assistance Level: Minimal assistance  Gait Deviations: Slow giovanna;Decreased weight shift bilateral;Decreased trunk rotation;Narrow base of support      PT Exercises  Exercise Treatment: Seated exercise: LAQ's, Hip flexion, Hip abduction MRE, Pillow squeeze, Ankle pumps x 20 from wc  PROM Exercises: hamstring/calf stretches in seated 2 sets x 1 minute each, STS x 10 from wc for improved technique/ LE strength  Functional Mobility Circuit Training: TUG 60, sec with WW  Static Standing Balance Exercises: Standing with UE support 4 min.       Activity Tolerance  Activity Tolerance: Patient tolerated treatment well      ASSESSMENT/PROGRESS TOWARDS GOALS:   Assessment  Assessment: Pt. tolerated treatment well this date. Pt. was able to complete gait and all exercises adequately with the need for some assistance and cueing throughout. Pt. looks to build on AM session this PM after some 
Physical Therapy Rehab Treatment Note  [place] [facility]  Room: [place] [address]       NAME: Hamilton Torres  : 1935 (89 y.o.)  MRN: [id number]  CODE STATUS: Full Code    Date of Service: 2024     Restrictions:  Restrictions/Precautions: Fall Risk    SUBJECTIVE:   Subjective: I am a little disappointed I am not leaving today\"    Pain  Pain: denies pain pre and post    OBJECTIVE:     Sit to Stand  Assistance Level: Supervision  Stand to Sit  Assistance Level: Supervision  Car Transfer  Assistance Level: Supervision  Skilled Clinical Factors: Improved safety/ technique    Ambulation  Surface: Level surface  Device: Rolling walker  Distance: 150 feet with turns, 50' x 2  Activity: Within Unit  Activity Comments: Flexed posture with downward gaze, NBOS with decreased stride length  Additional Factors: Increased time to complete;Verbal cues  Assistance Level: Supervision  Gait Deviations: Slow giovanna;Decreased weight shift bilateral;Decreased trunk rotation;Narrow base of support    Neuromuscular Education  Neuromuscular Education: Yes  NDT Treatment: Standing  Neuromuscular Comments: Standing with BUE support on ww x 5 minutes    ASSESSMENT/PROGRESS TOWARDS GOALS:   Assessment  Assessment: Patient with improved safety with gait and transfers with proper hand placement. Decreased eccentric lowering with lower level surfaces. Standing tolerance to 5 minutes with BUE support  Activity Tolerance: Patient tolerated treatment well  Discharge Recommendations: Continue to assess pending progress    Goals:  Long Term Goals  Long Term Goal 1: Pt will demonstrate bed mobility indep  Long Term Goal 2: Pt will demonstrate transfers mod indep with safest AD  Long Term Goal 3: Pt will demonstrate amb 150 ft mod indep with safest AD  Long Term Goal 4: Pt to complete TUG in 25 sec or less with supervision  Long Term Goal 5: Pt able to stand with BUE support 60 sec with no assistance  Patient Goals   Patient 
Physical Therapy Rehab Treatment Note  [facility]  [facility]       NAME: Hamilton Torres  : 1935 (89 y.o.)  MRN: [id number]  CODE STATUS: Full Code    Date of Service: 2024       Restrictions:  Restrictions/Precautions: Fall Risk, Isolation       SUBJECTIVE:   Subjective: \"I feel pretty good\"    Pain  Pain: Pt. reports 5/10 pain in L knee pre/post tx.      OBJECTIVE:     Bed Mobility  Overall Assistance Level: Supervision  Sit to Supine  Assistance Level: Supervision  Supine to Sit  Skilled Clinical Factors: unable to assess- pt. in chair post tx.    Sit to Stand  Assistance Level: Minimal assistance  Skilled Clinical Factors: Min a for lifting.  Stand to Sit  Assistance Level: Stand by assist  Skilled Clinical Factors: SBA for safety.    Ambulation  Surface: Level surface  Device: Rolling walker  Distance: 20' with turns. 25' with turns  Activity: Within Room  Activity Comments: Forward flexed  posture difficulty maintaining walker proximity.  Additional Factors: Increased time to complete;Verbal cues  Assistance Level: Minimal assistance  Gait Deviations: Slow giovanna;Decreased weight shift bilateral;Decreased trunk rotation;Narrow base of support      PT Exercises  Functional Mobility Circuit Training: TUG 60 sec with WW  Static Standing Balance Exercises: Standing with UE support 10 min.       Activity Tolerance  Activity Tolerance: Patient tolerated treatment well      ASSESSMENT/PROGRESS TOWARDS GOALS:   Assessment  Assessment: Pt. was able to endure increased time on feet this PM session. Pt. would benefit from continued skilled therapy in order to return to PLOF.  Activity Tolerance: Patient tolerated treatment well  Discharge Recommendations: Continue to assess pending progress    Goals:  Long Term Goals  Long Term Goal 1: Pt will demonstrate bed mobility indep  Long Term Goal 2: Pt will demonstrate transfers mod indep with safest AD  Long Term Goal 3: Pt will 
Physical Therapy Rehab Treatment Note  [facility] [place] [facility]  [facility]       NAME: Hamilton Torres  : 1935 (89 y.o.)  MRN: [id number]  CODE STATUS: Full Code    Date of Service: 2024     Restrictions:  Restrictions/Precautions: Fall Risk    SUBJECTIVE:   Subjective: \" I am tired today\"    Pain  Pain: denies pain pre and post session    OBJECTIVE:     Roll Left  Assistance Level: Supervision  Roll Right  Assistance Level: Supervision  Scooting  Assistance Level: Supervision    Sit to Stand  Skilled Clinical Factors: Not tested due to patient to remain in bed per RN    Ambulation  Surface: Level surface  Device: Rolling walker  Distance: 120'  Activity: Within Room  Activity Comments: Forward flexed  posture difficulty maintaining walker proximity. Frequently bumps obstacles able to self correct  Additional Factors: Verbal cues  Assistance Level: Minimal assistance  Gait Deviations: Slow giovanna;Decreased weight shift bilateral;Decreased trunk rotation;Narrow base of support    PT Exercises  Exercise Treatment: Seated exercise: LAQ's, Hip flexion, Hip abduction MRE, Pillow squeeze, Ankle pumps STS x 10 from      Activity Tolerance  Activity Tolerance: Patient tolerated treatment well;Patient limited by fatigue    ASSESSMENT/PROGRESS TOWARDS GOALS:   Assessment  Assessment: Patient initially declining therapy secondary to fatigue. Received call from nursing patient ready to participate in therapy session. Gait training completed in room with patient continuing to exhibit difficulty negotiating walker around onbstacles.  Activity Tolerance: Patient tolerated treatment well;Patient limited by fatigue  Discharge Recommendations: Continue to assess pending progress    Goals:  Long Term Goals  Long Term Goal 1: Pt will demonstrate bed mobility indep  Long Term Goal 2: Pt will demonstrate transfers mod indep with safest AD  Long Term Goal 3: Pt will demonstrate amb 150 ft mod indep with 
Physical Therapy Rehab Treatment Note  [place] [facility] [place] [facility]  [facility]       NAME: Hamilton Torres  : 1935 (89 y.o.)  MRN: [id number]  CODE STATUS: Full Code    Date of Service: 2024     Restrictions:  Restrictions/Precautions: Fall Risk    SUBJECTIVE:   Subjective: \" I am ready to go\"    Pain  Pain: denies pain pre and post    OBJECTIVE:     Sit to Stand  Assistance Level: Stand by assist  Stand to Sit  Assistance Level: Stand by assist  Skilled Clinical Factors: Utilizes BUE to control descent into chair    Ambulation  Surface: Level surface  Device: Rolling walker  Distance: 150' x 2  Activity: Within Unit  Activity Comments: Forward flexed  posture Improved ability maintaining walker proximity.  Additional Factors: Increased time to complete;Verbal cues  Assistance Level: Minimal assistance  Gait Deviations: Slow giovanna;Decreased weight shift bilateral;Decreased trunk rotation;Narrow base of support  Skilled Clinical Factors: Touching assistance    PT Exercises  Exercise Treatment: Seated exercise: LAQ's, Hip flexion, Hip abduction MRE, Pillow squeeze, Ankle pumps x 20     ASSESSMENT/PROGRESS TOWARDS GOALS:   Assessment  Assessment: Patient with improved gait tolerance this session up to 150'. Patient frustrated with isolation  Activity Tolerance: Patient tolerated treatment well  Discharge Recommendations: Continue to assess pending progress    Goals:  Long Term Goals  Long Term Goal 1: Pt will demonstrate bed mobility indep  Long Term Goal 2: Pt will demonstrate transfers mod indep with safest AD  Long Term Goal 3: Pt will demonstrate amb 150 ft mod indep with safest AD  Long Term Goal 4: Pt to complete TUG in 25 sec or less with supervision  Long Term Goal 5: Pt able to stand with BUE support 60 sec with no assistance  Patient Goals   Patient Goals : to walk without help    PLAN OF CARE/Safety:   Safety Devices  Type of Devices: All fall risk precautions in place;Call light 
Physical Therapy Rehab Treatment Note  [place]ility[place] [facility] [place] [facility]  [email or web address]       NAME: Hamilton Torres  : 1935 (89 y.o.)  MRN: [id number]  CODE STATUS: Full Code    Date of Service: 2024     Restrictions:  Restrictions/Precautions: Fall Risk     SUBJECTIVE:   Subjective: I did not sleep well last night    Pain  Pain: denies pain pre and post    OBJECTIVE:     Sit to Stand  Assistance Level: Stand by assist  Skilled Clinical Factors: Multiple attempts to rise. Increased time to complete  Stand to Sit  Assistance Level: Supervision  Skilled Clinical Factors: Utilizes BUE to control descent into chair    Ambulation  Surface: Level surface  Device: Rolling walker  Distance: 150'  Activity: Within Room  Activity Comments: Forward flexed  posture Improved ability maintaining walker proximity.  Additional Factors: Increased time to complete;Verbal cues  Assistance Level: Minimal assistance;Stand by assist  Gait Deviations: Slow giovanna;Decreased weight shift bilateral;Decreased trunk rotation;Narrow base of support  Skilled Clinical Factors: Touching assistance    Neuromuscular Education  Neuromuscular Education: Yes  NDT Treatment: Gait   Neuromuscular Comments: Lateral stepping with BUE support on bed rail x 3, Forward and retro gait with UUE support x 3    PT Exercises  Exercise Treatment: Standing exercise: Hip abduction, Hip flexion, Hip extension x 12 each     ASSESSMENT/PROGRESS TOWARDS GOALS:   Assessment  Assessment: Therapy session focused on weight bearing activities and gait training in multiple planes. Standing exercise completed for improved hip stability with all activities  Activity Tolerance: Patient tolerated treatment well  Discharge Recommendations: Continue to assess pending progress    Goals:  Long Term Goals  Long Term Goal 1: Pt will demonstrate bed mobility indep  Long Term Goal 2: Pt will demonstrate transfers mod indep with safest AD  Long Term Goal 3: Pt will 
Pt a one person assist to transfer from w/c back to bed per his request. Voltaren gel applied to bilat knees for arthritis pain. Pt had a BM earlier this shift. Call light in reach. Electronically signed by Marisela Drake RN on 12/22/2024 at 4:12 PM    
Pt assessment completed. Pt is alert and oriented, resting comfortably no c/o of pain. Roland hernandez in reach.  
[facility]  Occupational Therapy      NAME:  Hamilton Torres  ROOM: R247/R247-01  :  1935  DATE: 2024    Attempted to see Hamilton Torres on this date at 1300 for a 30 minute session for   []  Initial Evaluation   [x]  Scheduled therapy    []  Make-up therapy   []  Group therapy   []  Family training    Patient was unable to be seen due to:   [] Off unit for testing/procedure   [x] Patient declined   [] Therapy on hold due to     [] Nursing deferred due to    [] Other:      Pt in bed sleeping soundly upon arrival. Pt wakes to knock on door and loud verbal introduction. Pt states \"I'm tired\". Pt agreeable to participating in therapy, however, unable to remain awake to answer questions appropriately. Pt agreeable to therapist attempting again later today or possibly tomorrow.     Will attempt again as able.   Discussed with DANIEL Manning    Electronically signed by DEISY Alvarez on 24 at 1:11 PM EST  
Chair alarm in place        Speech Therapy Level of Assistance Scale    AUDITORY COMPREHENSION  Rating:  Modified Independent    VERBAL EXPRESSION  Rating:  Modified Independent    MOTOR SPEECH  Rating: Independent    PROBLEM SOLVING  Rating: Supervised Assistance-Minimal Assistance    MEMORY  Rating:  Supervised Assistance-Minimal Assistance        Therapy Time  SLP Individual Minutes  Time In: 1030  Time Out: 1100  Minutes: 30            Signature: Electronically signed by BENITO Steven on 12/20/2024 at 10:39 AM          
Devices:  Call light within reach and Chair alarm in place        Speech Therapy Level of Assistance Scale    AUDITORY COMPREHENSION  Rating:  Modified Independent    VERBAL EXPRESSION  Rating:  Modified Independent    MOTOR SPEECH  Rating: Independent    PROBLEM SOLVING  Rating: Supervised Assistance-Minimal Assistance    MEMORY  Rating:  Supervised Assistance-Minimal Assistance        Therapy Time  SLP Individual Minutes  Time In: 0930  Time Out: 1000  Minutes: 30            Signature: Electronically signed by BENITO Steven on 12/23/2024 at 9:57 AM          
EOB  Lower Extremity Bathing  Assistance Level: Contact guard assist  Skilled Clinical Factors: Touching asst standing at EOB to clean up  Upper Extremity Dressing  Assistance Level: Modified independent;Increased time to complete  Skilled Clinical Factors: at EOB  Lower Extremity Dressing  Assistance Level: Maximum assistance  Skilled Clinical Factors: d/t PE via lung  Putting On/Taking Off Footwear  Assistance Level: Dependent  Toileting  Assistance Level: Set-up  Skilled Clinical Factors: EOB via urinal- but couldnt go  Toilet Transfers  Skilled Clinical Factors: none  Tub/Shower Transfers  Skilled Clinical Factors: spnge bath via EOB d/t pt stating he had a bnowel movement and observed back of shirt to be saturated on lower half, therapist thought pt required extensive cleaning, but when got up and took off brief, pt had not had a bowel movement       Functional Mobility  Supine to Sit  Assistance Level: Moderate assistance  Scooting  Assistance Level: Moderate assistance  Sit to Stand  Assistance Level: Moderate assistance  Stand to Sit  Assistance Level: Minimal assistance       ASSESSMENT:  Assessment: Upon arrival, pt posterior lower shirt was wet, and pt stated he had a BM.  Pt call light was not on.  Pt was asked if he knew he used the bathroom in bed, why he did not use the call light.  Pt stated he was not sure, but did notify a PCA earlier about needing changed.  Pt states he needed to urinate during session, attempted for a couple of min at EOB, but could not go.  Pt stated at end of session after getting back into bed, he needed to go again, pressed call light d/t being at end of session for assist.  Pt was observed to have some confusion, decreased ability to maintain balance at EOB with retro leaning, needing max vc's for coming to neutral.   Pt demo increased difficulty with sit to stands.  Pt was cooperative with all session.  Activity Tolerance: Treatment limited secondary to decreased 
Given To: Patient  Education Provided: Home Exercise Program  Education Provided Comments: Seated HEP given and reviewed with handout provided. Patient completed with [place]  Education Method: Printed Information/Hand-outs  Education Outcome: Demonstrated understanding      ASSESSMENT/PROGRESS TOWARDS GOALS:   Assessment  Assessment: Patient consistent with gait and transfers this session Tug completed in 19.91 seconds meeting  long term goal. Seated HEP gven and reviewed with patient completing with [place]  Activity Tolerance: Patient tolerated treatment well  Discharge Recommendations: Continue to assess pending progress    Goals:  Long Term Goals  Long Term Goal 1: Pt will demonstrate bed mobility indep  Long Term Goal 2: Pt will demonstrate transfers mod indep with safest AD  Long Term Goal 3: Pt will demonstrate amb 150 ft mod indep with safest AD  Long Term Goal 4: Pt to complete TUG in 25 sec or less with supervision  Long Term Goal 5: Pt able to stand with BUE support 60 sec with no assistance  Patient Goals   Patient Goals : to walk without help    PLAN OF CARE/Safety:   Safety Devices  Type of Devices: All fall risk precautions in place;Call light within reach;Chair alarm in place;Left in chair    Therapy Time:   Individual   Time In 1300   Time Out 1400   Minutes 60      Minutes: 60  Transfer/Bed mobility training: 15  Gait trainin  Neuro re education: 5  Therapeutic ex: 15    Salvador Novoa PTA, 24 at 2:42 PM     
Supervision    OBJECTIVE:       Feeding  Skilled Clinical Factors: none  Grooming/Oral Hygiene  Assistance Level: Stand by assist  Skilled Clinical Factors: standing to brush teeth; pt able to don hearing aides, therapist closed battery compartments so batteries do not fall out.  Upper Extremity Bathing  Assistance Level: Modified independent  Skilled Clinical Factors: seated in shower chair  Lower Extremity Bathing  Assistance Level: Minimal assistance  Skilled Clinical Factors: Dep to wash L foot and Min A R foot (toes only) and SBA in standing with s/u for perineal care- use of grab bar  Upper Extremity Dressing  Assistance Level: Supervision  Lower Extremity Dressing  Assistance Level: Minimal assistance  Skilled Clinical Factors: assist to thread over feet with extended time provided, training on donning pants over RLE 1st, demo'ing slight improvements with getting pants over feet. Feet get stuck in pants when threading needing continued assist.  Knees to waist management- 5% assist to straighten out waist band.  Putting On/Taking Off Footwear  Assistance Level: Moderate assistance  Skilled Clinical Factors: DEP A to don/doff LLE sock and SBA with s/u for RLE sock to don/doff  Toileting  Skilled Clinical Factors: none  Toilet Transfers  Skilled Clinical Factors: none  Tub/Shower Transfers  Type: Shower  Transfer From: Walker  Transfer To: Shower chair with back  Additional Factors: Cues for hand placement;Increased time to complete  Assistance Level: Stand by assist  Skilled Clinical Factors: vcs for transitional sequencing- walking into shower with FWW         Functional Mobility  Device: Rolling walker  Activity: To/From bathroom  Assistance Level: Stand by assist  Supine to Sit  Assistance Level: Moderate assistance  Skilled Clinical Factors: HOB slightly elevated, use of bed rails- max difficulty with transition of legs and trunk this morning to come to EOB req assist, min vc's for sequencing movement, 
alarm in place      Therapy Time:   Individual   Time In 1130   Time Out 1200   Minutes 30      Minutes: 30  Transfer/Bed mobility trainin  Gait training: 15  Therapeutic ex: 10    Salvador Novoa PTA, 24 at 12:58 PM     
brush teeth with self s/u and apply deordorant; discussed shaving for part of pm therapy session d/t time limitations this mrn.  pt agreed.  Upper Extremity Bathing  Assistance Level: Modified independent  Skilled Clinical Factors: seated in shower chair  Lower Extremity Bathing  Assistance Level: Stand by assist  Upper Extremity Dressing  Assistance Level: Modified independent  Lower Extremity Dressing  Assistance Level: Minimal assistance  Skilled Clinical Factors: needed assist to don RLE into correct leg hole, demo difficulties with threading leg overall, and 5% assist for posterior waist band management post pulling up over waist d/t it curling inward  Putting On/Taking Off Footwear  Assistance Level: Minimal assistance  Skilled Clinical Factors: doffing socks: Mod I- LLE and Min A to doff over heel pt continued to doff rest; Donning socks: Sup LLE and min A RLE  Toileting  Assistance Level: Moderate assistance  Skilled Clinical Factors: asssit with brief and Max A: Posterior hyiene per BM  Toilet Transfers  Technique: Stand step  Equipment: Standard toilet  Additional Factors: Verbal cues  Assistance Level: Contact guard assist  Skilled Clinical Factors: tactile cues for sitting d/t height of toilet (low sittng)  Tub/Shower Transfers  Type: Shower  Transfer From: Rolling walker  Transfer To: Shower chair with back  Additional Factors: Cues for hand placement;Increased time to complete  Assistance Level: Stand by assist;Increased time to complete         Functional Mobility  Device: Rolling walker  Activity: To/From bathroom;Transport items;Retrieve items  Assistance Level: Stand by assist  Supine to Sit  Assistance Level: Supervision  Sit to Stand  Assistance Level: Stand by assist  Stand to Sit  Assistance Level: Stand by assist       Education:  Education  Education Given To: Patient  Education Provided Comments: Education on purpose of staying and not being discharged this day- once explained pt appeared to 
cardiovascular, GI, , pulmonary, musculoskeletal, psychiatric or neurological. See also Acute Rehab PM&R H&P.       Vital signs:  BP (!) 101/56   Pulse 79   Temp 97.6 °F (36.4 °C) (Oral)   Resp 16   Ht 1.778 m (5' 10\")   Wt 79.5 kg (175 lb 5.7 oz)   SpO2 93%   BMI 25.16 kg/m²   I/O:   PO/Intake:  fair PO intake,   reg diet    Bowel:   continent occ incontinent  Bladder: continent  no guzman  General:  Patient is well developed,   adequately nourished, and    well kempt.     HEENT:    Pupils equal, hearing intact to loud voice, external inspection of ear and nose benign.  Inspection of lips, tongue and gums benign    Musculoskeletal: No significant change in strength or tone.  All joints stable.      Inspection and palpation of digits and nails show no clubbing, cyanosis or inflammatory conditions.   Neuro/Psychiatric: Affect: flat but pleasant.  Alert and oriented to person, place and situation with min cues.  No significant change in deep tendon reflexes or sensation  Lungs:  Diminished, CTA-B. Respiration effort is   normal at rest.   HESS PE discovered while CT repeat is ordered to reevaluate his infiltrate-now on Eliquis    Heart:   S1 = S2,   RRR.       Abdomen:  Soft, non-tender, no enlargement of liver or spleen.  Extremities:    lower extremity edema and tenderness   Skin:   Intact to general survey, small bruises bilateral knees where injections to    Rehabilitation:  Physical Therapy:   Bed mobility:  Bed mobility  Rolling to Left: Stand by assistance (12/15/24 1240)  Rolling to Right: Stand by assistance (12/15/24 1240)  Supine to Sit: Minimal assistance (12/15/24 1240)  Sit to Supine: Unable to assess (12/15/24 1240)  Bed Mobility Comments: increased time and effort (12/15/24 1240)  Bed Mobility  Overall Assistance Level: Supervision (12/17/24 1152)  Overall Assistance Level: Supervision (12/17/24 1152)  Roll Left  Assistance Level: Stand by assist (12/17/24 1152)  Skilled Clinical Factors: 
fatigue    ASSESSMENT/PROGRESS TOWARDS GOALS:   Assessment  Assessment: Patient with increased fatigue with gait this p.m. requiring rest breaks betweenn trials. Patient continues to require cues for walker poximity throughout session. Patient fatigued at end of session and returned to bed  Activity Tolerance: Patient limited by fatigue  Discharge Recommendations: Continue to assess pending progress    Goals:  Long Term Goals  Long Term Goal 1: Pt will demonstrate bed mobility indep  Long Term Goal 2: Pt will demonstrate transfers mod indep with safest AD  Long Term Goal 3: Pt will demonstrate amb 150 ft mod indep with safest AD  Long Term Goal 4: Pt to complete TUG in 25 sec or less with supervision  Long Term Goal 5: Pt able to stand with BUE support 60 sec with no assistance  Patient Goals   Patient Goals : to walk without help    PLAN OF CARE/Safety:   Safety Devices  Type of Devices: All fall risk precautions in place;Call light within reach;Chair alarm in place;Left in chair    Therapy Time:   Individual   Time In 1330   Time Out 1430   Minutes 60      Minutes: 60  Transfer/Bed mobility training: 15  Gait trainin  Neuro re education: 10  Therapeutic ex: 15    Salvador Novoa PTA, 24 at 4:01 PM     
progress    Goals:  Long Term Goals  Long Term Goal 1: Pt will demonstrate bed mobility indep  Long Term Goal 2: Pt will demonstrate transfers mod indep with safest AD  Long Term Goal 3: Pt will demonstrate amb 150 ft mod indep with safest AD  Long Term Goal 4: Pt to complete TUG in 25 sec or less with supervision  Long Term Goal 5: Pt able to stand with BUE support 60 sec with no assistance  Patient Goals   Patient Goals : to walk without help    PLAN OF CARE/Safety:   Safety Devices  Type of Devices: All fall risk precautions in place;Call light within reach;Chair alarm in place;Left in chair    Therapy Time:   Individual   Time In 1130   Time Out 1200   Minutes 30      Minutes: 30  Transfer/Bed mobility trainin  Gait training: 15  Therapeutic ex: 10    Salvador Novoa PTA, 24 at 12:57 PM     
to wash BLE figure 4 bilateral legs to wash feet; s/u to wash upper/lower legs seated; s/u with SBA to wash ant/post areas, with CGA (touching assist) for sit to stand for perineal care  Upper Extremity Dressing  Assistance Level: Moderate assistance  Skilled Clinical Factors: Dep A to doff shirt d/t having urine all on bottom of shirt, and Mod I to don shirt  Lower Extremity Dressing  Assistance Level: Minimal assistance  Skilled Clinical Factors: Assist to don over LLE foot while seated and CGA (touching assist) standing to pull pants from knees to waist with 5% assist to straighten out waist band  Putting On/Taking Off Footwear  Assistance Level: Supervision  Skilled Clinical Factors: increased time- pt able to don BLE socks this day  Toileting  Skilled Clinical Factors: none- had dumped urinal on self- PCA in with pt to assist and pt up in w/c upon arrival  Toilet Transfers  Skilled Clinical Factors: none  Tub/Shower Transfers  Type: Shower  Transfer From: Wheelchair  Additional Factors: Cues for hand placement;Increased time to complete  Assistance Level: Stand by assist;Increased time to complete            Education:  Education  Education Given To: Patient  Education Provided: ADL Function  Education Provided Comments: Pt educated verbally to use the call light to go to the bathroom instead of using urinal d/t frequent spillage  Education Method: Verbal  Barriers to Learning: Hearing  Education Outcome: Verbalized understanding;Continued education needed         ASSESSMENT:  Assessment: Pt continues to have difficulty with sit to stand transfers requiring SBA but almost CGA (touching assist).  Pt requires increased time.  Pt very cooperative.  pt agreeable to start using call light to get up and use the toilet d/t spillage of urinal at times.  Activity Tolerance: Patient tolerated treatment well      PLAN OF CARE:  Strengthening, Balance training, Functional mobility training, Neuromuscular re-education, 
without deformity.  Skin: Skin color, texture, turgor normal.  No rashes or lesions.  Neurologic:  Neurovascularly intact without any focal sensory/motor deficits. Cranial nerves: II-XII intact, grossly non-focal.  Psychiatric: Alert and oriented, thought content appropriate, normal insight  Capillary Refill: Brisk,< 3 seconds   Peripheral Pulses: +2 palpable, equal bilaterally       Labs:   Recent Labs     12/19/24  0637   WBC 8.1   HGB 14.1   HCT 43.6        Recent Labs     12/19/24  0637      K 4.8      CO2 29   BUN 50*   CREATININE 0.89   CALCIUM 8.9     No results for input(s): \"AST\", \"ALT\", \"BILIDIR\", \"BILITOT\", \"ALKPHOS\" in the last 72 hours.  No results for input(s): \"INR\" in the last 72 hours.  No results for input(s): \"CKTOTAL\", \"TROPONINI\" in the last 72 hours.    Urinalysis:      Lab Results   Component Value Date/Time    NITRU Negative 12/11/2024 11:27 AM    WBCUA 0-2 12/11/2024 11:27 AM    BACTERIA FEW 12/11/2024 11:27 AM    RBCUA 3-5 12/11/2024 11:27 AM    BLOODU TRACE 12/11/2024 11:27 AM    GLUCOSEU Negative 12/11/2024 11:27 AM       Radiology:  Vascular duplex lower extremity venous bilateral   Final Result   Thrombus in the right distal femoral vein extending to popliteal vein.      No evidence of DVT in the left lower extremity.      Urgent call result to be provided to a licensed caregiver at 7:08 p.m.         CT CHEST W CONTRAST   Final Result   Addendum (preliminary) 1 of 1   ADDENDUM:   I discussed the findings with Dr. Haider at 9:25 p.m. on 12/11/2024.         Final   1. Moderate amount of thrombus in the left upper and lower lobe pulmonary   arteries. Clot burden is moderate. No sign of right ventricular strain.   2. No sign of any associated pulmonary infarction.   3. Moderate eventration of the right hemidiaphragm with moderate compressive   atelectasis of the posterior right lower lobe adjacent to the diaphragm.   4. Large left adrenal nodule measuring 4.4 x 3.9 cm 
3 more doses.   CBC BMP in a.m.  Oxygen monitoring  Agree with anticoagulation  Continue physical therapy    Discussed with patient and RN    Beatris Smith MD        
Right  Assistance Level: Stand by assist (12/18/24 1145)  Skilled Clinical Factors: Increased time and effort to complete. HOB flat with use of bedrail (12/18/24 1145)  Sit to Supine  Assistance Level: Stand by assist (12/17/24 1152)  Skilled Clinical Factors: Increased time and effort to complete (12/17/24 1152)  Supine to Sit  Assistance Level: Stand by assist (12/18/24 1145)  Skilled Clinical Factors: Increased effort to complete (12/18/24 1145)  Scooting  Assistance Level: Stand by assist (12/18/24 1145)  Skilled Clinical Factors: Increased time and effort to complete (12/18/24 1145)  Transfers:  Transfers  Sit to Stand: Moderate Assistance;2 Person Assistance (+2 from w/c - +1 from bed level; pt retropuslive throughout transition) (12/10/24 0853)  Stand to Sit: 2 Person Assistance;Moderate Assistance (12/10/24 0853)  Bed to Chair: Moderate assistance;2 Person Assistance (with ww - SBA of second person) (12/10/24 0853)  Comment: B HHA; posterior lean (12/09/24 0919)  Sit to Stand  Assistance Level: Stand by assist (12/18/24 1145)  Skilled Clinical Factors: Multiple attempts to rise (12/18/24 1145)  Stand to Sit  Assistance Level: Stand by assist (12/18/24 1145)  Skilled Clinical Factors: Utilizes BUE to control descent into chair (12/18/24 1145)  Bed To/From Chair  Technique: Stand step (12/18/24 1145)  Assistance Level: Stand by assist (12/18/24 1145)  Skilled Clinical Factors: With use of ww (12/18/24 1145)  Gait:   Ambulation  Surface: Level tile (12/15/24 1229)  Device: Rolling Walker (12/15/24 1229)  Other Apparatus: Wheelchair follow (12/10/24 0853)  Assistance: Minimal assistance (12/15/24 1229)  Quality of Gait: nbos, ff posture, shortened height and step length (12/15/24 1229)  Gait Deviations: Slow Nette;Decreased step length;Decreased step height;Decreased head and trunk rotation (12/15/24 1229)  Distance: 45 feet several turns (12/15/24 9320)  Comments: w/c follow needed to ensure safety - straight 
density of 35   Hounsfield units. Consider biochemical lab evaluation for functional status   and pheochromocytoma prior to resection.   5. Several small probable cysts in the left lobe of the liver, the largest   measuring 1.6 cm in segment.            XR CHEST PORTABLE   Final Result   1. Cardiomegaly.   2. Large hiatal hernia.         XR KNEE LEFT (1-2 VIEWS)   Final Result   Osteopenia with moderate tricompartmental osteoarthritic changes.      No evidence of fracture or subluxation.         XR KNEE RIGHT (1-2 VIEWS)   Final Result   Osteopenia with moderate tricompartmental osteoarthritic changes.      No evidence of fracture or subluxation.                 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] 12/09/2024    Parkinsonism (HCC) [G20.C] 07/12/2024    Impaired mobility & ADLs dt PD and OA flare up [Z74.09, Z78.9] 07/08/2024    Cervical spinal stenosis [M48.02] 07/08/2024    Falling [R29.6] 07/03/2024    Dizziness [R42]     Acute diastolic heart failure (HCC) [I50.31] 09/27/2018    Essential hypertension [I10] 06/29/2018    Dementia (HCC) [F03.90]     Hyperlipidemia [E78.5]          DVT Prophylaxis: apixaban  Diet: ADULT DIET; Regular  ADULT ORAL NUTRITION SUPPLEMENT; Breakfast, Lunch, Dinner; Standard High Calorie/High Protein Oral Supplement  Code Status: Full Code    PT/OT Eval Status: done  Dispo -   COVID- stable, off O2, completed paxlovid per ID recommendations    RLE DVT/PE- full anticoagulation as ordered   Weakness/repeated mechanical falls in the past- PT on case, management per primary service  HTN- well controlled with currne tmedications  CAD/NSTEMI, chronically elevated troponins 
lower lobe adjacent to the diaphragm.   4. Large left adrenal nodule measuring 4.4 x 3.9 cm with a density of 35   Hounsfield units. Consider biochemical lab evaluation for functional status   and pheochromocytoma prior to resection.   5. Several small probable cysts in the left lobe of the liver, the largest   measuring 1.6 cm in segment.            XR CHEST PORTABLE   Final Result   1. Cardiomegaly.   2. Large hiatal hernia.         XR KNEE LEFT (1-2 VIEWS)   Final Result   Osteopenia with moderate tricompartmental osteoarthritic changes.      No evidence of fracture or subluxation.         XR KNEE RIGHT (1-2 VIEWS)   Final Result   Osteopenia with moderate tricompartmental osteoarthritic changes.      No evidence of fracture or subluxation.                 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] 12/09/2024    Parkinsonism (HCC) [G20.C] 07/12/2024    Impaired mobility & ADLs dt PD and OA flare up [Z74.09, Z78.9] 07/08/2024    Cervical spinal stenosis [M48.02] 07/08/2024    Falling [R29.6] 07/03/2024    Dizziness [R42]     Acute diastolic heart failure (HCC) [I50.31] 09/27/2018    Essential hypertension [I10] 06/29/2018    Dementia (HCC) [F03.90]     Hyperlipidemia [E78.5]          DVT Prophylaxis: apixaban  Diet: ADULT DIET; Regular  ADULT ORAL NUTRITION SUPPLEMENT; Breakfast, Lunch, Dinner; Standard High Calorie/High Protein Oral Supplement  Code Status: Full Code    PT/OT Eval Status: done  Dispo -   COVID- stable, off O2, completed paxlovid     RLE DVT/PE- full anticoagulation as ordered   Weakness/repeated mechanical falls in the past- PT on case, management per primary service  HTN- well 
measuring 4.4 x 3.9 cm with a density of 35   Hounsfield units. Consider biochemical lab evaluation for functional status   and pheochromocytoma prior to resection.   5. Several small probable cysts in the left lobe of the liver, the largest   measuring 1.6 cm in segment.            XR CHEST PORTABLE   Final Result   1. Cardiomegaly.   2. Large hiatal hernia.         XR KNEE LEFT (1-2 VIEWS)   Final Result   Osteopenia with moderate tricompartmental osteoarthritic changes.      No evidence of fracture or subluxation.         XR KNEE RIGHT (1-2 VIEWS)   Final Result   Osteopenia with moderate tricompartmental osteoarthritic changes.      No evidence of fracture or subluxation.                 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] 12/09/2024    Parkinsonism (HCC) [G20.C] 07/12/2024    Impaired mobility & ADLs dt PD and OA flare up [Z74.09, Z78.9] 07/08/2024    Cervical spinal stenosis [M48.02] 07/08/2024    Falling [R29.6] 07/03/2024    Dizziness [R42]     Acute diastolic heart failure (HCC) [I50.31] 09/27/2018    Essential hypertension [I10] 06/29/2018    Dementia (HCC) [F03.90]     Hyperlipidemia [E78.5]          DVT Prophylaxis: apixaban  Diet: ADULT DIET; Regular  ADULT ORAL NUTRITION SUPPLEMENT; Breakfast, Lunch, Dinner; Standard High Calorie/High Protein Oral Supplement  Code Status: Full Code    PT/OT Eval Status: done  Dispo -     COVID- stable, off O2, completed paxlovid per ID recommendations   RLE DVT/PE- full anticoagulation as ordered   Weakness/repeated mechanical falls in the past- PT on case, management per primary service  HTN- well controlled with currne tmedications  CAD/NSTEMI, 
standing d/t stiffness, knee limitations.  Activity Tolerance: Patient tolerated treatment well      PLAN OF CARE:  Strengthening, Balance training, Functional mobility training, Neuromuscular re-education, Endurance training, Sensory integration, Cognitive/Perceptual training, Cognitive reorientation, Equipment evaluation, education, & procurement, Patient/Caregiver education & training, Self-Care / ADL  Continue Ot POC Until discharge    Patient goals : Get back to walking and doing stuff on my own  Time Frame for Long Term Goals : Within 2 weeks, pt to demo progress in the following areas listed below to achieve specific LTGs as stated in the intial eval  Long Term Goal 1: Pt to demo improved ADL/IADL status  Long Term Goal 2: Pt to demo improved standing balance and tolerance for self-care  Long Term Goal 3: Pt to demo improved UB strength, function, coordinatoin  Long Term Goal 4: Pt to demo improved overall activity tolerance        Therapy Time:   Individual Group Co-Treat   Time In 930       Time Out 1030         Minutes 60                   ADL/IADL trainin minutes     Electronically signed by:    DEISY Whiteside,   2024, 11:01 AM               
None  How much help for eating meals?: None  AM-PAC Inpatient Daily Activity Raw Score: 18  AM-PAC Inpatient ADL T-Scale Score : 38.66  ADL Inpatient CMS 0-100% Score: 46.65    Plan:  Occupational Therapy Plan  Times Per Week: 1-4x/wk  Current Treatment Recommendations: Strengthening, Balance training, Functional mobility training, Neuromuscular re-education, Endurance training, Sensory integration, Cognitive/Perceptual training, Cognitive reorientation    Goals:   Patient will:    - Improve functional endurance to tolerate/complete 12-25 mins of ADL's  - Be Set up in UB ADLs   - Be Min A in LB ADLs  - Be Supervison in ADL transfers without LOB  - Be Supervision in toileting tasks  - Improve bilateral UE strength and endurance to Fair in order to participate in self-care activities as projected.  - Sequence self-care tasks with out verbal cues    Patient Goal: Patient goals : TO return to [facility] when ready     Discussed and agreed upon: Yes Comments:     Therapy Time:   OT Individual Minutes  Time In: 0843  Time Out: 0856  Minutes: 13    Eval: 13 minutes     Electronically signed by:    CLEVELAND Martinez, OTR/L  12/9/2024, 9:30 AM  Electronically signed by JOSE MANUEL Martinez/L on 12/9/24 at 9:33 AM EST   
reach    Pain Assessment  Patient does not c/o pain.    Pain Re-assessment  Patient does not c/o pain.    Speech Therapy Level of Assistance Scale:  AUDITORY COMPREHENSION  Rating: Supervised Assistance-Minimal Assistance  VERBAL EXPRESSION  Rating: Supervised Assistance  MOTOR SPEECH  Rating: Modified Independent  PROBLEM SOLVING  Rating: Minimal Assistance  MEMORY  Rating: Minimal Assistance    Therapy Time  SLP Individual Minutes  Time In: 1300  Time Out: 1330  Minutes: 30             Signature: Electronically signed by BENITO Bhatia on 12/10/2024 at 2:33 PM    
degrees of feeds-modified diet as needed.  Complex discharge planning:  DC--12/21/24--home to assisted living with help from his daughter and assisted living staff.  By then he should be out of COVID restrictions.  Weekly team meeting every Thursday to re-assess progress towards goals, discuss and address social, psychological and medical comorbidities and to address difficulties they may be having progressing in therapy.  Patient and family education is in progress.  The patient is to follow-up with their family physician after discharge.    Spread therapy out 900 minutes over 7-day window due to his COVID-19 and pulmonary emboli.    Complex Active General Medical Issues that complicate care Assess & Plan:      Hyperlipidemia,   Essential hypertension,  Acute diastolic heart failure  -Acute rehab to monitor heart rate and rhythm with the option of telemetry and the effects of chronotropic medication with respect to increasing physical activity and exercise in PT, OT, ADLs with medication titration to lowest effective dosing.  Continue blood signs every shift focusing on heart rate, rhythm and blood pressure checks with orthostatic checks-monitoring the effect of exercise, therapy and posture.  Consult hospitalist for backup medical and adjust/add medications.  Monitor heart rate and blood pressure as well as medications effects on vital signs before during and after therapy with especial focus on preventing orthostasis and falls risk.    Dementia  -add speech-language pathology-promote hydration eliminate toxic medications    Dizziness, Falls frequently-focus on  balance therapy    Cervical spinal stenosis,   Primary osteoarthritis involving multiple joints-control pain monitor for spasticity    Parkinsonism  -consider Sinemet titrate  Cachexia nutritional deficit-  high-dose vitamin D, recheck vitamin D level out after discharge, add protein  Acute diagnosis of COVID-19-consult the pharmacist consult hospitalist, 
joints-control pain monitor for spasticity    Parkinsonism  -consider Sinemet titrate  Cachexia nutritional deficit-  high-dose vitamin D, recheck vitamin D level out after discharge, add protein  Acute diagnosis of COVID-19-consult the pharmacist consult hospitalist, start scheduled Tylenol start Paxlovid hold cholesterol medication follow renal and liver function.  Acute PE-Eliquis-PE discovered while CT repeat is ordered to reevaluate his infiltrate.  He is placed on Eliquis and echo and duplex pending for 12/12/2024.        Check with infection control to see if he can come out of droplet precaution.  Focus on coordinating dc plans with patient family and care givers and order necessary equipment.  Get clarification about his isolation and droplet precautions.              Judith Castillo D.O., PM&R     Attending    [phone]   [facility]    
than 30 degrees due to severe CHF,  requires positioning of the body in ways not feasible with an ordinary bed in order to alleviate low back pain, the patient requires different bed height to permit transfers to standing position and also requires frequent changes in body position due to pain and breathing issues.          At D/C their function is goaled at:   PT:Long Term Goal 1: Pt will demonstrate bed mobility indep  Long Term Goal 2: Pt will demonstrate transfers mod indep with safest AD  Long Term Goal 3: Pt will demonstrate amb 150 ft mod indep with safest AD  Long Term Goal 4: Pt to complete TUG in 25 sec or less with supervision  Long Term Goal 5: Pt able to stand with BUE support 60 sec with no assistance  OT:Eating  Assistance Needed: Setup or clean-up assistance  CARE Score: 5  Discharge Goal: Independent, Oral Hygiene  Assistance Needed: Independent  CARE Score: 6  Discharge Goal: Independent, Toileting Hygiene  Reason if not Attempted: Not attempted due to environmental limitations  CARE Score: 10  Discharge Goal: Independent, Shower/Bathe Self  Reason if not Attempted: Patient refused  CARE Score: 7  Discharge Goal: Supervision or touching assistance  Upper Body Dressing  Assistance Needed: Setup or clean-up assistance  CARE Score: 5  Discharge Goal: Independent, Lower Body Dressing  Assistance Needed: Substantial/maximal assistance  CARE Score: 2  Discharge Goal: Independent, Putting On/Taking Off Footwear  Assistance Needed: Dependent  CARE Score: 1  Discharge Goal: Partial/moderate assistance, Toilet Transfer  Assistance needed: Substantial/maximal assistance  CARE Score: 2  Discharge Goal: Independent  SP:Long Term Goals  Time Frame for Long Term Goals: 2 weeks  Goal 1: Patient will demonstrate functional cognitive-linguistic abilities in all opportunities at a supervised level in order to safely complete ADLs.              From a cognitive standpoint they will need:        24 hr vs daily 
window due to his COVID-19 and pulmonary emboli.   Patient is on Eliquis for right lower extremity DVT left PE.  He has a mild carotid plaque no urgent need for any intervention at this time.The patient will need a hospital bed at discharge in order to elevate the head of bed greater than 30 degrees due to severe CHF,  requires positioning of the body in ways not feasible with an ordinary bed in order to alleviate low back pain, the patient requires different bed height to permit transfers to standing position and also requires frequent changes in body position due to pain and breathing issues.      Complex Active General Medical Issues that complicate care Assess & Plan:      Hyperlipidemia,   Essential hypertension,  Acute diastolic heart failure  -Acute rehab to monitor heart rate and rhythm with the option of telemetry and the effects of chronotropic medication with respect to increasing physical activity and exercise in PT, OT, ADLs with medication titration to lowest effective dosing.  Continue blood signs every shift focusing on heart rate, rhythm and blood pressure checks with orthostatic checks-monitoring the effect of exercise, therapy and posture.  Consult hospitalist for backup medical and adjust/add medications.  Monitor heart rate and blood pressure as well as medications effects on vital signs before during and after therapy with especial focus on preventing orthostasis and falls risk. Patient is on Eliquis for right lower extremity DVT left PE.  He has a mild carotid plaque no urgent need for any intervention at this time.  Follow-up with Dr. Duvall as an outpatient    Dementia  -add speech-language pathology-promote hydration eliminate toxic medications    Dizziness, Falls frequently-focus on  balance therapy    Cervical spinal stenosis,   Primary osteoarthritis involving multiple joints-control pain monitor for spasticity    Parkinsonism  -consider Sinemet titrate  Cachexia nutritional deficit-  
function.  Acute PE-Eliquis-PE discovered while CT repeat is ordered to reevaluate his infiltrate.  He is placed on Eliquis and echo and duplex pending for 12/12/2024.       Focus of today's plan-  Initiate and modify therapuetic plan to meet patients individual needs, add rest breaks as needed, and monitoring orthostasis, controlling pain, treating COVID-and treat PE with Eliquis           Judith Castillo D.O., PM&R     Attending    [phone]   [facility]    
medication reconciliation, patient and family education, and medication simplification coordinating follow-up care with case management and social work as we progressed toward patient's plan pending DC--12/23/24--home to assisted living at [facility] with help from his daughter and assisted living staff.  By then he should be out of COVID restrictions.   SP his final weekly team meeting every Thursday to re-assess progress towards goals, discuss and address social, psychological and medical comorbidities and to address difficulties they may be having progressing in therapy.  Patient and family education is in progress.  The patient is to follow-up with their family physician after discharge.    Spread therapy out 900 minutes over 7-day window due to his COVID-19 and pulmonary emboli.   Patient is on Eliquis for right lower extremity DVT left PE.  He has a mild carotid plaque no urgent need for any intervention at this time.The patient will need a hospital bed at discharge in order to elevate the head of bed greater than 30 degrees due to severe CHF,  requires positioning of the body in ways not feasible with an ordinary bed in order to alleviate low back pain, the patient requires different bed height to permit transfers to standing position and also requires frequent changes in body position due to pain and breathing issues.      Complex Active General Medical Issues that complicate care Assess & Plan:      Hyperlipidemia,   Essential hypertension,  Acute diastolic heart failure  -Acute rehab to monitor heart rate and rhythm with the option of telemetry and the effects of chronotropic medication with respect to increasing physical activity and exercise in PT, OT, ADLs with medication titration to lowest effective dosing.  Continue blood signs every shift focusing on heart rate, rhythm and blood pressure checks with orthostatic checks-monitoring the effect of exercise, therapy and posture.  Consult hospitalist 
MIRTHA, PM&R     Attending    [phone]   [facility]

## 2025-02-19 ENCOUNTER — OFFICE VISIT (OUTPATIENT)
Dept: NEUROLOGY | Age: 89
End: 2025-02-19
Payer: MEDICARE

## 2025-02-19 VITALS
SYSTOLIC BLOOD PRESSURE: 122 MMHG | BODY MASS INDEX: 24.97 KG/M2 | DIASTOLIC BLOOD PRESSURE: 70 MMHG | WEIGHT: 174 LBS | HEART RATE: 72 BPM

## 2025-02-19 DIAGNOSIS — G20.A2 PARKINSON'S DISEASE WITHOUT DYSKINESIA, WITH FLUCTUATING MANIFESTATIONS (HCC): Primary | ICD-10-CM

## 2025-02-19 DIAGNOSIS — G31.84 MILD COGNITIVE IMPAIRMENT: ICD-10-CM

## 2025-02-19 DIAGNOSIS — R26.9 GAIT DISTURBANCE: ICD-10-CM

## 2025-02-19 PROCEDURE — 1123F ACP DISCUSS/DSCN MKR DOCD: CPT | Performed by: PSYCHIATRY & NEUROLOGY

## 2025-02-19 PROCEDURE — 1126F AMNT PAIN NOTED NONE PRSNT: CPT | Performed by: PSYCHIATRY & NEUROLOGY

## 2025-02-19 PROCEDURE — G8420 CALC BMI NORM PARAMETERS: HCPCS | Performed by: PSYCHIATRY & NEUROLOGY

## 2025-02-19 PROCEDURE — 1036F TOBACCO NON-USER: CPT | Performed by: PSYCHIATRY & NEUROLOGY

## 2025-02-19 PROCEDURE — G8427 DOCREV CUR MEDS BY ELIG CLIN: HCPCS | Performed by: PSYCHIATRY & NEUROLOGY

## 2025-02-19 PROCEDURE — 99214 OFFICE O/P EST MOD 30 MIN: CPT | Performed by: PSYCHIATRY & NEUROLOGY

## 2025-02-19 NOTE — PROGRESS NOTES
Subjective:      Patient ID: Hamilton Torres is a 89 y.o. male who presents today for:  Chief Complaint   Patient presents with    Follow-up     Pt states he is having trouble with his right knee. Pt states his left knee is fine he is getting knee injections no questions or concerns at this time        HPI 89Right handedgentleman with history of Parkinson disease with gait ataxia and tremors.  Patient continues on carbidopa levodopa 2 tablets daily without any side effects.  Patient also is on Aricept for some memory issues.  Patient's tremors have improved.  He still has issues with his night knee.  Patient actually had knee issues and had injection and was in the hospital and then hide COVID    He still continues to walk with a walker and his tremors improve        Past Medical History:   Diagnosis Date    Anticoagulant long-term use     Anxiety     Bursitis of hip     CAD (coronary artery disease)     Carotid stenosis     2/2013 16-49%    Cervical disc disorder     CHF (congestive heart failure) ([facility])     COVID-19 virus infection 01/21/2023    Dementia (HCC)     Dementia (HCC)     Hyperlipidemia     Hypertension     MI (myocardial infarction) (HCC)     Osteoarthritis     Spinal stenosis      Past Surgical History:   Procedure Laterality Date    CARPAL TUNNEL RELEASE  1998    CATARACT REMOVAL  2007    COLONOSCOPY  12/10/10,2007    DR MATTHEWS - DONE AT [facility]    COLONOSCOPY  2007    hx polyps needs 2015    COLONOSCOPY  9/21/15     DR. YARBROUGH     CORONARY ANGIOPLASTY WITH STENT PLACEMENT  06/07/2020    DIAGNOSTIC CARDIAC CATH LAB PROCEDURE      HERNIA REPAIR Bilateral     x 2    HERNIA REPAIR Right 11/13/2020    RIGHT INGUINAL HERNIA REPAIR performed by Roscoe Copeland MD at [facility]    [facility]       Social History     Socioeconomic History    Marital status:      Spouse name: Not on file    Number of children: Not on file    Years of education: Not on file    Highest education level: Not on file

## 2025-02-19 NOTE — CARE COORDINATION
[facility] Transitions Initial Follow Up Call    Call within 2 business days of discharge: Yes    Patient: Yvrose Osullivan Patient : 1935   MRN: [id number]  Reason for Admission: 10/19-10/20/2020 [id number] HTN  Discharge Date: 10/20/20 RARS: Readmission Risk Score: 18  NR  No CV-19 lab performed this admit  PCP 10/27 11:15, Dr Dillon Dunne 10/22 2:45    Care Transitions request call back to P: [phone] for initial CV-19 screening. English

## 2025-02-21 ENCOUNTER — OFFICE VISIT (OUTPATIENT)
Dept: CARDIOLOGY CLINIC | Age: 89
End: 2025-02-21

## 2025-02-21 VITALS
WEIGHT: 173.2 LBS | BODY MASS INDEX: 24.85 KG/M2 | SYSTOLIC BLOOD PRESSURE: 106 MMHG | DIASTOLIC BLOOD PRESSURE: 60 MMHG | OXYGEN SATURATION: 96 % | HEART RATE: 77 BPM

## 2025-02-21 DIAGNOSIS — I21.4 NSTEMI (NON-ST ELEVATED MYOCARDIAL INFARCTION) (HCC): ICD-10-CM

## 2025-02-21 DIAGNOSIS — R09.89 BILATERAL CAROTID BRUITS: ICD-10-CM

## 2025-02-21 DIAGNOSIS — E78.5 HYPERLIPIDEMIA, UNSPECIFIED HYPERLIPIDEMIA TYPE: ICD-10-CM

## 2025-02-21 DIAGNOSIS — I50.9 CONGESTIVE HEART FAILURE, UNSPECIFIED HF CHRONICITY, UNSPECIFIED HEART FAILURE TYPE (HCC): Primary | ICD-10-CM

## 2025-02-21 DIAGNOSIS — I25.10 CORONARY ARTERY DISEASE INVOLVING NATIVE CORONARY ARTERY OF NATIVE HEART WITHOUT ANGINA PECTORIS: ICD-10-CM

## 2025-02-21 DIAGNOSIS — I10 ESSENTIAL HYPERTENSION: ICD-10-CM

## 2025-02-21 DIAGNOSIS — I65.23 CAROTID STENOSIS, BILATERAL: ICD-10-CM

## 2025-02-21 DIAGNOSIS — I25.10 CORONARY ARTERY DISEASE INVOLVING NATIVE HEART WITHOUT ANGINA PECTORIS, UNSPECIFIED VESSEL OR LESION TYPE: ICD-10-CM

## 2025-02-21 ASSESSMENT — ENCOUNTER SYMPTOMS
STRIDOR: 0
CHEST TIGHTNESS: 0
EYES NEGATIVE: 1
SHORTNESS OF BREATH: 0
COUGH: 0
RESPIRATORY NEGATIVE: 1
NAUSEA: 0
WHEEZING: 0
GASTROINTESTINAL NEGATIVE: 1
BLOOD IN STOOL: 0

## 2025-02-21 NOTE — PROGRESS NOTES
Subsequent Progress Note    Patient: Hamilton Torres  YOB: 1935  MRN: [id number]    Chief Complaint: nstemi cad htn   Chief Complaint   Patient presents with    Follow-up     4 month   Denies any concerns       CV Data:  6/2018 spect negative  5/2018 echo EF 60%  11/2018 LAD AMBER  12/2018 CUS <. Left Vertebral 100  1/2020 echo ef 60 midl AR and MR  1/2020 SPECT - NEGATIVE  6/8/2020 Cath OM1 AMBER. EF 60%  9/2020  CUS mild  1/21 Renal Duplex- negative  1/21 spect negative  12/25 Echo EF 55-60 mild Ar  mild MR  12/24 RLE DVT and Left PE    Subjective/HPI: no cp no sob no falls no bleed. R Inguinal hernia     9/25/2019:  No cp no osb no falls no bleed eats well. Takes meds.   C/o Diuretic and frequent urine.     1/15/2020 recently in ER and hosp for HTN. Now stable without meds changes. Possible he did not take his meds. Recent Labs showed mild Troponin elevaton.     2/5/2020 NO CP NO SOB NO FALLS NO BLEED. TAKES MEDS.EATS WELL.     5/8/2020 TELEHEALTH EVALUATION -- Audio/Visual (During COVID-19 public health emergency)      137/70 home BP. Doing well no cp no osb no falls no bleed takes meds. Eats well. Son moved in w him.    6/17/2020 no cp no sob no edema no falls no bleed. Feels better since last stent.     8/5/2020 recent hosp for syncope related to Low BP. Imdur was stopped and Benazepril changed to Lisinopril. Feels much better.     10/22/2020 recent hosp for accelerated HTN.  We added Imdur.  He never had CP nor SOB. He feels well now. BOP better. Eating well.     8/18/21 now lives [facility] [place]- assisted living. Feels well no cp no sob no falls no bleed. Walks with walker.     1/19/22 doing well lives at [facility]. No cp no sob not dizzy no falls no bleed.     5/25/22 lives [facility]. No cp  No sob no falsl nob rand. Walks with walker. Feels well eats well.     11/22/22 doing well no cp no sob no falls nob leed.  Still taking asa and Plavix    2/15/23 doing well no cp no sob no fall

## 2025-03-24 NOTE — CARE COORDINATION
HHC ORDER OBTAINED, [facility] CALLED TO [facility]. [facility] IS UNABLE TO STAFF. MESSAGE LEFT FOR [facility]ic [facility]. REFERRAL CALLED TO [facility], DISCUSSED WITH TOMY HAS BEEN ACCEPTED BY [facility]. No (0)

## 2025-06-13 ENCOUNTER — APPOINTMENT (OUTPATIENT)
Dept: RADIOLOGY | Facility: HOSPITAL | Age: 89
End: 2025-06-13
Payer: MEDICARE

## 2025-06-13 ENCOUNTER — HOSPITAL ENCOUNTER (OUTPATIENT)
Facility: HOSPITAL | Age: 89
Setting detail: OBSERVATION
Discharge: HOME | End: 2025-06-13
Attending: STUDENT IN AN ORGANIZED HEALTH CARE EDUCATION/TRAINING PROGRAM | Admitting: INTERNAL MEDICINE
Payer: MEDICARE

## 2025-06-13 ENCOUNTER — APPOINTMENT (OUTPATIENT)
Dept: CARDIOLOGY | Facility: HOSPITAL | Age: 89
End: 2025-06-13
Payer: MEDICARE

## 2025-06-13 ENCOUNTER — HOSPITAL ENCOUNTER (INPATIENT)
Age: 89
LOS: 7 days | Discharge: HOME | End: 2025-06-20
Attending: PHYSICAL MEDICINE & REHABILITATION | Admitting: PHYSICAL MEDICINE & REHABILITATION
Payer: MEDICARE

## 2025-06-13 VITALS
DIASTOLIC BLOOD PRESSURE: 77 MMHG | HEART RATE: 64 BPM | WEIGHT: 174 LBS | TEMPERATURE: 99 F | SYSTOLIC BLOOD PRESSURE: 164 MMHG | RESPIRATION RATE: 18 BRPM | HEIGHT: 69 IN | BODY MASS INDEX: 25.77 KG/M2 | OXYGEN SATURATION: 98 %

## 2025-06-13 DIAGNOSIS — M25.562 CHRONIC PAIN OF BOTH KNEES: ICD-10-CM

## 2025-06-13 DIAGNOSIS — M25.561 CHRONIC PAIN OF BOTH KNEES: ICD-10-CM

## 2025-06-13 DIAGNOSIS — G89.29 CHRONIC PAIN OF BOTH KNEES: ICD-10-CM

## 2025-06-13 DIAGNOSIS — T14.90XA TRAUMA: Primary | ICD-10-CM

## 2025-06-13 DIAGNOSIS — R26.2 AMBULATORY DYSFUNCTION: ICD-10-CM

## 2025-06-13 PROBLEM — R29.6 FREQUENT FALLS: Status: ACTIVE | Noted: 2025-06-13

## 2025-06-13 LAB
ALBUMIN SERPL BCP-MCNC: 4.1 G/DL (ref 3.4–5)
ALP SERPL-CCNC: 63 U/L (ref 33–136)
ALT SERPL W P-5'-P-CCNC: 16 U/L (ref 10–52)
ANION GAP SERPL CALC-SCNC: 10 MMOL/L (ref 10–20)
AST SERPL W P-5'-P-CCNC: 16 U/L (ref 9–39)
ATRIAL RATE: 72 BPM
ATRIAL RATE: 73 BPM
BASOPHILS # BLD AUTO: 0.06 X10*3/UL (ref 0–0.1)
BASOPHILS NFR BLD AUTO: 0.7 %
BILIRUB SERPL-MCNC: 0.7 MG/DL (ref 0–1.2)
BUN SERPL-MCNC: 30 MG/DL (ref 6–23)
CALCIUM SERPL-MCNC: 9.2 MG/DL (ref 8.6–10.3)
CARDIAC TROPONIN I PNL SERPL HS: 16 NG/L (ref 0–20)
CARDIAC TROPONIN I PNL SERPL HS: 17 NG/L (ref 0–20)
CHLORIDE SERPL-SCNC: 106 MMOL/L (ref 98–107)
CO2 SERPL-SCNC: 26 MMOL/L (ref 21–32)
CREAT SERPL-MCNC: 0.86 MG/DL (ref 0.5–1.3)
EGFRCR SERPLBLD CKD-EPI 2021: 84 ML/MIN/1.73M*2
EOSINOPHIL # BLD AUTO: 0.14 X10*3/UL (ref 0–0.4)
EOSINOPHIL NFR BLD AUTO: 1.7 %
ERYTHROCYTE [DISTWIDTH] IN BLOOD BY AUTOMATED COUNT: 17.9 % (ref 11.5–14.5)
GLUCOSE SERPL-MCNC: 86 MG/DL (ref 74–99)
HCT VFR BLD AUTO: 34.4 % (ref 41–52)
HGB BLD-MCNC: 10.2 G/DL (ref 13.5–17.5)
IMM GRANULOCYTES # BLD AUTO: 0.03 X10*3/UL (ref 0–0.5)
IMM GRANULOCYTES NFR BLD AUTO: 0.4 % (ref 0–0.9)
LYMPHOCYTES # BLD AUTO: 1.21 X10*3/UL (ref 0.8–3)
LYMPHOCYTES NFR BLD AUTO: 14.4 %
MAGNESIUM SERPL-MCNC: 2.12 MG/DL (ref 1.6–2.4)
MCH RBC QN AUTO: 24 PG (ref 26–34)
MCHC RBC AUTO-ENTMCNC: 29.7 G/DL (ref 32–36)
MCV RBC AUTO: 81 FL (ref 80–100)
MONOCYTES # BLD AUTO: 1.1 X10*3/UL (ref 0.05–0.8)
MONOCYTES NFR BLD AUTO: 13.1 %
NEUTROPHILS # BLD AUTO: 5.84 X10*3/UL (ref 1.6–5.5)
NEUTROPHILS NFR BLD AUTO: 69.7 %
NRBC BLD-RTO: 0 /100 WBCS (ref 0–0)
P AXIS: 55 DEGREES
P AXIS: 61 DEGREES
P OFFSET: 171 MS
P OFFSET: 173 MS
P ONSET: 124 MS
P ONSET: 124 MS
PLATELET # BLD AUTO: 257 X10*3/UL (ref 150–450)
POTASSIUM SERPL-SCNC: 3.8 MMOL/L (ref 3.5–5.3)
PR INTERVAL: 180 MS
PR INTERVAL: 180 MS
PROT SERPL-MCNC: 6.5 G/DL (ref 6.4–8.2)
Q ONSET: 214 MS
Q ONSET: 214 MS
QRS COUNT: 11 BEATS
QRS COUNT: 12 BEATS
QRS DURATION: 116 MS
QRS DURATION: 118 MS
QT INTERVAL: 388 MS
QT INTERVAL: 390 MS
QTC CALCULATION(BAZETT): 424 MS
QTC CALCULATION(BAZETT): 429 MS
QTC FREDERICIA: 412 MS
QTC FREDERICIA: 416 MS
R AXIS: -43 DEGREES
R AXIS: -47 DEGREES
RBC # BLD AUTO: 4.25 X10*6/UL (ref 4.5–5.9)
SODIUM SERPL-SCNC: 138 MMOL/L (ref 136–145)
T AXIS: 49 DEGREES
T AXIS: 53 DEGREES
T OFFSET: 408 MS
T OFFSET: 409 MS
VENTRICULAR RATE: 72 BPM
VENTRICULAR RATE: 73 BPM
WBC # BLD AUTO: 8.4 X10*3/UL (ref 4.4–11.3)

## 2025-06-13 PROCEDURE — 80053 COMPREHEN METABOLIC PANEL: CPT | Performed by: STUDENT IN AN ORGANIZED HEALTH CARE EDUCATION/TRAINING PROGRAM

## 2025-06-13 PROCEDURE — 99285 EMERGENCY DEPT VISIT HI MDM: CPT | Mod: 25 | Performed by: STUDENT IN AN ORGANIZED HEALTH CARE EDUCATION/TRAINING PROGRAM

## 2025-06-13 PROCEDURE — 84484 ASSAY OF TROPONIN QUANT: CPT | Performed by: STUDENT IN AN ORGANIZED HEALTH CARE EDUCATION/TRAINING PROGRAM

## 2025-06-13 PROCEDURE — 70450 CT HEAD/BRAIN W/O DYE: CPT

## 2025-06-13 PROCEDURE — 85025 COMPLETE CBC W/AUTO DIFF WBC: CPT | Performed by: STUDENT IN AN ORGANIZED HEALTH CARE EDUCATION/TRAINING PROGRAM

## 2025-06-13 PROCEDURE — 93005 ELECTROCARDIOGRAM TRACING: CPT

## 2025-06-13 PROCEDURE — 1180000000 HC REHAB R&B

## 2025-06-13 PROCEDURE — 72125 CT NECK SPINE W/O DYE: CPT

## 2025-06-13 PROCEDURE — G0378 HOSPITAL OBSERVATION PER HR: HCPCS

## 2025-06-13 PROCEDURE — 72125 CT NECK SPINE W/O DYE: CPT | Performed by: INTERNAL MEDICINE

## 2025-06-13 PROCEDURE — G0390 TRAUMA RESPONS W/HOSP CRITI: HCPCS

## 2025-06-13 PROCEDURE — 36415 COLL VENOUS BLD VENIPUNCTURE: CPT | Performed by: STUDENT IN AN ORGANIZED HEALTH CARE EDUCATION/TRAINING PROGRAM

## 2025-06-13 PROCEDURE — 70450 CT HEAD/BRAIN W/O DYE: CPT | Performed by: INTERNAL MEDICINE

## 2025-06-13 PROCEDURE — 97165 OT EVAL LOW COMPLEX 30 MIN: CPT | Mod: GO

## 2025-06-13 PROCEDURE — 83735 ASSAY OF MAGNESIUM: CPT | Performed by: STUDENT IN AN ORGANIZED HEALTH CARE EDUCATION/TRAINING PROGRAM

## 2025-06-13 PROCEDURE — 97161 PT EVAL LOW COMPLEX 20 MIN: CPT | Mod: GP

## 2025-06-13 PROCEDURE — 73560 X-RAY EXAM OF KNEE 1 OR 2: CPT | Mod: 50

## 2025-06-13 RX ORDER — POLYETHYLENE GLYCOL 3350 17 G/17G
17 POWDER, FOR SOLUTION ORAL DAILY
Status: CANCELLED | OUTPATIENT
Start: 2025-06-13

## 2025-06-13 RX ORDER — ACETAMINOPHEN 325 MG/1
650 TABLET ORAL EVERY 4 HOURS PRN
Status: CANCELLED | OUTPATIENT
Start: 2025-06-13

## 2025-06-13 RX ORDER — ACETAMINOPHEN 500 MG
5 TABLET ORAL NIGHTLY PRN
Status: CANCELLED | OUTPATIENT
Start: 2025-06-13

## 2025-06-13 RX ORDER — ONDANSETRON HYDROCHLORIDE 2 MG/ML
4 INJECTION, SOLUTION INTRAVENOUS EVERY 6 HOURS PRN
Status: CANCELLED | OUTPATIENT
Start: 2025-06-13

## 2025-06-13 ASSESSMENT — ACTIVITIES OF DAILY LIVING (ADL): BATHING_ASSISTANCE: MODERATE

## 2025-06-13 ASSESSMENT — COGNITIVE AND FUNCTIONAL STATUS - GENERAL
MOVING FROM LYING ON BACK TO SITTING ON SIDE OF FLAT BED WITH BEDRAILS: A LOT
TOILETING: A LOT
TURNING FROM BACK TO SIDE WHILE IN FLAT BAD: A LOT
DRESSING REGULAR UPPER BODY CLOTHING: A LITTLE
DRESSING REGULAR LOWER BODY CLOTHING: A LOT
HELP NEEDED FOR BATHING: A LOT
STANDING UP FROM CHAIR USING ARMS: A LITTLE
DAILY ACTIVITIY SCORE: 16
MOBILITY SCORE: 13
CLIMB 3 TO 5 STEPS WITH RAILING: TOTAL
PERSONAL GROOMING: A LITTLE
WALKING IN HOSPITAL ROOM: A LOT
MOVING TO AND FROM BED TO CHAIR: A LITTLE

## 2025-06-13 ASSESSMENT — PAIN - FUNCTIONAL ASSESSMENT
PAIN_FUNCTIONAL_ASSESSMENT: 0-10

## 2025-06-13 ASSESSMENT — LIFESTYLE VARIABLES
TOTAL SCORE: 0
EVER FELT BAD OR GUILTY ABOUT YOUR DRINKING: NO
EVER HAD A DRINK FIRST THING IN THE MORNING TO STEADY YOUR NERVES TO GET RID OF A HANGOVER: NO
HAVE YOU EVER FELT YOU SHOULD CUT DOWN ON YOUR DRINKING: NO
HAVE PEOPLE ANNOYED YOU BY CRITICIZING YOUR DRINKING: NO

## 2025-06-13 ASSESSMENT — PAIN SCALES - GENERAL
PAINLEVEL_OUTOF10: 0
PAINLEVEL_OUTOF10: 7
PAINLEVEL_OUTOF10: 0 - NO PAIN
PAINLEVEL_OUTOF10: 0 - NO PAIN
PAINLEVEL_OUTOF10: 7
PAINLEVEL_OUTOF10: 0 - NO PAIN

## 2025-06-13 NOTE — PROGRESS NOTES
06/13/25 1351   Discharge Planning   Home or Post Acute Services Post acute facilities (Rehab/SNF/etc)   Expected Discharge Disposition Rehab  (FOC is [facility], 2nd choice is [facility])   Does the patient need discharge transport arranged? Yes   RoundTrip coordination needed? Yes   Has discharge transport been arranged? No   Patient Choice   Provider Choice list and CMS website (https://medicare.gov/care-compare#search) for post-acute Quality and Resource Measure Data were provided and reviewed with: Patient;Family   Intensity of Service   Intensity of Service 0-30 min     ADAM spoke with pt daughter Belkis regarding Discharge plan. Belkis requesting [facility], [facility] is F[facility] and [facility] is second choice. [facility]. ADAM updated that [facility] has no availability for at least the next 5 days.     Update- Pt accepted to [facility] and will discharge from ED to the facility today. Transport requested via roundtrip. Care team, facility, pt, and pt daughter Belkis notified.

## 2025-06-13 NOTE — PROGRESS NOTES
"Physical Therapy    Physical Therapy Evaluation    Patient Name: Thaddeus Curry  MRN: [id number]  Today's Date: 6/13/2025   Time Calculation  Start Time: 1152  Stop Time: 1209  Time Calculation (min): 17 min  AC02/AC02    Assessment/Plan   PT Assessment  PT Assessment Results: Decreased strength, Decreased range of motion, Decreased mobility  Rehab Prognosis: Good  Barriers to Discharge Home: Caregiver assistance, Physical needs  Caregiver Assistance: Caregiver assistance needed per identified barriers - however, level of patient's required assistance exceeds assistance available at home  Physical Needs: Ambulating household distances limited by function/safety  End of Session Communication: Physician, Care Coordinator  Assessment Comment: Pt wtih good tolerance this date. Pt reports 2 recent falls. Per Daughter pt limited by knee OA and went to REhab in December. Pt demonstrate increased assist with bed mobility and requires min A with transfers and ambualtion. Pt is high fall risk. Pt is motivated to return to [facility] and reports \"I want to walk\". Pt requires high intensity continued PT to return to [facility].  End of Session Patient Position: On cart, Alarm on  IP OR SWING BED PT PLAN  Inpatient or Swing Bed: Inpatient  PT Plan  Treatment/Interventions: Bed mobility, Transfer training, Gait training, Strengthening, Range of motion  PT Plan: Ongoing PT  PT Frequency: 4 times per week  PT Discharge Recommendations: High intensity level of continued care  Equipment Recommended upon Discharge: Wheeled walker  PT Recommended Transfer Status: Assist x1      Subjective     Current Problem:  Frequent falls    General Visit Information:  General  Reason for Referral: impaired mobility  Referred By: Izaiah (PT/OT 6/13)  Past Medical History Relevant to Rehab: including DVT, on plavix and eliquis, OA in B/L knee  Family/Caregiver Present: Yes  Caregiver Feedback: daughter present and active in pt care  Co-Treatment: OT  Co-Treatment " Reason: to maximize pt mobiltiy and STAT eval in ED for d/c  Prior to Session Communication: Bedside nurse, Physician  Patient Position Received: Alarm on, On cart  General Comment: Pt admitted s/p frequent falls due to knee pain and buckling. R >L per daughter. Daughter concerned pt may have hit head. Head CT- negative, Knee xray 6/13- negative acute, + patellofemoral and tibialfemoral OA in B/L knees, more marked on R LE.    Home Living:  Home Living  Home Living Comments: Pt lives alone in apt at [facility]. no steps at facility. Daughter assists as able/needed. Owns Rollator, walk in shower with seat.    Prior Level of Function:  Prior Function Per Pt/Caregiver Report  Prior Function Comments: Pt ambulates with Rollator in apt and to/from meals daily. Assist with ADLs and IADLs from staff and meals provided. pt with no falls sine December, then 2 falls today due to R knee bucking. reports cortisone shots x1-2 weeks ago. Does not drive, daughter assists to appts.    Precautions:  Precautions  Hearing/Visual Limitations: B/L Hearing aides, still [facility]  Medical Precautions: Fall precautions    Vital Signs:  Vital Signs  Heart Rate: 72  BP: 174/72 (194/74 at end of session)    Objective     Pain:  Pain Assessment  Pain Assessment: 0-10  0-10 (Numeric) Pain Score: 7  Pain Type: Chronic pain  Pain Location: Knee  Pain Orientation: Right    Cognition:  Cognition  Overall Cognitive Status: Within Functional Limits  Orientation Level: Oriented X4    General Assessments:  General Observation  General Observation: Pt agreeable to PT/OT evaluation. IV, bed alarm   Activity Tolerance  Endurance: Endurance does not limit participation in activity  Sensation  Sensation Comment: Dnies numbness and tingling in B/L LE, WFL with light touch  Strength  Strength Comments: B/L UE WFL for PT, B/L hip flex 4-/5, knee ext 3+/5, DF 4-/5 MMT     Coordination  Coordination Comment: WFL, tested with heel to shin and standing toe to  target  Postural Control  Posture Comment: forward flexed posture noted. cues for upright posture  Static Sitting Balance  Static Sitting-Comment/Number of Minutes: good  Dynamic Sitting Balance  Dynamic Sitting-Comments: Fair +  Static Standing Balance  Static Standing-Comment/Number of Minutes: Fair  Dynamic Standing Balance  Dynamic Standing-Comments: Fair    Functional Assessments:     Bed Mobility  Bed Mobility: Yes  Bed Mobility 1  Bed Mobility Comments 1: Pt performed sup to sit wtih mod A x2 this date cues for trunk and LEs, limited by pain and ED cart. Returend to bed sit to sup with mod A x1.  Transfers  Transfer: Yes  Transfer 1  Trials/Comments 1: Pt perofrmed sit to stand form high ED cart with WW with min A, requires R knee block due to hx of buckling. Requires min assist with sit to sup due to height of bed.  Ambulation/Gait Training  Ambulation/Gait Training Performed: Yes  Ambulation/Gait Training 1  Comments/Distance (ft) 1: Pt ambulates 5 side steps at EOB and 2 steps forward/backward with min A with WW. Requiers assist with advacning LEs. no bukcling noted, but B/L knee flex and intermittent NBOS noted. Cues for WBOS to reduce risk of falls.          Extremity/Trunk Assessments:  RUE   RUE : Within Functional Limits (ROM)  LUE   LUE: Within Functional Limits (ROM)  RLE   RLE : Exceptions to WFL (hamstring tightness noted, lacking 10 degrees extension, mild swelling in R LE.)  LLE   LLE : Within Functional Limits (hamatring tightness noted, lacking 15 degrees Knee extension.)    Outcome Measures:     [facility] Basic Mobility  Turning from your back to your side while in a flat bed without using bedrails: A lot  Moving from lying on your back to sitting on the side of a flat bed without using bedrails: A lot  Moving to and from bed to chair (including a wheelchair): A little  Standing up from a chair using your arms (e.g. wheelchair or bedside chair): A little  To walk in hospital room: A lot  Climbing  3-5 steps with railing: Total  Basic Mobility - Total Score: 13       Goals:            Pt will demonstrate CGA with all bed mobility   (Progressing)       Start:  06/13/25    Expected End:  06/27/25            Pt will demonstrate sit to stand and bed/chair transfers with WW with SBA.   (Progressing)       Start:  06/13/25    Expected End:  06/27/25            Pt will ambulate 30 feetx2 with WW with SBA  with no knee buckling and appropriate base of support (Progressing)       Start:  06/13/25    Expected End:  06/27/25            Pt will tolerate 15 reps x2 LE therapeutic exercise for LE strengthening and endurance  (Progressing)       Start:  06/13/25    Expected End:  06/27/25            Pt will demonstrate 10 reps x1 B/L LE standing toe taps with WW with SBA and no LOB to improve balance (Progressing)       Start:  06/13/25    Expected End:  06/27/25                 Education Documentation  Mobility Training, taught by Shala Recinos, PT at 6/13/2025 12:42 PM.  Learner: Patient  Readiness: Acceptance  Method: Explanation  Response: Verbalizes Understanding  Comment: Pt educated on PT POC, educated on safety with WW and appropriate DOROTEO

## 2025-06-13 NOTE — ED PROVIDER NOTES
HPI: The patient is a 89-year-old who reports he is on Plavix and Eliquis who presents to the Emergency Department with a chief complaint of multiple falls.  Patient reports that he has chronic knee pain from osteoarthritis and recently got knee injections.  He reports that the knees got better but then started hurting again and they have been buckling intermittently causing him to fall.  He reports that he has no pain anywhere in his body except for his knees.  EMS transported him after his assisted living facility called due to him having multiple falls while on blood thinners.  Patient denies any lightheadedness or dizziness or loss of consciousness.  He reports that the falls are due to his knees giving out.     PAST MEDICAL HISTORY:  as per HPI  ALLERGIES:  as per HPI  MEDICATIONS:  as per HPI  FAMILY HISTORY: as per HPI  SURGICAL HISTORY: as per HPI  SOCIAL HISTORY: as per HPI     PHYSICAL EXAM:  VITAL SIGNS: Nursing notes reviewed.  GENERAL:  Alert and interactive  EYES:   Eyes track.  ENT:  Airway patent.  RESPIRATORY:  Nonlabored breathing.  No chest wall tenderness  CARDIOVASCULAR:  [Regular rate.] [Regular rhythm.]  GASTROINTESTINAL:  No distension.  Nontender  MUSCULOSKELETAL:  No deformity.  Range of motion of knees bilaterally.  No induration erythema or warmth of the knees.  Compartments of the upper and lower extremities are soft.  No upper extremity tenderness.  Dorsalis pedis and radial pulses 2+ and equal bilaterally.  NEUROLOGICAL:  Awake.  Strength and sensation intact in upper and lower extremities.  Alert and oriented x 3. 5 out of 5 strength in upper and lower extremities.  No facial droop.  Sensation intact in upper and lower extremities. NIH 0  SKIN:  Dry.  HEAD: Nontender, atraumatic  NECK and BACK: No midline C, T or L-spine tenderness step-offs or deformities        MEDICAL DECISION MAKING (MDM):     Critical Care Time  Authorized and Performed by: Riky Lovell MD  Total critical care  time: [32] minutes  Due to a high probability of clinically significant, life threatening deterioration, the patient required my highest level of preparedness to intervene emergently and I personally spent this critical care time directly and personally managing the patient. This critical care time included obtaining a history; examining the patient; pulse oximetry; ordering and review of studies; arranging urgent treatment with development of a management plan; evaluation of patient's response to treatment; frequent reassessment; and, discussions with other providers and patient/family.  This critical care time was performed to assess and manage the high probability of imminent, life-threatening deterioration that could result in multi-organ failure. It was exclusive of separately billable procedures and treating other patients and teaching time.  Please see MDM section and the rest of the note for further information on patient assessment and treatment.    EKG 0957  Per my interpretation:  Electrocardiogram ECG  RATE:  [Normal]  RHYTHM: [Sinus]  AXIS: Left axis  INTERVALS: [Normal]  ST-T WAVE CHANGES: [Normal]  ABNORMALITIES/COMPARISON: Sinus rhythm with PACs.  No previous EKG to compare with.    EKG 1102  Per my interpretation:  Electrocardiogram ECG  RATE:  [Normal]  RHYTHM: [Sinus]  AXIS: Left axis deviation  INTERVALS: [Normal]  ST-T WAVE CHANGES: [Normal]  ABNORMALITIES/COMPARISON: PACs, unchanged Gross to       SUMMARY:  The patient is admitted to the Emergency Department for evaluation of above. Complete history and physical examination was performed by me.  Patient presents after what sounds like multiple mechanical falls on anticoagulation so she is made an HIA activation due to risk of intracranial injury.  She is prior to us for CT head and C-spine.  CT scans were negative.  I did also get knee x-rays due to the knee pain and obtain blood work to look for other potential causes that may have  precipitated the fall although it does sound like it is mechanical in the setting of his knee pain.  He has no tibial plateau tenderness so low suspicion for occult tibial plateau injury.  Knee x-ray showed degenerative changes but did not show any acute fracture.  Low suspicion for septic arthritis given I can fully range his knees.  He reports the pain he has in his knees is similar to what he is had 4 months ago and years so I suspect that this is likely his degenerative changes and not likely another acute process.  No evidence of arterial sufficiency.  No evidence of traumatic injuries to the rest of his body including the remainder of his upper and lower extremities as well as his trunk.    Patient's daughter came to the bedside and provided additional history.  She reports the patient is acting his normal self but was having difficulty walking today which he reported was due to weakness in his right leg.  The daughter was concerned both about head trauma as well as potential stroke.  I reassessed the patient and he has no drift in the upper or lower extremity and has 5 5 strength with hip flexion, knee flexion and extension and plantarflexion and dorsiflexion bilaterally.  Sensation is intact in the bilateral lower extremities and has good distal perfusion as my suspicion for cord compression syndrome and stroke is low especially given he has resolution of the reported weakness that he had mentioned to family before.  It is unclear if the weakness was pain limited because he does have pain in his knees and degenerative changes seen on x-ray but stroke cannot be ruled out.  He is taking a significant amount of blood thinners so ischemic stroke does seem less likely.  Patient's daughter is worried about his ability to care for himself at assisted living and thinks the patient might need either skilled nursing or rehab.  I agree that I am concerned given the frequent falls and given he is at high risk for an  adverse event due to him being on blood thinners.  Would work here was overall reassuring there is no abnormalities requiring emergent invention.  Initially plan to admit the patient locally but after discussion with the hospitalist, they were able to facilitate PT and OT seeing the patient here in the ER and they agreed that the patient needed acute rehab.  Given the time of day we are able to send the case over to our case management and social work team who worked on finding an acute rehab that we can discharge the patient to directly from the ED so we can get expedited care.  This is what both the patient and the patient's daughter wanted so this was the ideal outcome.    Patient was accepted to acute rehab at [facility] which was the place of the family and the patient wanted to go to the most.  Patient was transported there in stable condition.     DIAGNOSIS:    Trauma  Chronic knee pain bilaterally  Ambulatory dysfunction     DISPOSITION:    1) discharged       Riky Lovell MD  06/13/25 [id number]

## 2025-06-13 NOTE — PROGRESS NOTES
Occupational Therapy    Evaluation    Patient Name: Thaddeus Curry  MRN: [id number]  Department: [facility]  Room: [place] [address]  Today's Date: 6/13/2025  Time Calculation  Start Time: 1151  Stop Time: 1209  Time Calculation (min): 18 min        Assessment:  OT Assessment: Pt. presents with falls; impaired balance, safety, independence with ADLs, and transfers. Would benefit from continued OT to address deficits.  Prognosis: Good  Barriers to Discharge Home: Physical needs  Evaluation/Treatment Tolerance: Patient tolerated treatment well  End of Session Communication: Physician, Care Coordinator  End of Session Patient Position: On cart, Alarm on  OT Assessment Results: Decreased ADL status, Decreased safe judgment during ADL, Decreased endurance, Decreased functional mobility, Decreased trunk control for functional activities  Prognosis: Good  Evaluation/Treatment Tolerance: Patient tolerated treatment well  Plan:  Treatment Interventions: ADL retraining, Functional transfer training, Endurance training, Compensatory technique education  OT Frequency: 3 times per week  OT Discharge Recommendations: High intensity level of continued care  Equipment Recommended upon Discharge: Wheeled walker  OT Recommended Transfer Status: Minimal assist  OT - OK to Discharge: Yes (OT eval complete. Refer to MD for discharge.)    Subjective   Current Problem:  1. Trauma        2. Chronic pain of both knees        3. Ambulatory dysfunction          OT Visit Info:  OT Received On: 06/13/25  General:  General  Reason for Referral: ADL impairment  Referred By: Leodan OT/PT  Past Medical History Relevant to Rehab: including DVT, on plavix and eliquis, OA in B/L knee  Family/Caregiver Present: Yes  Caregiver Feedback: Daughter present  Co-Treatment: PT  Co-Treatment Reason: to maximize pt. safety  Prior to Session Communication: Bedside nurse  Patient Position Received: Alarm on, On cart  General Comment: Pt admitted s/p frequent falls due to knee  pain and buckling. R >L per daughter. Daughter concerned pt may have hit head. Head CT- negative, Knee xray 6/13- negative acute, + patellofemoral and tibialfemoral OA in B/L knees, more marked on R LE.  Precautions:  Hearing/Visual Limitations: B/L Hearing aides, still [place]  Medical Precautions: Fall precautions    Vital Signs Comment: 74 HR, 172/78 pre BP. post /74, 97% 02     Pain:  Pain Assessment  Pain Assessment: 0-10  0-10 (Numeric) Pain Score: 7  Pain Type: Chronic pain  Pain Location: Knee  Pain Orientation:  (Bilateral. R worse than L)  Pain Interventions: Repositioned  Response to Interventions: Decrease in pain    Objective   Cognition:  Overall Cognitive Status: Within Functional Limits  Orientation Level: Oriented X4  Home Living:  Home Living Comments: Pt lives alone in apt at [facility]. no steps at facility. Daughter assists as able/needed. Owns Rollator, walk in shower with seat.  Prior Function:  Prior Function Comments: Pt ambulates with Rollator in apt and to/from meals daily. Assist with ADLs and IADLs from staff and meals provided. pt with no falls sine December, then 2 falls today due to R knee bucking. reports cortisone shots x1-2 weeks ago. Does not drive, daughter assists to appts.    ADL:  Eating Assistance: Independent  Grooming Assistance: Minimal  Bathing Assistance: Moderate  UE Dressing Assistance: Stand by  LE Dressing Assistance: Maximal  Toileting Assistance with Device: Maximal  Activity Tolerance:  Endurance: Decreased tolerance for upright activites  Activity Tolerance Comments: B knee pain  Bed Mobility/Transfers: Bed Mobility  Bed Mobility: Yes  Bed Mobility 1  Bed Mobility Comments 1: sup to sit Mod A x2 to bring LEs and trunk to upright sit. Mod A x1 to bring LEs back into bed.    Transfers  Transfer: Yes  Transfer 1  Trials/Comments 1: sit to stand, Min A x1 with WW to lift, steady, balance.    Functional Mobility:  Functional Mobility  Functional Mobility  Performed:  (~3-4 steps foroward and back. narrow base of support, some shuffling of feet noted. Min A x1 with WW for safety and balance.)  Standing Balance:  Static Standing Balance  Static Standing-Level of Assistance: Contact guard  Dynamic Standing Balance  Dynamic Standing-Comments: Fair     Sensation:  Sensation Comment: Denies sensation deficits LEs.  Strength:  Strength Comments: BUEs 4-/5 MMT    Coordination:  Coordination Comment: WFL   Hand Function:  Gross Grasp: Functional  Extremities: RUE   RUE : Within Functional Limits and LUE   LUE: Within Functional Limits    Outcome Measures:[facility] Daily Activity  Putting on and taking off regular lower body clothing: A lot  Bathing (including washing, rinsing, drying): A lot  Putting on and taking off regular upper body clothing: A little  Toileting, which includes using toilet, bedpan or urinal: A lot  Taking care of personal grooming such as brushing teeth: A little  Eating Meals: None  Daily Activity - Total Score: 16    Education Documentation  Body Mechanics, taught by Natalie Packer OT at 6/13/2025  1:28 PM.  Learner: Patient  Readiness: Acceptance  Method: Explanation  Response: Verbalizes Understanding, Needs Reinforcement    ADL Training, taught by Natalie Packer OT at 6/13/2025  1:28 PM.  Learner: Patient  Readiness: Acceptance  Method: Explanation  Response: Verbalizes Understanding, Needs Reinforcement    Education Comments  Educated on walker safety, transfer training    IP EDUCATION:  Education  Individual(s) Educated: Patient (Daughter)  Education Provided: Fall precautons, Risk and benefits of OT discussed with patient or other, POC discussed and agreed upon    Goals:  Encounter Problems       Encounter Problems (Active)       OT Goals       Pt. will complete functional transfers with CGA  (Progressing)       Start:  06/13/25    Expected End:  06/27/25            CGA for LB dressing; AE as needed  (Progressing)       Start:   06/13/25    Expected End:  06/27/25            CGA for toileting tasks and clothing mgmt  (Progressing)       Start:  06/13/25    Expected End:  06/27/25            Fair + dyn standing balance during ADLs and functional activities  (Progressing)       Start:  06/13/25    Expected End:  06/27/25

## 2025-06-14 LAB
BILIRUB UR QL STRIP: NEGATIVE
CLARITY UR: CLEAR
COLOR UR: YELLOW
GLUCOSE UR STRIP-MCNC: NEGATIVE MG/DL
HGB UR QL STRIP: NEGATIVE
KETONES UR STRIP-MCNC: NEGATIVE MG/DL
LEUKOCYTE ESTERASE UR QL STRIP: NEGATIVE
NITRITE UR QL STRIP: NEGATIVE
PH UR STRIP: 6 [PH] (ref 5–9)
PROT UR STRIP-MCNC: NEGATIVE MG/DL
SP GR UR STRIP: 1.02 (ref 1–1.03)
URINE REFLEX TO CULTURE: NORMAL
UROBILINOGEN UR STRIP-ACNC: 0.2 E.U./DL

## 2025-06-14 PROCEDURE — 97535 SELF CARE MNGMENT TRAINING: CPT

## 2025-06-14 PROCEDURE — 1180000000 HC REHAB R&B

## 2025-06-14 PROCEDURE — 6370000000 HC RX 637 (ALT 250 FOR IP): Performed by: INTERNAL MEDICINE

## 2025-06-14 PROCEDURE — 97530 THERAPEUTIC ACTIVITIES: CPT

## 2025-06-14 PROCEDURE — 97162 PT EVAL MOD COMPLEX 30 MIN: CPT

## 2025-06-14 PROCEDURE — 6370000000 HC RX 637 (ALT 250 FOR IP): Performed by: PHYSICAL MEDICINE & REHABILITATION

## 2025-06-14 PROCEDURE — 97116 GAIT TRAINING THERAPY: CPT

## 2025-06-14 PROCEDURE — 97166 OT EVAL MOD COMPLEX 45 MIN: CPT

## 2025-06-14 PROCEDURE — 81003 URINALYSIS AUTO W/O SCOPE: CPT

## 2025-06-14 PROCEDURE — 97140 MANUAL THERAPY 1/> REGIONS: CPT

## 2025-06-14 PROCEDURE — 51798 US URINE CAPACITY MEASURE: CPT

## 2025-06-14 PROCEDURE — 97110 THERAPEUTIC EXERCISES: CPT

## 2025-06-14 RX ORDER — CARVEDILOL 6.25 MG/1
6.25 TABLET ORAL 2 TIMES DAILY WITH MEALS
Status: DISCONTINUED | OUTPATIENT
Start: 2025-06-14 | End: 2025-06-20 | Stop reason: HOSPADM

## 2025-06-14 RX ORDER — ISOSORBIDE MONONITRATE 30 MG/1
30 TABLET, EXTENDED RELEASE ORAL DAILY
Status: DISCONTINUED | OUTPATIENT
Start: 2025-06-14 | End: 2025-06-20

## 2025-06-14 RX ORDER — LISINOPRIL 10 MG/1
10 TABLET ORAL DAILY
Status: DISCONTINUED | OUTPATIENT
Start: 2025-06-14 | End: 2025-06-20 | Stop reason: HOSPADM

## 2025-06-14 RX ORDER — FINASTERIDE 5 MG/1
5 TABLET, FILM COATED ORAL DAILY
Status: DISCONTINUED | OUTPATIENT
Start: 2025-06-14 | End: 2025-06-20 | Stop reason: HOSPADM

## 2025-06-14 RX ORDER — ACETAMINOPHEN 325 MG/1
650 TABLET ORAL EVERY 4 HOURS PRN
Status: DISCONTINUED | OUTPATIENT
Start: 2025-06-14 | End: 2025-06-20 | Stop reason: HOSPADM

## 2025-06-14 RX ORDER — LATANOPROST 50 UG/ML
1 SOLUTION/ DROPS OPHTHALMIC NIGHTLY
Status: DISCONTINUED | OUTPATIENT
Start: 2025-06-14 | End: 2025-06-20 | Stop reason: HOSPADM

## 2025-06-14 RX ORDER — PRAVASTATIN SODIUM 40 MG
80 TABLET ORAL NIGHTLY
Status: DISCONTINUED | OUTPATIENT
Start: 2025-06-14 | End: 2025-06-20 | Stop reason: HOSPADM

## 2025-06-14 RX ORDER — ALBUTEROL SULFATE 90 UG/1
2 INHALANT RESPIRATORY (INHALATION) EVERY 6 HOURS PRN
Status: DISCONTINUED | OUTPATIENT
Start: 2025-06-14 | End: 2025-06-20 | Stop reason: HOSPADM

## 2025-06-14 RX ORDER — DONEPEZIL HYDROCHLORIDE 5 MG/1
5 TABLET, FILM COATED ORAL DAILY
Status: DISCONTINUED | OUTPATIENT
Start: 2025-06-14 | End: 2025-06-20 | Stop reason: HOSPADM

## 2025-06-14 RX ORDER — CLOPIDOGREL BISULFATE 75 MG/1
75 TABLET ORAL DAILY
Status: DISCONTINUED | OUTPATIENT
Start: 2025-06-14 | End: 2025-06-20 | Stop reason: HOSPADM

## 2025-06-14 RX ORDER — DORZOLAMIDE HYDROCHLORIDE AND TIMOLOL MALEATE 20; 5 MG/ML; MG/ML
1 SOLUTION/ DROPS OPHTHALMIC 2 TIMES DAILY
Status: DISCONTINUED | OUTPATIENT
Start: 2025-06-14 | End: 2025-06-20 | Stop reason: HOSPADM

## 2025-06-14 RX ORDER — POLYETHYLENE GLYCOL 3350 17 G/17G
17 POWDER, FOR SOLUTION ORAL DAILY
Status: DISCONTINUED | OUTPATIENT
Start: 2025-06-14 | End: 2025-06-19 | Stop reason: SDUPTHER

## 2025-06-14 RX ORDER — CARBIDOPA AND LEVODOPA 25; 100 MG/1; MG/1
1 TABLET ORAL 2 TIMES DAILY
Status: DISCONTINUED | OUTPATIENT
Start: 2025-06-14 | End: 2025-06-20 | Stop reason: HOSPADM

## 2025-06-14 RX ORDER — MECOBALAMIN 5000 MCG
5 TABLET,DISINTEGRATING ORAL NIGHTLY PRN
Status: DISCONTINUED | OUTPATIENT
Start: 2025-06-14 | End: 2025-06-20 | Stop reason: HOSPADM

## 2025-06-14 RX ORDER — BISACODYL 10 MG
10 SUPPOSITORY, RECTAL RECTAL DAILY PRN
Status: DISCONTINUED | OUTPATIENT
Start: 2025-06-14 | End: 2025-06-20 | Stop reason: HOSPADM

## 2025-06-14 RX ORDER — ACETAMINOPHEN 325 MG/1
650 TABLET ORAL EVERY 6 HOURS PRN
Status: DISCONTINUED | OUTPATIENT
Start: 2025-06-14 | End: 2025-06-14 | Stop reason: DRUGHIGH

## 2025-06-14 RX ORDER — SODIUM PHOSPHATE, DIBASIC AND SODIUM PHOSPHATE, MONOBASIC 7; 19 G/230ML; G/230ML
1 ENEMA RECTAL DAILY PRN
Status: DISCONTINUED | OUTPATIENT
Start: 2025-06-14 | End: 2025-06-20 | Stop reason: HOSPADM

## 2025-06-14 RX ADMIN — CARVEDILOL 6.25 MG: 6.25 TABLET, FILM COATED ORAL at 17:47

## 2025-06-14 RX ADMIN — CLOPIDOGREL BISULFATE 75 MG: 75 TABLET, FILM COATED ORAL at 08:03

## 2025-06-14 RX ADMIN — LISINOPRIL 10 MG: 10 TABLET ORAL at 08:03

## 2025-06-14 RX ADMIN — APIXABAN 5 MG: 5 TABLET, FILM COATED ORAL at 20:45

## 2025-06-14 RX ADMIN — LATANOPROST 1 DROP: 50 SOLUTION OPHTHALMIC at 20:44

## 2025-06-14 RX ADMIN — CARVEDILOL 6.25 MG: 6.25 TABLET, FILM COATED ORAL at 08:03

## 2025-06-14 RX ADMIN — ISOSORBIDE MONONITRATE 30 MG: 30 TABLET, EXTENDED RELEASE ORAL at 08:04

## 2025-06-14 RX ADMIN — POLYETHYLENE GLYCOL 3350 17 G: 17 POWDER, FOR SOLUTION ORAL at 12:54

## 2025-06-14 RX ADMIN — DORZOLAMIDE HYDROCHLORIDE AND TIMOLOL MALEATE 1 DROP: 20; 5 SOLUTION/ DROPS OPHTHALMIC at 12:54

## 2025-06-14 RX ADMIN — APIXABAN 5 MG: 5 TABLET, FILM COATED ORAL at 15:18

## 2025-06-14 RX ADMIN — FINASTERIDE 5 MG: 5 TABLET, FILM COATED ORAL at 08:04

## 2025-06-14 RX ADMIN — PRAVASTATIN SODIUM 80 MG: 40 TABLET ORAL at 20:45

## 2025-06-14 RX ADMIN — DONEPEZIL HYDROCHLORIDE 5 MG: 5 TABLET, FILM COATED ORAL at 08:03

## 2025-06-14 RX ADMIN — CARBIDOPA AND LEVODOPA 1 MG: 25; 100 TABLET ORAL at 12:50

## 2025-06-14 RX ADMIN — CARBIDOPA AND LEVODOPA 1 MG: 25; 100 TABLET ORAL at 08:04

## 2025-06-14 RX ADMIN — DORZOLAMIDE HYDROCHLORIDE AND TIMOLOL MALEATE 1 DROP: 20; 5 SOLUTION/ DROPS OPHTHALMIC at 20:43

## 2025-06-14 ASSESSMENT — PAIN SCALES - GENERAL: PAINLEVEL_OUTOF10: 0

## 2025-06-14 NOTE — PROGRESS NOTES
OCCUPATIONAL THERAPY  INPATIENT REHAB TREATMENT NOTE  [facility]      NAME: Hamilton Torres  : 1935 (89 y.o.)  MRN: [id number]  CODE STATUS: Prior  Room: R244/R244-01    Date of Service: 2025    Referring Physician: Dr. Troy  Rehab Diagnosis: Impaired mobility and ADLs d/t OA exacerbation    Hospital course:   Comments: Hospital Course: 88 y/o male pt who presented to [facility] from his assisted living with reports of multiple falls. Pt with chronic knee pain r/t OA and has recently had knee injections done by ortho. Though intially knees reportedly were better pt admits to kneebuckling causing the falls. CT (H) and (C) spine negative. Xrays of knees also negative for acute findings but did show degenerative changes. Labs obtained and w/o significant findings. Pt unsafe to return back to his AL from ER d/t recent falls and while being on blood thinners. PT/OT saw pt with recommendation for high intensity therapy.      Restrictions  Restrictions/Precautions  Restrictions/Precautions: Fall Risk     Patient's date of birth confirmed: Yes    SAFETY:  Safety Devices  Safety Devices in place: Yes  Type of devices: All fall risk precautions in place    SUBJECTIVE: \"Is Hiram still here?\"     Pain   Pain at start of treatment: No    Pain at end of treatment: No      OBJECTIVE:    Bed Mobility  Supine to Sit  Assistance Level: Stand by assist  Skilled Clinical Factors: HOB slightly elevated, Increased time and effort  Scooting  Assistance Level: Stand by assist    Bed To/From Chair  Technique: Stand pivot  Assistance Level: Minimal assistance  Skilled Clinical Factors: EOB > WC, Increased time and effort. Pt has difficulty advancing feet    UE Strengthening/Activity Tolerance  Socks: Pt wore 1# wrist weights and used bilateral hands to retrieve socks that were spread out across the tabletop. Pt then matched/folded socks and placed them in a basket placed on the floor to the right of him. Pt had Min

## 2025-06-14 NOTE — PROGRESS NOTES
benefit from trying a reacher and sock aid for increased IND with dressing    OT Education:  Education Given To: Patient  Education Provided: Role of Therapy, Plan of Care, Transfer Training, Mobility Training, Safety  Education Provided Comments: Pt educated on plan for therapy and expectations, educated on safety with transfers  Education Method: Verbal, Demonstration  Barriers to Learning: None  Education Outcome: Verbalized understanding, Demonstrated understanding      Plan:  Occupational Therapy Plan  Times Per Week: 5-7x/week  Hours Per Day: 1.5 hours  Therapy Duration: 2 Weeks  Current Treatment Recommendations: Strengthening;Balance training;Functional mobility training;Endurance training;Safety education & training;Patient/Caregiver education & training;Equipment evaluation, education, & procurement;Self-Care / ADL;Home management training    Goals:  Patient goals : To be able to walk, improve balance         - Patient will complete self care as followed using the recommended adaptive equipment and/or adaptive techniques as instructed:  Feeding: Independent  Grooming: Mod I  Bathing: Mod I  UE Dressing: Mod I  LE Dressing: Mod I  Footwear: Mod I  Toileting: Mod I  Toilet Transfer: Mod I  Shower/Tub Transfer: Supervision  - Patient will improve static and dynamic standing balance to complete pants management at MOD I level  - Patient will improve functional endurance to tolerate/complete 60 minutes of ADLs.  - Patient will improve B UE strength and endurance to WFL in order to participate in self-care activities as projected.  - Patient will perform kitchen mobility at device level without episodes of LOB and good safety awareness   - Patient will perform basic room mobility at MOD I level.  - Pt's current cognitive status is:  Comprehension: Independent  Expression: Independent  Social Interaction: Independent  Problem Solving: Supervision  Memory: Supervision  -Patient will participate in [facility] cognitive  assessment     Therapy Time:   Individual   Time In 0820   Time Out 0925   Minutes 65            Eval: 65 minutes     Electronically signed by:    JOSE MANUEL Melissa/L,   6/14/2025, 1:06 PM

## 2025-06-14 NOTE — PROGRESS NOTES
Pt. Noted with limited output this afternoon. Therapy stated he had just voided. Bladder scanned for greater than 300 order to straight cath.   Blade assisted with straight cath. Pt tolerated well.

## 2025-06-14 NOTE — PROGRESS NOTES
Physical Therapy Rehab Treatment Note  Facility/Department: [facility] RE[facility]  Room: [id number]       NAME: Hamilton Torres  : 1935 (89 y.o.)  MRN: [id number]  CODE STATUS: Prior    Date of Service: 2025  Chart Reviewed: Yes  Family/Caregiver Present: No    Restrictions:  Restrictions/Precautions: Fall Risk       SUBJECTIVE:   Subjective: \"I want to walk a much as I can\"    Pain   Mild bilateral knee pain. States he doesn't need pain meds. Tolerable.       OBJECTIVE:   Orientation  Overall Orientation Status: Within Functional Limits  Cognition  Overall Cognitive Status: WFL              Transfers  Sit to Stand: Minimal Assistance;Partial/Moderate assistance  Stand to Sit: Minimal Assistance;Partial/Moderate assistance    Ambulation  Surface: Level tile  Device: Rollator  Assistance: Supervision or touching assistance  Distance: 100 feet x 2 with seated rest break after 100 feet.     Stairs/Curb  Stairs?: No         Neuromuscular Education  Neuromuscular Comments: standing balance. pt not able to keep balance without bracing legs against mat table. seated elbow props alternating  Diagonal reaching edge of bed.   PT Exercises  PROM Exercises: stretches to bilateral lower extremities to pts tolerance. right tighter than left. seated position.  Exercise Equipment: swiss ball longseated. lateral flexion and knee flexion. cues for redirection.   Weighted ball overhead flexion, rotation, fwd rolls downward.                      ASSESSMENT/PROGRESS TOWARDS GOALS: pt very motivated to get through rehab and back to his residence.        Goals:  Long Term Goals  Long Term Goal 1: Pt to complete bed mobility with indep  Long Term Goal 2: Pt to complete transfers with indep  Long Term Goal 3: Pt to ambulate 50ft with LRD and indep  Long Term Goal 4: Pt to ambulate 150ft with LRD and supervision  Long Term Goal 5: pt to complete curb step with HR and SBA  Long term goal 6: Pt to complete TUG within 13.5sec.  Long term

## 2025-06-14 NOTE — CONSULTS
Consult      Patient:  Hamilton Torres  YOB: 1935    MRN: [id number]     Acct: [id number]    Primary Care Physician: Cesilia Martínez MD    HISTORY OF PRESENT ILLNESS:   History obtained from chart review and the patient.    The patient is a 89 y.o. male whom I have been requested to see by Dr. Castillo for evaluation of medical management. Patient was presented to  ER yesterday from assistant living facility due to repeated falls, head trauma/weakness. Was evaluated, CVA/MI/fractures were ruled out, was evaluated by PT and directly transferred to [facility] acute rehab     Past Medical History:        Diagnosis Date    Anticoagulant long-term use     Anxiety     Bursitis of hip     CAD (coronary artery disease)     Carotid stenosis     2/2013 16-49%    Cervical disc disorder     CHF (congestive heart failure) ([facility])     COVID-19 virus infection 01/21/2023    Dementia (HCC)     Dementia (HCC)     Hyperlipidemia     Hypertension     MI (myocardial infarction) ([facility])     Osteoarthritis     Spinal stenosis        Past Surgical History:        Procedure Laterality Date    CARPAL TUNNEL RELEASE  1998    CATARACT REMOVAL  2007    COLONOSCOPY  12/10/10,2007    DR MATTHEWS - DONE AT [facility]    COLONOSCOPY  2007    hx polyps needs 2015    COLONOSCOPY  9/21/15     DR. YARBROUGH     CORONARY ANGIOPLASTY WITH STENT PLACEMENT  06/07/2020    DIAGNOSTIC CARDIAC CATH LAB PROCEDURE      HERNIA REPAIR Bilateral     x 2    HERNIA REPAIR Right 11/13/2020    RIGHT INGUINAL HERNIA REPAIR performed by Roscoe Copeland MD at [facility] OR    PTCA         Medications:    No current facility-administered medications on file prior to encounter.     Current Outpatient Medications on File Prior to Encounter   Medication Sig Dispense Refill    apixaban (ELIQUIS) 5 MG TABS tablet Take 1 tablet by mouth 2 times daily 60 tablet 3    carbidopa-levodopa (SINEMET)  MG per tablet Take 1 tablet by mouth 2 times daily 60 tablet 3

## 2025-06-14 NOTE — PLAN OF CARE
Problem: Chronic Conditions and Co-morbidities  Goal: Patient's chronic conditions and co-morbidity symptoms are monitored and maintained or improved  Outcome: Progressing  Flowsheets (Taken 6/14/2025 1305)  Care Plan - Patient's Chronic Conditions and Co-Morbidity Symptoms are Monitored and Maintained or Improved: Monitor and assess patient's chronic conditions and comorbid symptoms for stability, deterioration, or improvement

## 2025-06-14 NOTE — PROGRESS NOTES
Facility/Department: [facility]  Rehabilitation Initial Assessment: Physical Therapy  Room: R244R244-01    NAME: Hamilton Torres  : 1935  MRN: [id number]    Date of Service: 2025    Rehab Diagnosis(es): Falls  Patient Active Problem List    Diagnosis Date Noted    Congestive heart failure (HCC) 2020    COVID 2023    Adrenal incidentaloma 2024    Adrenal adenoma, left 2024    Pulmonary embolus (HCC) 2024    Gait disturbance 12/10/2024    Primary osteoarthritis involving multiple joints 2024    Parkinsonism ([facility]) 2024    Impaired mobility & ADLs dt PD and OA flare up 2024    Cervical spinal stenosis 2024    Falling 2024    CNVM (choroidal neovascular membrane), bilateral 2023    Right inguinal hernia 2020    Syncope and collapse 2020    Post PTCA     Bilateral carotid bruits 2020    Unstable angina ([facility]) 2020    ACS (acute coronary syndrome) ([facility]) 2020    Non-STEMI (non-ST elevated myocardial infarction) ([facility]) 2020    CHF (congestive heart failure) ([facility])     Coronary artery disease involving native coronary artery of native heart without angina pectoris 2019    Dizziness     Anginal equivalent 2018    S/P cardiac cath 2018    Angina, class III     Chest pain 2018    HESS (dyspnea on exertion) 2018    Leg edema 2018    Acute diastolic heart failure ([facility]) 2018    Essential hypertension 2018    NSTEMI (non-ST elevated myocardial infarction) ([facility]) 2018    Hypertensive urgency 2018    Dementia ([facility])     Carotid stenosis, bilateral 2013    Hyperlipidemia        Past Medical History:   Diagnosis Date    Anticoagulant long-term use     Anxiety     Bursitis of hip     CAD (coronary artery disease)     Carotid stenosis     2013 16-49%    Cervical disc disorder     CHF (congestive heart failure) ([facility])     COVID-19 virus infection 2023     Med  Clinical Presentation: Med    Therapy Prognosis: Good  Barriers to Learning: none           CLINICAL IMPRESSION: Pt presents to [facility] rehab following falls at home with reported head strike. Pt demonstrates impaired balance, and strength which has limited his mobility and increased his risk of falls. Continued PT indicated to progress mobility and facilitate DC at highest level of indep and safety.    PLAN OF CARE:  Frequency: 1-2 treatment sessions per day, 5-7 days per week     Current Treatment Recommendations: Strengthening, ROM, Balance training, Functional mobility training, Transfer training, Endurance training, Gait training, Stair training, Neuromuscular re-education, Patient/Caregiver education & training, Safety education & training, Home exercise program, Equipment evaluation, education, & procurement  Requires PT Follow-Up: Yes    Patient's Goal:  \"I want to work on my balance and stay out of the wheelchair.\"    GOALS:  Patient Goals : \"I want to work on my balance and stay out of the wheelchair.\"  Long Term Goals  Long Term Goal 1: Pt to complete bed mobility with indep  Long Term Goal 2: Pt to complete transfers with indep  Long Term Goal 3: Pt to ambulate 50ft with LRD and indep  Long Term Goal 4: Pt to ambulate 150ft with LRD and supervision  Long Term Goal 5: pt to complete curb step with HR and SBA  Long term goal 6: Pt to complete TUG within 13.5sec.  Long term goal 7: Pt to complete 5X STS witin 15sec to demo improved strength and agility    ELOS:   Therapy Duration: 4-7 Days    Therapy Time:    Individual   Time In 0925   Time Out 0955   Minutes 30             Gina Martell PT, 06/14/25 at 11:26 AM

## 2025-06-14 NOTE — H&P
HISTORY & PHYSICAL       DATE OF ADMISSION:  6/13/2025    DATE OF SERVICE:  6/14/25    Subjective:    Hamilton Torres, 89 y.o. male presents today with:     CHIEF COMPLAINT:   falls     HPI    I reviewed recent nursing note, \" see notes \".    89-year-old gentleman presenting to the emergency department with a history of falls difficulty taking his Parkinson's medications current currently they have been resumed at twice a day.  He is having knee buckling he underwent a CAT scan of the spine which demonstrated no cord compression he was living at the assisted living prior to this and was on blood thinners he has been admitted to rehab given marked functional loss.    The patient has stabilized medically and is able to participate at acute level rehab but is too medically complex for SNF due to need for therapy at the acute level with at least 15 hours a week of PT OT and cognitive and recreational therapy at an acute level with daily medical monitoring.         Imaging:  Imaging and other studies reviewed and discussed with patient and staff    XR knee 1-2 views bilateral  Result Date: 6/13/2025  Interpreted By:  Roge Guillaume, STUDY: XR KNEE 1-2 VIEWS BILATERAL; 6/13/2025 9:35 am    INDICATION: Signs/Symptoms:falls, pain.    COMPARISON: None.    ACCESSION NUMBER(S): [id number]    ORDERING CLINICIAN: REEMA APONTE    TECHNIQUE: Bilateral knees, two views each    FINDINGS: No fractures or destructive lesions are identified. Right knee : *There is mild mediolateral tibiofemoral marginal spurring. There is marked narrowing of the patellofemoral joint space with a bone-on-bone appearance along with superior and inferior patellar marginal spurring posteriorly. No definite effusion is appreciated. Arterial calcifications are present.       Left knee : *There is mild medial and lateral tibiofemoral marginal spurring.  There is posterior patellar spurring both superiorly and inferiorly with subcortical degenerative  prognosis are good.      8.  Consult [facility] regarding the patient's back up to general medical needs.    A welcome letter was presented with an explanation of my services, my specialty and what to expect during the rehabilitation process.  As well as introducing myself, I also wrote my name on their bedside marker board with their name as well as the names of the other physicians with an explanation of our individual roles in their care, as well as the rehab process.          YESSI Castillo D.O., F.A.A.P.M.&NEELIMA

## 2025-06-14 NOTE — PROGRESS NOTES
Patient is resting quietly in bed with eyes closed.  Respirations are even and non-labored.  No distress observed.

## 2025-06-14 NOTE — PROGRESS NOTES
06/14/25 1500   RT Protocol   History Pulmonary Disease 0   Respiratory pattern 0   Breath sounds 2   Cough 0   Indications for Bronchodilator Therapy On home bronchodilators   Bronchodilator Assessment Score 2

## 2025-06-14 NOTE — PROGRESS NOTES
Patient arrived to room 244 via squad.  Patient is Patient is alert and oriented x3, respirations are even and non-labored.  Skin check completed by twp RN's. Skin issues noted. Incontinent care rendered upon arrival to room.  V/S obtained.  B/P elevated.  See flowsheets.

## 2025-06-15 PROCEDURE — 1180000000 HC REHAB R&B

## 2025-06-15 PROCEDURE — 6370000000 HC RX 637 (ALT 250 FOR IP): Performed by: PHYSICAL MEDICINE & REHABILITATION

## 2025-06-15 PROCEDURE — 6370000000 HC RX 637 (ALT 250 FOR IP): Performed by: INTERNAL MEDICINE

## 2025-06-15 RX ADMIN — DORZOLAMIDE HYDROCHLORIDE AND TIMOLOL MALEATE 1 DROP: 20; 5 SOLUTION/ DROPS OPHTHALMIC at 12:37

## 2025-06-15 RX ADMIN — PRAVASTATIN SODIUM 80 MG: 40 TABLET ORAL at 22:00

## 2025-06-15 RX ADMIN — FINASTERIDE 5 MG: 5 TABLET, FILM COATED ORAL at 08:34

## 2025-06-15 RX ADMIN — CARVEDILOL 6.25 MG: 6.25 TABLET, FILM COATED ORAL at 17:11

## 2025-06-15 RX ADMIN — CARBIDOPA AND LEVODOPA 1 MG: 25; 100 TABLET ORAL at 08:34

## 2025-06-15 RX ADMIN — CLOPIDOGREL BISULFATE 75 MG: 75 TABLET, FILM COATED ORAL at 08:34

## 2025-06-15 RX ADMIN — ISOSORBIDE MONONITRATE 30 MG: 30 TABLET, EXTENDED RELEASE ORAL at 08:35

## 2025-06-15 RX ADMIN — POLYETHYLENE GLYCOL 3350 17 G: 17 POWDER, FOR SOLUTION ORAL at 08:34

## 2025-06-15 RX ADMIN — APIXABAN 5 MG: 5 TABLET, FILM COATED ORAL at 22:00

## 2025-06-15 RX ADMIN — DONEPEZIL HYDROCHLORIDE 5 MG: 5 TABLET, FILM COATED ORAL at 08:34

## 2025-06-15 RX ADMIN — LISINOPRIL 10 MG: 10 TABLET ORAL at 08:34

## 2025-06-15 RX ADMIN — DORZOLAMIDE HYDROCHLORIDE AND TIMOLOL MALEATE 1 DROP: 20; 5 SOLUTION/ DROPS OPHTHALMIC at 22:01

## 2025-06-15 RX ADMIN — CARVEDILOL 6.25 MG: 6.25 TABLET, FILM COATED ORAL at 08:34

## 2025-06-15 RX ADMIN — ACETAMINOPHEN 650 MG: 325 TABLET ORAL at 05:25

## 2025-06-15 RX ADMIN — APIXABAN 5 MG: 5 TABLET, FILM COATED ORAL at 08:35

## 2025-06-15 RX ADMIN — CARBIDOPA AND LEVODOPA 1 MG: 25; 100 TABLET ORAL at 12:40

## 2025-06-15 ASSESSMENT — PAIN DESCRIPTION - LOCATION: LOCATION: KNEE

## 2025-06-15 ASSESSMENT — PAIN SCALES - GENERAL
PAINLEVEL_OUTOF10: 0
PAINLEVEL_OUTOF10: 5
PAINLEVEL_OUTOF10: 0

## 2025-06-15 ASSESSMENT — PAIN DESCRIPTION - ORIENTATION: ORIENTATION: RIGHT;LEFT

## 2025-06-15 NOTE — PROGRESS NOTES
Pt. Up to the toilet, incontinent of urine. Assistance provided to change depends. Pt. Noticed scant red blood from the tip of his penis.   Pt. States this is new.   Pt. Requested to lay down after lunch. When asked if he needed to use the toilet first he refused then informed me he urinated earlier in his depend. Pt. Was educated on the importance of keeping dry and to call us if he is wet tawanna due to the fact he has the sheering to his buttock.   Will do more frequent check and change d/t his poor memory recall.

## 2025-06-15 NOTE — PLAN OF CARE
Problem: Chronic Conditions and Co-morbidities  Goal: Patient's chronic conditions and co-morbidity symptoms are monitored and maintained or improved  Recent Flowsheet Documentation  Taken 6/15/2025 1125 by Randi Ceballos RN  Care Plan - Patient's Chronic Conditions and Co-Morbidity Symptoms are Monitored and Maintained or Improved: Monitor and assess patient's chronic conditions and comorbid symptoms for stability, deterioration, or improvement     Problem: Skin/Tissue Integrity  Goal: Skin integrity remains intact  Description: 1.  Monitor for areas of redness and/or skin breakdown  2.  Assess vascular access sites hourly  3.  Every 4-6 hours minimum:  Change oxygen saturation probe site  4.  Every 4-6 hours:  If on nasal continuous positive airway pressure, respiratory therapy assess nares and determine need for appliance change or resting period  Recent Flowsheet Documentation  Taken 6/15/2025 1125 by Randi Ceballos, RN  Skin Integrity Remains Intact: Monitor for areas of redness and/or skin breakdown

## 2025-06-15 NOTE — PROGRESS NOTES
Subjective:  The patient complains of ,\" moderate acute on chronic progressive fatigue and  weakness  partially relieved by rest, medications, PT,  OT,   SLP and rest and exacerbated by recent    events.      I am concerned about patient’s medical complexities and barriers to advancing in rehab goals including  see notes .        I reviewed current care and plans for further care with other rehab providers including nursing and case management.  According to recent nursing note, \"  see notes  \".    ROS x10:  The patient also complains of severely impaired mobility and activities of daily living.  Otherwise no new problems with vision, hearing, nose, mouth, throat, dermal, cardiovascular, GI, , pulmonary, musculoskeletal, psychiatric or neurological. See also Acute Rehab PM&R H&P.       Vital signs:  BP (!) 121/57   Pulse 72   Temp 97.5 °F (36.4 °C) (Oral)   Resp 16   Ht 1.778 m (5' 10\")   Wt 75.8 kg (167 lb 1.7 oz)   SpO2 95%   BMI 23.98 kg/m²   I/O:   PO/Intake:  fair PO intake,    diet    Bowel:   continent   Bladder: continent  no  guzman  General:  Patient is well developed,   adequately nourished, and    well kempt.     HEENT:    Pupils equal, hearing intact to loud voice, external inspection of ear and nose benign.  Inspection of lips, tongue and gums benign    Musculoskeletal: No significant change in strength or tone.  All joints stable.      Inspection and palpation of digits and nails show no clubbing, cyanosis or inflammatory conditions.   Neuro/Psychiatric: Affect: flat but pleasant.  Alert and oriented to person, place and situation with    cues.  No significant change in deep tendon reflexes or sensation  Lungs:  Diminished, CTA-B. Respiration effort is   normal at rest.     Heart:   S1 = S2,   RRR.       Abdomen:  Soft, non-tender, no enlargement of liver or spleen.  Extremities:    lower extremity edema and tenderness   Skin:   Intact to general survey,      Rehabilitation:  Physical  medication side effects, risk of tolerance/dependence & alternative treatments discussed.;No signs of potential drug abuse or diversion identified.;Assessed functional status (ability to engage in work or other purposeful activities, the pain intensity and its interference with activities of daily living, quality of family life and social activities, and the physical activity);Obtaining appropriate analgesic effect of treatment.       Skin healing   and breakdown risk:  continue pressure relief program.  Daily skin exams and reports from nursing.  Fatigue due to nutritional and hydration deficiency: Add and titrate vitamin B12 vitamin D and CoQ10 continue to monitor I&O’s, calorie counts prn, dietary consult prn.  Add healthy snack at night.  Acute episodic insomnia with situational adjustment disorder:  prn Ambien, monitor for day time sedation.  Falls risk elevated:  patient to use call light to get nursing assistance to get up, bed and chair alarm.  Elevated DVT risk: progressive activities in PT, continue prophylaxis CASIMIRO hose, elevation and meds-see MAR  .   Oral pharyngeal dysphagia-up in a degrees of feeds-modified diet as needed.  Complex discharge planning:  Weekly team meeting every Monday  to re-assess progress towards goals, discuss and address social, psychological and medical comorbidities and to address difficulties they may be having progressing in therapy.  Patient and family education is in progress.  The patient is to follow-up with their family physician after discharge.        Complex Active General Medical Issues that complicate care Assess & Plan:    Patient Active Problem List   Diagnosis    Hyperlipidemia    Carotid stenosis, bilateral    Dementia ([facility])    Hypertensive urgency    NSTEMI (non-ST elevated myocardial infarction) ([facility])    Essential hypertension    HESS (dyspnea on exertion)    Leg edema    Acute diastolic heart failure ([facility])    Chest pain    Angina, class III    S/P cardiac cath

## 2025-06-16 PROBLEM — R26.9 GAIT DISTURBANCE: Status: RESOLVED | Noted: 2024-12-10 | Resolved: 2025-06-16

## 2025-06-16 PROBLEM — R29.6 FREQUENT FALLS: Status: ACTIVE | Noted: 2025-06-13

## 2025-06-16 PROBLEM — I24.9 ACS (ACUTE CORONARY SYNDROME) (HCC): Status: RESOLVED | Noted: 2020-06-07 | Resolved: 2025-06-16

## 2025-06-16 PROBLEM — U07.1 COVID: Status: RESOLVED | Noted: 2023-01-21 | Resolved: 2025-06-16

## 2025-06-16 LAB
ANION GAP SERPL CALCULATED.3IONS-SCNC: 9 MEQ/L (ref 9–15)
BASOPHILS # BLD: 0.1 K/UL (ref 0–0.2)
BASOPHILS NFR BLD: 1 %
BUN SERPL-MCNC: 26 MG/DL (ref 8–23)
CALCIUM SERPL-MCNC: 8.5 MG/DL (ref 8.5–9.9)
CHLORIDE SERPL-SCNC: 104 MEQ/L (ref 95–107)
CO2 SERPL-SCNC: 24 MEQ/L (ref 20–31)
CREAT SERPL-MCNC: 0.78 MG/DL (ref 0.7–1.2)
EOSINOPHIL # BLD: 0.2 K/UL (ref 0–0.7)
EOSINOPHIL NFR BLD: 3 %
ERYTHROCYTE [DISTWIDTH] IN BLOOD BY AUTOMATED COUNT: 17.6 % (ref 11.5–14.5)
GLUCOSE SERPL-MCNC: 97 MG/DL (ref 70–99)
HCT VFR BLD AUTO: 32.6 % (ref 42–52)
HGB BLD-MCNC: 10.1 G/DL (ref 14–18)
LYMPHOCYTES # BLD: 1.4 K/UL (ref 1–4.8)
LYMPHOCYTES NFR BLD: 22 %
MCH RBC QN AUTO: 24.5 PG (ref 27–31.3)
MCHC RBC AUTO-ENTMCNC: 31 % (ref 33–37)
MCV RBC AUTO: 79.1 FL (ref 79–92.2)
MONOCYTES # BLD: 0.8 K/UL (ref 0.2–0.8)
MONOCYTES NFR BLD: 13.4 %
NEUTROPHILS # BLD: 3.8 K/UL (ref 1.4–6.5)
NEUTS SEG NFR BLD: 60.3 %
PLATELET # BLD AUTO: 266 K/UL (ref 130–400)
POTASSIUM SERPL-SCNC: 4.2 MEQ/L (ref 3.4–4.9)
RBC # BLD AUTO: 4.12 M/UL (ref 4.7–6.1)
SODIUM SERPL-SCNC: 137 MEQ/L (ref 135–144)
WBC # BLD AUTO: 6.3 K/UL (ref 4.8–10.8)

## 2025-06-16 PROCEDURE — 6370000000 HC RX 637 (ALT 250 FOR IP): Performed by: PHYSICAL MEDICINE & REHABILITATION

## 2025-06-16 PROCEDURE — 36415 COLL VENOUS BLD VENIPUNCTURE: CPT

## 2025-06-16 PROCEDURE — 97112 NEUROMUSCULAR REEDUCATION: CPT

## 2025-06-16 PROCEDURE — 80048 BASIC METABOLIC PNL TOTAL CA: CPT

## 2025-06-16 PROCEDURE — 97535 SELF CARE MNGMENT TRAINING: CPT

## 2025-06-16 PROCEDURE — 1180000000 HC REHAB R&B

## 2025-06-16 PROCEDURE — 6370000000 HC RX 637 (ALT 250 FOR IP): Performed by: INTERNAL MEDICINE

## 2025-06-16 PROCEDURE — 97116 GAIT TRAINING THERAPY: CPT

## 2025-06-16 PROCEDURE — 97110 THERAPEUTIC EXERCISES: CPT

## 2025-06-16 PROCEDURE — 97530 THERAPEUTIC ACTIVITIES: CPT

## 2025-06-16 PROCEDURE — 85025 COMPLETE CBC W/AUTO DIFF WBC: CPT

## 2025-06-16 PROCEDURE — 99233 SBSQ HOSP IP/OBS HIGH 50: CPT | Performed by: PHYSICAL MEDICINE & REHABILITATION

## 2025-06-16 RX ORDER — SENNOSIDES 8.6 MG/1
1 TABLET ORAL NIGHTLY
Status: DISCONTINUED | OUTPATIENT
Start: 2025-06-16 | End: 2025-06-19 | Stop reason: SDUPTHER

## 2025-06-16 RX ADMIN — DORZOLAMIDE HYDROCHLORIDE AND TIMOLOL MALEATE 1 DROP: 20; 5 SOLUTION/ DROPS OPHTHALMIC at 08:33

## 2025-06-16 RX ADMIN — FINASTERIDE 5 MG: 5 TABLET, FILM COATED ORAL at 08:28

## 2025-06-16 RX ADMIN — DONEPEZIL HYDROCHLORIDE 5 MG: 5 TABLET, FILM COATED ORAL at 08:29

## 2025-06-16 RX ADMIN — CLOPIDOGREL BISULFATE 75 MG: 75 TABLET, FILM COATED ORAL at 08:28

## 2025-06-16 RX ADMIN — POLYETHYLENE GLYCOL 3350 17 G: 17 POWDER, FOR SOLUTION ORAL at 08:28

## 2025-06-16 RX ADMIN — APIXABAN 5 MG: 5 TABLET, FILM COATED ORAL at 20:23

## 2025-06-16 RX ADMIN — SENNOSIDES 8.6 MG: 8.6 TABLET, FILM COATED ORAL at 20:23

## 2025-06-16 RX ADMIN — CARVEDILOL 6.25 MG: 6.25 TABLET, FILM COATED ORAL at 17:17

## 2025-06-16 RX ADMIN — LATANOPROST 1 DROP: 50 SOLUTION OPHTHALMIC at 22:11

## 2025-06-16 RX ADMIN — PRAVASTATIN SODIUM 80 MG: 40 TABLET ORAL at 20:23

## 2025-06-16 RX ADMIN — APIXABAN 5 MG: 5 TABLET, FILM COATED ORAL at 08:28

## 2025-06-16 RX ADMIN — CARVEDILOL 6.25 MG: 6.25 TABLET, FILM COATED ORAL at 08:29

## 2025-06-16 RX ADMIN — ACETAMINOPHEN 650 MG: 325 TABLET ORAL at 20:27

## 2025-06-16 RX ADMIN — LISINOPRIL 10 MG: 10 TABLET ORAL at 08:34

## 2025-06-16 RX ADMIN — CARBIDOPA AND LEVODOPA 1 MG: 25; 100 TABLET ORAL at 08:29

## 2025-06-16 RX ADMIN — ISOSORBIDE MONONITRATE 30 MG: 30 TABLET, EXTENDED RELEASE ORAL at 08:29

## 2025-06-16 RX ADMIN — CARBIDOPA AND LEVODOPA 1 MG: 25; 100 TABLET ORAL at 13:10

## 2025-06-16 RX ADMIN — DORZOLAMIDE HYDROCHLORIDE AND TIMOLOL MALEATE 1 DROP: 20; 5 SOLUTION/ DROPS OPHTHALMIC at 20:23

## 2025-06-16 NOTE — CONSULTS
[facility] Consult Note  Name: Hamilton Torres  Age: 89 y.o.  Gender: male  CodeStatus: DNR-CC  Allergies: No Known Allergies    Chief Complaint:No chief complaint on file.    Primary Care Provider: Cesilia Martínez MD  InpatientTreatment Team: Treatment Team:   Judith Castillo DO Kaplan, Mark, MD Patel, Keith DU, Darshana Quintanilla, Loren Bello, MSW, LSW  Cortney Alvarez, OTR/L  Hortencia, Yahaira SARAVIA, DEISY  Ayan, Toribio, OT  Inocente Diop Jacqueline, RN Hermann, Erica, RN  Admission Date: 6/13/2025      HPI   Consulting Provider: Dr. Mario Troncoso for Parkinson, fall  Pt seen and examined on rehab for neurology consult.  Patient is 89-year-old  male with past medical history of CAD with MI and coronary stent, carotid stenosis, CHF, dementia, hyperlipidemia, hypertension who was seen at [facility] emergency room on 6/13/2025 due to difficulty walking with recurrent falls.  Workup was done with CT of the head and CT of the cervical spine negative for acute intracranial findings or traumatic fracture or malalignment of the spine.  Basic laboratory testing was unremarkable.  Decision was made for placement at [facility] rehab for PT/OT.    He is a current patient of Dr. Sanchez, neurology.  Last seen by him on 2/26/2025 for Parkinson's disease and gait disturbance with mild cognitive impairment.  On carbidopa levodopa 25/100 mg twice daily.    No baseline hypotension noted.      Patient is currently alert and oriented x 3, no acute distress, cooperative.  Overall he reports he is doing well.  Minimal tremors of the upper extremities.  No significant rigidity.  Noted to be bradykinetic.  Denies dysphagia.  Denies neck or back pain.  No bowel or bladder dysfunction.  Reports chronic pain to bilateral knees from arthritis.  States his knees often give out on him.  Denies lightheadedness or dizziness.  Denies peripheral neuropathy.  Vitals:    06/16/25 0730   BP: (!)  Finger-Nose-Finger Test normal.      Deep Tendon Reflexes:      Reflex Scores:       Bicep reflexes are 2+ on the right side and 2+ on the left side.       Brachioradialis reflexes are 2+ on the right side and 2+ on the left side.       Patellar reflexes are 1+ on the right side and 1+ on the left side.       Achilles reflexes are 1+ on the right side and 1+ on the left side.        Family History   Problem Relation Age of Onset    Heart Disease Mother     High Blood Pressure Mother         Social History     Socioeconomic History    Marital status:      Spouse name: Not on file    Number of children: Not on file    Years of education: Not on file    Highest education level: Not on file   Occupational History    Not on file   Tobacco Use    Smoking status: Never    Smokeless tobacco: Never   Vaping Use    Vaping status: Never Used   Substance and Sexual Activity    Alcohol use: No     Comment: social    Drug use: No    Sexual activity: Not Currently   Other Topics Concern    Not on file   Social History Narrative    He is retired from  [employer], he is  and currently lives With: Alone    Type of Home: Assisted living-[address] in [facility]         Home Layout: One level- Level entry    Bathroom Shower/Tub: Walk-in shower    Bathroom Toilet: Standard, Equipment: Hand-held shower, Grab bars in shower, Built-in shower seat    Home Equipment: Cane, Grab bars, Reacher, Walker - Rolling (does not normally use canes)    Has the patient had two or more falls in the past year or any fall with injury in the past year?: Yes    ADL Assistance: Independent (occasionally asks for help)    Homemaking Assistance: Needs assistance, Homemaking Responsibilities: No    Ambulation Assistance: Independent (uses WW), Transfer Assistance: Independent    Active : No    Patient's  Info: dtr, facility    Leisure & Hobbies: puzzles, socializing in facility with family and neighbors, read,    Additional

## 2025-06-16 NOTE — PLAN OF CARE
Problem: Chronic Conditions and Co-morbidities  Goal: Patient's chronic conditions and co-morbidity symptoms are monitored and maintained or improved  6/16/2025 1045 by Darshana Nolan RN  Outcome: Progressing  6/16/2025 0419 by Meche Guadarrama RN  Outcome: Progressing  Flowsheets (Taken 6/15/2025 2055)  Care Plan - Patient's Chronic Conditions and Co-Morbidity Symptoms are Monitored and Maintained or Improved: Monitor and assess patient's chronic conditions and comorbid symptoms for stability, deterioration, or improvement     Problem: Discharge Planning  Goal: Discharge to home or other facility with appropriate resources  6/16/2025 1045 by Darshana Nolan RN  Outcome: Progressing  6/16/2025 0419 by Meche Guadarrama RN  Outcome: Progressing  Flowsheets (Taken 6/15/2025 2055)  Discharge to home or other facility with appropriate resources: Identify barriers to discharge with patient and caregiver     Problem: Pain  Goal: Verbalizes/displays adequate comfort level or baseline comfort level  6/16/2025 1045 by Darshana Nolan RN  Outcome: Progressing  6/16/2025 0419 by Meche Guadarrama RN  Outcome: Progressing  Flowsheets (Taken 6/15/2025 2055)  Verbalizes/displays adequate comfort level or baseline comfort level:   Encourage patient to monitor pain and request assistance   Assess pain using appropriate pain scale   Administer analgesics based on type and severity of pain and evaluate response     Problem: Skin/Tissue Integrity  Goal: Skin integrity remains intact  Description: 1.  Monitor for areas of redness and/or skin breakdown  2.  Assess vascular access sites hourly  3.  Every 4-6 hours minimum:  Change oxygen saturation probe site  4.  Every 4-6 hours:  If on nasal continuous positive airway pressure, respiratory therapy assess nares and determine need for appliance change or resting period  6/16/2025 1045 by Darshana Nolan RN  Outcome: Progressing  6/16/2025 0419 by Meche Guadarrama

## 2025-06-16 NOTE — PROGRESS NOTES
OCCUPATIONAL THERAPY  INPATIENT REHAB TREATMENT NOTE  [facility]      NAME: Hamilton Torres  : 1935 (89 y.o.)  MRN: [id number]  CODE STATUS: DNR-CC  Room: R244/R244-01    Date of Service: 2025    Referring Physician: Dr. Troy  Rehab Diagnosis: Impaired mobility and ADLs d/t OA exacerbation    Hospital course:   Comments: Hospital Course: 88 y/o male pt who presented to [facility] from his assisted living with reports of multiple falls. Pt with chronic knee pain r/t OA and has recently had knee injections done by ortho. Though intially knees reportedly were better pt admits to kneebuckling causing the falls. CT (H) and (C) spine negative. Xrays of knees also negative for acute findings but did show degenerative changes. Labs obtained and w/o significant findings. Pt unsafe to return back to his AL from ER d/t recent falls and while being on blood thinners. PT/OT saw pt with recommendation for high intensity therapy.    Restrictions  Restrictions/Precautions  Restrictions/Precautions: Fall Risk       Patient's date of birth confirmed: Yes    SAFETY:  Safety Devices  Type of devices: All fall risk precautions in place    SUBJECTIVE:     Pain at start of treatment: No    Pain at end of treatment: No    OBJECTIVE:    Patient engaged in standing fine motor and cognitive activity to increase [place] with ADL/IADL completion.    50 Piece Puzzle of [place] stated: Pt used bilateral hands to assemble multiple piece puzzle. Puzzle included capital city and physical outlines of the border of each state as a cue. Pt with good ability to pickup and manage pieces. Good ability to place pieces in correct locations for 48/50 states    Pt tolerated standing for and completed puzzle in 5 minutes. Fair balance without UE support. Pt only completing one stand before completing rest of activity in seated position   Transfers: CGA touching assist to min assist   Balance: Poor+. Consistent UE support needed

## 2025-06-16 NOTE — PROGRESS NOTES
Physical Therapy Rehab Treatment Note  Facility/Department: [facility]  [facility]       NAME: Hamilton Torres  : 1935 (89 y.o.)  MRN: [id number]  CODE STATUS: DNR-CC    Date of Service: 2025       Restrictions:  Restrictions/Precautions: Fall Risk       SUBJECTIVE:   Subjective: \"Hello\"    Pain  Pain: denies pain pre and post      OBJECTIVE:     Transfers  Surface: Wheelchair  Additional Factors: Verbal cues;Hand placement cues;Increased time to complete  Device:  (rollator)  Sit to Stand  Assistance Level: Moderate assistance  Skilled Clinical Factors: lifting assist required, good hand placement noted between transitions  Stand to Sit  Assistance Level: Minimal assistance  Skilled Clinical Factors: assist for safety and controlled descent  Bed To/From Chair  Technique: Stand pivot  Assistance Level: Moderate assistance  Skilled Clinical Factors: wc to bed, face to face, ModA for balance and safety throughout    Ambulation  Surface: Level surface;Uneven surface;Carpet  Device: Rollator  Distance: 100', 45' x 2  Activity: Within Unit  Additional Factors: Verbal cues;Hand placement cues;Increased time to complete  Assistance Level: Stand by assist  Gait Deviations: Slow giovanna;Narrow base of support;Unsteady gait;Path deviations  Skilled Clinical Factors: good environmental navigation and familiarity with use of rollator, cues for upright posture and proximity within WW. no fatigue or SOB noted with distances.    PT Exercises  PROM Exercises: seated HS/gastroc stretch x3 30 sec hold BLE  A/AROM Exercises: seated AP/LAQ/Marches/Hip Abd/Hip Add x10 BLE  Dynamic Standing Balance Exercises: standing hip extension/abduction/marches at uribe rail x10 BLE, standing step taps on 2 inch block with focus on balance x10 ea     Activity Tolerance  Activity Tolerance: Patient tolerated treatment well    ASSESSMENT/PROGRESS TOWARDS GOALS:   Assessment  Assessment: Pt demonstrates most difficulty with

## 2025-06-16 NOTE — PLAN OF CARE
Nutrition Problem #1: Increased nutrient needs  Intervention: Food and/or Nutrient Delivery: Continue Current Diet, Start Oral Nutrition Supplement (wound healing supplement bid)

## 2025-06-16 NOTE — CARE COORDINATION
Case Management Initial Assessment        NAME:  Hamilton Torres  ROOM: R244/R244-01  :  1935  DATE: 2025        Social Functional:  Social/Functional History  Lives With: Home care staff  Type of Home: Assisted living ([facility])  Home Layout: One level  Home Access: Level entry  Bathroom Shower/Tub: Walk-in shower  Bathroom Equipment: Grab bars in shower;Hand-held shower;Shower chair  Bathroom Accessibility: Accessible  Home Equipment: Rollator;Adjustable bed  Receives Help From: Family (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 for ADLs: Needs assistance (assist to don shoes and pants and assist with showering, pt reports recently has been needing more assistance)  Bath:  (nurse remains in the room in case pt needs something as pt gets nervous getting out of the shower due to previous falls)  Dressing:  (needed assist with compression socks only; pt independent otherwise)  Grooming: Independent  Feeding: Independent  Toileting: Independent (continent but wore a brief at night and could change self if had an accident)  Prior Level of Assist for Homemaking: Needs assistance (AL completes cooking, cleaning and laundry but pt reports he is able to use microwave or prepare a light snack)  Homemaking Responsibilities: No (AL performs med mgmt)  Prior Level of Assist for Transfers: Independent  Active : No  Patient's  Info: dtr takes pt to appointments; facility offers transport 2x/wk  Mode of Transportation: Car;Bus  Education: high school graduate  Occupation: Retired  Type of Occupation: [employer] for 30 years    Spoke with patient and explained role in the team. Patient questions answered appropriately. Explained discharge process. Patient stated understanding. Pt completed high school and retired from working at [employer] for 30 years. His support

## 2025-06-16 NOTE — PROGRESS NOTES
Comprehensive Nutrition Assessment    Type and Reason for Visit:  Initial, Consult    Nutrition Recommendations/Plan:   Continue Current Diet, Start Oral Nutrition Supplement (wound healing supplement bid)     Malnutrition Assessment:  Malnutrition Status:  No malnutrition (06/16/25 1337)      Nutrition Assessment:    Pt presents with good appetite/intake currently and pta, with no nutritional complaints. Pt reports progressive weight loss noted has been intentional, but note further weight loss x last 3 months per EHR. Pt with increased nutrient needs for d/t the presence of a wound. To provide wound healing supplement bid and high calorie supplement once daily. To continue to follow.    Nutrition Related Findings:    PMH-CHF, OA, htn, hld, MI, CAD, dementia (aricept); admitted s/p falls. Lab/meds reviewed. meds include-glycolax, pravachol. No edema noted. Wound Type: Stage II (Rt buttock)       Current Nutrition Intake & Therapies:    Average Meal Intake: 51-75%, %     ADULT DIET; Regular  ADULT ORAL NUTRITION SUPPLEMENT; Breakfast; Standard High Calorie/High Protein Oral Supplement  ADULT ORAL NUTRITION SUPPLEMENT; Lunch, Dinner; Wound Healing Oral Supplement    Anthropometric Measures:  Height: 177.8 cm (5' 10\")  Ideal Body Weight (IBW): 166 lbs (75 kg)    Admission Body Weight: 75.8 kg (167 lb)  Current BMI (kg/m2):  24  Usual Body Weight: 79.4 kg (175 lb) (7&12/2024 [facility])                          BMI Categories: Normal Weight (BMI 22.0 to 24.9) age over 65    Estimated Daily Nutrient Needs:  Energy Requirements Based On: Kcal/kg  Weight Used for Energy Requirements: Admission  Energy (kcal/day): ~1879-1687 (kg x 26-27)  Weight Used for Protein Requirements: Admission  Protein (g/day): 91-99 (kg x 1.2-1.3)  Method Used for Fluid Requirements: 1 ml/kcal  Fluid (ml/day): ~2000    Nutrition Diagnosis:   Increased nutrient needs related to increase demand for energy/nutrients as evidenced by

## 2025-06-16 NOTE — PROGRESS NOTES
Wound Ostomy Continence Nurse  Consult Note       NAME:  Hamilton Torres  MEDICAL RECORD NUMBER:  [id number]  AGE: 89 y.o.   GENDER: male  : 1935  TODAY'S DATE:  2025    Subjective   Reason for WOC Nurse Evaluation and Assessment: R Buttock      Hamilton Torres is a 89 y.o. male referred by:   [x] Physician  [] Nursing  [] Other:     Wound Identification:  Wound Type: pressure  Contributing Factors: chronic pressure, decreased mobility, shear force, malnutrition, incontinence of stool, and incontinence of urine    Wound History: Patient admitted to [facility] with a stage 2 pressure injury to the right buttock.  Current Wound Care Treatment:  Recommending 1) Continue pressure injury prevention interventions 2) Topical zinc paste to affected area 2x daily and as needed    Patient Goal of Care:  [x] Wound Healing  [] Odor Control  [] Palliative Care  [] Pain Control   [] Other:         PAST MEDICAL HISTORY        Diagnosis Date    ACS (acute coronary syndrome) ([facility]) 2020    Anticoagulant long-term use     Anxiety     Bursitis of hip     CAD (coronary artery disease)     Carotid stenosis     2013 16-49%    Cervical disc disorder     CHF (congestive heart failure) ([facility])     COVID 2023    COVID-19 virus infection 2023    Dementia ([facility])     Dementia ([facility])     Hyperlipidemia     Hypertension     Hypertensive urgency 2018    MI (myocardial infarction) ([facility])     Osteoarthritis     Spinal stenosis        PAST SURGICAL HISTORY    Past Surgical History:   Procedure Laterality Date    CARPAL TUNNEL RELEASE  1998    CATARACT REMOVAL      COLONOSCOPY  12/10/10,    DR MATTHEWS - DONE AT [facility]    COLONOSCOPY      hx polyps needs     COLONOSCOPY  9/21/15     DR. YARBROUGH     CORONARY ANGIOPLASTY WITH STENT PLACEMENT  2020    DIAGNOSTIC CARDIAC CATH LAB PROCEDURE      HERNIA REPAIR Bilateral     x 2    HERNIA REPAIR Right 2020    RIGHT INGUINAL HERNIA  able to:   [] Indicates understanding       [] Needs reinforcement  [] Unsuccessful      [] Verbal Understanding  [] Demonstrated understanding       [] No evidence of learning  [] Refused teaching         [x] N/A       Electronically signed by Gris Conrad, RICHIEN, RN, CWOCN on 6/16/2025 at 11:49 AM

## 2025-06-16 NOTE — CARE COORDINATION
[facility] ([place], [place])  INPATIENT REHABILITATION  TEAM CONFERENCE NOTE  Room: R244/R244-01  Admit Date: 2025       Date: 2025  Patient Name: Hamilton Torres        MRN: [id number]    : 1935  (89 y.o.)  Gender: male        REHAB DIAGNOSIS:   Diagnosis: Falls    CO MORBIDITIES:      Past Medical History:   Diagnosis Date    ACS (acute coronary syndrome) ([facility]) 2020    Anticoagulant long-term use     Anxiety     Bursitis of hip     CAD (coronary artery disease)     Carotid stenosis     2013 16-49%    Cervical disc disorder     CHF (congestive heart failure) ([facility])     COVID 2023    COVID-19 virus infection 2023    Dementia ([facility])     Dementia ([facility])     Hyperlipidemia     Hypertension     Hypertensive urgency 2018    MI (myocardial infarction) ([facility])     Osteoarthritis     Spinal stenosis      Past Surgical History:   Procedure Laterality Date    CARPAL TUNNEL RELEASE      CATARACT REMOVAL      COLONOSCOPY  12/10/10,    DR MATTHEWS - DONE AT [facility]    COLONOSCOPY      hx polyps needs     COLONOSCOPY  9/21/15     DR. YARBROUGH     CORONARY ANGIOPLASTY WITH STENT PLACEMENT  2020    DIAGNOSTIC CARDIAC CATH LAB PROCEDURE      HERNIA REPAIR Bilateral     x 2    HERNIA REPAIR Right 2020    RIGHT INGUINAL HERNIA REPAIR performed by Roscoe Copeland MD at [facility] OR    PTCA          Restrictions  Restrictions/Precautions: Fall Risk  CASE MANAGEMENT    Social/Functional History  Social/Functional History  Lives With: Other (Comment) (ASSISTED LIVING)  Type of Home: Assisted living ([facility])  Home Layout: One level  Home Access: Level entry  Bathroom Shower/Tub: Walk-in shower  Bathroom Equipment: Grab bars in shower, Hand-held shower, Shower chair  Bathroom Accessibility: Accessible  Home Equipment: Rollator, Adjustable bed  Has the patient had two or more falls in the past year or any fall with injury in the past year?: Yes (FALL RESULTED IN

## 2025-06-16 NOTE — PROGRESS NOTES
89-year-old gentleman presenting to the emergency department with a history of falls difficulty taking his Parkinson's medications current currently they have been resumed at twice a day. He is having knee buckling he underwent a CAT scan of the spine which demonstrated no cord compression he was living at the assisted living prior to this and was on blood thinners he has been admitted to rehab given marked functional loss     Subjective:   The patient complains of severe acute on chronic progressive fatigue and generalized weakness partially relieved by rest, medications, PT,  OT,   SLP and rest and exacerbated by recent  Parkinson's exacerbation .      I am concerned about patient’s medical complexities and barriers to advancing in rehab goals including decreasing falls risk.        I discussed current functional, rehabilitation, medical status with other rehabilitation providers including nursing and case management.  According to recent nursing note, \" Up to the toilet, incontinent of urine. Assistance provided to change depends. Pt. Noticed scant red blood from the tip of his penis.   Pt. States this is new.   Pt. Requested to lay down after lunch. When asked if he needed to use the toilet first he refused then informed me he urinated earlier in his depend. Pt. Was educated on the importance of keeping dry and to call us if he is wet tawanna due to the fact he has the sheering to his buttock.   Will do more frequent check and change d/t his poor memory recall \".    ROS x10:  The patient also complains of severely impaired mobility and activities of daily living.  Otherwise no new problems with vision, hearing, nose, mouth, throat, dermal, cardiovascular, GI, , pulmonary, musculoskeletal, psychiatric or neurological. See Rehab H&P on Rehab chart dated .       Vital signs:  BP (!) 149/60   Pulse 59   Temp 97.5 °F (36.4 °C) (Oral)   Resp 15   Ht 1.778 m (5' 10\")   Wt 75.8 kg (167 lb 1.7 oz)   SpO2 97%   BMI  physical activity and exercise in PT, OT, ADLs with medication titration to lowest effective dosing.  Continue blood signs every shift focusing on heart rate, rhythm and blood pressure checks with orthostatic checks-monitoring the effect of exercise, therapy and posture.  Consult hospitalist for backup medical and adjust/add medications.  Monitor heart rate and blood pressure as well as medications effects on vital signs before during and after therapy with especial focus on preventing orthostasis and falls risk.    Parkinsonism -consult neurology titrate dopaminergic medications  Anemia-Monitor vital signs monitor for orthostasis and tachycardia, check H&H prn.  Vitamin B12 and iron-dose iron with food to prevent GI upset.  Monitor for constipation.  Monitor stools for blood.  DNR CCA         Focus of today's plan-  Initiate and modify therapuetic plan to meet patients individual needs, add rest breaks as needed Focus on endurance, activity pacing, reassessing rehab goals and discharge planning.  Also focus on energy conservation.           Judith Castillo D.O., PM&R     Attending    [phone]   [facility]

## 2025-06-16 NOTE — PROGRESS NOTES
Year: No     Number of Times Moved in the Last Year: 0     Homeless in the Last Year: No           THERAPY, MEDICAL AND NURSING COORDINATION:    [x]  Pain medication before therapies     [x]  Check orthostatic BP and monitor heart rate and medications effects with therapy      [x]  Ambulate to the bathroom in room    [x]  Add scheduled rest beaks     [x]  In room therapies as needed      Discharge date set for:              6/20/25--to AL      Home with:   alone  with help from   son and Dtr            And:     Home Health Care:     [x]  PT    [x]  OT    []  ST   [x]  Aide       [x]  RN                    Equipment:  WW,        At D/C their function is goaled at:   PT:Long Term Goal 1: Pt to complete bed mobility with indep  Long Term Goal 2: Pt to complete transfers with indep  Long Term Goal 3: Pt to ambulate 50ft with LRD and indep  Long Term Goal 4: Pt to ambulate 150ft with LRD and supervision  Long Term Goal 5: pt to complete curb step with HR and SBA  OT:Eating  Assistance Needed: Setup or clean-up assistance  CARE Score: 5  Discharge Goal: Independent, Oral Hygiene  Assistance Needed: Supervision or touching assistance  CARE Score: 4  Discharge Goal: Independent, Toileting Hygiene  Reason if not Attempted: Patient refused  CARE Score: 7  Discharge Goal: Independent, Shower/Bathe Self  Assistance Needed: Partial/moderate assistance  CARE Score: 3  Discharge Goal: Independent  Upper Body Dressing  Assistance Needed: Setup or clean-up assistance  CARE Score: 5  Discharge Goal: Independent, Lower Body Dressing  Assistance Needed: Partial/moderate assistance  CARE Score: 3  Discharge Goal: Independent, Putting On/Taking Off Footwear  Assistance Needed: Substantial/maximal assistance  CARE Score: 2  Discharge Goal: Independent, Toilet Transfer  Reason if not Attempted: Patient refused  CARE Score: 7  Discharge Goal: Independent  SP:               From a cognitive standpoint they will need:        24 hr vs daily

## 2025-06-16 NOTE — PROGRESS NOTES
Hospitalist Progress Note      PCP: Cesilia Martínez MD    Date of Admission: 6/13/2025    Chief Complaint: weakness    Subjective: patient awake/alert     Medications:  Reviewed    Infusion Medications   Scheduled Medications    carbidopa-levodopa  1 tablet Oral BID    carvedilol  6.25 mg Oral BID WC    clopidogrel  75 mg Oral Daily    donepezil  5 mg Oral Daily    dorzolamide-timolol  1 drop Both Eyes BID    finasteride  5 mg Oral Daily    isosorbide mononitrate  30 mg Oral Daily    latanoprost  1 drop Both Eyes Nightly    lisinopril  10 mg Oral Daily    pravastatin  80 mg Oral Nightly    apixaban  5 mg Oral BID    polyethylene glycol  17 g Oral Daily     PRN Meds: melatonin, albuterol sulfate HFA, acetaminophen, bisacodyl, sodium phosphate    No intake or output data in the 24 hours ending 06/16/25 1348    Exam:    BP (!) 149/60   Pulse 59   Temp 97.5 °F (36.4 °C) (Oral)   Resp 15   Ht 1.778 m (5' 10\")   Wt 75.8 kg (167 lb 1.7 oz)   SpO2 97%   BMI 23.98 kg/m²     General appearance: No apparent distress, appears stated age and cooperative.  HEENT: Pupils equal, round, and reactive to light. Conjunctivae/corneas clear.  Neck: Supple, with full range of motion. No jugular venous distention. Trachea midline.  Respiratory:  Normal respiratory effort. Clear to auscultation, bilaterally without Rales/Wheezes/Rhonchi.  Cardiovascular: Regular rate and rhythm with normal S1/S2 without murmurs, rubs or gallops.  Abdomen: Soft, non-tender, non-distended with normal bowel sounds.  Musculoskeletal: No clubbing, cyanosis or edema bilaterally.  Full range of motion without deformity.  Skin: Skin color, texture, turgor normal.  No rashes or lesions.  Neurologic:  Neurovascularly intact without any focal sensory/motor deficits. Cranial nerves: II-XII intact, grossly non-focal.  Psychiatric: Alert and oriented, thought content appropriate, normal insight  Capillary Refill: Brisk,< 3 seconds   Peripheral Pulses: +2

## 2025-06-16 NOTE — PLAN OF CARE
Problem: Chronic Conditions and Co-morbidities  Goal: Patient's chronic conditions and co-morbidity symptoms are monitored and maintained or improved  Outcome: Progressing  Flowsheets (Taken 6/15/2025 2055)  Care Plan - Patient's Chronic Conditions and Co-Morbidity Symptoms are Monitored and Maintained or Improved: Monitor and assess patient's chronic conditions and comorbid symptoms for stability, deterioration, or improvement     Problem: Discharge Planning  Goal: Discharge to home or other facility with appropriate resources  Outcome: Progressing  Flowsheets (Taken 6/15/2025 2055)  Discharge to home or other facility with appropriate resources: Identify barriers to discharge with patient and caregiver     Problem: Pain  Goal: Verbalizes/displays adequate comfort level or baseline comfort level  Outcome: Progressing  Flowsheets (Taken 6/15/2025 2055)  Verbalizes/displays adequate comfort level or baseline comfort level:   Encourage patient to monitor pain and request assistance   Assess pain using appropriate pain scale   Administer analgesics based on type and severity of pain and evaluate response     Problem: Skin/Tissue Integrity  Goal: Skin integrity remains intact  Description: 1.  Monitor for areas of redness and/or skin breakdown  2.  Assess vascular access sites hourly  3.  Every 4-6 hours minimum:  Change oxygen saturation probe site  4.  Every 4-6 hours:  If on nasal continuous positive airway pressure, respiratory therapy assess nares and determine need for appliance change or resting period  Outcome: Progressing  Flowsheets (Taken 6/15/2025 2055)  Skin Integrity Remains Intact: Monitor for areas of redness and/or skin breakdown     Problem: ABCDS Injury Assessment  Goal: Absence of physical injury  Outcome: Progressing     Problem: Safety - Adult  Goal: Free from fall injury  Outcome: Progressing

## 2025-06-16 NOTE — PROGRESS NOTES
Assessment completed. Pt refused breakfast. Pt denied pain. Pt is somewhat cantankerous. Electronically signed by Darshana Nolan RN on 6/16/2025 at 10:48 AM

## 2025-06-16 NOTE — PROGRESS NOTES
Physical Therapy Rehab Treatment Note  Facility/Department: [facility]  [facility]       NAME: Hamilton Torres  : 1935 (89 y.o.)  MRN: [id number]  CODE STATUS: DNR-CC    Date of Service: 2025       Restrictions:  Restrictions/Precautions: Fall Risk       SUBJECTIVE:   Subjective: \"I am doing okay\"    Pain  Pain: denies pain pre and post      OBJECTIVE:     Bed Mobility  Overall Assistance Level: Supervision  Additional Factors: Verbal cues;Increased time to complete;Head of bed flat;With handrails  Roll Left  Assistance Level: Supervision  Skilled Clinical Factors: utiled HR- pt has at home  Roll Right  Assistance Level: Supervision  Skilled Clinical Factors: utiled HR- pt has at home  Sit to Supine  Assistance Level: Supervision  Supine to Sit  Assistance Level: Supervision  Scooting  Assistance Level: Supervision  Skilled Clinical Factors: scooting towards EOB    Transfers  Surface: Wheelchair;To bed  Additional Factors: Verbal cues;Hand placement cues;Increased time to complete  Device:  (rollator)  Sit to Stand  Assistance Level: Moderate assistance  Skilled Clinical Factors: lifting assist required, good hand placement noted between transitions  Stand to Sit  Assistance Level: Minimal assistance  Skilled Clinical Factors: assist for safety and controlled descent  Bed To/From Chair  Technique: Stand pivot  Assistance Level: Moderate assistance  Skilled Clinical Factors: wc to bed, face to face, ModA for balance and safety throughout    Ambulation  Surface: Level surface;Uneven surface;Carpet  Device: Rollator  Distance: 100' x 2  Activity: Within Unit  Additional Factors: Verbal cues;Hand placement cues;Increased time to complete  Assistance Level:  (touching assist)  Gait Deviations: Slow giovanna;Narrow base of support;Unsteady gait;Path deviations  Skilled Clinical Factors: good environmental navigation and familiarity with use of rollator, cues for upright posture and proximity within WW.  no fatigue or SOB noted with distances.    Neuromuscular Education  Neuromuscular Education: Yes  NDT Treatment: Gait   Neuromuscular Comments: TUG test assessed, pt completes in 41.5 seconds with SBA once in standing but requires ModA to complete STS. 5x STS completed in 45.5 seconds this session- requires Min-ModA to complete all stands and maintain balance.    PT Exercises  PROM Exercises: seated HS/gastroc stretch x3 30 sec hold BLE  Resistive Exercises: seated RTB HS curls/hip abd x10 BLE  Dynamic Standing Balance Exercises: standing heel raises/marches  x10 BLE, standing at rollator bag toss with focus on maintaining balance while reaching OOBOS and across midline     Activity Tolerance  Activity Tolerance: Patient tolerated treatment well    ASSESSMENT/PROGRESS TOWARDS GOALS:   Assessment  Assessment: Pt demonstrates most difficulty with completing STS d/t posterior lean as well as balance without UE support. Good stability noted with gait- no LOB/instability noted. Pt forgetful and requires frequent reminders for tasks at hand.  Activity Tolerance: Patient tolerated treatment well    Goals:  Long Term Goals  Long Term Goal 1: Pt to complete bed mobility with indep  Long Term Goal 2: Pt to complete transfers with indep  Long Term Goal 3: Pt to ambulate 50ft with LRD and indep  Long Term Goal 4: Pt to ambulate 150ft with LRD and supervision  Long Term Goal 5: pt to complete curb step with HR and SBA  Long term goal 6: Pt to complete TUG within 13.5sec.  Long term goal 7: Pt to complete 5X STS witin 15sec to demo improved strength and agility  Patient Goals   Patient Goals : \"I want to work on my balance and stay out of the wheelchair.\"    PLAN OF CARE/Safety:   Safety Devices  Type of Devices: All fall risk precautions in place;Bed alarm in place;Call light within reach;Left in bed      Therapy Time:   Individual   Time In 1100   Time Out 1200   Minutes 60      Minutes: 60  Transfer/Bed mobility training:

## 2025-06-16 NOTE — PROGRESS NOTES
OCCUPATIONAL THERAPY  INPATIENT REHAB TREATMENT NOTE  [facility]      NAME: Hamilton Torres  : 1935 (89 y.o.)  MRN: [id number]  CODE STATUS: DNR-CC  Room: R244/R244-01    Date of Service: 2025    Referring Physician: Dr. Troy  Rehab Diagnosis: Impaired mobility and ADLs d/t OA exacerbation    Hospital course:   Comments: Hospital Course: 89 y/o male pt who presented to [facility] from his assisted living with reports of multiple falls. Pt with chronic knee pain r/t OA and has recently had knee injections done by ortho. Though intially knees reportedly were better pt admits to kneebuckling causing the falls. CT (H) and (C) spine negative. Xrays of knees also negative for acute findings but did show degenerative changes. Labs obtained and w/o significant findings. Pt unsafe to return back to his AL from ER d/t recent falls and while being on blood thinners. PT/OT saw pt with recommendation for high intensity therapy.    Restrictions  Restrictions/Precautions  Restrictions/Precautions: Fall Risk          Patient's date of birth confirmed: Yes    SAFETY:   All precautions in place    SUBJECTIVE:   \"I probably shot down 15\" (talking about  career)    Pain at start of treatment: No    Pain at end of treatment: No    COGNITION:     Problem Solving: Supervision    OBJECTIVE:     ADL    Grooming/Oral Hygiene  Assistance Level: Modified independent  Skilled Clinical Factors: seated at sink  Upper Extremity Bathing  Assistance Level: Supervision  Skilled Clinical Factors: min cueing for thoroughness  Lower Extremity Bathing  Assistance Level: Contact guard assist;Increased time to complete  Skilled Clinical Factors: touching assist for balance while washing marcella area. min cuing for thoroughness  Upper Extremity Dressing  Assistance Level: Modified independent;Increased time to complete  Lower Extremity Dressing  Assistance Level: Increased time to complete;Contact guard assist  Skilled

## 2025-06-17 LAB
CK SERPL-CCNC: 50 U/L (ref 0–190)
FOLATE: 26.8 NG/ML (ref 4.8–24.2)
TSH REFLEX: 1.76 UIU/ML (ref 0.44–3.86)
VITAMIN B-12: 931 PG/ML (ref 232–1245)

## 2025-06-17 PROCEDURE — 97116 GAIT TRAINING THERAPY: CPT

## 2025-06-17 PROCEDURE — 97530 THERAPEUTIC ACTIVITIES: CPT

## 2025-06-17 PROCEDURE — 1180000000 HC REHAB R&B

## 2025-06-17 PROCEDURE — 97535 SELF CARE MNGMENT TRAINING: CPT

## 2025-06-17 PROCEDURE — 97112 NEUROMUSCULAR REEDUCATION: CPT

## 2025-06-17 PROCEDURE — 84443 ASSAY THYROID STIM HORMONE: CPT

## 2025-06-17 PROCEDURE — 97129 THER IVNTJ 1ST 15 MIN: CPT

## 2025-06-17 PROCEDURE — 82746 ASSAY OF FOLIC ACID SERUM: CPT

## 2025-06-17 PROCEDURE — 82550 ASSAY OF CK (CPK): CPT

## 2025-06-17 PROCEDURE — 36415 COLL VENOUS BLD VENIPUNCTURE: CPT

## 2025-06-17 PROCEDURE — 97110 THERAPEUTIC EXERCISES: CPT

## 2025-06-17 PROCEDURE — 99232 SBSQ HOSP IP/OBS MODERATE 35: CPT | Performed by: PHYSICAL MEDICINE & REHABILITATION

## 2025-06-17 PROCEDURE — 6370000000 HC RX 637 (ALT 250 FOR IP): Performed by: PHYSICAL MEDICINE & REHABILITATION

## 2025-06-17 PROCEDURE — 82607 VITAMIN B-12: CPT

## 2025-06-17 PROCEDURE — 6370000000 HC RX 637 (ALT 250 FOR IP): Performed by: INTERNAL MEDICINE

## 2025-06-17 RX ADMIN — CARVEDILOL 6.25 MG: 6.25 TABLET, FILM COATED ORAL at 07:29

## 2025-06-17 RX ADMIN — DORZOLAMIDE HYDROCHLORIDE AND TIMOLOL MALEATE 1 DROP: 20; 5 SOLUTION/ DROPS OPHTHALMIC at 07:31

## 2025-06-17 RX ADMIN — APIXABAN 5 MG: 5 TABLET, FILM COATED ORAL at 19:46

## 2025-06-17 RX ADMIN — DONEPEZIL HYDROCHLORIDE 5 MG: 5 TABLET, FILM COATED ORAL at 07:29

## 2025-06-17 RX ADMIN — CARBIDOPA AND LEVODOPA 1 MG: 25; 100 TABLET ORAL at 13:02

## 2025-06-17 RX ADMIN — CARVEDILOL 6.25 MG: 6.25 TABLET, FILM COATED ORAL at 16:54

## 2025-06-17 RX ADMIN — PRAVASTATIN SODIUM 80 MG: 40 TABLET ORAL at 19:46

## 2025-06-17 RX ADMIN — APIXABAN 5 MG: 5 TABLET, FILM COATED ORAL at 07:29

## 2025-06-17 RX ADMIN — CLOPIDOGREL BISULFATE 75 MG: 75 TABLET, FILM COATED ORAL at 07:29

## 2025-06-17 RX ADMIN — FINASTERIDE 5 MG: 5 TABLET, FILM COATED ORAL at 07:29

## 2025-06-17 RX ADMIN — DORZOLAMIDE HYDROCHLORIDE AND TIMOLOL MALEATE 1 DROP: 20; 5 SOLUTION/ DROPS OPHTHALMIC at 19:49

## 2025-06-17 RX ADMIN — LISINOPRIL 10 MG: 10 TABLET ORAL at 07:29

## 2025-06-17 RX ADMIN — CARBIDOPA AND LEVODOPA 1 MG: 25; 100 TABLET ORAL at 07:29

## 2025-06-17 RX ADMIN — LATANOPROST 1 DROP: 50 SOLUTION OPHTHALMIC at 19:46

## 2025-06-17 RX ADMIN — ISOSORBIDE MONONITRATE 30 MG: 30 TABLET, EXTENDED RELEASE ORAL at 07:29

## 2025-06-17 ASSESSMENT — PAIN SCALES - GENERAL: PAINLEVEL_OUTOF10: 0

## 2025-06-17 NOTE — PLAN OF CARE
Problem: Chronic Conditions and Co-morbidities  Goal: Patient's chronic conditions and co-morbidity symptoms are monitored and maintained or improved  6/17/2025 1008 by Michelle Esqueda RN  Outcome: Progressing  6/17/2025 0004 by Jazmín Lo RN  Outcome: Progressing     Problem: Discharge Planning  Goal: Discharge to home or other facility with appropriate resources  6/17/2025 1008 by Michelle Esqueda RN  Outcome: Progressing  6/17/2025 0004 by Jazmín Lo RN  Outcome: Progressing     Problem: Pain  Goal: Verbalizes/displays adequate comfort level or baseline comfort level  6/17/2025 1008 by Michelle Esqueda RN  Outcome: Progressing  6/17/2025 0004 by Jazmín Lo RN  Outcome: Progressing     Problem: Skin/Tissue Integrity  Goal: Skin integrity remains intact  Description: 1.  Monitor for areas of redness and/or skin breakdown  2.  Assess vascular access sites hourly  3.  Every 4-6 hours minimum:  Change oxygen saturation probe site  4.  Every 4-6 hours:  If on nasal continuous positive airway pressure, respiratory therapy assess nares and determine need for appliance change or resting period  6/17/2025 1008 by Michelle Esqueda RN  Outcome: Progressing  6/17/2025 0004 by Jazmín Lo RN  Outcome: Progressing     Problem: ABCDS Injury Assessment  Goal: Absence of physical injury  6/17/2025 1008 by Michelle Esqueda RN  Outcome: Progressing  6/17/2025 0004 by Jazmín Lo RN  Outcome: Progressing     Problem: Safety - Adult  Goal: Free from fall injury  6/17/2025 1008 by Michelle Esqueda RN  Outcome: Progressing  6/17/2025 0004 by Jazmín Lo RN  Outcome: Progressing     Problem: Nutrition Deficit:  Goal: Optimize nutritional status  6/17/2025 1008 by Michelle Esqueda RN  Outcome: Progressing  6/17/2025 0004 by Jazmín Lo RN  Outcome: Progressing

## 2025-06-17 NOTE — PROGRESS NOTES
Assessment complete, VSS. Denies pain or discomfort. Incontinent of BM this am. Working with therapy at this time. Electronically signed by Michelle Esqueda RN on 6/17/25 at 10:53 AM EDT     Ortho's obtained as ordered, negative. Electronically signed by Michelle Esqueda RN on 6/17/25 at 2:24 PM EDT

## 2025-06-17 NOTE — PROGRESS NOTES
OCCUPATIONAL THERAPY  INPATIENT REHAB TREATMENT NOTE  [facility]      NAME: Hamilton Torres  : 1935 (89 y.o.)  MRN: [id number]  CODE STATUS: DNR-CC  Room: R244/R244-01    Date of Service: 2025    Referring Physician: Dr. Troy  Rehab Diagnosis: Impaired mobility and ADLs d/t OA exacerbation    Hospital course:   Comments: Hospital Course: 88 y/o male pt who presented to [facility] from his assisted living with reports of multiple falls. Pt with chronic knee pain r/t OA and has recently had knee injections done by ortho. Though intially knees reportedly were better pt admits to kneebuckling causing the falls. CT (H) and (C) spine negative. Xrays of knees also negative for acute findings but did show degenerative changes. Labs obtained and w/o significant findings. Pt unsafe to return back to his AL from ER d/t recent falls and while being on blood thinners. PT/OT saw pt with recommendation for high intensity therapy.    Restrictions  Restrictions/Precautions  Restrictions/Precautions: Fall Risk          Patient's date of birth confirmed: Yes    SAFETY:   All precautions in place     SUBJECTIVE:     Pain at start of treatment: No    Pain at end of treatment: No    OBJECTIVE:    While standing, completed fine motor task with focus on coordination, activity tolerance, and strength in order to improve general function.    Completed manipulation of circular shower clips for placement onto horizontal dowels through BUE reaches. Pt able to follow instructions to place clips onto correct dowels with min cuing at times.     Transfers: CGA touching assist  Standing tolerance: 8 minutes  Balance:Fair-. No LOBs. Decreased posture noted at times but pt does correct for short periods      strengthening task     Challenged pt to utilize light resistance hand strengthener to pickup medium sized wooden blocks  Pt with good ability to squeeze strengthener to pickup pieces from table top. Pt then released

## 2025-06-17 NOTE — PROGRESS NOTES
Physical Therapy Rehab Treatment Note  Facility/Department: [facility]  [facility]       NAME: Hamilton Torres  : 1935 (89 y.o.)  MRN: [id number]  CODE STATUS: DNR-CC    Date of Service: 2025       Restrictions:  Restrictions/Precautions: Fall Risk       SUBJECTIVE:   Subjective: \"I am doing okay\"    Pain  Pain: denies pain pre and post      OBJECTIVE:     Transfers  Surface: Wheelchair  Additional Factors: Verbal cues;Hand placement cues;Increased time to complete  Device:  (rollator)  Sit to Stand  Assistance Level: Minimal assistance  Skilled Clinical Factors: less lifting assist required, good hand placement noted between transitions  Stand to Sit  Assistance Level: Minimal assistance  Skilled Clinical Factors: assist for safety and controlled descent    Ambulation  Surface: Level surface;Uneven surface  Device: Rollator  Distance: 150'  Activity: Within Unit  Additional Factors: Verbal cues;Hand placement cues;Increased time to complete  Assistance Level: Stand by assist  Gait Deviations: Slow giovanna;Narrow base of support;Unsteady gait;Path deviations  Skilled Clinical Factors: good environmental navigation and familiarity with use of rollator, cues for upright posture and proximity within rollator. no fatigue or SOB noted with distances.    Neuromuscular Education  Neuromuscular Education: Yes  NDT Treatment: Gait   Neuromuscular Comments: TUG test assessed, pt completes in 41.5 seconds with SBA once in standing but requires Cheri to complete STS. standing at rollator bag toss with focus on maintaining balance while reaching OOBOS and across midline    PT Exercises  PROM Exercises: seated HS/gastroc stretch x3 30 sec hold BLE  Dynamic Standing Balance Exercises: standing step taps on 2 inch block with focus on balance x10 ea     Activity Tolerance  Activity Tolerance: Patient tolerated treatment well    ASSESSMENT/PROGRESS TOWARDS GOALS:   Assessment  Assessment: Pt requiring less overall

## 2025-06-17 NOTE — CARE COORDINATION
LSW met with pt and updated him with the projected discharge date and goals. Pt voiced uncertainty if he will be ready to discharge by that date. LSW encouraged pt and explained that he will be re-evaluated closer to discharge to ensure he is ready to discharge. Electronically signed by BREANNA Rahman, KALPESH on 6/17/2025 at 4:39 PM

## 2025-06-17 NOTE — PROGRESS NOTES
OCCUPATIONAL THERAPY  INPATIENT REHAB TREATMENT NOTE  [facility]      NAME: Hamilton Torres  : 1935 (89 y.o.)  MRN: [id number]  CODE STATUS: DNR-CC  Room: R244/R244-01    Date of Service: 2025    Referring Physician: Dr. Troy  Rehab Diagnosis: Impaired mobility and ADLs d/t OA exacerbation    Hospital course:   Comments: Hospital Course: 89 y/o male pt who presented to [facility] from his assisted living with reports of multiple falls. Pt with chronic knee pain r/t OA and has recently had knee injections done by ortho. Though intially knees reportedly were better pt admits to kneebuckling causing the falls. CT (H) and (C) spine negative. Xrays of knees also negative for acute findings but did show degenerative changes. Labs obtained and w/o significant findings. Pt unsafe to return back to his AL from ER d/t recent falls and while being on blood thinners. PT/OT saw pt with recommendation for high intensity therapy.    Restrictions  Restrictions/Precautions  Restrictions/Precautions: Fall Risk          Patient's date of birth confirmed: Yes    SAFETY:   All precautions in place     SUBJECTIVE:   Oh I am ready    Pain at start of treatment: No    Pain at end of treatment: No    OBJECTIVE:    While standing to walker at table, completed fine motor task with focus on coordination, activity tolerance, and balance in order to improve general function.    Completed card sorting and matching task. Pt required to manipulate, grasp, and place large cards by reaching/placing cards throughout table. Pt then challenged to find matching card among new card deck and place directly on top of match.     Good ability to correctly match cards. Difficulty noted maintaining balance at times. Pt requesting to stand to walker to increase support. Cues needed to supply self rest breaks d.t pt not noticing decreased quality of posture and increased knee flexion as he fatigued.     Standing tolerance: 5 mins max.

## 2025-06-17 NOTE — PROGRESS NOTES
89-year-old gentleman presenting to the emergency department with a history of falls difficulty taking his Parkinson's medications current currently they have been resumed at twice a day. He is having knee buckling he underwent a CAT scan of the spine which demonstrated no cord compression he was living at the assisted living prior to this and was on blood thinners he has been admitted to rehab given marked functional loss     Subjective:   The patient complains of severe acute on chronic progressive fatigue and generalized weakness partially relieved by rest, medications, PT,  OT,   SLP and rest and exacerbated by recent  Parkinson's exacerbation .      I am concerned about patient’s medical complexities and barriers to advancing in rehab goals including decreasing falls risk.        I discussed current functional, rehabilitation, medical status with other rehabilitation providers including nursing and case management.  According to recent nursing note,  no recent substantive nursing notes.  Care discussed directly with nursing and care team.      ROS x10:  The patient also complains of severely impaired mobility and activities of daily living.  Otherwise no new problems with vision, hearing, nose, mouth, throat, dermal, cardiovascular, GI, , pulmonary, musculoskeletal, psychiatric or neurological. See Rehab H&P on Rehab chart dated .       Vital signs:  BP (!) 142/58   Pulse 63   Temp 97.9 °F (36.6 °C) (Oral)   Resp 17   Ht 1.778 m (5' 10\")   Wt 75.8 kg (167 lb 1.7 oz)   SpO2 98%   BMI 23.98 kg/m²   I/O:   PO/Intake:  fair PO intake, no problems observed or reported.  Regular diet    Bowel/Bladder:  continent, constipation and urinary urgency.  General:  Patient is well developed, adequately nourished, non-obese and     well kempt.     HEENT:    PERRLA, hearing intact to loud voice, external inspection of ear     and nose benign.  Inspection of lips, tongue and gums    Musculoskeletal: No significant

## 2025-06-17 NOTE — PLAN OF CARE
Problem: Chronic Conditions and Co-morbidities  Goal: Patient's chronic conditions and co-morbidity symptoms are monitored and maintained or improved  6/17/2025 0004 by Jazmín Lo RN  Outcome: Progressing  6/16/2025 1045 by Darshana Nolan RN  Outcome: Progressing     Problem: Discharge Planning  Goal: Discharge to home or other facility with appropriate resources  6/17/2025 0004 by Jazmín Lo RN  Outcome: Progressing  6/16/2025 1045 by Darshana Nolan RN  Outcome: Progressing     Problem: Pain  Goal: Verbalizes/displays adequate comfort level or baseline comfort level  6/17/2025 0004 by Jazmín Lo RN  Outcome: Progressing  6/16/2025 1045 by Darshana Nolan RN  Outcome: Progressing     Problem: Skin/Tissue Integrity  Goal: Skin integrity remains intact  Description: 1.  Monitor for areas of redness and/or skin breakdown  2.  Assess vascular access sites hourly  3.  Every 4-6 hours minimum:  Change oxygen saturation probe site  4.  Every 4-6 hours:  If on nasal continuous positive airway pressure, respiratory therapy assess nares and determine need for appliance change or resting period  6/17/2025 0004 by Jazmín Lo RN  Outcome: Progressing  6/16/2025 1045 by Darshana Nolan RN  Outcome: Progressing     Problem: ABCDS Injury Assessment  Goal: Absence of physical injury  6/17/2025 0004 by Jazmín Lo RN  Outcome: Progressing  6/16/2025 1045 by Darshana Nolan RN  Outcome: Progressing     Problem: Safety - Adult  Goal: Free from fall injury  6/17/2025 0004 by Jazmín Lo RN  Outcome: Progressing  6/16/2025 1045 by Darshana Nolan RN  Outcome: Progressing     Problem: Nutrition Deficit:  Goal: Optimize nutritional status  6/17/2025 0004 by Jazmín Lo RN  Outcome: Progressing  6/16/2025 1338 by Sharifa Brownlee, RD, LD  Flowsheets (Taken 6/16/2025 1338)  Nutrient intake appropriate for improving, restoring, or maintaining nutritional needs:   Assess nutritional status and

## 2025-06-17 NOTE — CARE COORDINATION
CM outreached to daughter Olga to discuss discharge date and plan with her, along with goals and family training. Daughter concerned that discharge date might be to soon as he is not under constant supervision at the AL and would need to be able to get up on his own. CM discussed goals with daughter, daughter to come in for family training on 6/18 at 2pm to see how patient is doing prior to discharge. Advised daughter that jacqueline Drake would also come and do an assessment prior to patient discharge so that they can verify that they are able to take care of the patient at the current level. Daughter would like to make sure that neurology is seeing patient, and was hopeful that a vascular doctor consult could be ordered for patient as he has some issues that also need addressed prior to discharge if possible. Patient has an appointment with Dr Mo Damon in August with [facility]. Also spoke with daughter in regards to her concern of patient urinating in a cup and then dumping it so that he is not incontinent as most of his falls have occurred into the bathroom. CM alerted OT of these concerns. Message left for Mira with Jacqueline Drake to discuss assessment needs prior to admitting back to AL. Electronically signed by Jayleen Mckinney RN on 6/17/2025 at 2:55 PM

## 2025-06-17 NOTE — PROGRESS NOTES
Physical Therapy Rehab Treatment Note  Facility/Department: [facility]  [facility]: [id number]       NAME: Hamilton Torres  : 1935 (89 y.o.)  MRN: [id number]  CODE STATUS: DNR-CC    Date of Service: 2025       Restrictions:  Restrictions/Precautions: Fall Risk       SUBJECTIVE:   Subjective: \"She just left\"    Pain  Pain: denies pain pre and post      OBJECTIVE:     Transfers  Surface: Wheelchair  Additional Factors: Verbal cues;Hand placement cues;Increased time to complete  Device:  (rollator)  Sit to Stand  Assistance Level: Minimal assistance  Skilled Clinical Factors: less lifting assist required, good hand placement noted between transitions  Stand to Sit  Assistance Level: Minimal assistance  Skilled Clinical Factors: assist for safety and controlled descent    Ambulation  Surface: Level surface;Uneven surface  Device: Rollator  Distance: 150' x 2  Activity: Within Unit  Additional Factors: Verbal cues;Hand placement cues;Increased time to complete  Assistance Level: Stand by assist  Gait Deviations: Slow giovanna;Narrow base of support;Unsteady gait;Path deviations  Skilled Clinical Factors: good environmental navigation and familiarity with use of rollator, cues for upright posture and proximity within rollator. no fatigue or SOB noted with distances.    Neuromuscular Education  Neuromuscular Education: Yes  NDT Treatment: Standing;Sitting  Neuromuscular Comments: 5x STS completed in 29.7 seconds this session- requires Min-ModA to complete all stands and maintain balance. Search and find environmental scanning activity with use of orange cones and rollator with focus on balance and multi-tasking.    PT Exercises  PROM Exercises: seated HS/gastroc stretch x3 30 sec hold BLE  A/AROM Exercises: seated AP/LAQ/Marches/Hip Abd/Hip Add x10 BLE  Resistive Exercises: seated RTB HS curls/hip abd x10 BLE  Dynamic Standing Balance Exercises: standing marches/heel raises/mini squats x10 BLE     Activity

## 2025-06-18 ENCOUNTER — APPOINTMENT (OUTPATIENT)
Dept: MRI IMAGING | Age: 89
End: 2025-06-18
Attending: PHYSICAL MEDICINE & REHABILITATION
Payer: MEDICARE

## 2025-06-18 PROBLEM — I95.1 ORTHOSTATIC HYPOTENSION: Status: ACTIVE | Noted: 2025-06-18

## 2025-06-18 PROCEDURE — 97110 THERAPEUTIC EXERCISES: CPT

## 2025-06-18 PROCEDURE — 1180000000 HC REHAB R&B

## 2025-06-18 PROCEDURE — 99222 1ST HOSP IP/OBS MODERATE 55: CPT | Performed by: NURSE PRACTITIONER

## 2025-06-18 PROCEDURE — 72148 MRI LUMBAR SPINE W/O DYE: CPT

## 2025-06-18 PROCEDURE — 6370000000 HC RX 637 (ALT 250 FOR IP): Performed by: INTERNAL MEDICINE

## 2025-06-18 PROCEDURE — 6370000000 HC RX 637 (ALT 250 FOR IP): Performed by: PHYSICAL MEDICINE & REHABILITATION

## 2025-06-18 PROCEDURE — 97530 THERAPEUTIC ACTIVITIES: CPT

## 2025-06-18 PROCEDURE — 97535 SELF CARE MNGMENT TRAINING: CPT

## 2025-06-18 PROCEDURE — 97112 NEUROMUSCULAR REEDUCATION: CPT

## 2025-06-18 PROCEDURE — 97116 GAIT TRAINING THERAPY: CPT

## 2025-06-18 PROCEDURE — 99232 SBSQ HOSP IP/OBS MODERATE 35: CPT | Performed by: PHYSICAL MEDICINE & REHABILITATION

## 2025-06-18 RX ADMIN — FINASTERIDE 5 MG: 5 TABLET, FILM COATED ORAL at 08:08

## 2025-06-18 RX ADMIN — APIXABAN 5 MG: 5 TABLET, FILM COATED ORAL at 08:09

## 2025-06-18 RX ADMIN — CARBIDOPA AND LEVODOPA 1 MG: 25; 100 TABLET ORAL at 08:09

## 2025-06-18 RX ADMIN — DORZOLAMIDE HYDROCHLORIDE AND TIMOLOL MALEATE 1 DROP: 20; 5 SOLUTION/ DROPS OPHTHALMIC at 20:55

## 2025-06-18 RX ADMIN — ACETAMINOPHEN 650 MG: 325 TABLET ORAL at 20:53

## 2025-06-18 RX ADMIN — CLOPIDOGREL BISULFATE 75 MG: 75 TABLET, FILM COATED ORAL at 08:08

## 2025-06-18 RX ADMIN — ISOSORBIDE MONONITRATE 30 MG: 30 TABLET, EXTENDED RELEASE ORAL at 08:09

## 2025-06-18 RX ADMIN — POLYETHYLENE GLYCOL 3350 17 G: 17 POWDER, FOR SOLUTION ORAL at 08:09

## 2025-06-18 RX ADMIN — CARVEDILOL 6.25 MG: 6.25 TABLET, FILM COATED ORAL at 08:09

## 2025-06-18 RX ADMIN — DONEPEZIL HYDROCHLORIDE 5 MG: 5 TABLET, FILM COATED ORAL at 08:09

## 2025-06-18 RX ADMIN — PRAVASTATIN SODIUM 80 MG: 40 TABLET ORAL at 20:47

## 2025-06-18 RX ADMIN — APIXABAN 5 MG: 5 TABLET, FILM COATED ORAL at 20:46

## 2025-06-18 RX ADMIN — LATANOPROST 1 DROP: 50 SOLUTION OPHTHALMIC at 20:55

## 2025-06-18 RX ADMIN — DORZOLAMIDE HYDROCHLORIDE AND TIMOLOL MALEATE 1 DROP: 20; 5 SOLUTION/ DROPS OPHTHALMIC at 08:10

## 2025-06-18 RX ADMIN — CARBIDOPA AND LEVODOPA 1 TABLET: 25; 100 TABLET ORAL at 12:30

## 2025-06-18 RX ADMIN — CARVEDILOL 6.25 MG: 6.25 TABLET, FILM COATED ORAL at 17:15

## 2025-06-18 RX ADMIN — SENNOSIDES 8.6 MG: 8.6 TABLET, FILM COATED ORAL at 20:46

## 2025-06-18 RX ADMIN — LISINOPRIL 10 MG: 10 TABLET ORAL at 08:10

## 2025-06-18 ASSESSMENT — ENCOUNTER SYMPTOMS
COLOR CHANGE: 0
WHEEZING: 0
TROUBLE SWALLOWING: 0
VOMITING: 0
COUGH: 0
SHORTNESS OF BREATH: 0
CHEST TIGHTNESS: 0
BACK PAIN: 0
NAUSEA: 0

## 2025-06-18 ASSESSMENT — PAIN DESCRIPTION - LOCATION: LOCATION: BACK

## 2025-06-18 ASSESSMENT — PAIN SCALES - GENERAL: PAINLEVEL_OUTOF10: 5

## 2025-06-18 NOTE — PROGRESS NOTES
OCCUPATIONAL THERAPY  INPATIENT REHAB TREATMENT NOTE  [facility]      NAME: Hamilton Torres  : 1935 (89 y.o.)  MRN: [id number]  CODE STATUS: DNR-CC  Room: R244/R244-01    Date of Service: 2025    Referring Physician: Dr. Troy  Rehab Diagnosis: Impaired mobility and ADLs d/t OA exacerbation    Hospital course:   Comments: Hospital Course: 88 y/o male pt who presented to [facility] from his assisted living with reports of multiple falls. Pt with chronic knee pain r/t OA and has recently had knee injections done by ortho. Though intially knees reportedly were better pt admits to kneebuckling causing the falls. CT (H) and (C) spine negative. Xrays of knees also negative for acute findings but did show degenerative changes. Labs obtained and w/o significant findings. Pt unsafe to return back to his AL from ER d/t recent falls and while being on blood thinners. PT/OT saw pt with recommendation for high intensity therapy.    Restrictions  Restrictions/Precautions  Restrictions/Precautions: Fall Risk          Patient's date of birth confirmed: Yes    SAFETY:   All precautions in place    SUBJECTIVE:     Pain at start of treatment: No    Pain at end of treatment: No    COGNITION:     Problem Solving: Supervision  Memory: Supervision    OBJECTIVE:    ADL- shower completed     Grooming/Oral Hygiene  Assistance Level: Modified independent  Skilled Clinical Factors: seated at sink  Upper Extremity Bathing  Assistance Level: Modified independent  Lower Extremity Bathing  Assistance Level: Increased time to complete;Stand by assist  Skilled Clinical Factors: cues for thoroughness with post hygiene following being incontinent of BM this AM per another therapist  Upper Extremity Dressing  Assistance Level: Modified independent;Increased time to complete  Lower Extremity Dressing  Assistance Level: Increased time to complete;Stand by assist  Putting On/Taking Off Footwear  Assistance Level: Moderate

## 2025-06-18 NOTE — PROGRESS NOTES
Occupational Therapy Get up and Go Note            Date: 2025  Patient Name: Hamilton Torres        MRN: [id number]    Account #: [id number]  : 1935  (89 y.o.)      Subjective:  Patient states:  \"I need changed\"  Pain:  Pain at start of treatment: No    Pain at end of treatment: No        Objective:  ADL:  Toileting:  Patient requested to change his personal absorbant undergarment.  Patient stood with supervision using the ww while therapist removed the undergarment. Undergarment had BM present. Patient was dependent to clean posterior marcella area while patient held the ww with B hands. Patient is aware that he will be getting washed and dressed with therapist at 8:30. Patient was supervised to get into bed        Treatment consisted of:   [x] ADL Training  [] Strengthening   [] Transfer Training    [] DME Education  [] HEP   [] Patient Education  [] Other:    Safety:  Safety Devices  Safety Devices in place:   Type of devices: All fall risk precautions in place      Therapy Time:   Individual Group Co-Treat   Time In 0714       Time Out 0730         Minutes 16             ADL/IADL trainin minutes         Electronically signed by:    CLEVELAND Jaimes    2025, 7:55 AM

## 2025-06-18 NOTE — CARE COORDINATION
CM received call from daughter Olga, would like to see patient at 11:30a PT session instead of this afternoon.PT updated. Daughter expressed concerns about patient going back to AL as she does not feel that he is ready yet. CM encouraged daughter to see patient at family training and added patient to team on Thursday for follow up and possible extension. Electronically signed by Jayleen Mckinney RN on 6/18/2025 at 10:53 AM

## 2025-06-18 NOTE — PLAN OF CARE
Problem: Chronic Conditions and Co-morbidities  Goal: Patient's chronic conditions and co-morbidity symptoms are monitored and maintained or improved  6/18/2025 1044 by Darshana Nolan RN  Outcome: Progressing  6/17/2025 2110 by Michelle Esqueda RN  Outcome: Progressing     Problem: Discharge Planning  Goal: Discharge to home or other facility with appropriate resources  6/18/2025 1044 by Darshana Nolan RN  Outcome: Progressing  6/17/2025 2110 by Michelle Esqueda RN  Outcome: Progressing     Problem: Pain  Goal: Verbalizes/displays adequate comfort level or baseline comfort level  6/18/2025 1044 by Darshana Nolan RN  Outcome: Progressing  6/17/2025 2110 by Michelle Esqueda RN  Outcome: Progressing     Problem: Skin/Tissue Integrity  Goal: Skin integrity remains intact  Description: 1.  Monitor for areas of redness and/or skin breakdown  2.  Assess vascular access sites hourly  3.  Every 4-6 hours minimum:  Change oxygen saturation probe site  4.  Every 4-6 hours:  If on nasal continuous positive airway pressure, respiratory therapy assess nares and determine need for appliance change or resting period  6/18/2025 1044 by Darshana Nolan RN  Outcome: Progressing  6/17/2025 2110 by Michelle Esqueda RN  Outcome: Progressing     Problem: ABCDS Injury Assessment  Goal: Absence of physical injury  6/18/2025 1044 by Darshana Nolan RN  Outcome: Progressing  6/17/2025 2110 by Michelle Esqueda RN  Outcome: Progressing     Problem: Safety - Adult  Goal: Free from fall injury  6/18/2025 1044 by Darshana Nolan RN  Outcome: Progressing  6/17/2025 2110 by Michelle Esqueda RN  Outcome: Progressing     Problem: Nutrition Deficit:  Goal: Optimize nutritional status  6/18/2025 1044 by Darshana Nolan RN  Outcome: Progressing  6/17/2025 2110 by Michelle Esqueda RN  Outcome: Progressing

## 2025-06-18 NOTE — PROGRESS NOTES
Physical Therapy Rehab Treatment Note  Facility/Department: [facility]  [facility]       NAME: Hamilton Torres  : 1935 (89 y.o.)  MRN: [id number]  CODE STATUS: DNR-CC    Date of Service: 2025       Restrictions:  Restrictions/Precautions: Fall Risk       SUBJECTIVE:   Subjective: \"I am doing okay\"    Pain  Pain: denies pain pre and post      OBJECTIVE:     Transfers  Surface: Wheelchair  Additional Factors: Verbal cues;Hand placement cues;Increased time to complete  Device:  (rollator)  Sit to Stand  Assistance Level: Stand by assist  Skilled Clinical Factors: no lifting assist required, good hand placement noted between transitions  Stand to Sit  Assistance Level: Stand by assist  Skilled Clinical Factors: cues for safety and controlled descent    Ambulation  Surface: Level surface;Uneven surface  Device: Rollator  Distance: 150'  Activity: Within Unit  Additional Factors: Verbal cues;Hand placement cues;Increased time to complete  Assistance Level: Supervision  Gait Deviations: Slow giovanna;Narrow base of support;Unsteady gait;Path deviations  Skilled Clinical Factors: good environmental navigation and familiarity with use of rollator, cues for upright posture and proximity within rollator. no fatigue or SOB noted with distances.    PT Exercises  A/AROM Exercises: seated AP/LAQ/Marches/Hip Abd/Hip Add x10 BLE     Activity Tolerance  Activity Tolerance: Patient tolerated treatment well    Education Provided: Role of Therapy;Mobility Training;Fall Prevention Strategies;Transfer Training;Home Exercise Program;Energy Conservation;Precautions;Safety;Family Education;Equipment;DME/Home Modifications  Education  Education Given To: Patient;Family  Education Provided: Role of Therapy;Mobility Training;Fall Prevention Strategies;Transfer Training;Home Exercise Program;Energy Conservation;Precautions;Safety;Family Education;Equipment;DME/Home Modifications  Education Method:

## 2025-06-18 NOTE — PROGRESS NOTES
Physical Therapy Rehab Treatment Note  [facility]ility/Department: [facility]  [facility]       NAME: Hamilton Torres  : 1935 (89 y.o.)  MRN: [id number]  CODE STATUS: DNR-CC    Date of Service: 2025       Restrictions:  Restrictions/Precautions: Fall Risk       SUBJECTIVE:   Subjective: \"this stuff tires me out\"    Pain  Pain: denies pain pre and post      OBJECTIVE:     Bed Mobility  Overall Assistance Level: Supervision  Additional Factors: Verbal cues;Increased time to complete;Head of bed flat;With handrails  Roll Left  Assistance Level: Modified independent  Skilled Clinical Factors: utiled HR- pt has at home  Roll Right  Assistance Level: Modified independent  Skilled Clinical Factors: utiled HR- pt has at home  Sit to Supine  Assistance Level: Modified independent  Supine to Sit  Assistance Level: Modified independent  Scooting  Assistance Level: Modified independent    Transfers  Surface: Wheelchair;To bed;From bed  Additional Factors: Verbal cues;Hand placement cues;Increased time to complete  Device:  (rollator)  Sit to Stand  Assistance Level: Stand by assist  Skilled Clinical Factors: no lifting assist required, good hand placement noted between transitions  Stand to Sit  Assistance Level: Stand by assist  Skilled Clinical Factors: cues for safety and controlled descent  Car Transfer  Assistance Level: Minimal assistance;Stand by assist  Skilled Clinical Factors: SBA to complete transition of BLE in/out of car, Cheri to complete STS from lower surface, vc's for technique and sequencing    Ambulation  Surface: Level surface;Uneven surface  Device: Rollator  Distance: 100' 50'  Activity: Within Unit  Additional Factors: Verbal cues;Hand placement cues;Increased time to complete  Assistance Level: Supervision  Gait Deviations: Slow giovanna;Narrow base of support;Unsteady gait;Path deviations  Skilled Clinical Factors: good environmental navigation and familiarity with use of rollator, cues for

## 2025-06-18 NOTE — PROGRESS NOTES
OCCUPATIONAL THERAPY  INPATIENT REHAB TREATMENT NOTE  [facility]      NAME: Hamilton Torres  : 1935 (89 y.o.)  MRN: [id number]  CODE STATUS: DNR-CC  Room: R244/R244-01    Date of Service: 2025    Referring Physician: Dr. Troy  Rehab Diagnosis: Impaired mobility and ADLs d/t OA exacerbation    Hospital course:   Comments: Hospital Course: 88 y/o male pt who presented to [facility] from his assisted living with reports of multiple falls. Pt with chronic knee pain r/t OA and has recently had knee injections done by ortho. Though intially knees reportedly were better pt admits to kneebuckling causing the falls. CT (H) and (C) spine negative. Xrays of knees also negative for acute findings but did show degenerative changes. Labs obtained and w/o significant findings. Pt unsafe to return back to his AL from ER d/t recent falls and while being on blood thinners. PT/OT saw pt with recommendation for high intensity therapy.    Restrictions  Restrictions/Precautions  Restrictions/Precautions: Fall Risk          Patient's date of birth confirmed: Yes    SAFETY:   All precautions in place    SUBJECTIVE:     Pain at start of treatment: No    Pain at end of treatment: No    OBJECTIVE:    While seated, challenged strength, activity tolerance, ROM, and flexibility for improved ADL performance.    Parkinsons exercise program completed to address flexability, activity tolerance, strength, posture    Exercises focused on cervical stretches (neck rotation, lateral flexion, chin tucks), breathing techniques/exercises, trunk exercises (rotation, flexion/extension), scapular and shoulder movements (protraction/retraction/flexion/extension/abduction). 10 reps completed for each exercise     Truncal rigidity noted while completing. Cues/assist needed to maximize ROM and for proper technique     Challenged pt through usage of 2# weight to complete BUE exercises. 1 sets x 10 reps completed. Exercises focused on all

## 2025-06-18 NOTE — PROGRESS NOTES
89-year-old gentleman presenting to the emergency department with a history of falls difficulty taking his Parkinson's medications current currently they have been resumed at twice a day. He is having knee buckling he underwent a CAT scan of the spine which demonstrated no cord compression he was living at the assisted living prior to this and was on blood thinners he has been admitted to rehab given marked functional loss     Subjective:   The patient complains of severe acute on chronic progressive fatigue and generalized weakness partially relieved by rest, medications, PT,  OT,   SLP and rest and exacerbated by recent  Parkinson's exacerbation .      I am concerned about patient’s medical complexities and barriers to advancing in rehab goals including decreasing falls risk.        I discussed current functional, rehabilitation, medical status with other rehabilitation providers including nursing and case management.  According to recent nursing note,  \"assume care of patient   Turned patient , patient denie\s pain bed alarm on\".    We are preparing for his discharge later this week.  I am concerned about his incontinence.  He is getting IV injection was able to ambulate his right buttock.  He we are encouraging side-to-side turns.  With his incontinence the stage II is hard to heal.    He is sleepy but arousable today.    ROS x10:  The patient also complains of severely impaired mobility and activities of daily living.  Otherwise no new problems with vision, hearing, nose, mouth, throat, dermal, cardiovascular, GI, , pulmonary, musculoskeletal, psychiatric or neurological. See Rehab H&P on Rehab chart dated .       Vital signs:  BP (!) 108/49   Pulse 65   Temp 97.3 °F (36.3 °C) (Oral)   Resp 17   Ht 1.778 m (5' 10\")   Wt 75.8 kg (167 lb 1.7 oz)   SpO2 96%   BMI 23.98 kg/m²   I/O:   PO/Intake:  fair PO intake, no problems observed or reported.  Regular diet    Bowel/Bladder:  incontinent,

## 2025-06-18 NOTE — PLAN OF CARE
[facility] - PODIATRY  [address]  [facility] [address]  Dept: [phone]  Dept Fax: [phone]    Siddhartha Davalos Jr., DPM     Assessment:   MDM    Coding  1. Ingrown toenail of right foot  Ambulatory referral/consult to Podiatry    clindamycin (CLEOCIN) 300 MG capsule    Nail Removal      2. Type 2 diabetes mellitus with stage 4 chronic kidney disease, without long-term current use of insulin        3. ICD (implantable cardioverter-defibrillator) in place        4. Chronic combined systolic and diastolic heart failure        5. Hypertensive cardiovascular-renal disease, stage 1-4 or unspecified chronic kidney disease, with heart failure            Plan:     Nail Removal    Date/Time: 2/22/2024 1:15 PM    Performed by: Siddhartha Davalos Jr., DPM  Authorized by: Siddhartha Davalos Jr., DPM    Consent Done?:  Yes (Verbal)  Time out: Immediately prior to the procedure a time out was called    Prep: patient was prepped and draped in usual sterile fashion    Location:     Location:  Right foot    Location detail:  Right big toe  Anesthesia:     Anesthesia:  Digital block    Local anesthetic:  Bupivacaine 0.5% without epinephrine    Anesthetic total (ml):  4  Procedure Details:     Preparation:  Skin prepped with alcohol, skin prepped with Betadine and sterile field established    Amount removed:  Complete    Wedge excision of skin of nail fold: Yes      Nail bed sutured?: No      Nail matrix removed:  None    Removed nail replaced and anchored: No      Dressing applied:  4x4, antibiotic ointment, gauze roll, petrolatum-impregnated gauze and dressing applied    Patient tolerance:  Patient tolerated the procedure well with no immediate complications      Hafsa was seen today for diabetic foot exam.    Diagnoses and all orders for this visit:    Ingrown toenail of right foot  -     Ambulatory referral/consult to Podiatry  -     clindamycin (CLEOCIN) 300 MG capsule; Take 1 capsule (300    Problem: Chronic Conditions and Co-morbidities  Goal: Patient's chronic conditions and co-morbidity symptoms are monitored and maintained or improved  6/17/2025 2110 by Michelle Esqueda RN  Outcome: Progressing  6/17/2025 1008 by Michelle Esqueda RN  Outcome: Progressing     Problem: Discharge Planning  Goal: Discharge to home or other facility with appropriate resources  6/17/2025 2110 by Michelle Esqueda RN  Outcome: Progressing  6/17/2025 1008 by Michelle Esqueda RN  Outcome: Progressing     Problem: Pain  Goal: Verbalizes/displays adequate comfort level or baseline comfort level  6/17/2025 2110 by Michelle Esqueda RN  Outcome: Progressing  6/17/2025 1008 by Michelle Esqueda RN  Outcome: Progressing     Problem: Skin/Tissue Integrity  Goal: Skin integrity remains intact  Description: 1.  Monitor for areas of redness and/or skin breakdown  2.  Assess vascular access sites hourly  3.  Every 4-6 hours minimum:  Change oxygen saturation probe site  4.  Every 4-6 hours:  If on nasal continuous positive airway pressure, respiratory therapy assess nares and determine need for appliance change or resting period  6/17/2025 2110 by Michelle Esqueda RN  Outcome: Progressing  6/17/2025 1008 by Michelle Esqueda RN  Outcome: Progressing     Problem: ABCDS Injury Assessment  Goal: Absence of physical injury  6/17/2025 2110 by Michelle Esqueda RN  Outcome: Progressing  6/17/2025 1008 by Michelle Esqueda RN  Outcome: Progressing     Problem: Safety - Adult  Goal: Free from fall injury  6/17/2025 2110 by Michelle Esqueda RN  Outcome: Progressing  6/17/2025 1008 by Michelle Esqueda RN  Outcome: Progressing     Problem: Nutrition Deficit:  Goal: Optimize nutritional status  6/17/2025 2110 by Michelle Esqueda RN  Outcome: Progressing  6/17/2025 1008 by Michelle Esqueda RN  Outcome: Progressing      mg total) by mouth every 6 (six) hours. for 7 days  -     Nail Removal    Type 2 diabetes mellitus with stage 4 chronic kidney disease, without long-term current use of insulin    ICD (implantable cardioverter-defibrillator) in place    Chronic combined systolic and diastolic heart failure    Hypertensive cardiovascular-renal disease, stage 1-4 or unspecified chronic kidney disease, with heart failure        -pt seen, evaluated, and managed  -dx discussed in detail. All questions/concerns addressed  -all tx options discussed. All alternatives, risks, benefits of all txs discussed  -The patient was educated regarding the above diagnosis.   -discussed ingrowing toenails and all tx options. Pt opts for partial nail avulsion  -pt to perform epsom salt or betadine soaks once daily x 2wks. Written instructions dispensed  -keep toe covered with triple abx ointment + bandaid x 2wks    Rx dispensed: clindamycin  Referrals: none  -WB: wbt        Follow up in about 3 weeks (around 3/14/2024).    Subjective:      Patient ID: Hafsa Hawley is a 60 y.o. female.    Chief Complaint:   Chief Complaint   Patient presents with    Diabetic Foot Exam     bilateral       CC - ingrown nail: Patient presents to the clinic complaining of painful ingrown toenail on the right foot. Patients rates pain 6/10 on pain scale. patient seeking tx options.    HPI    Last Podiatry Enc: Visit date not found  Last Enc w/ Me: Visit date not found    Outside reports reviewed: historical medical records.  Family hx: as below  Past Medical History:   Diagnosis Date    Allergy     Anemia     Arthritis     Atrial fibrillation     OAC    Chronic respiratory failure with hypoxia, on home oxygen therapy     2L with activity, off at rest.  Per Pulm  no overt evidence of ILD or COPD on PFTs and CT to explain O2 needs.    CKD (chronic kidney disease), stage IV 05/08/2018    Congestive heart failure     s/p AICD placement,    Deep vein thrombosis     Depression      elevated bilirubin d/t Gilbert's syndrome     confirmed by [place] genetic testing, evaluated by hepatology    Encounter for blood transfusion     GERD (gastroesophageal reflux disease)     Hypertension     Pheochromocytoma, malignant     Right kidney mass     Sleep apnea     Thalassemia trait, alpha     Thyroid disease     Type 2 diabetes mellitus with hyperglycemia, without long-term current use of insulin 08/13/2020     Past Surgical History:   Procedure Laterality Date    APPENDECTOMY      BONE MARROW BIOPSY      CARDIAC DEFIBRILLATOR PLACEMENT Left 12/2016    CARDIAC ELECTROPHYSIOLOGY MAPPING AND ABLATION      CARDIAC ELECTROPHYSIOLOGY MAPPING AND ABLATION      COLONOSCOPY N/A 5/6/2022    Procedure: COLONOSCOPY;  Surgeon: Arely Betancourt MD;  Location: [facility] ENDO (2ND FLR);  Service: Endoscopy;  Laterality: N/A;  heart transplant candidate/ EF 25% - 2nd floor/ defib - Biotronik - ERW  Eliquis - per Dr. Cortez with [facility] [place], Pt ok to hold Eliquis x 2 days prior-see media tab-outside correspondence dated 12/30/21  - ERW  verbal instructions/portal instructions/email instructions - s    EYE SURGERY      due to running tears    FRACTURE SURGERY Left     hand 5th digit    HYSTERECTOMY      KNEE SURGERY Left 2016    hematoma    LIVER BIOPSY  10/24/2018    Minimal steatosis, predominantly macrovesicular, 1%, Minimal nonspecific portal inflammation, no fibrosis. No findings on biopsy to explain elevated bilirubin levels. Could be d/t Gilbert's =?- hemolysis    RIGHT HEART CATHETERIZATION Right 12/07/2021    Procedure: INSERTION, CATHETER, RIGHT HEART;  Surgeon: Irving Cardenas MD;  Location: [facility] CATH LAB;  Service: Cardiology;  Laterality: Right;    RIGHT HEART CATHETERIZATION Right 12/19/2022    Procedure: INSERTION, CATHETER, RIGHT HEART;  Surgeon: Burke Camilo MD;  Location: [facility] CATH LAB;  Service: Cardiology;  Laterality: Right;    RIGHT HEART CATHETERIZATION Right 3/29/2023    Procedure: INSERTION,  CATHETER, RIGHT HEART;  Surgeon: Katie Liriano DO;  Location: [facility] CATH LAB;  Service: Cardiology;  Laterality: Right;    TRANSJUGULAR BIOPSY OF LIVER N/A 10/24/2018    Procedure: BIOPSY, LIVER, TRANSJUGULAR APPROACH;  Surgeon: Carmen Diagnostic Provider;  Location: [facility] OR [facility] FLR;  Service: Radiology;  Laterality: N/A;     Family History   Problem Relation Age of Onset    Cancer Mother         pancreatic CA early 50's    Heart disease Father          MI in late 50's    Hypertension Father     Heart attack Father     Heart disease Sister     Heart disease Brother     Cirrhosis Brother         alcoholic    Heart disease Sister     Heart disease Brother     Hypertension Brother     Diabetes Brother      Current Outpatient Medications   Medication Sig Dispense Refill    albuterol-ipratropium (DUO-NEB) 2.5 mg-0.5 mg/3 mL nebulizer solution Take 3 mLs by nebulization every 6 (six) hours as needed for Wheezing or Shortness of Breath. 90 mL 3    allopurinoL (ZYLOPRIM) 300 MG tablet Take 1 tablet (300 mg total) by mouth once daily. 90 tablet 3    ALPRAZolam (XANAX) 2 MG Tab Take 1 tablet orally 2 times a day. 60 tablet 4    apixaban (ELIQUIS) 2.5 mg Tab Take 1 tablet (2.5 mg total) by mouth 2 (two) times daily. 60 tablet 12    atorvastatin (LIPITOR) 40 MG tablet Take 1 tablet (40 mg total) by mouth every evening. 90 tablet 8    b complex vitamins tablet Take 1 tablet by mouth once daily.      blood sugar diagnostic (ACCU-CHEK GUIDE TEST STRIPS) Strp 1 strip by Other route 3 (three) times daily. 100 each 11    blood-glucose sensor (DEXCOM G7 SENSOR) Evon change sensor every 10 days. 3 each 11    bumetanide (BUMEX) 2 MG tablet Take 2 tablets (4mg total) by mouth in morning and take 1 tablet (2mg total) by mouth in the evening (no later then 5pm). 90 tablet 5    bumetanide (BUMEX) 2 MG tablet 2 tablets (4 mg total) every morning AND 1 tablet (2 mg total) every evening. 90 tablet 0    cetirizine (ZYRTEC) 10 MG tablet  Take 1 tablet (10 mg total) by mouth once daily. 30 tablet 12    cetirizine (ZYRTEC) 5 MG tablet Take 1 tablet (5 mg total) by mouth daily as needed for Allergies. (Patient not taking: Reported on 2/5/2024)      clindamycin (CLEOCIN) 300 MG capsule Take 1 capsule (300 mg total) by mouth every 6 (six) hours. for 7 days 28 capsule 0    diclofenac sodium (VOLTAREN) 1 % Gel Apply 2 g topically 3 (three) times daily as needed for pain. 300 g 1    DOBUTamine (DOBUTREX) 1,000 mg/250 mL (4,000 mcg/mL) infusion Inject 409 mcg/min into the vein continuous.      empagliflozin (JARDIANCE) 25 mg tablet Take 1 tablet orally once a day. 30 tablet 12    ergocalciferol (ERGOCALCIFEROL) 50,000 unit Cap Take 1 capsule (50,000 Units total) by mouth once a week. 4 capsule 11    ergocalciferol (ERGOCALCIFEROL) 50,000 unit Cap Take 1 capsule orally once a week 4 capsule 12    famotidine (PEPCID) 10 MG tablet Take 1 tablet (10 mg total) by mouth 2 (two) times daily. 60 tablet 12    febuxostat (ULORIC) 40 mg Tab Take 40 mg by mouth once daily.      ferrous sulfate (FEOSOL) 325 mg (65 mg iron) Tab tablet Take 1 tablet (325 mg total) by mouth 3 (three) times daily. 90 tablet 12    fluconazole (DIFLUCAN) 200 MG Tab Take 1 tablet orally once (Patient not taking: Reported on 2/5/2024) 1 tablet 0    fluticasone propionate (FLONASE ALLERGY RELIEF) 50 mcg/actuation nasal spray 2 sprays (100 mcg total) by Each Nostril route once daily. 16 g 12    fluticasone propionate (FLONASE) 50 mcg/actuation nasal spray 2 sprays by Each Nostril route daily as needed for Rhinitis.       HYDROcodone-acetaminophen (NORCO)  mg per tablet Take 1 tablet by mouth every 6 (six) hours as needed for Pain. 120 tablet 0    isosorbide-hydrALAZINE 20-37.5 mg (BIDIL) 20-37.5 mg Tab Take by mouth.      JARDIANCE 25 mg tablet Take 25 mg by mouth.      lancets (ONETOUCH DELICA PLUS LANCET) 30 gauge Misc by Misc.(Non-Drug; Combo Route) route 3 (three) times daily. 100 each  11    LIDOcaine (LIDODERM) 5 % Place 1 patch onto the skin once daily. Remove & discard patch within 12 hours or as directed by MD. 30 patch 2    metOLazone (ZAROXOLYN) 5 MG tablet Take 1 tablet (5 mg total) by mouth once daily. 30 tablet 11    metOLazone (ZAROXOLYN) 5 MG tablet Take 1 tablet orally once a day. 90 tablet 4    mometasone-formoterol (DULERA) 200-5 mcg/actuation inhaler Inhale 2 puffs into the lungs 2 (two) times daily. Controller 13 g 11    mupirocin (BACTROBAN) 2 % ointment Apply topically 3 (three) times daily. for 5 days (Patient not taking: Reported on 2/5/2024) 22 g 0    nystatin (MYCOSTATIN) 100,000 unit/mL suspension Take 5 mLs (500,000 Units total) by mouth 3 (three) times daily as needed. 473 mL 12    ondansetron (ZOFRAN-ODT) 8 MG TbDL Take 1 tablet (8 mg total) by mouth every 8 (eight) hours as needed (nausea). 30 tablet 4    predniSONE (DELTASONE) 5 MG tablet Take 1 tablet (5 mg total) by mouth once daily. 30 tablet 3    pregabalin (LYRICA) 50 MG capsule Take 1 capsule (50 mg total) by mouth every evening. 60 capsule 5    promethazine (PHENERGAN) 25 MG suppository Place 1 suppository (25 mg total) rectally every 6 (six) hours as needed for nausea. 10 suppository 5    promethazine-codeine 6.25-10 mg/5 ml (PHENERGAN WITH CODEINE) 6.25-10 mg/5 mL syrup Take 5 mL orally every 6 hours as needed. 473 mL 0    sacubitriL-valsartan (ENTRESTO) 24-26 mg per tablet Take 1 tablet by mouth 2 (two) times daily. 60 tablet 12    scopolamine (TRANSDERM-SCOP) 1.3-1.5 mg (1 mg over 3 days) Place 1 patch onto the skin every 72 hours as needed for nausea. 24 patch 2    semaglutide (OZEMPIC) 0.25 mg or 0.5 mg (2 mg/3 mL) pen injector Inject 0.5 mg into the skin every 7 days. 3 mL 11    SPIRIVA WITH HANDIHALER 18 mcg inhalation capsule Inhale 1 capsule (18 mcg total) into the lungs once daily. 30 capsule 5    terconazole (TERAZOL 3) 0.8 % vaginal cream Place 1 applicator vaginally once daily. 20 g 1    VENTOLIN  "HFA 90 mcg/actuation inhaler Inhale 2 puffs into the lungs every 6 (six) hours as needed for Shortness of Breath or Wheezing. 18 g 11     Current Facility-Administered Medications   Medication Dose Route Frequency Provider Last Rate Last Admin    heparin, porcine (PF) 100 unit/mL injection flush 500 Units  5 mL Intravenous Once Benson Cortez MD         Facility-Administered Medications Ordered in Other Visits   Medication Dose Route Frequency Provider Last Rate Last Admin    0.9%  NaCl infusion   Intravenous Continuous Corinna Hayes NP   New Bag at 05/23/19 0745    vancomycin in dextrose 5 % 1 gram/250 mL IVPB 1,000 mg  1,000 mg Intravenous On Call Procedure Corinna Hayes NP   1,000 mg at 05/23/19 0736     Review of patient's allergies indicates:   Allergen Reactions    Penicillins Hives and Other (See Comments)    Iodinated contrast media Nausea And Vomiting    Oxycodone-acetaminophen Other (See Comments)     Nausea, Dizziness, Anxiety.  "I don't like how it makes me feel."   Given Hydromorphone 0.5mg IVP  Without problems.  Other reaction(s): Other (See Comments)    Clonazepam Other (See Comments)    Diovan hct [valsartan-hydrochlorothiazide] Other (See Comments)     Causes coughing    Iodine Other (See Comments)    Irinotecan      Pt has homozygosity for the TA7 promoter variant that places this individual at significantly increased risk for   severe neutropenia (grade 4) when treated with the standard dose of irinotecan (risk approximately 50%).   Other drugs that have been demonstrated to be impacted by homozygosity for the UGT1A1 TA7 promoter variant include pazopanib, nilotinib, atazanavir, and belinostat. Metabolism of other drugs not listed here may also be impacted by UGT1A1 enzyme activity.       Tramadol Nausea And Vomiting and Other (See Comments)     Other reaction(s): Other (See Comments)    Valsartan Other (See Comments)     Social History     Socioeconomic History    " Marital status:    Tobacco Use    Smoking status: Never    Smokeless tobacco: Never   Substance and Sexual Activity    Alcohol use: Not Currently     Comment: up to 1 yr ago drank 2-3 drinks on occasion but sporadic    Drug use: No    Sexual activity: Yes     Partners: Male   Social History Narrative    On disability since 2013     Social Determinants of Health     Financial Resource Strain: Low Risk  (12/10/2023)    Overall Financial Resource Strain (CARDIA)     Difficulty of Paying Living Expenses: Not very hard   Food Insecurity: No Food Insecurity (12/10/2023)    Hunger Vital Sign     Worried About Running Out of Food in the Last Year: Never true     Ran Out of Food in the Last Year: Never true   Transportation Needs: No Transportation Needs (12/10/2023)    PRAPARE - Transportation     Lack of Transportation (Medical): No     Lack of Transportation (Non-Medical): No   Physical Activity: Inactive (12/10/2023)    Exercise Vital Sign     Days of Exercise per Week: 0 days     Minutes of Exercise per Session: 0 min   Stress: No Stress Concern Present (12/10/2023)    [employer] [place] [employer] - Occupational Stress Questionnaire     Feeling of Stress : Only a little   Social Connections: Socially Isolated (12/10/2023)    Social Connection and Isolation Panel [NHANES]     Frequency of Communication with Friends and Family: Once a week     Frequency of Social Gatherings with Friends and Family: Once a week     Attends [employer] Services: 1 to 4 times per year     Active Member of Clubs or Organizations: No     Attends Club or Organization Meetings: Never     Marital Status:    Housing Stability: Low Risk  (12/10/2023)    Housing Stability Vital Sign     Unable to Pay for Housing in the Last Year: No     Number of Places Lived in the Last Year: 1     Unstable Housing in the Last Year: No       ROS    REVIEW OF SYSTEMS: Negative as documented below as well as positive findings in bold.        "Constitutional  Respiratory  Gastrointestinal  Skin   - Fever - Cough - Heartburn - Rash   - Chills - Spit blood - Nausea - Itching   - Weight Loss - Shortness of breath - Vomiting - Nail pain   - Malaise/Fatigue - Wheezing - Abdominal Pain  Wound/Ulcer   - Weight Gain   - Blood in Stool  Poor wound healing       - Diarrhea          Cardiovascular  Genitourinary  Neurological  HEENT   - Chest Pain - Dysuria - Burning Sensation of feet - Headache   - Palpitations - Hematuria - Tingling / Paresthesia - Congestion   - Pain at night in legs - Flank Pain - Dizziness - Sore Throat   - Cramping   - Tremor - Blurred Vision   - Leg Swelling   - Sensory Change - Double Vision   - Dizzy when standing   - Speech Change - Eye Redness       - Focal Weakness - Dry Eyes       - Loss of Consciousness          Endocrine  Musculoskeletal  Psychiatric   - Cold intolerance - Muscle Pain - Depression   - Heat intolerance - Neck Pain - Insomnia   - Anemia - Joint Pain - Memory Loss   -  Easy bruising, bleeding - Heel pain - Anxiety      Toe Pain        Leg/Ankle/Foot Pain         Objective:     Ht 5' 6" (1.676 m)   Wt 80.2 kg (176 lb 12.9 oz)   LMP 10/23/2001   BMI 28.54 kg/m²   Vitals:    02/22/24 1310   Weight: 80.2 kg (176 lb 12.9 oz)   Height: 5' 6" (1.676 m)   PainSc:   8   PainLoc: Toe       Physical Exam    General Appearance:   Patient appears well developed, well nourished  Patient appears stated age    Psychiatric:   Patient is oriented to time, place, and person.  Patient has appropriate mood and affect    Neck:  Trachea Midline  No visible masses    Respiratory/Ears:  No distress or labored breathing.  Able to differentiate between normal talking voice and whisper.  Able to follow commands    Eyes:  Visual Acuity intact  Lids and conjunctivae normal. No discoloration noted.    Foot Exam  Physical Exam  Ortho Exam  Ortho/SPM Exam  Physical Exam  Neurologic Exam    R LE exam con't:  V:  DP 2/4, PT 2/4   CRT< 3s to all digits " tested   Tibial and popliteal lymph nodes are w/o abnormality        N:  Patient displays normal ankle reflexes   SILT in SP/DP/T/Ky/Saph distributions    Ortho: +Motor EHL/FHL/TA/GA   +TTP R great toe  Compartments soft/compressible. No pain on passive stretch of big toe. No calf  Pain.    Derm:  skin intact, skin warm and dry, skin without ulcers or lesions, skin without induration, right, great toe ingrowing and incurvated     Imaging / Labs:    none        Note: This was dictated using a computer transcription program. Although proofread, it may contain computer transcription errors and phonetic errors. Other human proofreading errors may also exist. Corrections may be performed at a later time. Please contact us for any clarification if needed.    Siddhartha Davalos DPM  [facility]

## 2025-06-18 NOTE — PROGRESS NOTES
Assessment completed. Pt ate well for breakfast. Pt denies pain. Pt showered with therapy creams and dressing applied.Electronically signed by Darshana Nolan RN on 6/18/2025 at 10:46 AM

## 2025-06-18 NOTE — PROGRESS NOTES
[facility]  MUSIC THERAPY  Missed Visit      Date:  6/18/2025        Patient Name: Hamilton Torres       MRN: [id number]        YOB: 1935 (89 y.o.)       Gender: male  Diagnosis: Impaired mobility and ADLs d/t OA exacerbation  Referring Practitioner: Dr. Troy    RESTRICTIONS/PRECAUTIONS:  Restrictions/Precautions: Fall Risk     Hearing: Exceptions to WFL  Hearing Exceptions: Bilateral hearing aid    ASSESSMENT/OBSERVATIONS:     MT-BC attempted to see pt for music therapy at 1:20pm, however patient was unavailable due to sleeping soundly in his bed. MT-BC asked patient's nurse if she thought MT-BC should wake him to offer music therapy or let his sleep, and his RN suggested to let him sleep a bit longer before his next therapy session.     PLAN:     MT-BC will attempt to see patient again at later time, if the schedule/time allows.       MARITZA Galloway, SHELBY    6/18/2025  Electronically signed by Emerald Lopez on 6/18/2025 at 1:52 PM

## 2025-06-19 LAB
ANION GAP SERPL CALCULATED.3IONS-SCNC: 12 MEQ/L (ref 9–15)
BASOPHILS # BLD: 0.1 K/UL (ref 0–0.2)
BASOPHILS NFR BLD: 0.7 %
BUN SERPL-MCNC: 32 MG/DL (ref 8–23)
CALCIUM SERPL-MCNC: 8.9 MG/DL (ref 8.5–9.9)
CHLORIDE SERPL-SCNC: 103 MEQ/L (ref 95–107)
CO2 SERPL-SCNC: 23 MEQ/L (ref 20–31)
CREAT SERPL-MCNC: 0.81 MG/DL (ref 0.7–1.2)
EOSINOPHIL # BLD: 0.2 K/UL (ref 0–0.7)
EOSINOPHIL NFR BLD: 2.5 %
ERYTHROCYTE [DISTWIDTH] IN BLOOD BY AUTOMATED COUNT: 17.8 % (ref 11.5–14.5)
GLUCOSE SERPL-MCNC: 96 MG/DL (ref 70–99)
HCT VFR BLD AUTO: 33.8 % (ref 42–52)
HGB BLD-MCNC: 10.5 G/DL (ref 14–18)
LYMPHOCYTES # BLD: 1.6 K/UL (ref 1–4.8)
LYMPHOCYTES NFR BLD: 23.4 %
MCH RBC QN AUTO: 24.1 PG (ref 27–31.3)
MCHC RBC AUTO-ENTMCNC: 31.1 % (ref 33–37)
MCV RBC AUTO: 77.7 FL (ref 79–92.2)
MONOCYTES # BLD: 0.8 K/UL (ref 0.2–0.8)
MONOCYTES NFR BLD: 11.7 %
NEUTROPHILS # BLD: 4.1 K/UL (ref 1.4–6.5)
NEUTS SEG NFR BLD: 61.1 %
PLATELET # BLD AUTO: 302 K/UL (ref 130–400)
POTASSIUM SERPL-SCNC: 4.5 MEQ/L (ref 3.4–4.9)
RBC # BLD AUTO: 4.35 M/UL (ref 4.7–6.1)
SODIUM SERPL-SCNC: 138 MEQ/L (ref 135–144)
WBC # BLD AUTO: 6.7 K/UL (ref 4.8–10.8)

## 2025-06-19 PROCEDURE — 97116 GAIT TRAINING THERAPY: CPT

## 2025-06-19 PROCEDURE — 99233 SBSQ HOSP IP/OBS HIGH 50: CPT | Performed by: PHYSICAL MEDICINE & REHABILITATION

## 2025-06-19 PROCEDURE — 6370000000 HC RX 637 (ALT 250 FOR IP): Performed by: PHYSICAL MEDICINE & REHABILITATION

## 2025-06-19 PROCEDURE — 97530 THERAPEUTIC ACTIVITIES: CPT

## 2025-06-19 PROCEDURE — 97112 NEUROMUSCULAR REEDUCATION: CPT

## 2025-06-19 PROCEDURE — 97110 THERAPEUTIC EXERCISES: CPT

## 2025-06-19 PROCEDURE — 1180000000 HC REHAB R&B

## 2025-06-19 PROCEDURE — 97535 SELF CARE MNGMENT TRAINING: CPT

## 2025-06-19 PROCEDURE — 80048 BASIC METABOLIC PNL TOTAL CA: CPT

## 2025-06-19 PROCEDURE — 36415 COLL VENOUS BLD VENIPUNCTURE: CPT

## 2025-06-19 PROCEDURE — 6370000000 HC RX 637 (ALT 250 FOR IP): Performed by: INTERNAL MEDICINE

## 2025-06-19 PROCEDURE — 85025 COMPLETE CBC W/AUTO DIFF WBC: CPT

## 2025-06-19 RX ORDER — POLYETHYLENE GLYCOL 3350 17 G/17G
17 POWDER, FOR SOLUTION ORAL 2 TIMES DAILY
Status: DISCONTINUED | OUTPATIENT
Start: 2025-06-19 | End: 2025-06-20 | Stop reason: HOSPADM

## 2025-06-19 RX ORDER — SENNOSIDES 8.6 MG/1
1 TABLET ORAL NIGHTLY
Status: DISCONTINUED | OUTPATIENT
Start: 2025-06-19 | End: 2025-06-20 | Stop reason: HOSPADM

## 2025-06-19 RX ADMIN — LISINOPRIL 10 MG: 10 TABLET ORAL at 09:47

## 2025-06-19 RX ADMIN — LATANOPROST 1 DROP: 50 SOLUTION OPHTHALMIC at 21:04

## 2025-06-19 RX ADMIN — PRAVASTATIN SODIUM 80 MG: 40 TABLET ORAL at 21:04

## 2025-06-19 RX ADMIN — CARBIDOPA AND LEVODOPA 1 TABLET: 25; 100 TABLET ORAL at 13:15

## 2025-06-19 RX ADMIN — DONEPEZIL HYDROCHLORIDE 5 MG: 5 TABLET, FILM COATED ORAL at 09:48

## 2025-06-19 RX ADMIN — DORZOLAMIDE HYDROCHLORIDE AND TIMOLOL MALEATE 1 DROP: 20; 5 SOLUTION/ DROPS OPHTHALMIC at 09:50

## 2025-06-19 RX ADMIN — APIXABAN 5 MG: 5 TABLET, FILM COATED ORAL at 09:48

## 2025-06-19 RX ADMIN — APIXABAN 5 MG: 5 TABLET, FILM COATED ORAL at 21:04

## 2025-06-19 RX ADMIN — CARBIDOPA AND LEVODOPA 1 TABLET: 25; 100 TABLET ORAL at 09:48

## 2025-06-19 RX ADMIN — DORZOLAMIDE HYDROCHLORIDE AND TIMOLOL MALEATE 1 DROP: 20; 5 SOLUTION/ DROPS OPHTHALMIC at 21:04

## 2025-06-19 RX ADMIN — CARVEDILOL 6.25 MG: 6.25 TABLET, FILM COATED ORAL at 15:32

## 2025-06-19 RX ADMIN — CLOPIDOGREL BISULFATE 75 MG: 75 TABLET, FILM COATED ORAL at 09:47

## 2025-06-19 RX ADMIN — CARVEDILOL 6.25 MG: 6.25 TABLET, FILM COATED ORAL at 09:48

## 2025-06-19 RX ADMIN — FINASTERIDE 5 MG: 5 TABLET, FILM COATED ORAL at 09:47

## 2025-06-19 RX ADMIN — ISOSORBIDE MONONITRATE 30 MG: 30 TABLET, EXTENDED RELEASE ORAL at 09:48

## 2025-06-19 ASSESSMENT — PAIN SCALES - GENERAL: PAINLEVEL_OUTOF10: 0

## 2025-06-19 NOTE — PROGRESS NOTES
Physical Therapy Rehab Treatment Note  [place] [facility] [place] [facility]  [facility]       NAME: Hamilton Torres  : 1935 (89 y.o.)  MRN: [id number]  CODE STATUS: DNR-CC    Date of Service: 2025       Restrictions:  Restrictions/Precautions: Fall Risk       SUBJECTIVE:   Subjective: \"Toribio worked me out\"    Pain  Pain: denies pain pre and post      OBJECTIVE:     Transfers  Surface: Wheelchair;To chair with arms;From chair with arms  Additional Factors: Verbal cues;Hand placement cues;Increased time to complete  Device:  (rollator)  Sit to Stand  Assistance Level: Stand by assist  Skilled Clinical Factors: no lifting assist required, good hand placement noted between transitions  Stand to Sit  Assistance Level: Stand by assist  Skilled Clinical Factors: cues for safety and controlled descent    Ambulation  Surface: Level surface;Uneven surface  Device: Rollator  Distance: 200', 100'  Activity: Within Unit  Additional Factors: Verbal cues;Hand placement cues;Increased time to complete  Assistance Level: Supervision  Gait Deviations: Slow giovanna;Narrow base of support;Unsteady gait;Path deviations  Skilled Clinical Factors: good environmental navigation and familiarity with use of rollator, cues for upright posture and proximity within rollator. no fatigue or SOB noted with distances.    Neuromuscular Education  Neuromuscular Education: Yes  NDT Treatment: Standing;Sitting;Gait   Neuromuscular Comments: 5x STS completed in 26.2 seconds this session- SBA to complete all stands and maintain balance. TUG test assessed, pt completes in 34.9 seconds with SBA.    PT Exercises  PROM Exercises: seated HS/gastroc stretch x3 30 sec hold BLE  A/AROM Exercises: seated AP/LAQ/Marches/Hip Abd/Hip Add x10 BLE  Resistive Exercises: seated RTB HS curls/hip abd x10 BLE  Dynamic Standing Balance Exercises: standing marches/heel raises/hip abduction x10 BLE     Activity Tolerance  Activity Tolerance: Patient tolerated  treatment well    ASSESSMENT/PROGRESS TOWARDS GOALS:   Assessment  Assessment: Good tolerance to session this date, slightly slower and more rigid movements this date but maintains stability throughout tasks. Pt reports increased fatigue as opposed to previous sessions. Expecting potential discharge tomorrow and denies further questions/concerns.  Activity Tolerance: Patient tolerated treatment well    Goals:  Long Term Goals  Long Term Goal 1: Pt to complete bed mobility with indep  Long Term Goal 2: Pt to complete transfers with SBA  Long Term Goal 3: Pt to ambulate 50ft - 150 feet with LRD and SBA  Long Term Goal 4: Pt to complete TUG at 25 sec  Long Term Goal 5: pt to complete curb step with HR and SBA  Long term goal 6: ---  Long term goal 7: ---  Patient Goals   Patient Goals : \"I want to work on my balance and stay out of the wheelchair.\"    PLAN OF CARE/Safety:   Safety Devices  Type of Devices: All fall risk precautions in place;Chair alarm in place;Left in chair;Call light within reach      Therapy Time:   Individual   Time In 1100   Time Out 1200   Minutes 60      Minutes: 60  Transfer/Bed mobility training: 10  Gait trainin  Neuro re education: 10  Therapeutic ex: 20      Daniel Aquino PTA, 25 at 12:02 PM

## 2025-06-19 NOTE — PROGRESS NOTES
Patient incontinent of bladder and bowel through out the night. Pt unaware that he had an incontinence episode.   Incontinence care provided.

## 2025-06-19 NOTE — PROGRESS NOTES
Physical Therapy Rehab Treatment Note  [facility] [place] [facility]  [facility]       NAME: Hamilton Torres  : 1935 (89 y.o.)  MRN: [id number]  CODE STATUS: DNR-CC    Date of Service: 2025       Restrictions:  Restrictions/Precautions: Fall Risk       SUBJECTIVE:   Subjective: \"Lunch was good\"    Pain  Pain: denies pain pre and post      OBJECTIVE:     Transfers  Surface: Wheelchair  Additional Factors: Verbal cues;Hand placement cues;Increased time to complete  Device:  (rollator)  Sit to Stand  Assistance Level: Stand by assist  Skilled Clinical Factors: no lifting assist required, good hand placement noted between transitions  Stand to Sit  Assistance Level: Stand by assist  Skilled Clinical Factors: cues for safety and controlled descent    Ambulation  Surface: Level surface;Uneven surface  Device: Rollator  Distance: 150'  Activity: Within Unit  Additional Factors: Verbal cues;Hand placement cues;Increased time to complete  Assistance Level: Supervision  Gait Deviations: Slow giovanna;Narrow base of support;Unsteady gait;Path deviations  Skilled Clinical Factors: good environmental navigation and familiarity with use of rollator, cues for upright posture and proximity within rollator. no fatigue or SOB noted with distances.    Curb  Curb Height: 4''  Device:  (rollator)  Number of Curbs: 1  Additional Factors: Verbal cues;Hand placement cues;Non-reciprocal going up;Non-reciprocal going down;Increased time to complete  Assistance Level: Minimal assistance  Skilled Clinical Factors: verbal and visual demo provided prior to completion, increased assist required when completing for rollator management and balance throughout.    PT Exercises  PROM Exercises: seated HS/gastroc stretch x3 30 sec hold BLE  Dynamic Standing Balance Exercises: standing at rollator bag toss with focus on maintaining balance while reaching OOBOS and across midline.     Activity Tolerance  Activity Tolerance: Patient

## 2025-06-19 NOTE — CARE COORDINATION
CM spoke with daughter Olga to discuss follow up from family training and team meeting. Daughter advised that based on the cognition with patient, the team feels that this is the current level patient will be at. Daughter is concerned about the length that patient has to ambulate and transfers. Daughter updated that AL would need to assess patient to follow up. Also advised that patient is having issues with hygiene following bowel movements, suggested a bidet, daughter stated that she does not think it will fit. Electronically signed by Jayleen Mckinney RN on 6/19/2025 at 12:57 PM

## 2025-06-19 NOTE — CARE COORDINATION
LSW called Aravind Drake and left a voicemail for Mira requesting a return call to discuss discharge and assessing pt. Electronically signed by BREANNA Rahman, KALPESH on 6/19/2025 at 11:18 AM

## 2025-06-19 NOTE — PROGRESS NOTES
89-year-old gentleman presenting to the emergency department with a history of falls difficulty taking his Parkinson's medications current currently they have been resumed at twice a day. He is having knee buckling he underwent a CAT scan of the spine which demonstrated no cord compression he was living at the assisted living prior to this and was on blood thinners he has been admitted to rehab given marked functional loss     Subjective:   The patient complains of severe acute on chronic progressive fatigue and generalized weakness partially relieved by rest, medications, PT,  OT,   SLP and rest and exacerbated by recent  Parkinson's exacerbation.      I am concerned about patient’s medical complexities and barriers to advancing in rehab goals including decreasing falls risk.        I discussed current functional, rehabilitation, medical status with other rehabilitation providers including nursing and case management.  According to recent nursing note,  \"incontinent of bladder and bowel through out the night. Pt unaware that he had an incontinence episode. Incontinence care provided\".    We are preparing for his discharge later this week.  We need to focus on a bladder program and weight shifts--family wants to extend DC date.    ROS x10:  The patient also complains of severely impaired mobility and activities of daily living.  Otherwise no new problems with vision, hearing, nose, mouth, throat, dermal, cardiovascular, GI, , pulmonary, musculoskeletal, psychiatric or neurological. See Rehab H&P on Rehab chart dated .       Vital signs:  BP (!) 141/68   Pulse 64   Temp 98.1 °F (36.7 °C) (Oral)   Resp 17   Ht 1.778 m (5' 10\")   Wt 75.8 kg (167 lb 1.7 oz)   SpO2 93%   BMI 23.98 kg/m²   I/O:   PO/Intake:  fair PO intake, no problems observed or reported.  Regular diet    Bowel/Bladder:  incontinent,   General:  Patient is well developed, adequately nourished, non-obese and     well kempt.     HEENT:    JULIETTE

## 2025-06-19 NOTE — PROGRESS NOTES
OCCUPATIONAL THERAPY  INPATIENT REHAB TREATMENT NOTE  [facility]      NAME: Hamilton Torres  : 1935 (89 y.o.)  MRN: [id number]  CODE STATUS: DNR-CC  Room: R244/R244-01    Date of Service: 2025    Referring Physician: Dr. Troy  Rehab Diagnosis: Impaired mobility and ADLs d/t OA exacerbation    Hospital course:   Comments: Hospital Course: 88 y/o male pt who presented to [facility] from his assisted living with reports of multiple falls. Pt with chronic knee pain r/t OA and has recently had knee injections done by ortho. Though intially knees reportedly were better pt admits to kneebuckling causing the falls. CT (H) and (C) spine negative. Xrays of knees also negative for acute findings but did show degenerative changes. Labs obtained and w/o significant findings. Pt unsafe to return back to his AL from ER d/t recent falls and while being on blood thinners. PT/OT saw pt with recommendation for high intensity therapy.    Restrictions  Restrictions/Precautions  Restrictions/Precautions: Fall Risk       Patient's date of birth confirmed: Yes    SAFETY:   All precautions in place     SUBJECTIVE:     Pain at start of treatment: No    Pain at end of treatment: No    OBJECTIVE:    Patient engaged in seated fine motor and cognitive activity to increase [place] with ADL/IADL completion.    Challenged pt to assemble peg board into pattern according to a visual model presented on a piece of paper.   Pt completed 1 pattern.     Difficult asymmetrical design completed    1st pattern: Pt needing increased time but demo'ed good understanding of activity with good sequencing/problem solving.     Good ability to place and remove pegs and reach vertically.     Returned to bed at end of session- SBA for w/c to bed.      Education:  Education  Education Given To: Patient  Education Provided: Role of Therapy;Plan of Care;Precautions;Transfer Training;Safety;ADL Function;DME/Home  Modifications;Cognition;Fall Prevention Strategies;Equipment  Education Method: Verbal;Demonstration  Education Outcome: Continued education needed    ASSESSMENT:  Activity Tolerance: Patient tolerated treatment well    PLAN OF CARE:  Strengthening, Balance training, Functional mobility training, Endurance training, Safety education & training, Patient/Caregiver education & training, Equipment evaluation, education, & procurement, Self-Care / ADL, Home management training       Patient goals : To be able to walk, improve balance       Therapy Time:   Individual Group Co-Treat   Time In 1500       Time Out 1530         Minutes 30             Therapeutic activities: 30 minutes     Electronically signed by:    Toribio Botello OT,   6/19/2025, 3:08 PM

## 2025-06-19 NOTE — PROGRESS NOTES
Occupational Therapy Get up and Go Note            Date: 2025  Patient Name: Hamilton Torres        MRN: [id number]    Account #: [id number]  : 1935  (89 y.o.)      Subjective:  Patient states:  \"Some of you people are as useless as tits on a bull.\"  Pain:  Pain at start of treatment: No    Pain at end of treatment: No    Objective:  ADL:  Feeding:  Setup - Patient requires assist to position table tray, open packets and containers - patient positive hand -> mouth with breakfast tray - patient requires increased time to cut waffle      Treatment consisted of:   [x] ADL Training  [] Strengthening   [] Transfer Training    [] DME Education  [] HEP   [] Patient Education  [] Other:    Safety:  Safety Devices  Safety Devices in place:   Type of devices: All fall risk precautions in place      Therapy Time:   Individual Group Co-Treat   Time In 712       Time Out 0720         Minutes 8             ADL/IADL trainin minutes         Electronically signed by:    DEISY Hernandez    2025, 7:44 AM

## 2025-06-19 NOTE — CARE COORDINATION
From: Family (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 for ADLs: Needs assistance (assist to don shoes and pants and assist with showering, pt reports recently has been needing more assistance)  Bath:  (nurse remains in the room in case pt needs something as pt gets nervous getting out of the shower due to previous falls)  Dressing:  (needed assist with compression socks only; pt independent otherwise)  Grooming: Independent  Feeding: Independent  Toileting: Independent (continent but wore a brief at night and could change self if had an accident)  Prior Level of Assist for Homemaking: Needs assistance (AL completes cooking, cleaning and laundry but pt reports he is able to use microwave or prepare a light snack)  Homemaking Responsibilities: No (AL performs med mgmt)  Prior Level of Assist for Ambulation: Independent household ambulator, with or without device, Independent community ambulator, with or without device (rollator)  Prior Level of Assist for Transfers: Independent  Active : No  Patient's  Info: dtr takes pt to appointments; facility offers transport 2x/wk  Mode of Transportation: Car, Bus  Education: high school graduate  Occupation: Retired  Type of Occupation: [employer] for 30 years  Leisure & Hobbies: Pt likes to read and watch television, work puzzles, pt used to like to bowl  Additional Comments: Pt reports frequent outings with his daughter. Is able to walk in the community       Pts personal preferences: likes to sleep in a recliner    Pts assets/resources/support system: dtr, son, grandchildren, AL staff    COVERAGE INFORMATION:Payor: MEDICARE / Plan: MEDICARE PART A AND B / Product Type: *No Product type* /       NURSING  No active isolations    Weight - Scale: 75.8 kg (167 lb 1.7 oz) / Body mass index is 23.98 kg/m².    ADULT DIET; Regular  ADULT ORAL NUTRITION SUPPLEMENT; Breakfast; Standard High Calorie/High Protein Oral Supplement  ADULT ORAL NUTRITION  don/doff personal brief and thoroughly clean posterior region in shower following earlier BM (06/18/25 0923)  Toilet Transfers  Technique: Stand pivot (06/16/25 0959)  Equipment: Grab bars (06/16/25 0959)  Additional Factors: Verbal cues;Increased time to complete (06/16/25 0959)  Assistance Level: Minimal assistance (06/16/25 0959)  Skilled Clinical Factors: retro upon standing from w/c with assist for anterior weight shift (06/16/25 0959)  Tub/Shower Transfers  Type: Shower (06/18/25 0923)  Transfer From: Rolling walker (06/18/25 0923)  Transfer To: Shower chair with back (06/18/25 0923)  Additional Factors: Cues for hand placement (06/18/25 0923)  Assistance Level: Contact guard assist (06/18/25 0923)  Skilled Clinical Factors: touching assist (06/18/25 0923)        LTG:  Eating:Discharge Goal: Independent  Oral Hygiene:Discharge Goal: Independent  Shower/Bathe self: Discharge Goal: Independent  Upper body dressing:Discharge Goal: Independent  Lower body dressing:Discharge Goal: Independent  Putting on taking off footwear:Discharge Goal: Independent   Toileting: Discharge Goal: Independent  Toilet transfer:Discharge Goal: Independent  OT Treatment Time: 1.5 hrs      SPEECH THERAPY                 Diet/Swallow:                       LTG:                COGNITION  OT:            Orientation  Orientation Level: Oriented X4 (06/14/25 0827)  SP:              RECREATIONAL THERAPY  Attendance to recreational therapy programs:    []  Pet Therapy  [] Music Therapy  [] Art Therapy    [] Recreation Therapy Group [] Support Group           Patient social interaction (mood, participation): good    Patient strengths: AL staff supportive    Patients goal: return to AL    Problems/Barriers: SBA needed for cognition--may perform better in a familiar environment        1. Safety:          - Intervention / Plan:    [x]  falls protocol     [x]  PT/OT    []  SP        - Results:         2. Potential DME needs:         - Intervention

## 2025-06-19 NOTE — PROGRESS NOTES
assist  Skilled Clinical Factors: declined to change brief, dpffed hospital pants and donned personal ones  Putting On/Taking Off Footwear  Assistance Level: Moderate assistance  Skilled Clinical Factors: TotalA CASIMIRO hose. setup shoes  Toileting  Assistance Level: Stand by assist  Skilled Clinical Factors: completed x 2: pt sat on toilet and was unable to go at beg of session. 2nd attempt: urinated and had soft bowel movement. SBA to manage clothing. Pt placed incontinence pad inside brief. Pt was able to wipe himself but continues to need cues to thoroughly complete hygiene s.p bowel movement and is somewhat resistive to cues / assistance to complete despite education on concerns for skin hygiene  Toilet Transfers  Technique: Stand pivot  Equipment: Grab bars  Additional Factors: Verbal cues;Increased time to complete  Assistance Level: Stand by assist  Tub/Shower Transfers  Skilled Clinical Factors: sponge bath at sink         Functional Mobility  Device: Rolling walker  Activity: To/From bathroom  Assistance Level: Stand by assist;Supervision  Skilled Clinical Factors: to/from bathroom. Good technique. No LOBs  Supine to Sit  Assistance Level: Supervision  Scooting  Assistance Level: Stand by assist  Sit to Stand  Assistance Level: Stand by assist  Stand to Sit  Assistance Level: Stand by assist       Education:  Education  Education Given To: Patient  Education Provided: Role of Therapy;Plan of Care;Precautions;Transfer Training;Safety;ADL Function;DME/Home Modifications;Cognition;Fall Prevention Strategies;Equipment  Education Method: Verbal;Demonstration  Education Outcome: Continued education needed    Equipment recommendations:     Denies need    Bidet adapter discussed in team today for AL bathroom    ASSESSMENT:  Activity Tolerance: Patient tolerated treatment well    D/C ADL completed. Pt mostly SBA. He continues to need assistance/education on incontinence management- recommend continued assist for

## 2025-06-19 NOTE — PROGRESS NOTES
Facility/Department: [facility] [place]  Rehabilitation Goal update: Physical Therapy  Room: [facility] [id number]        Pts goals updated as listed below:  Long Term Goal 1: Pt to complete bed mobility with indep  Long Term Goal 2: Pt to complete transfers with SBA  Long Term Goal 3: Pt to ambulate 50ft - 150 feet with LRD and SBA  Long Term Goal 4: Pt to complete TUG at 25 sec  Long Term Goal 5: pt to complete curb step with HR and SBA

## 2025-06-19 NOTE — PROGRESS NOTES
INDIVIDUALIZED OVERALL REHAB PLAN OF CARE  ADDENDUM TO REHAB PROGRESS NOTE-for audit purposes must also refer to this day's clinical note and combine the information      Date: 2025  Patient Name: Hamilton Torres   Room: R244/R244-01    MRN: [id number]    : 1935  (89 y.o.)  Gender: male       Today 2025 during weekly team meeting, I reviewed the patient Hamilton Torres in detail with the therapists and nurses involved in patient's care gathering complex physiatric data regarding current medical issues, progress in therapies, factors limiting progress, social issues, psychological issues, ongoing therapeutic plans and discharge planning.  I was present in the PM& R department in [facility]  A  video monitor live feed between the interdisciplinary team participants in the team conference was used to supplement connectivity and facilitate record review and allow immediate access to the EMR to allow real time review of the records and immediately address pertinent issues.   Our program views this an essential process to maintaining the integral rehab physician leadership role in team and the interdisciplinary discussion of our patient.    Legend:  I= independent Im =Modified independent  S=Supervised SB=stand by KAUR=set up CG=contact alfonzo Min= minimal Mod=Moderate Max=maximal Max of 2 =maximal assist of 2 people      CURRENT FUNCTIONAL STATUS:    89-year-old gentleman presenting to the emergency department with a history of falls difficulty taking his Parkinson's medications current currently they have been resumed at twice a day. He is having knee buckling he underwent a CAT scan of the spine which demonstrated no cord compression he was living at the assisted living prior to this and was on blood thinners he has been admitted to rehab given marked functional loss.     NURSING ISSUES:   focus on a bladder program and weight shifts-  Nursing will continue to focus on

## 2025-06-19 NOTE — CARE COORDINATION
LSW spoke with Mira at [facility], and she will be in to assess pt by 3:30PM. LSW reviewed pt's current level of function and recommendations to ensure safety. Mira also confirmed that a previous resident had a bidet before and that it will fit with pt's toilet. HHC vs OP was discussed as they do offer services onsite, Mira stated that they have [facility]. HHC vs OP to be discussed with pt. Electronically signed by BREANNA Rahman, OZZYW on 6/19/2025 at 2:34 PM

## 2025-06-19 NOTE — PROGRESS NOTES
Hospitalist Progress Note      PCP: Cesilia Martínez MD    Date of Admission: 6/13/2025    Chief Complaint: weakness    Subjective: patient back from PT, eating lunch    Medications:  Reviewed    Infusion Medications   Scheduled Medications    senna  1 tablet Oral Nightly    carbidopa-levodopa  1 tablet Oral BID    carvedilol  6.25 mg Oral BID WC    clopidogrel  75 mg Oral Daily    donepezil  5 mg Oral Daily    dorzolamide-timolol  1 drop Both Eyes BID    finasteride  5 mg Oral Daily    isosorbide mononitrate  30 mg Oral Daily    latanoprost  1 drop Both Eyes Nightly    lisinopril  10 mg Oral Daily    pravastatin  80 mg Oral Nightly    apixaban  5 mg Oral BID    polyethylene glycol  17 g Oral Daily     PRN Meds: melatonin, albuterol sulfate HFA, acetaminophen, bisacodyl, sodium phosphate    No intake or output data in the 24 hours ending 06/19/25 1200    Exam:    BP (!) 141/68   Pulse 64   Temp 98.1 °F (36.7 °C) (Oral)   Resp 17   Ht 1.778 m (5' 10\")   Wt 75.8 kg (167 lb 1.7 oz)   SpO2 93%   BMI 23.98 kg/m²     General appearance: No apparent distress, appears stated age and cooperative.  HEENT: Pupils equal, round, and reactive to light. Conjunctivae/corneas clear.  Neck: Supple, with full range of motion. No jugular venous distention. Trachea midline.  Respiratory:  Normal respiratory effort. Clear to auscultation, bilaterally without Rales/Wheezes/Rhonchi.  Cardiovascular: Regular rate and rhythm with normal S1/S2 without murmurs, rubs or gallops.  Abdomen: Soft, non-tender, non-distended with normal bowel sounds.  Musculoskeletal: No clubbing, cyanosis or edema bilaterally.  Full range of motion without deformity.  Skin: Skin color, texture, turgor normal.  No rashes or lesions.  Neurologic:  Neurovascularly intact without any focal sensory/motor deficits. Cranial nerves: II-XII intact, grossly non-focal.  Psychiatric: Alert and oriented, thought content appropriate, normal insight  Capillary

## 2025-06-19 NOTE — PROGRESS NOTES
Patient denies pain.  Electronically signed by Lexii Hartley LPN on 6/19/2025 at 9:53 AM    Aravind Drake came to evaluate patient today. She spoke with RN. RN stated patient is capable of returning to facility. Left LSW a message updating.  Electronically signed by Lexii Hartley LPN on 6/19/2025 at 5:30 PM

## 2025-06-20 VITALS
RESPIRATION RATE: 17 BRPM | OXYGEN SATURATION: 97 % | SYSTOLIC BLOOD PRESSURE: 117 MMHG | HEART RATE: 58 BPM | HEIGHT: 70 IN | WEIGHT: 167.11 LBS | BODY MASS INDEX: 23.92 KG/M2 | DIASTOLIC BLOOD PRESSURE: 58 MMHG | TEMPERATURE: 97.7 F

## 2025-06-20 PROBLEM — R41.89 COGNITIVE CHANGES: Status: ACTIVE | Noted: 2025-06-20

## 2025-06-20 PROBLEM — R26.0 ATAXIC GAIT: Status: ACTIVE | Noted: 2024-12-10

## 2025-06-20 PROBLEM — G90.1 DYSAUTONOMIA (HCC): Status: ACTIVE | Noted: 2025-06-20

## 2025-06-20 PROCEDURE — 97535 SELF CARE MNGMENT TRAINING: CPT

## 2025-06-20 PROCEDURE — 97116 GAIT TRAINING THERAPY: CPT

## 2025-06-20 PROCEDURE — 99238 HOSP IP/OBS DSCHRG MGMT 30/<: CPT | Performed by: PHYSICAL MEDICINE & REHABILITATION

## 2025-06-20 PROCEDURE — APPSS15 APP SPLIT SHARED TIME 0-15 MINUTES: Performed by: NURSE PRACTITIONER

## 2025-06-20 PROCEDURE — 97110 THERAPEUTIC EXERCISES: CPT

## 2025-06-20 PROCEDURE — 97112 NEUROMUSCULAR REEDUCATION: CPT

## 2025-06-20 PROCEDURE — 99232 SBSQ HOSP IP/OBS MODERATE 35: CPT | Performed by: PSYCHIATRY & NEUROLOGY

## 2025-06-20 PROCEDURE — 6370000000 HC RX 637 (ALT 250 FOR IP): Performed by: INTERNAL MEDICINE

## 2025-06-20 PROCEDURE — 6370000000 HC RX 637 (ALT 250 FOR IP): Performed by: PHYSICAL MEDICINE & REHABILITATION

## 2025-06-20 RX ADMIN — ISOSORBIDE MONONITRATE 30 MG: 30 TABLET, EXTENDED RELEASE ORAL at 09:03

## 2025-06-20 RX ADMIN — CARBIDOPA AND LEVODOPA 1 TABLET: 25; 100 TABLET ORAL at 09:02

## 2025-06-20 RX ADMIN — CARBIDOPA AND LEVODOPA 1 TABLET: 25; 100 TABLET ORAL at 12:59

## 2025-06-20 RX ADMIN — CARVEDILOL 6.25 MG: 6.25 TABLET, FILM COATED ORAL at 09:03

## 2025-06-20 RX ADMIN — LISINOPRIL 10 MG: 10 TABLET ORAL at 09:03

## 2025-06-20 RX ADMIN — APIXABAN 5 MG: 5 TABLET, FILM COATED ORAL at 09:03

## 2025-06-20 RX ADMIN — DORZOLAMIDE HYDROCHLORIDE AND TIMOLOL MALEATE 1 DROP: 20; 5 SOLUTION/ DROPS OPHTHALMIC at 09:04

## 2025-06-20 RX ADMIN — ACETAMINOPHEN 650 MG: 325 TABLET ORAL at 09:02

## 2025-06-20 RX ADMIN — CLOPIDOGREL BISULFATE 75 MG: 75 TABLET, FILM COATED ORAL at 09:03

## 2025-06-20 RX ADMIN — DONEPEZIL HYDROCHLORIDE 5 MG: 5 TABLET, FILM COATED ORAL at 09:03

## 2025-06-20 RX ADMIN — FINASTERIDE 5 MG: 5 TABLET, FILM COATED ORAL at 09:04

## 2025-06-20 ASSESSMENT — PAIN SCALES - GENERAL: PAINLEVEL_OUTOF10: 4

## 2025-06-20 ASSESSMENT — ENCOUNTER SYMPTOMS
COLOR CHANGE: 0
CONSTIPATION: 0
TROUBLE SWALLOWING: 0
COUGH: 0
WHEEZING: 0
CHEST TIGHTNESS: 0
VOMITING: 0
NAUSEA: 0
SHORTNESS OF BREATH: 0

## 2025-06-20 ASSESSMENT — PAIN DESCRIPTION - LOCATION: LOCATION: BACK

## 2025-06-20 ASSESSMENT — PAIN DESCRIPTION - DESCRIPTORS: DESCRIPTORS: ACHING

## 2025-06-20 ASSESSMENT — PAIN DESCRIPTION - ORIENTATION: ORIENTATION: RIGHT;LEFT

## 2025-06-20 NOTE — DISCHARGE SUMMARY
adjustment disorder:  consider prn low dose Ambien, monitor for day time sedation.    HS \"Tuck In\"  Falls risk elevated:  patient to use call light to get nursing assistance to get up, bed and chair alarm.  Elevated DVT risk: progressive activities in PT, continue prophylaxis CASIMIRO hose, elevation and Eliquis.  Complex discharge planning:   Discharge date to be determined likely extend past the 20th back to assisted living at [facility] with home health care.  Until then continue weekly team meeting  every Monday to re-assess progress towards goals, discuss and address social, psychological and medical comorbidities and to address difficulties they may be having progressing in therapy.  Patient and family education is in progress.  The patient is to follow-up with their family physician after discharge.    Bidet to toilet.    Complex Active General Medical Issues that complicated care summary:         Hyperlipidemia, Essential hypertension, Coronary artery disease involving native coronary artery of native heart without angina pectoris-Acute rehab to monitor heart rate and rhythm with the option of telemetry and the effects of chronotropic medication with respect to increasing physical activity and exercise in PT, OT, ADLs with medication titration to lowest effective dosing.  Continue blood signs every shift focusing on heart rate, rhythm and blood pressure checks with orthostatic checks-monitoring the effect of exercise, therapy and posture.  Consult hospitalist for backup medical and adjust/add medications.  Monitor heart rate and blood pressure as well as medications effects on vital signs before during and after therapy with especial focus on preventing orthostasis and falls risk.    Parkinsonism -consult neurology titrate dopaminergic medications  Anemia-Monitor vital signs monitor for orthostasis and tachycardia, check H&H prn.  Vitamin B12 and iron-dose iron with food to prevent GI upset.  Monitor for

## 2025-06-20 NOTE — PROGRESS NOTES
Pt assessment completed. VSS taken. Pt given medications per MAR. Pt denied having any pain or needs at this time. Medications given per MAR. Call light within reach, bed locked, bed in lowest position, and bed alarm active. Pt encouraged to use their call light when in need.    [de-identified] : Neurologic: normal mood and affect, orientated and able to communicate Constitutional: well developed and well nourished  Left Knee: Full ROM Ligamental stable Nontender no effusion Neurovascularly intact distally  Left Shoulder: proximal biceps tenderness +Speed's +Yergason's posterior shoulder tenderness +Ogle's +posterior load and shift +posterior crank test posterior apprehension

## 2025-06-20 NOTE — PROGRESS NOTES
[facility] Daily Progress Note  Name: Hamilton Torres  Age: 89 y.o.  Gender: male  CodeStatus: DNR-CC  Allergies: No Known Allergies    Chief Complaint:No chief complaint on file.    Primary Care Provider: Cesilia Martínez MD  InpatientTreatment Team: Treatment Team:   Judith Castillo DO Kaplan, Mark, MD Patel, Keith DU, MD Conrad, Gris, Krisatn Jasmine, DANIEL Khan, Tara, Loren Bello, BREANNA, OZZYW  Hortencia, Yahaira SARAVIA, Toribio Valadez, Roscoe Alicea  Admission Date: 6/13/2025      HPI   Pt seen and examined for neuro follow up.  Patient is alert and oriented x 3, no acute distress, cooperative.  Overall doing well.  Bradykinetic.  Tremors well-controlled.  No significant rigidity.  No dyskinesias.  Denies freezing spells.  No dysphagia.  Denies any current lightheadedness or dizziness.  Patient seen and examined events note    Vitals:    06/20/25 0902   BP: (!) 131/53   Pulse: 64   Resp:    Temp:    SpO2:         Review of Systems   Constitutional:  Negative for appetite change, chills, fatigue and fever.   HENT:  Negative for hearing loss and trouble swallowing.    Eyes:  Negative for visual disturbance.   Respiratory:  Negative for cough, chest tightness, shortness of breath and wheezing.    Cardiovascular:  Negative for chest pain, palpitations and leg swelling.   Gastrointestinal:  Negative for constipation, nausea and vomiting.   Genitourinary:  Negative for difficulty urinating.   Musculoskeletal:  Positive for gait problem.   Skin:  Negative for color change and rash.   Neurological:  Positive for tremors. Negative for dizziness, seizures, syncope, facial asymmetry, speech difficulty, weakness, light-headedness, numbness and headaches.   Psychiatric/Behavioral:  Negative for agitation, confusion and hallucinations. The patient is not nervous/anxious.          Physical Exam  Vitals and nursing note reviewed.   Constitutional:       General: He is not in acute distress.

## 2025-06-20 NOTE — PROGRESS NOTES
89-year-old gentleman presenting to the emergency department with a history of falls difficulty taking his Parkinson's medications current currently they have been resumed at twice a day. He is having knee buckling he underwent a CAT scan of the spine which demonstrated no cord compression he was living at the assisted living prior to this and was on blood thinners he has been admitted to rehab given marked functional loss     Subjective:   The patient complains of severe acute on chronic progressive fatigue and generalized weakness partially relieved by rest, medications, PT,  OT,   SLP and rest and exacerbated by recent  Parkinson's exacerbation.      I am concerned about patient’s medical complexities and barriers to advancing in rehab goals including decreasing falls risk.        I discussed current functional, rehabilitation, medical status with other rehabilitation providers including nursing and case management.  According to recent nursing note,  \"VSS taken. Pt given medications per MAR. Pt denied having any pain or needs at this time. Medications given per MAR. Call light within reach, bed locked, bed in lowest position, and bed alarm active. Pt encouraged to use their call light when in need. \".    We are preparing for his discharge later this week.  We need to focus on a bladder program and weight shifts--family wants to extend DC date.    ROS x10:  The patient also complains of severely impaired mobility and activities of daily living.  Otherwise no new problems with vision, hearing, nose, mouth, throat, dermal, cardiovascular, GI, , pulmonary, musculoskeletal, psychiatric or neurological. See Rehab H&P on Rehab chart dated .       Vital signs:  BP (!) 131/53   Pulse 64   Temp 97.7 °F (36.5 °C) (Oral)   Resp 17   Ht 1.778 m (5' 10\")   Wt 75.8 kg (167 lb 1.7 oz)   SpO2 97%   BMI 23.98 kg/m²   I/O:   PO/Intake:  fair PO intake, no problems observed or reported.  Regular diet    Bowel/Bladder:   elevated:  patient to use call light to get nursing assistance to get up, bed and chair alarm.  Elevated DVT risk: progressive activities in PT, continue prophylaxis CASIMIRO hose, elevation and Eliquis.  Complex discharge planning:   Discharge date to be determined likely extend past the 20th back to assisted living at [facility] with home health care.  Until then continue weekly team meeting  every Monday to re-assess progress towards goals, discuss and address social, psychological and medical comorbidities and to address difficulties they may be having progressing in therapy.  Patient and family education is in progress.  The patient is to follow-up with their family physician after discharge.    Bidet to toilet.    Complex Active General Medical Issues that complicate care Assess & Plan:         Hyperlipidemia, Essential hypertension, Coronary artery disease involving native coronary artery of native heart without angina pectoris-Acute rehab to monitor heart rate and rhythm with the option of telemetry and the effects of chronotropic medication with respect to increasing physical activity and exercise in PT, OT, ADLs with medication titration to lowest effective dosing.  Continue blood signs every shift focusing on heart rate, rhythm and blood pressure checks with orthostatic checks-monitoring the effect of exercise, therapy and posture.  Consult hospitalist for backup medical and adjust/add medications.  Monitor heart rate and blood pressure as well as medications effects on vital signs before during and after therapy with especial focus on preventing orthostasis and falls risk.    Parkinsonism -consult neurology titrate dopaminergic medications  Anemia-Monitor vital signs monitor for orthostasis and tachycardia, check H&H prn.  Vitamin B12 and iron-dose iron with food to prevent GI upset.  Monitor for constipation.  Monitor stools for blood.  DNR CCA        Focus on emotional health and caregiver involvement

## 2025-06-20 NOTE — PROGRESS NOTES
OCCUPATIONAL THERAPY  INPATIENT REHAB TREATMENT NOTE  [facility]      NAME: Hamilton Torres  : 1935 (89 y.o.)  MRN: [id number]  CODE STATUS: DNR-CC  Room: R244/R244-01    Date of Service: 2025    Referring Physician: Dr. Troy  Rehab Diagnosis: Impaired mobility and ADLs d/t OA exacerbation    Hospital course:   Comments: Hospital Course: 88 y/o male pt who presented to [facility] from his assisted living with reports of multiple falls. Pt with chronic knee pain r/t OA and has recently had knee injections done by ortho. Though intially knees reportedly were better pt admits to kneebuckling causing the falls. CT (H) and (C) spine negative. Xrays of knees also negative for acute findings but did show degenerative changes. Labs obtained and w/o significant findings. Pt unsafe to return back to his AL from ER d/t recent falls and while being on blood thinners. PT/OT saw pt with recommendation for high intensity therapy.    Restrictions  Restrictions/Precautions  Restrictions/Precautions: Fall Risk          Patient's date of birth confirmed: Yes    SAFETY:  Safety Devices  Type of devices: All fall risk precautions in place    SUBJECTIVE:     Pain at start of treatment: No    Pain at end of treatment: No    OBJECTIVE:    Pt presented supine in bed. Wanting to get into w/c and complete ADL tasks. Partial ADL completed- handoff to PCA at end of session d/t time restraints       Grooming/Oral Hygiene  Assistance Level: Modified independent  Skilled Clinical Factors: seated at sink  Upper Extremity Bathing  Assistance Level: Set-up  Lower Extremity Bathing  Skilled Clinical Factors: declined  Upper Extremity Dressing  Assistance Level: Modified independent;Increased time to complete  Lower Extremity Dressing  Assistance Level: Stand by assist  Skilled Clinical Factors: [facility] brief, donned personal brief       Supine to Sit  Assistance Level: Minimal assistance  Scooting  Assistance Level:

## 2025-06-20 NOTE — PLAN OF CARE
Problem: Chronic Conditions and Co-morbidities  Goal: Patient's chronic conditions and co-morbidity symptoms are monitored and maintained or improved  6/20/2025 1018 by Tara Khan, RN  Outcome: Progressing  6/20/2025 0657 by Kristan Smith, RN  Outcome: Progressing     Problem: Pain  Goal: Verbalizes/displays adequate comfort level or baseline comfort level  Outcome: Progressing     Problem: Skin/Tissue Integrity  Goal: Skin integrity remains intact  Description: 1.  Monitor for areas of redness and/or skin breakdown  2.  Assess vascular access sites hourly  3.  Every 4-6 hours minimum:  Change oxygen saturation probe site  4.  Every 4-6 hours:  If on nasal continuous positive airway pressure, respiratory therapy assess nares and determine need for appliance change or resting period  Outcome: Progressing     Problem: ABCDS Injury Assessment  Goal: Absence of physical injury  Outcome: Progressing     Problem: Safety - Adult  Goal: Free from fall injury  Outcome: Progressing

## 2025-06-20 NOTE — DISCHARGE INSTR - DIET

## 2025-06-20 NOTE — PROGRESS NOTES
Physical Therapy Rehab Treatment Note  Facility/Department: [facility]  [facility]       NAME: Hamilton Torres  : 1935 (89 y.o.)  MRN: [id number]  CODE STATUS: DNR-CC    Date of Service: 2025       Restrictions:  Restrictions/Precautions: Fall Risk       SUBJECTIVE:   Subjective: \"I am getting out today\"    Pain  Pain: denies pain pre and post      OBJECTIVE:     Bed Mobility  Overall Assistance Level: Modified Independent  Additional Factors: Verbal cues;Increased time to complete;Head of bed flat;With handrails  Roll Left  Assistance Level: Modified independent  Roll Right  Assistance Level: Modified independent  Sit to Supine  Assistance Level: Modified independent  Supine to Sit  Assistance Level: Modified independent  Scooting  Assistance Level: Modified independent    Transfers  Surface: From bed;Wheelchair  Additional Factors: Verbal cues;Hand placement cues;Increased time to complete  Device:  (rollator)  Sit to Stand  Assistance Level: Stand by assist  Skilled Clinical Factors: no lifting assist required, good hand placement noted between transitions  Stand to Sit  Assistance Level: Stand by assist  Skilled Clinical Factors: cues for safety and controlled descent  Car Transfer  Assistance Level: Minimal assistance;Stand by assist  Skilled Clinical Factors: SBA to complete transition of BLE in/out of car, Cheri to complete STS from lower surface, vc's for technique and sequencing    Ambulation  Surface: Level surface;Uneven surface  Device: Rollator  Distance: 150' x 2  Activity: Within Unit  Additional Factors: Verbal cues;Hand placement cues;Increased time to complete  Assistance Level: Supervision  Gait Deviations: Slow giovanna;Narrow base of support;Unsteady gait;Path deviations  Skilled Clinical Factors: good environmental navigation and familiarity with use of rollator, cues for upright posture and proximity within rollator. no fatigue or SOB noted with distances.    Neuromuscular

## 2025-06-20 NOTE — DISCHARGE INSTR - COC
Continuity of Care Form    Patient Name: Hamilton Torres   :  1935  MRN:  [id number]    Admit date:  2025  Discharge date:  2025    Code Status Order: DNR-CC   Advance Directives:     Admitting Physician:  Judith Castillo DO  PCP: Cesilia Martínez MD    Discharging Nurse: Rehab Nurse  Discharging Hospital Unit/Room#: R244/R244-01  Discharging Unit Phone Number: [phone]    Emergency Contact:   Extended Emergency Contact Information  Primary Emergency Contact: Olga Garcia   [facility]  Home Phone: [phone]  Mobile Phone: [phone]  Relation: Child  Secondary Emergency Contact: Wilton Torres   [facility]  Home Phone: [phone]  Relation: Child    Past Surgical History:  Past Surgical History:   Procedure Laterality Date    CARPAL TUNNEL RELEASE      CATARACT REMOVAL      COLONOSCOPY  12/10/10,    DR MATTHEWS - DONE AT [facility]    COLONOSCOPY      hx polyps needs     COLONOSCOPY  9/21/15     DR. YARBROUGH     CORONARY ANGIOPLASTY WITH STENT PLACEMENT  2020    DIAGNOSTIC CARDIAC CATH LAB PROCEDURE      HERNIA REPAIR Bilateral     x 2    HERNIA REPAIR Right 2020    RIGHT INGUINAL HERNIA REPAIR performed by Ros[facility]    PTCA         Immunization History:   Immunization History   Administered Date(s) Administered    COVID-19, PFIZER GRAY top, DO NOT Dilute, (age 12 y+), IM, 30 mcg/0.3 mL 2022    COVID-19, PFIZER PURPLE top, DILUTE for use, (age 12 y+), 30mcg/0.3mL 2021, 2021, 10/18/2021    COVID-19, PFIZER, , (age 12y+), IM, 30mcg/0.3mL 2024    Influenza 10/12/2011, 10/08/2012, 10/10/2013    Influenza Virus Vaccine 10/16/2014    Influenza, FLUAD, (age 65 y+), IM, Quadv, 0.5mL 10/27/2020    Influenza, FLUAD, (age 65 y+), IM, Trivalent PF, 0.5mL 10/08/2019    Influenza, FLUZONE High Dose, (age 65 y+), IM, Trivalent PF, 0.5mL 2015, 2016, 2017, 10/20/2018     23 COVID-19, Rapid 23 Infection                          Nurse Assessment:  Last Vital Signs: BP (!) 131/53   Pulse 64   Temp 97.7 °F (36.5 °C) (Oral)   Resp 17   Ht 1.778 m (5' 10\")   Wt 75.8 kg (167 lb 1.7 oz)   SpO2 97%   BMI 23.98 kg/m²     Last documented pain score (0-10 scale): Pain Level: 4  Last Weight:   Wt Readings from Last 1 Encounters:   25 75.8 kg (167 lb 1.7 oz)     Mental Status:  oriented and alert    IV Access:  - None    Nursing Mobility/ADLs:  Walking   Assisted  Transfer  Assisted  Bathing  Assisted  Dressing  Assisted  Toileting  Assisted  Feeding  Independent  Med Admin  Independent  Med Delivery   whole    Wound Care Documentation and Therapy:  Wound 25 Buttocks Right (Active)   Wound Etiology Pressure Stage 2 25 0903   Dressing Status Clean;Dry;Intact 25 0903   Wound Cleansed Soap and water 25 0903   Dressing/Treatment Zinc paste 25 0903   Dressing Change Due 25 1949   Wound Length (cm) 2 cm 25 0715   Wound Width (cm) 1 cm 25 0715   Wound Depth (cm) 0 cm 25 0715   Wound Surface Area (cm^2) 2 cm^2 25 0715   Wound Volume (cm^3) 0 cm^3 25 0715   Wound Assessment Pink/red 25 0903   Drainage Amount Scant (moist but unmeasurable) 25 1020   Drainage Description Serous 25 1020   Odor None 25 1020   Nneka-wound Assessment Blanchable erythema 25 1020   Margins Defined edges 25 1020   Number of days: 7        Elimination:  Continence:   Bowel: Yes  Bladder: No  Urinary Catheter: None   Colostomy/Ileostomy/Ileal Conduit: No       Date of Last BM: 2025    Intake/Output Summary (Last 24 hours) at 2025 1031  Last data filed at 2025 0653  Gross per 24 hour   Intake --   Output 650 ml   Net -650 ml     I/O last 3 completed shifts:  In: -   Out: 650 [Urine:650]    Safety Concerns:     History of Falls (last 30 days) and At Risk for

## 2025-06-20 NOTE — CARE COORDINATION
Pt is set to return to [facility] today. Medication list was signed and placed in d/c envelope. Noemí from [facility] confirmed that they accepted pt. OZZYW spoke with sandie

## 2025-06-20 NOTE — FLOWSHEET NOTE
Patient discharged with all belongings transported via physician's ambulance to [facility].

## 2025-06-20 NOTE — PROGRESS NOTES
Hospitalist Progress Note      PCP: Cesilia Martínez MD    Date of Admission: 6/13/2025    Chief Complaint: weakness    Subjective: patient awake/alert     Medications:  Reviewed    Infusion Medications   Scheduled Medications    polyethylene glycol  17 g Oral BID    senna  1 tablet Oral Nightly    carbidopa-levodopa  1 tablet Oral BID    carvedilol  6.25 mg Oral BID WC    clopidogrel  75 mg Oral Daily    donepezil  5 mg Oral Daily    dorzolamide-timolol  1 drop Both Eyes BID    finasteride  5 mg Oral Daily    latanoprost  1 drop Both Eyes Nightly    lisinopril  10 mg Oral Daily    pravastatin  80 mg Oral Nightly    apixaban  5 mg Oral BID     PRN Meds: melatonin, albuterol sulfate HFA, acetaminophen, bisacodyl, sodium phosphate      Intake/Output Summary (Last 24 hours) at 6/20/2025 1248  Last data filed at 6/20/2025 0653  Gross per 24 hour   Intake --   Output 650 ml   Net -650 ml       Exam:    BP (!) 131/53   Pulse 64   Temp 97.7 °F (36.5 °C) (Oral)   Resp 17   Ht 1.778 m (5' 10\")   Wt 75.8 kg (167 lb 1.7 oz)   SpO2 97%   BMI 23.98 kg/m²     General appearance: No apparent distress, appears stated age and cooperative.  HEENT: Pupils equal, round, and reactive to light. Conjunctivae/corneas clear.  Neck: Supple, with full range of motion. No jugular venous distention. Trachea midline.  Respiratory:  Normal respiratory effort. Clear to auscultation, bilaterally without Rales/Wheezes/Rhonchi.  Cardiovascular: Regular rate and rhythm with normal S1/S2 without murmurs, rubs or gallops.  Abdomen: Soft, non-tender, non-distended with normal bowel sounds.  Musculoskeletal: No clubbing, cyanosis or edema bilaterally.  Full range of motion without deformity.  Skin: Skin color, texture, turgor normal.  No rashes or lesions.  Neurologic:  Neurovascularly intact without any focal sensory/motor deficits. Cranial nerves: II-XII intact, grossly non-focal.  Psychiatric: Alert and oriented, thought content

## 2025-06-21 NOTE — PROGRESS NOTES
Physical Therapy  Facility/Department: [facility]  Rehabilitation Discharge note    NAME: Hamilton Torres  : 1935  MRN: [id number]    Date of discharge: 25        Past Medical History:   Diagnosis Date    ACS (acute coronary syndrome) (HCC) 2020    Anticoagulant long-term use     Anxiety     Bursitis of hip     CAD (coronary artery disease)     Carotid stenosis     2013 16-49%    Cervical disc disorder     CHF (congestive heart failure) (HCC)     COVID 2023    COVID-19 virus infection 2023    Dementia (HCC)     Dementia (HCC)     Hyperlipidemia     Hypertension     Hypertensive urgency 2018    MI (myocardial infarction) (HCC)     Osteoarthritis     Spinal stenosis      Past Surgical History:   Procedure Laterality Date    CARPAL TUNNEL RELEASE      CATARACT REMOVAL      COLONOSCOPY  12/10/10,2007    DR MATTHEWS - DONE AT [facility]    COLONOSCOPY      hx polyps needs     COLONOSCOPY  9/21/15     DR. YARBROUGH     CORONARY ANGIOPLASTY WITH STENT PLACEMENT  2020    DIAGNOSTIC CARDIAC CATH LAB PROCEDURE      HERNIA REPAIR Bilateral     x 2    HERNIA REPAIR Right 2020    RIGHT INGUINAL HERNIA REPAIR performed by Roscoe Copeland MD at [facility] OR    PTCA         Restrictions  Restrictions/Precautions  Restrictions/Precautions: Fall Risk    Objective    Bed Mobility  Overall Assistance Level: Modified Independent  Additional Factors: Verbal cues;Increased time to complete;Head of bed flat;With handrails  Roll Left  Assistance Level: Modified independent  Roll Right  Assistance Level: Modified independent  Sit to Supine  Assistance Level: Modified independent  Supine to Sit  Assistance Level: Modified independent  Scooting  Assistance Level: Modified independent     Transfers  Surface: From bed;Wheelchair  Additional Factors: Verbal cues;Hand placement cues;Increased time to complete  Device:  (rollator)  Sit to Stand  Assistance Level: Stand by assist  Skilled  Clinical Factors: no lifting assist required, good hand placement noted between transitions  Stand to Sit  Assistance Level: Stand by assist  Skilled Clinical Factors: cues for safety and controlled descent  Car Transfer  Assistance Level: Minimal assistance;Stand by assist  Skilled Clinical Factors: SBA to complete transition of BLE in/out of car, Cheri to complete STS from lower surface, vc's for technique and sequencing     Ambulation  Surface: Level surface;Uneven surface  Device: Rollator  Distance: 150' x 2  Activity: Within Unit  Additional Factors: Verbal cues;Hand placement cues;Increased time to complete  Assistance Level: Supervision  Gait Deviations: Slow giovanna;Narrow base of support;Unsteady gait;Path deviations  Skilled Clinical Factors: good environmental navigation and familiarity with use of rollator, cues for upright posture and proximity within rollator. no fatigue or SOB noted with distances.             Outcomes Measures:  Timed Up and Go: 30.6       Pt/ family education/training: Pt has been educated in safe mobility. He is unable to carry over for independence    Assessment: Pt has made excellent gains. He has not demonstrated consistent ability to meet all goals however      LTG established:  Long Term Goal 1: Pt to complete bed mobility with indep  Long Term Goal 2: Pt to complete transfers with SBA  Long Term Goal 3: Pt to ambulate 50ft - 150 feet with LRD and SBA  Long Term Goal 4: Pt to complete TUG at 25 sec  Long Term Goal 5: pt to complete curb step with HR and SBA    Discharge Plan:    D/c to AL(pts home)    Electronically signed by Carey Andujar PT on 6/21/2025 at 12:20 PM

## 2025-06-23 NOTE — PROGRESS NOTES
[facility] OCCUPATIONAL THERAPY DISCHARGE SUMMARY- REHAB     Date: 2025  Patient Name: Hamilton Torres        MRN: [id number]  Account: [id number]   : 1935  (89 y.o.)  Room: [place] [address]    Diagnosis:  Impaired mobility and ADLs d/t OA exacerbation    Past Medical History:   Diagnosis Date    ACS (acute coronary syndrome) ([facility]) 2020    Anticoagulant long-term use     Anxiety     Bursitis of hip     CAD (coronary artery disease)     Carotid stenosis     2013 16-49%    Cervical disc disorder     CHF (congestive heart failure) ([facility])     COVID 2023    COVID-19 virus infection 2023    Dementia ([facility])     Dementia ([facility])     Hyperlipidemia     Hypertension     Hypertensive urgency 2018    MI (myocardial infarction) ([facility])     Osteoarthritis     Spinal stenosis      Past Surgical History:   Procedure Laterality Date    CARPAL TUNNEL RELEASE      CATARACT REMOVAL      COLONOSCOPY  12/10/10,    DR MATTHEWS - DONE AT [facility] [place]    COLONOSCOPY      hx polyps needs     COLONOSCOPY  9/21/15     DR. YARBROUGH     CORONARY ANGIOPLASTY WITH STENT PLACEMENT  2020    DIAGNOSTIC CARDIAC CATH LAB PROCEDURE      HERNIA REPAIR Bilateral     x 2    HERNIA REPAIR Right 2020    RIGHT INGUINAL HERNIA REPAIR performed by Roscoe Copeland MD at [facility] OR    PTCA         Precautions:   Restrictions/Precautions: Fall Risk     Social/Functional History:  Social/Functional History  Lives With: Other (Comment) (ASSISTED LIVING)  Type of Home: Assisted living ([facility])  Home Layout: One level  Home Access: Level entry  Bathroom Shower/Tub: Walk-in shower  Bathroom Equipment: Grab bars in shower, Hand-held shower, Shower chair  Bathroom Accessibility: Accessible  Home Equipment: Rollator, Adjustable bed  Has the patient had two or more falls in the past year or any fall with injury in the past year?: Unknown  Receives Help From: Family (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

## 2025-06-30 ENCOUNTER — OFFICE VISIT (OUTPATIENT)
Age: 89
End: 2025-06-30
Payer: MEDICARE

## 2025-06-30 VITALS — DIASTOLIC BLOOD PRESSURE: 70 MMHG | OXYGEN SATURATION: 95 % | SYSTOLIC BLOOD PRESSURE: 120 MMHG | HEART RATE: 65 BPM

## 2025-06-30 DIAGNOSIS — I10 ESSENTIAL HYPERTENSION: ICD-10-CM

## 2025-06-30 DIAGNOSIS — E78.5 HYPERLIPIDEMIA, UNSPECIFIED HYPERLIPIDEMIA TYPE: ICD-10-CM

## 2025-06-30 DIAGNOSIS — I50.9 CONGESTIVE HEART FAILURE, UNSPECIFIED HF CHRONICITY, UNSPECIFIED HEART FAILURE TYPE (HCC): Primary | ICD-10-CM

## 2025-06-30 DIAGNOSIS — R09.89 BILATERAL CAROTID BRUITS: ICD-10-CM

## 2025-06-30 PROCEDURE — 1123F ACP DISCUSS/DSCN MKR DOCD: CPT | Performed by: INTERNAL MEDICINE

## 2025-06-30 PROCEDURE — 1159F MED LIST DOCD IN RCRD: CPT | Performed by: INTERNAL MEDICINE

## 2025-06-30 PROCEDURE — 99213 OFFICE O/P EST LOW 20 MIN: CPT | Performed by: INTERNAL MEDICINE

## 2025-06-30 PROCEDURE — 1111F DSCHRG MED/CURRENT MED MERGE: CPT | Performed by: INTERNAL MEDICINE

## 2025-06-30 PROCEDURE — 1036F TOBACCO NON-USER: CPT | Performed by: INTERNAL MEDICINE

## 2025-06-30 PROCEDURE — G8420 CALC BMI NORM PARAMETERS: HCPCS | Performed by: INTERNAL MEDICINE

## 2025-06-30 PROCEDURE — 99214 OFFICE O/P EST MOD 30 MIN: CPT | Performed by: INTERNAL MEDICINE

## 2025-06-30 PROCEDURE — G8427 DOCREV CUR MEDS BY ELIG CLIN: HCPCS | Performed by: INTERNAL MEDICINE

## 2025-06-30 ASSESSMENT — ENCOUNTER SYMPTOMS
WHEEZING: 0
GASTROINTESTINAL NEGATIVE: 1
CHEST TIGHTNESS: 0
EYES NEGATIVE: 1
RESPIRATORY NEGATIVE: 1
STRIDOR: 0
BLOOD IN STOOL: 0
COUGH: 0
NAUSEA: 0
SHORTNESS OF BREATH: 0

## 2025-06-30 NOTE — PROGRESS NOTES
[place]    Falls    Orthostatic hypotension    Dysautonomia (HCC)    Cognitive changes       There are no discontinued medications.        Modified Medications    No medications on file       No orders of the defined types were placed in this encounter.      Assessment/Plan:    1. Hyperlipidemia, unspecified hyperlipidemia type  Statin - continue.  Labs reviewed- excellent.     2. NSTEMI (non-ST elevated myocardial infarction) (HCC)  no ANGINA - conitnue all CV protective meds. SL NTG for angina discussed.     3. Essential hypertension   stable now.continue same mdes. Low salt diet.     4. Coronary artery disease involving native coronary artery of native heart without angina pectoris  No angina.  Dc ASA continue Plavix.     5. R Inguinal hernia- asymptomatic . Avoid heavylifting or constipation- stable      6. Carotid Bruits- most recent CUS images reviewed- f/u     7. Knee arthritis -  stable.     8. RLE DVT 12/24 - Eliquis.     Counseling:  Heart Healthy Lifestyle, Decrease Alcohol Consumption, Take Precautions to Prevent Falls, Regular Exercise and Walk Daily    Return in about 4 months (around 10/30/2025).      Electronically signed by TENISHA CARRIZALES MD on 6/30/2025 at 1:07 PM

## 2025-07-18 LAB
ATRIAL RATE: 72 BPM
ATRIAL RATE: 73 BPM
P AXIS: 55 DEGREES
P AXIS: 61 DEGREES
P OFFSET: 171 MS
P OFFSET: 173 MS
P ONSET: 124 MS
P ONSET: 124 MS
PR INTERVAL: 180 MS
PR INTERVAL: 180 MS
Q ONSET: 214 MS
Q ONSET: 214 MS
QRS COUNT: 11 BEATS
QRS COUNT: 12 BEATS
QRS DURATION: 116 MS
QRS DURATION: 118 MS
QT INTERVAL: 388 MS
QT INTERVAL: 390 MS
QTC CALCULATION(BAZETT): 424 MS
QTC CALCULATION(BAZETT): 429 MS
QTC FREDERICIA: 412 MS
QTC FREDERICIA: 416 MS
R AXIS: -43 DEGREES
R AXIS: -47 DEGREES
T AXIS: 49 DEGREES
T AXIS: 53 DEGREES
T OFFSET: 408 MS
T OFFSET: 409 MS
VENTRICULAR RATE: 72 BPM
VENTRICULAR RATE: 73 BPM

## 2025-08-20 ENCOUNTER — OFFICE VISIT (OUTPATIENT)
Age: 89
End: 2025-08-20
Payer: MEDICARE

## 2025-08-20 VITALS — HEART RATE: 70 BPM | DIASTOLIC BLOOD PRESSURE: 64 MMHG | SYSTOLIC BLOOD PRESSURE: 122 MMHG

## 2025-08-20 DIAGNOSIS — R29.6 RECURRENT FALLS: ICD-10-CM

## 2025-08-20 DIAGNOSIS — G90.1 DYSAUTONOMIA (HCC): ICD-10-CM

## 2025-08-20 DIAGNOSIS — I95.1 ORTHOSTATIC HYPOTENSION: ICD-10-CM

## 2025-08-20 DIAGNOSIS — F02.A0 MILD ALZHEIMER'S DEMENTIA, UNSPECIFIED TIMING OF DEMENTIA ONSET, UNSPECIFIED WHETHER BEHAVIORAL, PSYCHOTIC, OR MOOD DISTURBANCE OR ANXIETY (HCC): ICD-10-CM

## 2025-08-20 DIAGNOSIS — G20.A2 PARKINSON'S DISEASE WITHOUT DYSKINESIA, WITH FLUCTUATING MANIFESTATIONS (HCC): Primary | ICD-10-CM

## 2025-08-20 DIAGNOSIS — G30.9 MILD ALZHEIMER'S DEMENTIA, UNSPECIFIED TIMING OF DEMENTIA ONSET, UNSPECIFIED WHETHER BEHAVIORAL, PSYCHOTIC, OR MOOD DISTURBANCE OR ANXIETY (HCC): ICD-10-CM

## 2025-08-20 PROCEDURE — 99213 OFFICE O/P EST LOW 20 MIN: CPT | Performed by: PSYCHIATRY & NEUROLOGY

## 2025-08-20 PROCEDURE — 99214 OFFICE O/P EST MOD 30 MIN: CPT | Performed by: PSYCHIATRY & NEUROLOGY

## 2025-08-20 PROCEDURE — G8427 DOCREV CUR MEDS BY ELIG CLIN: HCPCS | Performed by: PSYCHIATRY & NEUROLOGY

## 2025-08-20 PROCEDURE — 1036F TOBACCO NON-USER: CPT | Performed by: PSYCHIATRY & NEUROLOGY

## 2025-08-20 PROCEDURE — 1159F MED LIST DOCD IN RCRD: CPT | Performed by: PSYCHIATRY & NEUROLOGY

## 2025-08-20 PROCEDURE — G8420 CALC BMI NORM PARAMETERS: HCPCS | Performed by: PSYCHIATRY & NEUROLOGY

## 2025-08-20 PROCEDURE — 1123F ACP DISCUSS/DSCN MKR DOCD: CPT | Performed by: PSYCHIATRY & NEUROLOGY

## (undated) DEVICE — PACK,LAPAROTOMY,NO GOWNS: Brand: MEDLINE

## (undated) DEVICE — TOWEL,OR,DSP,ST,BLUE,STD,4/PK,20PK/CS: Brand: MEDLINE

## (undated) DEVICE — APPLICATOR MEDICATED 26 CC SOLUTION HI LT ORNG CHLORAPREP

## (undated) DEVICE — ELECTRODE PT RET AD L9FT HI MOIST COND ADH HYDRGEL CORDED

## (undated) DEVICE — SUTURE VCRL + SZ 2-0 L36IN ABSRB UD L36MM CT-1 1/2 CIR VCP945H

## (undated) DEVICE — SUTURE VCRL + SZ 3-0 L36IN ABSRB UD L36MM CT-1 1/2 CIR VCP944H

## (undated) DEVICE — COVER LT HNDL BLU PLAS

## (undated) DEVICE — GOWN,SIRUS,NONRNF,SETINSLV,2XL,18/CS: Brand: MEDLINE

## (undated) DEVICE — GLOVE ORANGE PI 8   MSG9080

## (undated) DEVICE — INTENDED FOR TISSUE SEPARATION, AND OTHER PROCEDURES THAT REQUIRE A SHARP SURGICAL BLADE TO PUNCTURE OR CUT.: Brand: BARD-PARKER ® CARBON RIB-BACK BLADES

## (undated) DEVICE — LABEL MED MINI W/ MARKER

## (undated) DEVICE — MARKER SURG SKIN GENTIAN VLT REG TIP W/ 6IN RUL

## (undated) DEVICE — SUTURE MCRYL SZ 4-0 L27IN ABSRB UD L19MM PS-2 1/2 CIR PRIM Y426H

## (undated) DEVICE — SECTO® DISSECTOR, KITTNER, 5/16 IN DIAMETER, (5 EA/POUCH, 24 POUCHES/PK, 4 PK/BX): Brand: SYMMETRY SURGICAL

## (undated) DEVICE — COUNTER NDL 40 COUNT HLD 70 FOAM BLK ADH W/ MAG

## (undated) DEVICE — SPONGE,LAP,18"X18",DLX,XR,ST,5/PK,40/PK: Brand: MEDLINE

## (undated) DEVICE — SUTURE VCRL SZ 2-0 L36IN ABSRB UD L36MM CT-1 1/2 CIR J945H

## (undated) DEVICE — DRAIN SURG PENROSE 0.25X12 IN CLOSED WND DRAINAGE PREM SIL

## (undated) DEVICE — GOWN,AURORA,NONREINFORCED,LARGE: Brand: MEDLINE

## (undated) DEVICE — PENCIL SMOKE EVAC PUSH BUTTON COATED